# Patient Record
Sex: FEMALE | Race: WHITE | NOT HISPANIC OR LATINO | Employment: OTHER | URBAN - METROPOLITAN AREA
[De-identification: names, ages, dates, MRNs, and addresses within clinical notes are randomized per-mention and may not be internally consistent; named-entity substitution may affect disease eponyms.]

---

## 2017-12-13 ENCOUNTER — TRANSFERRED RECORDS (OUTPATIENT)
Dept: HEALTH INFORMATION MANAGEMENT | Facility: CLINIC | Age: 77
End: 2017-12-13

## 2018-03-06 ENCOUNTER — MEDICAL CORRESPONDENCE (OUTPATIENT)
Dept: HEALTH INFORMATION MANAGEMENT | Facility: CLINIC | Age: 78
End: 2018-03-06

## 2018-03-09 ENCOUNTER — MEDICAL CORRESPONDENCE (OUTPATIENT)
Dept: HEALTH INFORMATION MANAGEMENT | Facility: CLINIC | Age: 78
End: 2018-03-09

## 2018-03-20 ENCOUNTER — DOCUMENTATION ONLY (OUTPATIENT)
Dept: FAMILY MEDICINE | Facility: CLINIC | Age: 78
End: 2018-03-20

## 2018-03-20 DIAGNOSIS — R01.1 UNDIAGNOSED CARDIAC MURMURS: ICD-10-CM

## 2018-03-20 DIAGNOSIS — Z78.9 NURSING HOME RESIDENT: Primary | ICD-10-CM

## 2018-03-28 PROBLEM — F60.3 BORDERLINE PERSONALITY DISORDER (H): Status: ACTIVE | Noted: 2018-03-28

## 2018-03-28 PROBLEM — K21.9 GERD (GASTROESOPHAGEAL REFLUX DISEASE): Status: ACTIVE | Noted: 2018-03-28

## 2018-03-28 PROBLEM — F32.A DEPRESSIVE DISORDER: Status: ACTIVE | Noted: 2018-03-28

## 2018-03-29 NOTE — PROGRESS NOTES
Preceptor Attestation:   Patient seen, evaluated and discussed with the resident. I have verified the content of the note, which accurately reflects my assessment of the patient and the plan of care.   Supervising Physician:  Inder Lindsey MD

## 2018-03-29 NOTE — PROGRESS NOTES
"Subjective: Bhavana Marr is a 77 year old who is seen at [facility] nursing home for New patient visit today.    Additional information obtained from Nursing Staff RN. Patient is new to [facility]. Has a past medical history of MDD, Asthma, GERD, Borderline Personality disorder, Hyperlipidemia, osteoporosis  Acute concerns today include: No acute concern today    Chronic and Past Medical Problems include:  Patient Active Problem List   Diagnosis     Arthritis     Depressive disorder     Borderline personality disorder     GERD (gastroesophageal reflux disease)     Family History     Problem (# of Occurrences) Relation (Name,Age of Onset)    Depression (4) Maternal Aunt (60), Maternal Uncle (85), Paternal Aunt, Paternal Uncle        Social History:   Difficulty talking to patient. Would answer with \"yes\" or \"no\" and patient asked \"are we done\" when wanted to end interview  PMHX//MEDS/ALLERGIES/SHX/FHX reviewed and updated in Epic.   MEDICATION LIST:   Will need to obtain current med list to update medlist in chart    GENERAL ROS:   Patient had no general concerns. No fevers, chills, nausea, headache, chest pain trouble breathing, abd pain      Objective: LMP  (LMP Unknown)   /70 HR 78, RR 14, Temp 97.4  Gen: Well nourished and in NAD. Answers in one word answers or short sentence but overall cooperative and plesent  HEENT: Head is atraumatic,   CV: RR, 2+ systolic murmur heard best on Left mid axillary line  Pulm: CTAB, no wheezes/rales/rhonchi, good air entry   Extrem: no cyanosis, edema or clubbing   Psych: Euthymic, pleasant but answers with one word or brief answers  Neuro: Pt is able to ambulate independently       Assessment/ Plan:  1. Nursing home resident  New resident at [facility]. No complaints at this time, meeting with behavioural health at the home. Overall pleasant but not very conversive and conversation abrubtly ended with patient asking if \"we were finished\" Paper work was not available to review " vaccination, prior work up. Will need to review and scan into our system and ask at next visit. No acute concerns  - Continue current medications  - Will need to review pt's previous charts to see if patient needs vaccination, other diagnosis, or other w/u  - Need fax of medication or obtain med list at next visit    2. Undiagnosed cardiac murmurs  Patient noted to have a soft murmur on mitral area. No SOB, no chest pain, no swelling noted on exam. Will need to review previous charts once available to see if w/u done for murmur. Non- concerning at this time  - If no previous work up, can consider Echo       RECOMMENDED FOLLOW UP:  2 months.    Level of Service:  25594    Total of 20 minutes was spent in face to face contact with patient with > 50% in counseling and coordination of care.  Options for treatment and/or follow-up care were reviewed with the patient. Bhavana Marr was engaged and actively involved in the decision making process. She verbalized understanding of the options discussed and was satisfied with the final plan.    Phuc Davis  Family Medicine Resident PGY3

## 2018-04-10 DIAGNOSIS — F39 MOOD DISORDER (H): Primary | ICD-10-CM

## 2018-04-10 RX ORDER — PANTOPRAZOLE SODIUM 20 MG/1
20 TABLET, DELAYED RELEASE ORAL DAILY
Qty: 30 TABLET | Refills: 11 | Status: SHIPPED | OUTPATIENT
Start: 2018-04-10 | End: 2020-10-15

## 2018-04-10 RX ORDER — SERTRALINE HYDROCHLORIDE 100 MG/1
150 TABLET, FILM COATED ORAL DAILY
Qty: 30 TABLET | Refills: 11 | Status: ON HOLD | OUTPATIENT
Start: 2018-04-10 | End: 2023-05-31

## 2018-04-10 RX ORDER — TRAZODONE HYDROCHLORIDE 100 MG/1
100 TABLET ORAL AT BEDTIME
Qty: 30 TABLET | Refills: 11 | Status: SHIPPED | OUTPATIENT
Start: 2018-04-10 | End: 2018-09-24

## 2018-04-10 RX ORDER — MULTIVIT-MIN/FA/LYCOPEN/LUTEIN .4-300-25
TABLET ORAL
Qty: 30 TABLET | Refills: 11 | Status: SHIPPED | OUTPATIENT
Start: 2018-04-10 | End: 2019-09-18

## 2018-04-10 RX ORDER — MIRTAZAPINE 15 MG/1
15 TABLET, FILM COATED ORAL AT BEDTIME
Qty: 30 TABLET | Refills: 11 | Status: SHIPPED | OUTPATIENT
Start: 2018-04-10 | End: 2019-03-20

## 2018-04-10 RX ORDER — FERROUS SULFATE 325(65) MG
325 TABLET ORAL DAILY
Qty: 30 TABLET | Refills: 11 | Status: SHIPPED | OUTPATIENT
Start: 2018-04-10 | End: 2018-09-24

## 2018-04-10 RX ORDER — DOCUSATE SODIUM 100 MG/1
100 CAPSULE, LIQUID FILLED ORAL DAILY
Qty: 30 CAPSULE | Refills: 11 | Status: SHIPPED | OUTPATIENT
Start: 2018-04-10 | End: 2018-09-24

## 2018-04-10 RX ORDER — SIMVASTATIN 40 MG
40 TABLET ORAL AT BEDTIME
Qty: 30 TABLET | Refills: 11 | Status: SHIPPED | OUTPATIENT
Start: 2018-04-10

## 2018-04-10 RX ORDER — ACETAMINOPHEN 325 MG/1
650 TABLET ORAL 3 TIMES DAILY
Qty: 168 TABLET | Refills: 11 | Status: SHIPPED | OUTPATIENT
Start: 2018-04-10 | End: 2018-09-24

## 2018-04-10 RX ORDER — OLANZAPINE 5 MG/1
5 TABLET ORAL DAILY
Qty: 98 TABLET | Refills: 11 | Status: SHIPPED | OUTPATIENT
Start: 2018-04-10 | End: 2020-10-15

## 2018-05-02 DIAGNOSIS — J30.9 ALLERGIC RHINITIS, UNSPECIFIED CHRONICITY, UNSPECIFIED SEASONALITY, UNSPECIFIED TRIGGER: Primary | ICD-10-CM

## 2018-05-02 RX ORDER — FLUTICASONE PROPIONATE 50 MCG
2 SPRAY, SUSPENSION (ML) NASAL DAILY
Qty: 1 BOTTLE | Refills: 11 | Status: SHIPPED | OUTPATIENT
Start: 2018-05-02 | End: 2019-03-20

## 2018-05-16 NOTE — PROGRESS NOTES
"Per RAUL R[facility] \"wt 126.5, /79 P 106 R 14 T 98.4 60 day appt. Resident continues to do well, no medical issues at this time.\"  Avelina Glagissel, CMA    "

## 2018-05-22 ENCOUNTER — DOCUMENTATION ONLY (OUTPATIENT)
Dept: FAMILY MEDICINE | Facility: CLINIC | Age: 78
End: 2018-05-22

## 2018-05-22 VITALS
TEMPERATURE: 98.4 F | HEART RATE: 106 BPM | SYSTOLIC BLOOD PRESSURE: 123 MMHG | WEIGHT: 126.5 LBS | RESPIRATION RATE: 14 BRPM | DIASTOLIC BLOOD PRESSURE: 79 MMHG

## 2018-05-22 DIAGNOSIS — Z78.9 NURSING HOME RESIDENT: Primary | ICD-10-CM

## 2018-05-22 DIAGNOSIS — R01.1 HOLOSYSTOLIC MURMUR: ICD-10-CM

## 2018-05-22 NOTE — PROGRESS NOTES
Subjective: Bhavana Marr is a 77 year old who is seen at [facility] for follow up visit today.    Additional information obtained from Nursing Staff Josefina, who states that patient is doing well without concerns.   Acute concerns today include: None. Patient has no additional questions.   Chronic and Past Medical Problems include:  Patient Active Problem List   Diagnosis     Arthritis     Depressive disorder     Borderline personality disorder     GERD (gastroesophageal reflux disease)     Holosystolic murmur     Family History     Problem (# of Occurrences) Relation (Name,Age of Onset)    Depression (4) Maternal Aunt (60), Maternal Uncle (85), Paternal Aunt, Paternal Uncle        Social History:   Current activities:  Eating breakfast  PMHX/PSHX/MEDS/ALLERGIES/SHX/FHX reviewed and updated in Epic.   MEDICATION LIST:  Current Outpatient Prescriptions   Medication     acetaminophen (TYLENOL) 325 MG tablet     Calcium Oyster Shell 500 MG TABS     cholecalciferol (VITAMIN D3) 1000 UNIT tablet     Cyanocobalamin (B-12) 1000 MCG TBCR     docusate sodium (DOK) 100 MG capsule     ferrous sulfate (IRON) 325 (65 Fe) MG tablet     fluticasone (FLONASE) 50 MCG/ACT spray     mirtazapine (REMERON) 15 MG tablet     Multiple Vitamins-Minerals (CEROVITE SENIOR) TABS     OLANZapine (ZYPREXA) 5 MG tablet     pantoprazole (PROTONIX) 20 MG EC tablet     sertraline (ZOLOFT) 100 MG tablet     simvastatin (ZOCOR) 40 MG tablet     traZODone (DESYREL) 100 MG tablet     No current facility-administered medications for this visit.        GERIATRIC ROS:    Do you have difficulty getting around, watching TV or reading because of poor eyesight? No   Can you hear normal conversational voice? Yes  GENERAL ROS:   General: No unexplained weight gain or loss; adequate sleep pattern.  Resp: No worsening shortness of breath, cough or hemoptysis.   GI: No worsening constipation, no diarrhea, no nausea or vomiting  : Voiding  independently with no significant incontinence   Skin: No areas of new skin breakdown or worrisome rashes  Extremities:  No pain, extremity weakness or balance troubles    Objective: /79  Pulse 106  Temp 98.4  F (36.9  C)  Resp 14  Wt 126 lb 8 oz (57.4 kg)  LMP  (LMP Unknown)   Most recent weight:  126 lb  Gen: Well nourished and in NAD   HEENT: Head is atraumatic, moist mucous membranes  CV:  regular rate and rhythm, 2+ holosystolic murmur heard best at the left sternal border.  Pulm: CTAB, no wheezes/rales/rhonchi, good air entry   ABD: soft, nontender, BS intact  Extrem: no cyanosis, edema or clubbing   Psych: Euthymic.  Patient is alert and oriented ×3 converses normally.  Neuro: Pt is able to ambulate independently.    Preventative Screening:   Vaccinations reviewed: Patient did have Zostavax but would recommend Shingrix  Additional Recommended Screening: Echocardiogram for cardiac murmur     Assessment/ Plan:  Bhavana Munguia was seen today for other.    Diagnoses and all orders for this visit:    Nursing home resident.  Overall, patient doing well without concerns today.  She is able to ambulate independently and her mentation is normal.  Nursing staff has no further concerns.  Patient was recently seen for the first time by our clinic at which time vaccines and past records are unavailable.  Vaccine record was reviewed today, and the patient had the old Zostavax shingles vaccine, she would be recommended to get the new shingrix if able to afford.  Past medical records are unavailable, and nursing states they do not know that they will obtain these.  -Pharmacy to complete med reconciliation.    Holosystolic murmur.  Patient with a holosystolic murmur, likely aortic stenosis.  Recommend patient to get an echo for evaluation of this as she does not know if she has had one in the past and past records are not available for her.    RECOMMENDED FOLLOW UP:  2 months.    Level of Service:  07331    Whitney  MD Rodger  PGY-2, [facility]   Pager: [phone]

## 2018-06-08 DIAGNOSIS — J45.909 UNCOMPLICATED ASTHMA, UNSPECIFIED ASTHMA SEVERITY, UNSPECIFIED WHETHER PERSISTENT: Primary | ICD-10-CM

## 2018-06-08 RX ORDER — ALBUTEROL SULFATE 90 UG/1
1-2 AEROSOL, METERED RESPIRATORY (INHALATION) EVERY 4 HOURS PRN
Qty: 1 INHALER | Refills: 1 | Status: SHIPPED | OUTPATIENT
Start: 2018-06-08 | End: 2021-02-23

## 2018-07-13 ENCOUNTER — TRANSFERRED RECORDS (OUTPATIENT)
Dept: HEALTH INFORMATION MANAGEMENT | Facility: CLINIC | Age: 78
End: 2018-07-13

## 2018-07-26 NOTE — PROGRESS NOTES
Per Josefina, RN at [facility]: 60 day appt wt 125 /80 P 78 R 16 T 98.2 Bhavana struggles with keeping her food down at times. She is many years post op gastric bypass and is maintaining her weight only by taking ensure QID. I am wondering if she should have an EGD to find out if she has some strictures or something else going on that is hindering her eating? Also she reports no pain from her fx, could you discuss discontinuing her scheduled tylenol? Needs POLST completed.    Avelina Moreau, CMA

## 2018-07-27 ENCOUNTER — MEDICAL CORRESPONDENCE (OUTPATIENT)
Dept: HEALTH INFORMATION MANAGEMENT | Facility: CLINIC | Age: 78
End: 2018-07-27

## 2018-07-30 ENCOUNTER — DOCUMENTATION ONLY (OUTPATIENT)
Dept: FAMILY MEDICINE | Facility: CLINIC | Age: 78
End: 2018-07-30

## 2018-07-30 DIAGNOSIS — Z78.9 NURSING HOME RESIDENT: ICD-10-CM

## 2018-07-30 DIAGNOSIS — Z98.84 HISTORY OF GASTRIC BYPASS: ICD-10-CM

## 2018-07-30 DIAGNOSIS — R11.10 VOMITING, INTRACTABILITY OF VOMITING NOT SPECIFIED, PRESENCE OF NAUSEA NOT SPECIFIED, UNSPECIFIED VOMITING TYPE: Primary | ICD-10-CM

## 2018-07-30 RX ORDER — OLANZAPINE 5 MG/1
12.5 TABLET ORAL AT BEDTIME
COMMUNITY
Start: 2018-07-30 | End: 2020-10-15

## 2018-07-30 RX ORDER — GUAIFENESIN/DEXTROMETHORPHAN 100-10MG/5
5 SYRUP ORAL EVERY 4 HOURS PRN
Qty: 560 ML | COMMUNITY
Start: 2018-07-30 | End: 2018-10-17

## 2018-07-30 NOTE — PROGRESS NOTES
Subjective: Bhavana Marr is a 77 year old who is seen at [facility] for follow up visit today.  Additional information obtained from Nursing Staff Josefina  Acute concerns today include: Difficulty swallowing/vomiting   Patient states she has difficulty keeping food down. She does not seem to have any difficulty swallowing but shortly after eating or hours later she will start to vomit. Patient states is does not seem to happen at any particular time of day or with any type of food. She has a history of a gastric bypass surgery and feels it is associated with portion size. She has been watching her portion sizes recently and states it has not happened. Nursing staff confirmed history and noted it had not happened for the last two weeks.   Chronic and Past Medical Problems include:  Patient Active Problem List   Diagnosis     Arthritis     Depressive disorder     Borderline personality disorder     GERD (gastroesophageal reflux disease)     Holosystolic murmur     Asthma     Family History     Problem (# of Occurrences) Relation (Name,Age of Onset)    Depression (4) Maternal Aunt (60), Maternal Uncle (85), Paternal Aunt, Paternal Uncle        Social History:   Current activities:  Sitting in room  PMHX/PSHX/MEDS/ALLERGIES/SHX/FHX reviewed and updated in Epic.   MEDICATION LIST:  Current Outpatient Prescriptions   Medication     acetaminophen (TYLENOL) 325 MG tablet     albuterol (PROAIR HFA/PROVENTIL HFA/VENTOLIN HFA) 108 (90 Base) MCG/ACT Inhaler     Calcium Oyster Shell 500 MG TABS     cholecalciferol (VITAMIN D3) 1000 UNIT tablet     Cyanocobalamin (B-12) 1000 MCG TBCR     docusate sodium (DOK) 100 MG capsule     ferrous sulfate (IRON) 325 (65 Fe) MG tablet     fluticasone (FLONASE) 50 MCG/ACT spray     mirtazapine (REMERON) 15 MG tablet     Multiple Vitamins-Minerals (CEROVITE SENIOR) TABS     OLANZapine (ZYPREXA) 5 MG tablet     pantoprazole (PROTONIX) 20 MG EC tablet     sertraline (ZOLOFT) 100  MG tablet     simvastatin (ZOCOR) 40 MG tablet     traZODone (DESYREL) 100 MG tablet     No current facility-administered medications for this visit.      GERIATRIC ROS:    Do you have difficulty getting around, watching TV or reading because of poor eyesight? No   Can you hear normal conversational voice? No   Do you have trouble with control of your bladder?No   Do you have trouble with control of your bowels? No     GENERAL ROS:   General: No unexplained weight gain or loss; adequate sleep pattern.  Resp: No worsening shortness of breath, cough or hemoptysis.   GI: No worsening constipation, no diarrhea, no nausea or vomiting  : Voiding independently with no significant incontinence   Skin: No areas of new skin breakdown or worrisome rashes  Extremities:  No pain, extremity weakness or balance troubles    Objective:wt 125 /80 P 78 R 16 T 98.2   Gen: Well nourished and in NAD   HEENT: Head is atraumatic, decent dentition  CV: RRR, 2/6 holosystolic murmur heard best at the left sternal border  Pulm: CTAB, no wheezes/rales/rhonchi, good air entry   ABD: soft, nontender, BS intact  Extrem: no cyanosis, edema or clubbing   Psych: Euthymic. Patient is alert and oriented ×3 converses normally.  Neuro: Pt is able to ambulate independently    Preventative Screening:   Additional Recommended Screening: Swallow study with contrast     POLST has been completed today     Assessment/ Plan:  Nursing home resident  Overall, patient doing well.  She is able to ambulate independently and her mentation is normal.       Vomiting, intractability of vomiting not specified, presence of nausea not specified, unspecified vomiting type    History of gastric bypass    Due to patient history of gastric bypass will opt to get a swallow study to further evaluate. May just be due to large portion sizes in the setting of gastric bypass history but it is interesting that this is fairly new. Gastric Bypass was in 2000. Collect CMP, CBC,  and TSH to evaluate nutritional status in the setting of vomiting   -X-Ray Swallow Study w/contrast   -CMP, CBC, and TSH on next lab day.     RECOMMENDED FOLLOW UP:  2 months.    Level of Service:  04511    Total of 30 minutes was spent in face to face contact with patient with > 50% in counseling and coordination of care.  Options for treatment and/or follow-up care were reviewed with the patient. Bhavana Marr was engaged and actively involved in the decision making process. She verbalized understanding of the options discussed and was satisfied with the final plan.      Katarzyna Oneal

## 2018-07-31 NOTE — PROGRESS NOTES
Preceptor Attestation:   Patient seen, evaluated and discussed with the resident. I have verified the content of the note, which accurately reflects my assessment of the patient and the plan of care.   Supervising Physician:  David Lewis MD

## 2018-08-16 ENCOUNTER — TRANSFERRED RECORDS (OUTPATIENT)
Dept: HEALTH INFORMATION MANAGEMENT | Facility: CLINIC | Age: 78
End: 2018-08-16

## 2018-09-20 ENCOUNTER — MEDICAL CORRESPONDENCE (OUTPATIENT)
Dept: HEALTH INFORMATION MANAGEMENT | Facility: CLINIC | Age: 78
End: 2018-09-20

## 2018-09-20 DIAGNOSIS — R01.1 HOLOSYSTOLIC MURMUR: Primary | ICD-10-CM

## 2018-09-20 NOTE — PROGRESS NOTES
Echocardiogram Complete  September 20, 2018 at 2:54 pm Demographics, referral and medication list faxed to [facility] at [phone] who will contact patient for scheduling   [facility]  Phone: [phone]  Fax: [phone]

## 2018-09-21 ENCOUNTER — RECORDS - HEALTHEAST (OUTPATIENT)
Dept: ADMINISTRATIVE | Facility: OTHER | Age: 78
End: 2018-09-21

## 2018-09-21 NOTE — PROGRESS NOTES
September 21, 2018 at 2:58 pm faxed updated demographics and referral to [facility] at [phone]

## 2018-09-21 NOTE — PROGRESS NOTES
Per Josefina RN at [facility]:  60D Bp 97/69 P 106 R 16 T 98.6 Bhavana had a visit from a PA from [facility] yesterday and she was very concerned about what she stated was a grade 4/6 heart murmur which prompted an email to find out about an ECHO which was discussed in June. Bhavana does spend a lot of time in bed, she usually gets up for meals, eats very quickly then goes back up to bed. She never c/o chest pain or discomfort, just fatigue and sleeps a lot.  Avelina Moreau, CMA

## 2018-09-24 ENCOUNTER — DOCUMENTATION ONLY (OUTPATIENT)
Dept: FAMILY MEDICINE | Facility: CLINIC | Age: 78
End: 2018-09-24

## 2018-09-24 DIAGNOSIS — F39 MOOD DISORDER (H): ICD-10-CM

## 2018-09-24 DIAGNOSIS — K59.00 CONSTIPATION, UNSPECIFIED CONSTIPATION TYPE: Primary | ICD-10-CM

## 2018-09-24 RX ORDER — TRAZODONE HYDROCHLORIDE 100 MG/1
50 TABLET ORAL AT BEDTIME
Qty: 90 TABLET | Refills: 3 | Status: SHIPPED | OUTPATIENT
Start: 2018-09-24 | End: 2020-10-15

## 2018-09-24 RX ORDER — DOCUSATE SODIUM 100 MG/1
100 CAPSULE, LIQUID FILLED ORAL DAILY
Qty: 30 CAPSULE | Refills: 11 | Status: SHIPPED | OUTPATIENT
Start: 2018-09-24 | End: 2018-10-02 | Stop reason: ALTCHOICE

## 2018-09-24 RX ORDER — FERROUS SULFATE 325(65) MG
325 TABLET ORAL DAILY
Qty: 30 TABLET | Refills: 11 | Status: SHIPPED | OUTPATIENT
Start: 2018-09-24 | End: 2018-09-28

## 2018-09-24 RX ORDER — ASPIRIN 81 MG
100 TABLET, DELAYED RELEASE (ENTERIC COATED) ORAL DAILY PRN
Qty: 60 TABLET | Refills: 1 | COMMUNITY
Start: 2018-09-24 | End: 2018-10-02 | Stop reason: ALTCHOICE

## 2018-09-24 RX ORDER — ACETAMINOPHEN 325 MG/1
650 TABLET ORAL 3 TIMES DAILY PRN
Qty: 168 TABLET | Refills: 11 | COMMUNITY
Start: 2018-09-24 | End: 2020-10-15

## 2018-09-24 NOTE — PROGRESS NOTES
Subjective: Bhavana Marr is a 77 year old who is seen at [facility] nursing home for follow up visit today.    Additional information obtained from Nursing Staff, and includes:  Scheduling echo for her murmur.  Spends a lot of time in bed.  Eats her meals quickly.    Acute concerns today include: She is doing well and is wondering about the medications to help her sleep.  She believes something was decreased and that he is not sleeping as well anymore.  No headache, lightheadedness, SOB, fatigue, or dizziness.  Her nausea and vomiting have resolved and she tries to eat different foods and more slowly to not make her stomach get as upset.  She does alternate between hard and soft stools regularly.    Chronic and Past Medical Problems include:  Patient Active Problem List   Diagnosis     Arthritis     Depressive disorder     Borderline personality disorder     GERD (gastroesophageal reflux disease)     Holosystolic murmur     Asthma     History of gastric bypass     Family History     Problem (# of Occurrences) Relation (Name,Age of Onset)    Depression (4) Maternal Aunt (60), Maternal Uncle (85), Paternal Aunt, Paternal Uncle      Social History:   Current activities:  Sleeping in bed on arrival, stays in her room a fair amount during the day.    PMHX/PSHX/MEDS/ALLERGIES/SHX/FHX reviewed and updated in Epic.   MEDICATION LIST:  Current Outpatient Prescriptions   Medication     acetaminophen (TYLENOL) 325 MG tablet     docusate sodium (COLACE) 100 MG tablet     docusate sodium (DOK) 100 MG capsule     ferrous sulfate (IRON) 325 (65 Fe) MG tablet     traZODone (DESYREL) 100 MG tablet     albuterol (PROAIR HFA/PROVENTIL HFA/VENTOLIN HFA) 108 (90 Base) MCG/ACT Inhaler     Calcium Oyster Shell 500 MG TABS     cholecalciferol (VITAMIN D3) 1000 UNIT tablet     Cyanocobalamin (B-12) 1000 MCG TBCR     fluticasone (FLONASE) 50 MCG/ACT spray     guaiFENesin-dextromethorphan (ROBITUSSIN DM) 100-10 MG/5ML syrup     mirtazapine  (REMERON) 15 MG tablet     Multiple Vitamins-Minerals (CEROVITE SENIOR) TABS     OLANZapine (ZYPREXA) 5 MG tablet     OLANZapine (ZYPREXA) 5 MG tablet     pantoprazole (PROTONIX) 20 MG EC tablet     sertraline (ZOLOFT) 100 MG tablet     simvastatin (ZOCOR) 40 MG tablet     [DISCONTINUED] traZODone (DESYREL) 100 MG tablet     No current facility-administered medications for this visit.    GERIATRIC ROS:    Do you have difficulty getting around, watching TV or reading because of poor eyesight?  No   Can you hear normal conversational voice? Yes   Do you use hearing aides? No   Do you have problems with your memory?  No   Do you often feel sad or depressed? No   Have you unintentionally lost weight in the last 6 months? No   Do you have trouble with control of your bladder?  No   Do you have trouble with control of your bowels?  No   Have you had any falls in the past year? No    GENERAL ROS:   General: No unexplained weight gain or loss; adequate sleep pattern.  Resp: No worsening shortness of breath, cough or hemoptysis.   GI: No worsening constipation, +loose stools occasionally, no nausea or vomiting  : Voiding independently with no significant incontinence   Skin: No areas of new skin breakdown or worrisome rashes  Extremities:  No pain, extremity weakness or balance troubles    Objective: BP 97/69  RR 16 T 98.6  Gen: Well nourished and in NAD   HEENT: Head is atraumatic, decent dentition  CV: RRR, holosystolic murmur heard best at upper sternal border  Pulm: CTAB, no wheezes/rales/rhonchi, good air entry   ABD: soft, nontender, BS intact  Extrem: no cyanosis, edema or clubbing   Psych: Euthymic  Neuro: Pt is able to ambulate independently    Preventative Screening:   Vaccinations reviewed and up to date  Additional Recommended Screening: None    POLST has been completed     Assessment/ Plan:  1. Mood disorder (H)  Stable.  No acute concerns.  Tylenol was scheduled daily despite no voiced pain or  discomfort so this was changed to TID PRN pain.  - traZODone (DESYREL) 100 MG tablet; Take 0.5 tablets (50 mg) by mouth At Bedtime  Dispense: 90 tablet; Refill: 3  - ferrous sulfate (IRON) 325 (65 Fe) MG tablet; Take 1 tablet (325 mg) by mouth daily  Dispense: 30 tablet; Refill: 11  - acetaminophen (TYLENOL) 325 MG tablet; Take 2 tablets (650 mg) by mouth 3 times daily as needed for mild pain  Dispense: 168 tablet; Refill: 11  - docusate sodium (DOK) 100 MG capsule; Take 1 capsule (100 mg) by mouth daily  Dispense: 30 capsule; Refill: 11    Loose stools  Patient with more loose stools.  Has history of gastric bypass as well as dumping syndrome.  Has improved with dietary changes.  No further nausea or vomiting.  Will decreased Colace to daily PRN for now and re-evaluate at next visit.    Echocardiogram has been scheduled and will be discussed with patient at her next visit.  Orders were places on 9/21/18.     RECOMMENDED FOLLOW UP:  2 months.    Level of Service:  92568    Total of 30 minutes was spent in face to face contact with patient with > 50% in counseling and coordination of care.  Options for treatment and/or follow-up care were reviewed with the patient. Bhavana Marr was engaged and actively involved in the decision making process. She verbalized understanding of the options discussed and was satisfied with the final plan.    Becki Lynne    Discussed with Dr. Inder Lindsey.

## 2018-09-28 DIAGNOSIS — F39 MOOD DISORDER (H): ICD-10-CM

## 2018-09-28 RX ORDER — FERROUS SULFATE 325(65) MG
325 TABLET ORAL DAILY
Qty: 30 TABLET | Refills: 11 | Status: SHIPPED | OUTPATIENT
Start: 2018-09-28 | End: 2018-10-17

## 2018-10-01 ENCOUNTER — DOCUMENTATION ONLY (OUTPATIENT)
Dept: FAMILY MEDICINE | Facility: CLINIC | Age: 78
End: 2018-10-01

## 2018-10-01 DIAGNOSIS — L73.1 INGROWN HAIR: Primary | ICD-10-CM

## 2018-10-01 DIAGNOSIS — K59.09 OTHER CONSTIPATION: ICD-10-CM

## 2018-10-01 RX ORDER — ASPIRIN 81 MG
100 TABLET, DELAYED RELEASE (ENTERIC COATED) ORAL 2 TIMES DAILY PRN
Qty: 60 TABLET | Refills: 1 | COMMUNITY
Start: 2018-10-01 | End: 2018-10-17

## 2018-10-01 NOTE — PROGRESS NOTES
ASSESSMENT AND PLAN     1. Ingrown hair  Noted on L labia majora  - Warm compresses BID until resolved  - Avoid shaving in this area    2. Other constipation  - docusate sodium (COLACE) 100 MG tablet; Take 100 mg by mouth 2 times daily as needed for constipation  Dispense: 60 tablet; Refill: 1    Follow-up at next nursing home visit or sooner if needed    The patient was seen by, and discussed with, Dr. Lewis who agrees with the plan.    Patricia Skinner MD  PGY-3  Pager # [phone]         LIZET Eason is a 77 year old  female with a PMH significant for:  History of gastric bypass surgery, borderline personality disorder, lumbosacral intervertebral disc degeneration, major depressive disorder, hyperlipidemia, osteoporosis, vitamin D deficiency, insomnia, asthma, GERD who is being seen for a labial lesion.    Patient reports she noticed this lesion approximately 10 days ago.  It is located on her left labia.  She states it is not painful or itchy.  She denies any vaginal discharge.  She reports a history of genital herpes in the past but this feels a little bit different.  She has not tried anything to treat this.    She also reports difficulties with constipation.  Reports rare blood in her stools.  No significant abdominal pain.  She is currently receiving Colace once daily as needed.  Per nursing reports, patient actually reports diarrhea more than she reports constipation.    PMH, Medications and Allergies were reviewed and updated as needed.        REVIEW OF SYSTEMS     As per HPI        OBJECTIVE     VS not reviewed     Constitutional: Awake, alert, cooperative, no apparent distress, and appears stated age.  Lungs: No increased work of breathing, good air exchange, clear to auscultation bilaterally, no crackles or wheezing.  Cardiovascular: Regular rate and rhythm, normal S1 and S2, no S3 or S4, and moderate systolic murmur noted  Abdomen: Normal bowel sounds, soft, non-distended,  non-tender  Genitourinary: 0.5 cm erythematous papule surrounding a pubic hair noted on left labia majora.  No significant warmth or swelling.  No other lesions noted on the labia majora or minora  Neuropsychiatric: Normal affect, mood    No results found for this or any previous visit (from the past 24 hour(s)).

## 2018-10-02 ENCOUNTER — HOSPITAL ENCOUNTER (OUTPATIENT)
Dept: CARDIOLOGY | Facility: CLINIC | Age: 78
Discharge: HOME | End: 2018-10-02
Attending: FAMILY MEDICINE

## 2018-10-02 ENCOUNTER — TRANSFERRED RECORDS (OUTPATIENT)
Dept: HEALTH INFORMATION MANAGEMENT | Facility: CLINIC | Age: 78
End: 2018-10-02

## 2018-10-02 DIAGNOSIS — R01.1 HOLOSYSTOLIC MURMUR: ICD-10-CM

## 2018-10-02 DIAGNOSIS — E78.5 HYPERLIPIDEMIA LDL GOAL <130: ICD-10-CM

## 2018-10-02 DIAGNOSIS — G47.09 OTHER INSOMNIA: ICD-10-CM

## 2018-10-02 LAB
AORTIC ROOT: 3.2 CM
AORTIC VALVE MEAN VELOCITY: 141 CM/S
ASCENDING AORTA: 4.1 CM
AV DIMENSIONLESS INDEX VTI: 1.1
AV MEAN GRADIENT: 9 MMHG
AV PEAK GRADIENT: 18.3 MMHG
AV VALVE AREA: 1.7 CM2
AV VELOCITY RATIO: 0.9
BSA FOR ECHO PROCEDURE: 1.61 M2
CV BLOOD PRESSURE: NORMAL MMHG
CV ECHO HEIGHT: 65 IN
CV ECHO WEIGHT: 125 LBS
DOP CALC AO PEAK VEL: 214 CM/S
DOP CALC AO VTI: 41.2 CM
DOP CALC LVOT AREA: 1.54 CM2
DOP CALC LVOT DIAMETER: 1.4 CM
DOP CALC LVOT PEAK VEL: 190 CM/S
DOP CALC LVOT STROKE VOLUME: 68.3 CM3
DOP CALCLVOT PEAK VEL VTI: 44.4 CM
EJECTION FRACTION: 65 % (ref 55–75)
FRACTIONAL SHORTENING: 37.8 % (ref 28–44)
INTERVENTRICULAR SEPTUM IN END DIASTOLE: 1.3 CM (ref 0.6–0.9)
IVS/PW RATIO: 1.4
LA AREA 1: 26.9 CM2
LA AREA 2: 21.2 CM2
LEFT ATRIUM LENGTH: 5.95 CM
LEFT ATRIUM SIZE: 4.4 CM
LEFT ATRIUM VOLUME INDEX: 50.6 ML/M2
LEFT ATRIUM VOLUME: 81.5 ML
LEFT VENTRICLE CARDIAC INDEX: 2.8 L/MIN/M2
LEFT VENTRICLE CARDIAC OUTPUT: 4.6 L/MIN
LEFT VENTRICLE DIASTOLIC VOLUME INDEX: 44.7 CM3/M2 (ref 34–74)
LEFT VENTRICLE DIASTOLIC VOLUME: 72 CM3 (ref 46–106)
LEFT VENTRICLE HEART RATE: 67 BPM
LEFT VENTRICLE MASS INDEX: 80.3 G/M2
LEFT VENTRICLE SYSTOLIC VOLUME INDEX: 15.5 CM3/M2 (ref 11–31)
LEFT VENTRICLE SYSTOLIC VOLUME: 25 CM3 (ref 14–42)
LEFT VENTRICULAR INTERNAL DIMENSION IN DIASTOLE: 3.7 CM (ref 3.8–5.2)
LEFT VENTRICULAR INTERNAL DIMENSION IN SYSTOLE: 2.3 CM (ref 2.2–3.5)
LEFT VENTRICULAR MASS: 129.3 G
LEFT VENTRICULAR OUTFLOW TRACT MEAN GRADIENT: 10 MMHG
LEFT VENTRICULAR OUTFLOW TRACT MEAN VELOCITY: 147 CM/S
LEFT VENTRICULAR OUTFLOW TRACT PEAK GRADIENT: 14 MMHG
LEFT VENTRICULAR POSTERIOR WALL IN END DIASTOLE: 0.9 CM (ref 0.6–0.9)
LV STROKE VOLUME INDEX: 42.4 ML/M2
MITRAL VALVE E/A RATIO: 0.8
MV AVERAGE E/E' RATIO: 14.5 CM/S
MV DECELERATION TIME: 356 MS
MV E'TISSUE VEL-LAT: 6.82 CM/S
MV E'TISSUE VEL-MED: 5.07 CM/S
MV LATERAL E/E' RATIO: 12.7
MV MEDIAL E/E' RATIO: 17
MV PEAK A VELOCITY: 110 CM/S
MV PEAK E VELOCITY: 86.4 CM/S
NUC REST DIASTOLIC VOLUME INDEX: 2000 LBS
NUC REST SYSTOLIC VOLUME INDEX: 65 IN
TRICUSPID VALVE ANULAR PLANE SYSTOLIC EXCURSION: 1.7 CM

## 2018-10-02 ASSESSMENT — MIFFLIN-ST. JEOR: SCORE: 1042.88

## 2018-10-03 ENCOUNTER — DOCUMENTATION ONLY (OUTPATIENT)
Dept: FAMILY MEDICINE | Facility: CLINIC | Age: 78
End: 2018-10-03

## 2018-10-03 NOTE — PROGRESS NOTES
"Per Josefina RN at [facility]:  There has been a discrepancy with Bhavana Marr's ferrous sulfate. It was decreased to every other day in early August I believe it was a pharmacy request to \"does she really need it\" Her HGB was 12.7, Dr. Lewis has sent refill orders for both QOD and QD. Can you please clarify with him what it should be?     Avelina  "

## 2018-10-11 NOTE — PROGRESS NOTES
Per Josefina RN at [facility]:  f/u ECHO wt 120 /68 P 80 R 14 T 97.5. Bhavana had her ECHO last week so you should have the results to see her on Monday to discuss the findings.  Avelina Moreau, CMA

## 2018-10-15 ENCOUNTER — DOCUMENTATION ONLY (OUTPATIENT)
Dept: FAMILY MEDICINE | Facility: CLINIC | Age: 78
End: 2018-10-15

## 2018-10-15 DIAGNOSIS — Z71.2 ENCOUNTER TO DISCUSS TEST RESULTS: Primary | ICD-10-CM

## 2018-10-15 DIAGNOSIS — Z78.9 NURSING HOME RESIDENT: ICD-10-CM

## 2018-10-15 DIAGNOSIS — I35.0 MILD AORTIC STENOSIS: ICD-10-CM

## 2018-10-15 DIAGNOSIS — I34.0 MILD MITRAL REGURGITATION: ICD-10-CM

## 2018-10-17 ENCOUNTER — DOCUMENTATION ONLY (OUTPATIENT)
Dept: FAMILY MEDICINE | Facility: CLINIC | Age: 78
End: 2018-10-17

## 2018-10-17 VITALS
TEMPERATURE: 97.5 F | DIASTOLIC BLOOD PRESSURE: 68 MMHG | WEIGHT: 120 LBS | RESPIRATION RATE: 14 BRPM | HEART RATE: 80 BPM | SYSTOLIC BLOOD PRESSURE: 110 MMHG

## 2018-10-17 DIAGNOSIS — F39 MOOD DISORDER (H): ICD-10-CM

## 2018-10-17 DIAGNOSIS — K59.09 OTHER CONSTIPATION: ICD-10-CM

## 2018-10-17 PROBLEM — I35.0 MILD AORTIC STENOSIS: Status: ACTIVE | Noted: 2018-10-17

## 2018-10-17 PROBLEM — I34.0 MILD MITRAL REGURGITATION: Status: ACTIVE | Noted: 2018-10-17

## 2018-10-17 RX ORDER — GUAIFENESIN/DEXTROMETHORPHAN 100-10MG/5
10 SYRUP ORAL EVERY 4 HOURS PRN
Qty: 560 ML | Status: ON HOLD | COMMUNITY
Start: 2018-10-17 | End: 2023-05-31

## 2018-10-17 RX ORDER — FERROUS SULFATE 325(65) MG
325 TABLET ORAL
Qty: 30 TABLET | Refills: 11 | COMMUNITY
Start: 2018-10-17 | End: 2018-10-22

## 2018-10-17 RX ORDER — ASPIRIN 81 MG
100 TABLET, DELAYED RELEASE (ENTERIC COATED) ORAL DAILY
Qty: 60 TABLET | Refills: 1 | COMMUNITY
Start: 2018-10-17 | End: 2019-03-20

## 2018-10-17 RX ORDER — MAGNESIUM HYDROXIDE/ALUMINUM HYDROXICE/SIMETHICONE 120; 1200; 1200 MG/30ML; MG/30ML; MG/30ML
15 SUSPENSION ORAL
Refills: 0 | Status: ON HOLD | COMMUNITY
Start: 2018-10-17 | End: 2023-05-31

## 2018-10-17 NOTE — PROGRESS NOTES
"Given med list from [facility] ([place], [place]) to reconcile our list with theirs. Accurately verified and updated our medication list to match the [facility].      Per [facility] Order Summary: \"Pharmacy may fill orders for 1 year from date signed unless otherwise specified\"      Carloine Dyer, PharmD Resident  "

## 2018-10-18 NOTE — PROGRESS NOTES
Chief Complaint   Patient presents with     Other     [facility]          SUBJECTIVE       Bhavana Marr is a 77 year old  female with a PMH significant for:     Patient Active Problem List   Diagnosis     Arthritis     Depressive disorder     Borderline personality disorder (H)     GERD (gastroesophageal reflux disease)     Holosystolic murmur     Asthma     History of gastric bypass     Hyperlipidemia LDL goal <130     Other insomnia     Mild mitral regurgitation     Mild aortic stenosis     Patient is seen today at [facility] for follow-up of her echo results.  Patient was noted to have a murmur earlier this year, an echo was performed last week. She wants to know the results and has no other questions or concerns.        REVIEW OF SYSTEMS   Patient states she is feeling overall well.  Denies any palpitations, chest pain, shortness of breath, vision changes, nausea or vomiting.        OBJECTIVE       Physical Exam:  /68  Pulse 80  Temp 97.5  F (36.4  C)  Resp 14  Wt 120 lb (54.4 kg)  LMP  (LMP Unknown)  There is no height or weight on file to calculate BMI.      General: N acute distress  HEENT: Atraumatic, PERRL  Neck: Supple  CV: moderate systolic murmur, regular rate and rhythm  Pulm: CTAB, no wheezing, rhonchi or crackles  Abdm: Soft, NT, ND, +bowel sounds  Psych: Calm, normal affect        Echo:    No previous study for comparison.    Left ventricle ejection fraction is normal. The calculated left ventricular ejection fraction is 65%.    Normal left ventricular size and systolic function.    Left atrial volume is mildly increased.    Mild aortic stenosis.    Mild mitral regurgitation.    The ascending aorta is mildly dilated.      ASSESSMENT AND PLAN     Bhavana Munguia was seen today for other.    Diagnoses and all orders for this visit:    Encounter to discuss test results  Nursing home resident  Mild mitral regurgitation  Mild aortic stenosis.  Patient is overall feeling well this  morning.  Spent time discussing her echo results from last week.  Echo was done due to a newly noted murmur.  It showed mild aortic stenosis with mild mitral regurgitation and otherwise normal function.  Can discuss repeating the procedure depending on her clinical picture, and will plan to discuss this with her in 1 year.    Follow-up per routine.    LOS: [id number]    I precepted with Dr. Rosalia Soares MD  PGY-3, [facility]   Pager: [phone]

## 2018-10-22 ENCOUNTER — MEDICAL CORRESPONDENCE (OUTPATIENT)
Dept: HEALTH INFORMATION MANAGEMENT | Facility: CLINIC | Age: 78
End: 2018-10-22

## 2018-10-22 DIAGNOSIS — F39 MOOD DISORDER (H): ICD-10-CM

## 2018-10-22 DIAGNOSIS — K59.00 CONSTIPATION, UNSPECIFIED CONSTIPATION TYPE: Primary | ICD-10-CM

## 2018-10-22 DIAGNOSIS — Z98.84 HISTORY OF GASTRIC BYPASS: ICD-10-CM

## 2018-10-23 RX ORDER — DOCUSATE SODIUM 100 MG/1
CAPSULE, LIQUID FILLED ORAL
Qty: 30 CAPSULE | Refills: 12 | Status: SHIPPED | OUTPATIENT
Start: 2018-10-23 | End: 2019-12-19

## 2018-10-23 RX ORDER — FERROUS SULFATE 325(65) MG
TABLET ORAL
Qty: 30 TABLET | Refills: 12 | Status: ON HOLD | OUTPATIENT
Start: 2018-10-23 | End: 2023-05-31

## 2018-10-24 NOTE — PROGRESS NOTES
I have verified the content of the note, which accurately reflects my assessment of the patient and the plan of care.   Trinity Castro, PharmD

## 2018-11-09 ENCOUNTER — TRANSFERRED RECORDS (OUTPATIENT)
Dept: HEALTH INFORMATION MANAGEMENT | Facility: CLINIC | Age: 78
End: 2018-11-09

## 2018-11-20 NOTE — PROGRESS NOTES
Per Josefina RN at [facility]:  41 60 day apt wt 121 /71 P 66 R 12 T 98.5 Bhavana has had no complaints other than the occasional vomiting episode after eating.   Avelina Moreau, CMA

## 2018-11-26 ENCOUNTER — DOCUMENTATION ONLY (OUTPATIENT)
Dept: FAMILY MEDICINE | Facility: CLINIC | Age: 78
End: 2018-11-26

## 2018-11-26 DIAGNOSIS — H91.93 BILATERAL HEARING LOSS, UNSPECIFIED HEARING LOSS TYPE: Primary | ICD-10-CM

## 2018-11-28 NOTE — PROGRESS NOTES
Subjective: Bhavana Marr is a 77 year old who is seen at [facility] nursing home for follow up visit today.    Additional information obtained from Nursing Staff Josefina, and includes:  No concerns  Acute concerns today include: None.     Chronic and Past Medical Problems include:  Patient Active Problem List   Diagnosis     Arthritis     Depressive disorder     Borderline personality disorder (H)     GERD (gastroesophageal reflux disease)     Holosystolic murmur     Asthma     History of gastric bypass     Hyperlipidemia LDL goal <130     Other insomnia     Mild mitral regurgitation     Mild aortic stenosis     Family History     Problem (# of Occurrences) Relation (Name,Age of Onset)    Depression (4) Maternal Aunt (60), Maternal Uncle (85), Paternal Aunt, Paternal Uncle        Social History:   Current activities: per nursing home    PMHX/PSHX/MEDS/ALLERGIES/SHX/FHX reviewed and updated in Epic.   MEDICATION LIST:  Current Outpatient Prescriptions   Medication     acetaminophen (TYLENOL) 325 MG tablet     albuterol (PROAIR HFA/PROVENTIL HFA/VENTOLIN HFA) 108 (90 Base) MCG/ACT Inhaler     alum & mag hydroxide-simethicone (MYLANTA/MAALOX) 200-200-20 MG/5ML SUSP suspension     Calcium Oyster Shell 500 MG TABS     cholecalciferol (VITAMIN D3) 1000 UNIT tablet     Cyanocobalamin (B-12) 1000 MCG TBCR     docusate sodium (COLACE) 100 MG tablet      MG capsule     FEROSUL 325 (65 Fe) MG tablet     fluticasone (FLONASE) 50 MCG/ACT spray     guaiFENesin-dextromethorphan (ROBITUSSIN DM) 100-10 MG/5ML syrup     mirtazapine (REMERON) 15 MG tablet     Multiple Vitamins-Minerals (CEROVITE SENIOR) TABS     OLANZapine (ZYPREXA) 5 MG tablet     OLANZapine (ZYPREXA) 5 MG tablet     pantoprazole (PROTONIX) 20 MG EC tablet     sertraline (ZOLOFT) 100 MG tablet     simvastatin (ZOCOR) 40 MG tablet     traZODone (DESYREL) 100 MG tablet     No current facility-administered medications for this visit.        GERIATRIC ROS:    Do you  have difficulty getting around, watching TV or reading because of poor eyesight?  No   Can you hear normal conversational voice? NO  Do you use hearing aides? NO but she was fitted for them previously but never received them from audiologist  Do you have problems with your memory? No   Do you often feel sad or depressed? No   Do you have trouble with control of your bladder? No   Do you have trouble with control of your bowels?  No   Have you had any falls in the past year?  No       GENERAL ROS:   General: No unexplained weight gain or loss; adequate sleep pattern.  Resp: No worsening shortness of breath, cough or hemoptysis.   GI: No worsening constipation, no diarrhea, no nausea or vomiting  : Voiding independently with no significant incontinence   Skin: No areas of new skin breakdown or worrisome rashes  Extremities:  No pain, extremity weakness or balance troubles    Objective:wt 121 /71 P 66 R 12 T 98.5   Gen: Well nourished and in NAD   HEENT: Head is atraumatic, decent dentition  CV: RRR, 2/6 systolic murmur  Pulm: CTAB, no wheezes/rales/rhonchi, good air entry   ABD: soft, nontender, BS intact  Extrem: no cyanosis, edema or clubbing   Psych: Euthymic  Neuro: Pt is able to ambulate independently    Preventative Screening:   Vaccinations reviewed and up to date     Assessment/ Plan:  Hearing loss: Discussed with RN and RN will contact audiologist to have patient be assessed for hearing aids.    RECOMMENDED FOLLOW UP:  2 months.    Level of Service:  14141    Total of 20 minutes was spent in face to face contact with patient with > 50% in counseling and coordination of care.  Options for treatment and/or follow-up care were reviewed with the patient. Bhavana Marr was engaged and actively involved in the decision making process. She verbalized understanding of the options discussed and was satisfied with the final plan.    Patient precepted with Dr. Lewis.    Whitney Hidalgo, DO   PGY-3 [place]  Family Medicine  [facility]  Pager: [phone]

## 2019-01-24 NOTE — PROGRESS NOTES
Dewayne Rocha RN at [facility]:  60Day appt wt 128 /70 P 76 R 15 T 97.5. Has had occasional c/o back/hip pain, has been encouraged to be more active, spends much of the day laying in bed.   Avelina Moreau, CMA

## 2019-01-29 ENCOUNTER — DOCUMENTATION ONLY (OUTPATIENT)
Dept: FAMILY MEDICINE | Facility: CLINIC | Age: 79
End: 2019-01-29

## 2019-01-29 DIAGNOSIS — M25.50 ARTHRALGIA, UNSPECIFIED JOINT: Primary | ICD-10-CM
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
## 2019-01-29 DIAGNOSIS — H91.93 BILATERAL HEARING LOSS, UNSPECIFIED HEARING LOSS TYPE: ICD-10-CM

## 2019-01-29 RX ORDER — CAPSAICIN 0.025 %
1 CREAM (GRAM) TOPICAL 3 TIMES DAILY PRN
Qty: 270 G | Refills: 3 | Status: SHIPPED | OUTPATIENT
Start: 2019-01-29 | End: 2020-01-29

## 2019-01-29 NOTE — PROGRESS NOTES
Fall  Subjective: Bhavana Marr is a 78 year old who is seen at home for follow up visit today.  Seen at [address] [facility] [address] .    Also present at visit include nursing staff. Acute concerns today include: recent fall last week. Fell on knees. Unclear why she fell. Did not hit her head. Feeling well since then. Denies syncope, dizziness or seizure activity. Remembers event.      Chronic and Past Medical Problems include:  Patient Active Problem List   Diagnosis     Arthritis     Depressive disorder     Borderline personality disorder (H)     GERD (gastroesophageal reflux disease)     Holosystolic murmur     Asthma     History of gastric bypass     Hyperlipidemia LDL goal <130     Other insomnia     Mild mitral regurgitation     Mild aortic stenosis     Family History     Problem (# of Occurrences) Relation (Name,Age of Onset)    Depression (4) Maternal Aunt (60), Maternal Uncle (85), Paternal Aunt, Paternal Uncle        Social History:   Current activities:  Watch TV and activities  Support services:  Staff  PMHX/PSHX/MEDS/ALLERGIES/SHX/FHX reviewed and updated in Epic.   MEDICATION LIST:  Current Outpatient Medications   Medication     capsaicin (ZOSTRIX) 0.025 % external cream     acetaminophen (TYLENOL) 325 MG tablet     albuterol (PROAIR HFA/PROVENTIL HFA/VENTOLIN HFA) 108 (90 Base) MCG/ACT Inhaler     alum & mag hydroxide-simethicone (MYLANTA/MAALOX) 200-200-20 MG/5ML SUSP suspension     Calcium Oyster Shell 500 MG TABS     cholecalciferol (VITAMIN D3) 1000 UNIT tablet     Cyanocobalamin (B-12) 1000 MCG TBCR     docusate sodium (COLACE) 100 MG tablet      MG capsule     FEROSUL 325 (65 Fe) MG tablet     fluticasone (FLONASE) 50 MCG/ACT spray     guaiFENesin-dextromethorphan (ROBITUSSIN DM) 100-10 MG/5ML syrup     mirtazapine (REMERON) 15 MG tablet     Multiple Vitamins-Minerals (CEROVITE SENIOR) TABS     OLANZapine (ZYPREXA) 5 MG tablet     OLANZapine (ZYPREXA) 5 MG tablet      pantoprazole (PROTONIX) 20 MG EC tablet     sertraline (ZOLOFT) 100 MG tablet     simvastatin (ZOCOR) 40 MG tablet     traZODone (DESYREL) 100 MG tablet     No current facility-administered medications for this visit.      Medications are managed by:  NURSE  Patient has questions, concerns, or potential side effects/interactions from their medications: No  GERIATRIC ROS:    Do you have difficulty getting around, watching TV or reading because of poor eyesight? No   Can you hear normal conversational voice?  No   Do you use hearing aides? No however does need them. She is in the process of getting these.  Do you have problems with your memory? No   Do you often feel sad or depressed? No   Have you unintentionally lost weight in the last 6 months? No   Do you have trouble with control of your bladder? Occasionally however not a new concern  Do you have trouble with control of your bowels? No   Have you had any falls in the past year? Yes   How many? 3    GENERAL ROS:   General: No unexplained weight gain or loss; adequate sleep pattern.  Resp: No worsening shortness of breath or hemoptysis. Occasional cough  GI: No worsening constipation, no diarrhea, no nausea or vomiting  : Voiding independently with no significant incontinence   Skin: No areas of new skin breakdown or worrisome rashes  Extremities:  Occasional knee and back pain. No extremity weakness or balance troubles    Objective:  Gen: Well nourished and in NAD   HEENT: Head is atraumatic, decent dentition  CV: RRR - 2/6 systolic murmurs, no rubs, or gallups,   Pulm: CTAB, no wheezes/rales/rhonchi, good air entry   ABD: soft, nontender, BS intact  Extrem: no cyanosis, edema or clubbing   Psych: Euthymic  Neuro: Pt is able to ambulate independently    Preventative Screening:   Vaccinations reviewed and up to date    POA: self  Code status: DNR    Assessment/ Plan:  Bhavana Munguia was seen today for fall and hearing aid progress check.    Diagnoses and all orders  for this visit:    Arthralgia, unspecified joint  -     capsaicin (ZOSTRIX) 0.025 % external cream; Apply 1 g topically 3 times daily as needed (pain)  Patient with hand and knee pain occasionally. Due to history of gastric bypass patient unable to take NSAIDs which she was requesting today for PRN use. Discussed with her. Topical best options.     Bilateral hearing loss, unspecified hearing loss type  Patient still without hearing aids. Will send message again to staff regarding follow up for this.     Fall, subsequent encounter  Fall appears to be mechanical in nature with no sequela from event.  Patient does have history of mild aortic stenosis and mitral regurgitation however patient is asymptomatic and no concerns for worsening of aortic stenosis at this time.  If patient does have continued falls or concern for continued falls as well as new syncopal episodes would consider re-echoing the patient.        RECOMMENDED FOLLOW UP:  2 months.  Patient was seen and discussed with Dr. Lewis who agrees with assessment and plan.     Level of Service:  36211    Karina Walls  PGY3

## 2019-03-15 NOTE — PROGRESS NOTES
Dewayne Rocha RN at [facility]:  wt 125 /78 P 86 R 14 T 98.1 Bhavana has occasional stomach upset where she states she is up all night because of nausea and vomiting, unsure if it is food related. She spends most of her day in bed, not really interested in being active. No complaints.  Avelina Glamm, CMA

## 2019-03-19 ENCOUNTER — DOCUMENTATION ONLY (OUTPATIENT)
Dept: FAMILY MEDICINE | Facility: CLINIC | Age: 79
End: 2019-03-19

## 2019-03-19 DIAGNOSIS — K21.9 GASTROESOPHAGEAL REFLUX DISEASE WITHOUT ESOPHAGITIS: Primary | ICD-10-CM

## 2019-03-19 DIAGNOSIS — I35.0 MILD AORTIC STENOSIS: ICD-10-CM

## 2019-03-20 ENCOUNTER — DOCUMENTATION ONLY (OUTPATIENT)
Dept: PHARMACY | Facility: CLINIC | Age: 79
End: 2019-03-20

## 2019-03-20 DIAGNOSIS — J30.9 ALLERGIC RHINITIS: ICD-10-CM

## 2019-03-20 DIAGNOSIS — F39 MOOD DISORDER (H): ICD-10-CM

## 2019-03-20 DIAGNOSIS — K59.00 CONSTIPATION, UNSPECIFIED CONSTIPATION TYPE: ICD-10-CM

## 2019-03-20 NOTE — PROGRESS NOTES
Subjective: Bhavana Marr is a 78 year old who is seen at home for follow up visit today.  Seen at [address] [facility] [address] .    Also present at visit include nursing home staff.   Acute concerns today include: No acute concerns. She occasionally has heart burn about 1-2 times a month. She sleeps well and wakes up 1-2 times a night to urinate. No bowel issues. No breathing problems.     Chronic and Past Medical Problems include:  Patient Active Problem List   Diagnosis     Arthritis     Depressive disorder     Borderline personality disorder (H)     GERD (gastroesophageal reflux disease)     Holosystolic murmur     Asthma     History of gastric bypass     Hyperlipidemia LDL goal <130     Other insomnia     Mild mitral regurgitation     Mild aortic stenosis     Family History     Problem (# of Occurrences) Relation (Name,Age of Onset)    Depression (4) Maternal Aunt (60), Maternal Uncle (85), Paternal Aunt, Paternal Uncle        Social History:   Current activities:  Watch TV, sleep a lot  Support services:  Staff  PMHX/PSHX/MEDS/ALLERGIES/SHX/FHX reviewed and updated in Epic.   MEDICATION LIST:  Current Outpatient Medications   Medication     acetaminophen (TYLENOL) 325 MG tablet     albuterol (PROAIR HFA/PROVENTIL HFA/VENTOLIN HFA) 108 (90 Base) MCG/ACT Inhaler     alum & mag hydroxide-simethicone (MYLANTA/MAALOX) 200-200-20 MG/5ML SUSP suspension     Calcium Oyster Shell 500 MG TABS     capsaicin (ZOSTRIX) 0.025 % external cream     cholecalciferol (VITAMIN D3) 1000 UNIT tablet     Cyanocobalamin (B-12) 1000 MCG TBCR     docusate sodium (COLACE) 100 MG tablet      MG capsule     FEROSUL 325 (65 Fe) MG tablet     fluticasone (FLONASE) 50 MCG/ACT spray     guaiFENesin-dextromethorphan (ROBITUSSIN DM) 100-10 MG/5ML syrup     mirtazapine (REMERON) 15 MG tablet     Multiple Vitamins-Minerals (CEROVITE SENIOR) TABS     OLANZapine (ZYPREXA) 5 MG tablet     OLANZapine (ZYPREXA) 5 MG tablet      pantoprazole (PROTONIX) 20 MG EC tablet     sertraline (ZOLOFT) 100 MG tablet     simvastatin (ZOCOR) 40 MG tablet     traZODone (DESYREL) 100 MG tablet     No current facility-administered medications for this visit.      Medications are managed by:  SELF, FAMILY, HOME NURSE  Patient uses a pill box to manage their medications:  Yes / No  Patient has questions, concerns, or potential side effects/interactions from their medications:  Yes / No  GERIATRIC ROS:    Do you have difficulty getting around, watching TV or reading because of poor eyesight? No   Can you hear normal conversational voice? Yes   Do you use hearing aides? No   Do you have problems with your memory?  No   Do you often feel sad or depressed? No   Have you unintentionally lost weight in the last 6 months? No   Do you have trouble with control of your bladder? No   Do you have trouble with control of your bowels? No   Have you had any falls in the past year? Yes, 3 falls. No recent.      GENERAL ROS:   General: No unexplained weight gain or loss; adequate sleep pattern.  Resp: No worsening shortness of breath, cough or hemoptysis.   GI: No worsening constipation, no diarrhea, no nausea or vomiting  : Voiding independently with no significant incontinence   Skin: No areas of new skin breakdown or worrisome rashes  Extremities:  No pain, extremity weakness or balance troubles    Objective:  Gen: Well nourished and in NAD   HEENT: Head is atraumatic, decent dentition  CV: RRR - 2/6 systolic murmur, no rub or gallop   Pulm: CTAB, no wheezes/rales/rhonchi, good air entry   ABD: soft, nontender, BS intact  Extrem: no cyanosis, edema or clubbing   Psych: Euthymic  Neuro: Pt is able to ambulate independently    Preventative Screening:   Vaccinations reviewed and up to date    POA: self  Code status: DNR  Healthcare Directive, Living Will, POLST has been completed:  Unknown     Assessment/ Plan:  1. Gastroesophageal reflux disease without  esophagitis  Occasional, 1-2 times a month. She feels adequate control with PPI.     2. Mild aortic stenosis  Echo from 10/2018 with mild aortic stenosis and mild mitral regurgitation. No shortness of breath or chest pain. Continue to monitor.       RECOMMENDED FOLLOW UP:  2 months.    [facility] [id number]    I precepted today with Inder Lindsey MD.     Ramin Beltran, PGY2  [facility]

## 2019-03-20 NOTE — PROGRESS NOTES
Updated our med list to match administered meds at [facility].     Changes:  -Mirtazapine is 22.5 mg at bedtime, not 15 mg    Caroline Dyer, PharmD Resident

## 2019-03-21 RX ORDER — MIRTAZAPINE 15 MG/1
22.5 TABLET, FILM COATED ORAL AT BEDTIME
Qty: 30 TABLET | Refills: 11 | Status: ON HOLD | COMMUNITY
Start: 2019-03-20 | End: 2023-05-31

## 2019-03-21 RX ORDER — FLUTICASONE PROPIONATE 50 MCG
2 SPRAY, SUSPENSION (ML) NASAL DAILY PRN
COMMUNITY
Start: 2019-03-20 | End: 2023-08-10

## 2019-03-21 NOTE — PROGRESS NOTES
I have verified the content of the note, which accurately reflects my assessment of the patient and the plan of care.   Janelle Valdes, PharmD

## 2019-05-16 NOTE — PROGRESS NOTES
"Per RAUL Rocha at [facility]:  \"60 day appt wt 127 /82 P 73 R 14 T 98.8 Resident reported that she wants to cut down on her ensure because she feels like she is gaining weight and doesn't want to gain anymore weight. I really don't see too much of a weight difference out of a 10 lb range since her admission a year ago. She denies any medical concerns at this time. \"  Avelina Glamm, CMA    "

## 2019-05-21 ENCOUNTER — DOCUMENTATION ONLY (OUTPATIENT)
Dept: FAMILY MEDICINE | Facility: CLINIC | Age: 79
End: 2019-05-21

## 2019-05-21 DIAGNOSIS — R63.5 WEIGHT GAIN: ICD-10-CM

## 2019-05-21 DIAGNOSIS — Z00.00 PREVENTATIVE HEALTH CARE: ICD-10-CM

## 2019-05-21 DIAGNOSIS — D64.9 NORMOCYTIC ANEMIA: ICD-10-CM

## 2019-05-21 DIAGNOSIS — I35.0 MILD AORTIC STENOSIS: Primary | ICD-10-CM

## 2019-05-21 NOTE — PROGRESS NOTES
"Subjective: Bhavana Marr is a 78 year old who is seen at [facility] nursing home for follow up visit today.    Additional information obtained from Nursing Staff and includes:    Dewayne Rocha RN at [facility]:  \"60 day appt wt 127 /82 P 73 R 14 T 98.8 Resident reported that she wants to cut down on her ensure because she feels like she is gaining weight and doesn't want to gain anymore weight. I really don't see too much of a weight difference out of a 10 lb range since her admission a year ago. She denies any medical concerns at this time. \"  Acute concerns today include: As above, feels she is gaining some weight on ensure. Otherwise occasional heartburn that is well controlled. No new issues with bowel or bladder. Alternates between constipation and diarrhea but chronic and not bothersome to her. Staying active. No chest pain, SOB or increased fatigue. Arthritis pain well controlled    Chronic and Past Medical Problems include:  Patient Active Problem List   Diagnosis     Arthritis     Depressive disorder     Borderline personality disorder (H)     GERD (gastroesophageal reflux disease)     Holosystolic murmur     Asthma     History of gastric bypass     Hyperlipidemia LDL goal <130     Other insomnia     Mild mitral regurgitation     Mild aortic stenosis     Family History     Problem (# of Occurrences) Relation (Name,Age of Onset)    Depression (4) Maternal Aunt (60), Maternal Uncle (85), Paternal Aunt, Paternal Uncle        Social History:   Current activities:  Going for walks, playing cards  Support services:  Family  PMHX/PSHX/MEDS/ALLERGIES/SHX/FHX reviewed and updated in Epic.   MEDICATION LIST:  Current Outpatient Medications   Medication     acetaminophen (TYLENOL) 325 MG tablet     albuterol (PROAIR HFA/PROVENTIL HFA/VENTOLIN HFA) 108 (90 Base) MCG/ACT Inhaler     alum & mag hydroxide-simethicone (MYLANTA/MAALOX) 200-200-20 MG/5ML SUSP suspension     Calcium Oyster Shell 500 MG TABS     capsaicin (ZOSTRIX) " 0.025 % external cream     cholecalciferol (VITAMIN D3) 1000 UNIT tablet     Cyanocobalamin (B-12) 1000 MCG TBCR      MG capsule     FEROSUL 325 (65 Fe) MG tablet     fluticasone (FLONASE) 50 MCG/ACT nasal spray     guaiFENesin-dextromethorphan (ROBITUSSIN DM) 100-10 MG/5ML syrup     mirtazapine (REMERON) 15 MG tablet     Multiple Vitamins-Minerals (CEROVITE SENIOR) TABS     OLANZapine (ZYPREXA) 5 MG tablet     OLANZapine (ZYPREXA) 5 MG tablet     pantoprazole (PROTONIX) 20 MG EC tablet     sertraline (ZOLOFT) 100 MG tablet     simvastatin (ZOCOR) 40 MG tablet     traZODone (DESYREL) 100 MG tablet     No current facility-administered medications for this visit.        GERIATRIC ROS:    Do you have difficulty getting around, watching TV or reading because of poor eyesight? No   Can you hear normal conversational voice? Yes  Do you use hearing aides?No   Do you have problems with your memory? No   Do you often feel sad or depressed? No   Have you unintentionally lost weight in the last 6 months? No   Do you have trouble with control of your bladder? No   Do you have trouble with control of your bowels? No   Have you had any falls in the past year? No    GENERAL ROS:   General: No unexplained weight gain or loss; adequate sleep pattern.  Resp: No worsening shortness of breath, cough or hemoptysis.   GI: No worsening constipation, no diarrhea, no nausea or vomiting  : Voiding independently with no significant incontinence   Skin: No areas of new skin breakdown or worrisome rashes  Extremities:  No pain, extremity weakness or balance troubles    Objective: wt 127 /82 P 73 R 14 T 98.8  Gen: Well nourished and in NAD   HEENT: Head is atraumatic, decent dentition, moist MM  CV: RRR, moderate systolic murmur at RUSB  Pulm: CTAB, no wheezes/rales/rhonchi, good air entry   ABD: soft, nontender, BS intact  Extrem: no cyanosis, edema or clubbing   Psych: Euthymic  Neuro: Pt is able to ambulate  independently    Preventative Screening:   Vaccinations reviewed and due for PCV13, Zostavax and update of tetanus  Additional Recommended Screening: consider colonoscopy    POA: self   Code status: DNR     Assessment/ Plan:  1. Mild aortic stenosis  Echo Oct 2018, mild aortic stenosis, mild mitral regurg, mild aortic dilatation, normal EF.  Still very active, no increasing chest pain, palpitations, shortness of breath or fatigue.  Continue to monitor    2. Weight gain  Patient concerned about weight gain however weight has been quite stable.  Reassurance provided.  Continue Ensure    3. Preventative health care  Uptake vaccinations as above.    4. Normocytic anemia  Stable. On iron and B12 therapy. Add ferritin, iron studies, B12 to next lab work (gets CBC monthly). Consider colonoscopy if not done    [id number]    RECOMMENDED FOLLOW UP:  2 months.    The patient was seen by, and discussed with, Dr. Lindsey who agrees with the plan.    Patricia Skinner

## 2019-05-24 ENCOUNTER — DOCUMENTATION ONLY (OUTPATIENT)
Dept: PHARMACY | Facility: CLINIC | Age: 79
End: 2019-05-24

## 2019-05-24 NOTE — PROGRESS NOTES
I was asked to reconcile medications with medication list from [facility].  Medication list is up-to-date.     Keny Levi.D.

## 2019-07-24 ENCOUNTER — DOCUMENTATION ONLY (OUTPATIENT)
Dept: FAMILY MEDICINE | Facility: CLINIC | Age: 79
End: 2019-07-24

## 2019-07-24 DIAGNOSIS — R11.2 NON-INTRACTABLE VOMITING WITH NAUSEA, UNSPECIFIED VOMITING TYPE: ICD-10-CM

## 2019-07-24 DIAGNOSIS — G47.09 OTHER INSOMNIA: Primary | ICD-10-CM

## 2019-07-24 DIAGNOSIS — I35.0 MILD AORTIC STENOSIS: ICD-10-CM

## 2019-07-24 NOTE — PROGRESS NOTES
"Per RAUL Rocha at [facility]:  \"wt 131 /81 P 94 R 14 T 98.3 - Bhavana had an episode this week where she could not keep anything including water down for about 24 hours. No temp, VS were stable, this has since resolved. Resident has no other c/o\"  "

## 2019-08-01 NOTE — PROGRESS NOTES
"Subjective: Bhavana Marr is a 78 year old who is seen at home for follow up visit today.  Seen at [address] [facility] [address] .    Per Josefina RN at [facility]:  \"wt 131 /81 P 94 R 14 T 98.3 - Bhavana had an episode this week where she could not keep anything including water down for about 24 hours. No temp, VS were stable, this has since resolved. Resident has no other c/o\"  Acute concerns today include: Trouble falling asleep at night. Says she lays down around 10:00 and doesn't fall asleep for several hours. This is not a new issue for her. When asked what her activities during the day are, she says she naps all day. She has no other complaints.  Episode of n/v last week resolved spontaneously. She thinks it was something she ate. She has had no abdominal pain, nausea, vomiting this week. She also denies fevers/chills, chest pain, SOB, headache, dizziness, falls.     Chronic and Past Medical Problems include:  Patient Active Problem List   Diagnosis     Arthritis     Depressive disorder     Borderline personality disorder (H)     GERD (gastroesophageal reflux disease)     Holosystolic murmur     Asthma     History of gastric bypass     Hyperlipidemia LDL goal <130     Other insomnia     Mild mitral regurgitation     Mild aortic stenosis     Family History     Problem (# of Occurrences) Relation (Name,Age of Onset)    Depression (4) Maternal Aunt (60), Maternal Uncle (85), Paternal Aunt, Paternal Uncle        Social History:   Current activities:  Napping, occasional walking, outings  Support services:  Family  PMHX/PSHX/MEDS/ALLERGIES/SHX/FHX reviewed and updated in Epic.   MEDICATION LIST:  Current Outpatient Medications   Medication     acetaminophen (TYLENOL) 325 MG tablet     albuterol (PROAIR HFA/PROVENTIL HFA/VENTOLIN HFA) 108 (90 Base) MCG/ACT Inhaler     alum & mag hydroxide-simethicone (MYLANTA/MAALOX) 200-200-20 MG/5ML SUSP suspension     Calcium Oyster Shell 500 MG TABS     capsaicin " (ZOSTRIX) 0.025 % external cream     cholecalciferol (VITAMIN D3) 1000 UNIT tablet     Cyanocobalamin (B-12) 1000 MCG TBCR      MG capsule     FEROSUL 325 (65 Fe) MG tablet     fluticasone (FLONASE) 50 MCG/ACT nasal spray     guaiFENesin-dextromethorphan (ROBITUSSIN DM) 100-10 MG/5ML syrup     mirtazapine (REMERON) 15 MG tablet     Multiple Vitamins-Minerals (CEROVITE SENIOR) TABS     OLANZapine (ZYPREXA) 5 MG tablet     OLANZapine (ZYPREXA) 5 MG tablet     pantoprazole (PROTONIX) 20 MG EC tablet     sertraline (ZOLOFT) 100 MG tablet     simvastatin (ZOCOR) 40 MG tablet     traZODone (DESYREL) 100 MG tablet     No current facility-administered medications for this visit.      GERIATRIC ROS:    Do you have difficulty getting around, watching TV or reading because of poor eyesight? No   Can you hear normal conversational voice? Yes  Do you use hearing aides? No   Do you have problems with your memory?No   Do you often feel sad or depressed?  No   Have you unintentionally lost weight in the last 6 months? No   Do you have trouble with control of your bladder? No   Do you have trouble with control of your bowels? No   Have you had any falls in the past year? No    GENERAL ROS:   General: Trouble falling asleep.  No unexplained weight gain or loss.  Resp: No worsening shortness of breath, cough or hemoptysis.   GI: No worsening constipation, no diarrhea, no nausea or vomiting  : Voiding independently with no significant incontinence   Skin: No areas of new skin breakdown or worrisome rashes  Extremities:  No pain, extremity weakness or balance troubles    Objective:  Most recent weight:  131 lb  Gen: Well nourished and in NAD   HEENT: Head is atraumatic, decent dentition  CV: RRR , grade III systolic ejection murmur at RUSB  Pulm: CTAB, no wheezes/rales/rhonchi, good air entry   ABD: soft, nontender, BS intact  Extrem: no cyanosis, edema or clubbing   Psych: Euthymic  Neuro: Pt is able to ambulate  independently    Preventative Screening:   Vaccinations reviewed and due for PCV13, Zostavax   POA: Self   Code status: DNR    Assessment/ Plan:  There are no diagnoses linked to this encounter.   1. Other insomnia  Patient with longstanding issues with sleep. She already takes trazodone at bedtime. She has been doing very little activity and has been mostly napping during the day. We discussed sleep hygiene and encouraged patient to be up and out of bed during the day. She understands that sleeping all day makes it more difficult to sleep at night. She enjoys walking, so I encouraged her to start doing this again. I checked on Bhavana a second time to see if she had gotten out of bed, but she was napping again. She should work on sleep hygiene prior to any medication changes. Discussed this with nurse supervisor as well.     2. Mild aortic stenosis  Echo Oct 2018, mild aortic stenosis, mild mitral regurg, mild aortic dilatation, EF 65%.  Still very active, no increasing chest pain, palpitations, shortness of breath or fatigue.  Continue to monitor    3. Non-intractable vomiting with nausea, unspecified vomiting type  Self resolving episode of nausea and vomiting last week. No concurrent abdominal pain or fever. No recurrence of symptoms. Most likely viral gastroenteritis. She does have history of gastric bypass, but with symptoms resolving without therapy and lack of pain/fever unlikely to be related to obstructive process.       RECOMMENDED FOLLOW UP:  2 months.    Level of Service:  07816    Total of 30 minutes was spent in face to face contact with patient with > 50% in counseling and coordination of care.  Options for treatment and/or follow-up care were reviewed with the patient. Bhavana Marr was engaged and actively involved in the decision making process. She verbalized understanding of the options discussed and was satisfied with the final plan.    Patient discussed with attending physician Dr. Lewis who  agrees with the plan.     David Ochoa MD PGY2  [facility]

## 2019-08-29 ENCOUNTER — MEDICAL CORRESPONDENCE (OUTPATIENT)
Dept: HEALTH INFORMATION MANAGEMENT | Facility: CLINIC | Age: 79
End: 2019-08-29

## 2019-08-29 ENCOUNTER — DOCUMENTATION ONLY (OUTPATIENT)
Dept: FAMILY MEDICINE | Facility: CLINIC | Age: 79
End: 2019-08-29

## 2019-08-29 NOTE — PROGRESS NOTES
To be completed in Nursing note:    Please reference list for forms that require a visit for completion.  Please remind patients that providers are given 3-5 business days to complete and return forms.      Form type: Facsimile Transmittal from [facility]     Date form received: 19    Date form completed by Physician: 19    How was form returned to patient (mailed, faxed, or at  for patient to ): fax to [phone]    Date form mailed/faxed/left at  for patient and sent to HIM for [email or web address]    Once form is left for patient, faxed, or mailed PCS will then close the documentation only encounter.

## 2019-09-14 NOTE — PROGRESS NOTES
Information from Josefina at [facility]:    60 day appt    Wt 126 /66 P 106 R 18 T 98.7    Bhavana has been doing well with no complaints.     Olivia Dudley

## 2019-09-17 ENCOUNTER — DOCUMENTATION ONLY (OUTPATIENT)
Dept: PHARMACY | Facility: PHYSICIAN GROUP | Age: 79
End: 2019-09-17

## 2019-09-17 ENCOUNTER — DOCUMENTATION ONLY (OUTPATIENT)
Dept: FAMILY MEDICINE | Facility: CLINIC | Age: 79
End: 2019-09-17

## 2019-09-17 RX ORDER — SENNA AND DOCUSATE SODIUM 50; 8.6 MG/1; MG/1
1 TABLET, FILM COATED ORAL 2 TIMES DAILY
COMMUNITY
Start: 2019-09-17

## 2019-09-17 RX ORDER — SENNA AND DOCUSATE SODIUM 50; 8.6 MG/1; MG/1
1 TABLET, FILM COATED ORAL DAILY
Status: ON HOLD | COMMUNITY
Start: 2019-09-17 | End: 2023-05-31

## 2019-09-17 NOTE — PROGRESS NOTES
Subjective: Bhavana Marr is a 78 year old who is seen at home for follow up visit today.  Seen at [place] [id number] [place] [id number] .    Per Josefina CARTER: Patient is doing well and due for her 60-day visit.  Acute concerns today include: Patient denies any current problems or concerns.  Upon reviewing last visit patient states her trouble falling and sleep has improved.  Patient continues to have nausea at times but is not struggling with vomiting.  Patient does not believe that this is a significant problem for her anymore.  On review of systems patient is struggling with constipation.  Patient states she has a bowel movement 1-2 times a week.  Patient states she is often straining.  There is times where she will then develop diarrhea.  Patient is okay with changing her stool medications to try and achieve one soft stool daily.    Chronic and Past Medical Problems include:  Patient Active Problem List   Diagnosis     Arthritis     Depressive disorder     Borderline personality disorder (H)     GERD (gastroesophageal reflux disease)     Holosystolic murmur     Asthma     History of gastric bypass     Hyperlipidemia LDL goal <130     Other insomnia     Mild mitral regurgitation     Mild aortic stenosis     Family History     Problem (# of Occurrences) Relation (Name,Age of Onset)    Depression (4) Maternal Aunt (60), Maternal Uncle (85), Paternal Aunt, Paternal Uncle        Social History:   Current activities:  Outings, bingo  PMHX/PSHX/MEDS/ALLERGIES/SHX/FHX reviewed and updated in Epic.   MEDICATION LIST:  Current Outpatient Medications   Medication     SENNA-docusate sodium (SENNA S) 8.6-50 MG tablet     SENNA-docusate sodium (SENNA S) 8.6-50 MG tablet     acetaminophen (TYLENOL) 325 MG tablet     albuterol (PROAIR HFA/PROVENTIL HFA/VENTOLIN HFA) 108 (90 Base) MCG/ACT Inhaler     alum & mag hydroxide-simethicone (MYLANTA/MAALOX) 200-200-20 MG/5ML SUSP suspension     Calcium Oyster Shell 500 MG TABS      capsaicin (ZOSTRIX) 0.025 % external cream     cholecalciferol (VITAMIN D3) 1000 UNIT tablet     Cyanocobalamin (B-12) 1000 MCG TBCR      MG capsule     FEROSUL 325 (65 Fe) MG tablet     fluticasone (FLONASE) 50 MCG/ACT nasal spray     guaiFENesin-dextromethorphan (ROBITUSSIN DM) 100-10 MG/5ML syrup     mirtazapine (REMERON) 15 MG tablet     Multiple Vitamins-Minerals (CEROVITE SENIOR) TABS     OLANZapine (ZYPREXA) 5 MG tablet     OLANZapine (ZYPREXA) 5 MG tablet     pantoprazole (PROTONIX) 20 MG EC tablet     sertraline (ZOLOFT) 100 MG tablet     simvastatin (ZOCOR) 40 MG tablet     traZODone (DESYREL) 100 MG tablet     No current facility-administered medications for this visit.      Medications are managed by:  NURSE  Patient uses a pill box to manage their medications: dispensed per RN  Patient has questions, concerns, or potential side effects/interactions from their medications:  No  GERIATRIC ROS:    Do you have difficulty getting around, watching TV or reading because of poor eyesight? No   Can you hear normal conversational voice? Yes   Do you use hearing aides? No   Do you have problems with your memory? No   Do you often feel sad or depressed? No   Have you unintentionally lost weight in the last 6 months? No   Do you have trouble with control of your bladder? No   Do you have trouble with control of your bowels? No   Have you had any falls in the past year? No       GENERAL ROS:   General: No unexplained weight gain or loss; adequate sleep pattern.  Resp: No worsening shortness of breath, cough or hemoptysis.   GI: Constipation an nausea, no diarrhea, no  vomiting  : Voiding independently with no significant incontinence   Skin: No areas of new skin breakdown or worrisome rashes  Extremities:  No pain, extremity weakness or balance troubles    Objective: Wt 126 /66 P 106 R 18 T 98.7  Gen: Well nourished and in NAD   HEENT: Head is atraumatic, decent dentition  CV: RRR, 3/6 holo  systolic murmur   Pulm: CTAB, no wheezes/rales/rhonchi, good air entry   ABD: soft, nontender, BS intact  Extrem: no cyanosis, edema or clubbing   Psych: Euthymic  Neuro: Pt is able to ambulate independently    Assessment/ Plan:  Constipation. Patient currently states she is stooling 1-2 times weekly.  She notes some straining with this.  Patient also having monthly episodes of diarrhea.  This is likely due to overflow.  Patient is currently on Colace.  Will increase this to senna Colace  scheduled once daily and provide a as needed for additional constipation.    RECOMMENDED FOLLOW UP:  2 months.    Level of Service:  29514    Total of20 minutes was spent in face to face contact with patient with > 50% in counseling and coordination of care.  Options for treatment and/or follow-up care were reviewed with the patient. Bhavana Marr was engaged and actively involved in the decision making process. She verbalized understanding of the options discussed and was satisfied with the final plan.    I discussed the patient with  who is in agreement with the assessment and plan.     Katarzyna Oneal MD

## 2019-09-18 RX ORDER — LACTOSE-REDUCED FOOD
1 LIQUID (ML) ORAL 3 TIMES DAILY
COMMUNITY
Start: 2019-09-18

## 2019-09-18 NOTE — PROGRESS NOTES
Pharmacy Note    Received med list from [facility].  Reconciled meds with our med list and updated our med list.    Trinity Castro, Pharm.D.

## 2019-10-02 ENCOUNTER — MEDICAL CORRESPONDENCE (OUTPATIENT)
Dept: HEALTH INFORMATION MANAGEMENT | Facility: CLINIC | Age: 79
End: 2019-10-02

## 2019-10-03 ENCOUNTER — DOCUMENTATION ONLY (OUTPATIENT)
Dept: FAMILY MEDICINE | Facility: CLINIC | Age: 79
End: 2019-10-03

## 2019-10-03 NOTE — PROGRESS NOTES
To be completed in Nursing note:    Please reference list for forms that require a visit for completion.  Please remind patients that providers are given 3-5 business days to complete and return forms.      Form type:  [facility] / Progress Note    Date form received:  10/02/2019    Date form completed by Physician: 10/03/2019    How was form returned to patient (mailed, faxed, or at  for patient to ):  Faxed to [phone]       Date form mailed/faxed/left at  for patient and sent to HIM for scanning: 10/03/2019        Once form is left for patient, faxed, or mailed PCS will then close the documentation only encounter.

## 2019-11-08 ENCOUNTER — TRANSFERRED RECORDS (OUTPATIENT)
Dept: HEALTH INFORMATION MANAGEMENT | Facility: CLINIC | Age: 79
End: 2019-11-08

## 2019-11-14 NOTE — PROGRESS NOTES
Notes from Josefina @ [facility]:      60 day appt    wt 127     /72     P 73     R 16     T 98.8    Josefina will have a copy of her Optometry report and her annual labs.     Please add Cataracts as one of her visiting dx as well as psych dx of major depressive d/o, borderline personality disorder and bipolar disorder, insomnia, GERD and hx of gastric bypass.    She has no issues at this time.       Brianda CARTER

## 2019-11-18 ENCOUNTER — DOCUMENTATION ONLY (OUTPATIENT)
Dept: FAMILY MEDICINE | Facility: CLINIC | Age: 79
End: 2019-11-18

## 2019-11-19 NOTE — PROGRESS NOTES
Subjective: Bhavana Marr is a 78 year old who is seen at home for follow up visit today.  Seen at [address] [facility] [address] .    Acute concerns today include: No concerns.     She has no acute concerns herself. She is feeling well in general. Eating normally. Sleeping well, has had troubles with this in the past. No bowel or bladder concerns. No recent falls.     She is doing well per [facility] staff.     Chronic and Past Medical Problems include:  Patient Active Problem List   Diagnosis     Arthritis     Depressive disorder     Borderline personality disorder (H)     GERD (gastroesophageal reflux disease)     Holosystolic murmur     Asthma     History of gastric bypass     Hyperlipidemia LDL goal <130     Other insomnia     Mild mitral regurgitation     Mild aortic stenosis     Family History     Problem (# of Occurrences) Relation (Name,Age of Onset)    Depression (4) Maternal Aunt (60), Maternal Uncle (85), Paternal Aunt, Paternal Uncle        Social History:   Current activities:  Bingo    PMHX/PSHX/MEDS/ALLERGIES/SHX/FHX reviewed and updated in Epic.   MEDICATION LIST:  Current Outpatient Medications   Medication     acetaminophen (TYLENOL) 325 MG tablet     albuterol (PROAIR HFA/PROVENTIL HFA/VENTOLIN HFA) 108 (90 Base) MCG/ACT Inhaler     alum & mag hydroxide-simethicone (MYLANTA/MAALOX) 200-200-20 MG/5ML SUSP suspension     Calcium Oyster Shell 500 MG TABS     capsaicin (ZOSTRIX) 0.025 % external cream     cholecalciferol (VITAMIN D3) 1000 UNIT tablet     Cyanocobalamin (B-12) 1000 MCG TBCR      MG capsule     FEROSUL 325 (65 Fe) MG tablet     fluticasone (FLONASE) 50 MCG/ACT nasal spray     guaiFENesin-dextromethorphan (ROBITUSSIN DM) 100-10 MG/5ML syrup     mirtazapine (REMERON) 15 MG tablet     Nutritional Supplements (ENSURE ORIGINAL) LIQD     OLANZapine (ZYPREXA) 5 MG tablet     OLANZapine (ZYPREXA) 5 MG tablet     pantoprazole (PROTONIX) 20 MG EC tablet     SENNA-docusate sodium  (SENNA S) 8.6-50 MG tablet     SENNA-docusate sodium (SENNA S) 8.6-50 MG tablet     sertraline (ZOLOFT) 100 MG tablet     simvastatin (ZOCOR) 40 MG tablet     traZODone (DESYREL) 100 MG tablet     No current facility-administered medications for this visit.      Medications are managed by:  NURSE  Patient uses a pill box to manage their medications:  No  Patient has questions, concerns, or potential side effects/interactions from their medications:  No  GERIATRIC ROS:    Do you have difficulty getting around, watching TV or reading because of poor eyesight? No   Can you hear normal conversational voice? Yes  Do you use hearing aides? No   Do you have problems with your memory? No   Do you often feel sad or depressed? No   Have you unintentionally lost weight in the last 6 months?No   Do you have trouble with control of your bladder? No   Do you have trouble with control of your bowels? No   Have you had any falls in the past year? No      GENERAL ROS:   General: No unexplained weight gain or loss; adequate sleep pattern.  Resp: No worsening shortness of breath, cough or hemoptysis.   GI: No worsening constipation, no diarrhea, no nausea or vomiting  : Voiding independently with no significant incontinence   Skin: No areas of new skin breakdown or worrisome rashes  Extremities:  No pain, extremity weakness or balance troubles    Objective:    wt 127     /72     P 73     R 16     T 98.8    Gen: Well nourished and in NAD   HEENT: Head is atraumatic, decent dentition  CV: RRR - holosystolic murmur   Pulm: CTAB, no wheezes/rales/rhonchi, good air entry   ABD: soft, nontender, BS intact  Extrem: no cyanosis, edema or clubbing   Psych: Euthymic  Neuro: Pt is able to ambulate independently    Assessment/ Plan:  Bhavana was seen for her 60 day visit. No acute concerns today and doing well in general. Continue current plan of care.       RECOMMENDED FOLLOW UP:  2 months.        Patient seen and discussed with attending  physician David Lewis MD.     Ramin Beltran, PGY3  [facility]

## 2019-11-19 NOTE — PROGRESS NOTES
Preceptor Attestation:   Patient seen, evaluated and discussed with the resident. I have verified the content of the note, which accurately reflects my assessment of the patient and the plan of care.   Supervising Physician:  David Lewis MD.

## 2019-12-19 ENCOUNTER — DOCUMENTATION ONLY (OUTPATIENT)
Dept: PHARMACY | Facility: PHYSICIAN GROUP | Age: 79
End: 2019-12-19

## 2019-12-19 DIAGNOSIS — Z86.19 HX OF COLD SORES: Primary | ICD-10-CM

## 2019-12-19 DIAGNOSIS — E56.9 VITAMIN DEFICIENCY: ICD-10-CM

## 2019-12-19 RX ORDER — MULTIVIT-MIN/FA/LYCOPEN/LUTEIN .4-300-25
1 TABLET ORAL DAILY
Qty: 30 TABLET | Refills: 3 | COMMUNITY
Start: 2019-12-19

## 2019-12-19 RX ORDER — VALACYCLOVIR HYDROCHLORIDE 500 MG/1
TABLET, FILM COATED ORAL
Refills: 3 | COMMUNITY
Start: 2019-12-19

## 2019-12-19 NOTE — PROGRESS NOTES
Updated patients medication list from [facility].     Rebecca Ngo, PharmD Resident

## 2019-12-19 NOTE — PROGRESS NOTES
I have verified the content of the note, which accurately reflects my assessment of the patient and the plan of care.   Janelle Valdes, [facility], PharmD

## 2020-01-22 NOTE — PROGRESS NOTES
wt 127      /67     P 99     R 16     T 99.0     No health concerns at this time.     Notes from Josefina CARTER

## 2020-01-28 ENCOUNTER — DOCUMENTATION ONLY (OUTPATIENT)
Dept: FAMILY MEDICINE | Facility: CLINIC | Age: 80
End: 2020-01-28

## 2020-01-28 NOTE — PROGRESS NOTES
Subjective: Bhavana Marr is a 78 year old who is seen at home for follow up visit today.  Seen at [address] [facility] [address] .      Acute concerns today: None. Patient states she is overall doing well. RN staff also have no concerns. Endorses occasional difficulty falling asleep and often takes naps during the day. Eating well. No mood concerns. She enjoys social activities at the home. Denies problems with heart burn or personal history of GI bleed/ulcer.    Chronic and Past Medical Problems include:  Patient Active Problem List   Diagnosis     Arthritis     Depressive disorder     Borderline personality disorder (H)     GERD (gastroesophageal reflux disease)     Holosystolic murmur     Asthma     History of gastric bypass     Hyperlipidemia LDL goal <130     Other insomnia     Mild mitral regurgitation     Mild aortic stenosis     Family History     Problem (# of Occurrences) Relation (Name,Age of Onset)    Depression (4) Maternal Aunt (60), Maternal Uncle (85), Paternal Aunt, Paternal Uncle        Social History:   Current activities:  Bingo, eating out, watching shows, socializing     PMHX/PSHX/MEDS/ALLERGIES/SHX/FHX reviewed and updated in Epic.   MEDICATION LIST:  Current Outpatient Medications   Medication     acetaminophen (TYLENOL) 325 MG tablet     albuterol (PROAIR HFA/PROVENTIL HFA/VENTOLIN HFA) 108 (90 Base) MCG/ACT Inhaler     alum & mag hydroxide-simethicone (MYLANTA/MAALOX) 200-200-20 MG/5ML SUSP suspension     Calcium Oyster Shell 500 MG TABS     capsaicin (ZOSTRIX) 0.025 % external cream     cholecalciferol (VITAMIN D3) 1000 UNIT tablet     Cyanocobalamin (B-12) 1000 MCG TBCR     FEROSUL 325 (65 Fe) MG tablet     fluticasone (FLONASE) 50 MCG/ACT nasal spray     guaiFENesin-dextromethorphan (ROBITUSSIN DM) 100-10 MG/5ML syrup     mirtazapine (REMERON) 15 MG tablet     Multiple Vitamins-Minerals (CEROVITE SENIOR) TABS     Nutritional Supplements (ENSURE ORIGINAL) LIQD      OLANZapine (ZYPREXA) 5 MG tablet     OLANZapine (ZYPREXA) 5 MG tablet     pantoprazole (PROTONIX) 20 MG EC tablet     SENNA-docusate sodium (SENNA S) 8.6-50 MG tablet     SENNA-docusate sodium (SENNA S) 8.6-50 MG tablet     sertraline (ZOLOFT) 100 MG tablet     simvastatin (ZOCOR) 40 MG tablet     traZODone (DESYREL) 100 MG tablet     valACYclovir (VALTREX) 500 MG tablet     No current facility-administered medications for this visit.      Medications are managed by:  NURSE  Patient uses a pill box to manage their medications:  No  Patient has questions, concerns, or potential side effects/interactions from their medications:  No  GERIATRIC ROS:    Do you have difficulty getting around, watching TV or reading because of poor eyesight? No   Can you hear normal conversational voice? Yes  Do you use hearing aides? No   Do you have problems with your memory? No   Do you often feel sad or depressed? No   Have you unintentionally lost weight in the last 6 months?No   Do you have trouble with control of your bladder? No   Do you have trouble with control of your bowels? No   Have you had any falls in the past year? No      GENERAL ROS:   General: No unexplained weight gain or loss; adequate sleep pattern.  Resp: No worsening shortness of breath, cough or hemoptysis.   GI: Occasional diarrhea and constipation, no nausea or vomiting  : Voiding independently with no significant incontinence   Psych: No mood concerns  Extremities:  No pain, extremity weakness or balance troubles    Objective:    wt 127      /67     P 99     R 16     T 99.0   Gen: Well nourished, NAD   HEENT: Head is atraumatic, normocephalic, EOMI  CV: RRR - holosystolic murmur   Pulm: CTAB, no wheezes/rales/rhonchi, good air entry   Extrem: no cyanosis, edema or clubbing   Psych: Euthymic, normal affect  Neuro: No gross focal deficit, ambulating independently     Assessment/ Plan:  Patient Active Problem List   Diagnosis     Arthritis     Depressive  disorder     Borderline personality disorder (H)     GERD (gastroesophageal reflux disease)     Holosystolic murmur     Asthma     History of gastric bypass     Hyperlipidemia LDL goal <130     Other insomnia     Mild mitral regurgitation     Mild aortic stenosis     Bhavana is doing well today and has no acute concerns. No major changes today except plan to taper her PPI to every other day dosing for 2 weeks and then discontinue if tolerated. If rebound heartburn, resume PPI and consider trial of H2 blocker.     RECOMMENDED FOLLOW UP:  2 months.    Patient seen and discussed with attending physician Inder Lindsey MD.    Nicol Haynes MD, PGY3  [facility]

## 2020-01-28 NOTE — PROGRESS NOTES
Preceptor Attestation:   Patient seen, evaluated and discussed with the resident. I have verified the content of the note, which accurately reflects my assessment of the patient and the plan of care.   Supervising Physician:  Inder Lindsey MD.

## 2020-03-30 NOTE — PROGRESS NOTES
wt 130     BP 93/60,     P 76     R 16     T 98.0.     Bhavana has been stable, no issues or complaints     Notes from Josefina CARTER

## 2020-03-31 ENCOUNTER — DOCUMENTATION ONLY (OUTPATIENT)
Dept: FAMILY MEDICINE | Facility: CLINIC | Age: 80
End: 2020-03-31

## 2020-03-31 DIAGNOSIS — K21.9 GASTROESOPHAGEAL REFLUX DISEASE WITHOUT ESOPHAGITIS: ICD-10-CM

## 2020-03-31 DIAGNOSIS — G47.09 OTHER INSOMNIA: Primary | ICD-10-CM

## 2020-03-31 DIAGNOSIS — Z78.9 NURSING HOME RESIDENT: ICD-10-CM

## 2020-03-31 DIAGNOSIS — I35.0 MILD AORTIC STENOSIS: ICD-10-CM

## 2020-03-31 NOTE — PROGRESS NOTES
Subjective: Bhavana Marr is a 78 year old who is evaluated in a virtual visit today.  Seen at [address] [facility] [address] .       Acute concerns today: None. Patient states she is overall doing well. RN staff also have no concerns. Endorses occasional difficulty falling asleep and often takes naps during the day. Eating well. No mood concerns. Doing OK with COVID-19.  Denies problems with heart burn recently.     Chronic and Past Medical Problems include:      Patient Active Problem List   Diagnosis     Arthritis     Depressive disorder     Borderline personality disorder (H)     GERD (gastroesophageal reflux disease)     Holosystolic murmur     Asthma     History of gastric bypass     Hyperlipidemia LDL goal <130     Other insomnia     Mild mitral regurgitation     Mild aortic stenosis           Family History         Problem (# of Occurrences) Relation (Name,Age of Onset)     Depression (4) Maternal Aunt (60), Maternal Uncle (85), Paternal Aunt, Paternal Uncle            Social History:   Current activities:  Bingo, eating out, watching shows, socializing      PMHX/PSHX/MEDS/ALLERGIES/SHX/FHX reviewed and updated in Epic.   MEDICATION LIST:      Current Outpatient Medications   Medication     acetaminophen (TYLENOL) 325 MG tablet     albuterol (PROAIR HFA/PROVENTIL HFA/VENTOLIN HFA) 108 (90 Base) MCG/ACT Inhaler     alum & mag hydroxide-simethicone (MYLANTA/MAALOX) 200-200-20 MG/5ML SUSP suspension     Calcium Oyster Shell 500 MG TABS     capsaicin (ZOSTRIX) 0.025 % external cream     cholecalciferol (VITAMIN D3) 1000 UNIT tablet     Cyanocobalamin (B-12) 1000 MCG TBCR     FEROSUL 325 (65 Fe) MG tablet     fluticasone (FLONASE) 50 MCG/ACT nasal spray     guaiFENesin-dextromethorphan (ROBITUSSIN DM) 100-10 MG/5ML syrup     mirtazapine (REMERON) 15 MG tablet     Multiple Vitamins-Minerals (CEROVITE SENIOR) TABS     Nutritional Supplements (ENSURE ORIGINAL) LIQD     OLANZapine (ZYPREXA) 5 MG tablet  "    OLANZapine (ZYPREXA) 5 MG tablet     pantoprazole (PROTONIX) 20 MG EC tablet     SENNA-docusate sodium (SENNA S) 8.6-50 MG tablet     SENNA-docusate sodium (SENNA S) 8.6-50 MG tablet     sertraline (ZOLOFT) 100 MG tablet     simvastatin (ZOCOR) 40 MG tablet     traZODone (DESYREL) 100 MG tablet     valACYclovir (VALTREX) 500 MG tablet      No current facility-administered medications for this visit.      Medications are managed by:  NURSE  Patient uses a pill box to manage their medications:  No  Patient has questions, concerns, or potential side effects/interactions from their medications:  No  GERIATRIC ROS:    Do you have difficulty getting around, watching TV or reading because of poor eyesight? No   Can you hear normal conversational voice? Yes  Do you use hearing aides? No   Do you have problems with your memory? No   Do you often feel sad or depressed? No   Have you unintentionally lost weight in the last 6 months?No   Do you have trouble with control of your bladder? No   Do you have trouble with control of your bowels? No   Have you had any falls in the past year? No       GENERAL ROS:   General: No unexplained weight gain or loss; adequate sleep pattern.  Resp: No worsening shortness of breath, cough or hemoptysis.   GI: Occasional diarrhea and constipation, no nausea or vomiting  : Voiding independently with no significant incontinence   Psych: No mood concerns  Extremities:  No pain, extremity weakness or balance troubles     wt 130     BP 93/60,     P 76     R 16     T 98.0.     Bhavana has been stable, no issues or complaints       Bhavana has been stable, no issues or complaints    Gen: Alert. Answers questions appropriately. No cough or fever. C/o \"crusty\" eyes when awakening in morning. No edema or CHF sx.     Assessment/ Plan:      Patient Active Problem List   Diagnosis     Arthritis     Depressive disorder     Borderline personality disorder (H)     GERD (gastroesophageal reflux disease)     " "Holosystolic murmur     Asthma     History of gastric bypass     Hyperlipidemia LDL goal <130     Other insomnia     Mild mitral regurgitation     Mild aortic stenosis     Bhavana is doing well today and has no acute concerns. No major changes. Add artificial tears to help \"crusty\" morning eyelids on awakening.      RECOMMENDED FOLLOW UP:  2 months.   Start time 8:30am  End time 8:45am  15 minute Telephone visit  [id number]    Inder Lindsey MD                     "

## 2020-05-13 ENCOUNTER — MEDICAL CORRESPONDENCE (OUTPATIENT)
Dept: HEALTH INFORMATION MANAGEMENT | Facility: CLINIC | Age: 80
End: 2020-05-13

## 2020-05-15 ENCOUNTER — MEDICAL CORRESPONDENCE (OUTPATIENT)
Dept: HEALTH INFORMATION MANAGEMENT | Facility: CLINIC | Age: 80
End: 2020-05-15

## 2020-05-15 ENCOUNTER — VIRTUAL VISIT (OUTPATIENT)
Dept: FAMILY MEDICINE | Facility: CLINIC | Age: 80
End: 2020-05-15

## 2020-05-15 DIAGNOSIS — I34.0 MILD MITRAL REGURGITATION: ICD-10-CM

## 2020-05-15 DIAGNOSIS — I35.0 MILD AORTIC STENOSIS: Primary | ICD-10-CM

## 2020-05-15 DIAGNOSIS — Z78.9 NURSING HOME RESIDENT: ICD-10-CM

## 2020-05-15 NOTE — PROGRESS NOTES
"Family Medicine Video Visit Note  Bhavana Munguia is being evaluated via a billable video visit.               Video Visit Consent     Patient was verbally read the following and verbal consent was obtained.  \"Video visits are billed at different rates depending on your insurance coverage. During this emergency period, for some insurers they may be billed the same as an in-person visit.  Please reach out to your insurance provider with any questions.  If during the course of the call the physician/provider feels a telephone visit is not appropriate, you will not be charged for this service.\"     (Name person giving consent:  Patient   Date verbal consent given:  5/15/2020  Time verbal consent given:  9:00am    Patient would like the video invitation sent by: Other e-mail: [email or web address]           HPI       Video Start Time: 10:10am    Bhavana Munguia presents to clinic today for the following health issues:  60 day visit    History taken from patient and RN.    Patient states she is doing well overall.  Patient denies any acute questions or concerns.  Patient notes she does have some pain in her legs but associates this with immobility.  When patient is up walking around this pain dissipates.  Patient continues to eat well.  Her only complaint is the quality of the food.  Patient denies any problems urinating or stooling.  Patient continues to participate in activities at the facility.  RN notes no acute concerns.  Patient may have some tachycardia but RN associates this with exertion.  Patient also notes to have low blood pressures.  She is not on any antihypertensive.  Patient seems to be asymptomatic    Current Outpatient Medications   Medication Sig Dispense Refill     acetaminophen (TYLENOL) 325 MG tablet Take 2 tablets (650 mg) by mouth 3 times daily as needed for mild pain 168 tablet 11     albuterol (PROAIR HFA/PROVENTIL HFA/VENTOLIN HFA) 108 (90 Base) MCG/ACT Inhaler Inhale 1-2 puffs into the lungs every 4 hours as " "needed for shortness of breath / dyspnea 1 Inhaler 1     alum & mag hydroxide-simethicone (MYLANTA/MAALOX) 200-200-20 MG/5ML SUSP suspension Take 15 mLs by mouth every 3 hours as needed for indigestion  0     Calcium Oyster Shell 500 MG TABS Take 1 tablet by mouth daily 30 tablet 11     cholecalciferol (VITAMIN D3) 1000 UNIT tablet Take 1 tablet (1,000 Units) by mouth daily 30 tablet 11     Cyanocobalamin (B-12) 1000 MCG TBCR Take 1,000 mcg by mouth every 48 hours 14 tablet 11     FEROSUL 325 (65 Fe) MG tablet TAKE 1 TABLET BY MOUTH EVERY \"OTHER\" DAY 30 tablet 12     fluticasone (FLONASE) 50 MCG/ACT nasal spray Spray 2 sprays into both nostrils daily as needed for rhinitis or allergies       guaiFENesin-dextromethorphan (ROBITUSSIN DM) 100-10 MG/5ML syrup Take 10 mLs by mouth every 4 hours as needed for cough 560 mL      mirtazapine (REMERON) 15 MG tablet Take 1.5 tablets (22.5 mg) by mouth At Bedtime 30 tablet 11     Multiple Vitamins-Minerals (CEROVITE SENIOR) TABS Take 1 tablet by mouth daily 30 tablet 3     Nutritional Supplements (ENSURE ORIGINAL) LIQD Take 1 Container by mouth daily       OLANZapine (ZYPREXA) 5 MG tablet Take 2.5 tablets (12.5 mg) by mouth At Bedtime       OLANZapine (ZYPREXA) 5 MG tablet Take 1 tablet (5 mg) by mouth daily Also take 2.5 tablets by mouth at bedtime 98 tablet 11     pantoprazole (PROTONIX) 20 MG EC tablet Take 1 tablet (20 mg) by mouth daily 30 tablet 11     SENNA-docusate sodium (SENNA S) 8.6-50 MG tablet Take 1 tablet by mouth once as needed (constipation)       SENNA-docusate sodium (SENNA S) 8.6-50 MG tablet Take 1 tablet by mouth At Bedtime       sertraline (ZOLOFT) 100 MG tablet Take 1.5 tablets (150 mg) by mouth daily 30 tablet 11     simvastatin (ZOCOR) 40 MG tablet Take 1 tablet (40 mg) by mouth At Bedtime 30 tablet 11     traZODone (DESYREL) 100 MG tablet Take 0.5 tablets (50 mg) by mouth At Bedtime 90 tablet 3     valACYclovir (VALTREX) 500 MG tablet Give 500 mg by " mouth one time a day 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         Physical Exam:       Wt 127; 88/47, 111, 12, 97.9;    GENERAL: Healthy, alert and no distress  EYES: Eyes grossly normal to inspection.  No discharge or erythema, or obvious scleral/conjunctival abnormalities.  HENT: Normal cephalic/atraumatic.  External ears, nose and mouth without ulcers or lesions.  No nasal drainage visible.  RESP: No audible wheeze, cough, or visible cyanosis.  No visible retractions or increased work of breathing.    SKIN: Visible skin clear. No significant rash, abnormal pigmentation or lesions.  NEURO: Cranial nerves grossly intact.  Mentation and speech appropriate for age.  PSYCH: Mentation appears normal, affect flat, normal speech and appearance well-groomed.          Assessment and Plan   Diagnoses and all orders for this visit:    Mild aortic stenosis  Nursing home resident  Mild mitral regurgitation:   Patient doing well. No acute concerns today. No changes to medications or cares. Continue to monitor Blood Pressures and Heart Rates.     After Visit Information:  Will print and mail AVS     No follow-ups on file.    Video-Visit Details    Type of service:  Video Visit    Video End Time (time video stopped): 10:19am  9 minutes    Originating Location (pt. Location): Long term Care    Distant Location (provider location):  [facility]     Mode of Communication:  Video Conference via Byron Oneal DO  I precepted today with Inder Lindsey MD

## 2020-07-13 ENCOUNTER — TRANSFERRED RECORDS (OUTPATIENT)
Dept: HEALTH INFORMATION MANAGEMENT | Facility: CLINIC | Age: 80
End: 2020-07-13

## 2020-07-16 ENCOUNTER — DOCUMENTATION ONLY (OUTPATIENT)
Dept: FAMILY MEDICINE | Facility: CLINIC | Age: 80
End: 2020-07-16

## 2020-07-16 NOTE — PROGRESS NOTES
To be completed in Nursing note:    Please reference list for forms that require a visit for completion.  Please remind patients that providers are given 3-5 business days to complete and return forms.      Form type:  [facility] / Order Summary Report     Date form received:  2020    Date form completed by Physician:  2020    How was form returned to patient (mailed, faxed, or at  for patient to ):  Will fax to [phone]    Date form mailed/faxed/left at  for patient and sent to HIM for [email or web address]      Once form is left for patient, faxed, or mailed PCS will then close the documentation only encounter.

## 2020-08-07 NOTE — PROGRESS NOTES
Wt    123 lbs  B/p   142/86  p       96  resp  14  Temp 98.3  Patient has a scabbed area on rt hip surgical scar comes and goes  C/o increasing urinary incontience    Reported by ROD

## 2020-08-11 ENCOUNTER — VIRTUAL VISIT (OUTPATIENT)
Dept: FAMILY MEDICINE | Facility: CLINIC | Age: 80
End: 2020-08-11

## 2020-08-11 DIAGNOSIS — R63.4 WEIGHT LOSS: ICD-10-CM

## 2020-08-11 DIAGNOSIS — S81.801A WOUND OF RIGHT LOWER EXTREMITY, INITIAL ENCOUNTER: Primary | ICD-10-CM

## 2020-08-11 DIAGNOSIS — L85.3 DRY SKIN: ICD-10-CM

## 2020-08-11 NOTE — PROGRESS NOTES
Preceptor Attestation:   Patient seen and evaluated via video visit. I discussed the patient with the resident. I have verified the content of the note, which accurately reflects my assessment of the patient and the plan of care.   Supervising Physician:  David Lewis MD.

## 2020-08-11 NOTE — PROGRESS NOTES
Subjective: Bhavana Marr is a 79 year old who is seen at [facility] for follow up visit today.    Additional information obtained from Nursing Staff RN Josefina, and includes:  Weight loss and scab on right hip.   Acute concerns today include:   1. Weight loss: Normal weight range around 135lbs, has lost around 5-10lbs in the last 2 months. Patient has no concerns with appetite. She does not like the way the food is cooked in the kitchen, so sometimes will not eat the food. Drinks one Ensure can right now.     2. Right hip healing wound: Noticed about one week ago. Appears to be a thumb sized healing scab over her right hip scar from a hip replacement years ago. Patient does reports she stays in bed for a long period of time each day. Denies any pain to the area. No discharge noted from the wound.       Chronic and Past Medical Problems include:  Patient Active Problem List   Diagnosis     Arthritis     Depressive disorder     Borderline personality disorder (H)     GERD (gastroesophageal reflux disease)     Holosystolic murmur     Asthma     History of gastric bypass     Hyperlipidemia LDL goal <130     Other insomnia     Mild mitral regurgitation     Mild aortic stenosis     Family History     Problem (# of Occurrences) Relation (Name,Age of Onset)    Depression (4) Maternal Aunt (60), Maternal Uncle (85), Paternal Aunt, Paternal Uncle        Social History:   Current activities:  Has been bored with COVID-19.  has been out sick. She is usually very sociable.     PMHX/PSHX/MEDS/ALLERGIES/SHX/FHX reviewed and updated in Epic.   MEDICATION LIST:  Current Outpatient Medications   Medication     acetaminophen (TYLENOL) 325 MG tablet     albuterol (PROAIR HFA/PROVENTIL HFA/VENTOLIN HFA) 108 (90 Base) MCG/ACT Inhaler     alum & mag hydroxide-simethicone (MYLANTA/MAALOX) 200-200-20 MG/5ML SUSP suspension     Calcium Oyster Shell 500 MG TABS     cholecalciferol (VITAMIN D3)  1000 UNIT tablet     Cyanocobalamin (B-12) 1000 MCG TBCR     FEROSUL 325 (65 Fe) MG tablet     fluticasone (FLONASE) 50 MCG/ACT nasal spray     guaiFENesin-dextromethorphan (ROBITUSSIN DM) 100-10 MG/5ML syrup     mirtazapine (REMERON) 15 MG tablet     Multiple Vitamins-Minerals (CEROVITE SENIOR) TABS     Nutritional Supplements (ENSURE ORIGINAL) LIQD     OLANZapine (ZYPREXA) 5 MG tablet     OLANZapine (ZYPREXA) 5 MG tablet     pantoprazole (PROTONIX) 20 MG EC tablet     SENNA-docusate sodium (SENNA S) 8.6-50 MG tablet     SENNA-docusate sodium (SENNA S) 8.6-50 MG tablet     sertraline (ZOLOFT) 100 MG tablet     simvastatin (ZOCOR) 40 MG tablet     traZODone (DESYREL) 100 MG tablet     valACYclovir (VALTREX) 500 MG tablet     No current facility-administered medications for this visit.      GERIATRIC ROS:    Do you have difficulty getting around, watching TV or reading because of poor eyesight? No  Can you hear normal conversational voice? Yes  Do you use hearing aides? No  Do you have problems with your memory? No   Do you often feel sad or depressed? Sometimes, given that she cannot socialize as often given COVID-19.    Have you unintentionally lost weight in the last 6 months? Yes  Do you have trouble with control of your bladder? No  Do you have trouble with control of your bowels? No   Have you had any falls in the past year? No   GENERAL ROS:   General: No unexplained weight gain or loss; adequate sleep pattern.  Resp: No worsening shortness of breath, cough or hemoptysis.   GI: No worsening constipation, no diarrhea, no nausea or vomiting  : Voiding independently with no significant incontinence   Skin: No areas of new skin breakdown or worrisome rashes  Extremities:  No pain, extremity weakness or balance troubles  Objective: LMP  (LMP Unknown)    See nursing home chart for most recent set of vitals.   Gen: Well nourished and in NAD   Skin: Thumb sized healing wound with scar, no erythema, no drainage, no  streaking, no bruising.   Psych: Euthymic  Neuro: Pt is able to ambulate independently    Preventative Screening:   Vaccinations reviewed: Yes  Additional Recommended Screening: No    Assessment/ Plan:  1. Wound of right lower extremity, initial encounter  2. Dry skin  Right hip healing, scabbed, wound over previous surgical scar for hip arthroplasty. Has not bothered patient, noticed one week ago. Patient more concerned about her dry skin. No erythema or drainage noted from wound. Discussed wound cares with barrier protection with vaseline. Also, to prevent infection should apply neosporin to area twice a day for the next two weeks. Discussed with RAUL Rocha to call if not healing properly in the next two weeks. Also, counseled on position changes and pressure ulcer prevention.   - White Petrolatum ointment; Apply topically as needed for dry skin  Dispense: 60 g; Refill: 3  - bacitracin-neomycin-polymyxin (NEOSPORIN) 400-5-5000 external ointment; Apply twice daily for the next 14 days  Dispense: 28.35 g; Refill: 0      3. Weight loss  Sustained a 5-10lb unintentional weight loss in the last 2 months. She has had a good appetite. Just does not like how they cook the food in the kitchen. Discussed that we can increase the Ensure to 2 cans per day from previous 1 can per day. Will weight check before next appointment.       RECOMMENDED FOLLOW UP:  2 months.  Level of Service:  13540    Total of 30 minutes was spent in face to face contact with patient with > 50% in counseling and coordination of care.  Options for treatment and/or follow-up care were reviewed with the patient. Bhavana Marr was engaged and actively involved in the decision making process. She verbalized understanding of the options discussed and was satisfied with the final plan.    Staffed with Dr. David Lewis.     Natasha Esquivel MD, PGY3  [facility]

## 2020-10-06 ENCOUNTER — APPOINTMENT (OUTPATIENT)
Dept: FAMILY MEDICINE | Facility: CLINIC | Age: 80
End: 2020-10-06
Payer: COMMERCIAL

## 2020-10-06 ENCOUNTER — DOCUMENTATION ONLY (OUTPATIENT)
Dept: FAMILY MEDICINE | Facility: CLINIC | Age: 80
End: 2020-10-06

## 2020-10-06 DIAGNOSIS — Z00.00 PREVENTATIVE HEALTH CARE: ICD-10-CM

## 2020-10-06 DIAGNOSIS — G47.09 OTHER INSOMNIA: ICD-10-CM

## 2020-10-06 DIAGNOSIS — Z78.9 NURSING HOME RESIDENT: Primary | ICD-10-CM

## 2020-10-06 DIAGNOSIS — F39 MOOD DISORDER (H): ICD-10-CM

## 2020-10-06 PROCEDURE — 99213 OFFICE O/P EST LOW 20 MIN: CPT | Mod: 95 | Performed by: STUDENT IN AN ORGANIZED HEALTH CARE EDUCATION/TRAINING PROGRAM

## 2020-10-06 NOTE — PROGRESS NOTES
"Family Medicine Video Visit Note  Bhavana Munguia is being evaluated via a billable video visit.           Video Visit Consent     Patient was verbally read the following and verbal consent was obtained.  \"Video visits are billed at different rates depending on your insurance coverage. During this emergency period, for some insurers they may be billed the same as an in-person visit.  Please reach out to your insurance provider with any questions.  If during the course of the call the physician/provider feels a video visit is not appropriate, you will not be charged for this service.\"     (Name person giving consent:  caregiver   Date verbal consent given:  10/6/2020  Time verbal consent given:  09:00 AM)    Subjective: Bhavana Marr is a 79 year old who is seen at home for follow up visit today.  Seen at [address] [place] [address] .      Also present at visit include Josefina Hansen RN    Acute concerns today include: Bhavana was having intermittent confusion this past month with slightly increased temperatures. She had a temperature of 99.3 last night, which is a degree higher than usual for her. She had a UA that was negative this past week. Her mentation has improved in the last two days. She had 2 falls in the last week with some bruising. She had gone into the bathroom without her light on during the last fall. It is unclear what caused her earlier fall.    She was initially taking 300mg of trazodone to help her sleep. This was cut back to 150mg in the past week and her sleep has not been affected.     Bhavana denies fevers or chills. She also denies any headaches, chest pain, dyspnea, dizziness, confusion, coughing, muscle aches. She is sleeping well. She states her pooping is finally normal. She was incontinent of urine 3 months ago but that has gone back to normal. She has not been drinking as much water and has trouble with some foods. Because of her history of gastric bypass her meats have to be ground " up.     Chronic and Past Medical Problems include:  Patient Active Problem List   Diagnosis     Arthritis     Depressive disorder     Borderline personality disorder (H)     GERD (gastroesophageal reflux disease)     Holosystolic murmur     Asthma     History of gastric bypass     Hyperlipidemia LDL goal <130     Other insomnia     Mild mitral regurgitation     Mild aortic stenosis     Family History     Problem (# of Occurrences) Relation (Name,Age of Onset)    Depression (4) Maternal Aunt (60), Maternal Uncle (85), Paternal Aunt, Paternal Uncle        Social History:   Current activities:  Engages in nursing home activities  Support services:  Nursing home staff    PMHX/PSHX/MEDS/ALLERGIES/SHX/FHX reviewed and updated in Epic.   MEDICATION LIST:  Current Outpatient Medications   Medication     acetaminophen (TYLENOL) 325 MG tablet     albuterol (PROAIR HFA/PROVENTIL HFA/VENTOLIN HFA) 108 (90 Base) MCG/ACT Inhaler     alum & mag hydroxide-simethicone (MYLANTA/MAALOX) 200-200-20 MG/5ML SUSP suspension     bacitracin-neomycin-polymyxin (NEOSPORIN) 400-5-5000 external ointment     Calcium Oyster Shell 500 MG TABS     cholecalciferol (VITAMIN D3) 1000 UNIT tablet     Cyanocobalamin (B-12) 1000 MCG TBCR     FEROSUL 325 (65 Fe) MG tablet     fluticasone (FLONASE) 50 MCG/ACT nasal spray     guaiFENesin-dextromethorphan (ROBITUSSIN DM) 100-10 MG/5ML syrup     mirtazapine (REMERON) 15 MG tablet     Multiple Vitamins-Minerals (CEROVITE SENIOR) TABS     Nutritional Supplements (ENSURE ORIGINAL) LIQD     OLANZapine (ZYPREXA) 5 MG tablet     OLANZapine (ZYPREXA) 5 MG tablet     pantoprazole (PROTONIX) 20 MG EC tablet     SENNA-docusate sodium (SENNA S) 8.6-50 MG tablet     SENNA-docusate sodium (SENNA S) 8.6-50 MG tablet     sertraline (ZOLOFT) 100 MG tablet     simvastatin (ZOCOR) 40 MG tablet     traZODone (DESYREL) 100 MG tablet     valACYclovir (VALTREX) 500 MG tablet     White Petrolatum ointment     No current  facility-administered medications for this visit.      Medications are managed by:  NURSE  Patient has questions, concerns, or potential side effects/interactions from their medications:   No  GERIATRIC ROS:    Do you have difficulty getting around, watching TV or reading because of poor eyesight?  No   Can you hear normal conversational voice? Yes   Do you use hearing aides? No   Do you have problems with your memory?  No   Do you often feel sad or depressed?   No   Have you unintentionally lost weight in the last 6 months?  No   Do you have trouble with control of your bladder?   No   Do you have trouble with control of your bowels?  No   Have you had any falls in the past year? Yes  How many? Don't know, 2 in the last week    GENERAL ROS:   General: No unexplained weight gain or loss; adequate sleep pattern.  Resp: No worsening shortness of breath, cough or hemoptysis.   GI: No worsening constipation, no diarrhea, no nausea or vomiting  : Voiding independently with no significant incontinence   Skin: No areas of new skin breakdown or worrisome rashes  Extremities:  No pain, extremity weakness or balance troubles    Objective:  Gen: Well nourished and in NAD   HEENT: Head is atraumatic, decent dentition  Pulm: Normal work of breathing, able to speak in full sentences  Extrem: atraumatic, skin well perfused  Psych: Euthymic  Neuro: Pt is able to ambulate independently    Assessment/ Plan:  Insomnia  Dehydration  Bhavana's insomnia is currently well controlled with 150mg of Trazodone. The increased dose to 300mg is likely the cause to her increased falls and confusion, especially since both have resolved since decreasing the dose. However, will rule out electrolyte imbalance, anemia, or infection. She was likely also somewhat dehydrated.   - continue Trazodone at 150mg. If continuing to have confusion or falls, cut to 100mg, and continue taper until symptoms improve  -CBC and BMP at next lab draw  - encourage oral  hydration    Major Depressive Disorder  Bipolar Disorder  - stable, no new complaints, continue current medication regimen    Asthma  - stable, no new complaints, continue current medication regimen    GERD  - stable, no new complaints, continue current medication regimen  - Continue modified diet    Hyperlipidemia  -continue to monitor yearly, continue current regimen    RECOMMENDED FOLLOW UP:  2 months.    Total of 15 minutes was spent in face to face contact with patient with > 50% in counseling and coordination of care.  Options for treatment and/or follow-up care were reviewed with the patient. Bhavana Marr was engaged and actively involved in the decision making process. She verbalized understanding of the options discussed and was satisfied with the final plan.      Video-Visit Details    Type of service:  Video Visit    Video Start Time (time video started): 09:14 AM    Video End Time (time video stopped): 09:29 AM    Originating Location (pt. Location): Nursing Home    Distant Location (provider location):  [facility]     Platform used for Video Visit: Nancy Ngo MD  I precepted today with Dr. David Lewis

## 2020-10-09 ENCOUNTER — TRANSFERRED RECORDS (OUTPATIENT)
Dept: HEALTH INFORMATION MANAGEMENT | Facility: CLINIC | Age: 80
End: 2020-10-09

## 2020-10-15 ENCOUNTER — DOCUMENTATION ONLY (OUTPATIENT)
Dept: PHARMACY | Facility: CLINIC | Age: 80
End: 2020-10-15

## 2020-10-15 DIAGNOSIS — J45.909 UNCOMPLICATED ASTHMA, UNSPECIFIED ASTHMA SEVERITY, UNSPECIFIED WHETHER PERSISTENT: ICD-10-CM

## 2020-10-15 DIAGNOSIS — H04.123 DRY EYES: ICD-10-CM

## 2020-10-15 DIAGNOSIS — K59.00 CONSTIPATION, UNSPECIFIED CONSTIPATION TYPE: ICD-10-CM

## 2020-10-15 DIAGNOSIS — F39 MOOD DISORDER (H): ICD-10-CM

## 2020-10-15 DIAGNOSIS — R52 PAIN: Primary | ICD-10-CM

## 2020-10-15 DIAGNOSIS — Z86.19 HX OF COLD SORES: ICD-10-CM

## 2020-10-15 RX ORDER — TRAZODONE HYDROCHLORIDE 100 MG/1
100 TABLET ORAL AT BEDTIME
Qty: 90 TABLET | Refills: 3 | COMMUNITY
Start: 2020-10-15 | End: 2020-12-09

## 2020-10-15 RX ORDER — VALACYCLOVIR HYDROCHLORIDE 1 G/1
1000 TABLET, FILM COATED ORAL 2 TIMES DAILY
Qty: 21 TABLET | Refills: 0 | COMMUNITY
Start: 2020-10-15 | End: 2023-05-05

## 2020-10-15 RX ORDER — OLANZAPINE 5 MG/1
10 TABLET ORAL AT BEDTIME
Status: ON HOLD | COMMUNITY
Start: 2020-10-15 | End: 2023-06-07

## 2020-10-15 RX ORDER — CAPSAICIN 0.025 %
CREAM (GRAM) TOPICAL 3 TIMES DAILY PRN
Status: ON HOLD | COMMUNITY
Start: 2020-10-15 | End: 2023-05-31

## 2020-10-15 NOTE — PROGRESS NOTES
Updated medications from list sent by [facility]. Nurse on fax was Josefina Hansen.     Keny SantaD

## 2020-10-15 NOTE — PROGRESS NOTES
I have verified the content of the note, which accurately reflects my assessment of the patient and the plan of care.   Janelle Valdes, [facility], PharmD

## 2020-11-06 NOTE — PATIENT INSTRUCTIONS
Being seen d/t increase vomiting and difficulty keeping things down following meals/ensure    Wt 117 lb    B/P 100/63    P 106    R 17    T 98.4    O2 sat 94%    Pharmacist has expressed concern that the vomiting may be related to serotonin syndrome so we have just decreased her trazodone to 100mg at HS. Yesterday she states she did not keep her lunch down but she was fine after supper.     10lb weight loss in the last month  As reported by ROD  BTRN

## 2020-11-09 ENCOUNTER — DOCUMENTATION ONLY (OUTPATIENT)
Dept: FAMILY MEDICINE | Facility: CLINIC | Age: 80
End: 2020-11-09

## 2020-11-09 ENCOUNTER — APPOINTMENT (OUTPATIENT)
Dept: FAMILY MEDICINE | Facility: CLINIC | Age: 80
End: 2020-11-09
Payer: COMMERCIAL

## 2020-11-09 DIAGNOSIS — K21.9 GASTROESOPHAGEAL REFLUX DISEASE WITHOUT ESOPHAGITIS: Primary | ICD-10-CM

## 2020-11-09 NOTE — PROGRESS NOTES
"       SUBJECTIVE       Bhavana Munguia is a 79 year old  female with a PMH significant for:     Patient Active Problem List   Diagnosis     Arthritis     Depressive disorder     Borderline personality disorder (H)     GERD (gastroesophageal reflux disease)     Holosystolic murmur     Asthma     History of gastric bypass     Hyperlipidemia LDL goal <130     Other insomnia     Mild mitral regurgitation     Mild aortic stenosis     \"Increased vomiting, difficulty swallowing\"    Patient reports overall feeling well. Her biggest complaint today was a roommate who screams all night long. She does report getting enough sleep at night. Describes history of gastric bypass about 30 years ago. Denies any nausea, vomiting, diarrhea, abdominal pain, constipation. Patient reports being able to swallow liquids and solids without any sensation of things being stuck in her throat.     RN states that she has had multiple episodes of vomiting in the past weeks, requiring Maalox.     PMH, Medications and Allergies were reviewed and updated as needed.        REVIEW OF SYSTEMS     Constitutional, HEENT, cardiovascular, pulmonary, gi and gu systems are negative, except as otherwise noted.          OBJECTIVE     Wt 117 lbs  /63  P 106  T 98.4 F  O2 sat 94%    Constitutional: Awake, alert, cooperative, no acute distress, and appears stated age.  Eyes: sclera clear, conjunctiva normal.  ENT: NC/AT, MMM  Neck: Supple, symmetrical, trachea midline  Back: Symmetric, no curvature  Lungs: No increased WOB, CTABL, no crackles or wheezing appreciated.  Cardiovascular: RRR, normal S1 and S2, no S3 or S4, and no murmur appreciated.  Abdomen: Normal BS, soft, non-distended, non-tender  Musculoskeletal: No redness, warmth, or swelling of the joints. Tone is normal.  Neurologic: A&Ox3.  Cranial nerves II-XII are grossly intact.  Sensory is intact and gait is normal.  Neuropsychiatric: Normal affect, mood, orientation, memory and insight.  Skin: No " visible rashes, erythema, pallor, petechia or purpura.      ASSESSMENT AND PLAN     Diagnoses and all orders for this visit:    Gastroesophageal reflux disease without esophagitis  Symptoms suspicious of reflux, given history. Trial famotidine and follow up in 2-3 weeks. Consider adding PPI if symptoms persists.  -     famotidine (PEPCID) 40 MG tablet; Take 1 tablet (40 mg) by mouth 2 times daily as needed for heartburn      Dereje Frausto MD  11/9/2020    Precepted with Dr. Lewis.

## 2020-11-10 RX ORDER — FAMOTIDINE 40 MG/1
40 TABLET, FILM COATED ORAL 2 TIMES DAILY PRN
Qty: 90 TABLET | Refills: 0 | Status: SHIPPED | OUTPATIENT
Start: 2020-11-10 | End: 2022-07-11

## 2020-11-13 ENCOUNTER — TRANSFERRED RECORDS (OUTPATIENT)
Dept: HEALTH INFORMATION MANAGEMENT | Facility: CLINIC | Age: 80
End: 2020-11-13

## 2020-12-07 ENCOUNTER — OFFICE VISIT (OUTPATIENT)
Dept: FAMILY MEDICINE | Facility: CLINIC | Age: 80
End: 2020-12-07
Payer: COMMERCIAL

## 2020-12-07 DIAGNOSIS — F60.3 BORDERLINE PERSONALITY DISORDER (H): ICD-10-CM

## 2020-12-07 DIAGNOSIS — K21.9 GASTROESOPHAGEAL REFLUX DISEASE WITHOUT ESOPHAGITIS: ICD-10-CM

## 2020-12-07 DIAGNOSIS — M19.90 ARTHRITIS: Primary | ICD-10-CM

## 2020-12-07 DIAGNOSIS — D64.9 LOW HEMOGLOBIN: ICD-10-CM

## 2020-12-07 DIAGNOSIS — R63.4 WEIGHT LOSS: ICD-10-CM

## 2020-12-07 PROCEDURE — 99213 OFFICE O/P EST LOW 20 MIN: CPT | Mod: 95 | Performed by: STUDENT IN AN ORGANIZED HEALTH CARE EDUCATION/TRAINING PROGRAM

## 2020-12-07 NOTE — PROGRESS NOTES
"Family Medicine Video Visit Note  Bhavana Munguia is being evaluated via a billable video visit.           Video Visit Consent     Patient was verbally read the following and verbal consent was obtained.  \"Video visits are billed at different rates depending on your insurance coverage. During this emergency period, for some insurers they may be billed the same as an in-person visit.  Please reach out to your insurance provider with any questions.  If during the course of the call the physician/provider feels a video visit is not appropriate, you will not be charged for this service.\"     (Name person giving consent:  caregiver   Date verbal consent given:  12/7/2020  Time verbal consent given:  09:00 AM)    Subjective: Bahvana Marr is a 79 year old who is seen at home for follow up visit today.  Seen at [address] [place] [address] .      Also present at visit include Josefina Hansen RN  Acute concerns today include: The back of her knees has been hurting for about a month. Tylenol works to take the pain away completely. She is only taking the Tylenol as needed. She has D/c'd trazodone, now taking hydroxyzine 50 mg (starting 11/6) for sleep. Her sleep is fine. She is no longer having memory issues and no longer falling.    Her heartburn is much better. She is no longer having trouble eating/drinking. These episodes have been cyclic, last happened 3 weeks ago for about 2 days. Given pepcid as needed.     She is currently #116, two months ago was #126. She is drinking Ensure twice a day. At first she was unable to finish a whole portion but is finishing them now. Josefina Hansen believes she is drinking Ensure plus, though the original is listed in her medications.     Josefina is concerned about her annual labs. CBC on 10/9 showed a hbg of 10.2 and platelets over 600. Labs on 11/13 showed hemoglobin of 10.8 and normalized platelets. Bhavana is not feeling tired. She denies dyspnea, chest pain, illness, " nausea/vomiting.     Chronic and Past Medical Problems include:  Patient Active Problem List   Diagnosis     Arthritis     Depressive disorder     Borderline personality disorder (H)     GERD (gastroesophageal reflux disease)     Holosystolic murmur     Asthma     History of gastric bypass     Hyperlipidemia LDL goal <130     Other insomnia     Mild mitral regurgitation     Mild aortic stenosis     Family History     Problem (# of Occurrences) Relation (Name,Age of Onset)    Depression (4) Maternal Aunt (60), Maternal Uncle (85), Paternal Aunt, Paternal Uncle        Social History:   Current activities:  Bowling and bingo, although bowling has been on hold  Support services:  Nursing home staff    PMHX/PSHX/MEDS/ALLERGIES/SHX/FHX reviewed and updated in Epic.   MEDICATION LIST:  Current Outpatient Medications   Medication     Acetaminophen 325 MG CAPS     albuterol (PROAIR HFA/PROVENTIL HFA/VENTOLIN HFA) 108 (90 Base) MCG/ACT Inhaler     alum & mag hydroxide-simethicone (MYLANTA/MAALOX) 200-200-20 MG/5ML SUSP suspension     bacitracin-neomycin-polymyxin (NEOSPORIN) 400-5-5000 external ointment     Calcium Oyster Shell 500 MG TABS     capsaicin (ZOSTRIX) 0.025 % external cream     cholecalciferol (VITAMIN D3) 1000 UNIT tablet     Cyanocobalamin (B-12) 1000 MCG TBCR     famotidine (PEPCID) 40 MG tablet     FEROSUL 325 (65 Fe) MG tablet     fluticasone (FLONASE) 50 MCG/ACT nasal spray     guaiFENesin-dextromethorphan (ROBITUSSIN DM) 100-10 MG/5ML syrup     hydroxypropyl methylcellulose (GENTEAL) 0.2 % SOLN ophthalmic solution     magnesium hydroxide (MILK OF MAGNESIA) 400 MG/5ML suspension     mirtazapine (REMERON) 15 MG tablet     Multiple Vitamins-Minerals (CEROVITE SENIOR) TABS     Nutritional Supplements (ENSURE ORIGINAL) LIQD     OLANZapine (ZYPREXA) 5 MG tablet     SENNA-docusate sodium (SENNA S) 8.6-50 MG tablet     SENNA-docusate sodium (SENNA S) 8.6-50 MG tablet     sertraline (ZOLOFT) 100 MG tablet      simvastatin (ZOCOR) 40 MG tablet     traZODone (DESYREL) 100 MG tablet     valACYclovir (VALTREX) 1000 mg tablet     valACYclovir (VALTREX) 500 MG tablet     White Petrolatum ointment     No current facility-administered medications for this visit.      Medications are managed by: NURSE  Do you have difficulty getting around, watching TV or reading because of poor eyesight?  No   Can you hear normal conversational voice? Yes   Do you use hearing aides? No   Do you have problems with your memory?  No   Do you often feel sad or depressed?   No   Have you unintentionally lost weight in the last 6 months?  No   Do you have trouble with control of your bladder?   No   Do you have trouble with control of your bowels?  No   Have you had any falls in the past year? Yes, though not since changing medications in the last 2 months    GENERAL ROS:   General: 10# weight loss in 2 months; adequate sleep pattern.  Resp: No worsening shortness of breath, cough or hemoptysis.   GI: No worsening constipation, no diarrhea, no nausea or vomiting  : Voiding independently with no significant incontinence   Skin: No areas of new skin breakdown or worrisome rashes  Extremities:  Bilateral knee pain, extremity weakness or balance troubles    Objective:   Most recent weight:  116 lbs  Gen: Well nourished and in NAD   HEENT: Head is atraumatic, decent dentition  Pulm: Normal work of breathing, able to speak in full sentences  Extrem: atraumatic, skin well perfused  Psych: Euthymic  Neuro: Pt is able to ambulate independently    Assessment/plan  1. Arthritis  Bilateral knee pain likely due to arthritis.   - Schedule Tylenol 650mg three times a day.    2. Weight loss  3. Low hemoglobin  Patient has had many medication changes in the last 6 months. Her CBC is normalizing and may be an acute issue related to medications   - Increase Ensure to three times a day  - CBC in two months prior to next appointment    4. Borderline personality disorder  (H)  Symptoms well controlled on current regimen without side effects. No medication changes.    5. Gastroesophageal reflux disease without esophagitis  Continue famotidine twice a day as needed for heartburn.    RECOMMENDED FOLLOW UP:  2 months.    Total of 20 minutes was spent in face to face contact with patient with > 50% in counseling and coordination of care.  Options for treatment and/or follow-up care were reviewed with the patient. Bhavana Marr was engaged and actively involved in the decision making process. She verbalized understanding of the options discussed and was satisfied with the final plan.      Franca Ngo MD              Video-Visit Details    Type of service:  Video Visit    Video Start Time (time video started): 09:00 AM    Video End Time (time video stopped): 09:20 AM    Originating Location (pt. Location): Nursing Home    Distant Location (provider location):  [facility]     Platform used for Video Visit: Doximity      This patient was discussed with David Lewis MD, who agrees with the above assessment and plan.    Franca Ngo, PGY2  [facility]    .

## 2020-12-09 ENCOUNTER — DOCUMENTATION ONLY (OUTPATIENT)
Dept: PHARMACY | Facility: CLINIC | Age: 80
End: 2020-12-09

## 2020-12-09 DIAGNOSIS — R19.7 DIARRHEA: ICD-10-CM

## 2020-12-09 DIAGNOSIS — G47.00 INSOMNIA: Primary | ICD-10-CM

## 2020-12-09 RX ORDER — LOPERAMIDE HCL 1 MG/5ML
SOLUTION ORAL
Status: ON HOLD | COMMUNITY
Start: 2020-12-09 | End: 2023-05-31

## 2020-12-09 RX ORDER — HYDROXYZINE HYDROCHLORIDE 50 MG/1
50 TABLET, FILM COATED ORAL AT BEDTIME
COMMUNITY
Start: 2020-12-09 | End: 2021-12-21

## 2020-12-10 NOTE — PROGRESS NOTES
I have verified the content of the note, which accurately reflects my assessment of the patient and the plan of care.   Janelle Valdes, [facility], PharmD

## 2021-02-12 ENCOUNTER — TRANSFERRED RECORDS (OUTPATIENT)
Dept: HEALTH INFORMATION MANAGEMENT | Facility: CLINIC | Age: 81
End: 2021-02-12

## 2021-02-17 ENCOUNTER — DOCUMENTATION ONLY (OUTPATIENT)
Dept: FAMILY MEDICINE | Facility: CLINIC | Age: 81
End: 2021-02-17

## 2021-02-17 NOTE — PROGRESS NOTES
To be completed in Nursing note:    Please reference list for forms that require a visit for completion.  Please remind patients that providers are given 3-5 business days to complete and return forms.      Form type:  [facility] : Final Result for CBC with Differential     Date form received:  2021    Date form completed by Physician:  2021    How was form returned to patient (mailed, faxed, or at  for patient to ):  Fax to [phone]    Date form mailed/faxed/left at  for patient and sent to HIM for [email or web address]          Once form is left for patient, faxed, or mailed PCS will then close the documentation only encounter.

## 2021-02-18 NOTE — PROGRESS NOTES
Wt 124    B/P 136/81    P  92    R  16    T 98.6    O2 sats 97%    Dx to be addressed: Major Depressive d/o    Herpes    Asthma    GERD    Bipolar    Osteoporosis     Osteoarthritis    Bhavana had a recent episode where she was c/o SOB following a coughing episode relieved by using inhaler.    Reported by ROD  btrn

## 2021-02-19 ENCOUNTER — OFFICE VISIT (OUTPATIENT)
Dept: FAMILY MEDICINE | Facility: CLINIC | Age: 81
End: 2021-02-19
Payer: COMMERCIAL

## 2021-02-19 DIAGNOSIS — G89.29 CHRONIC BILATERAL LOW BACK PAIN, UNSPECIFIED WHETHER SCIATICA PRESENT: Primary | ICD-10-CM

## 2021-02-19 DIAGNOSIS — M54.50 CHRONIC BILATERAL LOW BACK PAIN, UNSPECIFIED WHETHER SCIATICA PRESENT: Primary | ICD-10-CM

## 2021-02-19 DIAGNOSIS — J45.909 UNCOMPLICATED ASTHMA, UNSPECIFIED ASTHMA SEVERITY, UNSPECIFIED WHETHER PERSISTENT: ICD-10-CM

## 2021-02-19 DIAGNOSIS — F32.A DEPRESSIVE DISORDER: ICD-10-CM

## 2021-02-19 PROCEDURE — 99213 OFFICE O/P EST LOW 20 MIN: CPT | Mod: 95 | Performed by: STUDENT IN AN ORGANIZED HEALTH CARE EDUCATION/TRAINING PROGRAM

## 2021-02-23 DIAGNOSIS — K59.00 CONSTIPATION, UNSPECIFIED CONSTIPATION TYPE: ICD-10-CM

## 2021-02-23 DIAGNOSIS — J45.909 UNCOMPLICATED ASTHMA, UNSPECIFIED ASTHMA SEVERITY, UNSPECIFIED WHETHER PERSISTENT: ICD-10-CM

## 2021-02-23 RX ORDER — ALBUTEROL SULFATE 90 UG/1
2 AEROSOL, METERED RESPIRATORY (INHALATION) EVERY 4 HOURS PRN
Qty: 1 INHALER | Refills: 1 | COMMUNITY
Start: 2021-02-23 | End: 2021-03-18

## 2021-02-23 RX ORDER — OLANZAPINE 2.5 MG/1
2.5 TABLET, FILM COATED ORAL AT BEDTIME
COMMUNITY
Start: 2021-02-23 | End: 2021-12-15

## 2021-03-02 NOTE — PROGRESS NOTES
"Family Medicine Video Visit Note  Bhavana Munguia is being evaluated via a billable video visit.            Video Visit Consent      Patient was verbally read the following and verbal consent was obtained.  \"Video visits are billed at different rates depending on your insurance coverage. During this emergency period, for some insurers they may be billed the same as an in-person visit.  Please reach out to your insurance provider with any questions.  If during the course of the call the physician/provider feels a video visit is not appropriate, you will not be charged for this service.\"      Name person giving consent:  caregiver   Date verbal consent given:  2/19/2020  Time verbal consent given:  09:00 AM     Subjective: Bhavana Marr is a 79 year old who is seen at home for follow up visit today.  Seen at [address] [place] [address] .       Also present at visit include Josefina Hansen RN  Acute concerns today include:   Had a coughing episode associated with shortness of breath last week. The episode was improved with albuterol inhaler. Has otherwise been stable with no other episodes. Usually gets short of breath after a coughing episode. Denies fever/chills, congestion, nausea/vomiting, chest pain.     She has been having difficulty with her roommate. She tries to avoid her as much as she can. Sometimes distresses her. Otherwise depression is stable. No thoughts to harm self.     Has chronic lower back pain. Has bene taking scheduled tylenol. Has been occurring on and off for the past 3 months. Denies saddle paresthesias, bladder/bowel incontinence, fever/chills. Does not radiate down her leg. Has not tried a heating pad.     Weight has improved from previous. Has been drinking ensure.      Chronic and Past Medical Problems include:      Patient Active Problem List   Diagnosis     Arthritis     Depressive disorder     Borderline personality disorder (H)     GERD (gastroesophageal reflux disease)     " Holosystolic murmur     Asthma     History of gastric bypass     Hyperlipidemia LDL goal <130     Other insomnia     Mild mitral regurgitation     Mild aortic stenosis            Family History         Problem (# of Occurrences) Relation (Name,Age of Onset)     Depression (4) Maternal Aunt (60), Maternal Uncle (85), Paternal Aunt, Paternal Uncle             Social History:   Current activities:  [employer]ingo  Support services:  Nursing home staff     PMHX/PSHX/MEDS/ALLERGIES/SHX/FHX reviewed and updated in Epic.   MEDICATION LIST:  Current Outpatient Medications   Medication     Acetaminophen 325 MG CAPS     albuterol (PROAIR HFA/PROVENTIL HFA/VENTOLIN HFA) 108 (90 Base) MCG/ACT inhaler     alum & mag hydroxide-simethicone (MYLANTA/MAALOX) 200-200-20 MG/5ML SUSP suspension     Calcium Oyster Shell 500 MG TABS     capsaicin (ZOSTRIX) 0.025 % external cream     cholecalciferol (VITAMIN D3) 1000 UNIT tablet     Cyanocobalamin (B-12) 1000 MCG TBCR     famotidine (PEPCID) 40 MG tablet     FEROSUL 325 (65 Fe) MG tablet     fluticasone (FLONASE) 50 MCG/ACT nasal spray     guaiFENesin-dextromethorphan (ROBITUSSIN DM) 100-10 MG/5ML syrup     hydroxypropyl methylcellulose (GENTEAL) 0.2 % SOLN ophthalmic solution     hydrOXYzine (ATARAX) 50 MG tablet     loperamide (IMODIUM) 1 MG/5ML solution     magnesium hydroxide (MILK OF MAGNESIA) 400 MG/5ML suspension     mirtazapine (REMERON) 15 MG tablet     Multiple Vitamins-Minerals (CEROVITE SENIOR) TABS     Nutritional Supplements (ENSURE ORIGINAL) LIQD     OLANZapine (ZYPREXA) 2.5 MG tablet     OLANZapine (ZYPREXA) 5 MG tablet     SENNA-docusate sodium (SENNA S) 8.6-50 MG tablet     SENNA-docusate sodium (SENNA S) 8.6-50 MG tablet     sertraline (ZOLOFT) 100 MG tablet     simvastatin (ZOCOR) 40 MG tablet     valACYclovir (VALTREX) 1000 mg tablet     valACYclovir (VALTREX) 500 MG tablet     White Petrolatum ointment     No current facility-administered medications for this visit.            Medications are managed by: NURSE  Do you have difficulty getting around, watching TV or reading because of poor eyesight?  No   Can you hear normal conversational voice? Yes   Do you use hearing aides? No   Do you have problems with your memory?  No   Do you often feel sad or depressed?   No   Have you unintentionally lost weight in the last 6 months?  No   Do you have trouble with control of your bladder?   No   Do you have trouble with control of your bowels?  No   Have you had any falls in the past year? Yes, not any since last visit.      GENERAL ROS:   General: adequate sleep pattern.  Resp: No worsening shortness of breath, cough or hemoptysis.   GI: No worsening constipation, no diarrhea, no nausea or vomiting  : Voiding independently with no significant incontinence   Skin: No areas of new skin breakdown or worrisome rashes  Back: Back pain  Extremities:  No extremity weakness or balance troubles     Objective:   Most recent weight:  124 lbs  Gen: Well nourished and in NAD   HEENT: Head is atraumatic, decent dentition  Pulm: Normal work of breathing, able to speak in full sentences  Extrem: atraumatic, skin well perfused  Psych: Euthymic  Neuro: Pt is able to ambulate independently     Assessment/plan  1. Chronic bilateral low back pain, unspecified whether sciatica present  Has been having lower back pain. Had scheduled tylenol from previous for knee pain. No red flag symptoms noted. No signs of sciatica. Discussed can continue scheduled tylenol and try a heating pad on for 15 minutes on and off.   -Scheduled tylenol, continue.  -Heating pad as needed    2. Depressive disorder  Stable. Has had trouble with her roommate but she feels stable at this time.   - No medication changes    3. Uncomplicated asthma, unspecified asthma severity, unspecified whether persistent  Episode of shortness of breath after coughing. Has only occurred a couple times. No chest pain. No wheezing. Albuterol  helps.  -Continue albuterol as needed.        RECOMMENDED FOLLOW UP:  2 months.     Total of 20 minutes was spent in face to face contact with patient with > 50% in counseling and coordination of care.  Options for treatment and/or follow-up care were reviewed with the patient. Bhavana Marr was engaged and actively involved in the decision making process. She verbalized understanding of the options discussed and was satisfied with the final plan.        Natasha Esquivel MD PGY3                    Video-Visit Details     Type of service:  Video Visit     Video Start Time (time video started): 09:20 AM     Video End Time (time video stopped): 09:40 AM    Originating Location (pt. Location): Nursing Home     Distant Location (provider location):  [facility] [place]      Platform used for Video Visit: Doximity        This patient was discussed with Inder Lindsey MD, who agrees with the above assessment and plan.     Natasha Esquivel MD PGY3  [facility]     .

## 2021-03-18 DIAGNOSIS — J45.909 UNCOMPLICATED ASTHMA, UNSPECIFIED ASTHMA SEVERITY, UNSPECIFIED WHETHER PERSISTENT: ICD-10-CM

## 2021-03-18 RX ORDER — ALBUTEROL SULFATE 90 UG/1
AEROSOL, METERED RESPIRATORY (INHALATION)
Qty: 8.5 G | Refills: 11 | Status: SHIPPED | OUTPATIENT
Start: 2021-03-18 | End: 2021-12-09 | Stop reason: ALTCHOICE

## 2021-04-08 NOTE — PROGRESS NOTES
Wt  130lbs  B/p  123/54  p     60  r      16  t      97.3  O2 sat  98% RA  Dx for appt    Bipolar    MDD    Asthma    Anemia    GERD    Osteoporosis    Osteoarthritis    Herpes    Reported by  AMANDA RN    BTRN

## 2021-04-12 ENCOUNTER — OFFICE VISIT (OUTPATIENT)
Dept: FAMILY MEDICINE | Facility: CLINIC | Age: 81
End: 2021-04-12
Payer: COMMERCIAL

## 2021-04-12 ENCOUNTER — DOCUMENTATION ONLY (OUTPATIENT)
Dept: FAMILY MEDICINE | Facility: CLINIC | Age: 81
End: 2021-04-12

## 2021-04-12 DIAGNOSIS — Z78.9 NURSING HOME RESIDENT: Primary | ICD-10-CM

## 2021-04-12 DIAGNOSIS — F32.A DEPRESSIVE DISORDER: ICD-10-CM

## 2021-04-12 DIAGNOSIS — Z00.00 ENCOUNTER FOR SCREENING AND PREVENTATIVE CARE: ICD-10-CM

## 2021-04-12 PROCEDURE — 99348 HOME/RES VST EST LOW MDM 30: CPT | Mod: GC | Performed by: STUDENT IN AN ORGANIZED HEALTH CARE EDUCATION/TRAINING PROGRAM

## 2021-04-12 NOTE — PROGRESS NOTES
Subjective: Bhavana Marr is a 80 year old who is seen at home for follow up visit today.  Seen at [address] [place] [address] .    Also present at visit include Josefina CARTER  Acute concerns today include:   1. No major concerns today. History of gastric bypass 1992, has diarrhea/constipation alternating but this has been ongoing for years.     Chronic and Past Medical Problems include:  Patient Active Problem List   Diagnosis     Arthritis     Depressive disorder     Borderline personality disorder (H)     GERD (gastroesophageal reflux disease)     Holosystolic murmur     Asthma     History of gastric bypass     Hyperlipidemia LDL goal <130     Other insomnia     Mild mitral regurgitation     Mild aortic stenosis     Weight loss     Family History     Problem (# of Occurrences) Relation (Name,Age of Onset)    Depression (4) Maternal Aunt (60), Maternal Uncle (85), Paternal Aunt, Paternal Uncle        Social History:   Current activities:  Yozio, other games  Support services:  Living in [place]  Currently living family/friends:  None  PMHX/PSHX/MEDS/ALLERGIES/SHX/FHX reviewed and updated in Epic.   MEDICATION LIST:  Current Outpatient Medications   Medication     Acetaminophen 325 MG CAPS     alum & mag hydroxide-simethicone (MYLANTA/MAALOX) 200-200-20 MG/5ML SUSP suspension     Calcium Oyster Shell 500 MG TABS     capsaicin (ZOSTRIX) 0.025 % external cream     cholecalciferol (VITAMIN D3) 1000 UNIT tablet     Cyanocobalamin (B-12) 1000 MCG TBCR     famotidine (PEPCID) 40 MG tablet     FEROSUL 325 (65 Fe) MG tablet     fluticasone (FLONASE) 50 MCG/ACT nasal spray     guaiFENesin-dextromethorphan (ROBITUSSIN DM) 100-10 MG/5ML syrup     hydroxypropyl methylcellulose (GENTEAL) 0.2 % SOLN ophthalmic solution     hydrOXYzine (ATARAX) 50 MG tablet     loperamide (IMODIUM) 1 MG/5ML solution     magnesium hydroxide (MILK OF MAGNESIA) 400 MG/5ML suspension     mirtazapine (REMERON) 15 MG tablet     Multiple  Vitamins-Minerals (CEROVITE SENIOR) TABS     Nutritional Supplements (ENSURE ORIGINAL) LIQD     OLANZapine (ZYPREXA) 2.5 MG tablet     OLANZapine (ZYPREXA) 5 MG tablet     PROAIR  (90 Base) MCG/ACT inhaler     SENNA-docusate sodium (SENNA S) 8.6-50 MG tablet     SENNA-docusate sodium (SENNA S) 8.6-50 MG tablet     sertraline (ZOLOFT) 100 MG tablet     simvastatin (ZOCOR) 40 MG tablet     valACYclovir (VALTREX) 1000 mg tablet     valACYclovir (VALTREX) 500 MG tablet     White Petrolatum ointment     No current facility-administered medications for this visit.      Medications are managed by:  SELF, FAMILY, HOME NURSE  Patient uses a pill box to manage their medications:  Yes / No  Patient has questions, concerns, or potential side effects/interactions from their medications:  Yes / No  GERIATRIC ROS:    Do you have difficulty getting around, watching TV or reading because of poor eyesight? No   Can you hear normal conversational voice? No, should have hearing aids but doesn't want them. She can converse easily if you speak up.  Do you use hearing aides? No   Do you have problems with your memory? No   Do you often feel sad or depressed? No   Have you unintentionally lost weight in the last 6 months? No   Do you have trouble with control of your bladder? No   Do you have trouble with control of your bowels? No   Have you had any falls in the past year? No   How many? Over a year ago was the last fall.     GENERAL ROS:   General: No unexplained weight gain or loss; adequate sleep pattern.  Resp: No worsening shortness of breath, cough or hemoptysis.   GI: No worsening constipation, no diarrhea, no nausea or vomiting  : Voiding independently with no significant incontinence   Skin: No areas of new skin breakdown or worrisome rashes  Extremities:  + Some dizziness with position changes but rare. No pain, extremity weakness or balance troubles.     Objective: LMP  (LMP Unknown)    Most recent weight:  See RN  notes for complete vitals.   Gen: Well nourished and in NAD   HEENT: Head is atraumatic, decent dentition  CV: RRR - holosystolic murmur, rubs, or gallups,   Pulm: CTAB, no wheezes/rales/rhonchi, good air entry   ABD: soft, nontender, BS intact  Extrem: no cyanosis, edema or clubbing   Psych: Euthymic  Neuro: Pt is able to ambulate independently    Preventative Screening:   Vaccinations reviewed and up to date YES  Additional Recommended Screening: Unclear if UTD on mammogram or DEXA scan. She is currently taking Vitamin D and calcium.     POA: None   Code status: Full Code today (previously stated DNR)  Healthcare Directive, Living Will, POLST has been completed: YES  Assessment/ Plan:  1. Nursing home resident    2. Depressive disorder  Stable. Doing well.    3. Encounter for screening and preventative care  - MA SCREENING DIGITAL BILAT; Future  - Dexa hip/pelvis/spine*; Future   No special concerns today. She is doing well. Updated code status. Discussed this with Josefina to update.    RECOMMENDED FOLLOW UP:  2 months.    Total of 20 minutes was spent in face to face contact with patient with > 50% in counseling and coordination of care.  Options for treatment and/or follow-up care were reviewed with the patient. Bhavana Marr was engaged and actively involved in the decision making process. She verbalized understanding of the options discussed and was satisfied with the final plan.    Patient seen and discussed with Dr. Lewis.    Yanet Medina MD PGY2  M

## 2021-04-13 NOTE — PROGRESS NOTES
Preceptor Attestation:    I discussed the patient with the resident and evaluated the patient in person. I have verified the content of the note, which accurately reflects my assessment of the patient and the plan of care.   Supervising Physician:  David Lewis MD.

## 2021-04-15 ENCOUNTER — TELEPHONE (OUTPATIENT)
Dept: FAMILY MEDICINE | Facility: CLINIC | Age: 81
End: 2021-04-15

## 2021-04-15 NOTE — TELEPHONE ENCOUNTER
Received call from radiology informing staff that the ICD code for the dexa scan was not approved by insurance for payment   Dx that may be appropriate are  Osteoporosis   Vit D deficiency  Another order needs to be entered she is scheduled for 4/19    Note routed to Dr.Forstrom Brianda CARTER    Order to be faxed to [phone]

## 2021-04-16 ENCOUNTER — DOCUMENTATION ONLY (OUTPATIENT)
Dept: PHARMACY | Facility: CLINIC | Age: 81
End: 2021-04-16

## 2021-04-16 DIAGNOSIS — M85.80 OSTEOPENIA, UNSPECIFIED LOCATION: Primary | ICD-10-CM

## 2021-04-16 DIAGNOSIS — Z13.820 SCREENING FOR OSTEOPOROSIS: Primary | ICD-10-CM

## 2021-04-16 DIAGNOSIS — H04.123 DRY EYES: ICD-10-CM

## 2021-04-16 NOTE — PROGRESS NOTES
Medication list has been updated to reflect MAR at [facility].     I have reviewed and verified the student s documentation and found it to be correct and complete.  Janelle Valdes [facility], Pharm.D.

## 2021-04-19 ENCOUNTER — HOSPITAL ENCOUNTER (OUTPATIENT)
Dept: MAMMOGRAPHY | Facility: CLINIC | Age: 81
End: 2021-04-19
Attending: FAMILY MEDICINE
Payer: COMMERCIAL

## 2021-04-19 ENCOUNTER — HOSPITAL ENCOUNTER (OUTPATIENT)
Dept: BONE DENSITY | Facility: CLINIC | Age: 81
End: 2021-04-19
Attending: FAMILY MEDICINE
Payer: COMMERCIAL

## 2021-04-19 ENCOUNTER — HOSPITAL ENCOUNTER (OUTPATIENT)
Dept: BONE DENSITY | Facility: CLINIC | Age: 81
End: 2021-04-19
Attending: NURSE PRACTITIONER
Payer: COMMERCIAL

## 2021-04-19 DIAGNOSIS — M85.80 OSTEOPENIA, UNSPECIFIED LOCATION: ICD-10-CM

## 2021-04-19 DIAGNOSIS — Z12.31 VISIT FOR SCREENING MAMMOGRAM: ICD-10-CM

## 2021-04-19 DIAGNOSIS — Z78.0 POST-MENOPAUSAL: ICD-10-CM

## 2021-04-19 PROCEDURE — 77080 DXA BONE DENSITY AXIAL: CPT

## 2021-04-19 PROCEDURE — 77081 DXA BONE DENSITY APPENDICULR: CPT

## 2021-04-19 PROCEDURE — 77063 BREAST TOMOSYNTHESIS BI: CPT

## 2021-05-05 NOTE — RESULT ENCOUNTER NOTE
Hello,    Please call the following patient at her nursing home with the results below. Thank you!    Vincent Munguia,    I hope you're well. I wanted to communicate with you the results of the tests that we did.     The mammogram was normal, no concerning findings - which is good news. Please let me know if you have any other questions or concerns.     Thank you!  Yanet Medina MD PGY2

## 2021-06-02 VITALS — WEIGHT: 125 LBS | HEIGHT: 65 IN | BODY MASS INDEX: 20.83 KG/M2

## 2021-06-04 NOTE — PROGRESS NOTES
WT     135 lb  B/P     112/75  P         73  RESP  16  TEMP   97.5  O2 Sat  96% on RA    VISIT Dx:    Bipolar d/o    Major depressive d/o    Insomnia    Asthma    Anemia    GERD    osteoporosis    HLD    VISIT NOTE: Bhavana had her dexa and mammogram last month and I believe that you were going to discuss her options for osteoporosis     Reported by ROD  BTRN

## 2021-06-05 ENCOUNTER — TELEPHONE (OUTPATIENT)
Dept: FAMILY MEDICINE | Facility: CLINIC | Age: 81
End: 2021-06-05

## 2021-06-05 NOTE — TELEPHONE ENCOUNTER
"[facility] Answering Service Pager Note    I received an answering service page at 8:19AM from RN regarding a medication error.    I called RN and we spoke on the phone.     Bhavana Marr took another resident's pills. These were:  Vitamin D  Clozapine 150mg  Vitamin B12  Folic acid  Imiprimine 12.5mg  Levothyroxine 125 mcg  Lopressor 12.5  Senna  Tamsulosin 12.4mg  Trileptal 300mg    This was an accidental ingestion, she grabbed another resident's cup by accident. She did vomit x1 approximately a half hour after ingestion. She also took her medications this morning, which looking at her list includes zoloft, zyprexa, mirtazepine (although the zyprexa and mirtazepine appear to be PM medications).    Her VS: Unable to obtain because she is \"moving her arms all around.\"    Based on the medications she is taking herself and also the medications she took from the other cup, inability to obtain vitals, would recommend she be seen in the ED. She needs to be monitored, EKG, labs checked. RN expressed understanding.     Yanet Medina MD PGY2  [facility]  Pager: [phone]   "

## 2021-06-07 ENCOUNTER — DOCUMENTATION ONLY (OUTPATIENT)
Dept: FAMILY MEDICINE | Facility: CLINIC | Age: 81
End: 2021-06-07
Payer: COMMERCIAL

## 2021-06-07 PROCEDURE — 99349 HOME/RES VST EST MOD MDM 40: CPT | Mod: GC | Performed by: STUDENT IN AN ORGANIZED HEALTH CARE EDUCATION/TRAINING PROGRAM

## 2021-06-07 NOTE — PROGRESS NOTES
Subjective: Bhavana Marr is a 80 year old who is seen at [facility] for follow up visit today. Address: [address] [place] [address].  Additional information obtained from Nursing Staff Josefina CARTER.  Acute concerns today include:  Patient was in the hospital overnight for monitoring on 6/5 due to accidentally taking another resident's pills, which included clozapine.  She has been feeling mentally and emotionally weak. Feels like this is caused by being at the nursing home and feeling like she doesn't have any privacy. Will talk with Josefina and other friends when she needs to. Is not interested in medication or counseling therapy.  She has been having pain in her L shoulder, L knee, and L lateral thigh for ~3 months. Pain will occur 2-3 times/week and last for several hours. Tylenol has been helping somewhat. She also has chronic back pain, which she feels is separate from her leg pain. No gait unsteadiness or fall. Is wondering if she could have additional medication for her pain.  She has a hx of gastric bypass surgery and has alternating diarrhea and constipation, with more constipation than diarrhea. Has been taking daily stool softeners.  She underwent a DEXA scan and mammogram in April, which we discussed the results of today. DEXA scan showed osteoporosis, and she is open to starting alendronate once weekly. Mammogram was normal.  Feels like breathing has been much better lately with albuterol inhaler.  Chronic and Past Medical Problems include:  Patient Active Problem List   Diagnosis     Arthritis     Depressive disorder     Borderline personality disorder (H)     GERD (gastroesophageal reflux disease)     Holosystolic murmur     Asthma     History of gastric bypass     Hyperlipidemia LDL goal <130     Other insomnia     Mild mitral regurgitation     Mild aortic stenosis     Weight loss     Family History     Problem (# of Occurrences) Relation (Name,Age of Onset)     Depression (4) Maternal Aunt (60), Maternal Uncle (85), Paternal Aunt, Paternal Uncle        Social History:   Support services:  Lives in nursing home.  PMHX/PSHX/MEDS/ALLERGIES/SHX/FHX reviewed and updated in Epic.   MEDICATION LIST:  Current Outpatient Medications   Medication     Acetaminophen 325 MG CAPS     alum & mag hydroxide-simethicone (MYLANTA/MAALOX) 200-200-20 MG/5ML SUSP suspension     Calcium Oyster Shell 500 MG TABS     capsaicin (ZOSTRIX) 0.025 % external cream     cholecalciferol (VITAMIN D3) 1000 UNIT tablet     Cyanocobalamin (B-12) 1000 MCG TBCR     famotidine (PEPCID) 40 MG tablet     FEROSUL 325 (65 Fe) MG tablet     fluticasone (FLONASE) 50 MCG/ACT nasal spray     guaiFENesin-dextromethorphan (ROBITUSSIN DM) 100-10 MG/5ML syrup     hydrOXYzine (ATARAX) 50 MG tablet     hypromellose (ARTIFICIAL TEARS) 0.5 % SOLN ophthalmic solution     loperamide (IMODIUM) 1 MG/5ML solution     magnesium hydroxide (MILK OF MAGNESIA) 400 MG/5ML suspension     mirtazapine (REMERON) 15 MG tablet     Multiple Vitamins-Minerals (CEROVITE SENIOR) TABS     Nutritional Supplements (ENSURE ORIGINAL) LIQD     OLANZapine (ZYPREXA) 2.5 MG tablet     OLANZapine (ZYPREXA) 5 MG tablet     PROAIR  (90 Base) MCG/ACT inhaler     SENNA-docusate sodium (SENNA S) 8.6-50 MG tablet     SENNA-docusate sodium (SENNA S) 8.6-50 MG tablet     sertraline (ZOLOFT) 100 MG tablet     simvastatin (ZOCOR) 40 MG tablet     valACYclovir (VALTREX) 1000 mg tablet     valACYclovir (VALTREX) 500 MG tablet     White Petrolatum ointment     No current facility-administered medications for this visit.      GERIATRIC ROS:    Do you have difficulty getting around, watching TV or reading because of poor eyesight? No  Can you hear normal conversational voice? Yes, though feels hearing is a little worse lately  Do you use hearing aides? No   Do you have problems with your memory? No  Do you often feel sad or depressed? Yes - more emotionally/mentally  weak   Have you unintentionally lost weight in the last 6 months? No  Do you have trouble with control of your bladder? No   Do you have trouble with control of your bowels? No   Have you had any falls in the past year? No    GENERAL ROS:   General: No unexplained weight gain or loss; sometimes does not sleep due to napping during day.  Resp: No worsening shortness of breath, cough or hemoptysis.   GI: No worsening constipation, no nausea or vomiting. Has constipation and diarrhea at baseline.  : Voiding independently with no significant incontinence  Extremities:  No balance troubles. Knee and thigh pain as above.    Objective: LMP  (LMP Unknown)    Most recent weight:  135. Most recent BP: 169/85, though all prior blood pressures WNL  Gen: Well nourished and in NAD   HEENT: Head is atraumatic  CV: RRR - no murmurs, rubs, or gallups,   Pulm: CTAB, no wheezes/rales/rhonchi, good air entry   ABD: soft, nontender, BS intact  Psych: Euthymic  Neuro: Pt is able to ambulate independently    Preventative Screening:   Additional Recommended Screening: Mammogram in 1 year    POA: None  Code status: Full code  Healthcare Directive, Living Will, POLST has been completed:  Yes     Assessment/ Plan:  1. Pain in knee, leg, L shoulder  Likely osteoarthritis. Patient taking Tylenol 650 mg TID and has capsaicin cream in prescriptions. Not able to take NSAIDs due to gastric bypass.   - Continue current medications; encouraged use of cream in painful areas  2. Depressive disorder  Mood has been worse lately. Patient declines medication or therapy (psychiatrist does see [facility] patients regularly), but has relational support she can talk to.  3. Osteoporosis  Diagnosed via DEXA scan in 4/2021. Patient taking calcium and vitamin D supplements. Will start weekly alendronate, 70 mg once weekly.  - Start alendronate 70 mg once weekly  - Continue calcium/vitamin D supplements  - Encouraged weight bearing activity and exercise    RECOMMENDED  FOLLOW UP:  2 months.    Total of 20 minutes was spent in face to face contact with patient with > 50% in counseling and coordination of care.  Options for treatment and/or follow-up care were reviewed with the patient. Bhavana Marr was engaged and actively involved in the decision making process. She verbalized understanding of the options discussed and was satisfied with the final plan.    Patient discussed with Dr. David Lewis who agrees with the above assessment and plan.    Yolanda Bradford, Medical Student Year 4  [facility]    Resident Attestation:  I was present with the medical student, Yolanda Bradford, MS4, who participated in the service and the documentation of the note.  I have verified the history and personally performed a physical exam and medical decision making, and have verified the content of the note.  I agree with the assessment and plan of care as documented in the note.    Sha Ornelas, PGY-3  [facility]  6/7/2021

## 2021-06-09 ENCOUNTER — TELEPHONE (OUTPATIENT)
Dept: FAMILY MEDICINE | Facility: CLINIC | Age: 81
End: 2021-06-09

## 2021-06-09 NOTE — TELEPHONE ENCOUNTER
[facility] phone call message- general phone call:    Reason for call: Please give a call back she is  Re the incident that happened on 6/5 please  e-mail any documents to her @ [email or web address].    Action desired: call back.    Return call needed: Yes    OK to leave a message on voice mail? Yes    Advised patient to response may take up to 2 business days: Yes    Primary language: English      needed? No    Call taken on June 9, 2021 at 12:09 PM by Adin Dsouza

## 2021-06-09 NOTE — TELEPHONE ENCOUNTER
Documentation being sent Noted.  Let me know if I need to speak to the [place] [facility] directly.    Inder Lindsey MD

## 2021-06-09 NOTE — TELEPHONE ENCOUNTER
Notes from 6/5/21 and 6/7/21 emailed to [place] [facility] as requested  Regarding medication issues from 6/5    Note routed to  and Dr.Brink Lamar RN

## 2021-06-24 ENCOUNTER — MEDICAL CORRESPONDENCE (OUTPATIENT)
Dept: HEALTH INFORMATION MANAGEMENT | Facility: CLINIC | Age: 81
End: 2021-06-24

## 2021-07-02 ENCOUNTER — DOCUMENTATION ONLY (OUTPATIENT)
Dept: FAMILY MEDICINE | Facility: CLINIC | Age: 81
End: 2021-07-02

## 2021-07-02 NOTE — PROGRESS NOTES
To be completed in Nursing note:    Please reference list for forms that require a visit for completion.  Please remind patients that providers are given 3-5 business days to complete and return forms.      Form type:  [facility]: Progress Notes     Date form received: 2021    Date form completed by Physician:  2021    How was form returned to patient (mailed, faxed, or at  for patient to ):  Fax to [phone]    Date form mailed/faxed/left at  for patient and sent to HIM for [email or web address]        Once form is left for patient, faxed, or mailed PCS will then close the documentation only encounter.

## 2021-08-03 ENCOUNTER — DOCUMENTATION ONLY (OUTPATIENT)
Dept: FAMILY MEDICINE | Facility: CLINIC | Age: 81
End: 2021-08-03

## 2021-08-03 DIAGNOSIS — M54.50 LOW BACK PAIN WITHOUT SCIATICA, UNSPECIFIED BACK PAIN LATERALITY, UNSPECIFIED CHRONICITY: Primary | ICD-10-CM

## 2021-08-03 PROBLEM — M75.00 ADHESIVE CAPSULITIS OF SHOULDER, UNSPECIFIED LATERALITY: Status: ACTIVE | Noted: 2021-08-03

## 2021-08-03 PROCEDURE — 99349 HOME/RES VST EST MOD MDM 40: CPT | Mod: GC | Performed by: STUDENT IN AN ORGANIZED HEALTH CARE EDUCATION/TRAINING PROGRAM

## 2021-08-03 RX ORDER — ALENDRONATE SODIUM 70 MG/1
70 TABLET ORAL
Status: ON HOLD | COMMUNITY
End: 2023-05-31

## 2021-08-03 NOTE — PROGRESS NOTES
I have verified the content of the note, which accurately reflects my assessment of the patient and the plan of care.   Trinity Castro, [facility], PharmD

## 2021-08-03 NOTE — PROGRESS NOTES
Medication list from [facility] updated in Epic. Added Fosamax.    Ian Copeland, PharmD, [facility]

## 2021-08-04 NOTE — PROGRESS NOTES
subjective: Bhavana Marr is a 80 year old who is seen at [facility] for follow up visit today.  Seen at [address] [place] [address] .    Additional information obtained from nursing staff.  Acute concerns today include:   Patient was complaining of back pain for 2 weeks.  The pain on the lower, started suddenly, no hx of trauma or carrying heavy object.  The pain is dull, intermittent, and localized.  Exacerbated with long sitting and relieved with laying.  It is relieved with Tylenol.  Denies bladder or bowel incontinence and saddle anesthesia.  Also she complains of right shoulder pain for some time, that usually exacerbated with movement.  Try Tylenol with help.  Once in a while patient dry cough, but no wheezing or chest pain.  Albuterol seem to be helping.  Last use was 3 weeks prior to visit.  Overall patient feels well no history of present fall, feeling dizzy or mentally down.    Chronic and Past Medical Problems include:  Patient Active Problem List   Diagnosis     Arthritis     Depressive disorder     Borderline personality disorder (H)     GERD (gastroesophageal reflux disease)     Holosystolic murmur     Asthma     History of gastric bypass     Hyperlipidemia LDL goal <130     Other insomnia     Mild mitral regurgitation     Mild aortic stenosis     Weight loss     Family History     Problem (# of Occurrences) Relation (Name,Age of Onset)    Depression (4) Maternal Aunt (60), Maternal Uncle (85), Paternal Aunt, Paternal Uncle        Social History:   Support services:  [facility]  PMHX/PSHX/MEDS/ALLERGIES/SHX/FHX reviewed and updated in Epic.   MEDICATION LIST:  Current Outpatient Medications   Medication     Acetaminophen 325 MG CAPS     alendronate (FOSAMAX) 70 MG tablet     alum & mag hydroxide-simethicone (MYLANTA/MAALOX) 200-200-20 MG/5ML SUSP suspension     Calcium Oyster Shell 500 MG TABS     capsaicin (ZOSTRIX) 0.025 % external cream     cholecalciferol (VITAMIN D3) 1000 UNIT tablet      Cyanocobalamin (B-12) 1000 MCG TBCR     famotidine (PEPCID) 40 MG tablet     FEROSUL 325 (65 Fe) MG tablet     fluticasone (FLONASE) 50 MCG/ACT nasal spray     guaiFENesin-dextromethorphan (ROBITUSSIN DM) 100-10 MG/5ML syrup     hydrOXYzine (ATARAX) 50 MG tablet     hypromellose (ARTIFICIAL TEARS) 0.5 % SOLN ophthalmic solution     loperamide (IMODIUM) 1 MG/5ML solution     magnesium hydroxide (MILK OF MAGNESIA) 400 MG/5ML suspension     mirtazapine (REMERON) 15 MG tablet     Multiple Vitamins-Minerals (CEROVITE SENIOR) TABS     Nutritional Supplements (ENSURE ORIGINAL) LIQD     OLANZapine (ZYPREXA) 2.5 MG tablet     OLANZapine (ZYPREXA) 5 MG tablet     PROAIR  (90 Base) MCG/ACT inhaler     SENNA-docusate sodium (SENNA S) 8.6-50 MG tablet     SENNA-docusate sodium (SENNA S) 8.6-50 MG tablet     sertraline (ZOLOFT) 100 MG tablet     simvastatin (ZOCOR) 40 MG tablet     valACYclovir (VALTREX) 1000 mg tablet     valACYclovir (VALTREX) 500 MG tablet     White Petrolatum ointment     No current facility-administered medications for this visit.     Medications are managed by:  SELF, FAMILY, HOME NURSE  Patient uses a pill box to manage their medications:  No  Patient has questions, concerns, or potential side effects/interactions from their medications:  No  GERIATRIC ROS:    Do you have difficulty getting around, watching TV or reading because of poor eyesight?  No   Can you hear normal conversational voice? Yes   Do you use hearing aides? No   Do you have problems with your memory? No   Do you often feel sad or depressed?  No   Have you unintentionally lost weight in the last 6 months? No   Do you have trouble with control of your bladder? No   Do you have trouble with control of your bowels?  No   Have you had any falls in the past year? No       GENERAL ROS:   General: No unexplained weight gain or loss; adequate sleep pattern.  Resp: No worsening shortness of breath, cough or hemoptysis.   GI: No worsening  constipation, no diarrhea, no nausea or vomiting  : Voiding independently with no significant incontinence   Skin: No areas of new skin breakdown or worrisome rashes  Extremities:  Right shoulder pain, no extremity weakness or balance troubles    Objective: LMP  (LMP Unknown)    Most recent weight:  133  Gen: Well nourished and in NAD   HEENT: Head is atraumatic, decent dentition  CV: RRR - no murmurs, rubs, or gallups,   Pulm: CTAB, no wheezes/rales/rhonchi, good air entry   ABD: soft, nontender, BS intact  Extrem: Restrictive active and passive range of motion of the right shoulder, no cyanosis, edema or clubbing   Psych: Euthymic  Neuro: Pt is able to ambulate independently    Preventative Screening:   Vaccinations reviewed and up to date   Additional Recommended Screening: Hep C    POA: None   Code status: Full code  Healthcare Directive, Living Will, POLST has been completed:  yes     Assessment/ Plan:  Back Pain  2-week history of back pain, no history of trauma or neurologic deficit.  -Back x-ray  -Referral to orthopedic  Right shoulder adhesive capsulitis  Restricted active and passive range of motion in all planes  -Recommended PT/OMT  Asthma  Well-controlled  -Albuterol as needed  Osteoporosis  Continue treatment with weight bearing activity and exercise  MDD  Continue same treatment  RECOMMENDED FOLLOW UP:  2 months.      Patient discussed with attending physician, Dr. Lewis who agreed for the plan    Jorge Agee, PGY2

## 2021-08-06 ENCOUNTER — TRANSFERRED RECORDS (OUTPATIENT)
Dept: HEALTH INFORMATION MANAGEMENT | Facility: CLINIC | Age: 81
End: 2021-08-06

## 2021-08-06 LAB
CHOLESTEROL (EXTERNAL): 129 MG/DL (ref 0–199)
HDLC SERPL-MCNC: 50 MG/DL
LDL CHOLESTEROL CALCULATED (EXTERNAL): 68 MG/DL
NON HDL CHOLESTEROL (EXTERNAL): 79 MG/DL
TRIGLYCERIDES (EXTERNAL): 53 MG/DL

## 2021-08-13 ENCOUNTER — TRANSFERRED RECORDS (OUTPATIENT)
Dept: HEALTH INFORMATION MANAGEMENT | Facility: CLINIC | Age: 81
End: 2021-08-13

## 2021-08-13 LAB — HEP C HIM: NORMAL

## 2021-10-01 NOTE — PROGRESS NOTES
Bhavana Doll [birth year] 60 day appt   wt 135   BP 94/67   P 67   R 16   T 97.3   O2 97% RA   - Bhavana has been doing well with no complaints.   DX for visit   - MDD, Asthma, GERD, Bipolar disorder and Osteoporosis    Reported by AMANDARROYAL  BTRN

## 2021-10-05 ENCOUNTER — DOCUMENTATION ONLY (OUTPATIENT)
Dept: PHARMACY | Facility: CLINIC | Age: 81
End: 2021-10-05

## 2021-10-05 ENCOUNTER — DOCUMENTATION ONLY (OUTPATIENT)
Dept: FAMILY MEDICINE | Facility: CLINIC | Age: 81
End: 2021-10-05
Payer: COMMERCIAL

## 2021-10-05 DIAGNOSIS — R01.1 HOLOSYSTOLIC MURMUR: ICD-10-CM

## 2021-10-05 DIAGNOSIS — F60.3 BORDERLINE PERSONALITY DISORDER (H): ICD-10-CM

## 2021-10-05 DIAGNOSIS — F32.A DEPRESSIVE DISORDER: ICD-10-CM

## 2021-10-05 DIAGNOSIS — K21.9 GASTROESOPHAGEAL REFLUX DISEASE WITHOUT ESOPHAGITIS: Primary | ICD-10-CM

## 2021-10-05 PROCEDURE — 99348 HOME/RES VST EST LOW MDM 30: CPT | Mod: GC | Performed by: STUDENT IN AN ORGANIZED HEALTH CARE EDUCATION/TRAINING PROGRAM

## 2021-10-06 NOTE — PROGRESS NOTES
Preceptor Attestation:    I discussed the patient with the resident and evaluated the patient in person. I have verified the content of the note, which accurately reflects my assessment of the patient and the plan of care.   Supervising Physician:  Inder Lindsey MD.

## 2021-10-06 NOTE — PROGRESS NOTES
Assessment & Plan     Gastroesophageal reflux disease without esophagitis  Stable    Holosystolic murmur  Stable2    Depressive disorder  Stable    Borderline personality disorder (H)  Stable, no changes         Follow up in 2 months    No follow-ups on file.    Franca Ngo MD  [facility]    Subjective   Bhavana Munguia is a 80 year old who presents for the following health issues     HPI     Bhavana Munguia is following up at her nursing home for Medicare visit.     She feels well today and has no concerns. She know the date and time and has no questions. She denies any leg swelling, recent falls, nausea, chest pain. She has not had any changes in her routine and can still easily navigate the house.    Review of Systems   Constitutional, HEENT, cardiovascular, pulmonary, gi and gu systems are negative, except as otherwise noted.      Objective    LMP  (LMP Unknown)   There is no height or weight on file to calculate BMI. See nursing note.  Physical Exam   GENERAL: healthy, alert and no distress  NECK: no adenopathy, no asymmetry, masses, or scars and thyroid normal to palpation  RESP: lungs clear to auscultation - no rales, rhonchi or wheezes  CV: regular rate and rhythm, normal S1 S2, no S3 or S4, holosystolic murmur loudest over left lower sternal border. No click or rub, no peripheral edema and peripheral pulses strong  ABDOMEN: soft, nontender, no hepatosplenomegaly, no masses and bowel sounds normal  MS: no gross musculoskeletal defects noted, no edema      ----- Service Performed and Documented by Resident or Fellow ------

## 2021-10-14 NOTE — PROGRESS NOTES
I have verified the content of the note, which accurately reflects my assessment of the patient and the plan of care.   Janelle Valdes, [facility], PharmD

## 2021-11-05 ENCOUNTER — TRANSFERRED RECORDS (OUTPATIENT)
Dept: HEALTH INFORMATION MANAGEMENT | Facility: CLINIC | Age: 81
End: 2021-11-05
Payer: COMMERCIAL

## 2021-12-08 NOTE — PROGRESS NOTES
wt 131   BP 90/63   P 89   R 18   T 97.9   O2 99% RA   - other than occasional low BP no concerns noted.   Resident received new hearing aides but she c/o they fall out of her ears when she puts her mask or her glasses on.    reported by ROD  btrn

## 2021-12-09 ENCOUNTER — DOCUMENTATION ONLY (OUTPATIENT)
Dept: FAMILY MEDICINE | Facility: CLINIC | Age: 81
End: 2021-12-09
Payer: COMMERCIAL

## 2021-12-09 DIAGNOSIS — K59.00 CONSTIPATION, UNSPECIFIED CONSTIPATION TYPE: ICD-10-CM

## 2021-12-09 DIAGNOSIS — J45.20 MILD INTERMITTENT ASTHMA, UNSPECIFIED WHETHER COMPLICATED: Primary | ICD-10-CM

## 2021-12-09 PROCEDURE — 99349 HOME/RES VST EST MOD MDM 40: CPT | Mod: GC | Performed by: STUDENT IN AN ORGANIZED HEALTH CARE EDUCATION/TRAINING PROGRAM

## 2021-12-09 RX ORDER — BUDESONIDE AND FORMOTEROL FUMARATE DIHYDRATE 80; 4.5 UG/1; UG/1
2 AEROSOL RESPIRATORY (INHALATION) 2 TIMES DAILY PRN
Qty: 10.2 G | Refills: 3 | Status: SHIPPED | OUTPATIENT
Start: 2021-12-09 | End: 2023-11-09

## 2021-12-09 RX ORDER — POLYETHYLENE GLYCOL 3350 17 G/17G
1 POWDER, FOR SOLUTION ORAL DAILY
Qty: 507 G | Refills: 1 | Status: SHIPPED | OUTPATIENT
Start: 2021-12-09 | End: 2022-10-21

## 2021-12-09 NOTE — PROGRESS NOTES
Assessment & Plan     Mild intermittent asthma, unspecified whether complicated  Discontinue albuterol and start Symbicort as rescue inhaler. Symptoms most consistent with mild intermittent asthma, ACT score 23 today.  - budesonide-formoterol (SYMBICORT) 80-4.5 MCG/ACT Inhaler; Inhale 2 puffs into the lungs 2 times daily as needed (Shortness of breath, wheezing)    Constipation, unspecified constipation type  Trial miralax for constipation and straining with bowel movements. Ok to continue milk of mag and Senna-docusate as needed for constipation.   - polyethylene glycol (MIRALAX) 17 GM/Dose powder; Take 17 g (1 capful) by mouth daily    Follow up 60 days for next home visit.    Zane Cade MD  [facility]    Subjective   Bhavana Munguia is a 80 year old who presents for the following health issues:    HPI     Follow up home visit today; Bhavana Munguia is in her usual state of health. With prompting the only things that she has been noticing is an increased use of her ProAir inhailer which she uses about one a week as needed for coughing. She denies any audible wheezing or subjective dyspnea, she denies any nocturnal symptoms waking with the feeling of shortness of breath or cough. She denies any dizziness, lightheadedness, feeling that she will pass out.    She does have difficulty with constipation usually she has a BM every other day sometimes longer. She denies any abdominal pain, nausea, vomiting or diarrhea.     Review of Systems   Constitutional, HEENT, cardiovascular, pulmonary, gi and gu systems are negative, except as otherwise noted.      Objective    LMP  (LMP Unknown)   There is no height or weight on file to calculate BMI.  Physical Exam   GENERAL: healthy, alert and no distress  EYES: Eyes grossly normal to inspection, PERRL and conjunctivae and sclerae normal  RESP: lungs clear to auscultation - no rales, rhonchi or wheezes  CV: regular rate and rhythm, holosystolic murmur, no  peripheral edema and peripheral pulses strong  ABDOMEN: soft, nontender, no hepatosplenomegaly, no masses and bowel sounds normal  MS: no gross musculoskeletal defects noted, no edema  SKIN: no suspicious lesions or rashes  NEURO: Normal strength and tone, mentation intact and speech normal  PSYCH: mentation appears normal, affect normal/bright

## 2021-12-15 ENCOUNTER — DOCUMENTATION ONLY (OUTPATIENT)
Dept: PHARMACY | Facility: CLINIC | Age: 81
End: 2021-12-15
Payer: COMMERCIAL

## 2021-12-15 DIAGNOSIS — F39 MOOD DISORDER (H): Primary | ICD-10-CM

## 2021-12-15 RX ORDER — OLANZAPINE 2.5 MG/1
2.5 TABLET, FILM COATED ORAL 2 TIMES DAILY
COMMUNITY
Start: 2021-12-15 | End: 2022-07-12

## 2021-12-15 NOTE — PROGRESS NOTES
"Updated medication list per [facility] \"Order Summary Report\".     Ian Copeland, PharmD, [facility]  PGY1 Ambulatory Care Pharmacy Resident    "

## 2021-12-16 NOTE — PROGRESS NOTES
I have verified the content of the note, which accurately reflects my assessment of the patient and the plan of care.   Janelle Valdes, [facility], PharmD

## 2021-12-21 ENCOUNTER — TELEPHONE (OUTPATIENT)
Dept: FAMILY MEDICINE | Facility: CLINIC | Age: 81
End: 2021-12-21
Payer: COMMERCIAL

## 2021-12-21 DIAGNOSIS — F39 MOOD DISORDER (H): Primary | ICD-10-CM

## 2021-12-21 DIAGNOSIS — G47.00 INSOMNIA, UNSPECIFIED TYPE: ICD-10-CM

## 2021-12-21 RX ORDER — HYDROXYZINE HYDROCHLORIDE 50 MG/1
50 TABLET, FILM COATED ORAL
Qty: 30 TABLET | Refills: 11 | Status: SHIPPED | OUTPATIENT
Start: 2021-12-21 | End: 2022-01-06

## 2021-12-21 NOTE — TELEPHONE ENCOUNTER
PRIOR AUTHORIZATION DENIED    Medication: hydroxyzine 50mg TAB- DENIED    Denial Date: 12/21/2021    Denial Rational: INDICATION NOT APPROVED PER PART D            Appeal Information:             Central Prior Authorization Team  Phone: [phone]

## 2021-12-21 NOTE — TELEPHONE ENCOUNTER
[facility] phone call message- general phone call:    Reason for call: Calling regarding prior auth for hydrOXYzine (ATARAX) 50 MG tablet-Sig - Route: Take 1 tablet (50 mg) by mouth At Bedtime For insomnia - Oral and cover my med key code Is [id number]    Return call needed: Yes    OK to leave a message on voice mail? Yes    Primary language: English      needed? No    Call taken on December 21, 2021 at 9:31 AM by Grisel Flores-Cardona

## 2021-12-21 NOTE — TELEPHONE ENCOUNTER
Can we get the current RX entered in Epic please including what pharmacy it is at?    Thank you,    Central Prior Authorization Team  Phone: [phone]

## 2021-12-21 NOTE — TELEPHONE ENCOUNTER
Prior Authorization Retail Medication Request    Medication/Dose: hydroxyzine 50mg at hs  ICD code (if different than what is on RX):    Previously Tried and Failed:    Rationale:  Renewal of PA     Insurance Name:    Insurance ID:        Pharmacy Information (if different than what is on RX)  Name     cover my med key code Is [id number]    BTRN

## 2021-12-21 NOTE — TELEPHONE ENCOUNTER
PA Initiation    Medication: hydroxyzine 50mg TAB- INITIATED  Insurance Company: [employer] - Phone [phone] Fax [phone]  Pharmacy Filling the Rx:    Filling Pharmacy Phone:    Filling Pharmacy Fax:    Start Date: 12/21/2021

## 2021-12-27 ENCOUNTER — DOCUMENTATION ONLY (OUTPATIENT)
Dept: FAMILY MEDICINE | Facility: CLINIC | Age: 81
End: 2021-12-27
Payer: COMMERCIAL

## 2021-12-27 NOTE — PROGRESS NOTES
To be completed in Nursing note:    Please reference list for forms that require a visit for completion.  Please remind patients that providers are given 3-5 business days to complete and return forms.      Form type:  [facility] Administration Department:  Request for Medicare Prescription Drug Coverage Determination     Date form received:  2021    Date form completed by Physician:  2021    How was form returned to patient (mailed, faxed, or at  for patient to ):  fax to [phone]      Date form mailed/faxed/left at  for patient and sent to HIM for [email or web address]            Once form is left for patient, faxed, or mailed PCS will then close the documentation only encounter.

## 2022-01-06 DIAGNOSIS — G47.00 INSOMNIA, UNSPECIFIED TYPE: ICD-10-CM

## 2022-01-06 RX ORDER — HYDROXYZINE HYDROCHLORIDE 50 MG/1
50 TABLET, FILM COATED ORAL AT BEDTIME
Qty: 30 TABLET | Refills: 11 | Status: SHIPPED | OUTPATIENT
Start: 2022-01-06 | End: 2023-05-05

## 2022-01-20 DIAGNOSIS — E78.5 HYPERLIPIDEMIA LDL GOAL <130: ICD-10-CM

## 2022-01-20 DIAGNOSIS — Z86.39 HISTORY OF VITAMIN D DEFICIENCY: ICD-10-CM

## 2022-01-20 DIAGNOSIS — R73.9 DRUG-INDUCED HYPERGLYCEMIA: ICD-10-CM

## 2022-01-20 DIAGNOSIS — T50.905A DRUG-INDUCED HYPERGLYCEMIA: ICD-10-CM

## 2022-01-20 DIAGNOSIS — F39 MOOD DISORDER (H): ICD-10-CM

## 2022-01-20 DIAGNOSIS — Z51.81 MEDICATION MONITORING ENCOUNTER: Primary | ICD-10-CM

## 2022-01-20 DIAGNOSIS — M85.80 OSTEOPENIA, UNSPECIFIED LOCATION: ICD-10-CM

## 2022-01-20 DIAGNOSIS — Z13.1 SCREENING FOR DIABETES MELLITUS: ICD-10-CM

## 2022-01-20 DIAGNOSIS — D50.9 IRON DEFICIENCY ANEMIA, UNSPECIFIED IRON DEFICIENCY ANEMIA TYPE: ICD-10-CM

## 2022-02-10 NOTE — PROGRESS NOTES
Information from Josefina:    60 day appt wt 132 /80 P 87 R 16 T 97.9 O2 95% RA.   Bhavana doesn't understand why her insurance is not paying for her OTC meds, I called the pharmacy and Medical Assistance will not be paying for them because that is what they pay [employer] to pay for. Her only other option is to pay out of pocket.     Dx for visit must be addressed in notes: MDD, Asthma, Anemia, GERD, Bipolar, Osteoporosis, Vit B 12 deficiency    Mary Amador, DNP, BSN, RN, PHN

## 2022-02-14 ENCOUNTER — DOCUMENTATION ONLY (OUTPATIENT)
Dept: FAMILY MEDICINE | Facility: CLINIC | Age: 82
End: 2022-02-14
Payer: COMMERCIAL

## 2022-02-14 DIAGNOSIS — Z98.84 HISTORY OF GASTRIC BYPASS: ICD-10-CM

## 2022-02-14 DIAGNOSIS — J45.20 MILD INTERMITTENT ASTHMA, UNSPECIFIED WHETHER COMPLICATED: ICD-10-CM

## 2022-02-14 DIAGNOSIS — K21.9 GASTROESOPHAGEAL REFLUX DISEASE WITHOUT ESOPHAGITIS: Primary | ICD-10-CM

## 2022-02-14 DIAGNOSIS — F32.A DEPRESSIVE DISORDER: ICD-10-CM

## 2022-02-14 DIAGNOSIS — J45.20 MILD INTERMITTENT ASTHMA, UNSPECIFIED WHETHER COMPLICATED: Primary | ICD-10-CM

## 2022-02-14 DIAGNOSIS — M54.50 ACUTE BILATERAL LOW BACK PAIN WITHOUT SCIATICA: ICD-10-CM

## 2022-02-14 DIAGNOSIS — F60.3 BORDERLINE PERSONALITY DISORDER (H): ICD-10-CM

## 2022-02-14 NOTE — PROGRESS NOTES
"Family Medicine Video Visit Note    Bhavana is being evaluated via a billable video visit.               Video Visit Consent        Patient was verbally read the following and verbal consent was obtained.    \"This video visit will be conducted via a call between you and your physician/provider. We have found that certain health care needs can be provided without the need for an in-person physical exam.  This service lets us provide the care you need with a video conversation.  If a prescription is necessary we can send it directly to your pharmacy.  If lab work is needed we can place an order for that and you can then stop by our lab to have the test done at a later time.        If during the course of the call the physician/provider feels a video visit is not appropriate, you will not be charged for this service.\"         Name person giving consent:  Patient      Date verbal consent given:  2/14/2022 Time verbal consent given:  7:45 AM        Conducted via approved eladia-based video.       Subjective: Bhavana is an 81 year old who is seen via video at home for follow up visit today.  Seen at [address] [place] [address] CTR   [place] [address].     Also present at visit include: Josefina Hansen RN, DON   Bhavana doesn't understand why her insurance is not paying for her OTC meds, I called the pharmacy and Medical Assistance will not be paying for them because that is what they pay [employer] to pay for. Her only other option is to pay out of pocket.   This continues to be a frustrating problem.         Chronic and Past Medical Problems include:   Patient Active Problem List   Diagnosis     Arthritis     Depressive disorder     Borderline personality disorder (H)     GERD (gastroesophageal reflux disease)     Holosystolic murmur     Asthma     History of gastric bypass     Hyperlipidemia LDL goal <130     Other insomnia     Mild mitral regurgitation     Mild aortic stenosis     Weight loss     Bilateral low back pain " without sciatica     Adhesive capsulitis of shoulder, right     Mild intermittent asthma, unspecified whether complicated       Social History:    Social History     Socioeconomic History     Marital status:      Spouse name: Not on file     Number of children: Not on file     Years of education: Not on file     Highest education level: Not on file   Occupational History     Not on file   Tobacco Use     Smoking status: Never Smoker     Smokeless tobacco: Never Used   Substance and Sexual Activity     Alcohol use: No     Drug use: No     Sexual activity: Not Currently     Birth control/protection: None   Other Topics Concern     Parent/sibling w/ CABG, MI or angioplasty before 65F 55M? No   Social History Narrative     Not on file     Social Determinants of Health     Financial Resource Strain: Not on file   Food Insecurity: Not on file   Transportation Needs: Not on file   Physical Activity: Not on file   Stress: Not on file   Social Connections: Not on file   Intimate Partner Violence: Not on file   Housing Stability: Not on file       Current activities: ?Nursing home activities   ?   PMHX/PSHX/MEDS/ALLERGIES/SHX/FHX reviewed and updated in Epic.    MEDICATION LIST:   Current Outpatient Medications   Medication     Acetaminophen 325 MG CAPS     alendronate (FOSAMAX) 70 MG tablet     alum & mag hydroxide-simethicone (MYLANTA/MAALOX) 200-200-20 MG/5ML SUSP suspension     budesonide-formoterol (SYMBICORT) 80-4.5 MCG/ACT Inhaler     Calcium Oyster Shell 500 MG TABS     capsaicin (ZOSTRIX) 0.025 % external cream     cholecalciferol (VITAMIN D3) 1000 UNIT tablet     Cyanocobalamin (B-12) 1000 MCG TBCR     famotidine (PEPCID) 40 MG tablet     FEROSUL 325 (65 Fe) MG tablet     fluticasone (FLONASE) 50 MCG/ACT nasal spray     guaiFENesin-dextromethorphan (ROBITUSSIN DM) 100-10 MG/5ML syrup     hydrOXYzine (ATARAX) 50 MG tablet     hypromellose (ARTIFICIAL TEARS) 0.5 % SOLN ophthalmic solution     loperamide  (IMODIUM) 1 MG/5ML solution     magnesium hydroxide (MILK OF MAGNESIA) 400 MG/5ML suspension     mirtazapine (REMERON) 15 MG tablet     Multiple Vitamins-Minerals (CEROVITE SENIOR) TABS     Nutritional Supplements (ENSURE ORIGINAL) LIQD     OLANZapine (ZYPREXA) 2.5 MG tablet     OLANZapine (ZYPREXA) 5 MG tablet     polyethylene glycol (MIRALAX) 17 GM/Dose powder     SENNA-docusate sodium (SENNA S) 8.6-50 MG tablet     SENNA-docusate sodium (SENNA S) 8.6-50 MG tablet     sertraline (ZOLOFT) 100 MG tablet     simvastatin (ZOCOR) 40 MG tablet     valACYclovir (VALTREX) 1000 mg tablet     valACYclovir (VALTREX) 500 MG tablet     White Petrolatum ointment     No current facility-administered medications for this visit.          Medications are managed by: ?NURSE at nursing home   Patient uses a pill box to manage their medications: ?No   Patient has questions, concerns, or potential side effects/interactions from their medications: ?No   GERIATRIC ROS: ?   Do you have difficulty getting around, watching TV or reading because of poor eyesight??No.   Can you hear normal conversational voice? Yes ?   Do you use hearing aides? No    Do you have problems with your memory? No   Do you often feel sad or depressed? No?   Have you unintentionally lost weight in the last 6 months? No    Do you have trouble with control of your bladder? Yes; wears Depends   Do you have trouble with control of your bowels? No    Have you had any falls in the past year? No.?   ?   GENERAL ROS:    General: No unexplained weight gain or loss; adequate sleep pattern.   Resp: No worsening shortness of breath, cough or hemoptysis.    GI: No worsening constipation, no diarrhea, no nausea or vomiting   : Voiding independently with no significant incontinence    Skin: No areas of new skin breakdown or worrisome rashes   Extremities: ?No pain, extremity weakness or balance troubles   ?   Video Exam:? wt 132 /80 P 87 R 16 T 97.9 O2 95% RA.   General:  Interactive.    Resp: Breathing without difficulty. Speaking in complete sentences.   Psych:?Remembers who I am.  Oriented to person and place.  Seems to be at baseline.   Neuro: Facial expressions are symmetric. Moving all extremities.  No focal findings.   Skin: intact   ?   Preventative Screening:?   Vaccinations were reviewed: Yes   Additional Recommended Screening: none   ?   Assessment/ Plan:   MDD, Asthma, Anemia, GERD, Bipolar, Osteoporosis, Vit B 12 deficiency     1. Gastroesophageal reflux disease without esophagitis  Increase famotidine to BID to control sxs.    2. Mild intermittent asthma, unspecified whether complicated  Well controlled at this time.    3. Acute bilateral low back pain without sciatica  Continue with acetaminophen and topical medications.    4. Depressive disorder  Stable.  She follows with Psychiatry.    5. History of gastric bypass  She needs chronic iron, B12, Vit D, calcium due to her gastric bypass.   Her insurance is refusing to pay for these as they are over-the-counter and they do not pay for vitamins.  We have submitted a PA for this and so far has been denied.  We are trying to get them to agree to pay for these needed nutrients.          Level of Service: ?02031      RECOMMENDED FOLLOW UP:  2 months.      Level of Service: 25 minutes with over 50% of time spent in spent in chart review, and care coordination with the DON.      Options for treatment and/or follow-up care were reviewed with the patient.          Video-Visit Details      Type of service:? Video Visit      Video Start Time: 8:10 AM      Video End Time: 8:35 AM    ?Originating Location (pt. Location):?Long term Care      Distant Location (provider location):? [facility]       Mode of Communication:? Video Conference          David Lewis MD

## 2022-03-31 NOTE — PROGRESS NOTES
Bhavana Marr [birth year] is being seen for 60 day appt   /65   P 78   R 16   T 97.0   O2 98%.   Resident has episodes of days where she vomits all intake, very frustrating for her as it affects her quality of life because she is unable to enjoy activities or outings when she is constantly vomiting.   Dx for vist: MDD< Asthma, Anemia, GERD, Bipolar, Osteoporosis, BPD, osteoarthritis    Reported by LJRN/  BTRN    Subjective: Bhavana Marr is a 81 year old who is seen at home for follow up visit today.  Seen at [id number] [place], [place] [id number] .    Acute concerns today include:  episodic vomiting that is started after patient having gastric bypass surgery.  Usually does have pain couple times per month.  Sometime continue over couple of days.  Patient stated the vomiting does not associated with nausea.  Also stated she does eat slow and small bites.  Chronic and Past Medical Problems include:  Patient Active Problem List   Diagnosis     Arthritis     Depressive disorder     Borderline personality disorder (H)     GERD (gastroesophageal reflux disease)     Holosystolic murmur     Asthma     History of gastric bypass     Hyperlipidemia LDL goal <130     Other insomnia     Mild mitral regurgitation     Mild aortic stenosis     Weight loss     Bilateral low back pain without sciatica     Adhesive capsulitis of shoulder, right     Mild intermittent asthma, unspecified whether complicated     Family History     Problem (# of Occurrences) Relation (Name,Age of Onset)    Depression (4) Maternal Aunt (60), Maternal Uncle (85), Paternal Aunt, Paternal Uncle        Social History:   Current activities: Within the nursing home  Support services: Nursing staff    PMHX/PSHX/MEDS/ALLERGIES/SHX/FHX reviewed and updated in Epic.   MEDICATION LIST:  Current Outpatient Medications   Medication     Acetaminophen 325 MG CAPS     alendronate (FOSAMAX) 70 MG tablet     alum & mag hydroxide-simethicone (MYLANTA/MAALOX) 200-200-20  MG/5ML SUSP suspension     budesonide-formoterol (SYMBICORT) 80-4.5 MCG/ACT Inhaler     Calcium Oyster Shell 500 MG TABS     capsaicin (ZOSTRIX) 0.025 % external cream     cholecalciferol (VITAMIN D3) 1000 UNIT tablet     Cyanocobalamin (B-12) 1000 MCG TBCR     famotidine (PEPCID) 40 MG tablet     FEROSUL 325 (65 Fe) MG tablet     fluticasone (FLONASE) 50 MCG/ACT nasal spray     guaiFENesin-dextromethorphan (ROBITUSSIN DM) 100-10 MG/5ML syrup     hydrOXYzine (ATARAX) 50 MG tablet     hypromellose (ARTIFICIAL TEARS) 0.5 % SOLN ophthalmic solution     loperamide (IMODIUM) 1 MG/5ML solution     magnesium hydroxide (MILK OF MAGNESIA) 400 MG/5ML suspension     mirtazapine (REMERON) 15 MG tablet     Multiple Vitamins-Minerals (CEROVITE SENIOR) TABS     Nutritional Supplements (ENSURE ORIGINAL) LIQD     OLANZapine (ZYPREXA) 2.5 MG tablet     OLANZapine (ZYPREXA) 5 MG tablet     polyethylene glycol (MIRALAX) 17 GM/Dose powder     SENNA-docusate sodium (SENNA S) 8.6-50 MG tablet     SENNA-docusate sodium (SENNA S) 8.6-50 MG tablet     sertraline (ZOLOFT) 100 MG tablet     simvastatin (ZOCOR) 40 MG tablet     valACYclovir (VALTREX) 1000 mg tablet     valACYclovir (VALTREX) 500 MG tablet     White Petrolatum ointment     No current facility-administered medications for this visit.     Medications are managed by:   HOME NURSE  Patient has questions, concerns, or potential side effects/interactions from their medications:  No  GERIATRIC ROS:    Do you have difficulty getting around, watching TV or reading because of poor eyesight?  No   Can you hear normal conversational voice? Yes   Do you use hearing aides? No   Do you have problems with your memory? Yes / No   Do you often feel sad or depressed? No   Have you unintentionally lost weight in the last 6 months? Yes, Bypass surgery     GENERAL ROS:   General: No unexplained weight gain or loss; adequate sleep pattern.  Resp: No worsening shortness of breath, cough or hemoptysis.    GI: No worsening constipation, no diarrhea, no nausea, + vomiting  : Voiding independently with no significant incontinence   Skin: No areas of new skin breakdown or worrisome rashes  Extremities:  No pain, extremity weakness or balance troubles    Objective: See RN note for details  Gen: Well nourished and in NAD   HEENT: Head is atraumatic, decent dentition  CV: RRR,regular rate and rhythm, normal S1 S2, no S3 or S4, holosystolic murmur loudest over left lower sternal border. No click or rub, no peripheral edema and peripheral pulses strong  Pulm: CTAB, no wheezes/rales/rhonchi, good air entry   ABD: soft, nontender, BS intact  Extrem: no cyanosis, edema or clubbing   Psych: Euthymic  Neuro: Pt is able to ambulate independently    Preventative Screening:   Vaccinations reviewed and up to date- Needs booster for COVID, DTAP  Additional Recommended Screening: ACT, annual physical      Code status: full  Healthcare Directive, Living Will, POLST has been completed:  Did not assesst     Assessment/ Plan:  Bhavana Munguia was seen today for [facility] visit.    Diagnoses and all orders for this visit:    Gastroesophageal reflux disease without esophagitis  Mild intermittent asthma, unspecified whether complicated  Depressive disorder  Constipation, unspecified constipation type  History of gastric bypass  Patient chronic conditions seem to be well controlled, will continue same regiment    Vomiting without nausea, intractability of vomiting not specified, unspecified vomiting type  Vomiting started gastric bypass surgery, eats episodic with with nausea.  Most likely due to dumping syndrome as a complication of the gastric bypass surgery.  Patient was consulted toavoid foods that are high in simple sugar content and replace them with a diet consisting of high-fiber, complex carbohydrate, and protein-rich foods. Behavioral modification, such as small, frequent meals and  solids from liquid intake  by 30 minutes.   -Behavioral modification      RECOMMENDED FOLLOW UP:  2 months.    Total of 30 minutes was spent in face to face contact with patient with > 50% in counseling and coordination of care.  Options for treatment and/or follow-up care were reviewed with the patient. Bhavana Nilda CHAS Marr was engaged and actively involved in the decision making process. She verbalized understanding of the options discussed and was satisfied with the final plan.    Patient was discussed with attending physician, Dr. Lindsey who agreed with the plan  Jorge Agee MD

## 2022-04-05 ENCOUNTER — DOCUMENTATION ONLY (OUTPATIENT)
Dept: FAMILY MEDICINE | Facility: CLINIC | Age: 82
End: 2022-04-05
Payer: COMMERCIAL

## 2022-04-05 DIAGNOSIS — Z98.84 HISTORY OF GASTRIC BYPASS: ICD-10-CM

## 2022-04-05 DIAGNOSIS — R11.11 VOMITING WITHOUT NAUSEA, INTRACTABILITY OF VOMITING NOT SPECIFIED, UNSPECIFIED VOMITING TYPE: ICD-10-CM

## 2022-04-05 DIAGNOSIS — K21.9 GASTROESOPHAGEAL REFLUX DISEASE WITHOUT ESOPHAGITIS: Primary | ICD-10-CM

## 2022-04-05 DIAGNOSIS — F32.A DEPRESSIVE DISORDER: ICD-10-CM

## 2022-04-05 DIAGNOSIS — K59.00 CONSTIPATION, UNSPECIFIED CONSTIPATION TYPE: ICD-10-CM

## 2022-04-05 DIAGNOSIS — J45.20 MILD INTERMITTENT ASTHMA, UNSPECIFIED WHETHER COMPLICATED: ICD-10-CM

## 2022-04-05 PROCEDURE — 99309 SBSQ NF CARE MODERATE MDM 30: CPT | Mod: GC | Performed by: STUDENT IN AN ORGANIZED HEALTH CARE EDUCATION/TRAINING PROGRAM

## 2022-04-18 NOTE — PROGRESS NOTES
"Updated medication list per [facility] \"Order Summary Report\". No changes to medication list since last encounter with pharmacy (August 2021).    Ian Copeland, PharmD, [facility]    " 1/4/2022 Last office visit    HYDROcodone-acetaminophen (NORCO) 7.5-325 MG per tablet 45 tablet 0 3/18/2022     Sig - Route: Take 1 to 1 and ONE-HALF tablets by mouth daily. - Oral        Wt Readings from Last 1 Encounters:   01/04/22 76.7 kg (169 lb)        BP Readings from Last 2 Encounters:   01/04/22 136/70   06/22/21 132/74   ]    Lab Results   Component Value Date    SODIUM 141 06/22/2021    POTASSIUM 4.0 06/22/2021    CHLORIDE 107 06/22/2021    CO2 26 06/22/2021    BUN 14 06/22/2021    CREATININE 0.98 06/22/2021    GLUCOSE 88 06/22/2021     No results found for: HGBA1C  TSH (mcUnits/mL)   Date Value   04/08/2015 1.707     Lab Results   Component Value Date    CHOLESTEROL 137 06/22/2021    HDL 64 06/22/2021    CALCLDL 57 06/22/2021    TRIGLYCERIDE 79 06/22/2021     Lab Results   Component Value Date    AST 34 06/22/2021    GPT 38 06/22/2021    ALKPT 32 (L) 06/22/2021    BILIRUBIN 0.6 06/22/2021

## 2022-05-06 ENCOUNTER — TELEPHONE (OUTPATIENT)
Dept: FAMILY MEDICINE | Facility: CLINIC | Age: 82
End: 2022-05-06

## 2022-05-06 NOTE — TELEPHONE ENCOUNTER
"Per [facility], \"A Prior Authorization has been started for hydroxyzine HCI 50mg tab.  To submit the PA, please follow the instructions below:  Login to [email or web address] and lick \"Enter a Key\"  Hill: [id number].\"  Dr. Lindsey, please let me know if you need me to do anything.  Thank you.  KWAME Niño    "

## 2022-06-13 NOTE — PROGRESS NOTES
Jenaro Marr 1940 60 day appt   wt 124   /77   P 94   R 18   T 97.5   O2 97%   Bhavana did have 1 day in which she was running a temp, vomiting and had diarrhea. Lasted less than 24 hours, COVID test was negative. No other concerns  Dx for visit: MDD, herpes virus, Asthma, Anemia, GERD, Bipolar, Osteoporosis, Osteoarthritis, BPD, Post gastric surgery syndromes    Reported by LJRN/  BTRN

## 2022-06-15 ENCOUNTER — DOCUMENTATION ONLY (OUTPATIENT)
Dept: FAMILY MEDICINE | Facility: CLINIC | Age: 82
End: 2022-06-15
Payer: COMMERCIAL

## 2022-06-15 DIAGNOSIS — M81.0 OSTEOPOROSIS, UNSPECIFIED OSTEOPOROSIS TYPE, UNSPECIFIED PATHOLOGICAL FRACTURE PRESENCE: ICD-10-CM

## 2022-06-15 DIAGNOSIS — K21.9 GASTROESOPHAGEAL REFLUX DISEASE WITHOUT ESOPHAGITIS: ICD-10-CM

## 2022-06-15 DIAGNOSIS — D50.9 IRON DEFICIENCY ANEMIA, UNSPECIFIED IRON DEFICIENCY ANEMIA TYPE: ICD-10-CM

## 2022-06-15 DIAGNOSIS — J45.20 MILD INTERMITTENT ASTHMA, UNSPECIFIED WHETHER COMPLICATED: ICD-10-CM

## 2022-06-15 DIAGNOSIS — F32.A DEPRESSIVE DISORDER: Primary | ICD-10-CM

## 2022-06-15 DIAGNOSIS — M19.90 OSTEOARTHRITIS, UNSPECIFIED OSTEOARTHRITIS TYPE, UNSPECIFIED SITE: ICD-10-CM

## 2022-06-15 DIAGNOSIS — F60.3 BORDERLINE PERSONALITY DISORDER (H): ICD-10-CM

## 2022-06-15 DIAGNOSIS — F31.81 BIPOLAR 2 DISORDER (H): ICD-10-CM

## 2022-06-15 DIAGNOSIS — Z98.84 HISTORY OF GASTRIC BYPASS: ICD-10-CM

## 2022-06-15 PROCEDURE — 99308 SBSQ NF CARE LOW MDM 20: CPT | Mod: GC | Performed by: STUDENT IN AN ORGANIZED HEALTH CARE EDUCATION/TRAINING PROGRAM

## 2022-06-16 NOTE — PROGRESS NOTES
Subjective: Bhavana Marr is a 81 year old who is seen at [facility] nursing home for follow up visit today.    Additional information obtained from Nursing Staff Josefina, and includes:  Bhavana did have 1 day in which she was running a temp, vomiting and had diarrhea. Lasted less than 24 hours, COVID test was negative. No other concerns  Acute concerns today include: None    Chronic and Past Medical Problems include:  Patient Active Problem List   Diagnosis     Arthritis     Depressive disorder     Borderline personality disorder (H)     GERD (gastroesophageal reflux disease)     Holosystolic murmur     Asthma     History of gastric bypass     Hyperlipidemia LDL goal <130     Other insomnia     Mild mitral regurgitation     Mild aortic stenosis     Weight loss     Bilateral low back pain without sciatica     Adhesive capsulitis of shoulder, right     Mild intermittent asthma, unspecified whether complicated     Family History     Problem (# of Occurrences) Relation (Name,Age of Onset)    Depression (4) Maternal Aunt (60), Maternal Uncle (85), Paternal Aunt, Paternal Uncle        Social History:   Current activities: Within the nursing home  Support services: Nursing staff  PMHX/PSHX/MEDS/ALLERGIES/SHX/FHX reviewed and updated in Epic.   MEDICATION LIST:  Current Outpatient Medications   Medication     Acetaminophen 325 MG CAPS     alendronate (FOSAMAX) 70 MG tablet     alum & mag hydroxide-simethicone (MYLANTA/MAALOX) 200-200-20 MG/5ML SUSP suspension     budesonide-formoterol (SYMBICORT) 80-4.5 MCG/ACT Inhaler     Calcium Oyster Shell 500 MG TABS     capsaicin (ZOSTRIX) 0.025 % external cream     cholecalciferol (VITAMIN D3) 1000 UNIT tablet     Cyanocobalamin (B-12) 1000 MCG TBCR     famotidine (PEPCID) 40 MG tablet     FEROSUL 325 (65 Fe) MG tablet     fluticasone (FLONASE) 50 MCG/ACT nasal spray     guaiFENesin-dextromethorphan (ROBITUSSIN DM) 100-10 MG/5ML syrup     hydrOXYzine (ATARAX) 50 MG tablet     hypromellose  (ARTIFICIAL TEARS) 0.5 % SOLN ophthalmic solution     loperamide (IMODIUM) 1 MG/5ML solution     magnesium hydroxide (MILK OF MAGNESIA) 400 MG/5ML suspension     mirtazapine (REMERON) 15 MG tablet     Multiple Vitamins-Minerals (CEROVITE SENIOR) TABS     Nutritional Supplements (ENSURE ORIGINAL) LIQD     OLANZapine (ZYPREXA) 2.5 MG tablet     OLANZapine (ZYPREXA) 5 MG tablet     polyethylene glycol (MIRALAX) 17 GM/Dose powder     SENNA-docusate sodium (SENNA S) 8.6-50 MG tablet     SENNA-docusate sodium (SENNA S) 8.6-50 MG tablet     sertraline (ZOLOFT) 100 MG tablet     simvastatin (ZOCOR) 40 MG tablet     valACYclovir (VALTREX) 1000 mg tablet     valACYclovir (VALTREX) 500 MG tablet     White Petrolatum ointment     No current facility-administered medications for this visit.       GERIATRIC ROS:    Do you have difficulty getting around, watching TV or reading because of poor eyesight? No   Can you hear normal conversational voice? Yes   Do you use hearing aides? No   Do you have problems with your memory? Yes   Do you often feel sad or depressed? No   Have you unintentionally lost weight in the last 6 months? Yes had bypass surgery    GENERAL ROS:   General: No unexplained weight gain or loss; adequate sleep pattern.  Resp: No worsening shortness of breath, cough or hemoptysis.   GI: No worsening constipation, no diarrhea, no nausea or vomiting  : Voiding independently with no significant incontinence   Skin: No areas of new skin breakdown or worrisome rashes  Extremities:  No pain, extremity weakness or balance troubles    Objective:    wt 124   /77   P 94   R 18   T 97.5   O2 97%  Gen: Well nourished and in NAD   HEENT: Head is atraumatic, decent dentition  CV: RRR - no murmurs, rubs, or gallups,   Pulm: CTAB, no wheezes/rales/rhonchi, good air entry   ABD: soft, nontender, BS intact  Extrem: no cyanosis, edema or clubbing   Psych: Euthymic  Neuro: Pt is able to ambulate independently    Preventative  Screening:   Vaccinations reviewed and up to date needs TDaP and covid booster  Additional Recommended Screening: fall risk assessment    Assessment/ Plan:  Bhavana Munguia was seen today for [facility] visit.    Diagnoses and all orders for this visit:    Depressive disorder  Bipolar 2 disorder (H)  Borderline personality disorder (H)  Continue current regimen      Mild intermittent asthma, unspecified whether complicated  Continue current regimen     Iron deficiency anemia, unspecified iron deficiency anemia type  Continue current regimen     Gastroesophageal reflux disease without esophagitis  History of gastric bypass  Continue current regimen     Osteoporosis, unspecified osteoporosis type, unspecified pathological fracture presence  Osteoarthritis, unspecified osteoarthritis type, unspecified site  Continue current regimen     Fever - Resolved           RECOMMENDED FOLLOW UP:  2 months.    Total of 20 minutes was spent in face to face contact with patient with > 50% in counseling and coordination of care.  Options for treatment and/or follow-up care were reviewed with the patient. Bhavana Marr was engaged and actively involved in the decision making process. She verbalized understanding of the options discussed and was satisfied with the final plan.      Meet Osorio MD  [facility] Medicine  PGY-3  [phone] (pager)

## 2022-07-06 NOTE — PROGRESS NOTES
Jenaro Marr [birth year]   wt 127   /75   P 100   R 16   T 97.2   O2 96%   Bhavana has a spot on her heal that looks like a Plantar's Wart. She states that it hurts when she walks unless she has her shoes on. Not sure when the Podiatrist is coming back.      Reported by AMANDARN/BTRN

## 2022-07-11 ENCOUNTER — DOCUMENTATION ONLY (OUTPATIENT)
Dept: FAMILY MEDICINE | Facility: CLINIC | Age: 82
End: 2022-07-11

## 2022-07-11 DIAGNOSIS — K21.9 GASTROESOPHAGEAL REFLUX DISEASE WITHOUT ESOPHAGITIS: ICD-10-CM

## 2022-07-11 DIAGNOSIS — D50.9 IRON DEFICIENCY ANEMIA, UNSPECIFIED IRON DEFICIENCY ANEMIA TYPE: ICD-10-CM

## 2022-07-11 DIAGNOSIS — B07.0 PLANTAR WART OF RIGHT FOOT: ICD-10-CM

## 2022-07-11 DIAGNOSIS — Z98.84 HISTORY OF GASTRIC BYPASS: ICD-10-CM

## 2022-07-11 DIAGNOSIS — M19.90 OSTEOARTHRITIS, UNSPECIFIED OSTEOARTHRITIS TYPE, UNSPECIFIED SITE: ICD-10-CM

## 2022-07-11 DIAGNOSIS — M81.0 OSTEOPOROSIS, UNSPECIFIED OSTEOPOROSIS TYPE, UNSPECIFIED PATHOLOGICAL FRACTURE PRESENCE: ICD-10-CM

## 2022-07-11 DIAGNOSIS — F60.3 BORDERLINE PERSONALITY DISORDER (H): ICD-10-CM

## 2022-07-11 DIAGNOSIS — J45.20 MILD INTERMITTENT ASTHMA, UNSPECIFIED WHETHER COMPLICATED: ICD-10-CM

## 2022-07-11 DIAGNOSIS — F31.81 BIPOLAR 2 DISORDER (H): ICD-10-CM

## 2022-07-11 DIAGNOSIS — F32.A DEPRESSIVE DISORDER: Primary | ICD-10-CM

## 2022-07-11 RX ORDER — FAMOTIDINE 40 MG/1
40 TABLET, FILM COATED ORAL 2 TIMES DAILY
Qty: 90 TABLET | Refills: 0 | Status: SHIPPED | OUTPATIENT
Start: 2022-07-11 | End: 2022-07-11

## 2022-07-11 RX ORDER — FAMOTIDINE 20 MG/1
20 TABLET, FILM COATED ORAL 2 TIMES DAILY
Qty: 90 TABLET | Refills: 11 | Status: SHIPPED | OUTPATIENT
Start: 2022-07-11 | End: 2022-07-12

## 2022-07-11 NOTE — PROGRESS NOTES
Subjective: Bhavana Marr is a 81 year old who is seen at [facility] nursing home for follow up visit today.    Additional information obtained from Nursing Staff Josefina, and includes:    1. Bhavana reports she has a growth on the bottom of there L heal concerning for a plantar wart. She states it is uncomfortable to walk on.   2. Bhavana reports she has n/v almost daily related to her GERD. She just got an antacid she started taking a couple days ago that has been helping her.     Chronic and Past Medical Problems include:  Patient Active Problem List   Diagnosis     Arthritis     Depressive disorder     Borderline personality disorder (H)     GERD (gastroesophageal reflux disease)     Holosystolic murmur     Asthma     History of gastric bypass     Hyperlipidemia LDL goal <130     Other insomnia     Mild mitral regurgitation     Mild aortic stenosis     Weight loss     Bilateral low back pain without sciatica     Adhesive capsulitis of shoulder, right     Mild intermittent asthma, unspecified whether complicated     Family History     Problem (# of Occurrences) Relation (Name,Age of Onset)    Depression (4) Maternal Aunt (60), Maternal Uncle (85), Paternal Aunt, Paternal Uncle        Social History:   Current activities: Within the nursing home  Support services: Nursing staff  PMHX/PSHX/MEDS/ALLERGIES/SHX/FHX reviewed and updated in Epic.   MEDICATION LIST:  Current Outpatient Medications   Medication     Acetaminophen 325 MG CAPS     alendronate (FOSAMAX) 70 MG tablet     alum & mag hydroxide-simethicone (MYLANTA/MAALOX) 200-200-20 MG/5ML SUSP suspension     budesonide-formoterol (SYMBICORT) 80-4.5 MCG/ACT Inhaler     Calcium Oyster Shell 500 MG TABS     capsaicin (ZOSTRIX) 0.025 % external cream     cholecalciferol (VITAMIN D3) 1000 UNIT tablet     Cyanocobalamin (B-12) 1000 MCG TBCR     famotidine (PEPCID) 40 MG tablet     FEROSUL 325 (65 Fe) MG tablet     fluticasone (FLONASE) 50 MCG/ACT nasal spray      guaiFENesin-dextromethorphan (ROBITUSSIN DM) 100-10 MG/5ML syrup     hydrOXYzine (ATARAX) 50 MG tablet     hypromellose (ARTIFICIAL TEARS) 0.5 % SOLN ophthalmic solution     loperamide (IMODIUM) 1 MG/5ML solution     magnesium hydroxide (MILK OF MAGNESIA) 400 MG/5ML suspension     mirtazapine (REMERON) 15 MG tablet     Multiple Vitamins-Minerals (CEROVITE SENIOR) TABS     Nutritional Supplements (ENSURE ORIGINAL) LIQD     OLANZapine (ZYPREXA) 2.5 MG tablet     OLANZapine (ZYPREXA) 5 MG tablet     polyethylene glycol (MIRALAX) 17 GM/Dose powder     SENNA-docusate sodium (SENNA S) 8.6-50 MG tablet     SENNA-docusate sodium (SENNA S) 8.6-50 MG tablet     sertraline (ZOLOFT) 100 MG tablet     simvastatin (ZOCOR) 40 MG tablet     valACYclovir (VALTREX) 1000 mg tablet     valACYclovir (VALTREX) 500 MG tablet     White Petrolatum ointment     No current facility-administered medications for this visit.       GERIATRIC ROS:    Do you have difficulty getting around, watching TV or reading because of poor eyesight? No   Can you hear normal conversational voice? Yes   Do you use hearing aides? No   Do you have problems with your memory? Yes   Do you often feel sad or depressed? No   Have you unintentionally lost weight in the last 6 months? Yes had bypass surgery    GENERAL ROS:   General: No unexplained weight gain or loss; adequate sleep pattern.  Resp: No worsening shortness of breath, cough or hemoptysis.   GI: No worsening constipation, no diarrhea, no nausea or vomiting  : Voiding independently with no significant incontinence   Skin: No areas of new skin breakdown or worrisome rashes  Extremities:  Bump on R heal that causes discomfort with walking. No pain, extremity weakness or balance troubles    Objective:    wt 127   /75   P 100   R 16   T 97.2   O2 96%   Gen: Well nourished and in NAD   HEENT: Head is atraumatic, decent dentition  CV: RRR - no murmurs, rubs, or gallups,   Pulm: CTAB, no  wheezes/rales/rhonchi, good air entry   ABD: soft, nontender, BS intact  Extrem: There is a plantar wart on R heal. no cyanosis, edema or clubbing   Psych: Euthymic  Neuro: Pt is able to ambulate independently    Preventative Screening:   Vaccinations reviewed and up to date needs TDaP and covid booster  Additional Recommended Screening: fall risk assessment    Assessment/ Plan:  Bhavana Munguia was seen today for [facility] visit.    Diagnoses and all orders for this visit:    Depressive disorder  Bipolar 2 disorder (H)  Borderline personality disorder (H)  Continue current regimen      Mild intermittent asthma, unspecified whether complicated  Continue current regimen     Iron deficiency anemia, unspecified iron deficiency anemia type  Continue current regimen     Gastroesophageal reflux disease without esophagitis  History of gastric bypass  Nausea/vomiting  Patient has been having frequent nausea and occasional vomiting due to her GERD. She started famotidine once daily prn which seems to be helping but waking up with nausea.   - famotidine 20 mg BID    Osteoporosis, unspecified osteoporosis type, unspecified pathological fracture presence  Osteoarthritis, unspecified osteoarthritis type, unspecified site  Continue current regimen     Plantar wart of right foot  - Recommended daily foot soaks for 10-15 minutes followed by use of pumice stone.   - salicylic acid 40% pads q48h for up to 12 weeks.   - We will try to bring an 11 blade scalpel in 2 weeks when we return for rounding to shave down to help with discomfort with walking.       RECOMMENDED FOLLOW UP:  2 months.    Total of 20 minutes was spent in face to face contact with patient with > 50% in counseling and coordination of care.  Options for treatment and/or follow-up care were reviewed with the patient. Bhavana Munguia CHAS Marr was engaged and actively involved in the decision making process. She verbalized understanding of the options discussed and was  satisfied with the final plan.      Benita Behm, MD PGY3  [facility] Medicine Residency  07/11/22    I precepted today with Dr. Lewis.

## 2022-07-12 ENCOUNTER — DOCUMENTATION ONLY (OUTPATIENT)
Dept: PHARMACY | Facility: CLINIC | Age: 82
End: 2022-07-12

## 2022-07-12 DIAGNOSIS — K21.9 GASTROESOPHAGEAL REFLUX DISEASE WITHOUT ESOPHAGITIS: ICD-10-CM

## 2022-07-12 DIAGNOSIS — F39 MOOD DISORDER (H): ICD-10-CM

## 2022-07-12 RX ORDER — OLANZAPINE 2.5 MG/1
2.5 TABLET, FILM COATED ORAL EVERY MORNING
Status: ON HOLD | COMMUNITY
Start: 2022-07-12 | End: 2023-05-31

## 2022-07-12 RX ORDER — FAMOTIDINE 40 MG/1
40 TABLET, FILM COATED ORAL 2 TIMES DAILY PRN
COMMUNITY
Start: 2022-07-12 | End: 2022-10-21

## 2022-07-12 RX ORDER — CALCIUM CARBONATE 500 MG/1
2 TABLET, CHEWABLE ORAL
COMMUNITY
Start: 2022-07-12

## 2022-07-24 ENCOUNTER — MEDICAL CORRESPONDENCE (OUTPATIENT)
Dept: HEALTH INFORMATION MANAGEMENT | Facility: CLINIC | Age: 82
End: 2022-07-24

## 2022-08-08 NOTE — PROGRESS NOTES
"Jenaro Marr [birth year] 60 day appt   wt 128   /73   P 72   R 16   T 97.9  O2 96% RA.    Resident currently receives senna daily for consitpation. She reports that she is not having a bowel movement at least every 3 days at times requiring MOM for relief. This week she was given 15 mLs MOM after reports no BM for \"4 or 5 days\", had a large BM then loose stool the next day. She would like something to help her have more regular bowel movements but not cause loose stools.   Dx for visit. BPD, Bipolar d/o, MDD, Asthma, GERD, Osteoporosis, Post gastric surgery syndrome.    Reported by LJRN/BTRN    Subjective: Bhavana Marr is a 81 year old who is seen at home for follow up visit today.  Seen at [address] [place], [place] [phone] .      Acute concerns today include:     Constipation.  Patient received senna daily for constipation.  She reports to staff that she is not having a bowel movement for at least 3 days.  Staff usually treat this with milk of mag, which usually causes loose stools.  Patient would like to have a bowel movement every day and wondering if there is additional management for that.    Continues to have phlegm after her gastric bypass surgery, but she is not bothered by it.    Chronic and Past Medical Problems include:  Patient Active Problem List   Diagnosis     Arthritis     Depressive disorder     Borderline personality disorder (H)     GERD (gastroesophageal reflux disease)     Holosystolic murmur     Asthma     History of gastric bypass     Hyperlipidemia LDL goal <130     Other insomnia     Mild mitral regurgitation     Mild aortic stenosis     Weight loss     Bilateral low back pain without sciatica     Adhesive capsulitis of shoulder, right     Mild intermittent asthma, unspecified whether complicated     Family History     Problem (# of Occurrences) Relation (Name,Age of Onset)    Depression (4) Maternal Aunt (60), Maternal Uncle (85), Paternal Aunt, Paternal Uncle        Social " History:   Current activities: Very excited to see the [employer] movie this afternoon    PMHX/PSHX/MEDS/ALLERGIES/SHX/FHX reviewed and updated in Epic.   MEDICATION LIST:  Current Outpatient Medications   Medication     Acetaminophen 325 MG CAPS     alendronate (FOSAMAX) 70 MG tablet     alum & mag hydroxide-simethicone (MYLANTA/MAALOX) 200-200-20 MG/5ML SUSP suspension     budesonide-formoterol (SYMBICORT) 80-4.5 MCG/ACT Inhaler     calcium carbonate (TUMS) 500 MG chewable tablet     Calcium Oyster Shell 500 MG TABS     capsaicin (ZOSTRIX) 0.025 % external cream     cholecalciferol (VITAMIN D3) 1000 UNIT tablet     Cyanocobalamin (B-12) 1000 MCG TBCR     famotidine (PEPCID) 40 MG tablet     FEROSUL 325 (65 Fe) MG tablet     fluticasone (FLONASE) 50 MCG/ACT nasal spray     guaiFENesin-dextromethorphan (ROBITUSSIN DM) 100-10 MG/5ML syrup     hydrOXYzine (ATARAX) 50 MG tablet     hypromellose (ARTIFICIAL TEARS) 0.5 % SOLN ophthalmic solution     loperamide (IMODIUM) 1 MG/5ML solution     magnesium hydroxide (MILK OF MAGNESIA) 400 MG/5ML suspension     mirtazapine (REMERON) 15 MG tablet     Multiple Vitamins-Minerals (CEROVITE SENIOR) TABS     Nutritional Supplements (ENSURE ORIGINAL) LIQD     OLANZapine (ZYPREXA) 2.5 MG tablet     OLANZapine (ZYPREXA) 5 MG tablet     polyethylene glycol (MIRALAX) 17 GM/Dose powder     salicylic acid (MEDIPLAST) 40 % miscellaneous     SENNA-docusate sodium (SENNA S) 8.6-50 MG tablet     SENNA-docusate sodium (SENNA S) 8.6-50 MG tablet     sertraline (ZOLOFT) 100 MG tablet     simvastatin (ZOCOR) 40 MG tablet     valACYclovir (VALTREX) 1000 mg tablet     valACYclovir (VALTREX) 500 MG tablet     White Petrolatum ointment     No current facility-administered medications for this visit.     Medications are managed by: Nursing home staff  Patient has questions, concerns, or potential side effects/interactions from their medications:   No  GERIATRIC ROS:    Do you have difficulty getting around,  watching TV or reading because of poor eyesight?  No   Can you hear normal conversational voice? Yes   Do you use hearing aides? No   Do you have problems with your memory? No   Do you often feel sad or depressed?  No   Have you unintentionally lost weight in the last 6 months?  Gastric bypass  Do you have trouble with control of your bladder? No   Do you have trouble with control of your bowels?  No   Have you had any falls in the past year? No       GENERAL ROS:   General: No unexplained weight gain or loss; adequate sleep pattern.  Resp: No worsening shortness of breath, cough or hemoptysis.   GI: No worsening constipation, no diarrhea, no nausea or vomiting  : Voiding independently with no significant incontinence   Skin: No areas of new skin breakdown or worrisome rashes  Extremities:  No pain, extremity weakness or balance troubles    Objective:    wt 128   /73   P 72   R 16   T 97.9  O2 96% RA.  Gen: Well nourished and in NAD   HEENT: Head is atraumatic, decent dentition  CV: RRR - no murmurs, rubs, or gallups,   Pulm: CTAB, no wheezes/rales/rhonchi, good air entry   ABD: soft, nontender, BS intact  Extrem: no cyanosis, edema or clubbing   Psych: Euthymic  Neuro: Pt is able to ambulate independently    Preventative Screening:   Vaccinations reviewed and due for Tdap and COVID booster    Assessment/ Plan:  Constipation, unspecified constipation type  Patient on a better regimen for constipation.  Recommended continuing daily senna.  Recommended increasing frequency of MiraLAX, 1/2-1 capful daily or every other day for  more consistent bowel movements.  If this is ineffective, could add in Colace at next visit.    Borderline personality disorder (H)  Depressive disorder  Bipolar affective disorder, remission status unspecified (H)  Continue current management per psychiatry.    Uncomplicated asthma, unspecified asthma severity, unspecified whether persistent  Continue current  management    Gastroesophageal reflux disease without esophagitis  Continue current management    Age-related osteoporosis without current pathological fracture  Continue current management    History of gastric bypass  Continue current management      RECOMMENDED FOLLOW UP:  2 months.    Level of Service:  94178    Total of 20 minutes was spent in face to face contact with patient with > 50% in counseling and coordination of care.  Options for treatment and/or follow-up care were reviewed with the patient. Bhavana Marr was engaged and actively involved in the decision making process. She verbalized understanding of the options discussed and was satisfied with the final plan.    Patient staffed with Dr. Lindsey.    Caryn Irvin MD, PGY 3

## 2022-08-09 ENCOUNTER — DOCUMENTATION ONLY (OUTPATIENT)
Dept: FAMILY MEDICINE | Facility: CLINIC | Age: 82
End: 2022-08-09
Payer: COMMERCIAL

## 2022-08-09 DIAGNOSIS — M81.0 AGE-RELATED OSTEOPOROSIS WITHOUT CURRENT PATHOLOGICAL FRACTURE: ICD-10-CM

## 2022-08-09 DIAGNOSIS — F32.A DEPRESSIVE DISORDER: ICD-10-CM

## 2022-08-09 DIAGNOSIS — K21.9 GASTROESOPHAGEAL REFLUX DISEASE WITHOUT ESOPHAGITIS: ICD-10-CM

## 2022-08-09 DIAGNOSIS — K59.00 CONSTIPATION, UNSPECIFIED CONSTIPATION TYPE: Primary | ICD-10-CM

## 2022-08-09 DIAGNOSIS — F31.9 BIPOLAR AFFECTIVE DISORDER, REMISSION STATUS UNSPECIFIED (H): ICD-10-CM

## 2022-08-09 DIAGNOSIS — Z98.84 HISTORY OF GASTRIC BYPASS: ICD-10-CM

## 2022-08-09 DIAGNOSIS — F60.3 BORDERLINE PERSONALITY DISORDER (H): ICD-10-CM

## 2022-08-09 DIAGNOSIS — J45.909 UNCOMPLICATED ASTHMA, UNSPECIFIED ASTHMA SEVERITY, UNSPECIFIED WHETHER PERSISTENT: ICD-10-CM

## 2022-08-09 PROCEDURE — 99309 SBSQ NF CARE MODERATE MDM 30: CPT | Mod: GC | Performed by: STUDENT IN AN ORGANIZED HEALTH CARE EDUCATION/TRAINING PROGRAM

## 2022-09-16 ENCOUNTER — HOSPITAL ENCOUNTER (OUTPATIENT)
Dept: MAMMOGRAPHY | Facility: CLINIC | Age: 82
Discharge: HOME | End: 2022-09-16
Attending: FAMILY MEDICINE | Admitting: FAMILY MEDICINE
Payer: COMMERCIAL

## 2022-09-16 DIAGNOSIS — Z12.31 VISIT FOR SCREENING MAMMOGRAM: ICD-10-CM

## 2022-09-16 PROCEDURE — 77067 SCR MAMMO BI INCL CAD: CPT

## 2022-09-28 NOTE — PROGRESS NOTES
Jenaro Marr [birth year] Is being seen for Plantar wart (?) on heel of right foot   wt 126   /64   P 72   R 16   T 97.8   O2 96% RA  Reported by LJRN/BTRN

## 2022-09-29 ENCOUNTER — DOCUMENTATION ONLY (OUTPATIENT)
Dept: FAMILY MEDICINE | Facility: CLINIC | Age: 82
End: 2022-09-29
Payer: COMMERCIAL

## 2022-09-29 DIAGNOSIS — B07.0 PLANTAR WARTS: ICD-10-CM

## 2022-09-29 PROCEDURE — 99308 SBSQ NF CARE LOW MDM 20: CPT | Mod: GC | Performed by: STUDENT IN AN ORGANIZED HEALTH CARE EDUCATION/TRAINING PROGRAM

## 2022-09-29 NOTE — PROGRESS NOTES
Subjective: Bhavana Marr is a 81 year old who is seen at home for follow up visit today.  Seen at [address] [facility] [address] .    Acute concerns today include: patient believes she has another plantar wart. She says it feels and appears the same as when she was diagnosed earlier in the year. She was started on OTC salicylic acid with good relief. No other changes in terms of noticing ulceration or bleeding. No other lesions.     Chronic and Past Medical Problems include:  Patient Active Problem List   Diagnosis     Arthritis     Depressive disorder     Borderline personality disorder (H)     GERD (gastroesophageal reflux disease)     Holosystolic murmur     Asthma     History of gastric bypass     Hyperlipidemia LDL goal <130     Other insomnia     Mild mitral regurgitation     Mild aortic stenosis     Weight loss     Bilateral low back pain without sciatica     Adhesive capsulitis of shoulder, right     Mild intermittent asthma, unspecified whether complicated     Bipolar affective disorder, remission status unspecified (H)     Constipation, unspecified constipation type     Age-related osteoporosis without current pathological fracture     Plantar warts     Family History     Problem (# of Occurrences) Relation (Name,Age of Onset)    Depression (4) Maternal Aunt (60), Maternal Uncle (85), Paternal Aunt, Paternal Uncle          PMHX/PSHX/MEDS/ALLERGIES/SHX/FHX reviewed and updated in Epic.   MEDICATION LIST:  Current Outpatient Medications   Medication     Acetaminophen 325 MG CAPS     alendronate (FOSAMAX) 70 MG tablet     alum & mag hydroxide-simethicone (MYLANTA/MAALOX) 200-200-20 MG/5ML SUSP suspension     budesonide-formoterol (SYMBICORT) 80-4.5 MCG/ACT Inhaler     calcium carbonate (TUMS) 500 MG chewable tablet     Calcium Oyster Shell 500 MG TABS     capsaicin (ZOSTRIX) 0.025 % external cream     cholecalciferol (VITAMIN D3) 1000 UNIT tablet     Cyanocobalamin (B-12) 1000 MCG TBCR      famotidine (PEPCID) 40 MG tablet     FEROSUL 325 (65 Fe) MG tablet     fluticasone (FLONASE) 50 MCG/ACT nasal spray     guaiFENesin-dextromethorphan (ROBITUSSIN DM) 100-10 MG/5ML syrup     hydrOXYzine (ATARAX) 50 MG tablet     hypromellose (ARTIFICIAL TEARS) 0.5 % SOLN ophthalmic solution     loperamide (IMODIUM) 1 MG/5ML solution     magnesium hydroxide (MILK OF MAGNESIA) 400 MG/5ML suspension     mirtazapine (REMERON) 15 MG tablet     Multiple Vitamins-Minerals (CEROVITE SENIOR) TABS     Nutritional Supplements (ENSURE ORIGINAL) LIQD     OLANZapine (ZYPREXA) 2.5 MG tablet     OLANZapine (ZYPREXA) 5 MG tablet     polyethylene glycol (MIRALAX) 17 GM/Dose powder     salicylic acid (MEDIPLAST) 40 % miscellaneous     SENNA-docusate sodium (SENNA S) 8.6-50 MG tablet     SENNA-docusate sodium (SENNA S) 8.6-50 MG tablet     sertraline (ZOLOFT) 100 MG tablet     simvastatin (ZOCOR) 40 MG tablet     valACYclovir (VALTREX) 1000 mg tablet     valACYclovir (VALTREX) 500 MG tablet     White Petrolatum ointment     No current facility-administered medications for this visit.     Medications are managed by:  [facility] NURSE    GENERAL ROS:   General: No unexplained weight gain or loss; adequate sleep pattern.  Resp: No worsening shortness of breath, cough or hemoptysis.   GI: No worsening constipation, no diarrhea, no nausea or vomiting  : Voiding independently with no significant incontinence   Skin: No areas of new skin breakdown or worrisome rashes  Extremities:  R heel with pain with ambulation    Objective:  wt 126   /64   P 72   R 16   T 97.8   O2 96% RA  Gen: Well nourished and in NAD   Extrem: no cyanosis, edema or clubbing. R heel with approx 5mm well circumscribed darker colored cauliflower-like appearance. No active bleeding, or drainage noted   Psych: Euthymic  Neuro: Pt is able to ambulate independently       Assessment/ Plan:  1. Plantar warts  History and physical exam consistent with diagnosis. Per chart  review, appears she has been on OTC salicylic acid plantar pads in the past for this.  - resume above regimen       RECOMMENDED FOLLOW UP:  2 months.    Total of 15 minutes was spent in face to face contact with patient with > 50% in counseling and coordination of care.  Options for treatment and/or follow-up care were reviewed with the patient. Bhavana Nilda CHAS Marr was engaged and actively involved in the decision making process. She verbalized understanding of the options discussed and was satisfied with the final plan.      Rema De Anda MD PGY3  Precepted with Dr. Lindsey

## 2022-10-04 NOTE — PROGRESS NOTES
Jenaro Marr [birth year] 60 day appt    Subjective: Bhavana Marr is a 81 year old who is seen at home for 60 day follow up visit today.  Seen at [id number] [phone] .      Acute concerns today include: Continues to be treated for Plantar wart on bottom of right foot    Dx for visit: Bipolar, MDD< Plantar Wart, Herpes, Asthma, Anemia, GERD, HLD, Osteoporosis, osteoarthritis, BPD, Post gastric syndromes    Chronic and Past Medical Problems include:  Patient Active Problem List   Diagnosis     Arthritis     Depressive disorder     Borderline personality disorder (H)     GERD (gastroesophageal reflux disease)     Holosystolic murmur     Asthma     History of gastric bypass     Hyperlipidemia LDL goal <130     Other insomnia     Mild mitral regurgitation     Mild aortic stenosis     Weight loss     Bilateral low back pain without sciatica     Adhesive capsulitis of shoulder, right     Mild intermittent asthma, unspecified whether complicated     Bipolar affective disorder, remission status unspecified (H)     Constipation, unspecified constipation type     Age-related osteoporosis without current pathological fracture     Plantar warts     Family History     Problem (# of Occurrences) Relation (Name,Age of Onset)    Depression (4) Maternal Aunt (60), Maternal Uncle (85), Paternal Aunt, Paternal Uncle        Social History:   Current activities:  Bingo and cards    PMHX/PSHX/MEDS/ALLERGIES/SHX/FHX reviewed and updated in Epic.   MEDICATION LIST:  Current Outpatient Medications   Medication     Acetaminophen 325 MG CAPS     alendronate (FOSAMAX) 70 MG tablet     alum & mag hydroxide-simethicone (MYLANTA/MAALOX) 200-200-20 MG/5ML SUSP suspension     budesonide-formoterol (SYMBICORT) 80-4.5 MCG/ACT Inhaler     calcium carbonate (TUMS) 500 MG chewable tablet     Calcium Oyster Shell 500 MG TABS     capsaicin (ZOSTRIX) 0.025 % external cream     cholecalciferol (VITAMIN D3) 1000 UNIT tablet      Cyanocobalamin (B-12) 1000 MCG TBCR     famotidine (PEPCID) 40 MG tablet     FEROSUL 325 (65 Fe) MG tablet     fluticasone (FLONASE) 50 MCG/ACT nasal spray     guaiFENesin-dextromethorphan (ROBITUSSIN DM) 100-10 MG/5ML syrup     hydrOXYzine (ATARAX) 50 MG tablet     hypromellose (ARTIFICIAL TEARS) 0.5 % SOLN ophthalmic solution     loperamide (IMODIUM) 1 MG/5ML solution     magnesium hydroxide (MILK OF MAGNESIA) 400 MG/5ML suspension     mirtazapine (REMERON) 15 MG tablet     Multiple Vitamins-Minerals (CEROVITE SENIOR) TABS     Nutritional Supplements (ENSURE ORIGINAL) LIQD     OLANZapine (ZYPREXA) 2.5 MG tablet     OLANZapine (ZYPREXA) 5 MG tablet     polyethylene glycol (MIRALAX) 17 GM/Dose powder     salicylic acid (MEDIPLAST) 40 % miscellaneous     SENNA-docusate sodium (SENNA S) 8.6-50 MG tablet     SENNA-docusate sodium (SENNA S) 8.6-50 MG tablet     sertraline (ZOLOFT) 100 MG tablet     simvastatin (ZOCOR) 40 MG tablet     valACYclovir (VALTREX) 1000 mg tablet     valACYclovir (VALTREX) 500 MG tablet     White Petrolatum ointment     No current facility-administered medications for this visit.     Medications are managed by:  NURSE  Patient has questions, concerns, or potential side effects/interactions from their medications:  No  GERIATRIC ROS:    Do you have difficulty getting around, watching TV or reading because of poor eyesight? No   Can you hear normal conversational voice?  No   Do you use hearing aides?  No   Do you have problems with your memory? Yes    Do you often feel sad or depressed? No   Have you unintentionally lost weight in the last 6 months?  No   Do you have trouble with control of your bladder? No   Do you have trouble with control of your bowels?  No   Have you had any falls in the past year?  No   How many? 0    GENERAL ROS:   General: No unexplained weight gain or loss; adequate sleep pattern.  Resp: No worsening shortness of breath, cough or hemoptysis.   GI: No worsening  constipation, no diarrhea, no nausea or vomiting  : Voiding independently with no significant incontinence   Skin: No areas of new skin breakdown or worrisome rashes aside from plantar wart.  Extremities:  No pain, extremity weakness or balance troubles    Objective: Most recent weight:  126 lbs  Vitals reviewed at the center with no concerns.  Gen: Well nourished and in NAD   HEENT: Head is atraumatic, decent dentition  CV: RRR - no murmurs, rubs, or gallups,   Pulm: CTAB, no wheezes/rales/rhonchi, good air entry   ABD: Non-distended  Extrem: no cyanosis, edema or clubbing   Psych: Euthymic  Neuro: Pt is able to ambulate independently    Preventative Screening:   Vaccinations reviewed and up to date   Get up and Go test:  Not performed. Observed patient ambulating with no concerns  Additional Recommended Screening: Annual labs discussed with nurse     Assessment/ Plan:  Constipation, unspecified constipation type  Continue daily senna and MiraLAX, 1/2-1 capful daily or every other day for consistent bowel movements.  Could add in Colace if still struggling with constipation.     Borderline personality disorder (H)  Depressive disorder  Bipolar affective disorder, remission status unspecified (H)  Continue current management per psychiatry.     Uncomplicated asthma, unspecified asthma severity, unspecified whether persistent  Continue current management     Gastroesophageal reflux disease without esophagitis  Continue current management     Age-related osteoporosis without current pathological fracture  Continue current management     History of gastric bypass  Continue current management    RECOMMENDED FOLLOW UP:  2 months.    Total of 15 minutes was spent in face to face contact with patient with > 50% in counseling and coordination of care.  Options for treatment and/or follow-up care were reviewed with the patient. Bhavana Marr was engaged and actively involved in the decision making process. She verbalized  understanding of the options discussed and was satisfied with the final plan.      Harshal Cuevas MD

## 2022-10-06 ENCOUNTER — DOCUMENTATION ONLY (OUTPATIENT)
Dept: FAMILY MEDICINE | Facility: CLINIC | Age: 82
End: 2022-10-06

## 2022-10-06 DIAGNOSIS — R52 PAIN: ICD-10-CM

## 2022-10-06 DIAGNOSIS — K59.00 CONSTIPATION, UNSPECIFIED CONSTIPATION TYPE: ICD-10-CM

## 2022-10-06 DIAGNOSIS — K21.9 GASTROESOPHAGEAL REFLUX DISEASE WITHOUT ESOPHAGITIS: ICD-10-CM

## 2022-10-21 RX ORDER — FAMOTIDINE 20 MG/1
20 TABLET, FILM COATED ORAL 2 TIMES DAILY
Status: ON HOLD | COMMUNITY
Start: 2022-10-21 | End: 2023-05-31

## 2022-10-21 RX ORDER — TROLAMINE SALICYLATE 10 G/100G
CREAM TOPICAL PRN
Status: ON HOLD | COMMUNITY
Start: 2022-10-21 | End: 2023-05-31

## 2022-10-21 RX ORDER — POLYETHYLENE GLYCOL 3350 17 G/17G
POWDER, FOR SOLUTION ORAL
Qty: 507 G | Refills: 1 | COMMUNITY
Start: 2022-10-21 | End: 2023-05-05

## 2022-12-05 NOTE — PROGRESS NOTES
Jenaro Marr [birth year] Is being seen for 60 day appt   wt 125,   /76   P 96   R 17   T 98.1   O2 98% RA.   Bhavana is currently in isolation because her roommate has tested positive for COVID  . Bhavana has so far tested negative and will remain on TBP for 7 days per CDC guidance as long as she continues to test negative.   Dx for visit: Bipolar, MDD, Herpes virus, Insomnia, Asthma, GERD, Anemia, HLD, Osteoporosis,     Reported by LJRN/BTRN

## 2022-12-06 ENCOUNTER — NURSING HOME VISIT (OUTPATIENT)
Dept: FAMILY MEDICINE | Facility: CLINIC | Age: 82
End: 2022-12-06
Payer: COMMERCIAL

## 2022-12-06 DIAGNOSIS — E78.5 HYPERLIPIDEMIA LDL GOAL <130: ICD-10-CM

## 2022-12-06 DIAGNOSIS — M19.90 OSTEOARTHRITIS, UNSPECIFIED OSTEOARTHRITIS TYPE, UNSPECIFIED SITE: ICD-10-CM

## 2022-12-06 DIAGNOSIS — F32.A DEPRESSIVE DISORDER: ICD-10-CM

## 2022-12-06 DIAGNOSIS — F31.9 BIPOLAR AFFECTIVE DISORDER, REMISSION STATUS UNSPECIFIED (H): ICD-10-CM

## 2022-12-06 DIAGNOSIS — J45.20 MILD INTERMITTENT ASTHMA, UNSPECIFIED WHETHER COMPLICATED: ICD-10-CM

## 2022-12-06 DIAGNOSIS — J45.909 UNCOMPLICATED ASTHMA, UNSPECIFIED ASTHMA SEVERITY, UNSPECIFIED WHETHER PERSISTENT: ICD-10-CM

## 2022-12-06 DIAGNOSIS — K21.9 GASTROESOPHAGEAL REFLUX DISEASE WITHOUT ESOPHAGITIS: Primary | ICD-10-CM

## 2022-12-06 DIAGNOSIS — M81.0 AGE-RELATED OSTEOPOROSIS WITHOUT CURRENT PATHOLOGICAL FRACTURE: ICD-10-CM

## 2022-12-06 DIAGNOSIS — Z98.84 HISTORY OF GASTRIC BYPASS: ICD-10-CM

## 2022-12-06 DIAGNOSIS — F60.3 BORDERLINE PERSONALITY DISORDER (H): ICD-10-CM

## 2022-12-06 PROCEDURE — 99308 SBSQ NF CARE LOW MDM 20: CPT | Mod: GC

## 2022-12-06 NOTE — PROGRESS NOTES
Jenaro Marr [birth year] 60 day appt    Subjective: Bhavana Marr is a 81 year old who is seen at home for 60 day follow up visit today.  Seen at [address] [phone] .      Acute concerns today include: No acute concerns, doing well    Dx for visit: Bipolar, MDD< , Herpes, Asthma, Anemia, GERD, HLD, Osteoporosis, osteoarthritis, BPD, Post gastric syndromes    Chronic and Past Medical Problems include:  Patient Active Problem List   Diagnosis     Arthritis     Depressive disorder     Borderline personality disorder (H)     GERD (gastroesophageal reflux disease)     Holosystolic murmur     Asthma     History of gastric bypass     Hyperlipidemia LDL goal <130     Other insomnia     Mild mitral regurgitation     Mild aortic stenosis     Weight loss     Bilateral low back pain without sciatica     Adhesive capsulitis of shoulder, right     Mild intermittent asthma, unspecified whether complicated     Bipolar affective disorder, remission status unspecified (H)     Constipation, unspecified constipation type     Age-related osteoporosis without current pathological fracture     Plantar warts     Family History     Problem (# of Occurrences) Relation (Name,Age of Onset)    Depression (4) Maternal Aunt (60), Maternal Uncle (85), Paternal Aunt, Paternal Uncle        Social History:   Current activities:  Restricted given recent COVID-19 positive residents    PMHX/PSHX/MEDS/ALLERGIES/SHX/FHX reviewed and updated in Epic.   MEDICATION LIST:  Current Outpatient Medications   Medication     Acetaminophen 325 MG CAPS     alendronate (FOSAMAX) 70 MG tablet     alum & mag hydroxide-simethicone (MYLANTA/MAALOX) 200-200-20 MG/5ML SUSP suspension     budesonide-formoterol (SYMBICORT) 80-4.5 MCG/ACT Inhaler     calcium carbonate (TUMS) 500 MG chewable tablet     Calcium Oyster Shell 500 MG TABS     capsaicin (ZOSTRIX) 0.025 % external cream     cholecalciferol (VITAMIN D3) 1000 UNIT tablet     Cyanocobalamin (B-12)  1000 MCG TBCR     famotidine (PEPCID) 20 MG tablet     FEROSUL 325 (65 Fe) MG tablet     fluticasone (FLONASE) 50 MCG/ACT nasal spray     guaiFENesin-dextromethorphan (ROBITUSSIN DM) 100-10 MG/5ML syrup     hydrOXYzine (ATARAX) 50 MG tablet     hypromellose (ARTIFICIAL TEARS) 0.5 % SOLN ophthalmic solution     loperamide (IMODIUM) 1 MG/5ML solution     magnesium hydroxide (MILK OF MAGNESIA) 400 MG/5ML suspension     mirtazapine (REMERON) 15 MG tablet     Multiple Vitamins-Minerals (CEROVITE SENIOR) TABS     Nutritional Supplements (ENSURE ORIGINAL) LIQD     OLANZapine (ZYPREXA) 2.5 MG tablet     OLANZapine (ZYPREXA) 5 MG tablet     polyethylene glycol (MIRALAX) 17 GM/Dose powder     salicylic acid (MEDIPLAST) 40 % miscellaneous     SENNA-docusate sodium (SENNA S) 8.6-50 MG tablet     SENNA-docusate sodium (SENNA S) 8.6-50 MG tablet     sertraline (ZOLOFT) 100 MG tablet     simvastatin (ZOCOR) 40 MG tablet     trolamine salicylate (ASPERCREME) 10 % external cream     valACYclovir (VALTREX) 1000 mg tablet     valACYclovir (VALTREX) 500 MG tablet     White Petrolatum ointment     No current facility-administered medications for this visit.     Medications are managed by:  NURSE  Patient has questions, concerns, or potential side effects/interactions from their medications:  No  GERIATRIC ROS:    Do you have difficulty getting around, watching TV or reading because of poor eyesight? No   Can you hear normal conversational voice?  No   Do you use hearing aides?  No   Do you have problems with your memory? Yes    Do you often feel sad or depressed? No   Have you unintentionally lost weight in the last 6 months?  No   Do you have trouble with control of your bladder? No   Do you have trouble with control of your bowels?  No   Have you had any falls in the past year?  No   How many? 0    GENERAL ROS:   General: No unexplained weight gain or loss; adequate sleep pattern.  Resp: No worsening shortness of breath, cough or  hemoptysis.   GI: No worsening constipation, no diarrhea, no nausea or vomiting  : Voiding independently with no significant incontinence   Skin: No areas of new skin breakdown or worrisome rashes aside from plantar wart.  Extremities:  No pain, extremity weakness or balance troubles    Objective: Most recent weight:  125 lbs  BP: 122/76  P 96  R 17  T 98.1  O2 98% on RA  Gen: Well nourished and in NAD   HEENT: Head is atraumatic, decent dentition  CV: RRR - no murmurs, rubs, or gallups,   Pulm: CTAB, no wheezes/rales/rhonchi, good air entry   ABD: Non-distended  Extrem: no cyanosis, edema or clubbing   Psych: Euthymic  Neuro: Pt is able to ambulate independently    Preventative Screening:   Vaccinations reviewed and up to date   Get up and Go test:  Not performed. Observed patient ambulating with no concerns  Additional Recommended Screening: Annual labs reviewed, no concerns     Assessment/ Plan:  Constipation, unspecified constipation type  Continue daily senna and MiraLAX, 1/2-1 capful daily or every other day for consistent bowel movements.  Could add in Colace if still struggling with constipation.     Borderline personality disorder (H)  Depressive disorder  Bipolar affective disorder, remission status unspecified (H)  Continue current management per psychiatry.     Uncomplicated asthma, unspecified asthma severity, unspecified whether persistent  Continue current management     Gastroesophageal reflux disease without esophagitis  Continue current management     Age-related osteoporosis without current pathological fracture  Continue current management     History of gastric bypass  Continue current management    RECOMMENDED FOLLOW UP:  2 months.    Total of 15 minutes was spent in face to face contact with patient with > 50% in counseling and coordination of care.  Options for treatment and/or follow-up care were reviewed with the patient. Bhavana Marr was engaged and actively involved in the decision  making process. She verbalized understanding of the options discussed and was satisfied with the final plan.      Rachel Hyman MD

## 2023-02-03 NOTE — PROGRESS NOTES
Jenaro Marr 1940 61 day eladia  . Wt 125   /90   R 16   T 97.7   O2 96% RA  Open area on scar tissue Right hip, we have been applying Bacitracin and bandaid daily.   Currently receives Acetaminiophen 650 mg TID - in January refused 2 pm dose 19 times.   Could we change her Acetaminiophen to BID?   And could we have parameters as to how much acetaminophen she can have daily?     Reported by LJRN/BTRN

## 2023-02-07 ENCOUNTER — NURSING HOME VISIT (OUTPATIENT)
Dept: FAMILY MEDICINE | Facility: CLINIC | Age: 83
End: 2023-02-07
Payer: COMMERCIAL

## 2023-02-07 ENCOUNTER — MEDICAL CORRESPONDENCE (OUTPATIENT)
Dept: HEALTH INFORMATION MANAGEMENT | Facility: CLINIC | Age: 83
End: 2023-02-07

## 2023-02-07 DIAGNOSIS — K21.9 GASTROESOPHAGEAL REFLUX DISEASE WITHOUT ESOPHAGITIS: ICD-10-CM

## 2023-02-07 DIAGNOSIS — F31.9 BIPOLAR AFFECTIVE DISORDER, REMISSION STATUS UNSPECIFIED (H): ICD-10-CM

## 2023-02-07 DIAGNOSIS — F60.3 BORDERLINE PERSONALITY DISORDER (H): ICD-10-CM

## 2023-02-07 DIAGNOSIS — L89.316 PRESSURE INJURY OF DEEP TISSUE OF RIGHT BUTTOCK: Primary | ICD-10-CM

## 2023-02-07 DIAGNOSIS — F32.A DEPRESSIVE DISORDER: ICD-10-CM

## 2023-02-07 DIAGNOSIS — Z00.00 ROUTINE CHECK-UP: ICD-10-CM

## 2023-02-07 DIAGNOSIS — J45.909 UNCOMPLICATED ASTHMA, UNSPECIFIED ASTHMA SEVERITY, UNSPECIFIED WHETHER PERSISTENT: ICD-10-CM

## 2023-02-07 DIAGNOSIS — M19.90 ARTHRITIS: ICD-10-CM

## 2023-02-07 PROCEDURE — 99308 SBSQ NF CARE LOW MDM 20: CPT | Mod: GC | Performed by: STUDENT IN AN ORGANIZED HEALTH CARE EDUCATION/TRAINING PROGRAM

## 2023-02-07 RX ORDER — ACETAMINOPHEN 325 MG/1
325-650 TABLET ORAL 2 TIMES DAILY
Qty: 90 TABLET | Refills: 4 | Status: ON HOLD | OUTPATIENT
Start: 2023-02-07 | End: 2023-05-31

## 2023-02-07 NOTE — PROGRESS NOTES
Subjective: Bhavana Marr is a 82 year old who is seen at home for follow up visit today.  Seen at [id number] [facility] [id number] .    Acute concerns today include: Open area on scar tissue on the right hip, daily applying bacitracin that the nursing staff.    Chronic and Past Medical Problems include:  Patient Active Problem List   Diagnosis     Arthritis     Depressive disorder     Borderline personality disorder (H)     GERD (gastroesophageal reflux disease)     Holosystolic murmur     Asthma     History of gastric bypass     Hyperlipidemia LDL goal <130     Other insomnia     Mild mitral regurgitation     Mild aortic stenosis     Weight loss     Bilateral low back pain without sciatica     Adhesive capsulitis of shoulder, right     Mild intermittent asthma, unspecified whether complicated     Bipolar affective disorder, remission status unspecified (H)     Constipation, unspecified constipation type     Age-related osteoporosis without current pathological fracture     Plantar warts     Family History     Problem (# of Occurrences) Relation (Name,Age of Onset)    Depression (4) Maternal Aunt (60), Maternal Uncle (85), Paternal Aunt, Paternal Uncle        Social History:     Support services: Nursing home  PMHX/PSHX/MEDS/ALLERGIES/SHX/FHX reviewed and updated in Epic.   MEDICATION LIST:  Current Outpatient Medications   Medication     Acetaminophen 325 MG CAPS     alendronate (FOSAMAX) 70 MG tablet     alum & mag hydroxide-simethicone (MYLANTA/MAALOX) 200-200-20 MG/5ML SUSP suspension     budesonide-formoterol (SYMBICORT) 80-4.5 MCG/ACT Inhaler     calcium carbonate (TUMS) 500 MG chewable tablet     Calcium Oyster Shell 500 MG TABS     capsaicin (ZOSTRIX) 0.025 % external cream     cholecalciferol (VITAMIN D3) 1000 UNIT tablet     Cyanocobalamin (B-12) 1000 MCG TBCR     famotidine (PEPCID) 20 MG tablet     FEROSUL 325 (65 Fe) MG tablet     fluticasone (FLONASE) 50 MCG/ACT nasal spray      guaiFENesin-dextromethorphan (ROBITUSSIN DM) 100-10 MG/5ML syrup     hydrOXYzine (ATARAX) 50 MG tablet     hypromellose (ARTIFICIAL TEARS) 0.5 % SOLN ophthalmic solution     loperamide (IMODIUM) 1 MG/5ML solution     magnesium hydroxide (MILK OF MAGNESIA) 400 MG/5ML suspension     mirtazapine (REMERON) 15 MG tablet     Multiple Vitamins-Minerals (CEROVITE SENIOR) TABS     Nutritional Supplements (ENSURE ORIGINAL) LIQD     OLANZapine (ZYPREXA) 2.5 MG tablet     OLANZapine (ZYPREXA) 5 MG tablet     polyethylene glycol (MIRALAX) 17 GM/Dose powder     salicylic acid (MEDIPLAST) 40 % miscellaneous     SENNA-docusate sodium (SENNA S) 8.6-50 MG tablet     SENNA-docusate sodium (SENNA S) 8.6-50 MG tablet     sertraline (ZOLOFT) 100 MG tablet     simvastatin (ZOCOR) 40 MG tablet     trolamine salicylate (ASPERCREME) 10 % external cream     valACYclovir (VALTREX) 1000 mg tablet     valACYclovir (VALTREX) 500 MG tablet     White Petrolatum ointment     No current facility-administered medications for this visit.     Medications are managed by:  NURSE  Patient has questions, concerns, or potential side effects/interactions from their medications:  No  GERIATRIC ROS:    Do you have difficulty getting around, watching TV or reading because of poor eyesight? No   Can you hear normal conversational voice?  No   Do you use hearing aides?  No   Do you have problems with your memory? Yes    Do you often feel sad or depressed? No   Have you unintentionally lost weight in the last 6 months?  No   Do you have trouble with control of your bladder? No   Do you have trouble with control of your bowels?  No   Have you had any falls in the past year?  No   How many? 0     GENERAL ROS:   General: No unexplained weight gain or loss; adequate sleep pattern.  Resp: No worsening shortness of breath, cough or hemoptysis.   GI: No worsening constipation, no diarrhea, no nausea or vomiting  : Voiding independently with no significant incontinence    Skin: No areas of new skin breakdown or worrisome rashes aside from plantar wart.  Extremities:  No pain, extremity weakness or balance troubles  Objective: . Wt 125   /90   R 16   T 97.7   O2 96% RA  Gen: Well nourished and in NAD   HEENT: Head is atraumatic, decent dentition  CV: RRR - no murmurs, rubs, or gallups,   Pulm: CTAB, no wheezes/rales/rhonchi, good air entry   ABD: soft, nontender, BS intact  Extrem: no cyanosis, edema or clubbing   Psych: Euthymic  Neuro: Pt is able to ambulate independently    Preventative Screening:   Vaccinations reviewed and up to date   Get up and Go test:  Not performed. Observed patient ambulating with no concerns  Additional Recommended Screening: Annual labs reviewed, no concerns    Assessment/ Plan:  Diagnoses and all orders for this visit:    Pressure injury of deep tissue of right buttock  Continue daily bacitracin and repositioning    Arthritis  -Tylenol 625 twice daily  -Tylenol 625 as needed    Borderline personality disorder (H)  Depressive disorder  Bipolar affective disorder, remission status unspecified (H)  Continue current management per psychiatric  Uncomplicated asthma, unspecified asthma severity, unspecified whether persistent  Continue current management      Gastroesophageal reflux disease without esophagitis  Continue current management  Routine check-up  Neck checkup in 2 months      RECOMMENDED FOLLOW UP:  2 months.    Total of 20 minutes was spent in face to face contact with patient with > 50% in counseling and coordination of care.  Options for treatment and/or follow-up care were reviewed with the patient. Bhavana Marr was engaged and actively involved in the decision making process. She verbalized understanding of the options discussed and was satisfied with the final plan.      Jorge Agee MD

## 2023-02-17 ENCOUNTER — TRANSFERRED RECORDS (OUTPATIENT)
Dept: HEALTH INFORMATION MANAGEMENT | Facility: CLINIC | Age: 83
End: 2023-02-17

## 2023-04-05 NOTE — PROGRESS NOTES
Jenaro Marr 1940 61 day appt   wt 125   /51   P 69   R 18   T 97.2   O2 90% RA  Has been struggling with depression d/t vomiting that sometimes lasts for days,   also complaining of constipation, could we increase Miralax from 2x weekly to 3x weekly?     Reported by Hoang/  TIA

## 2023-04-06 ENCOUNTER — NURSING HOME VISIT (OUTPATIENT)
Dept: FAMILY MEDICINE | Facility: CLINIC | Age: 83
End: 2023-04-06
Payer: COMMERCIAL

## 2023-04-06 DIAGNOSIS — R11.2 NAUSEA AND VOMITING, UNSPECIFIED VOMITING TYPE: Primary | ICD-10-CM

## 2023-04-06 PROCEDURE — 99309 SBSQ NF CARE MODERATE MDM 30: CPT | Mod: GC

## 2023-04-06 RX ORDER — ONDANSETRON 4 MG/1
4 FILM ORAL EVERY 4 HOURS PRN
Qty: 30 EACH | Refills: 1 | Status: ON HOLD | OUTPATIENT
Start: 2023-04-06 | End: 2023-05-31

## 2023-04-06 NOTE — PROGRESS NOTES
Subjective: Bhavana Marr is a 82 year old who is seen at home for follow up visit today.  Seen at [id number] [facility] [address] .    Acute concerns today include: Does not want to talk today.   Nursing report frequent nausea/vomtiing after eating. Request to increase miralaax to three times daily.   Chronic and Past Medical Problems include:  Patient Active Problem List   Diagnosis     Arthritis     Depressive disorder     Borderline personality disorder (H)     GERD (gastroesophageal reflux disease)     Holosystolic murmur     Asthma     History of gastric bypass     Hyperlipidemia LDL goal <130     Other insomnia     Mild mitral regurgitation     Mild aortic stenosis     Weight loss     Bilateral low back pain without sciatica     Adhesive capsulitis of shoulder, right     Mild intermittent asthma, unspecified whether complicated     Bipolar affective disorder, remission status unspecified (H)     Constipation, unspecified constipation type     Age-related osteoporosis without current pathological fracture     Plantar warts     Family History     Problem (# of Occurrences) Relation (Name,Age of Onset)    Depression (4) Maternal Aunt (60), Maternal Uncle (85), Paternal Aunt, Paternal Uncle        Social History:     Support services: Nursing home  PMHX/PSHX/MEDS/ALLERGIES/SHX/FHX reviewed and updated in Epic.   MEDICATION LIST:  Current Outpatient Medications   Medication     acetaminophen (TYLENOL) 325 MG tablet     alendronate (FOSAMAX) 70 MG tablet     alum & mag hydroxide-simethicone (MYLANTA/MAALOX) 200-200-20 MG/5ML SUSP suspension     budesonide-formoterol (SYMBICORT) 80-4.5 MCG/ACT Inhaler     calcium carbonate (TUMS) 500 MG chewable tablet     Calcium Oyster Shell 500 MG TABS     capsaicin (ZOSTRIX) 0.025 % external cream     cholecalciferol (VITAMIN D3) 1000 UNIT tablet     Cyanocobalamin (B-12) 1000 MCG TBCR     famotidine (PEPCID) 20 MG tablet     FEROSUL 325 (65 Fe) MG tablet      fluticasone (FLONASE) 50 MCG/ACT nasal spray     guaiFENesin-dextromethorphan (ROBITUSSIN DM) 100-10 MG/5ML syrup     hydrOXYzine (ATARAX) 50 MG tablet     hypromellose (ARTIFICIAL TEARS) 0.5 % SOLN ophthalmic solution     loperamide (IMODIUM) 1 MG/5ML solution     magnesium hydroxide (MILK OF MAGNESIA) 400 MG/5ML suspension     mirtazapine (REMERON) 15 MG tablet     Multiple Vitamins-Minerals (CEROVITE SENIOR) TABS     Nutritional Supplements (ENSURE ORIGINAL) LIQD     OLANZapine (ZYPREXA) 2.5 MG tablet     OLANZapine (ZYPREXA) 5 MG tablet     polyethylene glycol (MIRALAX) 17 GM/Dose powder     salicylic acid (MEDIPLAST) 40 % miscellaneous     SENNA-docusate sodium (SENNA S) 8.6-50 MG tablet     SENNA-docusate sodium (SENNA S) 8.6-50 MG tablet     sertraline (ZOLOFT) 100 MG tablet     simvastatin (ZOCOR) 40 MG tablet     trolamine salicylate (ASPERCREME) 10 % external cream     valACYclovir (VALTREX) 1000 mg tablet     valACYclovir (VALTREX) 500 MG tablet     White Petrolatum ointment     No current facility-administered medications for this visit.     Medications are managed by:  NURSE  Patient has questions, concerns, or potential side effects/interactions from their medications:  No  GERIATRIC ROS:    Do you have difficulty getting around, watching TV or reading because of poor eyesight? No   Can you hear normal conversational voice?  No   Do you use hearing aides?  No   Do you have problems with your memory? Yes    Do you often feel sad or depressed? No   Have you unintentionally lost weight in the last 6 months?  No   Do you have trouble with control of your bladder? No   Do you have trouble with control of your bowels?  No   Have you had any falls in the past year?  No   How many? 0     GENERAL ROS:   General: No unexplained weight gain or loss; adequate sleep pattern.  Resp: No worsening shortness of breath, cough or hemoptysis.   GI: No worsening constipation, no diarrhea, no nausea or vomiting  : Voiding  independently with no significant incontinence   Skin: No areas of new skin breakdown or worrisome rashes aside from plantar wart.  Extremities:  No pain, extremity weakness or balance troubles  Objective: . Wt 125   /90   R 16   T 97.7   O2 96% RA  Gen: Well nourished and in NAD   HEENT: Head is atraumatic, decent dentition  CV: RRR - no murmurs, rubs, or gallups,   Pulm: CTAB, no wheezes/rales/rhonchi, good air entry   ABD: soft, nontender, BS intact  Extrem: no cyanosis, edema or clubbing   Psych: Euthymic  Neuro: Pt is able to ambulate independently    Preventative Screening:   Vaccinations reviewed and up to date   Get up and Go test:  Not performed. Observed patient ambulating with no concerns  Additional Recommended Screening: Annual labs reviewed, no concerns    Assessment/ Plan:  Diagnoses and all orders for this visit:    Nausea and vomiting, unspecified vomiting type  -ondansetron (ZUPLENZ) 4 MG FILM, Take 4 mg by mouth every 4 hours as needed  -BMP     Constipation  -increase miralaax to 3 times daily     Pressure injury of deep tissue of right buttock  Continue daily bacitracin and repositioning    Arthritis  -Tylenol 625 twice daily  -Tylenol 625 as needed    Borderline personality disorder (H)  Depressive disorder  Bipolar affective disorder, remission status unspecified (H)  Continue current management per psychiatric  Uncomplicated asthma, unspecified asthma severity, unspecified whether persistent  Continue current management      Gastroesophageal reflux disease without esophagitis  Continue current management  Routine check-up  Neck checkup in 2 months      RECOMMENDED FOLLOW UP:  2 months.    Total of 20 minutes was spent in face to face contact with patient with > 50% in counseling and coordination of care.  Options for treatment and/or follow-up care were reviewed with the patient. Bhavana Marr was engaged and actively involved in the decision making process. She verbalized  understanding of the options discussed and was satisfied with the final plan.    Staffed with Dr. Lewis.     Ayden Chris DO

## 2023-04-14 ENCOUNTER — TRANSFERRED RECORDS (OUTPATIENT)
Dept: HEALTH INFORMATION MANAGEMENT | Facility: CLINIC | Age: 83
End: 2023-04-14
Payer: COMMERCIAL

## 2023-04-20 ENCOUNTER — HOSPITAL ENCOUNTER (OUTPATIENT)
Facility: CLINIC | Age: 83
End: 2023-04-20
Attending: INTERNAL MEDICINE | Admitting: INTERNAL MEDICINE
Payer: COMMERCIAL

## 2023-04-20 ENCOUNTER — HOSPITAL ENCOUNTER (EMERGENCY)
Facility: CLINIC | Age: 83
Discharge: HOME | End: 2023-04-20
Attending: PSYCHIATRY & NEUROLOGY | Admitting: PSYCHIATRY & NEUROLOGY
Payer: COMMERCIAL

## 2023-04-20 VITALS
TEMPERATURE: 98.7 F | RESPIRATION RATE: 18 BRPM | OXYGEN SATURATION: 97 % | HEART RATE: 73 BPM | SYSTOLIC BLOOD PRESSURE: 136 MMHG | DIASTOLIC BLOOD PRESSURE: 74 MMHG

## 2023-04-20 DIAGNOSIS — Z86.59 HISTORY OF BIPOLAR DISORDER: ICD-10-CM

## 2023-04-20 DIAGNOSIS — F43.23 ADJUSTMENT REACTION WITH ANXIETY AND DEPRESSION: ICD-10-CM

## 2023-04-20 PROCEDURE — 99285 EMERGENCY DEPT VISIT HI MDM: CPT | Mod: 25 | Performed by: PSYCHIATRY & NEUROLOGY

## 2023-04-20 PROCEDURE — 99284 EMERGENCY DEPT VISIT MOD MDM: CPT | Performed by: PSYCHIATRY & NEUROLOGY

## 2023-04-20 PROCEDURE — 90791 PSYCH DIAGNOSTIC EVALUATION: CPT

## 2023-04-20 PROCEDURE — 250N000013 HC RX MED GY IP 250 OP 250 PS 637: Performed by: PSYCHIATRY & NEUROLOGY

## 2023-04-20 RX ORDER — HYDROXYZINE HYDROCHLORIDE 25 MG/1
25 TABLET, FILM COATED ORAL ONCE
Status: COMPLETED | OUTPATIENT
Start: 2023-04-20 | End: 2023-04-20

## 2023-04-20 RX ADMIN — HYDROXYZINE HYDROCHLORIDE 25 MG: 25 TABLET, FILM COATED ORAL at 20:05

## 2023-04-20 ASSESSMENT — COLUMBIA-SUICIDE SEVERITY RATING SCALE - C-SSRS
REASONS FOR IDEATION LIFETIME: DOES NOT APPLY
1. IN THE PAST MONTH, HAVE YOU WISHED YOU WERE DEAD OR WISHED YOU COULD GO TO SLEEP AND NOT WAKE UP?: YES
TOTAL  NUMBER OF ABORTED OR SELF INTERRUPTED ATTEMPTS LIFETIME: NO
1. HAVE YOU WISHED YOU WERE DEAD OR WISHED YOU COULD GO TO SLEEP AND NOT WAKE UP?: YES
6. HAVE YOU EVER DONE ANYTHING, STARTED TO DO ANYTHING, OR PREPARED TO DO ANYTHING TO END YOUR LIFE?: NO
2. HAVE YOU ACTUALLY HAD ANY THOUGHTS OF KILLING YOURSELF?: NO
REASONS FOR IDEATION PAST MONTH: DOES NOT APPLY
ATTEMPT LIFETIME: NO
TOTAL  NUMBER OF INTERRUPTED ATTEMPTS LIFETIME: NO

## 2023-04-20 ASSESSMENT — ACTIVITIES OF DAILY LIVING (ADL)
ADLS_ACUITY_SCORE: 35

## 2023-04-20 NOTE — ED TRIAGE NOTES
BIBA from [facility], staff concerned that she has not been eating and has been making comments about not wanting to live anymore, seeming more depressed lately     Triage Assessment     Row Name 04/20/23 7308       Triage Assessment (Adult)    Airway WDL WDL       Respiratory WDL    Respiratory WDL WDL       Skin Circulation/Temperature WDL    Skin Circulation/Temperature WDL WDL       Cardiac WDL    Cardiac WDL WDL       Peripheral/Neurovascular WDL    Peripheral Neurovascular WDL WDL       Cognitive/Neuro/Behavioral WDL    Cognitive/Neuro/Behavioral WDL X;motor response    Level of Consciousness alert    Orientation oriented x 4    Speech slow    Mood/Behavior withdrawn       [place] Coma Scale    Best Eye Response 4-->(E4) spontaneous    Best Motor Response 6-->(M6) obeys commands    Best Verbal Response 5-->(V5) oriented    [place] Coma Scale Score 15

## 2023-04-20 NOTE — ED NOTES
Bed: ERI-CHAS  Expected date: 4/20/23  Expected time: 2:43 PM  Means of arrival:   Comments:  Bryant 413: 81 y/o, F, SI

## 2023-04-20 NOTE — ED PROVIDER NOTES
ED Provider Note  [facility]      History     Chief Complaint   Patient presents with     Suicidal     BIBA from [facility], staff concerned that she has not been eating, increased depression, and has been making comments about wishing to die and wanting to walk into traffic.      HPI  Bhavana Marr is a 82 year old female who is here from [facility] reporting that she was restless and pacing and staff got worried. Patient admits to feeling depressed but denies any thoughts of suicide. She currently sees her primary care provider for med management and denies having a therapist. She reports having trouble with her appetite primarily because of her gastric bypass history. She reports her restlessness is chronic. She inquires about how long the process will take as she would like to return to her facility.    Past Medical History  Past Medical History:   Diagnosis Date     Arthritis 1998     Asthma      Asthma 6/8/2018     Blood transfusion 2000     Borderline personality disorder (H)      Chronic sinus infection      Depressive disorder      GERD (gastroesophageal reflux disease)      History of blood transfusion      Hyperlipidemia      MDD (major depressive disorder)      Past Surgical History:   Procedure Laterality Date     ABDOMEN SURGERY      2 fatty tumors removed     APPENDECTOMY       BIOPSY       COLONOSCOPY       GASTRIC BYPASS       GI SURGERY  2000    gastric bypass     GYN SURGERY      complete hysterectomy     [facility] CORRECT BUNION,DOUBLE OSTEOTOMY      Description: Hallux Valgus (Bunion) Correction;  Recorded: 05/07/2012;     ORTHOPEDIC SURGERY      both hip,two foot surgies on each foot     ORTHOPEDIC SURGERY      right elbow     [facility] APPENDECTOMY      Description: Appendectomy;  Recorded: 05/07/2012;     [facility] TOTAL ABDOM HYSTERECTOMY      Description: Total Abdominal Hysterectomy;  Recorded: 05/07/2012;     acetaminophen (TYLENOL) 325 MG  tablet  alendronate (FOSAMAX) 70 MG tablet  alum & mag hydroxide-simethicone (MYLANTA/MAALOX) 200-200-20 MG/5ML SUSP suspension  budesonide-formoterol (SYMBICORT) 80-4.5 MCG/ACT Inhaler  calcium carbonate (TUMS) 500 MG chewable tablet  Calcium Oyster Shell 500 MG TABS  capsaicin (ZOSTRIX) 0.025 % external cream  cholecalciferol (VITAMIN D3) 1000 UNIT tablet  Cyanocobalamin (B-12) 1000 MCG TBCR  famotidine (PEPCID) 20 MG tablet  FEROSUL 325 (65 Fe) MG tablet  fluticasone (FLONASE) 50 MCG/ACT nasal spray  guaiFENesin-dextromethorphan (ROBITUSSIN DM) 100-10 MG/5ML syrup  hydrOXYzine (ATARAX) 50 MG tablet  hypromellose (ARTIFICIAL TEARS) 0.5 % SOLN ophthalmic solution  loperamide (IMODIUM) 1 MG/5ML solution  magnesium hydroxide (MILK OF MAGNESIA) 400 MG/5ML suspension  mirtazapine (REMERON) 15 MG tablet  Multiple Vitamins-Minerals (CEROVITE SENIOR) TABS  Nutritional Supplements (ENSURE ORIGINAL) LIQD  OLANZapine (ZYPREXA) 2.5 MG tablet  OLANZapine (ZYPREXA) 5 MG tablet  ondansetron (ZUPLENZ) 4 MG FILM  polyethylene glycol (MIRALAX) 17 GM/Dose powder  salicylic acid (MEDIPLAST) 40 % miscellaneous  SENNA-docusate sodium (SENNA S) 8.6-50 MG tablet  SENNA-docusate sodium (SENNA S) 8.6-50 MG tablet  sertraline (ZOLOFT) 100 MG tablet  simvastatin (ZOCOR) 40 MG tablet  trolamine salicylate (ASPERCREME) 10 % external cream  valACYclovir (VALTREX) 1000 mg tablet  valACYclovir (VALTREX) 500 MG tablet  White Petrolatum ointment      Allergies   Allergen Reactions     Nsaids      Gastric bypass surgery       Family History  Family History   Problem Relation Age of Onset     Depression Maternal Aunt      Depression Maternal Uncle      Depression Paternal Aunt      Depression Paternal Uncle      Social History   Social History     Tobacco Use     Smoking status: Never     Smokeless tobacco: Never   Substance Use Topics     Alcohol use: No     Drug use: No         A medically appropriate review of systems was performed with pertinent  positives and negatives noted in the HPI, and all other systems negative.    Physical Exam   BP: (!) 160/78  Pulse: 77  Temp: 98.7  F (37.1  C)  Resp: 18  SpO2: 93 %  Physical Exam  Vitals and nursing note reviewed.   HENT:      Head: Normocephalic.   Eyes:      Pupils: Pupils are equal, round, and reactive to light.   Pulmonary:      Effort: Pulmonary effort is normal.   Musculoskeletal:         General: Normal range of motion.      Cervical back: Normal range of motion.   Neurological:      General: No focal deficit present.      Mental Status: She is alert.   Psychiatric:         Attention and Perception: Attention normal. She does not perceive auditory or visual hallucinations.         Mood and Affect: Affect normal. Mood is anxious.         Speech: Speech normal.         Behavior: Behavior normal. Behavior is not agitated, aggressive, hyperactive or combative. Behavior is cooperative.         Thought Content: Thought content normal. Thought content is not paranoid or delusional. Thought content does not include homicidal or suicidal ideation.         Cognition and Memory: Cognition and memory normal.         Judgment: Judgment normal.           ED Course, Procedures, & Data      Procedures       ED Course Selections: A consult was attained from the  DEC service. The case was discussed with the  from that service. The consulting service's recommendations were provided at 7:00 PM. 10 minutes spent discussing case, care and disposition.    10 minutes spent reviewing prior records.       No results found for any visits on 04/20/23.  Medications   hydrOXYzine (ATARAX) tablet 25 mg (has no administration in time range)     Labs Ordered and Resulted from Time of ED Arrival to Time of ED Departure - No data to display  No orders to display          Critical care was not performed.     Medical Decision Making  The patient's presentation was of moderate complexity (a chronic illness mild to moderate  exacerbation, progression, or side effect of treatment).    The patient's evaluation involved:  an assessment requiring an independent historian (see separate area of note for details)  review of external note(s) from 2 sources (see separate area of note for details)    The patient's management necessitated moderate risk (limitations due to social determinants of health (see separate area of note for details)) and high risk (a decision regarding hospitalization).      Assessment & Plan    Patient is here for a mental health assessment. She resides in an Assisted Living Facility. She has history of depression. She has been feeling restless and was pacing. Staff felt she was depressed and that she has not been eating. Patient reports she has history of gastric bypass and her appetite has always been poor. She denies any thoughts of suicide.    Patient appears at baseline. She can be discharged back to her facility. I do not find her holdable. She is encouraged to follow-up established care and services.    I have reviewed the nursing notes. I have reviewed the findings, diagnosis, plan and need for follow up with the patient.    New Prescriptions    No medications on file       Final diagnoses:   Adjustment reaction with anxiety and depression   History of bipolar disorder       Martin Corbin MD  [facility] EMERGENCY DEPARTMENT  4/20/2023     Martin Corbin MD  04/20/23 1932

## 2023-04-20 NOTE — CONSULTS
Diagnostic Evaluation Consultation  Crisis Assessment    Patient was assessed: In Person  Patient location: [facility] ED  Was a release of information signed: Yes. Providers included on the release: Lindsey by kaylin Marr      Referral Data and Chief Complaint  Pt is a 82 year old female who uses she/her pronouns, and presents to the ED via EMS. Patient is referred to the ED by community provider(s). Patient is presenting to the ED for the following concerns: suicidal.      Informed Consent and Assessment Methods     Patient is her own guardian. Writer met with patient and explained the crisis assessment process, including applicable information disclosures and limits to confidentiality, assessed understanding of the process, and obtained consent to proceed with the assessment. Patient was observed to be able to participate in the assessment as evidenced by engaged. Assessment methods included conducting a formal interview with patient, review of medical records, collaboration with medical staff, and obtaining relevant collateral information from family and community providers when available..     Over the course of this crisis assessment provided reassurance, offered validation, engaged patient in problem solving and disposition planning, worked with patient on safety and aftercare planning and provided psychoeducation.      Summary of Patient Situation     Pt comes to ED by ambulance from nursing care facility due to increased symptoms of depression and expressed suicidal thoughts.  Pt has significant past history of bipolar disorder, as well as noted history of Borderline Personality D/O and MDD, currently also includes some cognitive impairment secondary to dementia.  Pt has resided in the current [facility] facility since 2018, all residents recently moved to a new facility March of 2023 which involved a physical plant change from crowded narrow space and roommates to a much larger and new space where residents had  "their own room and bathroom.  Pt had commented that she gutierrez snot like the new place as the old one felt like family.  Staff note pt has been more isolative in room, not engaging in activities as she used to, not out talking with peers and staff, giving them hugs, not shuns them or doesn't engage.  Pt has been eating less, this past week, hardly eating and when she does, she is nauseous.  She has appeared more depressed and has been making comments about suicide, stating she could walk down the driveway and into traffic.  Of note, the facility is kept locked, pt has a wander guard and her gait is slow and unsteady.  Pt tells this writer she is not suicidal, she admits she is \"skinny\" which is a result of gastric surgery 20+ years ago.  She has recently been seen by GI consultant and has further scheduled GI testing.  Pt admits she has bipolar, reports she takes her medications as prescribed which staff confirm.  She admits to being restless, says she often paces in her room, says she is not very active now in home activities, but mentions she likes a dice game called \"6542\" \"but it's not gambling!\"  She denies violent ideation or intent, no suspected drug or alcohol use, denies symptoms of psychosis.  Pt would like to return to nursing home, staff wonder if admission would be warranted which it is not at this time.  Pt signed release for  who can address pt's emerging adjustment issues to the new setting.    Brief Psychosocial History     Pt reports she was raised in [place], says that her real bio-mother was her aunt, but was placed her her aunt's sister at age 3 and \"raised like I was their own\", had a falling out with them in her middle years because \"they weren't my real family\".  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  She reports moving into " the care center about 5 years ago from her own apartment.  No legal issues, she is her own guardian. Family history of depression noted in the records.    Significant Clinical History     Pt reports long history of bipolar and depression, reports she has past [facility] aside from a series of three [facility] admission October 11, October 22 and November 16 all in 2017), [facility] staff indicate pt admitted to them in March 6, 2018 from an inpatient facility - there was also an [facility] medical admission after November for hip related surgery.     Collateral Information    [facility] staff Olesya by phone ([phone], [phone]), content embedded in the narrative     Risk Assessment    [place] Suicide Severity Rating Scale Full Clinical Version: 4/20/2023  Suicidal Ideation  1. Wish to be Dead (Lifetime): Yes  1. Wish to be Dead (Past 1 Month): Yes  2. Non-Specific Active Suicidal Thoughts (Lifetime): No  Intensity of Ideation  Most Severe Ideation Rating (Lifetime): 2  Most Severe Ideation Rating (Past 1 Month): 2  Frequency (Lifetime): Less than once a week  Frequency (Past 1 Month): Less than once a week  Duration (Lifetime): Fleeting, few seconds or minutes  Duration (Past 1 Month): Fleeting, few seconds or minutes  Controllability (Lifetime): Can control thoughts with little difficulty  Controllability (Past 1 Month): Can control thoughts with little difficulty  Deterrents (Lifetime): Deterrents probably stopped you  Deterrents (Past 1 Month): Deterrents probably stopped you  Reasons for Ideation (Lifetime): Does not apply  Reasons for Ideation (Past 1 Month): Does not apply  Suicidal Behavior  Actual Attempt (Lifetime): No  Has subject engaged in non-suicidal self-injurious behavior? (Lifetime): No  Interrupted Attempts (Lifetime): No  Aborted or Self-Interrupted Attempt (Lifetime): No  Preparatory Acts or Behavior (Lifetime): No  C-SSRS Risk  (Lifetime/Recent)  Calculated C-SSRS Risk Score (Lifetime/Recent): Low Risk      Validity of evaluation is not impacted by presenting factors during interview .   Comments regarding subjective versus objective responses to [place]at[place] tool: congruent  Environmental or Psychosocial Events: other life stressors, neither working nor attending school and social isolation  Chronic Risk Factors: history of psychiatric hospitalization, serious, persistent mental illness and personality disorders   Warning Signs: withdrawing from friends, family, and society and recent losses (physical, financial, personal)  Protective Factors: lives in a responsibly safe and stable environment and supportive ongoing medical and mental health care relationships  Interpretation of Risk Scoring, Risk Mitigation Interventions and Safety Plan:  low         Does the patient have thoughts of harming others? No     Is the patient engaging in sexually inappropriate behavior?  no        Current Substance Abuse     Is there recent substance abuse? no     Was a urine drug screen or blood alcohol level obtained: No       Mental Status Exam     Affect: Appropriate   Appearance: Appropriate    Attention Span/Concentration: Attentive  Eye Contact: Engaged   Fund of Knowledge: Appropriate    Language /Speech Content: Fluent   Language /Speech Volume: Normal    Language /Speech Rate/Productions: Articulate    Recent Memory: Variable   Remote Memory: Variable   Mood: Anxious    Orientation to Person: Yes    Orientation to Place: Yes   Orientation to Time of Day: Yes    Orientation to Date: Yes    Situation (Do they understand why they are here?): Yes    Psychomotor Behavior: Normal    Thought Content: Clear   Thought Form: Goal Directed      History of commitment: No       Medication    Psychotropic medications: Remeron, Zyprexa, Zoloft, Hydroxyzine  Medication changes made in the last two weeks: No       Current Care Team    Primary Care Provider: Inder [facility]  MD Richar [facility]                                        MD Richar M[facility]quez [facility]  Psychiatrist: No  Therapist: No  : [facility] [phone]     Other: [facility] [phone]      Diagnosis    Adjustment D/O mixed anxiety and depression F43.23  Dementia F02.8  Bipolar, unspecified F31.9 by history    Clinical Summary and Substantiation of Recommendations    Pt presents for assessment of suicidal behavior, no acute risk at this time, recommend completion of medication evaluation, discharge, take medications as prescribed, keep scheduled appointments, utilize community crisis services or ED if symptoms worsen.     Disposition    Recommended disposition: Residential Treatment: [facility]       Reviewed case and recommendations with attending provider. Attending Name: Dr. Corbin       Attending concurs with disposition: Yes       Patient and/or validated legal guardian concurs with disposition: Yes       Final disposition: Residential treatment: [facility].       Outpatient Details (if applicable):   Aftercare plan and appointments placed in the AVS and provided to patient: Yes. Given to patient by LMHP orally and by RN in writing      Assessment Details    Patient interview started at: 1900 and completed at: 2000.     Total duration spent on the patient case in minutes: 1.0 hrs      CPT code(s) utilized: 53645 - Psychotherapy for Crisis - 60 (30-74*) min       FERNANDO Price, [facility]  DEC - Triage & Transition Services  Callback: [phone]

## 2023-04-21 NOTE — DISCHARGE INSTRUCTIONS
Aftercare Plan  If I am feeling unsafe or I am in a crisis, I will:   Contact my established care providers   Call the National Suicide Prevention Lifeline: 988  Go to the nearest emergency room   Call 911     Warning signs that I or other people might notice when a crisis is developing for me: Active suicidal planning or intent    Things I am able to do on my own or with others to cope or help me feel better: Talk to staff, take medications as prescribed, keep scheduled appointments, utilize community crisis services or ED if symptoms worsen.     Your [facility] has a mental health crisis team you can call 24/7: [facility] Mobile Crisis  [phone]         Crisis Lines    Crisis Text Line  Text [phone]  You will be connected with a trained live crisis counselor to provide support.    Additional Information    Today you were seen by a licensed mental health professional through Triage and Transition services, [facility] ([facility])  for a crisis assessment in the Emergency Department at [facility].  It is recommended that you follow up with your established providers (psychiatrist, mental health therapist, and/or primary care doctor - as relevant) as soon as possible. Coordinators from [facility] will be calling you in the next 24-48 hours to ensure that you have the resources you need.  You can also contact [facility] coordinators directly at [phone]. You may have been scheduled for or offered an appointment with a mental health provider. [facility] maintains an extensive network of licensed behavioral health providers to connect patients with the services they need.  We do not charge providers a fee to participate in our referral network.  We match patients with providers based on a patient's specific needs, insurance coverage, and location.  Our first effort will be to refer you to a provider within your care system, and will utilize providers outside your care system as needed.

## 2023-05-03 NOTE — PROGRESS NOTES
Jenaro Marr 1940 [facility] Return   /63   P 75  R 16   T 97.0   O2 96% RA  She was sent to the hospital 4/21 for suicidal ideation and refusal to eat/drink/take meds  . She returned on 4/28 against our wishes with decreased psych meds and no resolution of her symptoms.   They have a dx of dementia with psychosis.   She is more paranoid now than when she left here, very delusional thoughts, keeps asking if she is going to correction, denies that she was in the hospital,   had wandered into another resident's room on Saturday and sustained a fall with head strike.   She is currently refusing to eat/drink/take meds.  The only thing that I received from the hospital is that she was hyponatremic and the ordered Sodium 1 gram daily   and that an xray showed a large amount of stool but no obstruction.     Reported by Hoang/  TIA

## 2023-05-04 ENCOUNTER — NURSING HOME VISIT (OUTPATIENT)
Dept: FAMILY MEDICINE | Facility: CLINIC | Age: 83
End: 2023-05-04
Payer: COMMERCIAL

## 2023-05-04 DIAGNOSIS — Z09 HOSPITAL DISCHARGE FOLLOW-UP: Primary | ICD-10-CM

## 2023-05-04 DIAGNOSIS — E87.1 HYPONATREMIA: ICD-10-CM

## 2023-05-04 DIAGNOSIS — Z78.9 NURSING HOME RESIDENT: ICD-10-CM

## 2023-05-04 PROCEDURE — 99309 SBSQ NF CARE MODERATE MDM 30: CPT | Mod: GC

## 2023-05-04 RX ORDER — SIMETHICONE 40MG/0.6ML
40 SUSPENSION, DROPS(FINAL DOSAGE FORM)(ML) ORAL 4 TIMES DAILY PRN
Status: CANCELLED | OUTPATIENT
Start: 2023-05-04

## 2023-05-05 ENCOUNTER — APPOINTMENT (OUTPATIENT)
Dept: ULTRASOUND IMAGING | Facility: CLINIC | Age: 83
DRG: 644 | End: 2023-05-05
Attending: STUDENT IN AN ORGANIZED HEALTH CARE EDUCATION/TRAINING PROGRAM
Payer: COMMERCIAL

## 2023-05-05 ENCOUNTER — MEDICAL CORRESPONDENCE (OUTPATIENT)
Dept: HEALTH INFORMATION MANAGEMENT | Facility: CLINIC | Age: 83
End: 2023-05-05

## 2023-05-05 ENCOUNTER — HOSPITAL ENCOUNTER (INPATIENT)
Facility: CLINIC | Age: 83
LOS: 31 days | Discharge: PSYCHIATRIC OR SUBSTANCE USE FACILITY | DRG: 644 | End: 2023-06-07
Attending: STUDENT IN AN ORGANIZED HEALTH CARE EDUCATION/TRAINING PROGRAM | Admitting: INTERNAL MEDICINE
Payer: COMMERCIAL

## 2023-05-05 ENCOUNTER — TELEPHONE (OUTPATIENT)
Dept: FAMILY MEDICINE | Facility: CLINIC | Age: 83
End: 2023-05-05
Payer: COMMERCIAL

## 2023-05-05 DIAGNOSIS — E16.2 HYPOGLYCEMIA: ICD-10-CM

## 2023-05-05 DIAGNOSIS — I95.9 HYPOTENSION, UNSPECIFIED HYPOTENSION TYPE: ICD-10-CM

## 2023-05-05 DIAGNOSIS — F32.A DEPRESSIVE DISORDER: Primary | ICD-10-CM

## 2023-05-05 DIAGNOSIS — R62.7 FAILURE TO THRIVE IN ADULT: ICD-10-CM

## 2023-05-05 DIAGNOSIS — F31.9 BIPOLAR AFFECTIVE DISORDER, REMISSION STATUS UNSPECIFIED (H): ICD-10-CM

## 2023-05-05 DIAGNOSIS — F60.3 BORDERLINE PERSONALITY DISORDER (H): ICD-10-CM

## 2023-05-05 LAB
ALBUMIN SERPL BCG-MCNC: 3 G/DL (ref 3.5–5.2)
ALBUMIN UR-MCNC: 10 MG/DL
ALP SERPL-CCNC: 50 U/L (ref 35–104)
ALT SERPL W P-5'-P-CCNC: 9 U/L (ref 10–35)
ANION GAP SERPL CALCULATED.3IONS-SCNC: 10 MMOL/L (ref 7–15)
APPEARANCE UR: CLEAR
AST SERPL W P-5'-P-CCNC: 20 U/L (ref 10–35)
BASOPHILS # BLD AUTO: 0 10E3/UL (ref 0–0.2)
BASOPHILS NFR BLD AUTO: 0 %
BILIRUB SERPL-MCNC: 0.2 MG/DL
BILIRUB UR QL STRIP: NEGATIVE
BUN SERPL-MCNC: 19.5 MG/DL (ref 8–23)
CALCIUM SERPL-MCNC: 8.8 MG/DL (ref 8.8–10.2)
CHLORIDE SERPL-SCNC: 100 MMOL/L (ref 98–107)
COLOR UR AUTO: YELLOW
CREAT SERPL-MCNC: 0.67 MG/DL (ref 0.51–0.95)
DEPRECATED HCO3 PLAS-SCNC: 23 MMOL/L (ref 22–29)
EOSINOPHIL # BLD AUTO: 0.1 10E3/UL (ref 0–0.7)
EOSINOPHIL NFR BLD AUTO: 1 %
ERYTHROCYTE [DISTWIDTH] IN BLOOD BY AUTOMATED COUNT: 12.7 % (ref 10–15)
GFR SERPL CREATININE-BSD FRML MDRD: 87 ML/MIN/1.73M2
GLUCOSE BLDC GLUCOMTR-MCNC: 104 MG/DL (ref 70–99)
GLUCOSE BLDC GLUCOMTR-MCNC: 88 MG/DL (ref 70–99)
GLUCOSE SERPL-MCNC: 109 MG/DL (ref 70–99)
GLUCOSE UR STRIP-MCNC: NEGATIVE MG/DL
HCT VFR BLD AUTO: 36.5 % (ref 35–47)
HGB BLD-MCNC: 11.9 G/DL (ref 11.7–15.7)
HGB UR QL STRIP: NEGATIVE
IMM GRANULOCYTES # BLD: 0 10E3/UL
IMM GRANULOCYTES NFR BLD: 0 %
KETONES UR STRIP-MCNC: 60 MG/DL
LEUKOCYTE ESTERASE UR QL STRIP: NEGATIVE
LIPASE SERPL-CCNC: 84 U/L (ref 13–60)
LYMPHOCYTES # BLD AUTO: 1.7 10E3/UL (ref 0.8–5.3)
LYMPHOCYTES NFR BLD AUTO: 18 %
MCH RBC QN AUTO: 30.4 PG (ref 26.5–33)
MCHC RBC AUTO-ENTMCNC: 32.6 G/DL (ref 31.5–36.5)
MCV RBC AUTO: 93 FL (ref 78–100)
MONOCYTES # BLD AUTO: 0.5 10E3/UL (ref 0–1.3)
MONOCYTES NFR BLD AUTO: 5 %
MUCOUS THREADS #/AREA URNS LPF: PRESENT /LPF
NEUTROPHILS # BLD AUTO: 7 10E3/UL (ref 1.6–8.3)
NEUTROPHILS NFR BLD AUTO: 76 %
NITRATE UR QL: NEGATIVE
NRBC # BLD AUTO: 0 10E3/UL
NRBC BLD AUTO-RTO: 0 /100
PH UR STRIP: 5.5 [PH] (ref 5–7)
PLATELET # BLD AUTO: 314 10E3/UL (ref 150–450)
POTASSIUM SERPL-SCNC: 3.9 MMOL/L (ref 3.4–5.3)
PROT SERPL-MCNC: 5.3 G/DL (ref 6.4–8.3)
RBC # BLD AUTO: 3.92 10E6/UL (ref 3.8–5.2)
RBC URINE: 0 /HPF
SODIUM SERPL-SCNC: 133 MMOL/L (ref 136–145)
SP GR UR STRIP: 1.03 (ref 1–1.03)
SQUAMOUS EPITHELIAL: <1 /HPF
UROBILINOGEN UR STRIP-MCNC: 2 MG/DL
WBC # BLD AUTO: 9.3 10E3/UL (ref 4–11)
WBC URINE: 2 /HPF

## 2023-05-05 PROCEDURE — 96361 HYDRATE IV INFUSION ADD-ON: CPT

## 2023-05-05 PROCEDURE — 82962 GLUCOSE BLOOD TEST: CPT

## 2023-05-05 PROCEDURE — 99285 EMERGENCY DEPT VISIT HI MDM: CPT | Mod: 25

## 2023-05-05 PROCEDURE — 250N000011 HC RX IP 250 OP 636: Performed by: EMERGENCY MEDICINE

## 2023-05-05 PROCEDURE — 36415 COLL VENOUS BLD VENIPUNCTURE: CPT | Performed by: EMERGENCY MEDICINE

## 2023-05-05 PROCEDURE — 85025 COMPLETE CBC W/AUTO DIFF WBC: CPT | Performed by: EMERGENCY MEDICINE

## 2023-05-05 PROCEDURE — 96374 THER/PROPH/DIAG INJ IV PUSH: CPT

## 2023-05-05 PROCEDURE — G0378 HOSPITAL OBSERVATION PER HR: HCPCS

## 2023-05-05 PROCEDURE — 80053 COMPREHEN METABOLIC PANEL: CPT | Performed by: STUDENT IN AN ORGANIZED HEALTH CARE EDUCATION/TRAINING PROGRAM

## 2023-05-05 PROCEDURE — 83690 ASSAY OF LIPASE: CPT | Performed by: INTERNAL MEDICINE

## 2023-05-05 PROCEDURE — 80053 COMPREHEN METABOLIC PANEL: CPT | Performed by: EMERGENCY MEDICINE

## 2023-05-05 PROCEDURE — 258N000003 HC RX IP 258 OP 636: Performed by: INTERNAL MEDICINE

## 2023-05-05 PROCEDURE — 93971 EXTREMITY STUDY: CPT | Mod: RT

## 2023-05-05 PROCEDURE — 81001 URINALYSIS AUTO W/SCOPE: CPT | Performed by: STUDENT IN AN ORGANIZED HEALTH CARE EDUCATION/TRAINING PROGRAM

## 2023-05-05 PROCEDURE — 258N000003 HC RX IP 258 OP 636: Performed by: STUDENT IN AN ORGANIZED HEALTH CARE EDUCATION/TRAINING PROGRAM

## 2023-05-05 PROCEDURE — 90791 PSYCH DIAGNOSTIC EVALUATION: CPT

## 2023-05-05 PROCEDURE — 96360 HYDRATION IV INFUSION INIT: CPT

## 2023-05-05 PROCEDURE — 85025 COMPLETE CBC W/AUTO DIFF WBC: CPT | Performed by: STUDENT IN AN ORGANIZED HEALTH CARE EDUCATION/TRAINING PROGRAM

## 2023-05-05 PROCEDURE — 99223 1ST HOSP IP/OBS HIGH 75: CPT | Performed by: INTERNAL MEDICINE

## 2023-05-05 RX ORDER — AMOXICILLIN 250 MG
1 CAPSULE ORAL 2 TIMES DAILY PRN
Status: DISCONTINUED | OUTPATIENT
Start: 2023-05-05 | End: 2023-06-07 | Stop reason: HOSPADM

## 2023-05-05 RX ORDER — POLYETHYLENE GLYCOL 3350 17 G/17G
17 POWDER, FOR SOLUTION ORAL DAILY PRN
Status: DISCONTINUED | OUTPATIENT
Start: 2023-05-05 | End: 2023-06-07 | Stop reason: HOSPADM

## 2023-05-05 RX ORDER — DEXTROSE MONOHYDRATE 25 G/50ML
25-50 INJECTION, SOLUTION INTRAVENOUS
Status: DISCONTINUED | OUTPATIENT
Start: 2023-05-05 | End: 2023-06-07 | Stop reason: HOSPADM

## 2023-05-05 RX ORDER — POLYETHYLENE GLYCOL 3350 17 G/17G
17 POWDER, FOR SOLUTION ORAL DAILY PRN
COMMUNITY
End: 2023-08-10

## 2023-05-05 RX ORDER — ONDANSETRON 2 MG/ML
4 INJECTION INTRAMUSCULAR; INTRAVENOUS ONCE
Status: COMPLETED | OUTPATIENT
Start: 2023-05-05 | End: 2023-05-05

## 2023-05-05 RX ORDER — ACETAMINOPHEN 325 MG/1
650 TABLET ORAL EVERY 6 HOURS PRN
Status: DISCONTINUED | OUTPATIENT
Start: 2023-05-05 | End: 2023-06-07 | Stop reason: HOSPADM

## 2023-05-05 RX ORDER — ACETAMINOPHEN 325 MG/1
650 TABLET ORAL EVERY 6 HOURS PRN
Status: ON HOLD | COMMUNITY
End: 2023-05-31

## 2023-05-05 RX ORDER — SODIUM CHLORIDE 9 MG/ML
INJECTION, SOLUTION INTRAVENOUS CONTINUOUS
Status: DISCONTINUED | OUTPATIENT
Start: 2023-05-05 | End: 2023-05-08

## 2023-05-05 RX ORDER — AMOXICILLIN 250 MG
2 CAPSULE ORAL 2 TIMES DAILY PRN
Status: DISCONTINUED | OUTPATIENT
Start: 2023-05-05 | End: 2023-06-07 | Stop reason: HOSPADM

## 2023-05-05 RX ORDER — ONDANSETRON 2 MG/ML
4 INJECTION INTRAMUSCULAR; INTRAVENOUS EVERY 6 HOURS PRN
Status: DISCONTINUED | OUTPATIENT
Start: 2023-05-05 | End: 2023-06-07 | Stop reason: HOSPADM

## 2023-05-05 RX ORDER — ONDANSETRON 4 MG/1
4 TABLET, ORALLY DISINTEGRATING ORAL EVERY 6 HOURS PRN
Status: DISCONTINUED | OUTPATIENT
Start: 2023-05-05 | End: 2023-06-07 | Stop reason: HOSPADM

## 2023-05-05 RX ORDER — ACETAMINOPHEN 650 MG/1
650 SUPPOSITORY RECTAL EVERY 6 HOURS PRN
Status: DISCONTINUED | OUTPATIENT
Start: 2023-05-05 | End: 2023-06-07 | Stop reason: HOSPADM

## 2023-05-05 RX ORDER — ALBUTEROL SULFATE 90 UG/1
2 AEROSOL, METERED RESPIRATORY (INHALATION) EVERY 4 HOURS PRN
Status: DISCONTINUED | OUTPATIENT
Start: 2023-05-05 | End: 2023-06-07 | Stop reason: HOSPADM

## 2023-05-05 RX ORDER — POLYETHYLENE GLYCOL 3350 17 G/17G
17 POWDER, FOR SOLUTION ORAL DAILY
COMMUNITY
End: 2023-08-10

## 2023-05-05 RX ORDER — NICOTINE POLACRILEX 4 MG
15-30 LOZENGE BUCCAL
Status: DISCONTINUED | OUTPATIENT
Start: 2023-05-05 | End: 2023-06-07 | Stop reason: HOSPADM

## 2023-05-05 RX ORDER — SODIUM CHLORIDE 9 MG/ML
INJECTION, SOLUTION INTRAVENOUS CONTINUOUS
Status: CANCELLED | OUTPATIENT
Start: 2023-05-05

## 2023-05-05 RX ORDER — PROCHLORPERAZINE 25 MG
12.5 SUPPOSITORY, RECTAL RECTAL EVERY 12 HOURS PRN
Status: DISCONTINUED | OUTPATIENT
Start: 2023-05-05 | End: 2023-06-07 | Stop reason: HOSPADM

## 2023-05-05 RX ORDER — PROCHLORPERAZINE MALEATE 5 MG
5 TABLET ORAL EVERY 6 HOURS PRN
Status: DISCONTINUED | OUTPATIENT
Start: 2023-05-05 | End: 2023-06-07 | Stop reason: HOSPADM

## 2023-05-05 RX ADMIN — ONDANSETRON 4 MG: 2 INJECTION INTRAMUSCULAR; INTRAVENOUS at 21:22

## 2023-05-05 RX ADMIN — DEXTROSE AND SODIUM CHLORIDE: 5; 900 INJECTION, SOLUTION INTRAVENOUS at 21:24

## 2023-05-05 RX ADMIN — SODIUM CHLORIDE 1000 ML: 9 INJECTION, SOLUTION INTRAVENOUS at 17:59

## 2023-05-05 ASSESSMENT — COLUMBIA-SUICIDE SEVERITY RATING SCALE - C-SSRS
2. HAVE YOU ACTUALLY HAD ANY THOUGHTS OF KILLING YOURSELF?: YES
1. SINCE LAST CONTACT, HAVE YOU WISHED YOU WERE DEAD OR WISHED YOU COULD GO TO SLEEP AND NOT WAKE UP?: YES
5. HAVE YOU STARTED TO WORK OUT OR WORKED OUT THE DETAILS OF HOW TO KILL YOURSELF? DO YOU INTEND TO CARRY OUT THIS PLAN?: YES

## 2023-05-05 ASSESSMENT — ACTIVITIES OF DAILY LIVING (ADL)
ADLS_ACUITY_SCORE: 35
ADLS_ACUITY_SCORE: 47

## 2023-05-05 NOTE — ED NOTES
Patient incontinent of stool.  She was cleaned up and placed in a fresh pad.  Straight cath urine obtained.

## 2023-05-05 NOTE — PROGRESS NOTES
Assessment & Plan     Hospital discharge follow-up  Nursing home resident  Hyponatremia  Patient was hospitalized from 4/21 - 4/28 for suicidal ideation and refusing to eat/drink/take meds.  Also found to be hyponatremic and their work-up.  Returned to the nursing home from the hospital in a similar if not worse state of paranoia.  Staff reported that she was found to have covered herself in her room and feces during a manic episode.  Today she is lying in bed with more depressed and flat affect.  Sodium levels in the hospital or down to 131 is not that severe but may be worsened by her sertraline.  We will repeat a BMP and reach out to her psychiatrist to suggest changing the dose of her sertraline in setting of hyponatremia.  - BMP         Follow up in 1 month for routine nursing home assessment.     No follow-ups on file.    Harshal Cuevas MD  [facility]    Jose Munguia is a 82 year old, presenting for the following health issues:  She was sent to the hospital 4/21 for suicidal ideation and refusal to eat/drink/take meds. She returned on 4/28 against our wishes with decreased psych meds and no resolution of her symptoms.   They have a dx of dementia with psychosis.   She is more paranoid now than when she left here, very delusional thoughts, keeps asking if she is going to long-term, denies that she was in the hospital, had wandered into another resident's room on Saturday and sustained a fall with head strike.   She is currently refusing to eat/drink/take meds.  The only thing that I received from the hospital is that she was hyponatremic and the ordered Sodium 1 gram daily   and that an xray showed a large amount of stool but no obstruction.        View : No data to display.              HPI     She was sent to the hospital 4/21 for suicidal ideation and refusal to eat/drink/take meds. She returned on 4/28 against our wishes with decreased psych meds and no resolution of her  symptoms.   She has a dx of dementia with psychosis.   Staff reports  more paranoid now than when she left the nursing home, very delusional thoughts, keeps asking if she is going to assisted, denies that she was in the hospital, had wandered into another resident's room on Saturday and sustained a fall with head strike.   She is currently refusing to eat/drink/take meds.  The only thing that staff received from the hospital is that she was hyponatremic and the ordered Sodium 1 gram daily and that an xray showed a large amount of stool but no obstruction.     For this writer the patient is resting in bed with no complaints.      Review of Systems   Constitutional, HEENT, cardiovascular, pulmonary, gi and gu systems are negative, except as otherwise noted.      Objective    LMP  (LMP Unknown)   There is no height or weight on file to calculate BMI.  Physical Exam   GENERAL: healthy, alert and no distress  RESP: lungs clear to auscultation - no rales, rhonchi or wheezes  CV: regular rate and rhythm, normal S1 S2, no S3 or S4, no murmur, click or rub, no peripheral edema and peripheral pulses strong  ABDOMEN: soft, nontender, no hepatosplenomegaly, no masses and bowel sounds normal  MS: no gross musculoskeletal defects noted, no edema        ----- Service Performed and Documented by Resident or Fellow ------

## 2023-05-05 NOTE — ED TRIAGE NOTES
Patient comes in via EMS from assisted living.  She has been having increased SI over the last several weeks, chronically SI for years.  Over the last few days she has not been eating or drinking due to her sadness.   She was initially hypotensive in the 80s for EMS and had a BG of 52.  EMS gave 400mL of NS and 1/2amp of D50 with improvement to BG of 139 and normotensive.  Patient agrees to stay safe in ED.  Denies any nausea, vomiting or abdominal pain.  States she hasnt been taking meds either.

## 2023-05-05 NOTE — TELEPHONE ENCOUNTER
Harshal Cuevas MD Trumbo, Bridgette, RAUL Rascon, I met with Bhavana today at the nursing home.  She was recently seen in the hospital for suicidal ideation but was also found to have hyponatremia.  Wondering if you could forward a message to her psychiatrist inquiring about her sertraline prescription and maybe adjusting her dosage in the setting of hyponatremia.

## 2023-05-05 NOTE — CONSULTS
"Diagnostic Evaluation Consultation  Crisis Assessment    Patient was assessed: In Person  Patient location: ED  Was a release of information signed: No. Reason: unable to engage with Writer.      Referral Data and Chief Complaint  Bhavana Munguia is a 82 year old, who uses she/her pronouns, and presents to the ED via EMS. Patient is referred to the ED by community provider(s). Patient is presenting to the ED for the following concerns: Failure to thrive.      Informed Consent and Assessment Methods     Patient is her own guardian. Writer met with patient and explained the crisis assessment process, including applicable information disclosures and limits to confidentiality. The patient was unwilling to participate in a formal crisis assessment because pt stated she did not want to talk. Due to this, assessment methods were limited to review of medical records, collaboration with medical staff, and obtaining relevant collateral information from family and community providers when available.     Over the course of this crisis assessment provided reassurance and offered validation. Patient's response to interventions was unengaged.     Summary of Patient Situation     When Writer approached Bhavana Munguia she was laying in bed, eyes open, but refused to speak. Writer noticed she had pulled her IV out and Writer located medical staff. Writer was unable to gather any information from pt directly. Writer attempted to engage pt in assessment, but pt only stated a few things to writer including \"leave me alone, I don't want to talk, I don't have a home and I don't like people.\" Throughout most of the assessment pt was unwilling to acknowledge Writer's verbal prompts and would close eyes, only briefly opening them and then closing them again. She also would not answer any regarding current mental health symptoms, even closed ended questions. When Writer prompted about SI multiple times, she did not respond.     Bhavana Munguia was recently " "admitted at [facility] due to SI threatening to walk into traffic or starve herself. She was discharged back to her facility on . Per collateral at the facility since she has returned she has been refusing to eat, drinking very little, smeared feces in her room, has been refusing meds and cares. Staff at the facility report that she hasn't made any SI comments since discharge from last hospitalization, but that she has appeared to be experiencing depression and failure to thrive.    Per MD Key note \"Patient presently is not very interactive with me but is calm and cooperative.  Does have some delay in her responses.  She denies wanting to harm herself.  Denies wanting to starve herself.  She admits that she has not been eating much as she is \"not hungry\".  Denies any abdominal pain or nausea/vomiting.  She does endorse feeling depressed.\"    Brief Psychosocial History  Per chart review from [facility] assessment on 2023 by Venkat Rankin \"Pt reports she was raised in [place], says that her real bio-mother was her aunt, but was placed her her aunt's sister at age 3 and \"raised like I was their own\", had a falling out with them in her middle years because \"they weren't my real family\".  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  She reports moving into the [facility] center about 5 years ago from her own apartment.  No legal issues, she is her own guardian. Family history of depression noted in the records.\"    Significant Clinical History  Per chart review from [facility] assessment on 2023 by Venkat Rankin \"Pt reports long history of bipolar and depression, reports she has past admissions to [facility] (Epic records note minimal inpatient treatment aside from a series of three [facility] inpatient admission ,  and  all in 2017), [facility] " "[facility] staff indicate pt admitted to them in March 6, 2018 from an inpatient facility - there was also an [facility] medical admission after November for hip related surgery.\"    Per chart review she has a history of bipolar disorder, Borderline Personality Disorder and MDD. Diagnoses currently also includes some cognitive impairment secondary to dementia. Pt was admitted to [facility] medically on 4/21/23 due to suicidal ideation and was discharged back to facility on 4/28/23.      Collateral Information  The following information was received from RAUL Rogers at [facility] whose relationship to the patient is RN at [facility]. Information was obtained via phone. Their phone number is # [phone] and they last had contact with patient on today 5/5/23.      How long have they been a resident: 2018    Why does patient live in the facility: \"It is a long term care for Mental health.\"    Significant changes to environment: \"Been really going downhill, refusing to eat or drink most of the time. Hasn't eaten anything at all the last few days, had a little bit of juice a bit of applesauce with meds when she does take them only. She won't take meds, refused vitals, she won't eat. In the past she was expressing that she was going to starve self and die this way, but since last hospitalization she hasn't been saying this outloud verbally but she hasn't been eating. Has some trouble with food staying down, but a lot of it is her mental illness, she is refusing cares.\"    Legal status (Commitment, probation, guardian, etc.): \"I don't know she has a .\"     Has the patient made any comments about wanting to kill themselves/others: \"No comments this time, last time she was definitely verbalizing this prior to last hospitalization. No verbal comments made this time, but she has been refusing care.\"     What happened today: \"BP was low, 80/45, that was part of the urgency in addition to refusing " "to eat, and dehydrated.\"    What is different about patient's functioning: \"Going downhill, refusing more and more cares, becoming more weak. In bed all the time, failure to thrive and care for self.\"    Concern about alcohol/drug use: No    If d/c is recommended, can patient return to current living situation: \"She can, needs more help at this time but we would accept her back. She can return tonight if that is what is recommended.\"    If no, what needs to happen in order for patient to return: NA    Additional information:            Risk Assessment  [place] Suicide Severity Rating Scale Full Clinical Version: 4/20/2023  Suicidal Ideation  1. Wish to be Dead (Lifetime): Yes  1. Wish to be Dead (Past 1 Month): Yes  2. Non-Specific Active Suicidal Thoughts (Lifetime): No  Intensity of Ideation  Most Severe Ideation Rating (Lifetime): 2  Most Severe Ideation Rating (Past 1 Month): 2  Frequency (Lifetime): Less than once a week  Frequency (Past 1 Month): Less than once a week  Duration (Lifetime): Fleeting, few seconds or minutes  Duration (Past 1 Month): Fleeting, few seconds or minutes  Controllability (Lifetime): Can control thoughts with little difficulty  Controllability (Past 1 Month): Can control thoughts with little difficulty  Deterrents (Lifetime): Deterrents probably stopped you  Deterrents (Past 1 Month): Deterrents probably stopped you  Reasons for Ideation (Lifetime): Does not apply  Reasons for Ideation (Past 1 Month): Does not apply  Suicidal Behavior  Actual Attempt (Lifetime): No  Has subject engaged in non-suicidal self-injurious behavior? (Lifetime): No  Interrupted Attempts (Lifetime): No  Aborted or Self-Interrupted Attempt (Lifetime): No  Preparatory Acts or Behavior (Lifetime): No  C-SSRS Risk (Lifetime/Recent)  Calculated C-SSRS Risk Score (Lifetime/Recent): Low Risk          [place] Suicide Severity Rating Scale Since Last Contact: 5/5/2023  Suicidal Ideation (Since Last Contact)  1. Wish " to be Dead (Since Last Contact): Yes  2. Non-Specific Active Suicidal Thoughts (Since Last Contact): Yes  3. Active Suicidal Ideation with any Methods (Not Plan) Without Intent to Act (Since Last Contact): Yes  4. Active Suicidal Ideation with Some Intent to Act, Without Specific Plan (Since Last Contact): Yes  5. Active Suicidal Ideation with Specific Plan and Intent (Since Last Contact): Yes        C-SSRS Risk (Since Last Contact)  Calculated C-SSRS Risk Score (Since Last Contact): High Risk    Validity of evaluation is impacted by presenting factors during interview: Pt unwilling to participate in interview, information gathered from last hospitalization at [facility].   Comments regarding subjective versus objective responses to [place] tool: NA  Environmental or Psychosocial Events: challenging interpersonal relationships, other life stressors and neither working nor attending school  Chronic Risk Factors: history of suicide attempts (Per chart review last attempt in 2018 by jumping out of a window.), history of psychiatric hospitalization and serious, persistent mental illness   Warning Signs: talking or writing about death, dying, or suicide, withdrawing from friends, family, and society, anxiety, agitation, unable to sleep, sleeping all the time, engaging in self-destructive behavior and recent discharges from emergency department or inpatient psychiatric care  Protective Factors: lives in a responsibly safe and stable environment, able to access care without barriers and supportive ongoing medical and mental health care relationships  Interpretation of Risk Scoring, Risk Mitigation Interventions and Safety Plan:  Writer was unable to engage pt in assessment and information CSSR-S was developed based on chart review. Since last contact pt was admitted medically due to SI with plan to walk into traffic or starve self to death. Per collateral she has not made any comments about wanting to die. Pt has informed  other medical staff per note review at this hospitalization that she doesn't want to die and her not eating is not due to her wanting to end her life. Writer is unable to assess risk at this time due to pt not willing to engage in assessment.        Does the patient have thoughts of harming others? Unable to assess.     Is the patient engaging in sexually inappropriate behavior?  no        Current Substance Abuse     Is there recent substance abuse? no     Was a urine drug screen or blood alcohol level obtained: No       Mental Status Exam     Affect: Flat   Appearance: Appropriate    Attention Span/Concentration: Inattentive  Eye Contact: Avoidant   Fund of Knowledge: Other: unable to assess.     Language /Speech Content: Fluent   Language /Speech Volume: Normal    Language /Speech Rate/Productions: Minimally Responsive    Recent Memory: Variable   Remote Memory: Variable   Mood: Depressed    Orientation to Person: Answer: unable to assess.     Orientation to Place: Answer: unable to assess.    Orientation to Time of Day: Answer: unable to assess.     Orientation to Date: Answer: unable to assess.     Situation (Do they understand why they are here?): Answer: unable to assess.     Psychomotor Behavior: Normal    Thought Content: Other: unable to assess.    Thought Form: Other: unable to assess.       History of commitment: No      Medication    Psychotropic medications: Yes. Pt is currently taking see below. . Medication compliant: No: refusing meds at care facility. . Recent medication changes: No  Medication changes made in the last two weeks: see chart for any med changes.     Current Facility-Administered Medications   Medication     sodium chloride 0.9% infusion     Current Outpatient Medications   Medication     acetaminophen (TYLENOL) 325 MG tablet     acetaminophen (TYLENOL) 325 MG tablet     alendronate (FOSAMAX) 70 MG tablet     alum & mag hydroxide-simethicone (MYLANTA/MAALOX) 200-200-20 MG/5ML SUSP  suspension     budesonide-formoterol (SYMBICORT) 80-4.5 MCG/ACT Inhaler     calcium carbonate (TUMS) 500 MG chewable tablet     Calcium Oyster Shell 500 MG TABS     capsaicin (ZOSTRIX) 0.025 % external cream     cholecalciferol (VITAMIN D3) 1000 UNIT tablet     Cyanocobalamin (B-12) 1000 MCG TBCR     famotidine (PEPCID) 20 MG tablet     FEROSUL 325 (65 Fe) MG tablet     fluticasone (FLONASE) 50 MCG/ACT nasal spray     guaiFENesin-dextromethorphan (ROBITUSSIN DM) 100-10 MG/5ML syrup     hypromellose (ARTIFICIAL TEARS) 0.5 % SOLN ophthalmic solution     loperamide (IMODIUM) 1 MG/5ML solution     magnesium hydroxide (MILK OF MAGNESIA) 400 MG/5ML suspension     mirtazapine (REMERON) 15 MG tablet     Multiple Vitamins-Minerals (CEROVITE SENIOR) TABS     OLANZapine (ZYPREXA) 2.5 MG tablet     OLANZapine (ZYPREXA) 5 MG tablet     ondansetron (ZUPLENZ) 4 MG FILM     polyethylene glycol (MIRALAX) 17 g packet     polyethylene glycol (MIRALAX) 17 g packet     SENNA-docusate sodium (SENNA S) 8.6-50 MG tablet     SENNA-docusate sodium (SENNA S) 8.6-50 MG tablet     sertraline (ZOLOFT) 100 MG tablet     simvastatin (ZOCOR) 40 MG tablet     trolamine salicylate (ASPERCREME) 10 % external cream     valACYclovir (VALTREX) 500 MG tablet     White Petrolatum ointment     Nutritional Supplements (ENSURE ORIGINAL) LIQD            Current Care Team    Primary Care Provider: Inder Lindsey MD - [facility]                                        David Lewis MD - [facility]    Psychiatrist: Dr. Clemente Clay  [facility]  Therapist: No     CTSS or ARMHS: No  ACT Team: No  Other: : [facility] [phone]  [facility] [phone]      Diagnosis    311 (F32.9) Unspecified Depressive Disorder  -primary - by history  Other Unspecified and Specified Bipolar and Related Disorder 296.80 (F31.9) Unspecified Bipolar and Related Disorder - by history   301.83 (F60.3) Borderline Personality Disorder - by  history     Clinical Summary and Substantiation of Recommendations    Writer unable to assess current mental health crisis due to pt being unwilling to participate in assessment. Per collateral, pt is refusing to eat, drink, refusing cares and appears to be experiencing failure to thrive. Pt would benefit from medical admission for further medical stabilization based on current presentation. Pt would likely not benefit from IP mental health placement based on dementia diagnosis.     Disposition    Recommended disposition: Medical Admission: for further stabalization.        Reviewed case and recommendations with attending provider. Attending Name: Dr. Jiménez     Attending concurs with disposition: Yes       Patient and/or validated legal guardian concurs with disposition: No: Pt was non responsive to Writer, writer unable to assess if pt concurs.        Final disposition: Medical admission: for further stabalization.       Outpatient Details (if applicable):   Aftercare plan and appointments placed in the AVS and provided to patient: No. Rationale: pt will be admitted medically.    Was lethal means counseling provided as a part of aftercare planning? NA; Pt admitted medically, also lives in a secure setting outpatient.       Assessment Details    Patient interview started at: 6:50pm and completed at: 7:10pm.     Total duration spent on the patient case in minutes: .25 hrs      CPT code(s) utilized: 49690 - Psychotherapy for Crisis - 60 (30-74*) min       Rachel Walker, Clinical Intern, Supervisor [place] Mckinneytherapist  [facility] - Triage & Transition Services  Callback: [phone]

## 2023-05-05 NOTE — ED PROVIDER NOTES
"  History     Chief Complaint:  Hypotension, Hyperglycemia, and Suicidal       HPI   Bhavana Marr is a 82 year old female with a history of depression, borderline personality disorder, bipolar affective disorder, GERD, asthma, hyperlipidemia, age-related osteoporosis who presents with hypoglycemia and hypotension along with suicidal ideation.  Patient lives at assisted living facility and has been reportedly not eating or drinking due to depression.  EMS found patient to have glucose of 52 with systolic blood pressure in the 80s.  Upon initial assessment, patient states \"I will not talk to you\", \"I cannot go home, I have no home\".  Patient refused to provide any further history.    Upon chart review, patient had nursing home visit for similar concerns yesterday.  She has been reportedly more paranoid since her recent discharge, wandering into patients rooms, asking if she is going to [facility], denying that she was in the hospital.  Staff reported that she was found covered in feces during a manic episode in her room.    During her recent admission, patient was reportedly threatening to walk in traffic and starve herself to death    Independent Historian:   None - Patient Only    Review of External Notes: Reviewed telephone encounter from today, progress note from yesterday 5/4/2023, discharge summary from 4/28/2023, DEC assessment from 4/20/2023    ROS:  Review of Systems    Allergies:  Nsaids     Medications:    alendronate (FOSAMAX) 70 MG tablet  Calcium Oyster Shell 500 MG TABS  cholecalciferol (VITAMIN D3) 1000 UNIT tablet  Multiple Vitamins-Minerals (CEROVITE SENIOR) TABS  acetaminophen (TYLENOL) 325 MG tablet  alum & mag hydroxide-simethicone (MYLANTA/MAALOX) 200-200-20 MG/5ML SUSP suspension  budesonide-formoterol (SYMBICORT) 80-4.5 MCG/ACT Inhaler  calcium carbonate (TUMS) 500 MG chewable tablet  capsaicin (ZOSTRIX) 0.025 % external cream  Cyanocobalamin (B-12) 1000 MCG TBCR  famotidine (PEPCID) 20 MG " tablet  FEROSUL 325 (65 Fe) MG tablet  fluticasone (FLONASE) 50 MCG/ACT nasal spray  guaiFENesin-dextromethorphan (ROBITUSSIN DM) 100-10 MG/5ML syrup  hydrOXYzine (ATARAX) 50 MG tablet  hypromellose (ARTIFICIAL TEARS) 0.5 % SOLN ophthalmic solution  loperamide (IMODIUM) 1 MG/5ML solution  magnesium hydroxide (MILK OF MAGNESIA) 400 MG/5ML suspension  mirtazapine (REMERON) 15 MG tablet  Nutritional Supplements (ENSURE ORIGINAL) LIQD  OLANZapine (ZYPREXA) 2.5 MG tablet  OLANZapine (ZYPREXA) 5 MG tablet  ondansetron (ZUPLENZ) 4 MG FILM  salicylic acid (MEDIPLAST) 40 % miscellaneous  SENNA-docusate sodium (SENNA S) 8.6-50 MG tablet  SENNA-docusate sodium (SENNA S) 8.6-50 MG tablet  sertraline (ZOLOFT) 100 MG tablet  simvastatin (ZOCOR) 40 MG tablet  trolamine salicylate (ASPERCREME) 10 % external cream  valACYclovir (VALTREX) 1000 mg tablet  valACYclovir (VALTREX) 500 MG tablet  White Petrolatum ointment        Past Medical History:    Past Medical History:   Diagnosis Date     Arthritis 1998     Asthma      Asthma 6/8/2018     Blood transfusion 2000     Borderline personality disorder (H)      Chronic sinus infection      Depressive disorder      GERD (gastroesophageal reflux disease)      History of blood transfusion      Hyperlipidemia      MDD (major depressive disorder)        Past Surgical History:    Past Surgical History:   Procedure Laterality Date     ABDOMEN SURGERY      2 fatty tumors removed     APPENDECTOMY       BIOPSY       COLONOSCOPY       GASTRIC BYPASS       GI SURGERY  2000    gastric bypass     GYN SURGERY      complete hysterectomy     [facility] CORRECT BUNION,DOUBLE OSTEOTOMY      Description: Hallux Valgus (Bunion) Correction;  Recorded: 05/07/2012;     ORTHOPEDIC SURGERY      both hip,two foot surgies on each foot     ORTHOPEDIC SURGERY      right elbow     [facility] APPENDECTOMY      Description: Appendectomy;  Recorded: 05/07/2012;     [facility] TOTAL ABDOM HYSTERECTOMY      Description: Total Abdominal  Hysterectomy;  Recorded: 05/07/2012;        Family History:    family history includes Depression in her maternal aunt, maternal uncle, paternal aunt, and paternal uncle.    Social History:   reports that she has never smoked. She has never used smokeless tobacco. She reports that she does not drink alcohol and does not use drugs.  PCP: Inder Lindsey     Physical Exam     Patient Vitals for the past 24 hrs:   BP Temp Temp src Pulse Resp SpO2   05/05/23 1900 113/47 -- -- 73 -- --   05/05/23 1830 (!) 117/101 -- -- 94 -- 100 %   05/05/23 1800 123/72 -- -- 71 -- 100 %   05/05/23 1645 100/66 98.6  F (37  C) Oral 86 20 100 %        Physical Exam  General: Uncooperative with exam, requesting not to be touched. One word answers, holding eyes shut  Head:  Scalp is NC/AT  Eyes:  No scleral icterus, PERRL  ENT:  The external nose and ears are normal.   Neck:  Normal range of motion without rigidity.  CV:  Regular rate and rhythm    No pathologic murmur   Resp:  Breath sounds are clear bilaterally    Non-labored, no retractions or accessory muscle use  GI:  Refuses exam  Skin:  Warm and dry, No rash or lesions noted.      Emergency Department Course     Laboratory:  Labs Ordered and Resulted from Time of ED Arrival to Time of ED Departure   COMPREHENSIVE METABOLIC PANEL - Abnormal       Result Value    Sodium 133 (*)     Potassium 3.9      Chloride 100      Carbon Dioxide (CO2) 23      Anion Gap 10      Urea Nitrogen 19.5      Creatinine 0.67      Calcium 8.8      Glucose 109 (*)     Alkaline Phosphatase 50      AST 20      ALT 9 (*)     Protein Total 5.3 (*)     Albumin 3.0 (*)     Bilirubin Total 0.2      GFR Estimate 87     GLUCOSE BY METER - Abnormal    GLUCOSE BY METER POCT 104 (*)    ROUTINE UA WITH MICROSCOPIC REFLEX TO CULTURE - Abnormal    Color Urine Yellow      Appearance Urine Clear      Glucose Urine Negative      Bilirubin Urine Negative      Ketones Urine 60 (*)     Specific Gravity Urine 1.032      Blood Urine  Negative      pH Urine 5.5      Protein Albumin Urine 10 (*)     Urobilinogen Urine 2.0      Nitrite Urine Negative      Leukocyte Esterase Urine Negative      Mucus Urine Present (*)     RBC Urine 0      WBC Urine 2      Squamous Epithelials Urine <1     CBC WITH PLATELETS AND DIFFERENTIAL    WBC Count 9.3      RBC Count 3.92      Hemoglobin 11.9      Hematocrit 36.5      MCV 93      MCH 30.4      MCHC 32.6      RDW 12.7      Platelet Count 314      % Neutrophils 76      % Lymphocytes 18      % Monocytes 5      % Eosinophils 1      % Basophils 0      % Immature Granulocytes 0      NRBCs per 100 WBC 0      Absolute Neutrophils 7.0      Absolute Lymphocytes 1.7      Absolute Monocytes 0.5      Absolute Eosinophils 0.1      Absolute Basophils 0.0      Absolute Immature Granulocytes 0.0      Absolute NRBCs 0.0     GLUCOSE MONITOR NURSING POCT   LIPASE        Procedures   None    Emergency Department Course & Assessments:    PSS-3    Date and Time Over the past 2 weeks have you felt down, depressed, or hopeless? Over the past 2 weeks have you had thoughts of killing yourself? Have you ever attempted to kill yourself? When did this last happen? User   05/05/23 1647 yes yes no -- VR      C-SSRS ([place])    Date and Time Q1 Wished to be Dead (Past Month) Q2 Suicidal Thoughts (Past Month) Q3 Suicidal Thought Method Q4 Suicidal Intent without Specific Plan Q5 Suicide Intent with Specific Plan Q6 Suicide Behavior (Lifetime) Within the Past 3 Months? RETIRED: Level of Risk per Screen Screening Not Complete User   05/05/23 1647 yes yes yes yes yes yes -- -- -- VR              Suicide assessment completed by mental health (D.E.C., LCSW, etc.)    Interventions:  Medications   0.9% sodium chloride BOLUS (0 mLs Intravenous Stopped 5/5/23 1905)     Followed by   sodium chloride 0.9% infusion (has no administration in time range)      Independent Interpretation (X-rays, CTs, rhythm strip):  None    Consultations/Discussion of  Management or Tests:  ED Course as of 05/05/23 2017   Fri May 05, 2023   1746 Attempted initial assessment   1848 Reattempted initial assessment, patient uncooperative   1909 Spoke with Sandeep with DEC after assessment   1925 I spoke to the hospitalist, Dr. Key, who has agreed to admit the patient for continued evaluation and treatment.         Social Determinants of Health affecting care:   Non adherent with medication at assisted living facility.    Disposition:  The patient was admitted to the hospital under the care of Dr. Key for observation.     Impression & Plan      Medical Decision Making:  Bhavana Marr is a 82 year old female who presents with failure to thrive.  Patient has not been eating or drinking at home likely because of her blood sugar being 52 with EMS along with systolic blood pressure in the 80s.  Amp of D50 along with normal saline given en route, additional 1 L of NS given here with blood pressure improvement.  Upon arrival, glucose rechecked at 109.  Consulted with DEC, patient was uncooperative with exam saying that she just wanted to sleep and did not answer questions appropriately, however she did deny suicidal ideation.  Due to her failure to thrive and inability provide cares for self, she is not appropriate for empath.  With patient's failure to thrive and significant hypoglycemia, patient would benefit from observation admission and encouragement of oral intake. I spoke to the hospitalist, , who has agreed to admit the patient for continued evaluation and treatment.  Patient resides at a living facility specializing in mental health, they are agreeable to accepting patient back after discharge.      Diagnosis:    ICD-10-CM    1. Hypotension, unspecified hypotension type  I95.9       2. Hypoglycemia  E16.2       3. Failure to thrive in adult  R62.7            Discharge Medications:  New Prescriptions    No medications on file          5/5/2023   Janay Jiménez MD       Felisha Cook PA-C  05/05/23 2017

## 2023-05-05 NOTE — ED NOTES
Bed: ED12  Expected date: 5/5/23  Expected time: 3:55 PM  Means of arrival: Ambulance  Comments:  417 82f hypoBP, hypoglycemic ETA [id number]

## 2023-05-05 NOTE — ED PROVIDER NOTES
"ED ATTENDING PHYSICIAN NOTE:   I evaluated this patient in conjunction with Felisha Cook PA-C  I have participated in the care of the patient and personally performed key elements of the history, exam, and medical decision making.      HPI:   Bhavana Marr is a 82 year old female with a history of depression who presents from her care facility for not eating for period of time as well as low blood sugar.  The patient is somewhat uncooperative and that she is unwilling to provide much information.  She states she \"just wants to sleep\".  The facility was concerned that she was not eating due to her worsening depression.  I asked the patient whether she is suicidal and she tells me no.  She appears to be profoundly depressed.  She denies wanting to hurt anybody else.  She will not tell me if there is been any situational changes.  She tells me she does not have any family.  She tells me she has not been sick with any fevers or vomiting.  EMS found her blood sugar to be 50 and gave her an amp of D50.  They also found her blood pressure to be low so IV saline was started.It is noted that she was brought to the emergency room at the end of April for concerns about increasing depression and making suicidal statements.  The patient denies to me that she is getting any current care for depression.    Independent Historian:   None - Patient Only    Review of External Notes:   Emergency department note April 20, 2023.This was at [facility].     EXAM:     Physical Exam   Constitutional:  Frail elderly female  HENT:   Mouth/Throat:   Oropharynx is clear and moist.   Eyes:    Conjunctivae normal and EOM are normal. Pupils are equal, round, and reactive to light.   Neck:    Normal range of motion.   Cardiovascular: Normal rate, regular rhythm and normal heart sounds.  Exam reveals no gallop and no friction rub.  No murmur heard.  Pulmonary/Chest:  Effort normal and breath sounds normal. Patient has no wheezes. " Patient has no rales.   Musculoskeletal:  Normal range of motion. Patient exhibits no edema.   Neurological:   Patient is alert and oriented to person.  GCS is 15  Skin:   Skin is warm and dry. No rash noted. No erythema.   Psychiatric:   Patient appears to be very depressed.  He is reluctant to speak with me about any details.  Frail elderly female..         Independent Interpretation (X-rays, CTs, rhythm strip):  None    Consultations/Discussion of Management or Tests:  Felisha herrera PA spoke with Dr. Key for observation admission    Social Determinants of Health affecting care:   None     MEDICAL DECISION MAKING/ASSESSMENT AND PLAN:   Bhavana Marr is an 82-year-old female brought in from her care facility for concerns for not eating she was found to have low blood pressure and low blood sugar.  This all seems to be likely stemming from her worsening depression.  She denies any acute illness.  Blood work and urine was obtained.  There is no acute metabolic derangement.  Vital signs are all normal.  I do not have any concern for any cardiovascular or respiratory issues.  Her blood sugar was rechecked.  This has remained stable she is not requiring any more dextrose boluses.  The patient did rip out her IV.  At this time due to her lack of cooperation we will not replace it.  We will bring her in for observation of her blood sugar.  At this point her blood pressure has responded to a liter of IV fluids.  A DEC assessment was attempted but the patient declines to elaborate with them as well.     DIAGNOSIS:     ICD-10-CM    1. Suicidal ideation  R45.851       2. Hypotension, unspecified hypotension type  I95.9       3. Hypoglycemia  E16.2                DISPOSITION:   Admission        Janay Jiménez MD  05/05/23 2030

## 2023-05-05 NOTE — TELEPHONE ENCOUNTER
Left message with Renetta at [facility] to have Dr. Clay call back.    Nola Cartwright RN on 5/5/2023 at 11:37 AM

## 2023-05-06 LAB
ANION GAP SERPL CALCULATED.3IONS-SCNC: 8 MMOL/L (ref 7–15)
BASOPHILS # BLD AUTO: 0 10E3/UL (ref 0–0.2)
BASOPHILS NFR BLD AUTO: 0 %
BUN SERPL-MCNC: 12.7 MG/DL (ref 8–23)
CALCIUM SERPL-MCNC: 8.9 MG/DL (ref 8.8–10.2)
CHLORIDE SERPL-SCNC: 103 MMOL/L (ref 98–107)
CREAT SERPL-MCNC: 0.63 MG/DL (ref 0.51–0.95)
DEPRECATED HCO3 PLAS-SCNC: 24 MMOL/L (ref 22–29)
EOSINOPHIL # BLD AUTO: 0.1 10E3/UL (ref 0–0.7)
EOSINOPHIL NFR BLD AUTO: 1 %
ERYTHROCYTE [DISTWIDTH] IN BLOOD BY AUTOMATED COUNT: 12.6 % (ref 10–15)
GFR SERPL CREATININE-BSD FRML MDRD: 89 ML/MIN/1.73M2
GLUCOSE SERPL-MCNC: 115 MG/DL (ref 70–99)
HCT VFR BLD AUTO: 35.5 % (ref 35–47)
HGB BLD-MCNC: 11.5 G/DL (ref 11.7–15.7)
IMM GRANULOCYTES # BLD: 0 10E3/UL
IMM GRANULOCYTES NFR BLD: 0 %
LYMPHOCYTES # BLD AUTO: 1.8 10E3/UL (ref 0.8–5.3)
LYMPHOCYTES NFR BLD AUTO: 18 %
MCH RBC QN AUTO: 30.1 PG (ref 26.5–33)
MCHC RBC AUTO-ENTMCNC: 32.4 G/DL (ref 31.5–36.5)
MCV RBC AUTO: 93 FL (ref 78–100)
MONOCYTES # BLD AUTO: 0.5 10E3/UL (ref 0–1.3)
MONOCYTES NFR BLD AUTO: 5 %
NEUTROPHILS # BLD AUTO: 7.6 10E3/UL (ref 1.6–8.3)
NEUTROPHILS NFR BLD AUTO: 76 %
NRBC # BLD AUTO: 0 10E3/UL
NRBC BLD AUTO-RTO: 0 /100
PLATELET # BLD AUTO: 309 10E3/UL (ref 150–450)
POTASSIUM SERPL-SCNC: 4.1 MMOL/L (ref 3.4–5.3)
RBC # BLD AUTO: 3.82 10E6/UL (ref 3.8–5.2)
SODIUM SERPL-SCNC: 135 MMOL/L (ref 136–145)
WBC # BLD AUTO: 10 10E3/UL (ref 4–11)

## 2023-05-06 PROCEDURE — 82310 ASSAY OF CALCIUM: CPT | Performed by: INTERNAL MEDICINE

## 2023-05-06 PROCEDURE — 99231 SBSQ HOSP IP/OBS SF/LOW 25: CPT | Performed by: HOSPITALIST

## 2023-05-06 PROCEDURE — 250N000013 HC RX MED GY IP 250 OP 250 PS 637: Performed by: HOSPITALIST

## 2023-05-06 PROCEDURE — 96361 HYDRATE IV INFUSION ADD-ON: CPT

## 2023-05-06 PROCEDURE — 258N000003 HC RX IP 258 OP 636: Performed by: INTERNAL MEDICINE

## 2023-05-06 PROCEDURE — 85025 COMPLETE CBC W/AUTO DIFF WBC: CPT | Performed by: INTERNAL MEDICINE

## 2023-05-06 PROCEDURE — 36415 COLL VENOUS BLD VENIPUNCTURE: CPT | Performed by: INTERNAL MEDICINE

## 2023-05-06 PROCEDURE — G0378 HOSPITAL OBSERVATION PER HR: HCPCS

## 2023-05-06 RX ORDER — OLANZAPINE 5 MG/1
10 TABLET ORAL AT BEDTIME
Status: DISCONTINUED | OUTPATIENT
Start: 2023-05-06 | End: 2023-06-07 | Stop reason: HOSPADM

## 2023-05-06 RX ORDER — ACETAMINOPHEN 325 MG/1
650 TABLET ORAL 2 TIMES DAILY
Status: DISCONTINUED | OUTPATIENT
Start: 2023-05-06 | End: 2023-06-07 | Stop reason: HOSPADM

## 2023-05-06 RX ORDER — AMOXICILLIN 250 MG
1 CAPSULE ORAL DAILY
Status: DISCONTINUED | OUTPATIENT
Start: 2023-05-06 | End: 2023-05-07

## 2023-05-06 RX ORDER — FAMOTIDINE 20 MG/1
20 TABLET, FILM COATED ORAL 2 TIMES DAILY
Status: DISCONTINUED | OUTPATIENT
Start: 2023-05-06 | End: 2023-05-13

## 2023-05-06 RX ORDER — OLANZAPINE 2.5 MG/1
2.5 TABLET, FILM COATED ORAL EVERY MORNING
Status: DISCONTINUED | OUTPATIENT
Start: 2023-05-06 | End: 2023-05-10

## 2023-05-06 RX ORDER — VALACYCLOVIR HYDROCHLORIDE 500 MG/1
500 TABLET, FILM COATED ORAL DAILY
Status: DISCONTINUED | OUTPATIENT
Start: 2023-05-06 | End: 2023-06-07 | Stop reason: HOSPADM

## 2023-05-06 RX ADMIN — OLANZAPINE 10 MG: 5 TABLET, FILM COATED ORAL at 21:45

## 2023-05-06 RX ADMIN — ACETAMINOPHEN 650 MG: 325 TABLET ORAL at 20:08

## 2023-05-06 RX ADMIN — FAMOTIDINE 20 MG: 20 TABLET ORAL at 20:09

## 2023-05-06 RX ADMIN — DEXTROSE AND SODIUM CHLORIDE: 5; 900 INJECTION, SOLUTION INTRAVENOUS at 22:38

## 2023-05-06 RX ADMIN — DEXTROSE AND SODIUM CHLORIDE: 5; 900 INJECTION, SOLUTION INTRAVENOUS at 09:29

## 2023-05-06 RX ADMIN — MIRTAZAPINE 22.5 MG: 15 TABLET, FILM COATED ORAL at 21:45

## 2023-05-06 ASSESSMENT — ACTIVITIES OF DAILY LIVING (ADL)
ADLS_ACUITY_SCORE: 43
ADLS_ACUITY_SCORE: 43
ADLS_ACUITY_SCORE: 47
ADLS_ACUITY_SCORE: 43
ADLS_ACUITY_SCORE: 49
ADLS_ACUITY_SCORE: 43
ADLS_ACUITY_SCORE: 43
ADLS_ACUITY_SCORE: 47
ADLS_ACUITY_SCORE: 47
ADLS_ACUITY_SCORE: 39
ADLS_ACUITY_SCORE: 43
ADLS_ACUITY_SCORE: 49

## 2023-05-06 NOTE — CONSULTS
"Care Management Initial Consult    General Information  Assessment completed with: PatientBhavana (Patient stated \"I do not want to talk.\"  remainder of consult information obtained from chart and outside sources)  Type of CM/SW Visit: Initial Assessment    Primary Care Provider verified and updated as needed: Yes (Inder Lindsey [phone])   Readmission within the last 30 days:           Advance Care Planning:    no ACP documents on file in EPIC; POLST provided by SNF and sent to [email or web address] for review and uploading to pt chart  General Information Comments: information obtained from paper records sent to hospital by SNF    Communication Assessment  Patient's communication style: spoken language (English or Bilingual)    Hearing Difficulty or Deaf: no        Cognitive  Cognitive/Neuro/Behavioral: .WDL except, level of consciousness, mood/behavior, orientation  Level of Consciousness: confused, alert  Arousal Level: opens eyes spontaneously  Orientation: disoriented to, place, time, situation (refused to answer for time)  Mood/Behavior: flat affect, withdrawn  Best Language: 0 - No aphasia  Speech: slow, other (see comments) (long-term through assessment, ignored RN and pretended not to hear anything)    Living Environment:   People in home: facility resident  n/a  Current living Arrangements: residential facility  Name of Facility: [facility] [phone]   Able to return to prior arrangements: yes  Living Arrangement Comments: \"[facility] is a Skilled Nursing Facility (SNF-1) providing 24 hour licensed nursing care for people with mental illness. \"    Family/Social Support:  Care provided by:  (SNF staff)  Provides care for: no one, unable/limited ability to care for self  Marital Status:   Facility resident(s)/Staff          Description of Support System: Supportive, Involved         Current Resources:   Patient receiving home care services: No   " "  Community Resources: Skilled Nursing Facility  Equipment currently used at home:    Supplies currently used at home: Incontinence Supplies    Employment/Financial:  Employment Status:        Financial Concerns: No concerns identified    Finance Comments: active [employer]ARE/[facility] [place] insurance  Does the patient's insurance plan have a 3 day qualifying hospital stay waiver?  No    Lifestyle & Psychosocial Needs:  Social Determinants of Health     Tobacco Use: Low Risk  (4/6/2023)    Patient History      Smoking Tobacco Use: Never      Smokeless Tobacco Use: Never      Passive Exposure: Not on file     Functional Status:  Prior to admission patient needed assistance:      Mental Health Status:  Mental Health Status: Current Concern       Chemical Dependency Status:  Chemical Dependency Status: No Current Concerns             Values/Beliefs:  Spiritual, Cultural Beliefs, Jain Practices, Values that affect care:            Values/Beliefs Comment: Jo [place]    Additional Information:  Consult for SW.  Writer received consult for discharge planning and read chart.  Called [facility] where patient resides to call nurse and receive preliminary information. Patient resides there and is able to return. Patient has been suicidal, not eating, rubbing feces on self and wall. Patient had a recent hospital stay and has only been home for about one week prior to this admission. Facility nurse explained that patient had \"a botched gastric bypass, a lot of diarrhea, gas and perhaps this contributes to her failure to thrive.\" Patient at baseline is \"friendly, cordial and gives staff hugs.\"  Patient is currently her own guardian.  SW attempted to complete assessment with patient but patient was not wanting to talk today.  Writer called [facility] and left a VM.     Upon review of paper chart the following was noted among paper records, appears to be an order from the primary doctor.         It " does look like patient is able to return to her Memory care facility per information noted above .    Josefina Martel, CARLIW

## 2023-05-06 NOTE — PHARMACY-ADMISSION MEDICATION HISTORY
Pharmacist Admission Medication History    Admission medication history is complete. The information provided in this note is only as accurate as the sources available at the time of the update.    Medication reconciliation/reorder completed by provider prior to medication history? No    Information Source(s): Facility ([facility] medication list/MAR via N/A    Pertinent Information: Patient from [facility] [place] [phone]    Changes made to PTA medication list:    Added: PRN APAP, PRN Miralax    Deleted: Valtrex 1 g, Mediplast, hydroxyzine    Changed: scheduled APAP, scheduled Miralax, scheduled senna S    Medication Affordability:  Not including over the counter (OTC) medications, was there a time in the past 12 months when you did not take your medications as prescribed because of cost?: Unable to Assess    Allergies reviewed with patient and updates made in EHR: yes    Medication History Completed By: Betsy Mishra RPH 5/5/2023 8:22 PM    Prior to Admission medications    Medication Sig Last Dose Taking? Auth Provider Long Term End Date   acetaminophen (TYLENOL) 325 MG tablet Take 650 mg by mouth every 6 hours as needed for mild pain Max acetaminophen dose 3250 mg  Yes Unknown, Entered By History     acetaminophen (TYLENOL) 325 MG tablet Take 1-2 tablets (325-650 mg) by mouth 2 times daily  Patient taking differently: Take 650 mg by mouth 2 times daily 5/5/2023 at 0800 Yes Inder Lindsey MD     alendronate (FOSAMAX) 70 MG tablet Take 70 mg by mouth every 7 days Every Wednesday 5/3/2023 Yes Reported, Patient Yes    alum & mag hydroxide-simethicone (MYLANTA/MAALOX) 200-200-20 MG/5ML SUSP suspension Take 15 mLs by mouth every 3 hours as needed for indigestion  Yes      budesonide-formoterol (SYMBICORT) 80-4.5 MCG/ACT Inhaler Inhale 2 puffs into the lungs 2 times daily as needed (Shortness of breath, wheezing)  Yes Zane Cade MD Yes    calcium carbonate (TUMS) 500 MG chewable tablet Chew 2 tabs  "every 3 hours as needed  Yes Inder Lindsey MD     Calcium Oyster Shell 500 MG TABS Take 1 tablet by mouth daily 5/5/2023 at 0800 Yes Inder Lindsey MD     capsaicin (ZOSTRIX) 0.025 % external cream Apply topically 3 times daily as needed  Yes Inder Lindsey MD     cholecalciferol (VITAMIN D3) 1000 UNIT tablet Take 1 tablet (1,000 Units) by mouth daily 5/5/2023 at 0800 Yes Inder Lindsey MD     Cyanocobalamin (B-12) 1000 MCG TBCR Take 1,000 mcg by mouth every 48 hours 5/4/2023 at 0748 Yes Inder Lindsey MD     famotidine (PEPCID) 20 MG tablet Take 20 mg by mouth 2 times daily 5/5/2023 at 0800 Yes Inder Lindsey MD     FEROSUL 325 (65 Fe) MG tablet TAKE 1 TABLET BY MOUTH EVERY \"OTHER\" DAY 5/3/2023 at 1259 Yes David Lewis MD     fluticasone (FLONASE) 50 MCG/ACT nasal spray Spray 2 sprays into both nostrils daily as needed for rhinitis or allergies  Yes Inder Lindsey MD     guaiFENesin-dextromethorphan (ROBITUSSIN DM) 100-10 MG/5ML syrup Take 10 mLs by mouth every 4 hours as needed for cough  Yes      hypromellose (ARTIFICIAL TEARS) 0.5 % SOLN ophthalmic solution Place 2 drops into both eyes 4 times daily as needed  Yes Reported, Patient     loperamide (IMODIUM) 1 MG/5ML solution Give as needed for diarrhea. 2-4 tsp after first loose stool and 2 tsp after each additional loose stool. Not to exceed 8 tsp in 72 hours  Yes Inder Lindsey MD     magnesium hydroxide (MILK OF MAGNESIA) 400 MG/5ML suspension Take 30 mLs by mouth daily as needed for constipation  Yes      mirtazapine (REMERON) 15 MG tablet Take 1.5 tablets (22.5 mg) by mouth At Bedtime 5/4/2023 at 2000 Yes Inder Lindsey MD Yes    Multiple Vitamins-Minerals (CEROVITE SENIOR) TABS Take 1 tablet by mouth daily 5/5/2023 at 0800 Yes Inder Lindsey MD     OLANZapine (ZYPREXA) 2.5 MG tablet Take 1 tablet (2.5 mg) by mouth every morning 5/5/2023 at 0800 Yes Inder Lindsey MD Yes    OLANZapine (ZYPREXA) 5 MG tablet Take 2 tablets (10 mg) by mouth At Bedtime 5/4/2023 at 2100 " Yes Inder Lindsey MD Yes    ondansetron (ZUPLENZ) 4 MG FILM Take 4 mg by mouth every 4 hours as needed (for nausea, vomiting)  Yes David Lewis MD     polyethylene glycol (MIRALAX) 17 g packet Take 17 g by mouth Every Mon, Wed, Fri Morning 5/5/2023 at 0800 Yes Unknown, Entered By History     polyethylene glycol (MIRALAX) 17 g packet Take 17 g by mouth daily as needed for constipation  Yes Unknown, Entered By History     SENNA-docusate sodium (SENNA S) 8.6-50 MG tablet Take 1 tablet by mouth once as needed (constipation)  Yes Katarzyna Oneal MD     SENNA-docusate sodium (SENNA S) 8.6-50 MG tablet Take 1 tablet by mouth daily 5/5/2023 at 0800 Yes Katarzyna Oneal MD     sertraline (ZOLOFT) 100 MG tablet Take 1.5 tablets (150 mg) by mouth daily 5/5/2023 at 0800 Yes Inder Lindsey MD Yes    simvastatin (ZOCOR) 40 MG tablet Take 1 tablet (40 mg) by mouth At Bedtime 5/4/2023 at 2000 Yes Inder Lindsey MD Yes    trolamine salicylate (ASPERCREME) 10 % external cream Apply topically as needed for moderate pain  Yes Inder Lindsey MD     valACYclovir (VALTREX) 500 MG tablet Give 500 mg by mouth one time a day related to other herpesviral infection for outbreak prevention 5/5/2023 at 0800 Yes Inder Lindsey MD Yes    White Petrolatum ointment Apply topically as needed for dry skin  Yes Natasha Ochoa MD     Nutritional Supplements (ENSURE ORIGINAL) LIQD Take 1 Container by mouth daily

## 2023-05-06 NOTE — PROGRESS NOTES
Observation goals  PRIOR TO DISCHARGE        Comments:   -Tolerating diet: not met, refused breakfast, ordered standard tray  -Seen by Psychiatry: not met  -BP and glucose stable:  met  Nurse to notify provider when observation goals have been met and patient is ready for discharge.

## 2023-05-06 NOTE — ED NOTES
[facility]  ED Nurse Handoff Report    ED Chief complaint: Hypotension, Hyperglycemia, and Suicidal      ED Diagnosis:   Final diagnoses:   Hypotension, unspecified hypotension type   Hypoglycemia   Failure to thrive in adult       Code Status: MD need to assess     Allergies:   Allergies   Allergen Reactions     Nsaids      Gastric bypass surgery         Patient Story: Patient has increased S.I. over last several weeks.   Focused Assessment:  Patient safety. BG and BP monitoring.     Treatments and/or interventions provided: EMS gave 400mL of NS and 1/2amp of D50, NS bolus, CBC,UA,CMP    Patient's response to treatments and/or interventions: Patients  BP and BG improved    To be done/followed up on inpatient unit:  DEC assessment     Does this patient have any cognitive concerns?: SI, refused to talk at times    Activity level - Baseline/Home:  Stand with Assist of 1  Activity Level - Current:   Unknown    Patient's Preferred language: English   Needed?: No    Isolation: None  Infection: Not Applicable  Patient tested for COVID 19 prior to admission: NO  Bariatric?: No  Labs Ordered and Resulted from Time of ED Arrival to Time of ED Departure   COMPREHENSIVE METABOLIC PANEL - Abnormal       Result Value    Sodium 133 (*)     Potassium 3.9      Chloride 100      Carbon Dioxide (CO2) 23      Anion Gap 10      Urea Nitrogen 19.5      Creatinine 0.67      Calcium 8.8      Glucose 109 (*)     Alkaline Phosphatase 50      AST 20      ALT 9 (*)     Protein Total 5.3 (*)     Albumin 3.0 (*)     Bilirubin Total 0.2      GFR Estimate 87     GLUCOSE BY METER - Abnormal    GLUCOSE BY METER POCT 104 (*)    ROUTINE UA WITH MICROSCOPIC REFLEX TO CULTURE - Abnormal    Color Urine Yellow      Appearance Urine Clear      Glucose Urine Negative      Bilirubin Urine Negative      Ketones Urine 60 (*)     Specific Gravity Urine 1.032      Blood Urine Negative      pH Urine 5.5      Protein Albumin Urine 10  (*)     Urobilinogen Urine 2.0      Nitrite Urine Negative      Leukocyte Esterase Urine Negative      Mucus Urine Present (*)     RBC Urine 0      WBC Urine 2      Squamous Epithelials Urine <1     CBC WITH PLATELETS AND DIFFERENTIAL    WBC Count 9.3      RBC Count 3.92      Hemoglobin 11.9      Hematocrit 36.5      MCV 93      MCH 30.4      MCHC 32.6      RDW 12.7      Platelet Count 314      % Neutrophils 76      % Lymphocytes 18      % Monocytes 5      % Eosinophils 1      % Basophils 0      % Immature Granulocytes 0      NRBCs per 100 WBC 0      Absolute Neutrophils 7.0      Absolute Lymphocytes 1.7      Absolute Monocytes 0.5      Absolute Eosinophils 0.1      Absolute Basophils 0.0      Absolute Immature Granulocytes 0.0      Absolute NRBCs 0.0     GLUCOSE MONITOR NURSING POCT       Vital Signs:   Vitals:    05/05/23 1645 05/05/23 1800 05/05/23 1830 05/05/23 1900   BP: 100/66 123/72 (!) 117/101 113/47   Pulse: 86 71 94 73   Resp: 20      Temp: 98.6  F (37  C)      TempSrc: Oral      SpO2: 100% 100% 100%        Cardiac Rhythm:     Was the PSS-3 completed:   Yes  What interventions are required if any?               Family Comments:   OBS brochure/video discussed/provided to patient/family: Yes              Name of person given brochure if not patient:               Relationship to patient:     For the majority of the shift this patient's behavior was Green.   Behavioral interventions performed were None.    ED NURSE PHONE NUMBER: *[id number]

## 2023-05-06 NOTE — ED NOTES
Pt is quiet and withdrawn, but calm and compliant with cares.  Perineal care performed at this time following a large almost blackish stool.  Pt denies any abdominal pain but had a small emesis on her pillow case. She denied being nauseated.  MD updated.    Chio Shepard RN,.......................................... 5/5/2023   8:30 PM

## 2023-05-06 NOTE — PROGRESS NOTES
[facility]    Medicine Progress Note - Hospitalist Service    Date of Admission:  5/5/2023    Assessment & Plan   Bhavana Marr is an 82-year-old female patient with history including dementia; asthma; dyslipidemia; depression/anxiety; bipolar disorder; borderline personality disorder; osteoporosis; prior gastric bypass; and recent hospitalization (4/21/2023 to 4/28/2023) with issues including suicidal ideation and dementia with behavioral disturbance. She presents from her memory care facility with decreased oral intake, low glucose level, low blood pressure, depression and possible suicidal ideations.     On initial evaluation, patient was afebrile, normotensive, not tachycardic.  Labs notable for BMP with sodium 133 otherwise normal; CBC was normal; LFTs showed low albumin of 3.0 and low protein 5.3 and low AST of 9, otherwise normal; glucose 109; urinalysis showed 60 ketones and not suggestive of infection.        Decreased oral intake with hypoglycemia, low blood pressures.  Hyponatremia, suspect due to decreased PO intake.  Decreased oral intake, possibly due to depression.  * Initial presentation as above. Pt found with low sodium as well as ketones in the urine. Given fluids in the ED.    * No abdominal pain or lab findings to suggest a primary GI etiology.  - Continue IVF's - D5 NS at 75 ml/hr.  - Continue diet as tolerated.  - Lipase minimally elevated at 84.  - Mental health management as noted.     Psychiatric issues.  Dementia with h/o behavioral disturbance.  Depression/anxiety, bipolar d/o, borderline personality d/o with possible suicidal ideations.  * Of note is that the patient was recently hospitalized through [facility] with issues including suicidal ideation and dementia.  She was seen by psychiatry during that hospitalization; they did not recommend any type of psychiatric admission.    * Initial presentation as above. Noted manic episode and some confusion at memory  "care. On admit, pt denied suicidal ideations on admit but did endorse feeling depressed.  She was seen by the DEC  in the ED and not felt to be actively suicidal.  - Continue PTA mirtazapine; olanzapine; sertraline.  - Psychiatry consulted, appreciate their assistance.  - Re-orient as needed.  - Maintain normal day/night, sleep/wake cycles.  - Minimize sedating medications as able.     Asthma.  - Continue budesonide-formoterol.     Dyslipidemia.  - Continue simvastatin.     GERD.  - Continue famotidine.     Osteoporosis.  - Resume alendronate after discharge.     Prior gastric bypass.  B12 deficiency.  Iron deficienty anemia.  - Continue B12 and iron.     Dispo.  - Pending psychiatry evaluation.       Diet: Regular Diet Adult    DVT Prophylaxis: Ambulate every shift  Cortez Catheter: Not present  Lines: None     Cardiac Monitoring: None  Code Status: Full Code      Clinically Significant Risk Factors Present on Admission              # Hypoalbuminemia: Lowest albumin = 3 g/dL at 5/5/2023  4:53 PM, will monitor as appropriate                  Disposition Plan      Expected Discharge Date: 05/06/2023    Discharge Delays: Other (Add Comment)    Discharge Comments: SW consult, CM          Nuno Courtney MD  Hospitalist Service  [facility]  Securely message with [employer] (more info)  Text page via [employer] Paging/Directory   ______________________________________________________________________    Interval History   Bhavana Marr was seen this morning. I introduced myself and tried to talk to her, however she told me to \"go find something else to do.\"    Physical Exam   Vital Signs: Temp: 98.4  F (36.9  C) Temp src: Oral BP: (!) 147/65 Pulse: 58   Resp: 19 SpO2: 98 % O2 Device: None (Room air)    Weight: 119 lbs .77 oz    Patient refused.    Medical Decision Making       25 MINUTES SPENT BY ME on the date of service doing chart review, history, exam, documentation & further activities " per the note.      Data     I have personally reviewed the following data over the past 24 hrs:    10.0  \   11.5 (L)   / 309     135 (L) 103 12.7 /  115 (H)   4.1 24 0.63 \       ALT: N/A AST: N/A AP: N/A TBILI: N/A   ALB: N/A TOT PROTEIN: N/A LIPASE: N/A       Imaging results reviewed over the past 24 hrs:   Recent Results (from the past 24 hour(s))   US Lower Extremity Venous Duplex Right    Narrative    EXAM: US LOWER EXTREMITY VENOUS DUPLEX RIGHT  LOCATION: [facility]  DATE/TIME: 5/5/2023 9:00 PM CDT    INDICATION: Leg pain,   COMPARISON: None.  TECHNIQUE: Venous Duplex ultrasound of the right lower extremity with and without compression, augmentation and duplex. Color flow and spectral Doppler with waveform analysis performed.    FINDINGS: Exam includes the common femoral, femoral, popliteal, and contralateral common femoral veins as well as segmentally visualized deep calf veins and greater saphenous vein.     RIGHT: No deep vein thrombosis. No superficial thrombophlebitis. No popliteal cyst.      Impression    IMPRESSION:  1.  No deep venous thrombosis in the right lower extremity.

## 2023-05-06 NOTE — PROGRESS NOTES
Orientation/Cognitive: Disoriented X3   Observation Goals (Met/ Not Met): Not met  Mobility Level/Assist Equipment: A1 GB/W  Fall Risk (Y/N): Y   Behavior Concerns: confused trying to get OOB frequently, pulled IV access twice.  Pain Management: Denies pain  Tele/VS/O2: no tele, VSS, O2 RA  ABNL Lab/BG: sodium 135  Diet: reg  Bowel/Bladder: Incontinent @ times  Skin Concerns: blanchable redness on buttocks, R hip   Drains/Devices: Lost IV access   Tests/Procedures for next shift: psych consult  Anticipated DC date & active delays: TBD

## 2023-05-06 NOTE — PLAN OF CARE
Bhavana Munguia CHAS Justa  May 5, 2023  Plan of Care Hand-off Note     Patient Care Path: Inpatient Medical    Plan for Care:   Writer unable to assess current mental health crisis due to pt being unwilling to participate in assessment. Per collateral, pt is refusing to eat, drink, refusing cares and appears to be experiencing failure to thrive. Pt would benefit from medical admission for further medical stabilization based on current presentation. Pt would likely not benefit from IP mental health placement based on dementia diagnosis, but would benefit from a psychiatry consult to review current medications.     Critical Safety Issues: Pt refusing cares, refusing to eat and drink.     Overview:  This patient is a child/adolescent: No    This patient has additional special visitor precautions: No    Legal Status: Voluntary    Contacts:   : [facility] [phone]  [facility] [phone]    Psychiatry Consult:  Adult Psychiatry Consult requested related to medication review. Psychiatry IP Consult Order Placed: Yes    Updated RN and Attending Provider regarding plan of care.    Rachel Walker

## 2023-05-06 NOTE — PROGRESS NOTES
RECEIVING UNIT ED HANDOFF REVIEW    ED Nurse Handoff Report was reviewed by: Trupti Galarza RN on May 5, 2023 at 9:05 PM

## 2023-05-06 NOTE — PROGRESS NOTES
Observation goals  PRIOR TO DISCHARGE        Comments:   -Tolerating diet: not met, refused breakfast, refused lunch  -Seen by Psychiatry: not met  -BP and glucose stable:  met  Nurse to notify provider when observation goals have been met and patient is ready for discharge.        Orientation/Cognitive: A/Ox1, irritable, refuses meds  Observation Goals (Met/ Not Met): not met  Mobility Level/Assist Equipment: A1 gb w, forgets she can ambulate  Fall Risk (Y/N): yes  Behavior Concerns: irritable, confused, refuses meds and assessments  Pain Management: denies  Tele/VS/O2: VSS RA  ABNL Lab/BG: , hgb 11.5, abnl UA  Diet: reg  Bowel/Bladder: inc, no BM this shift  Skin Concerns: R hip, chino red  Drains/Devices: no IV access, vasc access paged, needed for cont IVF D5 in NS  Tests/Procedures for next shift: Psych consult  Anticipated DC date & active delays: TBD  Patient Stated Goal for Today: CHELSEA, refused to answer  Short-Term Goals: improve PO intake  Additional Info: refused 1230 meds, moved due time to 1600

## 2023-05-06 NOTE — PROGRESS NOTES
Observation goals  PRIOR TO DISCHARGE        Comments:   -Tolerating diet.   Not met  -Seen by Psychiatry.   Not met  -BP and glucose stable.   Met  Nurse to notify provider when observation goals have been met and patient is ready for discharge.

## 2023-05-06 NOTE — PROVIDER NOTIFICATION
Clarified with hospitalist regarding needing a 1:1 sitter since notes indicated pt having possible suicidal ideations.  Per hospitalist, no need for a sitter.

## 2023-05-06 NOTE — PLAN OF CARE
Goal Outcome Evaluation:      Plan of Care Reviewed With: patient    Overall Patient Progress: improvingOverall Patient Progress: improving       Summary:  Shift [id number]. Failure to thrive, hasn't been eating or drinking at [facility].    Care Plan Summary Note:  Orientation: A&Ox3, confused at times  Observation Goals (met & not met): Not met  Activity Level: Ax1 Gb/W, ambulated to bathroom   Fall Risk: Yes  Behavior & Aggression Tool Color: Green  Pain Management: Denies pain, has prn Tyl  Tele/VS/O2: VSS on RA  ABNL Lab/BG: None  Diet: Reg  Bowel/Bladder: Continent   Skin concerns: red blanchable to buttocks  Drains/Devices: IV infusing D5 & 0.9% NS at 75 mL/hr. Has wrap cover over IV site.  Tests/Procedures for next shift: Psych consult, SW/CM consult  Anticipated DC date: 1-2 days  Other Important Info: Had past suicidal ideation, MD confirmed no sitter needed at this time  Goal of day: to get rest       Observation goals  PRIOR TO DISCHARGE        Comments:   -Tolerating diet: not met, hasn't had appetite during our shift  -Seen by Psychiatry: not met  -BP and glucose stable: met  Nurse to notify provider when observation goals have been met and patient is ready for discharge.

## 2023-05-06 NOTE — PROGRESS NOTES
Observation goals  PRIOR TO DISCHARGE        Comments:   -Tolerating diet: not met, hasn't had appetite during our shift  -Seen by Psychiatry: not met  -BP and glucose stable: met  Nurse to notify provider when observation goals have been met and patient is ready for discharge.

## 2023-05-06 NOTE — PROGRESS NOTES
Orientation/Cognitive: Disoriented X4   Observation Goals (Met/ Not Met): Not met  Mobility Level/Assist Equipment: Lift X2  Fall Risk (Y/N): Y  Behavior Concerns: suicidal ideation   Pain Management: Denies pain  Tele/VS/O2: no tele, VSS, O2 RA  ABNL Lab/BG: sodium 133, albumin 3.0, protein total 5.9, Lipase 84  Diet: reg  Bowel/Bladder: Incontinent   Skin Concerns: blanchable redness on buttocks  Drains/Devices: PIV infusing dextrose 5% 0.9 % NaCl 75 mL/hr  Tests/Procedures for next shift: psych consult, GREGG,    Anticipated DC date & active delays: TBD

## 2023-05-06 NOTE — H&P
INTERNAL MEDICINE HISTORY AND PHYSICAL  [facility]      Bhavana Marr [MR#: [id number]  : 1940  82 year old female]  Date of Admission:  2023  Primary Care Provider:  Inder Lindsey      Chief Complaint:   Low glucose level, low blood pressure, decreased oral intake, depression, possible suicidal ideations.      History of Present Illness:   Ms. Bhavana Marr is an 82-year-old female patient with history including dementia; asthma; dyslipidemia; depression/anxiety; bipolar disorder; borderline personality disorder; osteoporosis; prior gastric bypass; and recent hospitalization (2023 to 2023) with issues including suicidal ideation and dementia with behavioral disturbance. She presents from her memory care facility with decreased oral intake, low glucose level, low blood pressure, depression and possible suicidal ideations.    Of note is that the patient was recently hospitalized through [facility] with issues including suicidal ideation and dementia.  She was seen by psychiatry during that hospitalization; they did not recommend any type of psychiatric admission.  Apparently had some issues with nausea which improved/resolved at the time discharge.  It appears that she was discharged on salt tablets.    In the above context, patient now presents to [facility] from her memory care unit.  Please note that the patient is not the best historian and not very conversant with me. However, in speaking with the ER provider, Felisha Cook PA-C and from reviewing the electronic medical record, it appears that patient has not been eating well recently at her memory care unit.  She has been depressed.  There were apparently reports of suicidal ideations.  Apparently she has been a bit confused and wandering to other residents rooms.  It was noted at 1 point that she was found covered in feces during a manic episode in her room.  Overall, they were monitoring her blood  "pressures and glucose levels and today she was noted to have low blood pressures and low glucose levels.  As such, paramedics were called.  Patient initially did not really want to speak with the paramedics.  She was given dextrose and IV fluids en route with improvement in blood pressure and glucose level.  She was subsequently brought to [facility] for further evaluation.      On initial evaluation, patient was afebrile, normotensive, not tachycardic.  Labs notable for BMP with sodium 133 otherwise normal; CBC was normal; LFTs showed low albumin of 3.0 and low protein 5.3 and low AST of 9, otherwise normal; glucose 109; urinalysis showed 60 ketones and not suggestive of infection.  Patient has some further soft blood pressures and was given some more IV fluids in the ER.  She was seen by the DEC  not felt to be actively suicidal and she did not need psychiatry hospitalization; however given the patient's medical issues, request for observation admission was made.    Patient presently is not very interactive with me but is calm and cooperative.  Does have some delay in her responses.  She denies wanting to harm herself.  Denies wanting to starve herself.  She admits that she has not been eating much as she is \"not hungry\".  Denies any abdominal pain or nausea/vomiting.  She does endorse feeling depressed.  No chest pain or dyspnea.      Past Medical History:   1. Dementia. Lives in memory care.  2. Asthma.  3. DLD.  4. Psychiatric: Depression/anxiety, bipolar, borderline personality d/o.  5. Osteoporosis.  6. H/o obesity s/p g-bypass.  7. H/o B12 deficiency.  8. H/o iron deficiency anemia.      Past Medical History:   Diagnosis Date     Arthritis 1998     Asthma      Asthma 6/8/2018     Blood transfusion 2000     Borderline personality disorder (H)      Chronic sinus infection      Depressive disorder      GERD (gastroesophageal reflux disease)      History of blood transfusion      Hyperlipidemia      MDD " "(major depressive disorder)      Above past medical history reviewed and edited and/or added to as necessary.    Past Surgical History:     Past Surgical History:   Procedure Laterality Date     ABDOMEN SURGERY      2 fatty tumors removed     APPENDECTOMY       BIOPSY       COLONOSCOPY       GASTRIC BYPASS       GI SURGERY  2000    gastric bypass     GYN SURGERY      complete hysterectomy     HC CORRECT BUNION,DOUBLE OSTEOTOMY      Description: Hallux Valgus (Bunion) Correction;  Recorded: 05/07/2012;     ORTHOPEDIC SURGERY      both hip,two foot surgies on each foot     ORTHOPEDIC SURGERY      right elbow     ZC APPENDECTOMY      Description: Appendectomy;  Recorded: 05/07/2012;     ZC TOTAL ABDOM HYSTERECTOMY      Description: Total Abdominal Hysterectomy;  Recorded: 05/07/2012;        Allergies:     Allergies   Allergen Reactions     Nsaids      Gastric bypass surgery          Medications:     Prior to Admission Medications   Prescriptions Last Dose Informant Patient Reported? Taking?   Calcium Oyster Shell 500 MG TABS   No No   Sig: Take 1 tablet by mouth daily   Cyanocobalamin (B-12) 1000 MCG TBCR   No No   Sig: Take 1,000 mcg by mouth every 48 hours   FEROSUL 325 (65 Fe) MG tablet   No No   Sig: TAKE 1 TABLET BY MOUTH EVERY \"OTHER\" DAY   Multiple Vitamins-Minerals (CEROVITE SENIOR) TABS   Yes No   Sig: Take 1 tablet by mouth daily   Nutritional Supplements (ENSURE ORIGINAL) LIQD   Yes No   Sig: Take 1 Container by mouth daily   OLANZapine (ZYPREXA) 2.5 MG tablet   Yes No   Sig: Take 1 tablet (2.5 mg) by mouth every morning   OLANZapine (ZYPREXA) 5 MG tablet   Yes No   Sig: Take 2 tablets (10 mg) by mouth At Bedtime   SENNA-docusate sodium (SENNA S) 8.6-50 MG tablet   Yes No   Sig: Take 1 tablet by mouth once as needed (constipation)   SENNA-docusate sodium (SENNA S) 8.6-50 MG tablet   Yes No   Sig: Take 1 tablet by mouth At Bedtime   White Petrolatum ointment   No No   Sig: Apply topically as needed for dry " skin   acetaminophen (TYLENOL) 325 MG tablet   No No   Sig: Take 1-2 tablets (325-650 mg) by mouth 2 times daily   alendronate (FOSAMAX) 70 MG tablet   Yes No   Sig: Take 70 mg by mouth every 7 days Every Wednesday   alum & mag hydroxide-simethicone (MYLANTA/MAALOX) 200-200-20 MG/5ML SUSP suspension   Yes No   Sig: Take 15 mLs by mouth every 3 hours as needed for indigestion   budesonide-formoterol (SYMBICORT) 80-4.5 MCG/ACT Inhaler   No No   Sig: Inhale 2 puffs into the lungs 2 times daily as needed (Shortness of breath, wheezing)   calcium carbonate (TUMS) 500 MG chewable tablet   Yes No   Sig: Chew 2 tabs every 3 hours as needed   capsaicin (ZOSTRIX) 0.025 % external cream   Yes No   Sig: Apply topically 3 times daily as needed   cholecalciferol (VITAMIN D3) 1000 UNIT tablet   No No   Sig: Take 1 tablet (1,000 Units) by mouth daily   famotidine (PEPCID) 20 MG tablet   Yes No   Sig: Take 20 mg by mouth 2 times daily   fluticasone (FLONASE) 50 MCG/ACT nasal spray   Yes No   Sig: Spray 2 sprays into both nostrils daily as needed for rhinitis or allergies   guaiFENesin-dextromethorphan (ROBITUSSIN DM) 100-10 MG/5ML syrup   Yes No   Sig: Take 10 mLs by mouth every 4 hours as needed for cough   hydrOXYzine (ATARAX) 50 MG tablet   No No   Sig: Take 1 tablet (50 mg) by mouth At Bedtime For insomnia   hypromellose (ARTIFICIAL TEARS) 0.5 % SOLN ophthalmic solution   Yes No   Sig: Place 2 drops into both eyes 4 times daily as needed   loperamide (IMODIUM) 1 MG/5ML solution   Yes No   Sig: Give 4 tsp by mouth as needed for diarrhea. 2-4 tsp after first loose stool and 2 tsp after each additional loose stool. Not to exceed 8 tsp in 72 hours   magnesium hydroxide (MILK OF MAGNESIA) 400 MG/5ML suspension   Yes No   Sig: Take 30 mLs by mouth daily as needed for constipation   mirtazapine (REMERON) 15 MG tablet   Yes No   Sig: Take 1.5 tablets (22.5 mg) by mouth At Bedtime   ondansetron (ZUPLENZ) 4 MG FILM   No No   Sig: Take 4  mg by mouth every 4 hours as needed (for nausea, vomiting)   polyethylene glycol (MIRALAX) 17 GM/Dose powder   Yes No   Sig: Take 17 g (1 capful) by mouth twice a week. May also take 17 g (1 capful) daily as needed for constipation.   salicylic acid (MEDIPLAST) 40 % miscellaneous   No No   Sig: Apply to plantar wart every 48 hours for up to 12 weeks   sertraline (ZOLOFT) 100 MG tablet   No No   Sig: Take 1.5 tablets (150 mg) by mouth daily   simvastatin (ZOCOR) 40 MG tablet   No No   Sig: Take 1 tablet (40 mg) by mouth At Bedtime   trolamine salicylate (ASPERCREME) 10 % external cream   Yes No   Sig: Apply topically as needed for moderate pain   valACYclovir (VALTREX) 1000 mg tablet   Yes No   Sig: Take 1 tablet (1,000 mg) by mouth 2 times daily For outbreaks   valACYclovir (VALTREX) 500 MG tablet   Yes No   Sig: Give 500 mg by mouth one time a day related to other herpesviral infection for outbreak prevention      Facility-Administered Medications: None       Social History:   . No children. Does not smoke/drink.  Is in a memory care facility.  Social History     Socioeconomic History     Marital status:      Spouse name: Not on file     Number of children: Not on file     Years of education: Not on file     Highest education level: Not on file   Occupational History     Not on file   Tobacco Use     Smoking status: Never     Smokeless tobacco: Never   Vaping Use     Vaping status: Not on file   Substance and Sexual Activity     Alcohol use: No     Drug use: No     Sexual activity: Not Currently     Birth control/protection: None   Other Topics Concern     Parent/sibling w/ CABG, MI or angioplasty before 65F 55M? No   Social History Narrative     Not on file     Social Determinants of Health     Financial Resource Strain: Not on file   Food Insecurity: Not on file   Transportation Needs: Not on file   Physical Activity: Not on file   Stress: Not on file   Social Connections: Not on file   Intimate  Partner Violence: Not on file   Housing Stability: Not on file       Family History:   Reviewed.  Family History   Problem Relation Age of Onset     Depression Maternal Aunt      Depression Maternal Uncle      Depression Paternal Aunt      Depression Paternal Uncle        Review of Systems:   As noted in the HPI; otherwise 10 point review of systems was negative.     Physical Exam:   VITALS:  Blood pressure 113/47, pulse 73, temperature 98.6  F (37  C), temperature source Oral, resp. rate 20, SpO2 100 %, not currently breastfeeding.  General: Awake, alert, calm, cooperative.  Some delayed responses.  Not very talkative.  HEENT: Pupils likely round and reactive, no scleral icterus or conjunctival injection.  Oropharynx without erythema or exudate.  Neck: No bruits, JVD or adenopathy.  Heart/Chest: Regular rate and rhythm without murmurs, rubs or gallops.  Lungs: Diminished in bases without crackles or wheezes.  Abdomen: Soft, nontender, nondistended, possible sounds.  Extremities/Musculoskeletal: No bilateral extremity pitting edema.  Skin:    Neurologic:       Labs, Imaging & Other Data:     Results for orders placed or performed during the hospital encounter of 05/05/23   Comprehensive metabolic panel     Status: Abnormal   Result Value Ref Range    Sodium 133 (L) 136 - 145 mmol/L    Potassium 3.9 3.4 - 5.3 mmol/L    Chloride 100 98 - 107 mmol/L    Carbon Dioxide (CO2) 23 22 - 29 mmol/L    Anion Gap 10 7 - 15 mmol/L    Urea Nitrogen 19.5 8.0 - 23.0 mg/dL    Creatinine 0.67 0.51 - 0.95 mg/dL    Calcium 8.8 8.8 - 10.2 mg/dL    Glucose 109 (H) 70 - 99 mg/dL    Alkaline Phosphatase 50 35 - 104 U/L    AST 20 10 - 35 U/L    ALT 9 (L) 10 - 35 U/L    Protein Total 5.3 (L) 6.4 - 8.3 g/dL    Albumin 3.0 (L) 3.5 - 5.2 g/dL    Bilirubin Total 0.2 <=1.2 mg/dL    GFR Estimate 87 >60 mL/min/1.73m2   CBC with platelets and differential     Status: None   Result Value Ref Range    WBC Count 9.3 4.0 - 11.0 10e3/uL    RBC Count 3.92  3.80 - 5.20 10e6/uL    Hemoglobin 11.9 11.7 - 15.7 g/dL    Hematocrit 36.5 35.0 - 47.0 %    MCV 93 78 - 100 fL    MCH 30.4 26.5 - 33.0 pg    MCHC 32.6 31.5 - 36.5 g/dL    RDW 12.7 10.0 - 15.0 %    Platelet Count 314 150 - 450 10e3/uL    % Neutrophils 76 %    % Lymphocytes 18 %    % Monocytes 5 %    % Eosinophils 1 %    % Basophils 0 %    % Immature Granulocytes 0 %    NRBCs per 100 WBC 0 <1 /100    Absolute Neutrophils 7.0 1.6 - 8.3 10e3/uL    Absolute Lymphocytes 1.7 0.8 - 5.3 10e3/uL    Absolute Monocytes 0.5 0.0 - 1.3 10e3/uL    Absolute Eosinophils 0.1 0.0 - 0.7 10e3/uL    Absolute Basophils 0.0 0.0 - 0.2 10e3/uL    Absolute Immature Granulocytes 0.0 <=0.4 10e3/uL    Absolute NRBCs 0.0 10e3/uL   Glucose by meter     Status: Abnormal   Result Value Ref Range    GLUCOSE BY METER POCT 104 (H) 70 - 99 mg/dL   UA with Microscopic reflex to Culture     Status: Abnormal    Specimen: Urine, Catheter   Result Value Ref Range    Color Urine Yellow Colorless, Straw, Light Yellow, Yellow    Appearance Urine Clear Clear    Glucose Urine Negative Negative mg/dL    Bilirubin Urine Negative Negative    Ketones Urine 60 (A) Negative mg/dL    Specific Gravity Urine 1.032 1.003 - 1.035    Blood Urine Negative Negative    pH Urine 5.5 5.0 - 7.0    Protein Albumin Urine 10 (A) Negative mg/dL    Urobilinogen Urine 2.0 Normal, 2.0 mg/dL    Nitrite Urine Negative Negative    Leukocyte Esterase Urine Negative Negative    Mucus Urine Present (A) None Seen /LPF    RBC Urine 0 <=2 /HPF    WBC Urine 2 <=5 /HPF    Squamous Epithelials Urine <1 <=1 /HPF    Narrative    Urine Culture not indicated   CBC with platelets + differential     Status: None    Narrative    The following orders were created for panel order CBC with platelets + differential.  Procedure                               Abnormality         Status                     ---------                               -----------         ------                     CBC with platelets and  d...[[id number]]                      Final result                 Please view results for these tests on the individual orders.         ASSESSMENT & PLAN:   Ms. Bhavana Marr is an 82-year-old female patient with history including dementia; asthma; dyslipidemia; depression/anxiety; bipolar disorder; borderline personality disorder; osteoporosis; prior gastric bypass; and recent hospitalization (4/21/2023 to 4/28/2023) with issues including suicidal ideation and dementia with behavioral disturbance. She presents from her memory care facility with decreased oral intake, low glucose level, low blood pressure, depression and possible suicidal ideations.    On initial evaluation, patient was afebrile, normotensive, not tachycardic.  Labs notable for BMP with sodium 133 otherwise normal; CBC was normal; LFTs showed low albumin of 3.0 and low protein 5.3 and low AST of 9, otherwise normal; glucose 109; urinalysis showed 60 ketones and not suggestive of infection.        Decreased oral intake with hypoglycemia, low blood pressures.  Hyponatremia, suspect due to decreased PO intake.  Decreased oral intake, possibly due to depression.  * Initial presentation as above. Pt found with low sodium as well as ketones in the urine. Given fluids in the ED.    * No abdominal pain or lab findings to suggest a primary GI etiology.  - Continue IVF's - D5 NS at 75 ml/hr.  - Continue diet as tolerated.  - Check lipase.  - Mental health management as noted.    Psychiatric issues.  Dementia with h/o behavioral disturbance.  Depression/anxiety, bipolar d/o, borderline personality d/o with possible suicidal ideations.  * Of note is that the patient was recently hospitalized through [facility] with issues including suicidal ideation and dementia.  She was seen by psychiatry during that hospitalization; they did not recommend any type of psychiatric admission.    * Initial presentation as above. Noted manic episode and some confusion at memory  care. On admit, pt denied suicidal ideations on admit but did endorse feeling depressed.  She was seen by the DEC  in the ED and not felt to be actively suicidal.  - Continue PTA mirtazapine; olanzapine; sertraline.  - Ask psychiatry to see, appreciate help.  - Re-orient as needed.  - Maintain normal day/night, sleep/wake cycles.  - Minimize sedating medications as able.    Asthma.  - Continue budesonide-formoterol and we will order PRN albuterol.    Dyslipidemia.  - Continue simvastatin.    GERD.  - Continue famotidine.    Osteoporosis.  - Resume alendronate after discharge.    Prior gastric bypass.  B12 deficiency.  Iron deficienty anemia.  - Continue B12 and iron.    Dispo.  - Possibly discharge back to memory care tomorrow if BP's and glucose stable; seen by Psychiatry.  - Ask SW to see for discharge planning to memory care, appreciate help.      Mayank Key Jr., MD  [phone] (p)  Text Page  Daniel

## 2023-05-07 LAB
ANION GAP SERPL CALCULATED.3IONS-SCNC: 11 MMOL/L (ref 7–15)
BUN SERPL-MCNC: 7.4 MG/DL (ref 8–23)
CALCIUM SERPL-MCNC: 9.3 MG/DL (ref 8.8–10.2)
CHLORIDE SERPL-SCNC: 103 MMOL/L (ref 98–107)
CREAT SERPL-MCNC: 0.63 MG/DL (ref 0.51–0.95)
DEPRECATED HCO3 PLAS-SCNC: 23 MMOL/L (ref 22–29)
GFR SERPL CREATININE-BSD FRML MDRD: 89 ML/MIN/1.73M2
GLUCOSE SERPL-MCNC: 117 MG/DL (ref 70–99)
MAGNESIUM SERPL-MCNC: 2 MG/DL (ref 1.7–2.3)
PHOSPHATE SERPL-MCNC: 3 MG/DL (ref 2.5–4.5)
POTASSIUM SERPL-SCNC: 3.7 MMOL/L (ref 3.4–5.3)
SODIUM SERPL-SCNC: 137 MMOL/L (ref 136–145)

## 2023-05-07 PROCEDURE — 250N000011 HC RX IP 250 OP 636: Performed by: INTERNAL MEDICINE

## 2023-05-07 PROCEDURE — 120N000001 HC R&B MED SURG/OB

## 2023-05-07 PROCEDURE — 250N000013 HC RX MED GY IP 250 OP 250 PS 637: Performed by: INTERNAL MEDICINE

## 2023-05-07 PROCEDURE — 83735 ASSAY OF MAGNESIUM: CPT | Performed by: HOSPITALIST

## 2023-05-07 PROCEDURE — 80048 BASIC METABOLIC PNL TOTAL CA: CPT | Performed by: HOSPITALIST

## 2023-05-07 PROCEDURE — 36415 COLL VENOUS BLD VENIPUNCTURE: CPT | Performed by: HOSPITALIST

## 2023-05-07 PROCEDURE — 96376 TX/PRO/DX INJ SAME DRUG ADON: CPT

## 2023-05-07 PROCEDURE — 99232 SBSQ HOSP IP/OBS MODERATE 35: CPT | Performed by: HOSPITALIST

## 2023-05-07 PROCEDURE — 84100 ASSAY OF PHOSPHORUS: CPT | Performed by: HOSPITALIST

## 2023-05-07 PROCEDURE — 258N000003 HC RX IP 258 OP 636: Performed by: INTERNAL MEDICINE

## 2023-05-07 PROCEDURE — G0378 HOSPITAL OBSERVATION PER HR: HCPCS

## 2023-05-07 PROCEDURE — 250N000013 HC RX MED GY IP 250 OP 250 PS 637: Performed by: HOSPITALIST

## 2023-05-07 RX ORDER — AMOXICILLIN 250 MG
2 CAPSULE ORAL 2 TIMES DAILY
Status: DISCONTINUED | OUTPATIENT
Start: 2023-05-07 | End: 2023-05-26

## 2023-05-07 RX ADMIN — ONDANSETRON 4 MG: 2 INJECTION INTRAMUSCULAR; INTRAVENOUS at 07:56

## 2023-05-07 RX ADMIN — DEXTROSE AND SODIUM CHLORIDE: 5; 900 INJECTION, SOLUTION INTRAVENOUS at 20:12

## 2023-05-07 RX ADMIN — FAMOTIDINE 20 MG: 20 TABLET ORAL at 09:01

## 2023-05-07 RX ADMIN — MIRTAZAPINE 22.5 MG: 15 TABLET, FILM COATED ORAL at 23:09

## 2023-05-07 RX ADMIN — OLANZAPINE 2.5 MG: 2.5 TABLET, FILM COATED ORAL at 09:01

## 2023-05-07 RX ADMIN — ACETAMINOPHEN 650 MG: 325 TABLET ORAL at 20:04

## 2023-05-07 RX ADMIN — FAMOTIDINE 20 MG: 20 TABLET ORAL at 20:03

## 2023-05-07 RX ADMIN — SENNOSIDES AND DOCUSATE SODIUM 2 TABLET: 50; 8.6 TABLET ORAL at 20:11

## 2023-05-07 RX ADMIN — VALACYCLOVIR HYDROCHLORIDE 500 MG: 500 TABLET, FILM COATED ORAL at 09:01

## 2023-05-07 RX ADMIN — DEXTROSE AND SODIUM CHLORIDE: 5; 900 INJECTION, SOLUTION INTRAVENOUS at 08:02

## 2023-05-07 RX ADMIN — OLANZAPINE 10 MG: 5 TABLET, FILM COATED ORAL at 23:09

## 2023-05-07 RX ADMIN — SENNOSIDES AND DOCUSATE SODIUM 1 TABLET: 50; 8.6 TABLET ORAL at 09:06

## 2023-05-07 RX ADMIN — Medication 1 MG: at 00:35

## 2023-05-07 RX ADMIN — SERTRALINE HYDROCHLORIDE 150 MG: 100 TABLET ORAL at 09:02

## 2023-05-07 RX ADMIN — ACETAMINOPHEN 650 MG: 325 TABLET ORAL at 09:07

## 2023-05-07 ASSESSMENT — ACTIVITIES OF DAILY LIVING (ADL)
ADLS_ACUITY_SCORE: 37
ADLS_ACUITY_SCORE: 37
ADLS_ACUITY_SCORE: 39
ADLS_ACUITY_SCORE: 37
ADLS_ACUITY_SCORE: 39

## 2023-05-07 NOTE — PROGRESS NOTES
[facility]    Medicine Progress Note - Hospitalist Service    Date of Admission:  5/5/2023    Assessment & Plan   Bhavana Marr is an 82-year-old female patient with history including dementia; asthma; dyslipidemia; depression/anxiety; bipolar disorder; borderline personality disorder; osteoporosis; prior gastric bypass; and recent hospitalization (4/21/2023 to 4/28/2023) with issues including suicidal ideation and dementia with behavioral disturbance. She presents from her [facility] care facility with decreased oral intake, low glucose level, low blood pressure, depression and possible suicidal ideations.     On initial evaluation, patient was afebrile, normotensive, not tachycardic.  Labs notable for BMP with sodium 133 otherwise normal; CBC was normal; LFTs showed low albumin of 3.0 and low protein 5.3 and low AST of 9, otherwise normal; glucose 109; urinalysis showed 60 ketones and not suggestive of infection.        Decreased oral intake with hypoglycemia, low blood pressures  Hyponatremia, suspect due to decreased PO intake  Decreased oral intake, possibly due to depression  * Initial presentation as above. Pt found with low sodium as well as ketones in the urine. Given fluids in the ED.    * No abdominal pain or lab findings to suggest a primary GI etiology.  - Continue IVF's - D5 NS at 75 ml/hr.  - Continue diet as tolerated.  - Lipase minimally elevated at 84.  - Mental health management as noted.     Psychiatric issues  Dementia with h/o behavioral disturbance  Depression/anxiety, bipolar d/o, borderline personality d/o with possible suicidal ideations  * Of note is that the patient was recently hospitalized through [facility] with issues including suicidal ideation and dementia.  She was seen by psychiatry during that hospitalization; they did not recommend any type of psychiatric admission.    * Initial presentation as above. Noted manic episode and some confusion at [facility].  On admit, pt denied suicidal ideations on admit but did endorse feeling depressed.  She was seen by the DEC  in the ED and not felt to be actively suicidal.  - Continue PTA mirtazapine; olanzapine; sertraline.  - Psychiatry consulted, appreciate their assistance.  - Re-orient as needed.  - Maintain normal day/night, sleep/wake cycles.  - Minimize sedating medications as able.     Asthma  - Continue budesonide-formoterol.     Dyslipidemia  - Continue simvastatin.     GERD  - Continue famotidine.     Osteoporosis  - Resume alendronate after discharge.     Prior gastric bypass  B12 deficiency  Iron deficienty anemia  - Continue B12 and iron.    Constipation  - Increase senokot-s.  - PRN miralax available.     Dispo  - Pending psychiatry evaluation.       Diet: Regular Diet Adult    DVT Prophylaxis: Ambulate every shift  Cortez Catheter: Not present  Lines: None     Cardiac Monitoring: None  Code Status: Full Code      Clinically Significant Risk Factors Present on Admission              # Hypoalbuminemia: Lowest albumin = 3 g/dL at 5/5/2023  4:53 PM, will monitor as appropriate                  Disposition Plan      Expected Discharge Date: 05/08/2023    Discharge Delays: Specialist Consult (enter specialist & decision needed in comments)    Discharge Comments: Psych consult          Nuno Courtney MD  Hospitalist Service  [facility]  Securely message with [employer] (more info)  Text page via [employer] Paging/Directory   ______________________________________________________________________    Interval History   Bhavana Marr was seen this morning. She had some nausea last night, improved now. States she occasionally will get some nausea/vomiting ever since her gastric bypass surgery. Feels constipated, last BM was 3 days ago. Denies fevers, chest pain, shortness of breath.    Physical Exam   Vital Signs: Temp: 98.6  F (37  C) Temp src: Oral BP: 116/77 Pulse: 93   Resp: 18 SpO2: 96 % O2  Device: None (Room air)    Weight: 119 lbs .77 oz    Constitutional: awake, alert, cooperative, no apparent distress, laying in the hospital bed  Respiratory: no increased work of breathing, clear to auscultation bilaterally, no crackles or wheezing  Cardiovascular: regular rate and rhythm, normal S1 and S2, no murmur noted  GI: normal bowel sounds, soft, non-distended, non-tender  Skin: warm, dry  Musculoskeletal: no lower extremity pitting edema present  Neurologic: awake, alert, answers questions appropriately, moves all extremities, flat affect    Medical Decision Making       35 MINUTES SPENT BY ME on the date of service doing chart review, history, exam, documentation & further activities per the note.      Data     I have personally reviewed the following data over the past 24 hrs:    N/A  \   N/A   / N/A     137 103 7.4 (L) /  117 (H)   3.7 23 0.63 \

## 2023-05-07 NOTE — PROGRESS NOTES
Observation goals  PRIOR TO DISCHARGE        Comments:   -Tolerating diet: not met, refusing food  -Seen by Psychiatry: not met  -BP and glucose stable:  met  Nurse to notify provider when observation goals have been met and patient is ready for discharge.

## 2023-05-07 NOTE — UTILIZATION REVIEW
Admission Status; Secondary Review Determination       Under the authority of the Utilization Management Committee, the utilization review process indicated a secondary review on the above patient. The review outcome is based on review of the medical records, discussions with staff, and applying clinical experience noted on the date of the review.     (x) Inpatient Status Appropriate - This patient's medical care is consistent with medical management for inpatient care and reasonable inpatient medical practice.     RATIONALE FOR DETERMINATION     Patient requires inpatient admission versus short stay observation or outpatient treatment for the following reasons:  82 year old female with pmh of dementia, and borderline personalitty disorder who presents with decreased oral intake, hypoglycemia, and possible suicidal ideations from her [facility] care facility where she has not been eating or drinking. She is refusing oral intake here, and she remains on IV fluids to maintain hydration status. Psychiatry is consulted and not available on the weekends. Due to inability to maintain oral hydration without continued IV fluids and need for mental health evaluation and stabilization, would advance to inpatient status.    The expected length of stay at the time of admission was more than 2 nights because of the severity of illness, intensity of service provided, and risk for adverse outcome. Inpatient admission is appropriate.         This document was produced using voice recognition software       The information on this document is developed by the utilization review team in order for the business office to ensure compliance. This only denotes the appropriateness of proper admission status and does not reflect the quality of care rendered.   The definitions of Inpatient Status and Observation Status used in making the determination above are those provided in the CMS Coverage Manual, Chapter 1 and Chapter 6, section 70.4.    Sincerely,   Lowell Parsons DO  Physician Advisor  [facility].

## 2023-05-07 NOTE — PROVIDER NOTIFICATION
MD Notification    Notified Person: MD    Notified Person Name: RALPH HERNÁNDEZ     Notification Date/Time: 5/6/23 1906    Notification Interaction: Text    Purpose of Notification: SS Room 5503 LD. Pt lost IV access twice and has an order for Dextrose 5% & 0.9 NaCl. Pt is still refusing to eat. I ordered IV access but they are busy. Please advice. Thank you.    Orders Received: Pending    Comments:

## 2023-05-07 NOTE — PLAN OF CARE
Orientation/Cognitive: A&Ox4, forgetful  Observation Goals (Met/ Not Met): Inpatient  Mobility Level/Assist Equipment: Assist of 1 gb/w  Fall Risk (Y/N): Yes  Behavior Concerns: Consistently trying to pull IV out  Pain Management: Denies pain  Tele/VS/O2: VSS on RA  ABNL Lab/BG:   Diet: Regular, poor appetite   Bowel/Bladder: Continent  Skin Concerns: Redness to buttocks and R hip  Drains/Devices: IVF infusing; 75 D5NS  Tests/Procedures for next shift: Psych needs to see pt  Anticipated DC date & active delays: TBD  Patient Stated Goal for Today:

## 2023-05-07 NOTE — PROGRESS NOTES
Observation goals  PRIOR TO DISCHARGE          -Tolerating diet: not met, refusing food  -Seen by Psychiatry: not met  -BP and glucose stable:  met    Nurse to notify provider when observation goals have been met and patient is ready for discharge.

## 2023-05-07 NOTE — PROVIDER NOTIFICATION
MD Notification    Notified Person: MD    Notified Person Name: RALPH HERNÁNDEZ      Notification Date/Time: 5/6/23    Notification Interaction: Text    Purpose of Notification: FYI unable to get vascular access until 2300. Pt pulls the IV access out charge nurse requested a sitter. Thank you.     Orders Received: Pending    Comments:

## 2023-05-07 NOTE — PLAN OF CARE
Goal Outcome Evaluation:      Plan of Care Reviewed With: patient        Summary:  Shift 9892-4932. Failure to thrive, hasn't been eating or drinking at [facility].    Orientation/Cognitive: A&O x3, fluctuating confusion  Observation Goals (Met/ Not Met): Not met  Mobility Level/Assist Equipment: Ax1 Gb/W  Fall Risk (Y/N): Yes  Behavior Concerns: None  Pain Management: Denies of pain  Tele/VS/O2: VSS on RA  ABNL Lab/BG: none  Diet: Reg, but no appetite   Bowel/Bladder: Urinary frequency  Skin Concerns: blanchable redness to buttocks and R hip  Drains/Devices: IV D5 NaCl 75 mL/hr  Tests/Procedures for next shift: Psych consult, SW following  Anticipated DC date & active delays: TBD, psych consult pending  Patient Stated Goal for Today: To rest    Observation goals  PRIOR TO DISCHARGE        Comments:   -Tolerating diet: not met, refusing food  -Seen by Psychiatry: not met  -BP and glucose stable:  met  Nurse to notify provider when observation goals have been met and patient is ready for discharge.            V-Y Flap Text: The defect edges were debeveled with a #15 scalpel blade.  Given the location of the defect, shape of the defect and the proximity to free margins a V-Y flap was deemed most appropriate.  Using a sterile surgical marker, an appropriate advancement flap was drawn incorporating the defect and placing the expected incisions within the relaxed skin tension lines where possible.    The area thus outlined was incised deep to adipose tissue with a #15 scalpel blade.  The skin margins were undermined to an appropriate distance in all directions utilizing iris scissors.

## 2023-05-08 ENCOUNTER — DOCUMENTATION ONLY (OUTPATIENT)
Dept: OTHER | Facility: CLINIC | Age: 83
End: 2023-05-08
Payer: COMMERCIAL

## 2023-05-08 PROCEDURE — 99231 SBSQ HOSP IP/OBS SF/LOW 25: CPT | Performed by: HOSPITALIST

## 2023-05-08 PROCEDURE — 99222 1ST HOSP IP/OBS MODERATE 55: CPT

## 2023-05-08 PROCEDURE — 120N000001 HC R&B MED SURG/OB

## 2023-05-08 PROCEDURE — 258N000003 HC RX IP 258 OP 636: Performed by: INTERNAL MEDICINE

## 2023-05-08 PROCEDURE — 250N000013 HC RX MED GY IP 250 OP 250 PS 637: Performed by: HOSPITALIST

## 2023-05-08 RX ADMIN — OLANZAPINE 10 MG: 5 TABLET, FILM COATED ORAL at 21:37

## 2023-05-08 RX ADMIN — ACETAMINOPHEN 650 MG: 325 TABLET ORAL at 21:36

## 2023-05-08 RX ADMIN — ACETAMINOPHEN 650 MG: 325 TABLET ORAL at 09:06

## 2023-05-08 RX ADMIN — FAMOTIDINE 20 MG: 20 TABLET ORAL at 09:07

## 2023-05-08 RX ADMIN — DEXTROSE AND SODIUM CHLORIDE: 5; 900 INJECTION, SOLUTION INTRAVENOUS at 09:03

## 2023-05-08 RX ADMIN — FAMOTIDINE 20 MG: 20 TABLET ORAL at 21:37

## 2023-05-08 RX ADMIN — SERTRALINE HYDROCHLORIDE 150 MG: 100 TABLET ORAL at 09:07

## 2023-05-08 RX ADMIN — SENNOSIDES AND DOCUSATE SODIUM 2 TABLET: 50; 8.6 TABLET ORAL at 21:36

## 2023-05-08 RX ADMIN — MIRTAZAPINE 22.5 MG: 15 TABLET, FILM COATED ORAL at 21:37

## 2023-05-08 RX ADMIN — VALACYCLOVIR HYDROCHLORIDE 500 MG: 500 TABLET, FILM COATED ORAL at 09:06

## 2023-05-08 RX ADMIN — SENNOSIDES AND DOCUSATE SODIUM 2 TABLET: 50; 8.6 TABLET ORAL at 09:06

## 2023-05-08 RX ADMIN — OLANZAPINE 2.5 MG: 2.5 TABLET, FILM COATED ORAL at 09:07

## 2023-05-08 ASSESSMENT — ACTIVITIES OF DAILY LIVING (ADL)
ADLS_ACUITY_SCORE: 43
ADLS_ACUITY_SCORE: 39
ADLS_ACUITY_SCORE: 37
ADLS_ACUITY_SCORE: 39
ADLS_ACUITY_SCORE: 43
ADLS_ACUITY_SCORE: 39

## 2023-05-08 NOTE — PROGRESS NOTES
[facility]    Medicine Progress Note - Hospitalist Service    Date of Admission:  5/5/2023    Assessment & Plan   Bhavana Marr is an 82-year-old female patient with history including dementia; asthma; dyslipidemia; depression/anxiety; bipolar disorder; borderline personality disorder; osteoporosis; prior gastric bypass; and recent hospitalization (4/21/2023 to 4/28/2023) with issues including suicidal ideation and dementia with behavioral disturbance. She presents from her [facility] care facility with decreased oral intake, low glucose level, low blood pressure, depression and possible suicidal ideations.     On initial evaluation, patient was afebrile, normotensive, not tachycardic.  Labs notable for BMP with sodium 133 otherwise normal; CBC was normal; LFTs showed low albumin of 3.0 and low protein 5.3 and low AST of 9, otherwise normal; glucose 109; urinalysis showed 60 ketones and not suggestive of infection.        Decreased oral intake with hypoglycemia, low blood pressures  Hyponatremia, suspect due to decreased PO intake  Decreased oral intake, possibly due to depression  * Initial presentation as above. Pt found with low sodium as well as ketones in the urine. Given fluids in the ED.    * No abdominal pain or lab findings to suggest a primary GI etiology.  - Continue IVF's - D5 NS at 75 ml/hr.  - Continue diet as tolerated.  - Lipase minimally elevated at 84.  - Mental health management as noted.     Psychiatric issues  Dementia with h/o behavioral disturbance  Depression/anxiety, bipolar d/o, borderline personality d/o with possible suicidal ideations  * Of note is that the patient was recently hospitalized through [facility] with issues including suicidal ideation and dementia.  She was seen by psychiatry during that hospitalization; they did not recommend any type of psychiatric admission.    * Initial presentation as above. Noted manic episode and some confusion at [facility].  On admit, pt denied suicidal ideations on admit but did endorse feeling depressed.  She was seen by the DEC  in the ED and not felt to be actively suicidal.  - Continue PTA mirtazapine; olanzapine; sertraline.  - Psychiatry consulted, appreciate their assistance.  - Re-orient as needed.  - Maintain normal day/night, sleep/wake cycles.  - Minimize sedating medications as able.     Asthma  - Continue PTA budesonide-formoterol.     Dyslipidemia  - Continue PTA simvastatin.     GERD  - Continue PTA famotidine.     Osteoporosis  - Resume alendronate after discharge.     Prior gastric bypass  B12 deficiency  Iron deficienty anemia  - Continue B12 and iron.    Constipation  - Increased senokot-s.  - PRN miralax available.     Dispo  - Pending psychiatry evaluation.       Diet: Regular Diet Adult    DVT Prophylaxis: Ambulate every shift  Cortez Catheter: Not present  Lines: None     Cardiac Monitoring: None  Code Status: Full Code      Clinically Significant Risk Factors Present on Admission              # Hypoalbuminemia: Lowest albumin = 3 g/dL at 5/5/2023  4:53 PM, will monitor as appropriate                  Disposition Plan      Expected Discharge Date: 05/09/2023    Discharge Delays: Specialist Consult (enter specialist & decision needed in comments)    Discharge Comments: Psych consult          Nuno Courtney MD  Hospitalist Service  [facility]  Securely message with [employer] (more info)  Text page via [employer] Paging/Directory   ______________________________________________________________________    Interval History   Bhavana Marr was seen this morning. Feels depressed. No other complaints/concerns this morning. Seems open to working with psychiatry today.    Physical Exam   Vital Signs: Temp: 97.7  F (36.5  C) Temp src: Oral BP: 123/60 Pulse: 64   Resp: 18 SpO2: 95 % O2 Device: None (Room air)    Weight: 119 lbs .77 oz    Constitutional: awake, alert, cooperative, no apparent  distress, laying in the hospital bed  Respiratory: no increased work of breathing, clear to auscultation bilaterally, no crackles or wheezing  Cardiovascular: regular rate and rhythm, normal S1 and S2, no murmur noted  GI: normal bowel sounds, soft, non-distended, non-tender  Skin: warm, dry  Musculoskeletal: no lower extremity pitting edema present  Neurologic: awake, alert, answers questions appropriately, moves all extremities, flat affect    Medical Decision Making       30 MINUTES SPENT BY ME on the date of service doing chart review, history, exam, documentation & further activities per the note.      Data   NOTE: Data reviewed over the past 24 hrs contributes toward MDM complexity

## 2023-05-08 NOTE — CONSULTS
"      Initial Psychiatric Consult   Consult date: May 8, 2023         Reason for Consult, requesting source:    Depression/anxiety   Requesting source: Mayank Key    Labs and imaging reviewed. Patient seen and evaluated by DANIEL Perry CNP          HPI:   Ms. Bhavana Marr is an 82-year-old female patient with history including dementia; asthma; dyslipidemia; depression/anxiety; bipolar disorder; borderline personality disorder; osteoporosis; prior gastric bypass; and recent hospitalization (4/21/2023 to 4/28/2023) with issues including suicidal ideation and dementia with behavioral disturbance. She presents from her memory care facility with decreased oral intake, low glucose level, low blood pressure, depression and possible suicidal ideations.    Of note is that the patient was recently [facility] with issues including suicidal ideation and dementia.  She was seen by psychiatry during that hospitalization; they did not recommend any type of psychiatric admission.  Apparently had some issues with nausea which improved/resolved at the time discharge.  It appears that she was discharged on salt tablets.     On 5/4, she was seen by Primary Care after her discharged from [facility] and staff reported that she was found to have covered herself in her room and feces during a manic episode. The primary care team reached out to her psychiatrist questioning dose adjustment of sertraline due to recent hyponatremia. Sertraline seems to have been kept at 150mg daily.     On assessment today, Bhavana tells me that she is feeling \"better\" and agrees she's ready to return to her living facility. She doesn't remember why she wasn't taking her medications at home, but has been complaint while in the hospital and now reports feeling better. Denies SI, states she has been sleeping/eating better but it is to be ntoed she is a poor historian.         Past Psychiatric History:   Per chart review she " has a history of bipolar disorder, Borderline Personality Disorder and MDD. Diagnoses currently also includes dementia. Pt was admitted to [facility] medically on 4/21/23 due to suicidal ideation and was discharged back to facility on 4/28/23.         Substance Use and History:   None per chart review        Past Medical History:   PAST MEDICAL HISTORY:   Past Medical History:   Diagnosis Date     Arthritis 1998     Asthma      Asthma 6/8/2018     Blood transfusion 2000     Borderline personality disorder (H)      Chronic sinus infection      Depressive disorder      GERD (gastroesophageal reflux disease)      History of blood transfusion      Hyperlipidemia      MDD (major depressive disorder)        PAST SURGICAL HISTORY:   Past Surgical History:   Procedure Laterality Date     ABDOMEN SURGERY      2 fatty tumors removed     APPENDECTOMY       BIOPSY       COLONOSCOPY       GASTRIC BYPASS       GI SURGERY  2000    gastric bypass     GYN SURGERY      complete hysterectomy     HC CORRECT BUNION,DOUBLE OSTEOTOMY      Description: Hallux Valgus (Bunion) Correction;  Recorded: 05/07/2012;     ORTHOPEDIC SURGERY      both hip,two foot surgies on each foot     ORTHOPEDIC SURGERY      right elbow     Z APPENDECTOMY      Description: Appendectomy;  Recorded: 05/07/2012;     ZZC TOTAL ABDOM HYSTERECTOMY      Description: Total Abdominal Hysterectomy;  Recorded: 05/07/2012;             Family History:   FAMILY HISTORY:   Family History   Problem Relation Age of Onset     Depression Maternal Aunt      Depression Maternal Uncle      Depression Paternal Aunt      Depression Paternal Uncle        Family Psychiatric History: see above         Social History:   SOCIAL HISTORY:   Social History     Tobacco Use     Smoking status: Never     Smokeless tobacco: Never   Vaping Use     Vaping status: Not on file   Substance Use Topics     Alcohol use: No       Lives in care facility          Physical ROS:   The 10 point Review of  "Systems is negative other than noted in the HPI or here.           Medications:       acetaminophen  650 mg Oral BID     famotidine  20 mg Oral BID     mirtazapine  22.5 mg Oral At Bedtime     OLANZapine  10 mg Oral At Bedtime     OLANZapine  2.5 mg Oral QAM     senna-docusate  2 tablet Oral BID     sertraline  150 mg Oral Daily     sodium chloride (PF)  3 mL Intracatheter Q8H     valACYclovir  500 mg Oral Daily              Allergies:     Allergies   Allergen Reactions     Nsaids      Gastric bypass surgery            Labs:     Recent Results (from the past 48 hour(s))   Basic metabolic panel    Collection Time: 05/07/23  5:52 AM   Result Value Ref Range    Sodium 137 136 - 145 mmol/L    Potassium 3.7 3.4 - 5.3 mmol/L    Chloride 103 98 - 107 mmol/L    Carbon Dioxide (CO2) 23 22 - 29 mmol/L    Anion Gap 11 7 - 15 mmol/L    Urea Nitrogen 7.4 (L) 8.0 - 23.0 mg/dL    Creatinine 0.63 0.51 - 0.95 mg/dL    Calcium 9.3 8.8 - 10.2 mg/dL    Glucose 117 (H) 70 - 99 mg/dL    GFR Estimate 89 >60 mL/min/1.73m2   Magnesium    Collection Time: 05/07/23  5:52 AM   Result Value Ref Range    Magnesium 2.0 1.7 - 2.3 mg/dL   Phosphorus    Collection Time: 05/07/23  5:52 AM   Result Value Ref Range    Phosphorus 3.0 2.5 - 4.5 mg/dL          Physical and Psychiatric Examination:     /60 (BP Location: Right arm)   Pulse 64   Temp 97.7  F (36.5  C) (Oral)   Resp 18   Ht 1.6 m (5' 3\")   Wt 54 kg (119 lb 0.8 oz)   LMP  (LMP Unknown)   SpO2 95%   BMI 21.09 kg/m    Weight is 119 lbs .77 oz  Body mass index is 21.09 kg/m .    Physical Exam:  I have reviewed the physical exam as documented by by the medical team and agree with findings and assessment and have no additional findings to add at this time.    Mental Status Exam:    Appearance: awake, alert, adequately groomed and dressed in hospital scrubs  Attitude:  somewhat cooperative  Eye Contact:  fair  Mood:  better  Affect:  appropriate and in normal range and mood " congruent  Speech:  clear, coherent  Language: Fluent in english   Psychomotor Behavior:  no evidence of tardive dyskinesia, dystonia, or tics  Thought Process:  linear  Associations:  no loose associations  Thought Content:  no evidence of suicidal ideation or homicidal ideation and no evidence of psychotic thought  Insight:  poor  Judgement:  limited  Oriented to:  person and place  Attention Span and Concentration:  limited  Recent and Remote Memory:  poor  Fund of Knowledge: Appropriate   Gait and Station: baseline                DSM-5 Diagnosis:   History of bipolar disorder  History of Cluster B traits   Neurocognitive disorder             Assessment:   Bhavana is an 82 year old female with a history of the above diagnoses who presents to the hospital for failure to thrive. She has been taking all medications during this hospitalization, her sodium has normalized and upon interview today appears to be at baseline. I do not find her holdable. She is encouraged to follow-up with established care and services.           Summary of Recommendations:     1. Patient may return to her prior living arrangements with outpatient psychiatry follow-up     2. Continue current psychiatric medications       Betsy Copeland, VINITA-BC  Consult/Liaison Psychiatry   [facility]

## 2023-05-08 NOTE — PROGRESS NOTES
Orientation/Cognitive: A&OX4 but forgetful at times  Observation Goals (Met/ Not Met): Inpatient  Mobility Level/Assist Equipment: SBA GBW  Fall Risk (Y/N): Yes  Behavior Concerns: None  Pain Management: Denies  Tele/VS/O2: VSS on RA  ABNL Lab/BG:   Diet: Regular  Bowel/Bladder: Urinary frequency  Skin Concerns: Blanchable redness on L hip, mepilex applied  Drains/Devices: No PIV, pt pulled out  Tests/Procedures for next shift: None  Anticipated DC date & active delays: TBD

## 2023-05-08 NOTE — PLAN OF CARE
Goal Outcome Evaluation:    Orientation/Cognitive: A&Ox3, disoriented to place, and partial time  Observation Goals (Met/ Not Met): inpatient  Mobility Level/Assist Equipment: SBA GB/W  Fall Risk (Y/N): Y  Behavior Concerns: impulsive  Pain Management: denies  Tele/VS/O2: VSS on RA  ABNL Lab/BG: BUN 7.4  Diet: Regular  Bowel/Bladder: frequency, no BM this shift  Skin Concerns: Blanchable redness to L hip  Drains/Devices: PIV infusing D5NS at 75mL/hr  Tests/Procedures for next shift: psych to consult   Anticipated DC date & active delays: TBD pending psych consult  Patient Stated Goal for Today: sleep

## 2023-05-08 NOTE — PROGRESS NOTES
Orientation/Cognitive: A &O X4, forgetful  Observation Goals (Met/ Not Met): Inpatient  Mobility Level/Assist Equipment: A1 GB/W  Fall Risk (Y/N): Y  Behavior Concerns: gets OOB frequently  Pain Management: Denies pain  Tele/VS/O2: no tele, VSS, O2 RA  ABNL Lab/BG: Urea nitrogen 7.4  Diet: reg  Bowel/Bladder: Incontinent @ times  Skin Concerns: blanchable redness on buttocks  Drains/Devices: PIV infusing dextrose 5% 0.9 % NaCl 75 mL/hr  Tests/Procedures for next shift: psych consult  Anticipated DC date & active delays: TBD

## 2023-05-09 PROCEDURE — 250N000013 HC RX MED GY IP 250 OP 250 PS 637: Performed by: HOSPITALIST

## 2023-05-09 PROCEDURE — 250N000011 HC RX IP 250 OP 636: Performed by: INTERNAL MEDICINE

## 2023-05-09 PROCEDURE — 99233 SBSQ HOSP IP/OBS HIGH 50: CPT | Performed by: HOSPITALIST

## 2023-05-09 PROCEDURE — 120N000001 HC R&B MED SURG/OB

## 2023-05-09 RX ADMIN — MIRTAZAPINE 22.5 MG: 15 TABLET, FILM COATED ORAL at 21:33

## 2023-05-09 RX ADMIN — FAMOTIDINE 20 MG: 20 TABLET ORAL at 19:32

## 2023-05-09 RX ADMIN — ACETAMINOPHEN 650 MG: 325 TABLET ORAL at 19:32

## 2023-05-09 RX ADMIN — ONDANSETRON 4 MG: 4 TABLET, ORALLY DISINTEGRATING ORAL at 14:04

## 2023-05-09 RX ADMIN — OLANZAPINE 10 MG: 5 TABLET, FILM COATED ORAL at 21:33

## 2023-05-09 RX ADMIN — SENNOSIDES AND DOCUSATE SODIUM 2 TABLET: 50; 8.6 TABLET ORAL at 19:32

## 2023-05-09 ASSESSMENT — ACTIVITIES OF DAILY LIVING (ADL)
ADLS_ACUITY_SCORE: 47
ADLS_ACUITY_SCORE: 43
ADLS_ACUITY_SCORE: 39
ADLS_ACUITY_SCORE: 43
ADLS_ACUITY_SCORE: 39
ADLS_ACUITY_SCORE: 39
ADLS_ACUITY_SCORE: 47
ADLS_ACUITY_SCORE: 47
ADLS_ACUITY_SCORE: 43
ADLS_ACUITY_SCORE: 43
ADLS_ACUITY_SCORE: 39
ADLS_ACUITY_SCORE: 43

## 2023-05-09 NOTE — PLAN OF CARE
Goal Outcome Evaluation:  Orientation/Cognitive: A&OX3 , disoriented to place   Observation Goals (Met/ Not Met): Inpatient  Mobility Level/Assist Equipment: SBA GB/W  Fall Risk (Y/N): Yes  Behavior Concerns: None, slept all night  Pain Management: Denies  Tele/VS/O2: VSS on RA  ABNL Lab/BG: No new lab results  Diet: Regular  Bowel/Bladder: Continent  Skin Concerns: Blanchable redness on L hip, mepilex in place  Drains/Devices: No IV access   Tests/Procedures for next shift: None  Anticipated DC date & active delays: TBD

## 2023-05-09 NOTE — PROGRESS NOTES
Writer spoke with Dr. Courtney to inform him that pt removed IV yesterday evening. Dr. Courtney was ok with IV not being replaced.

## 2023-05-09 NOTE — PROGRESS NOTES
Care Management Follow Up    Length of Stay (days): 2    Expected Discharge Date: 05/09/2023     Concerns to be Addressed:       Patient plan of care discussed at interdisciplinary rounds: Yes    Anticipated Discharge Disposition:       Anticipated Discharge Services:    Anticipated Discharge DME:      Patient/family educated on Medicare website which has current facility and service quality ratings:    Education Provided on the Discharge Plan:    Patient/Family in Agreement with the Plan:      Referrals Placed by CM/SW:    Private pay costs discussed: Not applicable    Additional Information:  Doctor, Writer and Director of Nursing, Josefina,  from [facility] discussed patient return.  ([phone])     DON was not comfortable with patient being discharged back to their facility prior to her Psychiatric  needs being met. Psych is being consulted again with new information that Patient may not have a dementia diagnosis and is need of med adjustment      FRANKIE Eastman

## 2023-05-09 NOTE — CONSULTS
"Triage and Transition - Consult and Liaison     Bhavana DOHERTY Justa  May 9, 2023    The following information was received from Josefina whose relationship to the patient is Director of Nursing at [facility] (long term care for mentally ill) Information was obtained via phone. Their phone number is # [phone] and they last had contact with patient today, when she came to visit       How long have they been a resident: 5 years    Why does patient live in the facility: the facility is for patient's with mental illness- it is not memory care or nursing home. She lives there for longstanding history of mental health concerns- including bipolar.    Significant changes to environment: they moved to a new location about 6 weeks ago    What is different about patient's functioning: Normally Bhavana is alert and oriented and bright eyed. She often comes to the nursing station to talk to staff and is friendly. Nurse states they moved to a new location a little over 6 weeks ago, moved all of the residents to brand new place, they now have their own rooms, etc. She states shortly after this they noticed decompensation in her bipolar. She started pacing the hallways, not engaging with them , would walk circles around building. She then into depressive episode where she refused to get out of bed, wasn't eating, and repeatedly asking about letting her die. Starting talking about wanting  to go out in traffic outside their facility. During this time had been more confused- as well. Feel it was the move that set this off and she has decompensated. She understands [facility]-psych beds are hard to come by, but feels like nothing is being done. She states \"we want the old Bhavana back- shes not herself\"    Nurse states in June of 2021, dementia got put in chart. One of the nurses gave patient the wrong psychotropic medications- she ended up in the hospital and she believes this is when the diagnosis was med.  Their facility is often mistook for " memory care or nursing home but it is not. Primary doctor and psychiatrist has never diagnosed her with dementia.     When patient came to them , she was on sertraline 150mg , Remeron 15mg, 100mg trazodone and higher doses of Zyprexa both in evenings and in morning.  Zyprexa was decreased 3 years ago- to current dose. Had  Serotonin syndrome with trazodone so she was taken off. They have upped Remeron to 22.5mg. No other previous medication trial information was gathered.     Concern about alcohol/drug use: No    If d/c is recommended, can patient return to current living situation: no.    If no, what needs to happen in order for patient to return: they would like to see medications adjusted, bipolar more well managed.     Linsey Fuchs, [facility]   Triage and Transition - Consult and Liaison   [phone]

## 2023-05-09 NOTE — CONSULTS
"Psych consulted noted. I attempted to see her, she was laying in bed. Appeared to have vomit on the floor. She was irritable, refused to speak with me. Repeated over and over \"I don't want to talk\" and \"leave me alone\". I attempted to engage her in conversation anyway and she did not respond at all. She did agree to meet with me tomorrow- I will try to see her in the morning.      DANIEL Koch [facility]   Consult/Liaison Psychiatry   [facility]    Contact information available via [facility] Paging/Directory  If I am not available, then [facility] CL line ([phone]) should know who is covering our consult service.        "

## 2023-05-09 NOTE — PLAN OF CARE
Orientation/Cognitive: A&Ox3, disoriented to place  Observation Goals (Met/ Not Met): Inpatient  Mobility Level/Assist Equipment: Assist of 1 gb/w  Fall Risk (Y/N): Yes  Behavior Concerns:   Pain Management: Denies pain; PRN zofran given for nausea  Tele/VS/O2: VSS on RA  ABNL Lab/BG:   Diet: Regular, poor appetite   Bowel/Bladder: Continent  Skin Concerns: Redness to buttocks and R hip  Drains/Devices: No IV access, MD aware  Tests/Procedures for next shift: DON of facility wants Psych to reevaluate patient  Anticipated DC date & active delays: TBD  Patient Stated Goal for Today:

## 2023-05-09 NOTE — TELEPHONE ENCOUNTER
CHRIS Hansen RN DON informed of request regarding sertraline and hyponatremia. RN states patient is currently inpt and she will discuss this issue with the inpt psychiatrist    Brianda Danielle RN on 5/9/2023 at 9:25 AM  .

## 2023-05-09 NOTE — PROGRESS NOTES
[facility]    Medicine Progress Note - Hospitalist Service    Date of Admission:  5/5/2023    Assessment & Plan   Bhavana Marr is an 82-year-old female patient with history including dementia; asthma; dyslipidemia; depression/anxiety; bipolar disorder; borderline personality disorder; osteoporosis; prior gastric bypass; and recent hospitalization (4/21/2023 to 4/28/2023) with issues including suicidal ideation and dementia with behavioral disturbance. She presents from her memory care facility with decreased oral intake, low glucose level, low blood pressure, depression and possible suicidal ideations.     On initial evaluation, patient was afebrile, normotensive, not tachycardic.  Labs notable for BMP with sodium 133 otherwise normal; CBC was normal; LFTs showed low albumin of 3.0 and low protein 5.3 and low AST of 9, otherwise normal; glucose 109; urinalysis showed 60 ketones and not suggestive of infection.        Decreased oral intake with hypoglycemia, low blood pressures  Hyponatremia, suspect due to decreased PO intake  Decreased oral intake, possibly due to depression  * Initial presentation as above. Pt found with low sodium as well as ketones in the urine. Given fluids in the ED.    * No abdominal pain or lab findings to suggest a primary GI etiology.  - Discontinued IV fluids on 5/8 due to improved oral intake.  - Continue diet as tolerated.  - Lipase minimally elevated at 84.  - Mental health management as noted.     Psychiatric issues  Dementia with h/o behavioral disturbance  Depression/anxiety, bipolar d/o, borderline personality d/o with possible suicidal ideations  * Of note is that the patient was recently hospitalized through [facility] with issues including suicidal ideation and dementia.  She was seen by psychiatry during that hospitalization; they did not recommend any type of psychiatric admission.    * Initial presentation as above. Noted manic episode and some  confusion at memory care. On admit, pt denied suicidal ideations on admit but did endorse feeling depressed.  She was seen by the DEC  in the ED and not felt to be actively suicidal.  - Re-orient as needed.  - Maintain normal day/night, sleep/wake cycles.  - Minimize sedating medications as able.  - Continue PTA mirtazapine; olanzapine; sertraline.  - Psychiatry consulted, appreciate their assistance.    - Received more information/concern from director of nursing at patient's facility today. Will ask psychiatry to re-evaluate patient and contact the director of nursing at her facility (Josefina, [phone]).     Asthma  - Continue PTA budesonide-formoterol.     Dyslipidemia  - Continue PTA simvastatin.     GERD  - Continue PTA famotidine.     Osteoporosis  - Resume alendronate after discharge.     Prior gastric bypass  B12 deficiency  Iron deficienty anemia  - Continue B12 and iron.    Constipation  - Increased senokot-s.  - PRN miralax available.     Dispo  - Pending psychiatry re-evaluation.       Diet: Regular Diet Adult    DVT Prophylaxis: Ambulate every shift  Cortez Catheter: Not present  Lines: None     Cardiac Monitoring: None  Code Status: Full Code      Clinically Significant Risk Factors              # Hypoalbuminemia: Lowest albumin = 3 g/dL at 5/5/2023  4:53 PM, will monitor as appropriate                   Disposition Plan      Expected Discharge Date: 05/09/2023,  6:00 PM  Discharge Delays: Specialist Consult (enter specialist & decision needed in comments)    Discharge Comments: Care conference today @1015.  Psych reconsulted today.          Nuno Courtney MD  Hospitalist Service  [facility]  Securely message with [employer] (more info)  Text page via [employer] Paging/Directory   ______________________________________________________________________    Interval History   Bhavana Marr was seen this morning. She feels depressed, denies any suicidal ideation. Wants to go  home soon. No other complaints/concerns for me.    Met with social work and Josefina, director of nursing at the patient's facility, this morning. She is concerned that Bhavana is depressed and hopeless and that her psychiatric needs aren't being met. She believes that dementia got into the patient's chart erroneously and that the patient does not actually have a diagnosis of dementia. She has noticed a significant change in the patient's behavior over the past 6 weeks, appearing depressed, hopeless, decreased appetite, less interactive. The facility moved into a new building about two months ago, symptoms started about two weeks after that.    Physical Exam   Vital Signs: Temp: 97.5  F (36.4  C) Temp src: Axillary BP: 129/67 Pulse: 57   Resp: 16 SpO2: 99 % O2 Device: None (Room air)    Weight: 119 lbs .77 oz    Constitutional: awake, alert, cooperative, no apparent distress, laying in the hospital bed  Respiratory: no increased work of breathing, clear to auscultation bilaterally, no crackles or wheezing  Cardiovascular: regular rate and rhythm, normal S1 and S2, no murmur noted  GI: normal bowel sounds, soft, non-distended, non-tender  Skin: warm, dry  Musculoskeletal: no lower extremity pitting edema present  Neurologic: awake, alert, answers questions appropriately, moves all extremities, flat affect    Medical Decision Making       55 MINUTES SPENT BY ME on the date of service doing chart review, history, exam, documentation & further activities per the note.      Data   NOTE: Data reviewed over the past 24 hrs contributes toward MDM complexity

## 2023-05-10 ENCOUNTER — MEDICAL CORRESPONDENCE (OUTPATIENT)
Dept: HEALTH INFORMATION MANAGEMENT | Facility: CLINIC | Age: 83
End: 2023-05-10

## 2023-05-10 PROCEDURE — 250N000013 HC RX MED GY IP 250 OP 250 PS 637: Performed by: HOSPITALIST

## 2023-05-10 PROCEDURE — 99232 SBSQ HOSP IP/OBS MODERATE 35: CPT | Performed by: HOSPITALIST

## 2023-05-10 PROCEDURE — 99221 1ST HOSP IP/OBS SF/LOW 40: CPT

## 2023-05-10 PROCEDURE — 250N000013 HC RX MED GY IP 250 OP 250 PS 637: Performed by: INTERNAL MEDICINE

## 2023-05-10 PROCEDURE — 120N000001 HC R&B MED SURG/OB

## 2023-05-10 RX ORDER — OLANZAPINE 5 MG/1
5 TABLET ORAL EVERY MORNING
Status: DISCONTINUED | OUTPATIENT
Start: 2023-05-11 | End: 2023-06-06

## 2023-05-10 RX ADMIN — FAMOTIDINE 20 MG: 20 TABLET ORAL at 08:58

## 2023-05-10 RX ADMIN — ACETAMINOPHEN 650 MG: 325 TABLET ORAL at 21:23

## 2023-05-10 RX ADMIN — SERTRALINE HYDROCHLORIDE 150 MG: 100 TABLET ORAL at 08:58

## 2023-05-10 RX ADMIN — ACETAMINOPHEN 650 MG: 325 TABLET ORAL at 08:58

## 2023-05-10 RX ADMIN — FAMOTIDINE 20 MG: 20 TABLET ORAL at 21:24

## 2023-05-10 RX ADMIN — Medication 1 MG: at 21:31

## 2023-05-10 RX ADMIN — VALACYCLOVIR HYDROCHLORIDE 500 MG: 500 TABLET, FILM COATED ORAL at 08:58

## 2023-05-10 RX ADMIN — OLANZAPINE 10 MG: 5 TABLET, FILM COATED ORAL at 21:24

## 2023-05-10 RX ADMIN — OLANZAPINE 2.5 MG: 2.5 TABLET, FILM COATED ORAL at 08:58

## 2023-05-10 RX ADMIN — MIRTAZAPINE 22.5 MG: 15 TABLET, FILM COATED ORAL at 21:23

## 2023-05-10 ASSESSMENT — ACTIVITIES OF DAILY LIVING (ADL)
ADLS_ACUITY_SCORE: 43

## 2023-05-10 NOTE — PLAN OF CARE
Orientation/Cognitive: A&Ox3, disoriented to time  Observation Goals (Met/ Not Met): Inpatient  Mobility Level/Assist Equipment: Assist of 1 gb/w  Fall Risk (Y/N): Yes  Behavior Concerns:   Pain Management: Denies pain  Tele/VS/O2: VSS on RA  ABNL Lab/BG:   Diet: Regular, poor appetite   Bowel/Bladder: Continent  Skin Concerns: Redness to buttocks and R hip  Drains/Devices: No IV access, MD aware  Tests/Procedures for next shift: Patient on 72-hour hold and Psych filing for commitment  Anticipated DC date & active delays: TBD  Patient Stated Goal for Today:

## 2023-05-10 NOTE — CONSULTS
"      Initial Psychiatric Consult   Consult date: May 10, 2023         Reason for Consult, requesting source:    Depression  Requesting source: Mayank Key    Labs and imaging reviewed. Discussed with nursing.        HPI:   Bhavana Marr is a 82 year old female with a history of bipolar I disorder, gastric bypass surgery (unknown procedure type), osteoporosis, asthma, and dyslipidemia who presented from her LTC facility [facility] on 5/5/23 for poor PO intake with subsequent hypoglycemia and hypotension, depression, and suicidal ideation.     Bhavana lives in LTC facility [facility]; the director of nursing at this facility has expressed significant concerns for Bhavana's mental health- has been depressed, not getting out of bed, not eating, not talking to them, asking for them to let her die. She had expressed wanting to walk into traffic. They have sent her to the ED 3 times recently (4/20/23, 4/21/2023, and this admission 5/5/23) for similar concerns. She has been discharged the past two times with no changes to psychiatric medications and no improvement in symptoms. They do not feel she is safe to return at this point. I have attempted to speak with Bhavana on two separate occasions, both times she refused to talk to me and remained mute for most of both assessment attempts. She did not respond to questions regarding mood, suicidal ideation, medications, psychiatric history, or my recommendation for her to be admitted to inpatient psychiatry. She did however speak to the aide who was in the room and told me \"I have nothing to say to you\".           Past Psychiatric History:   History of bipolar I disorder. Current psychotropic medications include olanzapine, sertraline, and mirtazapine.        Substance Use and History:   Unable to assess, she refuses to answer.        Past Medical History:   PAST MEDICAL HISTORY:   Past Medical History:   Diagnosis Date     Arthritis 1998     Asthma      Asthma 6/8/2018 "     Blood transfusion 2000     Borderline personality disorder (H)      Chronic sinus infection      Depressive disorder      GERD (gastroesophageal reflux disease)      History of blood transfusion      Hyperlipidemia      MDD (major depressive disorder)        PAST SURGICAL HISTORY:   Past Surgical History:   Procedure Laterality Date     ABDOMEN SURGERY      2 fatty tumors removed     APPENDECTOMY       BIOPSY       COLONOSCOPY       GASTRIC BYPASS       GI SURGERY  2000    gastric bypass     GYN SURGERY      complete hysterectomy     HC CORRECT BUNION,DOUBLE OSTEOTOMY      Description: Hallux Valgus (Bunion) Correction;  Recorded: 05/07/2012;     ORTHOPEDIC SURGERY      both hip,two foot surgies on each foot     ORTHOPEDIC SURGERY      right elbow     ZZC APPENDECTOMY      Description: Appendectomy;  Recorded: 05/07/2012;     ZZC TOTAL ABDOM HYSTERECTOMY      Description: Total Abdominal Hysterectomy;  Recorded: 05/07/2012;             Family History:   FAMILY HISTORY:   Family History   Problem Relation Age of Onset     Depression Maternal Aunt      Depression Maternal Uncle      Depression Paternal Aunt      Depression Paternal Uncle            Social History:   SOCIAL HISTORY:   Social History     Tobacco Use     Smoking status: Never     Smokeless tobacco: Never   Vaping Use     Vaping status: Not on file   Substance Use Topics     Alcohol use: No       Unable to assess, she refuses to answer.         Physical ROS:   The 10 point Review of Systems is negative other than noted in the HPI or here.    Review Of Systems  Unable to assess, she refuses to answer.         Medications:       acetaminophen  650 mg Oral BID     famotidine  20 mg Oral BID     mirtazapine  22.5 mg Oral At Bedtime     OLANZapine  10 mg Oral At Bedtime     OLANZapine  2.5 mg Oral QAM     senna-docusate  2 tablet Oral BID     sertraline  150 mg Oral Daily     sodium chloride (PF)  3 mL Intracatheter Q8H     valACYclovir  500 mg Oral Daily  "             Allergies:     Allergies   Allergen Reactions     Nsaids      Gastric bypass surgery            Labs:   No results found for this or any previous visit (from the past 48 hour(s)).       Physical and Psychiatric Examination:     /54 (BP Location: Right arm, Patient Position: Left side, Cuff Size: Adult Regular)   Pulse 69   Temp 97.8  F (36.6  C) (Oral)   Resp 18   Ht 1.6 m (5' 3\")   Wt 54 kg (119 lb 0.8 oz)   LMP  (LMP Unknown)   SpO2 94%   BMI 21.09 kg/m    Weight is 119 lbs .77 oz  Body mass index is 21.09 kg/m .    Physical Exam:  I have reviewed the physical exam as documented by by the medical team and agree with findings and assessment and have no additional findings to add at this time.    Mental Status Exam:    Appearance: awake, alert and laying in bed, eyes closed  Attitude:  uncooperative  Eye Contact:  poor   Mood:  Unable to assess, she refuses to answer.  Affect:  Flat  Speech:  Selectively mute  Psychomotor Behavior:  no evidence of tardive dyskinesia, dystonia, or tics  Thought Process:  Unable to assess, she refuses to speak to me.  Associations:  Unable to assess, she refuses to speak to me.  Thought Content:  Unable to assess, she refuses to speak to me.  Insight:  Unable to assess, she refuses to speak to me.  Judgement:  Unable to assess, she refuses to speak to me.  Oriented to:  Unable to assess, she refuses to speak to me.  Attention Span and Concentration:  Unable to assess, she refuses to speak to me.  Recent and Remote Memory:  Unable to assess, she refuses to speak to me.  Gait and Station:                DSM-5 Diagnosis:   Bipolar I disorder, current episode depressed, severe, without psychosis          Assessment:   Bhavana Marr is a 82 year old female seen for concerns for depression. She again refuses to speak with me. Her care facility has reported significant concerns for depression, suicidal ideation, and poor self care. She had poor PO intake which " "lead to hypoglycemia and hypotension. She has lived at this facility for several years and they know her well- this is far from her baseline. She did not answer questions regarding suicidal ideation so I am unable to say if she is currently suicidal or not. Due to risk of danger to self as an apparent result of depression, I recommend IP psychiatric admission. She did not respond to this either, so without her consent we will have to place her on a hold and file for commitment. To this she responded, \"do what you have to do.\"    Per her LTC facility [facility] director of nursing, she does NOT have a history of dementia. This was entered into her chart erroneously during a prior admission in 2021. She has lived at [facility] for 5 years where she sees both a primary care provider and psychiatrist regularly, neither of whom have diagnosed her with dementia. Upon review of chart, in the H&P from 6/2021 admission it states she has a \"history of apparent dementia\" but this is not listed in the most recent outpatient PCP nursing home visit not just two months prior in 4/2021.           Summary of Recommendations:   1.  Increase olanzapine to 5mg in the morning, 10mg at night for bipolar depression.    2.  IP psych is recommended. 72 hour hold placed, will file for commitment. Examiner's statement provided to unit SW.    3.  Consider mood stabilizer such as valproate for depression if olanzapine not effective- would need to try to clarify type of gastric bypass procedure she had or use IR formulation regardless.          DANIEL Koch CNP    Consult/Liaison Psychiatry   [facility]    Contact information available via [facility] Paging/Directory  If I am not available, then [facility] CL line ([phone]) should know who is covering our consult service.            "

## 2023-05-10 NOTE — PROGRESS NOTES
Care Management Follow Up    Length of Stay (days): 3    Expected Discharge Date: 05/12/2023     Concerns to be Addressed:       Patient plan of care discussed at interdisciplinary rounds: Yes    Anticipated Discharge Disposition:       Anticipated Discharge Services:    Anticipated Discharge DME:      Patient/family educated on Medicare website which has current facility and service quality ratings:    Education Provided on the Discharge Plan:    Patient/Family in Agreement with the Plan:      Referrals Placed by CM/SW:    Private pay costs discussed: Not applicable    Additional Information:  SW received information that patient will now be put under a 72 hour hold and needs an Exhibit A completed. SW  Escalated to supervisor Gabriela.     SW received a call from [facility] care coordinator Ladonna Staton  who would like to be updated about patients status.       FRANKIE Eastman

## 2023-05-10 NOTE — PLAN OF CARE
Goal Outcome Evaluation:      Plan of Care Reviewed With: patient    Overall Patient Progress: no change  Orientation/Cognitive: A&Ox3, disoriented to place  Observation Goals (Met/ Not Met): Inpatient  Mobility Level/Assist Equipment: Assist of 1 gb/w  Fall Risk (Y/N): Yes  Behavior Concerns: suicidal statements occasionally, does not have plan.  Pain Management: Denies pain  Tele/VS/O2: VSS on RA  ABNL Lab/BG:   Diet: Regular, poor appetite, 2 emesis episodes following eating  Bowel/Bladder: Continent, occasional episodes  Skin Concerns: Redness to buttocks and R hip  Drains/Devices: No IV access, MD aware  Tests/Procedures for next shift: DON of facility wants Psych to reevaluate patient  Anticipated DC date & active delays: TBD  Patient Stated Goal for Today:

## 2023-05-10 NOTE — PLAN OF CARE
Goal Outcome Evaluation:  Orientation/Cognitive: A&Ox3 disoriented to place  Observation Goals (Met/ Not Met): INP  Mobility Level/Assist Equipment: Ax1,GB,Walker  Fall Risk (Y/N): Y  Behavior Concerns: Flat affect, will ignore staff when asking question   Pain Management: Denies pain   Tele/VS/O2: VSS on RA  ABNL Lab/BG: Hgb:11.5  Diet: Regular  Bowel/Bladder: Can be incontinent at times  Skin Concerns: Scattered bruising   Drains/Devices: No IV access MD aware  Tests/Procedures for next shift: Psych to reconsult  Anticipated DC date & active delays: TBD  Patient Stated Goal for Today: To be left alone  Other: Pt refused AM lab draws, please call lab later when pt is in a better mood

## 2023-05-10 NOTE — PROGRESS NOTES
[facility]    Medicine Progress Note - Hospitalist Service    Date of Admission:  5/5/2023    Assessment & Plan   Bhavana Marr is an 82-year-old female patient with history including dementia; asthma; dyslipidemia; depression/anxiety; bipolar disorder; borderline personality disorder; osteoporosis; prior gastric bypass; and recent hospitalization (4/21/2023 to 4/28/2023) with issues including suicidal ideation and dementia with behavioral disturbance. She presents from her memory care facility with decreased oral intake, low glucose level, low blood pressure, depression and possible suicidal ideations.     On initial evaluation, patient was afebrile, normotensive, not tachycardic.  Labs notable for BMP with sodium 133 otherwise normal; CBC was normal; LFTs showed low albumin of 3.0 and low protein 5.3 and low AST of 9, otherwise normal; glucose 109; urinalysis showed 60 ketones and not suggestive of infection.        Decreased oral intake with hypoglycemia, low blood pressures  Hyponatremia, suspect due to decreased PO intake  Decreased oral intake, possibly due to depression  * Initial presentation as above. Pt found with low sodium as well as ketones in the urine. Given fluids in the ED.    * No abdominal pain or lab findings to suggest a primary GI etiology.  - Discontinued IV fluids on 5/8 due to improved oral intake.  - Continue diet as tolerated. She is eating better, although not always consistently per report.  - Lipase minimally elevated at 84.  - Mental health management as noted.     Psychiatric issues  Dementia with h/o behavioral disturbance  Depression/anxiety, bipolar d/o, borderline personality d/o with possible suicidal ideations  * Of note is that the patient was recently hospitalized through [facility] with issues including suicidal ideation and dementia.  She was seen by psychiatry during that hospitalization; they did not recommend any type of psychiatric admission.     * Initial presentation as above. Noted manic episode and some confusion at memory care. On admit, pt denied suicidal ideations on admit but did endorse feeling depressed.  She was seen by the DEC  in the ED and not felt to be actively suicidal.  - Re-orient as needed.  - Maintain normal day/night, sleep/wake cycles.  - Minimize sedating medications as able.  - Continue PTA mirtazapine; olanzapine; sertraline.  - Psychiatry consulted, appreciate their assistance. Patient refused to speak with psychiatry again today. Meds adjusted by psychiatry on 5/10/23. Patient placed on a hold on 5/10/23 by psychiatry, they are planning to file for commitment.     Asthma  - Continue PTA budesonide-formoterol.     Dyslipidemia  - Continue PTA simvastatin.     GERD  - Continue PTA famotidine.     Osteoporosis  - Resume alendronate after discharge.     Prior gastric bypass  B12 deficiency  Iron deficienty anemia  - Continue B12 and iron.    Constipation  - Continue senokot-s.  - PRN miralax available.     Dispo  - Psychiatry placed patient on a hold on 5/10/23 and is planning to file for commitment.       Diet: Regular Diet Adult    DVT Prophylaxis: Ambulate every shift  Cortez Catheter: Not present  Lines: None     Cardiac Monitoring: None  Code Status: Full Code      Clinically Significant Risk Factors              # Hypoalbuminemia: Lowest albumin = 3 g/dL at 5/5/2023  4:53 PM, will monitor as appropriate                   Disposition Plan      Expected Discharge Date: 05/12/2023,  6:00 PM  Discharge Delays: Specialist Consult (enter specialist & decision needed in comments)    Discharge Comments: .  Psych consult today.    Inpateint psych recommended.          Nuno Courtney MD  Hospitalist Service  [facility]  Securely message with [employer] (more info)  Text page via [facility] Paging/Directory   ______________________________________________________________________    Interval History   Bhavana Munguia  CHAS Marr was seen earlier today. She feels depressed and tired. She wanted to talk about her life a little bit, talked about not having any living relatives. Denies chest pain, shortness of breath, nausea, abdominal pain. Discussed with psychiatry.    Physical Exam   Vital Signs: Temp: 97.8  F (36.6  C) Temp src: Oral BP: 100/54 Pulse: 69   Resp: 18 SpO2: 94 % O2 Device: None (Room air)    Weight: 119 lbs .77 oz    Constitutional: awake, alert, cooperative, no apparent distress, laying in the hospital bed  Respiratory: no increased work of breathing, clear to auscultation bilaterally, no crackles or wheezing  Cardiovascular: regular rate and rhythm, normal S1 and S2, no murmur noted  GI: normal bowel sounds, soft, non-distended, non-tender  Skin: warm, dry  Musculoskeletal: no lower extremity pitting edema present  Neurologic: awake, alert, answered some questions but then didn't want to talk much, moves all extremities, flat affect, appears depressed    Medical Decision Making       40 MINUTES SPENT BY ME on the date of service doing chart review, history, exam, documentation & further activities per the note.      Data   NOTE: Data reviewed over the past 24 hrs contributes toward MDM complexity

## 2023-05-11 PROCEDURE — 250N000013 HC RX MED GY IP 250 OP 250 PS 637

## 2023-05-11 PROCEDURE — 120N000001 HC R&B MED SURG/OB

## 2023-05-11 PROCEDURE — 99231 SBSQ HOSP IP/OBS SF/LOW 25: CPT | Performed by: INTERNAL MEDICINE

## 2023-05-11 PROCEDURE — 250N000013 HC RX MED GY IP 250 OP 250 PS 637: Performed by: HOSPITALIST

## 2023-05-11 RX ADMIN — SENNOSIDES AND DOCUSATE SODIUM 2 TABLET: 50; 8.6 TABLET ORAL at 08:47

## 2023-05-11 RX ADMIN — VALACYCLOVIR HYDROCHLORIDE 500 MG: 500 TABLET, FILM COATED ORAL at 08:48

## 2023-05-11 RX ADMIN — ACETAMINOPHEN 650 MG: 325 TABLET ORAL at 08:48

## 2023-05-11 RX ADMIN — FAMOTIDINE 20 MG: 20 TABLET ORAL at 20:12

## 2023-05-11 RX ADMIN — SERTRALINE HYDROCHLORIDE 150 MG: 100 TABLET ORAL at 08:47

## 2023-05-11 RX ADMIN — OLANZAPINE 5 MG: 2.5 TABLET, FILM COATED ORAL at 08:48

## 2023-05-11 RX ADMIN — MIRTAZAPINE 22.5 MG: 15 TABLET, FILM COATED ORAL at 22:00

## 2023-05-11 RX ADMIN — ACETAMINOPHEN 650 MG: 325 TABLET ORAL at 20:12

## 2023-05-11 RX ADMIN — OLANZAPINE 10 MG: 5 TABLET, FILM COATED ORAL at 22:00

## 2023-05-11 RX ADMIN — SENNOSIDES AND DOCUSATE SODIUM 2 TABLET: 50; 8.6 TABLET ORAL at 20:12

## 2023-05-11 RX ADMIN — FAMOTIDINE 20 MG: 20 TABLET ORAL at 08:48

## 2023-05-11 ASSESSMENT — ACTIVITIES OF DAILY LIVING (ADL)
ADLS_ACUITY_SCORE: 40
ADLS_ACUITY_SCORE: 44
ADLS_ACUITY_SCORE: 42
ADLS_ACUITY_SCORE: 42
ADLS_ACUITY_SCORE: 40
ADLS_ACUITY_SCORE: 43
ADLS_ACUITY_SCORE: 40
ADLS_ACUITY_SCORE: 42
ADLS_ACUITY_SCORE: 43
ADLS_ACUITY_SCORE: 42
ADLS_ACUITY_SCORE: 44
ADLS_ACUITY_SCORE: 44

## 2023-05-11 NOTE — PROGRESS NOTES
Care Management Follow Up    Length of Stay (days): 4    Expected Discharge Date: 05/15/2023     Concerns to be Addressed:       Patient plan of care discussed at interdisciplinary rounds: Yes    Anticipated Discharge Disposition:       Anticipated Discharge Services:    Anticipated Discharge DME:      Patient/family educated on Medicare website which has current facility and service quality ratings:    Education Provided on the Discharge Plan:    Patient/Family in Agreement with the Plan:      Referrals Placed by CM/SW:    Private pay costs discussed: Not applicable    Additional Information:  Call received from Esperanza in administration confirming that the petition for commitment has been signed. Writer called Pre-petition screening ([phone]) and spoke with Buster Leung. Buster updated on petition for commitment and that paperwork has been signed. Writer explained patient is on a 72 hour hold that was placed on 5/10/23 at 1214. Writer faxed over petition, Examiners statement and Exhibit A to Pre-pettition screening at  [phone]. Writer will wait to hear from pre-petition screening on if the commitment is supported. Signed Petition for commitment placed on patient's chart.       Addendum 1525: Call received from Caryn Chen ([phone]) with [facility] Pre-petition screening. She confirmed she has been assigned patient's case and will be out on 5/12 between [id number] to meet with patient.     FERNANDO Fields, SW   Social Work   [facility]

## 2023-05-11 NOTE — PLAN OF CARE
"Orientation/Cognitive: A&Ox3, disoriented to time  Observation Goals (Met/ Not Met): Inpatient  Mobility Level/Assist Equipment: Assist of 1 gb/w  Fall Risk (Y/N): Yes  Behavior Concerns: Very flat today; Writer offered to take her for a walk in hallway and pt stated \"leave me alone\". Pt slept and rested in bed all day. Refused chair at meal times.  Pain Management: Denies pain  Tele/VS/O2: VSS on RA  ABNL Lab/BG:   Diet: Regular, very poor appetite (ensure ordered; pt refused)   Bowel/Bladder: Continent  Skin Concerns: Redness to buttocks and R hip (pt refused repos or mepilex)  Drains/Devices: No IV access, MD aware  Tests/Procedures for next shift: Patient on 72-hour hold and Psych filing for commitment  Anticipated DC date & active delays: TBD  Patient Stated Goal for Today:   "

## 2023-05-11 NOTE — PROGRESS NOTES
[facility]    HOSPITALIST PROGRESS NOTE :   --------------------------------------------------    Date of Admission:  5/5/2023    Cumulative Summary: Bhavana Marr is an 82-year-old female patient with history including dementia; asthma; dyslipidemia; depression/anxiety; bipolar disorder; borderline personality disorder; osteoporosis; prior gastric bypass; and recent hospitalization (4/21/2023 to 4/28/2023) with issues including suicidal ideation and dementia with behavioral disturbance. She presents from her memory care facility with decreased oral intake, low glucose level, low blood pressure, depression and possible suicidal ideations.     On initial evaluation, patient was afebrile, normotensive, not tachycardic.  Labs notable for BMP with sodium 133 otherwise normal; CBC was normal; LFTs showed low albumin of 3.0 and low protein 5.3 and low AST of 9, otherwise normal; glucose 109; urinalysis showed 60 ketones and not suggestive of infection.     Assessment & Plan     Decreased oral intake with hypoglycemia, low blood pressures  Hyponatremia, suspect due to decreased PO intake  Decreased oral intake, possibly due to Psychiatric Illness; see below     * Initial presentation as above. Pt found with low sodium as well as ketones in the urine. Given fluids in the ED.    * No abdominal pain or lab findings to suggest a primary GI etiology.    - Patient has been admitted to the hospital for further evaluation and management  - Discontinued IV fluids on 5/8 due to improved oral intake.  - Continue diet as tolerated. She is eating better, although not always consistently per report.  - Lipase minimally elevated at 84.  - Mental health management as below     Psychiatric issues  Depression/anxiety, bipolar d/o, borderline personality d/o with possible suicidal ideations  * Of note is that the patient was recently hospitalized through [facility] with issues including suicidal  ideation and dementia.  She was seen by psychiatry during that hospitalization; they did not recommend any type of psychiatric admission.    * Initial presentation as above. Noted manic episode and some confusion at memory care. On admit, pt denied suicidal ideations on admit but did endorse feeling depressed.  She was seen by the DEC  in the ED and not felt to be actively suicidal.    - Re-orient as needed.  - Maintain normal day/night, sleep/wake cycles.  - Minimize sedating medications as able.  - Continue PTA mirtazapine; olanzapine; sertraline.  - Psychiatry consulted, appreciate their assistance. Patient refused to speak with psychiatry again . Meds adjusted by psychiatry on 5/10/23. Patient placed on a hold on 5/10/23 by psychiatry, they are planning to file for commitment.  -Highly appreciate  help, seems like [facility]'s screening has been completed, patient is planned to be evaluated on 05/12 between 09:30-10 AM     Asthma  - Continue PTA budesonide-formoterol.     Dyslipidemia  - Continue PTA simvastatin.     GERD  - Continue PTA famotidine.     Osteoporosis  - Resume alendronate after discharge.     Prior gastric bypass  B12 deficiency  Iron deficienty anemia  - Continue B12 and iron.     Constipation  - Continue senokot-s.  - PRN miralax available.      Dispo  - Psychiatry placed patient on a hold on 5/10/23 and has filed for commitment.    Clinically Significant Risk Factors              # Hypoalbuminemia: Lowest albumin = 3 g/dL at 5/5/2023  4:53 PM, will monitor as appropriate                 Diet: Regular Diet Adult    Cortez Catheter: Not present  DVT Prophylaxis: Ambulate every shift  Code Status: Full Code    The patient's care was discussed with the Patient.    Medical Decision Making     30 min were spent in direct patient care today including the floor time , more than 50% of the time was spent in patient care coordination and counseling.      Disposition  Plan     Expected Discharge Date: 05/12/2023,  6:00 PM  Discharge Delays: Specialist Consult (enter specialist & decision needed in comments)    Discharge Comments: Inpateint psych recommended.     Entered: Radha Yan MD 05/11/2023, 8:15 AM     Radha Yan MD, MD, [facility]  Text Page (7am - 6pm)    ----------------------------------------------------------------------------------------------------------------------    Interval History   Patient care was assumed this morning, patient was somnolent examined.  Patient looks tired and depressed, was sleeping, did not answer to many questions but denying any chest pain, shortness of breath or pain.    -Data reviewed today: I reviewed all new labs and imaging results over the last 24 hours.    I personally reviewed no images or EKG's today.    Physical Exam   Temp: 97.7  F (36.5  C) Temp src: Oral BP: 103/57 Pulse: 63   Resp: 18 SpO2: 97 % O2 Device: None (Room air)    Vitals:    05/05/23 2140   Weight: 54 kg (119 lb 0.8 oz)     Vital Signs with Ranges  Temp:  [97.3  F (36.3  C)-97.8  F (36.6  C)] 97.7  F (36.5  C)  Pulse:  [] 63  Resp:  [16-18] 18  BP: (100-112)/(54-72) 103/57  SpO2:  [94 %-97 %] 97 %  I/O last 3 completed shifts:  In: 560 [P.O.:560]  Out: 100 [Emesis/NG output:100]    GENERAL: Alert , awake and oriented. NAD. Conversational, appropriate.  Does not want to talk too much, answered few questions  HEENT: Normocephalic. EOMI. No icterus or injection. Nares normal.   LUNGS: Clear to auscultation. No dyspnea at rest.   HEART: Regular rate. Extremities perfused.   ABDOMEN: Soft, nontender, and nondistended. Positive bowel sounds.   EXTREMITIES: No LE edema noted.   NEUROLOGIC: Moves extremities x4 on command. No acute focal neurologic abnormalities noted.  Does not want to talk too much    Medications       acetaminophen  650 mg Oral BID     famotidine  20 mg Oral BID     mirtazapine  22.5 mg Oral At Bedtime     OLANZapine  10 mg Oral At Bedtime      OLANZapine  5 mg Oral QAM     senna-docusate  2 tablet Oral BID     sertraline  150 mg Oral Daily     sodium chloride (PF)  3 mL Intracatheter Q8H     valACYclovir  500 mg Oral Daily       Data   Recent Labs   Lab 05/07/23  0552 05/06/23  0636 05/05/23  2108 05/05/23  1653   WBC  --  10.0  --  9.3   HGB  --  11.5*  --  11.9   MCV  --  93  --  93   PLT  --  309  --  314    135*  --  133*   POTASSIUM 3.7 4.1  --  3.9   CHLORIDE 103 103  --  100   CO2 23 24  --  23   BUN 7.4* 12.7  --  19.5   CR 0.63 0.63  --  0.67   ANIONGAP 11 8  --  10   LAWANDA 9.3 8.9  --  8.8   * 115* 88 109*   ALBUMIN  --   --   --  3.0*   PROTTOTAL  --   --   --  5.3*   BILITOTAL  --   --   --  0.2   ALKPHOS  --   --   --  50   ALT  --   --   --  9*   AST  --   --   --  20   LIPASE  --   --   --  84*       Imaging:   No results found for this or any previous visit (from the past 24 hour(s)).

## 2023-05-11 NOTE — PROGRESS NOTES
Alert, oriented x 2-3. Pt aware she was in the hospital at start of  shift and verbalized reason for being in hospital was d/t depression and mental issues. Calm and cooperative with cares. Denied any suicidal ideation. VSS.  Ambulated to BR with  assist of one with GB/walker.  Plan for psych re-eval. Pt on 72 hour hold, and psych plan to file for commitment.

## 2023-05-11 NOTE — PROGRESS NOTES
Orientation/Cognitive: A&O X3, disoriented to time.  Observation Goals (Met/ Not Met): Inpateint  Mobility Level/Assist Equipment: A1 GB/W  Fall Risk (Y/N): Y  Behavior Concerns:n/a  Pain Management: Denies pain  Tele/VS/O2: no tele,VSS, O2 RA  ABNL Lab/BG: None  Diet: Reg   Bowel/Bladder: Continent  Skin Concerns: Blanchable redness on buttocks and R hip.   Drains/Devices: No IV acess  Tests/Procedures for next shift: Pt on 72 hour hold & [facility] filing for commitment  Anticipated DC date & active delays: TBD

## 2023-05-11 NOTE — PROGRESS NOTES
Care Management Follow Up    Length of Stay (days): 4    Expected Discharge Date: 05/12/2023     Concerns to be Addressed:    Commitment  Patient plan of care discussed at interdisciplinary rounds: Yes    Anticipated Discharge Disposition:  TBD     Anticipated Discharge Services:  TBD  Anticipated Discharge DME:  N/A    Patient/family educated on Medicare website which has current facility and service quality ratings:  N/A  Education Provided on the Discharge Plan:  N/A  Patient/Family in Agreement with the Plan:  TBD    Referrals Placed by CM/SW:  N/A  Private pay costs discussed: Not applicable    Additional Information:  Completed the commitment paperwork and have submitted it to administration for it to be signed.  Once this is completed will contact [facility] Pres-Petition Screening for processing.    Will continue to follow.    FERNANDO Xiao, [facility]    [phone]  [facility]

## 2023-05-11 NOTE — PROGRESS NOTES
Resumed cares from [id number]  Pt flat affect, denies pain. A&O x3 - disoriented to situation and pt stated she did not want to talk about it. VSS on RA. Scattered bruising. Incontinent of bladder and bowels. No IV access - MD aware. Blanchable redness on buttocks, pt turning herself in bed. Patient on a 72-hour hold and pysch filing for commitment. Todays goal: pt stated she wants to sleep

## 2023-05-12 PROCEDURE — 120N000001 HC R&B MED SURG/OB

## 2023-05-12 PROCEDURE — 250N000013 HC RX MED GY IP 250 OP 250 PS 637

## 2023-05-12 PROCEDURE — 250N000013 HC RX MED GY IP 250 OP 250 PS 637: Performed by: HOSPITALIST

## 2023-05-12 PROCEDURE — 99231 SBSQ HOSP IP/OBS SF/LOW 25: CPT | Performed by: INTERNAL MEDICINE

## 2023-05-12 PROCEDURE — 250N000013 HC RX MED GY IP 250 OP 250 PS 637: Performed by: INTERNAL MEDICINE

## 2023-05-12 RX ADMIN — Medication 1 MG: at 20:49

## 2023-05-12 RX ADMIN — MIRTAZAPINE 22.5 MG: 15 TABLET, FILM COATED ORAL at 22:26

## 2023-05-12 RX ADMIN — SENNOSIDES AND DOCUSATE SODIUM 2 TABLET: 50; 8.6 TABLET ORAL at 08:41

## 2023-05-12 RX ADMIN — VALACYCLOVIR HYDROCHLORIDE 500 MG: 500 TABLET, FILM COATED ORAL at 08:41

## 2023-05-12 RX ADMIN — OLANZAPINE 10 MG: 5 TABLET, FILM COATED ORAL at 22:22

## 2023-05-12 RX ADMIN — FAMOTIDINE 20 MG: 20 TABLET ORAL at 08:42

## 2023-05-12 RX ADMIN — SENNOSIDES AND DOCUSATE SODIUM 1 TABLET: 50; 8.6 TABLET ORAL at 20:05

## 2023-05-12 RX ADMIN — ACETAMINOPHEN 650 MG: 325 TABLET ORAL at 08:41

## 2023-05-12 RX ADMIN — SERTRALINE HYDROCHLORIDE 150 MG: 100 TABLET ORAL at 08:42

## 2023-05-12 RX ADMIN — FAMOTIDINE 20 MG: 20 TABLET ORAL at 20:05

## 2023-05-12 RX ADMIN — OLANZAPINE 5 MG: 2.5 TABLET, FILM COATED ORAL at 08:42

## 2023-05-12 RX ADMIN — ACETAMINOPHEN 650 MG: 325 TABLET ORAL at 20:05

## 2023-05-12 ASSESSMENT — ACTIVITIES OF DAILY LIVING (ADL)
ADLS_ACUITY_SCORE: 40
DRESS: 0-->INDEPENDENT
ADLS_ACUITY_SCORE: 26
ADLS_ACUITY_SCORE: 40
DOING_ERRANDS_INDEPENDENTLY_DIFFICULTY: NO
CONCENTRATING,_REMEMBERING_OR_MAKING_DECISIONS_DIFFICULTY: NO
ADLS_ACUITY_SCORE: 40
FALL_HISTORY_WITHIN_LAST_SIX_MONTHS: NO
ADLS_ACUITY_SCORE: 40
DIFFICULTY_EATING/SWALLOWING: NO
WALKING_OR_CLIMBING_STAIRS_DIFFICULTY: NO
DIFFICULTY_COMMUNICATING: NO
ADLS_ACUITY_SCORE: 40
CHANGE_IN_FUNCTIONAL_STATUS_SINCE_ONSET_OF_CURRENT_ILLNESS/INJURY: NO
ADLS_ACUITY_SCORE: 40
WEAR_GLASSES_OR_BLIND: YES
ADLS_ACUITY_SCORE: 40
TOILETING_ISSUES: NO
BATHING: 0-->INDEPENDENT
EQUIPMENT_CURRENTLY_USED_AT_HOME: CANE, STRAIGHT;WALKER, STANDARD
DRESSING/BATHING_DIFFICULTY: NO
ADLS_ACUITY_SCORE: 40
ADLS_ACUITY_SCORE: 40
ADLS_ACUITY_SCORE: 41
DRESS: 0-->INDEPENDENT
ADLS_ACUITY_SCORE: 40

## 2023-05-12 NOTE — PROGRESS NOTES
[facility]    HOSPITALIST PROGRESS NOTE :   --------------------------------------------------    Date of Admission:  5/5/2023    Cumulative Summary: Bhavana Marr is an 82-year-old female patient with history including dementia; asthma; dyslipidemia; depression/anxiety; bipolar disorder; borderline personality disorder; osteoporosis; prior gastric bypass; and recent hospitalization (4/21/2023 to 4/28/2023) with issues including suicidal ideation and dementia with behavioral disturbance. She presents from her memory care facility with decreased oral intake, low glucose level, low blood pressure, depression and possible suicidal ideations.     On initial evaluation, patient was afebrile, normotensive, not tachycardic.  Labs notable for BMP with sodium 133 otherwise normal; CBC was normal; LFTs showed low albumin of 3.0 and low protein 5.3 and low AST of 9, otherwise normal; glucose 109; urinalysis showed 60 ketones and not suggestive of infection.     Assessment & Plan     Decreased oral intake with hypoglycemia, low blood pressures  Hyponatremia, suspect due to decreased PO intake  Decreased oral intake, possibly due to Psychiatric Illness; see below     * Initial presentation as above. Pt found with low sodium as well as ketones in the urine. Given fluids in the ED.    * No abdominal pain or lab findings to suggest a primary GI etiology.    - Patient has been admitted to the hospital for further evaluation and management  - Discontinued IV fluids on 5/8 due to improved oral intake.  - Continue diet as tolerated. She is eating better, although not always consistently per report.  - Lipase minimally elevated at 84.  - Mental health management as below     Psychiatric issues  Depression/anxiety, bipolar d/o, borderline personality d/o with possible suicidal ideations  * Of note is that the patient was recently hospitalized through [facility] with issues including suicidal  ideation and dementia.  She was seen by psychiatry during that hospitalization; they did not recommend any type of psychiatric admission.    * Initial presentation as above. Noted manic episode and some confusion at memory care. On admit, pt denied suicidal ideations on admit but did endorse feeling depressed.  She was seen by the DEC  in the ED and not felt to be actively suicidal.    - Re-orient as needed.  - Maintain normal day/night, sleep/wake cycles.  - Minimize sedating medications as able.  - Continue PTA mirtazapine; olanzapine; sertraline.  - Psychiatry consulted, appreciate their assistance. Patient refused to speak with psychiatry again . Meds adjusted by psychiatry on 5/10/23. Patient placed on a hold on 5/10/23 by psychiatry, they are planning to file for commitment.  - Highly appreciate  help, seems like [facility]'s screening has been completed, patient is planned to be evaluated on 05/12      Asthma  - Continue PTA budesonide-formoterol.     Dyslipidemia  - Continue PTA simvastatin.     GERD  - Continue PTA famotidine.     Osteoporosis  - Resume alendronate after discharge.     Prior gastric bypass  B12 deficiency  Iron deficienty anemia  - Continue B12 and iron.     Constipation  - Continue senokot-s.  - PRN miralax available.      Dispo  - Psychiatry placed patient on a hold on 5/10/23 and has filed for commitment.    Clinically Significant Risk Factors              # Hypoalbuminemia: Lowest albumin = 3 g/dL at 5/5/2023  4:53 PM, will monitor as appropriate                 Diet: Regular Diet Adult    Cortez Catheter: Not present  DVT Prophylaxis: Ambulate every shift  Code Status: Full Code    The patient's care was discussed with the Patient.    Medical Decision Making     30 min were spent in direct patient care today including the floor time , more than 50% of the time was spent in patient care coordination and counseling.      Disposition Plan     Expected  Discharge Date: 05/15/2023,  6:00 PM  Discharge Delays: Specialist Consult (enter specialist & decision needed in comments)    Discharge Comments: Inpateint psych recommended.     Entered: Radha Yan MD 05/12/2023, 7:40 AM     Radha Yan MD, MD, [facility]  Text Page (7am - 6pm)    ----------------------------------------------------------------------------------------------------------------------    Interval History      Patient was seen and examined, was lying in bed, told me she does not want to talk to me today, denying any complaints, she closed her eyes then and did not answer any questions she was okay for a quick examination looks comfortable    -Data reviewed today: I reviewed all new labs and imaging results over the last 24 hours.    I personally reviewed no images or EKG's today.    Physical Exam   Temp: 97.7  F (36.5  C) Temp src: Oral BP: 110/65 Pulse: 65   Resp: 16 SpO2: 97 % O2 Device: None (Room air)    Vitals:    05/05/23 2140   Weight: 54 kg (119 lb 0.8 oz)     Vital Signs with Ranges  Temp:  [97.7  F (36.5  C)-98.4  F (36.9  C)] 97.7  F (36.5  C)  Pulse:  [63-78] 65  Resp:  [16-18] 16  BP: (100-118)/(65-71) 110/65  SpO2:  [96 %-97 %] 97 %  I/O last 3 completed shifts:  In: 830 [P.O.:830]  Out: -     GENERAL: Alert , awake and oriented. NAD. Conversational, appropriate.  Does not want to talk too much, answered few questions  HEENT: Normocephalic. EOMI. No icterus or injection. Nares normal.   LUNGS: Clear to auscultation. No dyspnea at rest.   HEART: Regular rate. Extremities perfused.   ABDOMEN: Soft, nontender, and nondistended. Positive bowel sounds.   EXTREMITIES: No LE edema noted.   NEUROLOGIC: Moves extremities x4 on command. No acute focal neurologic abnormalities noted.  Does not want to talk too much    Medications       acetaminophen  650 mg Oral BID     famotidine  20 mg Oral BID     mirtazapine  22.5 mg Oral At Bedtime     OLANZapine  10 mg Oral At Bedtime     OLANZapine  5 mg Oral  VINAYAK     senna-docusate  2 tablet Oral BID     sertraline  150 mg Oral Daily     sodium chloride (PF)  3 mL Intracatheter Q8H     valACYclovir  500 mg Oral Daily       Data   Recent Labs   Lab 05/07/23  0552 05/06/23  0636 05/05/23  2108 05/05/23  1653   WBC  --  10.0  --  9.3   HGB  --  11.5*  --  11.9   MCV  --  93  --  93   PLT  --  309  --  314    135*  --  133*   POTASSIUM 3.7 4.1  --  3.9   CHLORIDE 103 103  --  100   CO2 23 24  --  23   BUN 7.4* 12.7  --  19.5   CR 0.63 0.63  --  0.67   ANIONGAP 11 8  --  10   LAWANDA 9.3 8.9  --  8.8   * 115* 88 109*   ALBUMIN  --   --   --  3.0*   PROTTOTAL  --   --   --  5.3*   BILITOTAL  --   --   --  0.2   ALKPHOS  --   --   --  50   ALT  --   --   --  9*   AST  --   --   --  20   LIPASE  --   --   --  84*       Imaging:   No results found for this or any previous visit (from the past 24 hour(s)).

## 2023-05-12 NOTE — PLAN OF CARE
Orientation/Cognitive: A&O X3, disoriented to time.  Observation Goals (Met/ Not Met): Inpateint  Mobility Level/Assist Equipment: A1 GB/W  Fall Risk (Y/N): Y  Behavior Concerns:n/a  Pain Management: Denies pain  Tele/VS/O2: no tele,VSS, O2 RA  ABNL Lab/BG: See Chart  Diet: Reg          Bowel/Bladder: Continent  Skin Concerns: Blanchable redness on buttocks and R hip.   Drains/Devices: No IV, Md aware  Tests/Procedures for next shift: Pt on 72 hour hold   Anticipated DC date & active delays: TBD

## 2023-05-12 NOTE — PROGRESS NOTES
Care Management Follow Up    Length of Stay (days): 5    Expected Discharge Date: 05/15/2023     Concerns to be Addressed:       Patient plan of care discussed at interdisciplinary rounds: Yes    Anticipated Discharge Disposition:       Anticipated Discharge Services:    Anticipated Discharge DME:      Patient/family educated on Medicare website which has current facility and service quality ratings:    Education Provided on the Discharge Plan:    Patient/Family in Agreement with the Plan:      Referrals Placed by CM/SW:    Private pay costs discussed: Not applicable    Additional Information:  Writer called Whitney mahmood ([phone]) with [facility] Pre-petition screening. Whitney stated she came out to meet with patient, but patient declined to meet with Whitney. Whitney stated she is looking into assigning it but will give writer a call with confirmation.     Addendum 155: VM from Whitney stating she never got the commitment paperwork and asking for writer to fax again. Writer faxed documents over to Whitney. Writer called Whitney and updated her. Whitney and writer verified Fax and whitney is asking that it be faxed to [phone]. Writer faxed documents again to this number.     Nataliia James, FERNANDO, LGSW   Social Work   [facility]

## 2023-05-12 NOTE — PLAN OF CARE
"Orientation/Cognitive: A&Ox3, disoriented to time  Observation Goals (Met/ Not Met): Inpatient  Mobility Level/Assist Equipment: Ax1, gb, walker  Fall Risk (Y/N): Y  Behavior Concerns: flat affect, cooperative  Pain Management: denies pain  Tele/VS/O2: VSS  ABNL Lab/BG: WDL  Diet: tolerating a regular diet, remains unmotivated to eat much  Bowel/Bladder: WDL  Skin Concerns: redness on buttocks and R hip, declined mepilex  Drains/Devices: No IV access  Tests/Procedures for next shift: none  Anticipated DC date & active delays: Pt on 72-hr hold, psych filing for commitment  Patient Stated Goal for Today: refused to answer    /41 (BP Location: Left arm)   Pulse 58   Temp 97.4  F (36.3  C) (Oral)   Resp 16   Ht 1.6 m (5' 3\")   Wt 54 kg (119 lb 0.8 oz)   LMP  (LMP Unknown)   SpO2 96%   BMI 21.09 kg/m          "

## 2023-05-13 LAB
CREAT SERPL-MCNC: 0.75 MG/DL (ref 0.51–0.95)
GFR SERPL CREATININE-BSD FRML MDRD: 80 ML/MIN/1.73M2

## 2023-05-13 PROCEDURE — 82565 ASSAY OF CREATININE: CPT | Performed by: INTERNAL MEDICINE

## 2023-05-13 PROCEDURE — 99231 SBSQ HOSP IP/OBS SF/LOW 25: CPT | Performed by: INTERNAL MEDICINE

## 2023-05-13 PROCEDURE — 120N000001 HC R&B MED SURG/OB

## 2023-05-13 PROCEDURE — 36415 COLL VENOUS BLD VENIPUNCTURE: CPT | Performed by: INTERNAL MEDICINE

## 2023-05-13 PROCEDURE — 250N000013 HC RX MED GY IP 250 OP 250 PS 637: Performed by: HOSPITALIST

## 2023-05-13 PROCEDURE — 250N000013 HC RX MED GY IP 250 OP 250 PS 637

## 2023-05-13 RX ORDER — FAMOTIDINE 20 MG/1
20 TABLET, FILM COATED ORAL DAILY
Status: DISCONTINUED | OUTPATIENT
Start: 2023-05-14 | End: 2023-06-07 | Stop reason: HOSPADM

## 2023-05-13 RX ADMIN — OLANZAPINE 5 MG: 2.5 TABLET, FILM COATED ORAL at 09:33

## 2023-05-13 RX ADMIN — OLANZAPINE 10 MG: 5 TABLET, FILM COATED ORAL at 22:05

## 2023-05-13 RX ADMIN — FAMOTIDINE 20 MG: 20 TABLET ORAL at 09:32

## 2023-05-13 RX ADMIN — ACETAMINOPHEN 650 MG: 325 TABLET ORAL at 09:32

## 2023-05-13 RX ADMIN — VALACYCLOVIR HYDROCHLORIDE 500 MG: 500 TABLET, FILM COATED ORAL at 09:33

## 2023-05-13 RX ADMIN — MIRTAZAPINE 22.5 MG: 15 TABLET, FILM COATED ORAL at 22:05

## 2023-05-13 RX ADMIN — SENNOSIDES AND DOCUSATE SODIUM 2 TABLET: 50; 8.6 TABLET ORAL at 09:33

## 2023-05-13 RX ADMIN — ACETAMINOPHEN 650 MG: 325 TABLET ORAL at 20:20

## 2023-05-13 RX ADMIN — SERTRALINE HYDROCHLORIDE 150 MG: 100 TABLET ORAL at 09:33

## 2023-05-13 ASSESSMENT — ACTIVITIES OF DAILY LIVING (ADL)
ADLS_ACUITY_SCORE: 26
ADLS_ACUITY_SCORE: 27
ADLS_ACUITY_SCORE: 27
ADLS_ACUITY_SCORE: 26
ADLS_ACUITY_SCORE: 27
ADLS_ACUITY_SCORE: 27
ADLS_ACUITY_SCORE: 26
ADLS_ACUITY_SCORE: 26
ADLS_ACUITY_SCORE: 27
ADLS_ACUITY_SCORE: 27

## 2023-05-13 NOTE — CONSULTS
"CLINICAL NUTRITION SERVICES  -  ASSESSMENT NOTE      Recommendations Ordered by Registered Dietitian (RD):     Will make pt Room Service with Assist - she will be seen for meal orders, offer supplements       Malnutrition:   % Weight Loss:  Weight loss does not meet criteria for malnutrition  - records reflect wt has been stable, but suspect pt has lost some wt with decreased po intake  % Intake:  </= 50% for >/= 5 days (severe malnutrition)  Subcutaneous Fat Loss:  None observed - pt covered in blankets  Muscle Loss:  Temporal region - moderate depletion  Fluid Retention:  None noted    Malnutrition Diagnosis: suspect at least Moderate malnutrition  In Context of:  Acute illness or injury          REASON FOR ASSESSMENT  Bhavana Marr is a 82 year old female seen by Registered Dietitian for Admission Nutrition Risk Screen: 5/12  Have you recently lost weight without trying? \"14-23#\"  Have you been eating poorly because of a decreased appetite? \"yes\"        NUTRITION HISTORY  Chart reviewed  Pt admitted from facility  She has been refusing to eat, drinking very little     Pt currently on 72hr hold      CURRENT NUTRITION ORDERS  Diet Order:     Regular    Pt continues to have poor po intake during admit  Declining offers of food    Stopped by to see pt this afternoon - not very engaged, covers pulled up to chin  Tells me that she ordered mashed potatoes for lunch (no order placed)  Is agreeable to some orange sarita - said \"NO\" to offer of additional food items      NUTRITION FOCUSED PHYSICAL ASSESSMENT FOR DIAGNOSING MALNUTRITION)  Yes (brief visual, as pt covered in blankets and not engaged in visit)             Observed:    Muscle wasting (refer to documentation in Malnutrition section)    Obtained from Chart/Interdisciplinary Team:  FTT    ANTHROPOMETRICS  Height: 5' 3\"  Weight:(5/5) 54 kg / 119 lbs .77 oz  Body mass index is 21.09 kg/m .  Weight Status:  Normal BMI  IBW: 52.3 kg  % IBW: 103%  Weight History:   Wt " Readings from Last 10 Encounters:   05/05/23 54 kg (119 lb 0.8 oz)   10/17/18 54.4 kg (120 lb)   05/22/18 57.4 kg (126 lb 8 oz)     4/24/23 54.2 kg    LABS  Labs reviewed    MEDICATIONS  Medications reviewed      ASSESSED NUTRITION NEEDS PER APPROVED PRACTICE GUIDELINES:    Dosing Weight 54 kg  Estimated Energy Needs: 8180-5268 kcals (25-30 Kcal/Kg)  Justification: maintenance  Estimated Protein Needs: 65-80 grams protein (1.2-1.5 g pro/Kg)  Justification: Repletion  Estimated Fluid Needs: 7205-9147  mL (1 mL/Kcal)  Justification: maintenance    MALNUTRITION:  % Weight Loss:  Weight loss does not meet criteria for malnutrition  - records reflect wt has been stable, but suspect pt has lost some wt with decreased po intake  % Intake:  </= 50% for >/= 5 days (severe malnutrition)  Subcutaneous Fat Loss:  None observed - pt covered in blankets  Muscle Loss:  Temporal region - moderate depletion  Fluid Retention:  None noted    Malnutrition Diagnosis: suspect at least Moderate malnutrition  In Context of:  Acute illness or injury    NUTRITION DIAGNOSIS:  Inadequate oral intake related to poor appetite, mental health as evidenced by pt declining to eat meals      NUTRITION INTERVENTIONS  Recommendations / Nutrition Prescription  Regular diet  .      Implementation  Nutrition education: Not appropriate at this time due to patient condition  Will make pt Room Service with Assist - she will be seen for meal orders, offer supplements  .      Nutrition Goals  Pt to consume 1-2 items TID at meals  .      MONITORING AND EVALUATION:  Progress towards goals will be monitored and evaluated per protocol and Practice Guidelines

## 2023-05-13 NOTE — PROGRESS NOTES
[facility]    HOSPITALIST PROGRESS NOTE :   --------------------------------------------------    Date of Admission:  5/5/2023    Cumulative Summary: Bhavana Marr is an 82-year-old female patient with history including dementia; asthma; dyslipidemia; depression/anxiety; bipolar disorder; borderline personality disorder; osteoporosis; prior gastric bypass; and recent hospitalization (4/21/2023 to 4/28/2023) with issues including suicidal ideation and dementia with behavioral disturbance. She presents from her memory care facility with decreased oral intake, low glucose level, low blood pressure, depression and possible suicidal ideations.     On initial evaluation, patient was afebrile, normotensive, not tachycardic.  Labs notable for BMP with sodium 133 otherwise normal; CBC was normal; LFTs showed low albumin of 3.0 and low protein 5.3 and low AST of 9, otherwise normal; glucose 109; urinalysis showed 60 ketones and not suggestive of infection.     Assessment & Plan     Decreased oral intake with hypoglycemia, low blood pressures  Hyponatremia, suspect due to decreased PO intake  Decreased oral intake, possibly due to Psychiatric Illness; see below     * Initial presentation as above. Pt found with low sodium as well as ketones in the urine. Given fluids in the ED.    * No abdominal pain or lab findings to suggest a primary GI etiology.    -- Patient has been admitted to the hospital for further evaluation and management.  -- Discontinued IV fluids on 5/8 due to improved oral intake.  --Discussed with bedside nursing to assist in ordering food for patient, discussed with patient to increase her oral intake  --Her oral intake is not consistently improved but has been improved as compared to before pain  -- Lipase minimally elevated at 84.  -- Mental health management as below     Psychiatric issues  Depression/anxiety, bipolar d/o, borderline personality d/o with possible suicidal  ideations  * Of note is that the patient was recently hospitalized through [facility] with issues including suicidal ideation and dementia.  She was seen by psychiatry during that hospitalization; they did not recommend any type of psychiatric admission.    * Initial presentation as above. Noted manic episode and some confusion at memory care. On admit, pt denied suicidal ideations on admit but did endorse feeling depressed.  She was seen by the DEC  in the ED and not felt to be actively suicidal.    -- Re-orient as needed.  -- Maintain normal day/night, sleep/wake cycles.  -- Minimize sedating medications as able.  -- Continue PTA mirtazapine; olanzapine; sertraline.  -- Psychiatry consulted, appreciate their assistance. Patient refused to speak with psychiatry again . Meds adjusted by psychiatry on 5/10/23. Patient placed on a hold on 5/10/23 by psychiatry, they are planning to file for commitment.  -- Highly appreciate  help, seems like [facility]'s screening has been completed, per  note, Caryn from [facility] screening did come to meet with patient but patient declined to meet Caryn which seems to be concerning behavior and commitment has been filed in the first place due to withdrawn affects.  --According to Caryn, she did not get the commitment paperwork so  has refaxed the documents again.  --Hoping he can have further information available on Monday morning.     Asthma  - Continue PTA budesonide-formoterol.     Dyslipidemia  - Continue PTA simvastatin.     GERD  - Continue PTA famotidine.     Osteoporosis  - Resume alendronate after discharge.     Prior gastric bypass  B12 deficiency  Iron deficienty anemia  - Continue B12 and iron.     Constipation  - Continue senokot-s.  - PRN miralax available.      Dispo  - Psychiatry placed patient on a hold on 5/10/23 and has filed for commitment.    Clinically Significant Risk Factors               # Hypoalbuminemia: Lowest albumin = 3 g/dL at 5/5/2023  4:53 PM, will monitor as appropriate                 Diet: Regular Diet Adult    Cortez Catheter: Not present  DVT Prophylaxis: Ambulate every shift  Code Status: Full Code    The patient's care was discussed with the Patient.    Medical Decision Making     30 min were spent in direct patient care today including the floor time , more than 50% of the time was spent in patient care coordination and counseling.      Disposition Plan     Expected Discharge Date: 05/15/2023,  6:00 PM  Discharge Delays: Specialist Consult (enter specialist & decision needed in comments)    Discharge Comments: Inpateint psych recommended.     Entered: Radha Yan MD 05/13/2023, 7:29 AM     Radha Yan MD, MD, [facility]  Text Page (7am - 6pm)    ----------------------------------------------------------------------------------------------------------------------    Interval History      Patient was seen and examined, lying in bed, answered more questions today as compared to yesterday.  Thinks that she got good night sleep, denying any new complaints, has not had any breakfast, encouraged her to order the breakfast she is open to the idea of bedside nursing help ordering her food.  Denying any new complaints.    -Data reviewed today: I reviewed all new labs and imaging results over the last 24 hours.    I personally reviewed no images or EKG's today.    Physical Exam   Temp: 97.5  F (36.4  C) Temp src: Oral BP: 136/71 Pulse: 65   Resp: 16 SpO2: 99 % O2 Device: None (Room air)    Vitals:    05/05/23 2140   Weight: 54 kg (119 lb 0.8 oz)     Vital Signs with Ranges  Temp:  [97.4  F (36.3  C)-98.1  F (36.7  C)] 97.5  F (36.4  C)  Pulse:  [53-66] 65  Resp:  [16] 16  BP: (108-136)/(41-71) 136/71  SpO2:  [96 %-100 %] 99 %  I/O last 3 completed shifts:  In: 480 [P.O.:480]  Out: -     GENERAL: Alert , awake and oriented. NAD. Conversational, appropriate.  Does not want to talk too  much, answered few questions  HEENT: Normocephalic. EOMI. No icterus or injection. Nares normal.   LUNGS: Clear to auscultation. No dyspnea at rest.   HEART: Regular rate. Extremities perfused.   ABDOMEN: Soft, nontender, and nondistended. Positive bowel sounds.   EXTREMITIES: No LE edema noted.   NEUROLOGIC: Moves extremities x4 on command. No acute focal neurologic abnormalities noted.  Does not want to talk too much    Medications       acetaminophen  650 mg Oral BID     famotidine  20 mg Oral BID     mirtazapine  22.5 mg Oral At Bedtime     OLANZapine  10 mg Oral At Bedtime     OLANZapine  5 mg Oral QAM     senna-docusate  2 tablet Oral BID     sertraline  150 mg Oral Daily     sodium chloride (PF)  3 mL Intracatheter Q8H     valACYclovir  500 mg Oral Daily       Data   Recent Labs   Lab 05/07/23  0552      POTASSIUM 3.7   CHLORIDE 103   CO2 23   BUN 7.4*   CR 0.63   ANIONGAP 11   LAWANDA 9.3   *       Imaging:   No results found for this or any previous visit (from the past 24 hour(s)).

## 2023-05-13 NOTE — PLAN OF CARE
"Failure to thrive, 72-hour hold    DATE & TIME: 05/13/23 4911-6683      Cognitive Concerns/ Orientation : A&Ox1; confused, uncooperative, impulsive and irritable at times  BEHAVIOR & AGGRESSION TOOL COLOR: Green  CIWA SCORE: NA   ABNL VS/O2: VSS on RA  MOBILITY: Assist of 1/gait belt to bathroom; slept almost all day - activity encouraged all shift but patient adamantly refuses - \"Leave me alone!\"  PAIN MANAGMENT: Denies  DIET: Regular - poor appetite - po encouraged  BOWEL/BLADDER: Continent; last BM 5/10  ABNL LAB/BG: None drawn  DRAIN/DEVICES: None  TELEMETRY RHYTHM: NA  SKIN: Pale, dry, blanchable redness to sacrum/coccyx (refused full skin assessment)  TESTS/PROCEDURES: NA  D/C DATE: On 72-hr hold - Psych has filed for commitment                              "

## 2023-05-13 NOTE — PROGRESS NOTES
Failure to thrive, 72-hour hold    Date & Time: 5/12/23 - 5/13/23 9369-5393  Orientation/Cognitive: A&O X 2-3, disoriented to time/place/ sometimes situation. Pt. States does not remember why she was admitted. Does not remember to use call light.  Mobility: A1 GB/W  Fall Risk (Y/N): Y  Behavior Concerns: flat affect  Pain Management: Denies pain  VS/O2: VSS, O2 on RA  ABNL Lab/BG: Hgb 11.5  Diet: Reg          Bowel/Bladder: Continent  Skin Concerns: WDL   Drains/Devices: No IV access, MD aware.  Tests/Procedures: Pt on 72 hour hold & psych filing for commitment  Anticipated DC date & active delays: TBD

## 2023-05-13 NOTE — PLAN OF CARE
Goal Outcome Evaluation:       Failure to thrive, 72-hour hold    Date & Time: 5/12/23 - 5/13/23 9676-5482  Orientation/Cognitive: A&O X 2-3, disoriented to time/place/ sometimes situation. Pt. States does not remember why she was admitted. Does not remember to use call light.  Mobility: A1 GB/W  Fall Risk (Y/N): Y  Behavior Concerns: flat affect  Pain Management: Denies pain  VS/O2: VSS, O2 on RA  ABNL Lab/BG: Hgb 11.5  Diet: Reg          Bowel/Bladder: Continent  Skin Concerns: WDL   Drains/Devices: No IV access, MD aware.  Tests/Procedures: Pt on 72 hour hold & psych filing for commitment  Anticipated DC date & active delays: TBD

## 2023-05-14 ENCOUNTER — MEDICAL CORRESPONDENCE (OUTPATIENT)
Dept: HEALTH INFORMATION MANAGEMENT | Facility: CLINIC | Age: 83
End: 2023-05-14
Payer: COMMERCIAL

## 2023-05-14 PROCEDURE — 250N000013 HC RX MED GY IP 250 OP 250 PS 637: Performed by: HOSPITALIST

## 2023-05-14 PROCEDURE — 250N000013 HC RX MED GY IP 250 OP 250 PS 637: Performed by: INTERNAL MEDICINE

## 2023-05-14 PROCEDURE — 120N000001 HC R&B MED SURG/OB

## 2023-05-14 PROCEDURE — 250N000013 HC RX MED GY IP 250 OP 250 PS 637

## 2023-05-14 PROCEDURE — 99231 SBSQ HOSP IP/OBS SF/LOW 25: CPT | Performed by: INTERNAL MEDICINE

## 2023-05-14 RX ADMIN — ACETAMINOPHEN 650 MG: 325 TABLET ORAL at 08:36

## 2023-05-14 RX ADMIN — SENNOSIDES AND DOCUSATE SODIUM 2 TABLET: 50; 8.6 TABLET ORAL at 08:36

## 2023-05-14 RX ADMIN — SERTRALINE HYDROCHLORIDE 150 MG: 100 TABLET ORAL at 08:35

## 2023-05-14 RX ADMIN — FAMOTIDINE 20 MG: 20 TABLET ORAL at 08:36

## 2023-05-14 RX ADMIN — OLANZAPINE 10 MG: 5 TABLET, FILM COATED ORAL at 21:35

## 2023-05-14 RX ADMIN — OLANZAPINE 5 MG: 2.5 TABLET, FILM COATED ORAL at 08:36

## 2023-05-14 RX ADMIN — Medication 1 MG: at 21:35

## 2023-05-14 RX ADMIN — ACETAMINOPHEN 650 MG: 325 TABLET ORAL at 20:13

## 2023-05-14 RX ADMIN — VALACYCLOVIR HYDROCHLORIDE 500 MG: 500 TABLET, FILM COATED ORAL at 08:35

## 2023-05-14 RX ADMIN — SENNOSIDES AND DOCUSATE SODIUM 2 TABLET: 50; 8.6 TABLET ORAL at 20:12

## 2023-05-14 RX ADMIN — MIRTAZAPINE 22.5 MG: 15 TABLET, FILM COATED ORAL at 21:35

## 2023-05-14 ASSESSMENT — ACTIVITIES OF DAILY LIVING (ADL)
ADLS_ACUITY_SCORE: 24
ADLS_ACUITY_SCORE: 27
ADLS_ACUITY_SCORE: 27
ADLS_ACUITY_SCORE: 26
ADLS_ACUITY_SCORE: 26
ADLS_ACUITY_SCORE: 27
ADLS_ACUITY_SCORE: 26
ADLS_ACUITY_SCORE: 27
ADLS_ACUITY_SCORE: 27
ADLS_ACUITY_SCORE: 26

## 2023-05-14 NOTE — PROGRESS NOTES
[facility]    HOSPITALIST PROGRESS NOTE :   --------------------------------------------------    Date of Admission:  5/5/2023    Cumulative Summary: Bhavana Marr is an 82-year-old female patient with history including dementia; asthma; dyslipidemia; depression/anxiety; bipolar disorder; borderline personality disorder; osteoporosis; prior gastric bypass; and recent hospitalization (4/21/2023 to 4/28/2023) with issues including suicidal ideation and dementia with behavioral disturbance. She presents from her memory care facility with decreased oral intake, low glucose level, low blood pressure, depression and possible suicidal ideations.     On initial evaluation, patient was afebrile, normotensive, not tachycardic.  Labs notable for BMP with sodium 133 otherwise normal; CBC was normal; LFTs showed low albumin of 3.0 and low protein 5.3 and low AST of 9, otherwise normal; glucose 109; urinalysis showed 60 ketones and not suggestive of infection.     Assessment & Plan     Decreased oral intake with hypoglycemia, low blood pressures  Hyponatremia, suspect due to decreased PO intake  Decreased oral intake, possibly due to Psychiatric Illness; see below     * Initial presentation as above. Pt found with low sodium as well as ketones in the urine. Given fluids in the ED.    * No abdominal pain or lab findings to suggest a primary GI etiology.    -- Patient has been admitted to the hospital for further evaluation and management.  -- Discontinued IV fluids on 5/8 due to improved oral intake.  -- Discussed with bedside nursing to assist in ordering food for patient, discussed with patient to increase her oral intake  -- Her oral intake is not consistently improved but has been improved as compared to before pain  -- Lipase minimally elevated at 84.  -- Mental health management as below     Psychiatric issues  Depression/anxiety, bipolar d/o, borderline personality d/o with possible suicidal  ideations  * Of note is that the patient was recently hospitalized through [facility] with issues including suicidal ideation and dementia.  She was seen by psychiatry during that hospitalization; they did not recommend any type of psychiatric admission.    * Initial presentation as above. Noted manic episode and some confusion at memory care. On admit, pt denied suicidal ideations on admit but did endorse feeling depressed.  She was seen by the DEC  in the ED and not felt to be actively suicidal.    -- Re-orient as needed.  -- Maintain normal day/night, sleep/wake cycles.  -- Minimize sedating medications as able.  -- Continue PTA mirtazapine; olanzapine; sertraline.  -- Psychiatry consulted, appreciate their assistance. Patient refused to speak with psychiatry again . Meds adjusted by psychiatry on 5/10/23. Patient placed on a hold on 5/10/23 by psychiatry, they are planning to file for commitment.  -- Highly appreciate  help, seems like [facility]'s screening has been completed, per  note, Caryn from [facility] screening did come to meet with patient but patient declined to meet Caryn which seems to be concerning behavior and commitment has been filed in the first place due to withdrawn affects.  --According to Caryn, she did not get the commitment paperwork so  has refaxed the documents again.  --Hoping he can have further information available on Monday morning.    Suspect Moderate malnutrition In Context of:  Acute illness or injury:  -- Will make pt Room Service with Assist - she will be seen for meal orders, offer supplements     Asthma  -- Continue PTA budesonide-formoterol.     Dyslipidemia  -- Continue PTA simvastatin.     GERD  -- Continue PTA famotidine.     Osteoporosis  -- Resume alendronate after discharge.     Prior gastric bypass  B12 deficiency  Iron deficienty anemia  -- Continue B12 and iron.     Constipation  --  Continue senokot-s.  -- PRN miralax available.   -- Last bowel movement was on 05/10, encouraged patient to continue to take her stool softeners      Dispo  -- Psychiatry placed patient on a hold on 5/10/23 and has filed for commitment.    Clinically Significant Risk Factors              # Hypoalbuminemia: Lowest albumin = 3 g/dL at 5/5/2023  4:53 PM, will monitor as appropriate                 Diet: Regular Diet Adult  Room Service    Cortez Catheter: Not present  DVT Prophylaxis: Ambulate every shift  Code Status: Full Code    The patient's care was discussed with the Patient.    Medical Decision Making     30 min were spent in total patient care today including the floor time , more than 50% of the time was spent in patient care coordination and counseling.      Disposition Plan     Expected Discharge Date: 05/15/2023,  6:00 PM  Discharge Delays: Specialist Consult (enter specialist & decision needed in comments)    Discharge Comments: Inpateint psych recommended.     Entered: Radha Yan MD 05/14/2023, 7:47 AM     Radha Yan MD, MD, FACP  Text Page (7am - 6pm)    ----------------------------------------------------------------------------------------------------------------------    Interval History      Patient was seen and examined, lying in bed, was less talkative today as compared to yesterday, answered only 1 question, then closed her eyes, looks comfortable, denying any new problems.    -Data reviewed today: I reviewed all new labs and imaging results over the last 24 hours.    I personally reviewed no images or EKG's today.    Physical Exam   Temp: 97.4  F (36.3  C) Temp src: Oral BP: 103/57 Pulse: 70   Resp: 18 SpO2: 98 % O2 Device: None (Room air)    Vitals:    05/05/23 2140   Weight: 54 kg (119 lb 0.8 oz)     Vital Signs with Ranges  Temp:  [97.4  F (36.3  C)-97.9  F (36.6  C)] 97.4  F (36.3  C)  Pulse:  [70-97] 70  Resp:  [17-18] 18  BP: ()/(52-74) 103/57  SpO2:  [98 %] 98 %  No intake/output  data recorded.    GENERAL: Alert , awake and oriented. NAD. Conversational, appropriate.  Does not want to talk too much, answered few questions  HEENT: Normocephalic. EOMI. No icterus or injection. Nares normal.   LUNGS: Clear to auscultation. No dyspnea at rest.   HEART: Regular rate. Extremities perfused.   ABDOMEN: Soft, nontender, and nondistended. Positive bowel sounds.   EXTREMITIES: No LE edema noted.   NEUROLOGIC: Moves extremities x4 on command. No acute focal neurologic abnormalities noted.  Does not want to talk too much    Medications       acetaminophen  650 mg Oral BID     famotidine  20 mg Oral Daily     mirtazapine  22.5 mg Oral At Bedtime     OLANZapine  10 mg Oral At Bedtime     OLANZapine  5 mg Oral QAM     senna-docusate  2 tablet Oral BID     sertraline  150 mg Oral Daily     sodium chloride (PF)  3 mL Intracatheter Q8H     valACYclovir  500 mg Oral Daily       Data   Recent Labs   Lab 05/13/23  0851   CR 0.75       Imaging:   No results found for this or any previous visit (from the past 24 hour(s)).

## 2023-05-14 NOTE — PLAN OF CARE
Goal Outcome Evaluation:       Failure to thrive, 72-hour hold    DATE & TIME: 05/13/23-5/14/23 3510-1798    Cognitive Concerns/ Orientation : A&Ox1; confused, calm/cooperative. Can be irritable/frustrated at times.   BEHAVIOR & AGGRESSION TOOL COLOR: Green  ABNL VS/O2: VSS on RA  MOBILITY: Assist of 1/gait belt to bathroom; slept all of shift between cares/using the bathroom.   PAIN MANAGMENT: Denies  DIET: Regular - poor appetite - po encouraged. Water intake adequate.  BOWEL/BLADDER: Continent; last BM 5/10 - pt. Refused senna in evening of 5/13.  ABNL LAB/BG: None drawn  DRAIN/DEVICES: None  TELEMETRY RHYTHM: NA  SKIN: Pale, dry, blanchable redness to sacrum/coccyx.   TESTS/PROCEDURES: NA  D/C DATE: On 72-hr hold - Psych has filed for commitment - awaiting further details.

## 2023-05-15 ENCOUNTER — MEDICAL CORRESPONDENCE (OUTPATIENT)
Dept: HEALTH INFORMATION MANAGEMENT | Facility: CLINIC | Age: 83
End: 2023-05-15
Payer: COMMERCIAL

## 2023-05-15 PROCEDURE — 250N000013 HC RX MED GY IP 250 OP 250 PS 637: Performed by: HOSPITALIST

## 2023-05-15 PROCEDURE — 250N000013 HC RX MED GY IP 250 OP 250 PS 637

## 2023-05-15 PROCEDURE — 250N000013 HC RX MED GY IP 250 OP 250 PS 637: Performed by: INTERNAL MEDICINE

## 2023-05-15 PROCEDURE — 120N000001 HC R&B MED SURG/OB

## 2023-05-15 PROCEDURE — 99231 SBSQ HOSP IP/OBS SF/LOW 25: CPT | Performed by: HOSPITALIST

## 2023-05-15 RX ADMIN — ACETAMINOPHEN 650 MG: 325 TABLET ORAL at 20:09

## 2023-05-15 RX ADMIN — ACETAMINOPHEN 650 MG: 325 TABLET ORAL at 08:35

## 2023-05-15 RX ADMIN — SENNOSIDES AND DOCUSATE SODIUM 2 TABLET: 50; 8.6 TABLET ORAL at 08:36

## 2023-05-15 RX ADMIN — SENNOSIDES AND DOCUSATE SODIUM 2 TABLET: 50; 8.6 TABLET ORAL at 20:09

## 2023-05-15 RX ADMIN — OLANZAPINE 10 MG: 5 TABLET, FILM COATED ORAL at 21:58

## 2023-05-15 RX ADMIN — FAMOTIDINE 20 MG: 20 TABLET ORAL at 08:35

## 2023-05-15 RX ADMIN — MIRTAZAPINE 22.5 MG: 15 TABLET, FILM COATED ORAL at 21:58

## 2023-05-15 RX ADMIN — SERTRALINE HYDROCHLORIDE 150 MG: 100 TABLET ORAL at 08:36

## 2023-05-15 RX ADMIN — VALACYCLOVIR HYDROCHLORIDE 500 MG: 500 TABLET, FILM COATED ORAL at 08:35

## 2023-05-15 RX ADMIN — OLANZAPINE 5 MG: 2.5 TABLET, FILM COATED ORAL at 08:35

## 2023-05-15 ASSESSMENT — ACTIVITIES OF DAILY LIVING (ADL)
ADLS_ACUITY_SCORE: 24
ADLS_ACUITY_SCORE: 26
ADLS_ACUITY_SCORE: 24

## 2023-05-15 NOTE — PLAN OF CARE
Goal Outcome Evaluation:       Failure to thrive, 72-hour hold    DATE & TIME: 05/14/23-5/15/23 3523-0338     Cognitive Concerns/ Orientation : A&Ox1-2; confused, oriented to self Cooperative but irritable at times. Pleasant this shift.  BEHAVIOR & AGGRESSION TOOL COLOR: Green  ABNL VS/O2: VSS on RA  MOBILITY: Assist of 1/gait belt to bathroom; slept all of shift except when going to the bathroom.   PAIN MANAGMENT: Denies  DIET: Regular - appetite was better on 5/14 - Water intake adequate.  BOWEL/BLADDER: Continent; last BM 5/14. Had 2 small BM overnight.  ABNL LAB/BG: None drawn  DRAIN/DEVICES: None  TELEMETRY RHYTHM: NA  SKIN: Pale, dry.  TESTS/PROCEDURES: NA  D/C DATE: On 72-hr hold - Psych has filed for commitment - awaiting further details.

## 2023-05-15 NOTE — PLAN OF CARE
Failure to thrive, 72-hour hold  DATE & TIME: 05/14/23 6521-9112     Cognitive Concerns/ Orientation : A&Ox1; confused; cooperative but irritable and refuses cares at times.  BEHAVIOR & AGGRESSION TOOL COLOR: Green  ABNL VS/O2: VSS on RA  MOBILITY: Assist of 1/gait belt to bathroom; slept all of shift except when going to the bathroom.   PAIN MANAGMENT: Denies  DIET: Regular - appetite was better today - Water intake adequate.  BOWEL/BLADDER: Continent; last BM today  ABNL LAB/BG: None drawn  DRAIN/DEVICES: None  TELEMETRY RHYTHM: NA  SKIN: Pale, dry, blanchable redness to sacrum/coccyx.   TESTS/PROCEDURES: NA  D/C DATE: On 72-hr hold - Psych has filed for commitment - awaiting further details.

## 2023-05-15 NOTE — PROGRESS NOTES
[facility]    Medicine Progress Note - Hospitalist Service    Date of Admission:  5/5/2023    Assessment & Plan   Bhavana Marr is an 82-year-old female patient with history including dementia; asthma; dyslipidemia; depression/anxiety; bipolar disorder; borderline personality disorder; osteoporosis; prior gastric bypass; and recent hospitalization (4/21/2023 to 4/28/2023) with issues including suicidal ideation and dementia with behavioral disturbance. She presents from her memory care facility with decreased oral intake, low glucose level, low blood pressure, depression and possible suicidal ideations.     On initial evaluation, patient was afebrile, normotensive, not tachycardic.  Labs notable for BMP with sodium 133 otherwise normal; CBC was normal; LFTs showed low albumin of 3.0 and low protein 5.3 and low AST of 9, otherwise normal; glucose 109; urinalysis showed 60 ketones and not suggestive of infection.    Decreased oral intake with hypoglycemia, low blood pressures  Hyponatremia, suspect due to decreased PO intake  Decreased oral intake, possibly due to Psychiatric Illness; see below   * Initial presentation as above. Pt found with low sodium as well as ketones in the urine. Given fluids in the ED.    * No abdominal pain or lab findings to suggest a primary GI etiology.  Patient has been admitted to the hospital for further evaluation and management.    Discontinued IV fluids on 5/8 due to improved oral intake.    Discussed with bedside nursing to assist in ordering food for patient, discussed with patient to increase her oral intake    Her oral intake is not consistently improved but has been improved as compared to before pain    Lipase minimally elevated at 84.    Mental health management as below.    Psychiatric issues  Depression/anxiety, bipolar d/o, borderline personality d/o with possible suicidal ideations  * Of note is that the patient was recently hospitalized through  [facility] with issues including suicidal ideation and dementia.  She was seen by psychiatry during that hospitalization; they did not recommend any type of psychiatric admission.    * Initial presentation as above. Noted manic episode and some confusion at memory care. On admit, pt denied suicidal ideations on admit but did endorse feeling depressed.  She was seen by the DEC  in the ED and not felt to be actively suicidal.    Re-orient as needed.    Maintain normal day/night, sleep/wake cycles.    Minimize sedating medications as able.    Continue PTA mirtazapine; olanzapine; sertraline.    Psychiatry consulted, appreciate their assistance. Patient refused to speak with psychiatry again . Meds adjusted by psychiatry on 5/10/23. Patient placed on a hold on 5/10/23 by psychiatry, they are planning to file for commitment.    Highly appreciate  help, seems like [facility]'s screening has been completed, per  note, Caryn from [facility] screening did come to meet with patient but patient declined to meet Caryn which seems to be concerning behavior and commitment has been filed in the first place due to withdrawn affects.    Suspect Moderate malnutrition In Context of:  Acute illness or injury:    Nutrition consulted.    She will be seen for meal orders, offer supplements.     Asthma    Continue PTA budesonide-formoterol.     Dyslipidemia  Continue PTA simvastatin.     GERD    Continue PTA famotidine.     Osteoporosis    Resume alendronate after discharge.     Prior gastric bypass  B12 deficiency  Iron deficienty anemia    Continue B12 and iron.     Constipation    Continue senokot-s.    PRN miralax available.     Dispo    Psychiatry placed patient on a hold on 5/10/23 and has filed for commitment.         Diet: Regular Diet Adult  Room Service    DVT Prophylaxis: Ambulate every shift  Cortez Catheter: Not present  Lines: None     Cardiac Monitoring:  None  Code Status: Full Code      Clinically Significant Risk Factors              # Hypoalbuminemia: Lowest albumin = 3 g/dL at 5/5/2023  4:53 PM, will monitor as appropriate                     Disposition Plan     Expected Discharge Date: 05/16/2023,  6:00 PM  Discharge Delays: Specialist Consult (enter specialist & decision needed in comments)    Discharge Comments: .72 hour hold--up on Monday 1214.   [place] to meet on Friday for commitment.       Inpateint psych recommended.          uNno Courtney MD  Hospitalist Service  [facility]  Securely message with [employer] (more info)  Text page via [employer] Paging/Directory   ______________________________________________________________________    Interval History   Bhavana Nilda CHAS Marr was seen this morning. She stated: 'I'm better.' and 'I don't cry as much.' No specific complaints/concerns for me this morning.    Physical Exam   Vital Signs: Temp: 97.8  F (36.6  C) Temp src: Oral BP: 99/53 Pulse: 84   Resp: 16 SpO2: 96 % O2 Device: None (Room air)    Weight: 119 lbs .77 oz    Constitutional: awake, alert, cooperative, no apparent distress, laying in the hospital bed, flat affect  Respiratory: no increased work of breathing, clear to auscultation bilaterally, no crackles or wheezing  Cardiovascular: regular rate and rhythm, normal S1 and S2, no murmur noted  GI: normal bowel sounds, soft, non-distended, non-tender    Medical Decision Making       30 MINUTES SPENT BY ME on the date of service doing chart review, history, exam, documentation & further activities per the note.      Data   NOTE: Data reviewed over the past 24 hrs contributes toward MDM complexity

## 2023-05-15 NOTE — PLAN OF CARE
Goal Outcome Evaluation:    Failure to thrive, comes from memory care unit, decreased oral intake, depression, Placed on court hold today 5/15 at 1206    DATE & TIME: 5/15/23 9508-4642     Cognitive Concerns/ Orientation : A&Ox3; poor insight into situation, Pleasant this shift.  BEHAVIOR & AGGRESSION TOOL COLOR: Green  ABNL VS/O2: VSS on RA  MOBILITY: Assist of 1/gait belt to bathroom; ambulated in hallway to shower room.  PAIN MANAGMENT: Denies  DIET: Regular - Fair appetite, encouraged protein and fluid intake.  BOWEL/BLADDER: Continent; last BM 5/14.   ABNL LAB/BG: None drawn  DRAIN/DEVICES: None  TELEMETRY RHYTHM: NA  SKIN: Pale, dry. Blanchable redness on coccyx.  TESTS/PROCEDURES: NA  D/C DATE: Patient is on a commitment.

## 2023-05-16 PROCEDURE — 250N000013 HC RX MED GY IP 250 OP 250 PS 637: Performed by: INTERNAL MEDICINE

## 2023-05-16 PROCEDURE — 99231 SBSQ HOSP IP/OBS SF/LOW 25: CPT | Performed by: HOSPITALIST

## 2023-05-16 PROCEDURE — 250N000013 HC RX MED GY IP 250 OP 250 PS 637: Performed by: HOSPITALIST

## 2023-05-16 PROCEDURE — 120N000001 HC R&B MED SURG/OB

## 2023-05-16 PROCEDURE — 250N000013 HC RX MED GY IP 250 OP 250 PS 637

## 2023-05-16 RX ORDER — SIMVASTATIN 20 MG
40 TABLET ORAL AT BEDTIME
Status: DISCONTINUED | OUTPATIENT
Start: 2023-05-16 | End: 2023-06-07 | Stop reason: HOSPADM

## 2023-05-16 RX ADMIN — FAMOTIDINE 20 MG: 20 TABLET ORAL at 08:35

## 2023-05-16 RX ADMIN — VALACYCLOVIR HYDROCHLORIDE 500 MG: 500 TABLET, FILM COATED ORAL at 08:35

## 2023-05-16 RX ADMIN — SERTRALINE HYDROCHLORIDE 150 MG: 100 TABLET ORAL at 08:35

## 2023-05-16 RX ADMIN — OLANZAPINE 5 MG: 2.5 TABLET, FILM COATED ORAL at 08:35

## 2023-05-16 RX ADMIN — SIMVASTATIN 40 MG: 20 TABLET, FILM COATED ORAL at 22:39

## 2023-05-16 RX ADMIN — ACETAMINOPHEN 650 MG: 325 TABLET ORAL at 22:37

## 2023-05-16 RX ADMIN — OLANZAPINE 10 MG: 5 TABLET, FILM COATED ORAL at 22:39

## 2023-05-16 RX ADMIN — ACETAMINOPHEN 650 MG: 325 TABLET ORAL at 22:39

## 2023-05-16 RX ADMIN — MIRTAZAPINE 22.5 MG: 15 TABLET, FILM COATED ORAL at 22:39

## 2023-05-16 RX ADMIN — SENNOSIDES AND DOCUSATE SODIUM 2 TABLET: 50; 8.6 TABLET ORAL at 08:35

## 2023-05-16 RX ADMIN — ACETAMINOPHEN 650 MG: 325 TABLET ORAL at 08:34

## 2023-05-16 ASSESSMENT — ACTIVITIES OF DAILY LIVING (ADL)
ADLS_ACUITY_SCORE: 24
ADLS_ACUITY_SCORE: 26
ADLS_ACUITY_SCORE: 24
ADLS_ACUITY_SCORE: 32
ADLS_ACUITY_SCORE: 26
ADLS_ACUITY_SCORE: 24
ADLS_ACUITY_SCORE: 24
ADLS_ACUITY_SCORE: 32
ADLS_ACUITY_SCORE: 24
ADLS_ACUITY_SCORE: 24

## 2023-05-16 NOTE — PLAN OF CARE
Goal Outcome Evaluation:      Plan of Care Reviewed With: patient      Failure to thrive, comes from memory care unit, decreased oral intake, depression, Placed on court hold today 5/15 at 1206    DATE & TIME: 5/16/23  [id number]  Cognitive Concerns/ Orientation : A&Ox 2-3; poor insight into situation, Pleasant this shift.  BEHAVIOR & AGGRESSION TOOL COLOR: Green  ABNL VS/O2: VSS on RA  MOBILITY: Assist of 1/gait belt to bathroom.  PAIN MANAGMENT: Denies  DIET: Regular - appetite improving, food order was done for lunch and dinner. encouraged protein and fluid intake.  BOWEL/BLADDER: Incontinent of stool this shift; BM x3   ABNL LAB/BG: None drawn  DRAIN/DEVICES: None  TELEMETRY RHYTHM: NA  SKIN: Pale, dry. Blanchable redness on coccyx & Left hips, open to air.   TESTS/PROCEDURES: NA  D/C DATE: Psych consult in place. Patient is on a court hold and commitment process, SW following.

## 2023-05-16 NOTE — PLAN OF CARE
Goal Outcome Evaluation:       Failure to thrive, comes from memory care unit, decreased oral intake, depression, Placed on court hold today 5/15 at 1206    DATE & TIME: 5/15/23 - 5/16/23 8786-2421     Cognitive Concerns/ Orientation : A&Ox 2-3; poor insight into situation, Pleasant this shift.  BEHAVIOR & AGGRESSION TOOL COLOR: Green  ABNL VS/O2: VSS on RA  MOBILITY: Assist of 1/gait belt to bathroom; ambulated in hallway to shower room.  PAIN MANAGMENT: Denies  DIET: Regular - appetite improving, encouraged protein and fluid intake.  BOWEL/BLADDER: Continent this shift; last BM 5/14.   ABNL LAB/BG: None drawn  DRAIN/DEVICES: None  TELEMETRY RHYTHM: NA  SKIN: Pale, dry. Blanchable redness on coccyx & hips.  TESTS/PROCEDURES: NA  D/C DATE: Patient is on a commitment.

## 2023-05-16 NOTE — PLAN OF CARE
Goal Outcome Evaluation: progressing  5/15/23 3 p to 7 p    Orientation: DO to siutation, time, calm, needs to be reoriented    Vitals/Tele: Madhav, on RA, no signs of pain    IV Access/drains: None    Diet: Regular, appetite is poor    Mobility: A1 GB    GI/: Continent, this shift    Wound/Skin: pale, dry    Consults:     Discharge Plan: TBD, on commitment process      See Flow sheets for assessment

## 2023-05-16 NOTE — PROGRESS NOTES
[facility]    Medicine Progress Note - Hospitalist Service    Date of Admission:  5/5/2023    Assessment & Plan   Bhavana Marr is an 82-year-old female patient with history including dementia; asthma; dyslipidemia; depression/anxiety; bipolar disorder; borderline personality disorder; osteoporosis; prior gastric bypass; and recent hospitalization (4/21/2023 to 4/28/2023) with issues including suicidal ideation and dementia with behavioral disturbance. She presents from her memory care facility with decreased oral intake, low glucose level, low blood pressure, depression and possible suicidal ideations.     On initial evaluation, patient was afebrile, normotensive, not tachycardic.  Labs notable for BMP with sodium 133 otherwise normal; CBC was normal; LFTs showed low albumin of 3.0 and low protein 5.3 and low AST of 9, otherwise normal; glucose 109; urinalysis showed 60 ketones and not suggestive of infection.    Decreased oral intake with hypoglycemia, low blood pressures  Hyponatremia, suspect due to decreased PO intake  Decreased oral intake, possibly due to Psychiatric Illness; see below   * Initial presentation as above. Pt found with low sodium as well as ketones in the urine. Given fluids in the ED.    * No abdominal pain or lab findings to suggest a primary GI etiology.  Patient has been admitted to the hospital for further evaluation and management.    Discontinued IV fluids on 5/8 due to improved oral intake.    Discussed with bedside nursing to assist in ordering food for patient, discussed with patient to increase her oral intake    Her oral intake is not consistently improved but has been improved as compared to before pain    Lipase minimally elevated at 84.    Mental health management as below.    Psychiatric issues  Depression/anxiety, bipolar d/o, borderline personality d/o with possible suicidal ideations  * Of note is that the patient was recently hospitalized through  [facility] with issues including suicidal ideation and dementia.  She was seen by psychiatry during that hospitalization; they did not recommend any type of psychiatric admission.    * Initial presentation as above. Noted manic episode and some confusion at memory care. On admit, pt denied suicidal ideations on admit but did endorse feeling depressed.  She was seen by the DEC  in the ED and not felt to be actively suicidal.    Re-orient as needed.    Maintain normal day/night, sleep/wake cycles.    Minimize sedating medications as able.    Continue PTA mirtazapine; olanzapine; sertraline.    Psychiatry consulted, appreciate their assistance. Patient refused to speak with psychiatry again . Meds adjusted by psychiatry on 5/10/23. Patient placed on a hold on 5/10/23 by psychiatry, they are planning to file for commitment.    Highly appreciate  help, seems like [facility]'s screening has been completed, per  note, Caryn from [facility] screening did come to meet with patient but patient declined to meet Caryn which seems to be concerning behavior and commitment has been filed in the first place due to withdrawn affects.    Suspect Moderate malnutrition In Context of:  Acute illness or injury    Nutrition consulted.    She will be seen for meal orders, offer supplements.     Asthma    Can resume PTA PRN budesonide-formoterol if needed.     Dyslipidemia    Continue PTA simvastatin.     GERD    Continue PTA famotidine.     Osteoporosis    Resume alendronate after discharge.     Prior gastric bypass  B12 deficiency  Iron deficienty anemia    Continue B12 and iron.     Constipation    Continue senokot-s.    PRN miralax available.     Dispo    Psychiatry placed patient on a hold on 5/10/23 and has filed for commitment.       Diet: Regular Diet Adult  Room Service    DVT Prophylaxis: Ambulate every shift  Cortez Catheter: Not present  Lines: None     Cardiac  Monitoring: None  Code Status: Full Code      Clinically Significant Risk Factors              # Hypoalbuminemia: Lowest albumin = 3 g/dL at 5/5/2023  4:53 PM, will monitor as appropriate                     Disposition Plan     Expected Discharge Date: 05/18/2023,  6:00 PM  Discharge Delays: Specialist Consult (enter specialist & decision needed in comments)    Discharge Comments: .72 hour hold--up on Monday 1214.   County to meet on Friday for commitment.       Inpateint psych recommended.          Nuno Courtney MD  Hospitalist Service  [facility]  Securely message with [employer] (more info)  Text page via [employer] Paging/Directory   ______________________________________________________________________    Interval History   Bhavana Marr was seen this morning. Seems to feel OK. No new complaints/concerns. States she is going to go on vacation next week or the week after.    Physical Exam   Vital Signs: Temp: 97.8  F (36.6  C) Temp src: Oral BP: 99/54 Pulse: 60   Resp: 17 SpO2: 95 % O2 Device: None (Room air)    Weight: 119 lbs .77 oz    Constitutional: awake, alert, cooperative, no apparent distress, laying in the hospital bed, flat affect  Respiratory: no increased work of breathing, clear to auscultation bilaterally, no crackles or wheezing  Cardiovascular: regular rate and rhythm, normal S1 and S2, no murmur noted  GI: normal bowel sounds, soft, non-distended, non-tender    Medical Decision Making       30 MINUTES SPENT BY ME on the date of service doing chart review, history, exam, documentation & further activities per the note.      Data   NOTE: Data reviewed over the past 24 hrs contributes toward MDM complexity

## 2023-05-16 NOTE — PROGRESS NOTES
Care Management Follow Up    Length of Stay (days): 9    Expected Discharge Date: 05/22/2023     Concerns to be Addressed:       Patient plan of care discussed at interdisciplinary rounds: Yes    Anticipated Discharge Disposition:  In patient psychiatry per psychiatry consult on 5/10.     Anticipated Discharge Services:    Anticipated Discharge DME:      Patient/family educated on Medicare website which has current facility and service quality ratings:    Education Provided on the Discharge Plan:    Patient/Family in Agreement with the Plan:      Referrals Placed by CM/SW:    Private pay costs discussed: Not applicable    Additional Information:  [facility] has scheduled patient's Virtual   Court Examination for May 18 at 10:30 and her Preliminary Hearing to follow.   will bring a lap top to patients room on the morning of May 18th.   Patient today has been given her hearing schedule and copy of the PrePetition Report.  The hospital  copy of the schedule and PPS report is in patient's paper chart under legal index.   Writer will try to meet with patient to review papers, however her court appointed  will also reach out to her before the hearing.        LEENA CollazoSW

## 2023-05-16 NOTE — PROGRESS NOTES
SPIRITUAL HEALTH SERVICES Progress Note       66     Attempted visit today per extended length of stay check-in and Bhavana Munguia denied  support visit at this time. We remain available to support pt while admitted. Pt will reach out as needed and no follow-up is necessary at this time.     ------  Gio Maddox M.Div.  Resident   Pager: [phone]

## 2023-05-17 PROCEDURE — 120N000001 HC R&B MED SURG/OB

## 2023-05-17 PROCEDURE — 250N000013 HC RX MED GY IP 250 OP 250 PS 637: Performed by: HOSPITALIST

## 2023-05-17 PROCEDURE — 99232 SBSQ HOSP IP/OBS MODERATE 35: CPT

## 2023-05-17 PROCEDURE — 250N000013 HC RX MED GY IP 250 OP 250 PS 637: Performed by: INTERNAL MEDICINE

## 2023-05-17 PROCEDURE — 250N000013 HC RX MED GY IP 250 OP 250 PS 637

## 2023-05-17 PROCEDURE — 99231 SBSQ HOSP IP/OBS SF/LOW 25: CPT | Performed by: HOSPITALIST

## 2023-05-17 RX ADMIN — OLANZAPINE 10 MG: 5 TABLET, FILM COATED ORAL at 20:05

## 2023-05-17 RX ADMIN — SENNOSIDES AND DOCUSATE SODIUM 2 TABLET: 50; 8.6 TABLET ORAL at 09:05

## 2023-05-17 RX ADMIN — SENNOSIDES AND DOCUSATE SODIUM 2 TABLET: 50; 8.6 TABLET ORAL at 20:04

## 2023-05-17 RX ADMIN — VALACYCLOVIR HYDROCHLORIDE 500 MG: 500 TABLET, FILM COATED ORAL at 09:04

## 2023-05-17 RX ADMIN — SERTRALINE HYDROCHLORIDE 150 MG: 100 TABLET ORAL at 09:04

## 2023-05-17 RX ADMIN — OLANZAPINE 5 MG: 2.5 TABLET, FILM COATED ORAL at 09:03

## 2023-05-17 RX ADMIN — MIRTAZAPINE 22.5 MG: 15 TABLET, FILM COATED ORAL at 20:07

## 2023-05-17 RX ADMIN — ACETAMINOPHEN 650 MG: 325 TABLET ORAL at 09:04

## 2023-05-17 RX ADMIN — SIMVASTATIN 40 MG: 20 TABLET, FILM COATED ORAL at 20:06

## 2023-05-17 RX ADMIN — ACETAMINOPHEN 650 MG: 325 TABLET ORAL at 20:05

## 2023-05-17 RX ADMIN — FAMOTIDINE 20 MG: 20 TABLET ORAL at 09:05

## 2023-05-17 ASSESSMENT — ACTIVITIES OF DAILY LIVING (ADL)
ADLS_ACUITY_SCORE: 32
ADLS_ACUITY_SCORE: 26
ADLS_ACUITY_SCORE: 32
ADLS_ACUITY_SCORE: 26
ADLS_ACUITY_SCORE: 32
ADLS_ACUITY_SCORE: 30
ADLS_ACUITY_SCORE: 30
ADLS_ACUITY_SCORE: 26

## 2023-05-17 NOTE — PROGRESS NOTES
[facility]    Medicine Progress Note - Hospitalist Service    Date of Admission:  5/5/2023    Assessment & Plan   Bhavana Marr is an 82-year-old female patient with history including dementia; asthma; dyslipidemia; depression/anxiety; bipolar disorder; borderline personality disorder; osteoporosis; prior gastric bypass; and recent hospitalization (4/21/2023 to 4/28/2023) with issues including suicidal ideation and dementia with behavioral disturbance. She presents from her memory care facility with decreased oral intake, low glucose level, low blood pressure, depression and possible suicidal ideations.     On initial evaluation, patient was afebrile, normotensive, not tachycardic.  Labs notable for BMP with sodium 133 otherwise normal; CBC was normal; LFTs showed low albumin of 3.0 and low protein 5.3 and low AST of 9, otherwise normal; glucose 109; urinalysis showed 60 ketones and not suggestive of infection.    Decreased oral intake with hypoglycemia, low blood pressures  Hyponatremia, suspect due to decreased PO intake  Decreased oral intake, possibly due to Psychiatric Illness; see below   * Initial presentation as above. Pt found with low sodium as well as ketones in the urine. Given fluids in the ED.    * No abdominal pain or lab findings to suggest a primary GI etiology.  Patient has been admitted to the hospital for further evaluation and management.    Discontinued IV fluids on 5/8 due to improved oral intake.    Discussed with bedside nursing to assist in ordering food for patient, discussed with patient to increase her oral intake    Her oral intake is not consistently improved but has been improved as compared to before pain    Lipase minimally elevated at 84.    Mental health management as below.    Psychiatric issues  Depression/anxiety, bipolar d/o, borderline personality d/o with possible suicidal ideations  * Of note is that the patient was recently hospitalized through  [facility] with issues including suicidal ideation and dementia.  She was seen by psychiatry during that hospitalization; they did not recommend any type of psychiatric admission.    * Initial presentation as above. Noted manic episode and some confusion at memory care. On admit, pt denied suicidal ideations on admit but did endorse feeling depressed.  She was seen by the DEC  in the ED and not felt to be actively suicidal.    Re-orient as needed.    Maintain normal day/night, sleep/wake cycles.    Minimize sedating medications as able.    Continue PTA mirtazapine; olanzapine; sertraline.    Psychiatry consulted, appreciate their assistance. Patient refused to speak with psychiatry again . Meds adjusted by psychiatry on 5/10/23. Patient placed on a hold on 5/10/23 by psychiatry, they are planning to file for commitment.    Highly appreciate  help, seems like [facility]'s screening has been completed, per  note, Caryn from [facility] screening did come to meet with patient but patient declined to meet Caryn which seems to be concerning behavior and commitment has been filed in the first place due to withdrawn affects.    Psychiatry re-evaluated patient today, appreciate their assistance.    Suspect Moderate malnutrition In Context of:  Acute illness or injury    Nutrition consulted.    She will be seen for meal orders, offer supplements.     Asthma    Can resume PTA PRN budesonide-formoterol if needed.     Dyslipidemia    Continue PTA simvastatin.     GERD    Continue PTA famotidine.     Osteoporosis    Resume alendronate after discharge.     Prior gastric bypass  B12 deficiency  Iron deficienty anemia    Continue B12 and iron.     Constipation    Continue senokot-s.    PRN miralax available.     Dispo    Psychiatry placed patient on a hold on 5/10/23 and has filed for commitment. Patient has court examination and preliminary hearing tomorrow,  5/18/23.       Diet: Regular Diet Adult  Room Service    DVT Prophylaxis: Ambulate every shift  Cortez Catheter: Not present  Lines: None     Cardiac Monitoring: None  Code Status: Full Code      Clinically Significant Risk Factors              # Hypoalbuminemia: Lowest albumin = 3 g/dL at 5/5/2023  4:53 PM, will monitor as appropriate                     Disposition Plan      Expected Discharge Date: 05/22/2023,  6:00 PM  Discharge Delays: Specialist Consult (enter specialist & decision needed in comments)    Discharge Comments: .72 hour hold--up on Monday 1214.   County to meet on Friday for commitment.       Inpateint psych recommended.          Nuno Courtney MD  Hospitalist Service  [facility]  Securely message with [employer] (more info)  Text page via [employer] Paging/Directory   ______________________________________________________________________    Interval History   Bhavana Marr was seen this morning. She feels better today. States she had some pain in her leg, but it is resolved now. No other complaints/concerns for me.    Physical Exam   Vital Signs: Temp: 98  F (36.7  C) Temp src: Oral BP: 131/65 Pulse: 103   Resp: 16 SpO2: 96 % O2 Device: None (Room air)    Weight: 119 lbs .77 oz    Constitutional: awake, alert, cooperative, no apparent distress, laying in the hospital bed  Neuropsychiatric: flat affect    Medical Decision Making       25 MINUTES SPENT BY ME on the date of service doing chart review, history, exam, documentation & further activities per the note.      Data   NOTE: Data reviewed over the past 24 hrs contributes toward MDM complexity

## 2023-05-17 NOTE — PLAN OF CARE
Goal Outcome Evaluation:  Failure to thrive, comes from memory care unit, decreased oral intake, depression, Placed on court hold today 5/15 at 1206    DATE & TIME: 5/17/23 5961-6627  Cognitive Concerns/ Orientation : A&Ox 2-3; poor insight into situation. Slept most of the shift and was irritable. Pt did not want to communicate.  BEHAVIOR & AGGRESSION TOOL COLOR: Green  ABNL VS/O2: Pt was redirectable and compliant to have VS performed.  MOBILITY: Assist of 1/gait belt.  PAIN MANAGMENT: Denies  DIET: Regular   BOWEL/BLADDER: Incontinent of bowel and bladder at times, no BM this shift.   ABNL LAB/BG: None drawn  DRAIN/DEVICES: None, no IV access.   TELEMETRY RHYTHM: NA  SKIN: Pale, dry. Blanchable redness on coccyx & Left hips, open to air.   TESTS/PROCEDURES: NA  D/C DATE: Psych saw the pt, continuing current medications. Patient is on a court hold and commitment process, SW following.

## 2023-05-17 NOTE — CONSULTS
"      Psychiatry Consultation; Follow up              Reason for Consult, requesting source:    Follow up   Requesting source: Nuno Courtney    Labs and imaging reviewed, discussed with nursing.               Interim history:    From initial note: \"Bhavana Marr is a 82 year old female with a history of bipolar I disorder, gastric bypass surgery (unknown procedure type), osteoporosis, asthma, and dyslipidemia who presented from her LTC facility [facility] on 5/5/23 for poor PO intake with subsequent hypoglycemia and hypotension, depression, and suicidal ideation.      Bhavana lives in LTC facility [facility]; the director of nursing at this facility has expressed significant concerns for Bhavana's mental health- has been depressed, not getting out of bed, not eating, not talking to them, asking for them to let her die. She had expressed wanting to walk into traffic. They have sent her to the ED 3 times recently (4/20/23, 4/21/2023, and this admission 5/5/23) for similar concerns. She has been discharged the past two times with no changes to psychiatric medications and no improvement in symptoms. They do not feel she is safe to return at this point. I have attempted to speak with Bhavana on two separate occasions, both times she refused to talk to me and remained mute for most of both assessment attempts. She did not respond to questions regarding mood, suicidal ideation, medications, psychiatric history, or my recommendation for her to be admitted to inpatient psychiatry. She did however speak to the aide who was in the room and told me \"I have nothing to say to you\". \"    5/17 follow up: She is somewhat more willing to engage in conversation today but overall quite guarded still. She reports her mood is \"better\" and does endorse recent depression. Denies SI currently or recently but does report a past history of one suicide attempt \"with a rope\", when asked what stopped her at that time she responded \"there was no " "where to hang it\". Does not recall when this happened- many years ago. Repeatedly states she's going to sleep now when further questions asked, does not respond to questions.         Current Medications:       acetaminophen  650 mg Oral BID     famotidine  20 mg Oral Daily     mirtazapine  22.5 mg Oral At Bedtime     OLANZapine  10 mg Oral At Bedtime     OLANZapine  5 mg Oral QAM     senna-docusate  2 tablet Oral BID     sertraline  150 mg Oral Daily     simvastatin  40 mg Oral At Bedtime     valACYclovir  500 mg Oral Daily              MSE:   Appearance: awake, alert  Attitude:  guarded and somewhat cooperative  Eye Contact:  poor   Mood:  \"better\"  Affect:  flat  Speech:  clear, coherent  Psychomotor Behavior:  no evidence of tardive dyskinesia, dystonia, or tics  Thought Process:  linear and goal oriented  Associations:  no loose associations  Thought Content:  no evidence of suicidal ideation or homicidal ideation and no evidence of psychotic thought  Insight:  limited  Judgement:  limited  Oriented to:  time, person, and place  Attention Span and Concentration:  fair  Recent and Remote Memory:  fair      Vital signs:  Temp: 98  F (36.7  C) Temp src: Oral BP: 131/65 Pulse: 103   Resp: 16 SpO2: 96 % O2 Device: None (Room air)   Height: 160 cm (5' 3\") Weight: 54 kg (119 lb 0.8 oz)  Estimated body mass index is 21.09 kg/m  as calculated from the following:    Height as of this encounter: 1.6 m (5' 3\").    Weight as of this encounter: 54 kg (119 lb 0.8 oz).              DSM-5 Diagnosis:   Bipolar I disorder, current episode depressed, severe, without psychosis    She does NOT have a history of dementia          Assessment:   Bhavana does appear somewhat improved, slightly more willing to engage in assessment but still quite limited. At least she does now admit to depression, feels medication change has been helpful (olanzapine was increased on 5/10) and states her mood is \"better\". On 5/10 we filed petition for " "commitment, she is now on a court hold with hearing scheduled for 5/18. Continue to recommend IP psych admission for bipolar depression.    From my initial note 5/10: \"Per her LTC facility [facility] director of nursing, she does NOT have a history of dementia. This was entered into her chart erroneously during a prior admission in 2021. She has lived at [facility] for 5 years where she sees both a primary care provider and psychiatrist regularly, neither of whom have diagnosed her with dementia. Upon review of chart, in the H&P from 6/2021 admission it states she has a \"history of apparent dementia\" but this is not listed in the most recent outpatient PCP nursing home visit not just two months prior in 4/2021.\" She does not live in a memory care facility but rather a long term care facility for persons with mental illness.           Summary of Recommendations:   1.  Olanzapine 5mg daily and 10mg nightly for bipolar depression, does seem to be responding to this    2.  Sertraline 150mg daily and mirtazapine 22.5mg nightly- will need to monitor for possible activation/switch into cherise    3.  Court hearing re: commitment on 5/18    4.  Continue to recommend IP psych admission       DANIEL Koch [facility]  Consult/Liaison Psychiatry   [facility]    Contact information available via [facility] Paging/Directory  If I am not available, then [facility] MARIBEL line ([phone]) should know who is covering our consult service.   \"Much or all of the text in this note was generated through the use of Dragon Dictate voice to text software. Errors in spelling or words which appear to be out of contact are unintentional, may be present due having escaped editing\"           "

## 2023-05-17 NOTE — PLAN OF CARE
Failure to thrive, comes from memory care unit, decreased oral intake, depression, Placed on court hold today 5/15 at 1206    DATE & TIME: 5/16/23  NOC  Cognitive Concerns/ Orientation : A&Ox 2-3; poor insight into situation, Pleasant this shift, but @0400 got out bed and tried to leave but resolved in the end.  BEHAVIOR & AGGRESSION TOOL COLOR: Green  ABNL VS/O2: Refused VSS this shift  MOBILITY: Assist of 1/gait belt.  PAIN MANAGMENT: Denies  DIET: Regular   BOWEL/BLADDER: Incontinent of stool this shift; no BM this  ABNL LAB/BG: None drawn  DRAIN/DEVICES: None  TELEMETRY RHYTHM: NA  SKIN: Pale, dry. Blanchable redness on coccyx & Left hips, open to air.   TESTS/PROCEDURES: NA  D/C DATE: Psych consult in place. Patient is on a court hold and commitment process, SW following. Possibly discharge 5/22

## 2023-05-18 ENCOUNTER — MEDICAL CORRESPONDENCE (OUTPATIENT)
Dept: HEALTH INFORMATION MANAGEMENT | Facility: CLINIC | Age: 83
End: 2023-05-18
Payer: COMMERCIAL

## 2023-05-18 PROCEDURE — 250N000013 HC RX MED GY IP 250 OP 250 PS 637: Performed by: INTERNAL MEDICINE

## 2023-05-18 PROCEDURE — 250N000013 HC RX MED GY IP 250 OP 250 PS 637: Performed by: HOSPITALIST

## 2023-05-18 PROCEDURE — 120N000001 HC R&B MED SURG/OB

## 2023-05-18 PROCEDURE — 99231 SBSQ HOSP IP/OBS SF/LOW 25: CPT | Performed by: HOSPITALIST

## 2023-05-18 PROCEDURE — 250N000013 HC RX MED GY IP 250 OP 250 PS 637

## 2023-05-18 RX ADMIN — SENNOSIDES AND DOCUSATE SODIUM 2 TABLET: 50; 8.6 TABLET ORAL at 20:42

## 2023-05-18 RX ADMIN — OLANZAPINE 5 MG: 2.5 TABLET, FILM COATED ORAL at 08:23

## 2023-05-18 RX ADMIN — SENNOSIDES AND DOCUSATE SODIUM 2 TABLET: 50; 8.6 TABLET ORAL at 08:23

## 2023-05-18 RX ADMIN — ACETAMINOPHEN 650 MG: 325 TABLET ORAL at 08:22

## 2023-05-18 RX ADMIN — OLANZAPINE 10 MG: 5 TABLET, FILM COATED ORAL at 20:43

## 2023-05-18 RX ADMIN — SIMVASTATIN 40 MG: 20 TABLET, FILM COATED ORAL at 20:44

## 2023-05-18 RX ADMIN — MIRTAZAPINE 22.5 MG: 15 TABLET, FILM COATED ORAL at 20:42

## 2023-05-18 RX ADMIN — ACETAMINOPHEN 650 MG: 325 TABLET ORAL at 20:42

## 2023-05-18 RX ADMIN — VALACYCLOVIR HYDROCHLORIDE 500 MG: 500 TABLET, FILM COATED ORAL at 08:23

## 2023-05-18 RX ADMIN — SERTRALINE HYDROCHLORIDE 150 MG: 100 TABLET ORAL at 08:22

## 2023-05-18 RX ADMIN — FAMOTIDINE 20 MG: 20 TABLET ORAL at 08:23

## 2023-05-18 ASSESSMENT — ACTIVITIES OF DAILY LIVING (ADL)
ADLS_ACUITY_SCORE: 29
ADLS_ACUITY_SCORE: 30
ADLS_ACUITY_SCORE: 30
ADLS_ACUITY_SCORE: 29
ADLS_ACUITY_SCORE: 30
ADLS_ACUITY_SCORE: 29
ADLS_ACUITY_SCORE: 29
ADLS_ACUITY_SCORE: 30
ADLS_ACUITY_SCORE: 29

## 2023-05-18 NOTE — PLAN OF CARE
Goal Outcome Evaluation:      Cognitive Concerns/ Orientation : A&Ox 3; disoriented to situation, calm/cooperative this shift   BEHAVIOR & AGGRESSION TOOL COLOR: Green  ABNL VS/O2: VSS on RA  MOBILITY: SBA/GB to BR  PAIN MANAGMENT: denies  DIET: Regular, poor po   BOWEL/BLADDER: continent of urine, no BM  ABNL LAB/BG: None today  DRAIN/DEVICES: None   TELEMETRY RHYTHM: NA  SKIN: Pale, dry. Blanchable redness on coccyx/L hip-open to air  TESTS/PROCEDURES: NA  D/C DATE: Pt slept well, up to BR with SBA, no c/o pain/discomfort, patient is on a court hold and commitment process, Psych/SW following.

## 2023-05-18 NOTE — PROGRESS NOTES
[facility]    Medicine Progress Note - Hospitalist Service    Date of Admission:  5/5/2023    Assessment & Plan   Bhavana Marr is an 82-year-old female patient with history including dementia; asthma; dyslipidemia; depression/anxiety; bipolar disorder; borderline personality disorder; osteoporosis; prior gastric bypass; and recent hospitalization (4/21/2023 to 4/28/2023) with issues including suicidal ideation and dementia with behavioral disturbance. She presents from her memory care facility with decreased oral intake, low glucose level, low blood pressure, depression and possible suicidal ideations.     On initial evaluation, patient was afebrile, normotensive, not tachycardic.  Labs notable for BMP with sodium 133 otherwise normal; CBC was normal; LFTs showed low albumin of 3.0 and low protein 5.3 and low AST of 9, otherwise normal; glucose 109; urinalysis showed 60 ketones and not suggestive of infection.    Decreased oral intake with hypoglycemia, low blood pressures  Hyponatremia, suspect due to decreased PO intake  Decreased oral intake, possibly due to Psychiatric Illness; see below   * Initial presentation as above. Pt found with low sodium as well as ketones in the urine. Given fluids in the ED.    * No abdominal pain or lab findings to suggest a primary GI etiology.  Patient has been admitted to the hospital for further evaluation and management.    Discontinued IV fluids on 5/8 due to improved oral intake.    Discussed with bedside nursing to assist in ordering food for patient, discussed with patient to increase her oral intake    Her oral intake is not consistently improved but has been improved as compared to before pain    Lipase minimally elevated at 84.    Mental health management as below.    Psychiatric issues  Depression/anxiety, bipolar d/o, borderline personality d/o with possible suicidal ideations  * Of note is that the patient was recently hospitalized through  [facility] with issues including suicidal ideation and dementia.  She was seen by psychiatry during that hospitalization; they did not recommend any type of psychiatric admission.    * Initial presentation as above. Noted manic episode and some confusion at memory care. On admit, pt denied suicidal ideations on admit but did endorse feeling depressed.  She was seen by the DEC  in the ED and not felt to be actively suicidal.    Re-orient as needed.    Maintain normal day/night, sleep/wake cycles.    Minimize sedating medications as able.    Continue PTA mirtazapine; olanzapine; sertraline.    Psychiatry consulted, appreciate their assistance. Patient refused to speak with psychiatry again . Meds adjusted by psychiatry on 5/10/23. Patient placed on a hold on 5/10/23 by psychiatry, they filed for commitment. Re-evaluated by psychiatry on 5/17/23, continued current medications.    Patient had court examination and preliminary hearing today, 5/18/23.    Still awaiting inpatient psychiatry.    Suspect Moderate malnutrition In Context of:  Acute illness or injury    Nutrition consulted.    She will be seen for meal orders, offer supplements.     Asthma    Can resume PTA PRN budesonide-formoterol if needed.     Dyslipidemia    Continue PTA simvastatin.     GERD    Continue PTA famotidine.     Osteoporosis    Resume alendronate after discharge.     Prior gastric bypass  B12 deficiency  Iron deficienty anemia    Continue B12 and iron.     Constipation    Continue senokot-s.    PRN miralax available.     Dispo    Psychiatry placed patient on a hold on 5/10/23 and has filed for commitment. Patient had court examination and preliminary hearing today, 5/18/23.         Diet: Regular Diet Adult  Room Service    DVT Prophylaxis: Ambulate every shift  Cortez Catheter: Not present  Lines: None     Cardiac Monitoring: None  Code Status: Full Code      Clinically Significant Risk Factors              # Hypoalbuminemia: Lowest  albumin = 3 g/dL at 5/5/2023  4:53 PM, will monitor as appropriate                     Disposition Plan      Expected Discharge Date: 05/22/2023,  6:00 PM  Discharge Delays: Specialist Consult (enter specialist & decision needed in comments)    Discharge Comments: .72 hour hold--up on Monday 1214.   [place] to meet [place] Friday for commitment.       Inpateint psych recommended.          Nuno Courtney MD  Hospitalist Service  [facility]  Securely message with [employer] (more info)  Text page via [facility] Paging/Directory   ______________________________________________________________________    Interval History   Bhavana Marr was seen this morning. She feels OK. Mood is 'OK.' No new complaints/concerns.    Physical Exam   Vital Signs: Temp: 98  F (36.7  C) Temp src: Oral BP: 91/63 Pulse: 71   Resp: 16 SpO2: 94 % O2 Device: None (Room air)    Weight: 119 lbs .77 oz    Constitutional: awake, alert, cooperative, no apparent distress, laying in the hospital bed  Respiratory: no increased work of breathing, clear to auscultation bilaterally, no crackles or wheezing  Cardiovascular: regular rate and rhythm, normal S1 and S2, no murmur noted  GI: normal bowel sounds, soft, non-distended, non-tender  Skin: warm, dry  Musculoskeletal: no lower extremity pitting edema present  Neurologic: awake, alert, appropriate in conversation but doesn't talk much, flat affect, moves all extremities    Medical Decision Making       25 MINUTES SPENT BY ME on the date of service doing chart review, history, exam, documentation & further activities per the note.      Data   NOTE: Data reviewed over the past 24 hrs contributes toward MDM complexity

## 2023-05-18 NOTE — PLAN OF CARE
Goal Outcome Evaluation:  Failure to thrive, comes from memory care unit, decreased oral intake, depression, Placed on court hold today 5/15 at 1206    DATE & TIME: 5/18/23  2487-1433  Cognitive Concerns/ Orientation : A&Ox 3; disoriented to situation, calm/cooperative this shift   BEHAVIOR & AGGRESSION TOOL COLOR: Green  ABNL VS/O2: VSS on RA  MOBILITY: SBA/GB to BR x1 assist using a transfer belt  PAIN MANAGMENT: denies  DIET: Regular, fair appetite, needs encouragement.  BOWEL/BLADDER: continent of urine, no BM this shift  ABNL LAB/BG: None today  DRAIN/DEVICES: None   TELEMETRY RHYTHM: NA  SKIN: Pale, dry. Blanchable redness on coccyx/L hip-open to air  TESTS/PROCEDURES: NA  D/C DATE: Pt was sleeping most of the shift, up to BR with SBa, no c/o pain/discomfort, patient is on a court hold, had a virtual hearing however declined to communicate.  is involved, and mentioned possibility of Pt getting admitted to Geriatric Psych.

## 2023-05-18 NOTE — PLAN OF CARE
Goal Outcome Evaluation:      Plan of Care Reviewed With: patient    Overall Patient Progress: no change    Outcome Evaluation: Pt still not engaging during visits, but eating well per flow sheets/[facility] records. Does not appear at risk for malnutrition.

## 2023-05-18 NOTE — PROGRESS NOTES
CLINICAL NUTRITION SERVICES - REASSESSMENT NOTE     Nutrition Prescription    RECOMMENDATIONS FOR MDs/PROVIDERS TO ORDER:  None    Malnutrition Status:    Unable to determine due to no NFPE and pt not engaging with writer. Pt eating well per flow sheets/[facility] records, and does not appear at risk for malnutrition.    Recommendations already ordered by Registered Dietitian (RD):  New weights    Future/Additional Recommendations:  Monitor po intake adequacy, labs, BMs, wt trends     EVALUATION OF THE PROGRESS TOWARD GOALS   Diet: Regular  Snacks and supplements: None  Intake: Pt ordering 3 meals/day and consuming 50-75% of them. Pt appears to be meeting >75% estimated needs per 4-day average via [facility] and flow sheets records.     NEW FINDINGS   Visited pt in room this morning. Pt appeared to be sleeping and did not respond to several attempts to wake and engage in conversation. NFPE not appropriate at this time.    Labs:  Reviewed.    Meds:  Senna docusate    GI:  Last BM on 5/16 x4    Weights:  No new weights since admit    MALNUTRITION  % Intake: Decreased intake does not meet criteria  % Weight Loss: Unable to assess 2/2 no new weights  Subcutaneous Fat Loss: Unable to assess  Muscle Loss: Unable to assess  Fluid Accumulation/Edema: None noted  Malnutrition Diagnosis: Unable to determine due to no NFPE and pt not engaging with writer     Previous Goals   Pt to consume 1-2 items TID at meals  Evaluation: Met    Previous Nutrition Diagnosis  Inadequate oral intake related to poor appetite, mental health as evidenced by pt declining to eat meals  Evaluation: Improving    CURRENT NUTRITION DIAGNOSIS  None at this time    INTERVENTIONS  Implementation  Encourage po intake    Goals  Patient to consume % of nutritionally adequate meal trays TID, or the equivalent with supplements/snacks.    Monitoring/Evaluation  Progress toward goals will be monitored and evaluated per protocol.    Lenard Durbin - Dietetic  Intern

## 2023-05-19 PROCEDURE — 120N000001 HC R&B MED SURG/OB

## 2023-05-19 PROCEDURE — 250N000013 HC RX MED GY IP 250 OP 250 PS 637: Performed by: INTERNAL MEDICINE

## 2023-05-19 PROCEDURE — 250N000013 HC RX MED GY IP 250 OP 250 PS 637

## 2023-05-19 PROCEDURE — 99231 SBSQ HOSP IP/OBS SF/LOW 25: CPT | Performed by: HOSPITALIST

## 2023-05-19 PROCEDURE — 250N000013 HC RX MED GY IP 250 OP 250 PS 637: Performed by: HOSPITALIST

## 2023-05-19 RX ADMIN — ACETAMINOPHEN 650 MG: 325 TABLET ORAL at 20:57

## 2023-05-19 RX ADMIN — OLANZAPINE 10 MG: 5 TABLET, FILM COATED ORAL at 20:57

## 2023-05-19 RX ADMIN — ACETAMINOPHEN 650 MG: 325 TABLET ORAL at 10:25

## 2023-05-19 RX ADMIN — SENNOSIDES AND DOCUSATE SODIUM 2 TABLET: 50; 8.6 TABLET ORAL at 10:25

## 2023-05-19 RX ADMIN — SIMVASTATIN 40 MG: 20 TABLET, FILM COATED ORAL at 20:57

## 2023-05-19 RX ADMIN — OLANZAPINE 5 MG: 2.5 TABLET, FILM COATED ORAL at 10:25

## 2023-05-19 RX ADMIN — SERTRALINE HYDROCHLORIDE 150 MG: 100 TABLET ORAL at 10:25

## 2023-05-19 RX ADMIN — VALACYCLOVIR HYDROCHLORIDE 500 MG: 500 TABLET, FILM COATED ORAL at 10:25

## 2023-05-19 RX ADMIN — FAMOTIDINE 20 MG: 20 TABLET ORAL at 10:25

## 2023-05-19 RX ADMIN — MIRTAZAPINE 22.5 MG: 15 TABLET, FILM COATED ORAL at 20:57

## 2023-05-19 ASSESSMENT — ACTIVITIES OF DAILY LIVING (ADL)
ADLS_ACUITY_SCORE: 30
ADLS_ACUITY_SCORE: 30
ADLS_ACUITY_SCORE: 32
ADLS_ACUITY_SCORE: 32
ADLS_ACUITY_SCORE: 30
ADLS_ACUITY_SCORE: 32
ADLS_ACUITY_SCORE: 30
ADLS_ACUITY_SCORE: 32
ADLS_ACUITY_SCORE: 30
ADLS_ACUITY_SCORE: 30

## 2023-05-19 NOTE — PROGRESS NOTES
Care Management Follow Up    Length of Stay (days): 11    Expected Discharge Date: 05/22/2023     Concerns to be Addressed:       Patient plan of care discussed at interdisciplinary rounds: Yes    Anticipated Discharge Disposition:  In Patient Psychiatry     Anticipated Discharge Services:    Anticipated Discharge DME:      Patient/family educated on Medicare website which has current facility and service quality ratings:    Education Provided on the Discharge Plan:    Patient/Family in Agreement with the Plan:      Referrals Placed by CM/SW:    Private pay costs discussed: Not applicable    Additional Information:  Patient had a scheduled Examination and Preliminary Hearing today however she chose to not participate. Writer was with patient when she spoke to the Court staff over the phone and decline wanting to talk. Writer explained the purpose of the phone call and she said continued to decline and pushed the phone away.   Her court appointed  waived the Examination and the preliminary hearing due to patient refusal to participate.   The  representing the hospital is asking if psychiatry is planning to file for a Grove. The original commitment paperwork did not request a Grove and in the psychiatry note from 5/17 there is not mention of the need for a Grove.   [facility] faxed Continuance of Court Hold until patient's Hearing on 5/23 which will be virtual.  Writer placed the court hold paper in the index section of patient's paper chart.         LEENA CollazoSW

## 2023-05-19 NOTE — PLAN OF CARE
Summary: [id number] 5/19/23 failure to thrive   Orientation: disoriented to situation   Activity Level: stand by GB  Fall Risk: yes  Behavior & Aggression Tool Color: green  Pain Management: denies  ABNL VS/O2: VSS on RA  ABNL Lab/BG: n/a  Diet: reg- room service   Bowel/Bladder: 5 BM today, incontinent of bladder at times  Drains/Devices: n/a  Tests/Procedures for next shift: n/a  Anticipated DC date: pt on court hold  Other Important Info:

## 2023-05-19 NOTE — PROGRESS NOTES
[facility]    Medicine Progress Note - Hospitalist Service    Date of Admission:  5/5/2023    Assessment & Plan   Bhavana Marr is an 82-year-old female patient with history including dementia; asthma; dyslipidemia; depression/anxiety; bipolar disorder; borderline personality disorder; osteoporosis; prior gastric bypass; and recent hospitalization (4/21/2023 to 4/28/2023) with issues including suicidal ideation and dementia with behavioral disturbance. She presents from her memory care facility with decreased oral intake, low glucose level, low blood pressure, depression and possible suicidal ideations.     On initial evaluation, patient was afebrile, normotensive, not tachycardic.  Labs notable for BMP with sodium 133 otherwise normal; CBC was normal; LFTs showed low albumin of 3.0 and low protein 5.3 and low AST of 9, otherwise normal; glucose 109; urinalysis showed 60 ketones and not suggestive of infection.    Decreased oral intake with hypoglycemia, low blood pressures  Hyponatremia, suspect due to decreased PO intake  Decreased oral intake, possibly due to Psychiatric Illness; see below   * Initial presentation as above. Pt found with low sodium as well as ketones in the urine. Given fluids in the ED.    * No abdominal pain or lab findings to suggest a primary GI etiology.  Patient has been admitted to the hospital for further evaluation and management.    Discontinued IV fluids on 5/8 due to improved oral intake.    Discussed with bedside nursing to assist in ordering food for patient, discussed with patient to increase her oral intake.    Patient eating well per review of flowsheets.    Lipase minimally elevated at 84.    Mental health management as below.    Psychiatric issues  Depression/anxiety, bipolar d/o, borderline personality d/o with possible suicidal ideations  * Of note is that the patient was recently hospitalized through [facility] with issues including suicidal  ideation and dementia.  She was seen by psychiatry during that hospitalization; they did not recommend any type of psychiatric admission.    * Initial presentation as above. Noted manic episode and some confusion at memory care. On admit, pt denied suicidal ideations on admit but did endorse feeling depressed.  She was seen by the DEC  in the ED and not felt to be actively suicidal.    Re-orient as needed.    Maintain normal day/night, sleep/wake cycles.    Minimize sedating medications as able.    Continue PTA mirtazapine; olanzapine; sertraline.    Psychiatry consulted, appreciate their assistance. Patient refused to speak with psychiatry again. Meds adjusted by psychiatry on 5/10/23. Patient placed on a hold on 5/10/23 by psychiatry, they filed for commitment. Re-evaluated by psychiatry on 5/17/23, continued current medications. Likely re-consult psychiatry on 5/22/23.    Patient had court examination and preliminary hearing 5/18/23, didn't participate, next hearing scheduled for 5/23/23.    Still awaiting inpatient psychiatry.    Suspect Moderate malnutrition In Context of:  Acute illness or injury    Nutrition consulted.    She will be seen for meal orders, offer supplements.     Asthma    Can resume PTA PRN budesonide-formoterol if needed.     Dyslipidemia    Continue PTA simvastatin.     GERD    Continue PTA famotidine.     Osteoporosis    Resume alendronate after discharge.     Prior gastric bypass  B12 deficiency  Iron deficienty anemia    Continue B12 and iron.     Constipation    Continue senokot-s.    PRN miralax available.     Dispo    Psychiatry placed patient on a hold on 5/10/23 and has filed for commitment. Patient had court examination and preliminary hearing on 5/18/23, didn't participate. Next hearing is on 5/23/23.       Diet: Regular Diet Adult  Room Service    DVT Prophylaxis: Ambulate every shift  Cortez Catheter: Not present  Lines: None     Cardiac Monitoring: None  Code Status: Full  Code      Clinically Significant Risk Factors              # Hypoalbuminemia: Lowest albumin = 3 g/dL at 5/5/2023  4:53 PM, will monitor as appropriate                     Disposition Plan     Expected Discharge Date: 05/22/2023,  6:00 PM  Discharge Delays: Specialist Consult (enter specialist & decision needed in comments)    Discharge Comments: .72 hour hold--up on Monday 1214.   County to meet on Friday for commitment.       Inpateint psych recommended.          Nuno Courtney MD  Hospitalist Service  [facility]  Securely message with [employer] (more info)  Text page via [facility] Paging/Directory   ______________________________________________________________________    Interval History   Bhavana Marr was seen this morning. She feels depressed. No pain, shortness of breath, nausea. Had court hearing yesterday, but didn't participate.    Physical Exam   Vital Signs: Temp: 97.9  F (36.6  C) Temp src: Oral BP: 106/63 Pulse: 65   Resp: 17 SpO2: 95 % O2 Device: None (Room air)    Weight: 119 lbs .77 oz    Constitutional: awake, alert, cooperative, no apparent distress, laying in the hospital bed  Respiratory: no increased work of breathing, clear to auscultation bilaterally, no crackles or wheezing  Cardiovascular: regular rate and rhythm, normal S1 and S2, no murmur noted  Neuropsychiatric: flat affect, appears depressed    Medical Decision Making       25 MINUTES SPENT BY ME on the date of service doing chart review, history, exam, documentation & further activities per the note.      Data   NOTE: Data reviewed over the past 24 hrs contributes toward MDM complexity

## 2023-05-19 NOTE — PLAN OF CARE
Goal Outcome Evaluation:       Cognitive Concerns/ Orientation : A&Ox 3; disoriented to situation, calm/cooperative    BEHAVIOR & AGGRESSION TOOL COLOR: Green  ABNL VS/O2: VSS on RA  MOBILITY: SBA/GB to BR  PAIN MANAGMENT: denies  DIET: Regular, fair appetite, needs encouragement  BOWEL/BLADDER: continent of urine, no BM this shift  ABNL LAB/BG: None today  DRAIN/DEVICES: None   TELEMETRY RHYTHM: NA  SKIN: Pale, dry. Blanchable redness on coccyx/L hip-open to air  TESTS/PROCEDURES: NA  D/C DATE: Pt slept most of the shift, up to BR with SBA, no c/o pain/discomfort, patient is on a court hold, had a preliminary virtual hearing 5/18 however declined to participate, next hearing 5/23 SW following.

## 2023-05-19 NOTE — PLAN OF CARE
Goal Outcome Evaluation:      Plan of Care Reviewed With: patient    Failure to thrive, comes from memory care unit, decreased oral intake, depression, Placed on court hold 5/15 at 1206  DATE & TIME: 5/19/23, [id number]  Cognitive Concerns/ Orientation : A&Ox 3; disoriented to situation, calm/cooperative. Sleeping frequently throughout the day    BEHAVIOR & AGGRESSION TOOL COLOR: Green  ABNL VS/O2: VSS on RA  MOBILITY: SBA/GB to BR, encouraged patient to walk in the hallways, but refused  PAIN MANAGMENT: denies  DIET: Regular, fair appetite, needs encouragement  BOWEL/BLADDER: continent of urine, large loose formed BMs x 4   ABNL LAB/BG: None today  DRAIN/DEVICES: None   TELEMETRY RHYTHM: NA  SKIN: Pale, dry. Blanchable redness on coccyx. L/R. Hip red spots, covered with mepilex, encouraged weight shifting  TESTS/PROCEDURES: NA   D/C DATE: patient is on a court hold, had a virtual hearing however declined to communicate per previous notes. SW following.

## 2023-05-20 PROCEDURE — 250N000013 HC RX MED GY IP 250 OP 250 PS 637: Performed by: INTERNAL MEDICINE

## 2023-05-20 PROCEDURE — 250N000013 HC RX MED GY IP 250 OP 250 PS 637: Performed by: HOSPITALIST

## 2023-05-20 PROCEDURE — 250N000013 HC RX MED GY IP 250 OP 250 PS 637

## 2023-05-20 PROCEDURE — 250N000011 HC RX IP 250 OP 636: Performed by: INTERNAL MEDICINE

## 2023-05-20 PROCEDURE — 99231 SBSQ HOSP IP/OBS SF/LOW 25: CPT | Performed by: HOSPITALIST

## 2023-05-20 PROCEDURE — 120N000001 HC R&B MED SURG/OB

## 2023-05-20 RX ADMIN — ACETAMINOPHEN 650 MG: 325 TABLET ORAL at 20:51

## 2023-05-20 RX ADMIN — SENNOSIDES AND DOCUSATE SODIUM 2 TABLET: 50; 8.6 TABLET ORAL at 09:43

## 2023-05-20 RX ADMIN — FAMOTIDINE 20 MG: 20 TABLET ORAL at 09:43

## 2023-05-20 RX ADMIN — ONDANSETRON 4 MG: 4 TABLET, ORALLY DISINTEGRATING ORAL at 18:48

## 2023-05-20 RX ADMIN — PROCHLORPERAZINE MALEATE 5 MG: 5 TABLET ORAL at 20:56

## 2023-05-20 RX ADMIN — MIRTAZAPINE 22.5 MG: 15 TABLET, FILM COATED ORAL at 20:51

## 2023-05-20 RX ADMIN — SERTRALINE HYDROCHLORIDE 150 MG: 100 TABLET ORAL at 09:43

## 2023-05-20 RX ADMIN — SIMVASTATIN 40 MG: 20 TABLET, FILM COATED ORAL at 20:51

## 2023-05-20 RX ADMIN — OLANZAPINE 10 MG: 5 TABLET, FILM COATED ORAL at 20:51

## 2023-05-20 RX ADMIN — VALACYCLOVIR HYDROCHLORIDE 500 MG: 500 TABLET, FILM COATED ORAL at 09:43

## 2023-05-20 RX ADMIN — OLANZAPINE 5 MG: 2.5 TABLET, FILM COATED ORAL at 09:43

## 2023-05-20 RX ADMIN — ACETAMINOPHEN 650 MG: 325 TABLET ORAL at 09:43

## 2023-05-20 ASSESSMENT — ACTIVITIES OF DAILY LIVING (ADL)
ADLS_ACUITY_SCORE: 32
ADLS_ACUITY_SCORE: 34
ADLS_ACUITY_SCORE: 32
ADLS_ACUITY_SCORE: 32
ADLS_ACUITY_SCORE: 28
ADLS_ACUITY_SCORE: 28
ADLS_ACUITY_SCORE: 34
ADLS_ACUITY_SCORE: 32
ADLS_ACUITY_SCORE: 28
ADLS_ACUITY_SCORE: 32

## 2023-05-20 NOTE — PLAN OF CARE
Goal Outcome Evaluation:  Orientation/Cognitive: A&Ox 3; disoriented to situation.  Mobility Level/Assist Equipment: SBA with GB  Fall Risk (Y/N): Yes  Behavior Concerns: None   Pain Management: Scheduled tylenol   Tele/VS/O2: VSS on RA except soft BP   ABNL Lab/BG: None this shift   Diet: Regular   Bowel/Bladder: Incontinent at times   Skin Concerns: Blanchable redness on coccyx  Drains/Devices: No PIV   Tests/Procedures for next shift: Court examination scheduled for 5/23/23. SW following   Anticipated DC date & active delays: TBD. Patient had court examination and preliminary hearing 5/18/23, didn't participate, next hearing scheduled for 5/23/23.

## 2023-05-20 NOTE — PROGRESS NOTES
"[facility]  Hospitalist Progress Note        Hussain Gar MD   05/20/2023        Interval History:        - No acute issues overnight,  following for disposition; has court hearing on 5/23/23  - patient denied any active complaints, refused to be examined stating \"I don't want to talk\"  - last evaluated by psychiatry on 5/17, continued on olanzapine 5 mg daily and 10 mg nightly along with sertraline 150 mg daily and Remeron 22.5 mg at night; will put in order for psychiatric re-evaluation  -no labs for past week, will repeat routine labs for 5/21         Assessment and Plan:        Bhavana Marr is an 82-year-old female patient with PMH of dementia; asthma; dyslipidemia; depression/anxiety; bipolar disorder; borderline personality disorder; osteoporosis; prior gastric bypass; and recent hospitalization (4/21/2023 to 4/28/2023) with issues including suicidal ideation and dementia with behavioral disturbance. She presented from her memory care facility with decreased oral intake, low glucose level, low blood pressure, depression and possible suicidal ideations.     On initial evaluation, patient was afebrile, normotensive, not tachycardic.  Labs notable for BMP with sodium 133 otherwise normal; CBC was normal; LFTs showed low albumin of 3.0 and low protein 5.3 and low AST of 9, otherwise normal; glucose 109; urinalysis showed 60 ketones and not suggestive of infection.     Decreased oral intake with hypoglycemia, low blood pressures  Hyponatremia, suspect due to decreased PO intake  Failure to thrive, possibly due to Psychiatric Illness; see below   * Initial presentation as above. Pt found with low sodium as well as ketones in the urine. Given fluids in the ED.    * No abdominal pain or lab findings to suggest a primary GI etiology.  Patient has been admitted to the hospital for further evaluation and management.  - d/roberto IVF on 5/8 due to improved oral intake  - Mental health " management as below.     Psychiatric issues  Depression/anxiety, bipolar d/o, borderline personality d/o with possible suicidal ideations  * Of note is that the patient was recently hospitalized through [facility] with issues including suicidal ideation and dementia.  She was seen by psychiatry during that hospitalization; they did not recommend any type of psychiatric admission.    * Initial presentation as above. Noted manic episode and some confusion at [facility] care. On admit, pt denied suicidal ideations on admit but did endorse feeling depressed.  She was seen by the DEC  in the ED and not felt to be actively suicidal.  -Psychiatry following , last evaluated on 5/17, continued on olanzapine 5 mg daily and 10 mg nightly along with sertraline 150 mg daily and Remeron 22.5 mg at night; will put in order for psychiatric re-evaluation  -  following for disposition; has court hearing on 5/23/23; patient declined preliminary hearing on 5/18/23     Suspect Moderate malnutrition In Context of:  Acute illness or injury  - Nutrition following; supplements for nutrition     Asthma  -  PRN albuterol inhaler     Dyslipidemia    Continue PTA simvastatin.     GERD    Continue PTA famotidine.     Osteoporosis    Resume alendronate after discharge.     Prior gastric bypass  B12 deficiency  Iron deficienty anemia  - can resume PTA B12 and iron on discharge .     Constipation  -bowel regimen in place      DVT Prophylaxis: Ambulate every shift    Code Status: Full Code      Diet: Regular Diet Adult  Room Service      Dispo  -pending court hearing ; psychiatry reevaluation ;  following for disposition  - had court examination and preliminary hearing on 5/18/23, didn't participate. Next hearing is on 5/23/23.    Clinically Significant Risk Factors              # Hypoalbuminemia: Lowest albumin = 3 g/dL at 5/5/2023  4:53 PM, will monitor as appropriate                      Page Me (7 am to 6  "pm)    Care plan discussed with               Physical Exam:      Blood pressure 123/75, pulse 98, temperature 97.6  F (36.4  C), temperature source Oral, resp. rate 16, height 1.6 m (5' 3\"), weight 54 kg (119 lb 0.8 oz), SpO2 96 %, not currently breastfeeding.  Vitals:    05/05/23 2140   Weight: 54 kg (119 lb 0.8 oz)     Vital Signs with Ranges  Temp:  [97.6  F (36.4  C)-98.1  F (36.7  C)] 97.6  F (36.4  C)  Pulse:  [60-98] 98  Resp:  [16-18] 16  BP: ()/(57-75) 123/75  SpO2:  [96 %-98 %] 96 %  I/O's Last 24 hours  I/O last 3 completed shifts:  In: 236 [P.O.:236]  Out: -      Patient resting comfortably, sleeping, not in apparent distress  declined examination stating \"I don't want to talk\"                  Medications:          acetaminophen  650 mg Oral BID     famotidine  20 mg Oral Daily     mirtazapine  22.5 mg Oral At Bedtime     OLANZapine  10 mg Oral At Bedtime     OLANZapine  5 mg Oral QAM     senna-docusate  2 tablet Oral BID     sertraline  150 mg Oral Daily     simvastatin  40 mg Oral At Bedtime     valACYclovir  500 mg Oral Daily     PRN Meds: acetaminophen **OR** acetaminophen, albuterol, glucose **OR** dextrose **OR** glucagon, melatonin, ondansetron **OR** ondansetron, polyethylene glycol, prochlorperazine **OR** prochlorperazine **OR** prochlorperazine, senna-docusate **OR** senna-docusate         Data:      All new lab and imaging data was reviewed.   Recent Labs   Lab Test 05/06/23  0636 05/05/23  1653   WBC 10.0 9.3   HGB 11.5* 11.9   MCV 93 93    314      Recent Labs   Lab Test 05/13/23  0851 05/07/23  0552 05/06/23  0636 05/05/23  2108 05/05/23  1653   NA  --  137 135*  --  133*   POTASSIUM  --  3.7 4.1  --  3.9   CHLORIDE  --  103 103  --  100   CO2  --  23 24  --  23   BUN  --  7.4* 12.7  --  19.5   CR 0.75 0.63 0.63  --  0.67   ANIONGAP  --  11 8  --  10   LWAANDA  --  9.3 8.9  --  8.8   GLC  --  117* 115* 88 109*     No lab results found.    Invalid input(s): TROP, " TROPONINIES

## 2023-05-20 NOTE — PLAN OF CARE
Goal Outcome Evaluation:    5/19/2023 4796-3452    Orientation/Cognitive: A&Ox 3; disoriented to place.  Mobility Level/Assist Equipment: SBA with GB  Fall Risk (Y/N): Yes  Behavior Concerns: None   Pain Management: Scheduled tylenol   Tele/VS/O2: VSS on RA   ABNL Lab/BG: None this shift   Diet: Regular   Bowel/Bladder: BR-interm incont  Skin Concerns: none  Drains/Devices: No PIV   Tests/Procedures for next shift: Court examination scheduled for 5/23/23. SW following   Anticipated DC date & active delays: TBD Patient had court examination and preliminary hearing 5/18/23, didn't participate, next hearing scheduled for 5/23/23.

## 2023-05-21 LAB
ANION GAP SERPL CALCULATED.3IONS-SCNC: 9 MMOL/L (ref 7–15)
BASOPHILS # BLD AUTO: 0 10E3/UL (ref 0–0.2)
BASOPHILS NFR BLD AUTO: 0 %
BUN SERPL-MCNC: 8.6 MG/DL (ref 8–23)
CALCIUM SERPL-MCNC: 9.3 MG/DL (ref 8.8–10.2)
CHLORIDE SERPL-SCNC: 104 MMOL/L (ref 98–107)
CREAT SERPL-MCNC: 0.71 MG/DL (ref 0.51–0.95)
DEPRECATED HCO3 PLAS-SCNC: 24 MMOL/L (ref 22–29)
EOSINOPHIL # BLD AUTO: 0.1 10E3/UL (ref 0–0.7)
EOSINOPHIL NFR BLD AUTO: 1 %
ERYTHROCYTE [DISTWIDTH] IN BLOOD BY AUTOMATED COUNT: 13.2 % (ref 10–15)
GFR SERPL CREATININE-BSD FRML MDRD: 85 ML/MIN/1.73M2
GLUCOSE SERPL-MCNC: 78 MG/DL (ref 70–99)
HCT VFR BLD AUTO: 38.9 % (ref 35–47)
HGB BLD-MCNC: 12.6 G/DL (ref 11.7–15.7)
IMM GRANULOCYTES # BLD: 0 10E3/UL
IMM GRANULOCYTES NFR BLD: 0 %
LYMPHOCYTES # BLD AUTO: 1.7 10E3/UL (ref 0.8–5.3)
LYMPHOCYTES NFR BLD AUTO: 25 %
MCH RBC QN AUTO: 30.3 PG (ref 26.5–33)
MCHC RBC AUTO-ENTMCNC: 32.4 G/DL (ref 31.5–36.5)
MCV RBC AUTO: 94 FL (ref 78–100)
MONOCYTES # BLD AUTO: 0.4 10E3/UL (ref 0–1.3)
MONOCYTES NFR BLD AUTO: 5 %
NEUTROPHILS # BLD AUTO: 4.6 10E3/UL (ref 1.6–8.3)
NEUTROPHILS NFR BLD AUTO: 69 %
NRBC # BLD AUTO: 0 10E3/UL
NRBC BLD AUTO-RTO: 0 /100
PLATELET # BLD AUTO: 255 10E3/UL (ref 150–450)
POTASSIUM SERPL-SCNC: 4.2 MMOL/L (ref 3.4–5.3)
RBC # BLD AUTO: 4.16 10E6/UL (ref 3.8–5.2)
SODIUM SERPL-SCNC: 137 MMOL/L (ref 136–145)
WBC # BLD AUTO: 6.9 10E3/UL (ref 4–11)

## 2023-05-21 PROCEDURE — 85025 COMPLETE CBC W/AUTO DIFF WBC: CPT | Performed by: HOSPITALIST

## 2023-05-21 PROCEDURE — 120N000001 HC R&B MED SURG/OB

## 2023-05-21 PROCEDURE — 250N000013 HC RX MED GY IP 250 OP 250 PS 637

## 2023-05-21 PROCEDURE — 250N000011 HC RX IP 250 OP 636: Performed by: INTERNAL MEDICINE

## 2023-05-21 PROCEDURE — 250N000013 HC RX MED GY IP 250 OP 250 PS 637: Performed by: INTERNAL MEDICINE

## 2023-05-21 PROCEDURE — 36415 COLL VENOUS BLD VENIPUNCTURE: CPT | Performed by: HOSPITALIST

## 2023-05-21 PROCEDURE — 80048 BASIC METABOLIC PNL TOTAL CA: CPT | Performed by: HOSPITALIST

## 2023-05-21 PROCEDURE — 99232 SBSQ HOSP IP/OBS MODERATE 35: CPT | Performed by: HOSPITALIST

## 2023-05-21 PROCEDURE — 250N000013 HC RX MED GY IP 250 OP 250 PS 637: Performed by: HOSPITALIST

## 2023-05-21 RX ADMIN — SIMVASTATIN 40 MG: 20 TABLET, FILM COATED ORAL at 20:19

## 2023-05-21 RX ADMIN — OLANZAPINE 10 MG: 5 TABLET, FILM COATED ORAL at 20:19

## 2023-05-21 RX ADMIN — MIRTAZAPINE 22.5 MG: 15 TABLET, FILM COATED ORAL at 20:19

## 2023-05-21 RX ADMIN — FAMOTIDINE 20 MG: 20 TABLET ORAL at 08:44

## 2023-05-21 RX ADMIN — ACETAMINOPHEN 650 MG: 325 TABLET ORAL at 20:19

## 2023-05-21 RX ADMIN — OLANZAPINE 5 MG: 2.5 TABLET, FILM COATED ORAL at 08:44

## 2023-05-21 RX ADMIN — SERTRALINE HYDROCHLORIDE 150 MG: 100 TABLET ORAL at 08:43

## 2023-05-21 RX ADMIN — SENNOSIDES AND DOCUSATE SODIUM 2 TABLET: 50; 8.6 TABLET ORAL at 20:19

## 2023-05-21 RX ADMIN — PROCHLORPERAZINE EDISYLATE 5 MG: 5 INJECTION INTRAMUSCULAR; INTRAVENOUS at 20:27

## 2023-05-21 RX ADMIN — VALACYCLOVIR HYDROCHLORIDE 500 MG: 500 TABLET, FILM COATED ORAL at 08:43

## 2023-05-21 RX ADMIN — ONDANSETRON 4 MG: 4 TABLET, ORALLY DISINTEGRATING ORAL at 18:12

## 2023-05-21 RX ADMIN — ACETAMINOPHEN 650 MG: 325 TABLET ORAL at 08:43

## 2023-05-21 ASSESSMENT — ACTIVITIES OF DAILY LIVING (ADL)
ADLS_ACUITY_SCORE: 32
ADLS_ACUITY_SCORE: 26
ADLS_ACUITY_SCORE: 26
ADLS_ACUITY_SCORE: 32
ADLS_ACUITY_SCORE: 26
ADLS_ACUITY_SCORE: 32
ADLS_ACUITY_SCORE: 26

## 2023-05-21 NOTE — PLAN OF CARE
Goal Outcome Evaluation:  Cognitive Concerns/ Orientation : A&Ox4, withdrawn, very quiet  BEHAVIOR & AGGRESSION TOOL COLOR: Green   ABNL VS/O2: VSS on room air  MOBILITY: SBA with gait blet  PAIN MANAGMENT: Denied. Scheduled Tylenol was given  DIET: Regular, no appetite  BOWEL/BLADDER: Incontinent of bowels and bladder at times  ABNL LAB/BG: NA  DRAIN/DEVICES: None  TELEMETRY RHYTHM: NA  SKIN: Blanchable redness to right hip. pale   TESTS/PROCEDURES: NA  D/C DATE: TBD  Discharge Barriers: Patient had court examination and preliminary hearing 5/18/23, didn't participate, next hearing scheduled for 5/23/23.  OTHER IMPORTANT INFO: Court examination scheduled for 5/23/23. SW following. Voiced wanting to die this am, no active plan, place on suicide watch, sitter at the bedside. Showered this am. Emotional support offered.

## 2023-05-21 NOTE — PLAN OF CARE
DATE & TIME: 5/20/23, 1900 - 0830    Cognitive Concerns/ Orientation : A&O x 2, disoriented to time and situation   BEHAVIOR & AGGRESSION TOOL COLOR: Green   ABNL VS/O2: VSS on room air  MOBILITY: SBA with GB  PAIN MANAGMENT: Denied. Scheduled Tylenol was given  DIET: Regular  BOWEL/BLADDER: Incontinent of bowels and bladder at times  ABNL LAB/BG: NA  DRAIN/DEVICES: None  TELEMETRY RHYTHM: NA  SKIN: Blanchable redness to right hip. Protective mepilex in place  TESTS/PROCEDURES: NA  D/C DATE: TBD  Discharge Barriers: Patient had court examination and preliminary hearing 5/18/23, didn't participate, next hearing scheduled for 5/23/23.  OTHER IMPORTANT INFO: Court examination scheduled for 5/23/23. SW following       Goal Outcome Evaluation:      Plan of Care Reviewed With: patient    Overall Patient Progress: no changeOverall Patient Progress: no change

## 2023-05-21 NOTE — PROGRESS NOTES
"[facility]  Hospitalist Progress Note        Hussain Gar MD   05/21/2023        Interval History:        - Patient reports feeling depressed and states \"I just want to die\" ; reports having no plan to hurt herself  - will have suicide precautions in place; sitter  - psychiatry to reevaluate (consult placed for re-eval 5/20)  - routine labs 5/21 noted unremarkable         Assessment and Plan:        Bhavana Marr is an 82-year-old female patient with PMH of dementia; asthma; dyslipidemia; depression/anxiety; bipolar disorder; borderline personality disorder; osteoporosis; prior gastric bypass; and recent hospitalization (4/21/2023 to 4/28/2023) with issues including suicidal ideation and dementia with behavioral disturbance. She presented from her memory care facility with decreased oral intake, low glucose level, low blood pressure, depression and possible suicidal ideations.     On initial evaluation, patient was afebrile, normotensive, not tachycardic.  Labs notable for BMP with sodium 133 otherwise normal; CBC was normal; LFTs showed low albumin of 3.0 and low protein 5.3 and low AST of 9, otherwise normal; glucose 109; urinalysis showed 60 ketones and not suggestive of infection.     Decreased oral intake with hypoglycemia, low blood pressures  Hyponatremia, suspect due to decreased PO intake  Failure to thrive, possibly due to Psychiatric Illness; see below   * Initial presentation as above. Pt found with low sodium as well as ketones in the urine. Given fluids in the ED.    * No abdominal pain or lab findings to suggest a primary GI etiology.  Patient has been admitted to the hospital for further evaluation and management.  - d/roberto IVF on 5/8 due to improved oral intake  - Mental health management as below.     Psychiatric issues  Depression/anxiety, bipolar d/o, borderline personality d/o with possible suicidal ideations  * Of note is that the patient was recently hospitalized through " "[facility] with issues including suicidal ideation and dementia.  She was seen by psychiatry during that hospitalization; they did not recommend any type of psychiatric admission.    * Initial presentation as above. Noted manic episode and some confusion at memory care. On admit, pt denied suicidal ideations on admit but did endorse feeling depressed.  She was seen by the DEC  in the ED and not felt to be actively suicidal.  - Psychiatry following intermittently, last evaluated on 5/17, continued on olanzapine 5 mg daily and 10 mg nightly along with sertraline 150 mg daily and Remeron 22.5 mg at night  - still reports feeling depressed and states \"I just want to die\" (5/21) ; reports having no plan to hurt herself  - will have suicide precautions in place; sitter  - psychiatry to reevaluate (consult placed for re-eval 5/20)  -  following for disposition; has court hearing on 5/23/23; patient declined preliminary hearing on 5/18/23     Suspect Moderate malnutrition In Context of:  Acute illness or injury  - Nutrition following; supplements for nutrition     Asthma  -  PRN albuterol inhaler     Dyslipidemia    Continue PTA simvastatin.     GERD    Continue PTA famotidine.     Osteoporosis    Resume alendronate after discharge.     Prior gastric bypass  B12 deficiency  Iron deficienty anemia  - can resume PTA B12 and iron on discharge .     Constipation  -bowel regimen in place      DVT Prophylaxis: Ambulate every shift    Code Status: Full Code      Diet: Regular Diet Adult  Room Service      Dispo  - pending court hearing ; psychiatry reevaluation ;  following for disposition  - had court examination and preliminary hearing on 5/18/23, didn't participate. Next hearing is on 5/23/23.    Clinically Significant Risk Factors              # Hypoalbuminemia: Lowest albumin = 3 g/dL at 5/5/2023  4:53 PM, will monitor as appropriate                      Page Me (7 am to 6 pm)    Care " "plan discussed with patient and nursing               Physical Exam:      Blood pressure (!) 148/85, pulse 98, temperature 97.8  F (36.6  C), temperature source Oral, resp. rate 18, height 1.6 m (5' 3\"), weight 54 kg (119 lb 0.8 oz), SpO2 95 %, not currently breastfeeding.  Vitals:    05/05/23 2140   Weight: 54 kg (119 lb 0.8 oz)     Vital Signs with Ranges  Temp:  [97.6  F (36.4  C)-98  F (36.7  C)] 97.8  F (36.6  C)  Pulse:  [61-98] 98  Resp:  [16-18] 18  BP: (102-148)/(52-85) 148/85  SpO2:  [95 %-97 %] 95 %  I/O's Last 24 hours  I/O last 3 completed shifts:  In: 240 [P.O.:240]  Out: 75 [Emesis/NG output:75]     Alert awake and oriented  looks depressed  lungs bilaterally clear  abdomen soft, nontender, nondistended  no edema  nonfocal neurological exam                  Medications:          acetaminophen  650 mg Oral BID     famotidine  20 mg Oral Daily     mirtazapine  22.5 mg Oral At Bedtime     OLANZapine  10 mg Oral At Bedtime     OLANZapine  5 mg Oral QAM     senna-docusate  2 tablet Oral BID     sertraline  150 mg Oral Daily     simvastatin  40 mg Oral At Bedtime     valACYclovir  500 mg Oral Daily     PRN Meds: acetaminophen **OR** acetaminophen, albuterol, glucose **OR** dextrose **OR** glucagon, melatonin, ondansetron **OR** ondansetron, polyethylene glycol, prochlorperazine **OR** prochlorperazine **OR** prochlorperazine, senna-docusate **OR** senna-docusate         Data:      All new lab and imaging data was reviewed.   Recent Labs   Lab Test 05/06/23  0636 05/05/23  1653   WBC 10.0 9.3   HGB 11.5* 11.9   MCV 93 93    314      Recent Labs   Lab Test 05/13/23  0851 05/07/23  0552 05/06/23  0636 05/05/23  2108 05/05/23  1653   NA  --  137 135*  --  133*   POTASSIUM  --  3.7 4.1  --  3.9   CHLORIDE  --  103 103  --  100   CO2  --  23 24  --  23   BUN  --  7.4* 12.7  --  19.5   CR 0.75 0.63 0.63  --  0.67   ANIONGAP  --  11 8  --  10   LAWANDA  --  9.3 8.9  --  8.8   GLC  --  117* 115* 88 109*     No " lab results found.    Invalid input(s): TROP, TROPONINIES

## 2023-05-21 NOTE — PLAN OF CARE
Shift: 5/20/23 1645-1203  Orientation/Cognitive: A&Ox2. Disoriented to place and situation   Mobility Level/Assist Equipment: SBA with GB  Fall Risk (Y/N): Yes  Behavior Concerns: Flat affect. Intermittently refuses to answer assessment questions   Pain Management: Scheduled tylenol   Tele/VS/O2: VSS on RA   ABNL Lab/BG: None this shift   Diet: Regular, However, poor appetite today. And had x2 small emesis. Gave PRN zofran x1.   Bowel/Bladder: Intermittently incontinent   Skin Concerns: none  Drains/Devices: No PIV   Tests/Procedures for next shift: Court examination scheduled for 5/23/23. SW following   Anticipated DC date & active delays: TBD Patient had court examination and preliminary hearing 5/18/23, didn't participate, next hearing scheduled for 5/23/23.

## 2023-05-22 PROCEDURE — 250N000011 HC RX IP 250 OP 636: Performed by: HOSPITALIST

## 2023-05-22 PROCEDURE — 99233 SBSQ HOSP IP/OBS HIGH 50: CPT

## 2023-05-22 PROCEDURE — 250N000013 HC RX MED GY IP 250 OP 250 PS 637

## 2023-05-22 PROCEDURE — 250N000013 HC RX MED GY IP 250 OP 250 PS 637: Performed by: HOSPITALIST

## 2023-05-22 PROCEDURE — 250N000013 HC RX MED GY IP 250 OP 250 PS 637: Performed by: INTERNAL MEDICINE

## 2023-05-22 PROCEDURE — 99232 SBSQ HOSP IP/OBS MODERATE 35: CPT | Performed by: HOSPITALIST

## 2023-05-22 PROCEDURE — 120N000001 HC R&B MED SURG/OB

## 2023-05-22 RX ORDER — DIVALPROEX SODIUM 125 MG/1
250 CAPSULE, COATED PELLETS ORAL AT BEDTIME
Status: DISCONTINUED | OUTPATIENT
Start: 2023-05-22 | End: 2023-05-31

## 2023-05-22 RX ORDER — DIVALPROEX SODIUM 125 MG/1
125 CAPSULE, COATED PELLETS ORAL 2 TIMES DAILY
Status: DISCONTINUED | OUTPATIENT
Start: 2023-05-22 | End: 2023-05-31

## 2023-05-22 RX ORDER — ENOXAPARIN SODIUM 100 MG/ML
40 INJECTION SUBCUTANEOUS EVERY 24 HOURS
Status: DISCONTINUED | OUTPATIENT
Start: 2023-05-22 | End: 2023-06-07 | Stop reason: HOSPADM

## 2023-05-22 RX ADMIN — SERTRALINE HYDROCHLORIDE 150 MG: 100 TABLET ORAL at 08:39

## 2023-05-22 RX ADMIN — OLANZAPINE 10 MG: 5 TABLET, FILM COATED ORAL at 21:01

## 2023-05-22 RX ADMIN — FAMOTIDINE 20 MG: 20 TABLET ORAL at 08:38

## 2023-05-22 RX ADMIN — OLANZAPINE 5 MG: 2.5 TABLET, FILM COATED ORAL at 08:38

## 2023-05-22 RX ADMIN — SIMVASTATIN 40 MG: 20 TABLET, FILM COATED ORAL at 21:00

## 2023-05-22 RX ADMIN — MIRTAZAPINE 22.5 MG: 15 TABLET, FILM COATED ORAL at 21:01

## 2023-05-22 RX ADMIN — VALACYCLOVIR HYDROCHLORIDE 500 MG: 500 TABLET, FILM COATED ORAL at 08:40

## 2023-05-22 RX ADMIN — DIVALPROEX SODIUM 250 MG: 125 CAPSULE, COATED PELLETS ORAL at 21:00

## 2023-05-22 RX ADMIN — SENNOSIDES AND DOCUSATE SODIUM 1 TABLET: 50; 8.6 TABLET ORAL at 08:40

## 2023-05-22 RX ADMIN — DIVALPROEX SODIUM 125 MG: 125 CAPSULE, COATED PELLETS ORAL at 14:37

## 2023-05-22 RX ADMIN — ENOXAPARIN SODIUM 40 MG: 40 INJECTION SUBCUTANEOUS at 12:02

## 2023-05-22 RX ADMIN — ACETAMINOPHEN 650 MG: 325 TABLET ORAL at 21:00

## 2023-05-22 RX ADMIN — ACETAMINOPHEN 650 MG: 325 TABLET ORAL at 08:38

## 2023-05-22 ASSESSMENT — ACTIVITIES OF DAILY LIVING (ADL)
ADLS_ACUITY_SCORE: 26
ADLS_ACUITY_SCORE: 32
ADLS_ACUITY_SCORE: 26
ADLS_ACUITY_SCORE: 26
ADLS_ACUITY_SCORE: 32
ADLS_ACUITY_SCORE: 26
ADLS_ACUITY_SCORE: 32
ADLS_ACUITY_SCORE: 26
ADLS_ACUITY_SCORE: 32

## 2023-05-22 NOTE — PROGRESS NOTES
"[facility]  Hospitalist Progress Note        Hussain Gar MD   05/22/2023        Interval History:        - No acute issues overnight; feels depressed and making comments like \"I just want to die\" but denies any plan; does not seem to be actively suicidal; can probably discontinue suicide precautions/sitter; await psychiatry reevaluation  - was planned for outpatient UGI endoscopy (5/22) for nause/vomiting with h/o gastric bypass (was seen in clinic by Yessi 4/18/23); currently tolerating PO without significant G.I. issues, can reschedule EGD with the PCP as outpatient on follow up         Assessment and Plan:        Bhavana Marr is an 82-year-old female patient with PMH of dementia; asthma; dyslipidemia; depression/anxiety; bipolar disorder; borderline personality disorder; osteoporosis; prior gastric bypass; and recent hospitalization (4/21/2023 to 4/28/2023) with issues including suicidal ideation and dementia with behavioral disturbance. She presented from her memory care facility with decreased oral intake, low glucose level, low blood pressure, depression and possible suicidal ideations.     On initial evaluation, patient was afebrile, normotensive, not tachycardic.  Labs notable for BMP with sodium 133 otherwise normal; CBC was normal; LFTs showed low albumin of 3.0 and low protein 5.3 and low AST of 9, otherwise normal; glucose 109; urinalysis showed 60 ketones and not suggestive of infection.     Decreased oral intake with hypoglycemia, low blood pressures  Hyponatremia, suspect due to decreased PO intake  Failure to thrive, possibly due to Psychiatric Illness; see below   * Initial presentation as above. Pt found with low sodium as well as ketones in the urine. Given fluids in the ED.    * No abdominal pain or lab findings to suggest a primary GI etiology.  Patient has been admitted to the hospital for further evaluation and management.  - d/roberto IVF on 5/8 due to improved oral " "intake  - Mental health management as below.     Psychiatric issues  Depression/anxiety, bipolar d/o, borderline personality d/o with possible suicidal ideations  * Of note is that the patient was recently hospitalized through [facility] with issues including suicidal ideation and dementia.  She was seen by psychiatry during that hospitalization; they did not recommend any type of psychiatric admission.    * Initial presentation as above. Noted manic episode and some confusion at [facility] care. On admit, pt denied suicidal ideations on admit but did endorse feeling depressed.  She was seen by the DEC  in the ED and not felt to be actively suicidal.  - Psychiatry following intermittently, last evaluated on 5/17, continued on olanzapine 5 mg daily and 10 mg nightly along with sertraline 150 mg daily and Remeron 22.5 mg at night  - reports feeling depressed and making comments like \"I just want to die\" but denies any plan; does not seem to be actively suicidal; can probably discontinue suicide precautions/sitter; will await psychiatry reevaluation (consult placed for re-eval 5/20)  -  following for disposition; has court hearing on 5/23/23; patient declined preliminary hearing on 5/18/23     Suspect Moderate malnutrition In Context of:  Acute illness or injury  - Nutrition following; supplements for nutrition    Prior gastric bypass  B12 deficiency  Iron deficienty anemia  GERD  - can resume PTA B12 and iron on discharge  - continue PT famotidine  - was planned for outpatient UGI endoscopy (5/22) for nausea/vomiting with h/o gastric bypass (was seen in clinic by Yessi 4/18/23); currently tolerating PO without significant G.I. issues, can reschedule EGD with the PCP as outpatient on follow up     Asthma  -  PRN albuterol inhaler     Dyslipidemia    Continue PTA simvastatin.      Osteoporosis    Resume alendronate after discharge.     Constipation  -bowel regimen in place      DVT Prophylaxis:  " "given prolonged hospitalization ; will start Lovenox 5/22/23; (patient may refuse )    Code Status: Full Code      Diet: Room Service  Combination Diet Regular Diet; Safe Tray - with utensils      Dispo  - pending court hearing ; psychiatry reevaluation ;  following for disposition  - had court examination and preliminary hearing on 5/18/23, didn't participate. Next hearing is on 5/23/23.    Clinically Significant Risk Factors              # Hypoalbuminemia: Lowest albumin = 3 g/dL at 5/5/2023  4:53 PM, will monitor as appropriate                      Page Me (7 am to 6 pm)    Care plan discussed with patient and nursing               Physical Exam:      Blood pressure 104/54, pulse 55, temperature 97.7  F (36.5  C), temperature source Oral, resp. rate 18, height 1.6 m (5' 3\"), weight 54 kg (119 lb 0.8 oz), SpO2 96 %, not currently breastfeeding.  Vitals:    05/05/23 2140   Weight: 54 kg (119 lb 0.8 oz)     Vital Signs with Ranges  Temp:  [97.5  F (36.4  C)-97.9  F (36.6  C)] 97.7  F (36.5  C)  Pulse:  [55-65] 55  Resp:  [16-18] 18  BP: ()/(54-70) 104/54  SpO2:  [96 %-98 %] 96 %  I/O's Last 24 hours  I/O last 3 completed shifts:  In: 240 [P.O.:240]  Out: -      Alert awake and oriented  Has a flat affect  lungs bilaterally clear  abdomen soft, nontender, nondistended  no edema  nonfocal neurological exam                  Medications:          acetaminophen  650 mg Oral BID     famotidine  20 mg Oral Daily     mirtazapine  22.5 mg Oral At Bedtime     OLANZapine  10 mg Oral At Bedtime     OLANZapine  5 mg Oral QAM     senna-docusate  2 tablet Oral BID     sertraline  150 mg Oral Daily     simvastatin  40 mg Oral At Bedtime     valACYclovir  500 mg Oral Daily     PRN Meds: acetaminophen **OR** acetaminophen, albuterol, glucose **OR** dextrose **OR** glucagon, melatonin, ondansetron **OR** ondansetron, polyethylene glycol, prochlorperazine **OR** prochlorperazine **OR** prochlorperazine, " senna-docusate **OR** senna-docusate         Data:      All new lab and imaging data was reviewed.   Recent Labs   Lab Test 05/21/23  0742 05/06/23  0636 05/05/23  1653   WBC 6.9 10.0 9.3   HGB 12.6 11.5* 11.9   MCV 94 93 93    309 314      Recent Labs   Lab Test 05/21/23  0742 05/13/23  0851 05/07/23  0552 05/06/23  0636     --  137 135*   POTASSIUM 4.2  --  3.7 4.1   CHLORIDE 104  --  103 103   CO2 24  --  23 24   BUN 8.6  --  7.4* 12.7   CR 0.71 0.75 0.63 0.63   ANIONGAP 9  --  11 8   LAWANDA 9.3  --  9.3 8.9   GLC 78  --  117* 115*     No lab results found.    Invalid input(s): TROP, TROPONINIES

## 2023-05-22 NOTE — PLAN OF CARE
Goal Outcome Evaluation:    DATE & TIME: 5/21 5688-5755   Cognitive Concerns/ Orientation : A&Ox4, withdrawn but very pleasant when engaging in conversation   BEHAVIOR & AGGRESSION TOOL COLOR: Green   ABNL VS/O2: VSS on room air - slightly soft BP 96/62   MOBILITY: SBA with gait blet  PAIN MANAGMENT: Denied. Receiving scheduled tylenol.   DIET: Regular, good appetite foloowing brief episodes of emesis requiring PO zolfran and IV compazine   BOWEL/BLADDER: Incontinent of bowel and bladder at times - continent this shift  ABNL LAB/BG: NA  DRAIN/DEVICES: IV placed to left hand   TELEMETRY RHYTHM: NA  SKIN: Blanchable redness to right hip.  TESTS/PROCEDURES: NA  D/C DATE: TBD  Discharge Barriers: Patient had court examination and preliminary hearing 5/18/23, didn't participate, next hearing scheduled for 5/23/23.  OTHER IMPORTANT INFO: Court examination scheduled for 5/23/23. SW following. Pt declined suicidal ideation, plan/intent this shift - psych reeval pending

## 2023-05-22 NOTE — CONSULTS
"      Psychiatry Consultation; Follow up              Reason for Consult, requesting source:    Follow up   Requesting source: Nuno Courtney    Labs and imaging reviewed, discussed with nursing.               Interim history:    From initial note: \"Bhavana Marr is a 82 year old female with a history of bipolar I disorder, gastric bypass surgery (unknown procedure type), osteoporosis, asthma, and dyslipidemia who presented from her LTC facility [facility] on 5/5/23 for poor PO intake with subsequent hypoglycemia and hypotension, depression, and suicidal ideation.      Bhavana lives in LTC facility [facility]; the director of nursing at this facility has expressed significant concerns for Bhavana's mental health- has been depressed, not getting out of bed, not eating, not talking to them, asking for them to let her die. She had expressed wanting to walk into traffic. They have sent her to the ED 3 times recently (4/20/23, 4/21/2023, and this admission 5/5/23) for similar concerns. She has been discharged the past two times with no changes to psychiatric medications and no improvement in symptoms. They do not feel she is safe to return at this point. I have attempted to speak with Bhavana on two separate occasions, both times she refused to talk to me and remained mute for most of both assessment attempts. She did not respond to questions regarding mood, suicidal ideation, medications, psychiatric history, or my recommendation for her to be admitted to inpatient psychiatry. She did however speak to the aide who was in the room and told me \"I have nothing to say to you\". \"    5/17 follow up: She is somewhat more willing to engage in conversation today but overall quite guarded still. She reports her mood is \"better\" and does endorse recent depression. Denies SI currently or recently but does report a past history of one suicide attempt \"with a rope\", when asked what stopped her at that time she responded \"there was no " "where to hang it\". Does not recall when this happened- many years ago. Repeatedly states she's going to sleep now when further questions asked, does not respond to questions.     5/22 Follow up:  Seen laying in bed, awake. Minimally willing to engage in conversation, repeatedly states \"I don't want to talk\" but does answer some questions. She continues to endorse depressed mood, today states she thinks she needs IP psych admission which she was previously not even willing to discuss. She is aware of commitment hearing tomorrow, asked what to expect with that but when I began explaining, she stated again that she is done talking. She does not ever recall taking Depakote and is agreeable to trying this for bipolar depression.        Current Medications:       acetaminophen  650 mg Oral BID     enoxaparin ANTICOAGULANT  40 mg Subcutaneous Q24H     famotidine  20 mg Oral Daily     mirtazapine  22.5 mg Oral At Bedtime     OLANZapine  10 mg Oral At Bedtime     OLANZapine  5 mg Oral QAM     senna-docusate  2 tablet Oral BID     sertraline  150 mg Oral Daily     simvastatin  40 mg Oral At Bedtime     valACYclovir  500 mg Oral Daily              MSE:   Appearance: awake, alert and laying in bed  Attitude:  guarded and somewhat cooperative  Eye Contact:  fair  Mood:  depressed  Affect:  flat  Speech:  clear, coherent  Psychomotor Behavior:  no evidence of tardive dyskinesia, dystonia, or tics  Thought Process:  linear and goal oriented  Associations:  no loose associations  Thought Content:  no evidence of suicidal ideation or homicidal ideation and no evidence of psychotic thought  Insight:  fair  Judgement:  fair  Oriented to:  time, person, and place  Attention Span and Concentration:  fair  Recent and Remote Memory:  fair      Vital signs:  Temp: 97.7  F (36.5  C) Temp src: Oral BP: 104/54 Pulse: 55   Resp: 18 SpO2: 96 % O2 Device: Nasal cannula   Height: 160 cm (5' 3\") Weight: 54 kg (119 lb 0.8 oz)  Estimated body mass " "index is 21.09 kg/m  as calculated from the following:    Height as of this encounter: 1.6 m (5' 3\").    Weight as of this encounter: 54 kg (119 lb 0.8 oz).              DSM-5 Diagnosis:   Bipolar I disorder, current episode depressed, severe, without psychosis    She does NOT have a history of dementia          Assessment:   Mood remains depressed, has seen slight improvement with increase in olanzapine. She was more willing to discuss medications changes today. Does not ever recall taking Depakote. Agreeable to trying this for bipolar depression. She has a history of gastric bypass but does not know what type of procedure she had done. Will try Depakote sprinkles and monitor valproic acid level; may need to switch to liquid IR formulation if her VPA acid levels are unexpectedly low.    On 5/10 we filed petition for commitment, she is now on a court hold. She refused to participate in preliminary hearing on 5/18. Commitment hearing tomorrow 5/23. Continue to recommend IP psych admission. She does not need geripsych as she does not have dementia.     From my initial note 5/10: \"Per her LTC facility [facility] director of nursing, she does NOT have a history of dementia. This was entered into her chart erroneously during a prior admission in 2021. She has lived at [facility] for 5 years where she sees both a primary care provider and psychiatrist regularly, neither of whom have diagnosed her with dementia. Upon review of chart, in the H&P from 6/2021 admission it states she has a \"history of apparent dementia\" but this is not listed in the most recent outpatient PCP nursing home visit not just two months prior in 4/2021.\" She does not live in a memory care facility but rather a long term care facility for persons with mental illness.              Summary of Recommendations:   1.  Olanzapine 5mg daily and 10mg nightly for bipolar depression, does seem to be responding to this    2.  Sertraline 150mg daily and " "mirtazapine 22.5mg nightly- will need to monitor for possible activation/switch into cherise    3.  Start Depakote sprinkles 125mg BID and 250mg at bedtime    4.  Will plan to check valproic acid level 5/26    5.  Continue to recommend IP psych admission, discussed with unit SW. Does NOT have history of dementia.     6.  Consult left open for follow up      DANIEL Koch CNP  Consult/Liaison Psychiatry   [facility]    Contact information available via [facility] Paging/Directory  If I am not available, then [facility] CL line ([phone]) should know who is covering our consult service.   \"Much or all of the text in this note was generated through the use of Dragon Dictate voice to text software. Errors in spelling or words which appear to be out of contact are unintentional, may be present due having escaped editing\"           "

## 2023-05-22 NOTE — PROVIDER NOTIFICATION
Endoscopy called, pt has upper GI endoscopy scheduled today as an outpatient. MD said pt doesn't need one inpatient, okayed to reschedule through her PCP after discharge.

## 2023-05-22 NOTE — PLAN OF CARE
Goal Outcome Evaluation:  DATE & TIME: 5/21/23-5/22/23 [id number]   Cognitive Concerns/ Orientation : A&Ox4, withdrawn but very pleasant when engaging in conversation   BEHAVIOR & AGGRESSION TOOL COLOR: Green   ABNL VS/O2: VSS on RA  MOBILITY: SBA with gait blet  PAIN MANAGMENT: Denied.   DIET: Regular  BOWEL/BLADDER: Incontinent of bowel and bladder at times - continent this shift  ABNL LAB/BG: NA  DRAIN/DEVICES: Pt removed PIV that was placed yesterday  TELEMETRY RHYTHM: NA  SKIN: Blanchable redness to right hip.  TESTS/PROCEDURES: NA  D/C DATE: TBD  Discharge Barriers: Patient had court examination and preliminary hearing 5/18/23, didn't participate, next hearing scheduled for 5/23/23.  OTHER IMPORTANT INFO: Court examination scheduled for 5/23/23. SW following. Pt declined suicidal ideation, plan/intent this shift - psych reeval pending

## 2023-05-22 NOTE — PROGRESS NOTES
Care Management Follow Up    Length of Stay (days): 15    Expected Discharge Date: 05/24/2023     Concerns to be Addressed:       Patient plan of care discussed at interdisciplinary rounds: Yes    Anticipated Discharge Disposition:  In Patient psychiatry     Anticipated Discharge Services:    Anticipated Discharge DME:      Patient/family educated on Medicare website which has current facility and service quality ratings:    Education Provided on the Discharge Plan:    Patient/Family in Agreement with the Plan:      Referrals Placed by CM/SW:    Private pay costs discussed: Not applicable    Additional Information:  Patient has her virtual  MI Commitment Hearing tomorrow morning scheduled at 0900. Writer will bring a lap top into patient's room and link patient to the zoom hearing.  Psychiatry continues to recommend a Full Commitment for patient and [facility].   Writer received a call today from Carleen Lucero [facility] Assistant  ([phone]) representing the hospital petition. She had two questions which writer which writer was able to answer after talking to PATRICK Hester.  Question was 1. Is hospital pursing a [place] and the answer is NO.  #2 Will we consider a Stay of  Commitment and the answer is NO because patient is not consistent in willingness to transfer to In Patient pscychiatry   Ms Nic also stated the  representing patient did call her today and offer her another chance to complete the Examination which she declined last week.  Patient declined this offer and also told the  she would not participate in the Virtual Hearing tomorrow at 900.  Ms Lucero has requested writer be in the room with patient tomorrow during the hearing.     April Verde is checking to see if patient is on the wait list for [facility].  Patient does need to be independent with mobility for transfer and able to toliet  herself.     Larissa Voss, [facility] CenterSW

## 2023-05-22 NOTE — PROGRESS NOTES
Current condition (current mood & behavior): green, calm and   Sitter present: yes  Every 15 minute documentation by NA/RN completed for Shift: yes  Room safety Suicide Checklist completed in Epic: yes  Patient's color of severity (suicide scale): YELLOW  Order for psych consult placed (if appropriate): yes  Suicide care plan added: yes      Renny Colón RN

## 2023-05-22 NOTE — PROGRESS NOTES
Current condition (current mood & behavior): Calm and cooperative  Sitter present: yes  Every 15 minute documentation by NA/RN completed for Shift: yes  Room safety Suicide Checklist completed in Epic: yes  Patient's color of severity (suicide scale): YELLOW  Order for psych consult placed (if appropriate): yes  Suicide care plan added: yes      Eamon Sommer RN

## 2023-05-23 ENCOUNTER — TELEPHONE (OUTPATIENT)
Dept: BEHAVIORAL HEALTH | Facility: CLINIC | Age: 83
End: 2023-05-23
Payer: COMMERCIAL

## 2023-05-23 ENCOUNTER — MEDICAL CORRESPONDENCE (OUTPATIENT)
Dept: HEALTH INFORMATION MANAGEMENT | Facility: CLINIC | Age: 83
End: 2023-05-23
Payer: COMMERCIAL

## 2023-05-23 LAB — SARS-COV-2 RNA RESP QL NAA+PROBE: NEGATIVE

## 2023-05-23 PROCEDURE — 250N000013 HC RX MED GY IP 250 OP 250 PS 637: Performed by: HOSPITALIST

## 2023-05-23 PROCEDURE — 250N000013 HC RX MED GY IP 250 OP 250 PS 637

## 2023-05-23 PROCEDURE — 99231 SBSQ HOSP IP/OBS SF/LOW 25: CPT | Performed by: HOSPITALIST

## 2023-05-23 PROCEDURE — 87635 SARS-COV-2 COVID-19 AMP PRB: CPT | Performed by: HOSPITALIST

## 2023-05-23 PROCEDURE — 250N000011 HC RX IP 250 OP 636: Performed by: INTERNAL MEDICINE

## 2023-05-23 PROCEDURE — 99233 SBSQ HOSP IP/OBS HIGH 50: CPT

## 2023-05-23 PROCEDURE — 250N000011 HC RX IP 250 OP 636: Performed by: HOSPITALIST

## 2023-05-23 PROCEDURE — 120N000001 HC R&B MED SURG/OB

## 2023-05-23 PROCEDURE — 250N000013 HC RX MED GY IP 250 OP 250 PS 637: Performed by: INTERNAL MEDICINE

## 2023-05-23 RX ADMIN — OLANZAPINE 5 MG: 2.5 TABLET, FILM COATED ORAL at 09:06

## 2023-05-23 RX ADMIN — ENOXAPARIN SODIUM 40 MG: 40 INJECTION SUBCUTANEOUS at 11:14

## 2023-05-23 RX ADMIN — SENNOSIDES AND DOCUSATE SODIUM 2 TABLET: 50; 8.6 TABLET ORAL at 09:13

## 2023-05-23 RX ADMIN — SENNOSIDES AND DOCUSATE SODIUM 2 TABLET: 50; 8.6 TABLET ORAL at 20:53

## 2023-05-23 RX ADMIN — FAMOTIDINE 20 MG: 20 TABLET ORAL at 09:06

## 2023-05-23 RX ADMIN — DIVALPROEX SODIUM 125 MG: 125 CAPSULE, COATED PELLETS ORAL at 13:22

## 2023-05-23 RX ADMIN — DIVALPROEX SODIUM 125 MG: 125 CAPSULE, COATED PELLETS ORAL at 09:07

## 2023-05-23 RX ADMIN — VALACYCLOVIR HYDROCHLORIDE 500 MG: 500 TABLET, FILM COATED ORAL at 09:07

## 2023-05-23 RX ADMIN — ACETAMINOPHEN 650 MG: 325 TABLET ORAL at 09:06

## 2023-05-23 RX ADMIN — SIMVASTATIN 40 MG: 20 TABLET, FILM COATED ORAL at 20:53

## 2023-05-23 RX ADMIN — ACETAMINOPHEN 650 MG: 325 TABLET ORAL at 20:54

## 2023-05-23 RX ADMIN — MIRTAZAPINE 22.5 MG: 15 TABLET, FILM COATED ORAL at 20:53

## 2023-05-23 RX ADMIN — DIVALPROEX SODIUM 250 MG: 125 CAPSULE, COATED PELLETS ORAL at 20:53

## 2023-05-23 RX ADMIN — ONDANSETRON 4 MG: 4 TABLET, ORALLY DISINTEGRATING ORAL at 17:47

## 2023-05-23 RX ADMIN — OLANZAPINE 10 MG: 5 TABLET, FILM COATED ORAL at 20:53

## 2023-05-23 RX ADMIN — SERTRALINE HYDROCHLORIDE 150 MG: 100 TABLET ORAL at 09:06

## 2023-05-23 ASSESSMENT — ACTIVITIES OF DAILY LIVING (ADL)
ADLS_ACUITY_SCORE: 26

## 2023-05-23 NOTE — TELEPHONE ENCOUNTER
11:26 AM: Intake received a call from Provider stating that when bed is available, Pt is to be presented for DIANE/Richard.    11:30 AM: Intake received a RARS score of 8.

## 2023-05-23 NOTE — PROGRESS NOTES
[facility]  Hospitalist Progress Note        Hussain Gar MD   05/23/2023        Interval History:        - re-evaluated by psychiatry 5/22, started Depakote sprinkles 125 mg BID and 250 mg at bedtime; plan to check valproic acid level 5/26; continue to recommend inpatient psychiatry admission  - has court hearing for commitment on 5/23 but patient declining to participate in the hearing         Assessment and Plan:        Bhavana Marr is an 82-year-old female patient with PMH of asthma; dyslipidemia; depression/anxiety; bipolar disorder; borderline personality disorder; osteoporosis; prior gastric bypass; and recent hospitalization (4/21/2023 to 4/28/2023) with issues including suicidal ideation and dementia with behavioral disturbance. She presented from her care facility with decreased oral intake, low glucose level, low blood pressure, depression and possible suicidal ideations.     On initial evaluation, patient was afebrile, normotensive, not tachycardic.  Labs notable for BMP with sodium 133 otherwise normal; CBC was normal; LFTs showed low albumin of 3.0 and low protein 5.3 and low AST of 9, otherwise normal; glucose 109; urinalysis showed 60 ketones and not suggestive of infection.     Decreased oral intake with hypoglycemia, low blood pressures  Hyponatremia, suspect due to decreased PO intake  Failure to thrive, possibly due to Psychiatric Illness; see below   * Initial presentation as above. Pt found with low sodium as well as ketones in the urine. Given fluids in the ED.    * No abdominal pain or lab findings to suggest a primary GI etiology.  Patient has been admitted to the hospital for further evaluation and management.  - d/roberto IVF on 5/8 due to improved oral intake  - Mental health management as below.     Psychiatric issues  Depression/anxiety, bipolar d/o, borderline personality d/o with possible suicidal ideations  * Of note is that the patient was recently hospitalized  "through [facility] with issues including suicidal ideation and dementia.  She was seen by psychiatry during that hospitalization; they did not recommend any type of psychiatric admission.    * Initial presentation as above. Noted manic episode and some confusion at memory care. On admit, pt denied suicidal ideations on admit but did endorse feeling depressed.  She was seen by the DEC  in the ED and not felt to be actively suicidal.  - Psychiatry following intermittently, last evaluated on 5/22, continued on olanzapine 5 mg daily and 10 mg nightly along with sertraline 150 mg daily and Remeron 22.5 mg at night; on 5/22, started Depakote sprinkles 125 mg BID and 250 mg at bedtime; plan to check valproic acid level 5/26; continue to recommend inpatient psychiatry admission  - has court hearing for commitment on 5/23 but patient declining to participate in the hearing  - reports feeling depressed and making comments like \"I just want to die\" but denies any plan; does not seem to be actively suicidal; will discontinue sitter (5/23); continue with suicide precautions  -  following for disposition     Suspect Moderate malnutrition In Context of:  Acute illness or injury  - Nutrition following; supplements for nutrition    Prior gastric bypass  B12 deficiency  Iron deficienty anemia  GERD  - can resume PTA B12 and iron on discharge  - continue PT famotidine  - was planned for outpatient UGI endoscopy (5/22) for nausea/vomiting with h/o gastric bypass (was seen in clinic by Yessi 4/18/23); currently tolerating PO without significant G.I. issues, can reschedule EGD with the PCP as outpatient on follow up     Asthma  -  PRN albuterol inhaler     Dyslipidemia    Continue PTA simvastatin.      Osteoporosis    Resume alendronate after discharge.     Constipation  -bowel regimen in place      DVT Prophylaxis:  given prolonged hospitalization started Lovenox 5/22/23; (patient may refuse )    Code Status: " "Full Code      Diet: Room Service  Combination Diet Regular Diet; Safe Tray - with utensils      Dispo  - pending court hearing ; needs inpatient psychiatry;  following for disposition  - had court examination and preliminary hearing on 5/18/23, didn't participate. Next hearing is on 5/23/23.    Clinically Significant Risk Factors              # Hypoalbuminemia: Lowest albumin = 3 g/dL at 5/5/2023  4:53 PM, will monitor as appropriate                      Page Me (7 am to 6 pm)    Care plan discussed with patient and               Physical Exam:      Blood pressure 117/67, pulse 87, temperature 97.6  F (36.4  C), temperature source Oral, resp. rate 18, height 1.6 m (5' 3\"), weight 54 kg (119 lb 0.8 oz), SpO2 95 %, not currently breastfeeding.  Vitals:    05/05/23 2140   Weight: 54 kg (119 lb 0.8 oz)     Vital Signs with Ranges  Temp:  [97.6  F (36.4  C)-98.4  F (36.9  C)] 97.6  F (36.4  C)  Pulse:  [63-87] 87  Resp:  [18] 18  BP: (117-118)/(67-78) 117/67  SpO2:  [95 %-96 %] 95 %  I/O's Last 24 hours  I/O last 3 completed shifts:  In: 490 [P.O.:490]  Out: -      Alert awake and oriented  Has a flat affect  lungs bilaterally clear  abdomen soft, nontender, nondistended  no edema  nonfocal neurological exam                  Medications:          acetaminophen  650 mg Oral BID     divalproex sodium delayed-release  125 mg Oral BID 09 12     divalproex sodium delayed-release  250 mg Oral At Bedtime     enoxaparin ANTICOAGULANT  40 mg Subcutaneous Q24H     famotidine  20 mg Oral Daily     mirtazapine  22.5 mg Oral At Bedtime     OLANZapine  10 mg Oral At Bedtime     OLANZapine  5 mg Oral QAM     senna-docusate  2 tablet Oral BID     sertraline  150 mg Oral Daily     simvastatin  40 mg Oral At Bedtime     valACYclovir  500 mg Oral Daily     PRN Meds: acetaminophen **OR** acetaminophen, albuterol, glucose **OR** dextrose **OR** glucagon, melatonin, ondansetron **OR** ondansetron, polyethylene " glycol, prochlorperazine **OR** prochlorperazine **OR** prochlorperazine, senna-docusate **OR** senna-docusate         Data:      All new lab and imaging data was reviewed.   Recent Labs   Lab Test 05/21/23  0742 05/06/23  0636 05/05/23  1653   WBC 6.9 10.0 9.3   HGB 12.6 11.5* 11.9   MCV 94 93 93    309 314      Recent Labs   Lab Test 05/21/23  0742 05/13/23  0851 05/07/23  0552 05/06/23  0636     --  137 135*   POTASSIUM 4.2  --  3.7 4.1   CHLORIDE 104  --  103 103   CO2 24  --  23 24   BUN 8.6  --  7.4* 12.7   CR 0.71 0.75 0.63 0.63   ANIONGAP 9  --  11 8   LAWANDA 9.3  --  9.3 8.9   GLC 78  --  117* 115*     No lab results found.    Invalid input(s): TROP, TROPONINIES

## 2023-05-23 NOTE — PLAN OF CARE
Goal Outcome Evaluation:    Orientation: A+Ox2, disoriented to time, situation     Vitals/Tele: VSS, on RA    IV Access/drains: No IV access     Diet: Regular, safe tray     Mobility; SBA with gait belt. Pt ambulated in the hallway x2     GI/: Continent    Wound/Skin; Redness around hip     Consults: Psych     Discharge Plan: Pending inpatient pysch     Others: 1:1 sitter    See Flow sheets for assessment

## 2023-05-23 NOTE — CONSULTS
Triage and Transition - Consult and Liaison     Bhavana Marr  May 23, 2023    Psychiatry consult acknowledged.     Escalated case to Psychiatry Management.     Updated Rachel Sauceda.      Pt has been added to  Patient Placement Services for the wait list for [facility].      RARs called in to  PPS.     RYALND KOTHARI MSW, LICSW  Triage and Transition - Consult and Liaison   [phone]

## 2023-05-23 NOTE — CONSULTS
"      Psychiatry Consultation; Follow up              Reason for Consult, requesting source:    Follow up  Requesting source: Nuno Courtney    Labs and imaging reviewed, discussed with nursing.               Interim history:    From initial note: \"Bhavana Marr is a 82 year old female with a history of bipolar I disorder, gastric bypass surgery (unknown procedure type), osteoporosis, asthma, and dyslipidemia who presented from her LTC facility [facility] on 5/5/23 for poor PO intake with subsequent hypoglycemia and hypotension, depression, and suicidal ideation.      Bhavana lives in LTC facility [facility]; the director of nursing at this facility has expressed significant concerns for Bhavana's mental health- has been depressed, not getting out of bed, not eating, not talking to them, asking for them to let her die. She had expressed wanting to walk into traffic. They have sent her to the ED 3 times recently (4/20/23, 4/21/2023, and this admission 5/5/23) for similar concerns. She has been discharged the past two times with no changes to psychiatric medications and no improvement in symptoms. They do not feel she is safe to return at this point. I have attempted to speak with Bhavana on two separate occasions, both times she refused to talk to me and remained mute for most of both assessment attempts. She did not respond to questions regarding mood, suicidal ideation, medications, psychiatric history, or my recommendation for her to be admitted to inpatient psychiatry. She did however speak to the aide who was in the room and told me \"I have nothing to say to you\". \"     5/17 follow up: She is somewhat more willing to engage in conversation today but overall quite guarded still. She reports her mood is \"better\" and does endorse recent depression. Denies SI currently or recently but does report a past history of one suicide attempt \"with a rope\", when asked what stopped her at that time she responded \"there was no " "where to hang it\". Does not recall when this happened- many years ago. Repeatedly states she's going to sleep now when further questions asked, does not respond to questions.      5/22 Follow up:  Seen laying in bed, awake. Minimally willing to engage in conversation, repeatedly states \"I don't want to talk\" but does answer some questions. She continues to endorse depressed mood, today states she thinks she needs IP psych admission which she was previously not even willing to discuss. She is aware of commitment hearing tomorrow, asked what to expect with that but when I began explaining, she stated again that she is done talking. She does not ever recall taking Depakote and is agreeable to trying this for bipolar depression.    5/23 Follow up: Laying in bed, awake. Again minimally willing to engage in conversation, immediately states \"I don't want to talk\". Does answer some questions but repeatedly states she is done talking. States she did participate in court hearing this morning, \"they told me it went well\" but she is unable/unwilling to provide any further details. She continues to endorse depressed mood. Denies SI/HI/AVH. Denies any side effects from Depakote.        Current Medications:       acetaminophen  650 mg Oral BID     divalproex sodium delayed-release  125 mg Oral BID 09 12     divalproex sodium delayed-release  250 mg Oral At Bedtime     enoxaparin ANTICOAGULANT  40 mg Subcutaneous Q24H     famotidine  20 mg Oral Daily     mirtazapine  22.5 mg Oral At Bedtime     OLANZapine  10 mg Oral At Bedtime     OLANZapine  5 mg Oral QAM     senna-docusate  2 tablet Oral BID     sertraline  150 mg Oral Daily     simvastatin  40 mg Oral At Bedtime     valACYclovir  500 mg Oral Daily              MSE:   Appearance: awake, alert and laying on her side in bed  Attitude:  guarded and somewhat cooperative  Eye Contact:  poor   Mood:  depressed  Affect:  flat affect  Speech:  paucity of speech  Psychomotor Behavior:  " "no evidence of tardive dyskinesia, dystonia, or tics  Thought Process:  linear and goal oriented  Associations:  no loose associations  Thought Content:  no evidence of suicidal ideation or homicidal ideation and no evidence of psychotic thought  Insight:  fair  Judgement:  fair  Oriented to:  time, person, and place  Attention Span and Concentration:  fair  Recent and Remote Memory:  intact      Vital signs:  Temp: 97.6  F (36.4  C) Temp src: Oral BP: 117/67 Pulse: 87   Resp: 18 SpO2: 95 % O2 Device: None (Room air)   Height: 160 cm (5' 3\") Weight: 54 kg (119 lb 0.8 oz)  Estimated body mass index is 21.09 kg/m  as calculated from the following:    Height as of this encounter: 1.6 m (5' 3\").    Weight as of this encounter: 54 kg (119 lb 0.8 oz).              DSM-5 Diagnosis:   Bipolar I disorder, current episode depressed, severe, without psychosis     She does NOT have a history of dementia          Assessment:   Mood remains depressed. She is quite apathetic, not doing much other than laying in bed. Minimally engages in assessment. Does get up with encouragement, nursing reports they have been getting her up and walking the halls. She is physically capable of getting up, walking but depression is causing significant lack of motivation/interest. I continue to recommend IP psych admission for severe bipolar depression. No side effects reported from addition of Depakote sprinkles yesterday. Will order VPA level to be drawn 5/26, continue current dose for now.    Court hearing regarding commitment was this morning, as of yet no update from [facility].    From my initial note 5/10: \"Per her LTC facility [facility] director of nursing, she does NOT have a history of dementia. This was entered into her chart erroneously during a prior admission in 2021. She has lived at [facility] for 5 years where she sees both a primary care provider and psychiatrist regularly, neither of whom have diagnosed her with dementia. Upon " "review of chart, in the H&P from 6/2021 admission it states she has a \"history of apparent dementia\" but this is not listed in the most recent outpatient PCP nursing home visit not just two months prior in 4/2021.\" She does not live in a memory care facility but rather a long term care facility for persons with mental illness.            Summary of Recommendations:   1.  Continue current medications    2.  Valproic acid level to be drawn 5/26 as trough prior to bedtime dose of Depakote    3.  Continue to encourage ambulation    4.  IP psych admission recommended    5.  Consult left open for ongoing follow up    DANIEL Koch CNP  Consult/Liaison Psychiatry   [facility]    Contact information available via [facility] Paging/Directory  If I am not available, then [facility] CL line ([phone]) should know who is covering our consult service.   \"Much or all of the text in this note was generated through the use of Dragon Dictate voice to text software. Errors in spelling or words which appear to be out of contact are unintentional, may be present due having escaped editing\"           "

## 2023-05-23 NOTE — PROGRESS NOTES
Current condition (current mood & behavior): Madi Diego present: Yes  Every 15 minute documentation by NA/RN completed for Shift: Yes  Room safety Suicide Checklist completed in Epic: yes  Patient's color of severity (suicide scale): yellow  Order for psych consult placed (if appropriate): yes  Suicide care plan added: yes      Saida Winters RN

## 2023-05-23 NOTE — PROGRESS NOTES
Charge RN called central intake @ [phone] to put Bhavana on the list for to transfer to inpatient mental health. I talked to Moshe who said patient needs to be independent with ambulating and all cares. I talked to sitter at bedside a to walk her in the buchanan twice before 1200. I will continue to monitor and when patient is ambulatory.

## 2023-05-23 NOTE — PROGRESS NOTES
"Care Management Follow Up    Length of Stay (days): 16    Expected Discharge Date: 05/24/2023     Concerns to be Addressed:       Patient plan of care discussed at interdisciplinary rounds: Yes    Anticipated Discharge Disposition:       Anticipated Discharge Services:    Anticipated Discharge DME:      Patient/family educated on Medicare website which has current facility and service quality ratings:    Education Provided on the Discharge Plan:    Patient/Family in Agreement with the Plan:      Referrals Placed by CM/SW:    Private pay costs discussed: Not applicable    Additional Information:  Patient did participate in her virtual hearing today. She told the , \"I want t go home, I am taking my medications and eating\".   We now will  await the Judges decision which writer anticipates a decision by Wednesday.   PATRICK Hester, reports patient is now on the wait list for [facility] [address] which is the Older Adult In Patient Psychiatry unit.    Writer contacted the DON at [facility], Josefina, [phone],  where patient resides to provide her an update.  This facility is licensed as a SNF and their population has chronic mental health diagnosis.  This facility use to be called [facility].  When they move to a new location with a new building they rename the facility.The facility [place] [address] in [place].     Josefina reports patient has lived there many years and their goal is that return, \"we are her family, she has no other family\".   Patient at baseline is independent with personal cares except staff assist with a shower and assist with occasional  fecal incontinence.  Prior to coming in, patient was showing weakness due to poor fluid and food intake. She was grabbing on to the handrails.   She was making suicidal comments and was wearing a wander guard so staff would be  aware if she tried to walk out onto to [address].      Writer explained a petition for MI commitment " has been filed and completed and now we are waiting for the 's decision.  Explained this commitment was initiated because patient did not think she needed In patient mental health care for her depression.  Explained patient is on the wait list to [facility] to [facility]. Reviewed that patient is now taking her medications and does walk in the hallway with encouragement.  The DON and  goal is for patient to return. The  plans to visit patient in the coming days.        Larissa Voss, LEENASW

## 2023-05-23 NOTE — TELEPHONE ENCOUNTER
S: [place] [address] Med Unit, Charge Nurse April calling at 12:22 PM about a 82 year old/Female presenting with increased depression, SI with no plan      B: Pt arrived via Group Home Staff . Presenting problem, stressors: Pt has been experiencing an increase in depression and anxiety. Pt was recently hospitalized, but continued to be depressed and had not been eating while at group home. Pt was not complying with prescribed meds. Pt had second hearing today as Court hold is in process.    Pt affect in ED: Depressed and quiet  Pt Dx: Bipolar Disorder  Previous IPMH hx? Yes: 4/21 - 4/28  Pt endorses SI, no plan   Hx of suicide attempt? No  Pt denies SIB  Pt denies HI   Pt denies hallucinations .   Pt RARS Score: 8    Hx of aggression/violence, sexual offenses, legal concerns, Epic care plan? describe: No  Current concerns for aggression this visit? No  Does pt have a history of Civil Commitment? 72HH on 5/10/23  Is Pt their own guardian? Yes    Pt is prescribed medication. Is patient medication compliant? Yes  Pt endorses OP services: Group home  CD concerns: None  Acute or chronic medical concerns: Dementia ha been ruled out, per [facility].  Does Pt present with specific needs, assistive devices, or exclusionary criteria? None      Pt is ambulatory  Pt is able to perform ADLs independently      A: Pt to be reviewed for IP admission. Pt is on a court hold in [facility]   Preferred placement: Statewide    COVID:Not yet ordered, Intake to request lab   Utox: Not ordered, intake to request lab    CMP: Not ordered, intake requested lab  CBC: Not ordered, intake requested lab  HCG: N/A    R: Patient cleared and ready for behavioral bed placement: Yes  Pt placed on IP worklist? Yes    1:45 PM: Intake informed updated Assessment/Psych Consult has been completed.

## 2023-05-23 NOTE — PROGRESS NOTES
IP MH Referral Acuity Rating Score (RARS)    LMHP complete at referral to IP MH, with DEC; and, daily while awaiting IP MH placement. Call score to PPS.  CRITERIA SCORING   New 72 HH and Involuntary for IP MH (not adolescent) 1/1   Boarding over 24 hours 1/1   Vulnerable adult at least 55+ with multiple co morbidities; or, Patient age 11 or under 1/1   Suicide ideation without relief of precipitating factors 1/1   Current plan for suicide 1/1   Current plan for homicide 0/1   Imminent risk or actual attempt to seriously harm another without relief of factors precipitating the attempt 1/1   Severe dysfunction in daily living (ex: complete neglect for self care, extreme disruption in vegetative function, extreme deterioration in social interactions) 1/1   Recent (last 2 weeks) or current physical aggression in the ED 0/1   Restraints or seclusion episode in ED 0/1   Verbal aggression, agitation, yelling, etc., while in the ED 0/1   Active psychosis with psychomotor agitation or catatonia 0/1   Need for constant or near constant redirection (from leaving, from others, etc).  1/1   Intrusive or disruptive behaviors 0/1   TOTAL Acuity Total Score: 8

## 2023-05-24 ENCOUNTER — TELEPHONE (OUTPATIENT)
Dept: BEHAVIORAL HEALTH | Facility: CLINIC | Age: 83
End: 2023-05-24
Payer: COMMERCIAL

## 2023-05-24 LAB
AMPHETAMINES UR QL SCN: NORMAL
BARBITURATES UR QL SCN: NORMAL
BENZODIAZ UR QL SCN: NORMAL
BZE UR QL SCN: NORMAL
CANNABINOIDS UR QL SCN: NORMAL
OPIATES UR QL SCN: NORMAL
PCP QUAL URINE (ROCHE): NORMAL

## 2023-05-24 PROCEDURE — 250N000013 HC RX MED GY IP 250 OP 250 PS 637: Performed by: INTERNAL MEDICINE

## 2023-05-24 PROCEDURE — 99231 SBSQ HOSP IP/OBS SF/LOW 25: CPT | Performed by: INTERNAL MEDICINE

## 2023-05-24 PROCEDURE — 80307 DRUG TEST PRSMV CHEM ANLYZR: CPT | Performed by: INTERNAL MEDICINE

## 2023-05-24 PROCEDURE — 250N000013 HC RX MED GY IP 250 OP 250 PS 637: Performed by: HOSPITALIST

## 2023-05-24 PROCEDURE — 250N000013 HC RX MED GY IP 250 OP 250 PS 637

## 2023-05-24 PROCEDURE — 250N000011 HC RX IP 250 OP 636: Performed by: HOSPITALIST

## 2023-05-24 PROCEDURE — 120N000001 HC R&B MED SURG/OB

## 2023-05-24 RX ADMIN — SIMVASTATIN 40 MG: 20 TABLET, FILM COATED ORAL at 20:59

## 2023-05-24 RX ADMIN — DIVALPROEX SODIUM 250 MG: 125 CAPSULE, COATED PELLETS ORAL at 20:58

## 2023-05-24 RX ADMIN — SENNOSIDES AND DOCUSATE SODIUM 2 TABLET: 50; 8.6 TABLET ORAL at 08:56

## 2023-05-24 RX ADMIN — FAMOTIDINE 20 MG: 20 TABLET ORAL at 08:57

## 2023-05-24 RX ADMIN — OLANZAPINE 10 MG: 5 TABLET, FILM COATED ORAL at 20:58

## 2023-05-24 RX ADMIN — ACETAMINOPHEN 650 MG: 325 TABLET ORAL at 20:58

## 2023-05-24 RX ADMIN — ACETAMINOPHEN 650 MG: 325 TABLET ORAL at 08:56

## 2023-05-24 RX ADMIN — OLANZAPINE 5 MG: 2.5 TABLET, FILM COATED ORAL at 08:56

## 2023-05-24 RX ADMIN — DIVALPROEX SODIUM 125 MG: 125 CAPSULE, COATED PELLETS ORAL at 14:15

## 2023-05-24 RX ADMIN — SENNOSIDES AND DOCUSATE SODIUM 2 TABLET: 50; 8.6 TABLET ORAL at 20:58

## 2023-05-24 RX ADMIN — VALACYCLOVIR HYDROCHLORIDE 500 MG: 500 TABLET, FILM COATED ORAL at 08:56

## 2023-05-24 RX ADMIN — ENOXAPARIN SODIUM 40 MG: 40 INJECTION SUBCUTANEOUS at 12:08

## 2023-05-24 RX ADMIN — MIRTAZAPINE 22.5 MG: 15 TABLET, FILM COATED ORAL at 20:59

## 2023-05-24 RX ADMIN — SERTRALINE HYDROCHLORIDE 150 MG: 100 TABLET ORAL at 08:57

## 2023-05-24 RX ADMIN — DIVALPROEX SODIUM 125 MG: 125 CAPSULE, COATED PELLETS ORAL at 08:57

## 2023-05-24 ASSESSMENT — ACTIVITIES OF DAILY LIVING (ADL)
ADLS_ACUITY_SCORE: 26
ADLS_ACUITY_SCORE: 27
ADLS_ACUITY_SCORE: 30
ADLS_ACUITY_SCORE: 26
ADLS_ACUITY_SCORE: 30
ADLS_ACUITY_SCORE: 26

## 2023-05-24 NOTE — PLAN OF CARE
Up SBA with gait belt. A&O x 4. Calm and cooperative with cares. Denies pain. Good appetite, tolerating current diet. Ambulated in hallway x 4 throughout shift. Occasionally incontinent of bladder, incontinent of bowel x 1. Denies suicidal ideation. Blanchable redness to right hip, mepilex replaced. Commitment order placed by .  drug screen resulted negative.

## 2023-05-24 NOTE — PROGRESS NOTES
Care Management Follow Up    Length of Stay (days): 17    Expected Discharge Date: 05/25/2023     Concerns to be Addressed:       Patient plan of care discussed at interdisciplinary rounds: Yes    Anticipated Discharge Disposition:  In Patient Psychiatry at [facility]     Anticipated Discharge Services:    Anticipated Discharge DME:      Patient/family educated on Medicare website which has current facility and service quality ratings:    Education Provided on the Discharge Plan:    Patient/Family in Agreement with the Plan:      Referrals Placed by CM/SW:    Private pay costs discussed:     Additional Information:  Writer has been informed from [facility] Commitment  that the  did sign a commitment order however the commitment was listed to [facility] not [facility] where patient is.  The  has sent a message to the [facility] staff to have the order corrected.  A copy of the current order is on the chart as we wait for a corrected version.    Mental Health Intake is requesting a copy of the order and a drug screen to be given per their protocol.         Larissa Voss, LEENASW

## 2023-05-24 NOTE — PLAN OF CARE
Goal Outcome Evaluation:       DATE & TIME: 5/23/23 9688-0759  Cognitive Concerns/ Orientation : A&Ox4, withdrawn but very pleasant when engaging in conversation   BEHAVIOR & AGGRESSION TOOL COLOR: Green  ABNL VS/O2: VSS on RA  MOBILITY: SBA with gait belt  PAIN MANAGMENT: Denied.   DIET: Regular- safe tray  BOWEL/BLADDER: Continent this shift, no BM  ABNL LAB/BG: NA  DRAIN/DEVICES: No IV access  TELEMETRY RHYTHM: NA  SKIN: Blanchable redness to right hip- mepilex applied. Scattered bruising.  TESTS/PROCEDURES: NA  D/C DATE: Pending  decision from court hearing 5/23 AM.  Discharge Barriers: Spoke with pt this shift about feelings of court hearing. Pt reported it feeling more positive and that it went well.  OTHER IMPORTANT INFO:  Pt denies suicidal ideation, plan/intent this shift. Started on depakote 5/22 evening per psychiatry. Suicide precautions and 1:1 discontinued this shift. Bed alarm on.

## 2023-05-24 NOTE — TELEPHONE ENCOUNTER
9:18 AM   Intake spoke with nurse overseeing pt, requested drug test, court hold to be faxed over for review and Dr. to Dr. Bernard (PAGER [phone])  Dr. House ([id number]).    9:29 AM    Intake received callback from  Ladonna stating  should be making decision today and will inform intake once received.

## 2023-05-24 NOTE — TELEPHONE ENCOUNTER
0437 Bed Search Update:    [facility]: at capacity    [facility]: at capacity per website     [facility]: at capacity per website     [facility]: at capacity per unit staff    [facility]: at capacity per website     [facility]: at capacity per website     [facility]: at capacity per website     [facility]: at capacity per website     [facility]: at capacity per website     [facility]: at capacity per website (Mixed unit/Low acuity only).     [facility]: at capacity per website     [facility]: at capacity per website     [facility]: at capacity per website     [facility]: Posting 2 beds.  No Intake after 10PM.       [facility]: at capacity per website    [facility]: Posting 3 beds.  Very low acuity only.     [facility]: Posting 3 beds.  Low acuity    [facility]: Posting 2 beds.  Voluntary patients only.    [facility]: Posting 6 beds. (Low acuity only. Must have the cognitive ability to do programming. No aggressive or violent behavior or recent HX in the last 2 yrs. MH must be primary).     [facility]: at capacity per website     [facility]: at capacity per website     [facility]: Posting 2 beds (Covid neg. Voluntary only. Mixed unit. No aggressive or violent behavior. No registered sex offenders).     [facility]: Posting 6 beds. Facility is in [place].     [facility]: Posting 3 beds.  Will serve ages 13-18 (mixed unit with adults). Negative COVID test required. No lines, drains or tubes (oxygen, CPAP, IV, etc.)  Per wait list, pt is not to be placed here.    Remains on wait list.

## 2023-05-24 NOTE — PROGRESS NOTES
[facility]    Medicine Progress Note - Hospitalist Service    Date of Admission:  5/5/2023    Assessment & Plan   Bhavana Marr is an 82-year-old female patient with PMH of asthma; dyslipidemia; depression/anxiety; bipolar disorder; borderline personality disorder; osteoporosis; prior gastric bypass; and recent hospitalization (4/21/2023 to 4/28/2023) with issues including suicidal ideation and dementia with behavioral disturbance. She presented from her care facility with decreased oral intake, low glucose level, low blood pressure, depression and possible suicidal ideations.     On initial evaluation, patient was afebrile, normotensive, not tachycardic.  Labs notable for BMP with sodium 133 otherwise normal; CBC was normal; LFTs showed low albumin of 3.0 and low protein 5.3 and low AST of 9, otherwise normal; glucose 109; urinalysis showed 60 ketones and not suggestive of infection.     Decreased oral intake with hypoglycemia, low blood pressures  Hyponatremia, suspect due to decreased PO intake  Failure to thrive, possibly due to Psychiatric Illness; see below   * Initial presentation as above. Pt found with low sodium as well as ketones in the urine. Given fluids in the ED.    * No abdominal pain or lab findings to suggest a primary GI etiology.  - d/roberto IVF on 5/8 due to improved oral intake  - Mental health management as below.     Psychiatric issues  Depression/anxiety, bipolar d/o, borderline personality d/o with possible suicidal ideations  * Of note is that the patient was recently hospitalized through [facility] with issues including suicidal ideation.  She was seen by psychiatry during that hospitalization; they did not recommend any type of psychiatric admission.    * Lives in LTC facility.  Per review of psych note, that the facility director of nursing reported that patient does not have hx of Dementia and was entered in her chart erroneously during prior admission in 2021.      * Initial presentation as above. Noted manic episode and some confusion. On admit, pt denied suicidal ideations on admit but did endorse feeling depressed.  She was seen by the DEC  in the ED and not felt to be actively suicidal.  - Psychiatry following intermittently, last evaluated on 5/22, continued on olanzapine 5 mg daily and 10 mg nightly along with sertraline 150 mg daily and Remeron 22.5 mg at night; on 5/22, started Depakote sprinkles 125 mg BID and 250 mg at bedtime; plan to check valproic acid level 5/26; continue to recommend inpatient psychiatry admission  - has court hearing for commitment on 5/23 and had participated, awaiting decision at this time  - sitter was discontinued on 5/23  -  following for disposition     Suspect Moderate malnutrition In Context of:  Acute illness or injury  - Nutrition following; supplements for nutrition     Prior gastric bypass  B12 deficiency  Iron deficienty anemia  GERD  - can resume PTA B12 and iron on discharge  - continue PTA famotidine  - was planned for outpatient UGI endoscopy (5/22) for nausea/vomiting with h/o gastric bypass (was seen in clinic by Yessi 4/18/23); currently tolerating PO without significant G.I. issues, can reschedule EGD with the PCP as outpatient on follow up     Asthma  -  PRN albuterol inhaler     Dyslipidemia    Continue PTA simvastatin.      Osteoporosis    Resume alendronate after discharge.     Constipation  -bowel regimen in place        Diet: Room Service  Combination Diet Regular Diet; Safe Tray - with utensils    DVT Prophylaxis: Enoxaparin (Lovenox) SQ  Cortez Catheter: Not present  Lines: None     Cardiac Monitoring: None  Code Status: Full Code      Clinically Significant Risk Factors              # Hypoalbuminemia: Lowest albumin = 3 g/dL at 5/5/2023  4:53 PM, will monitor as appropriate                     Disposition Plan     Expected Discharge Date: 05/24/2023,  6:00 PM  Discharge Delays: Specialist  Consult (enter specialist & decision needed in comments)    Discharge Comments: .72 hour hold--up on Monday 1214.   [place] to meet on Friday for commitment.       Inpateint psych recommended.          Merle House MD  Hospitalist Service  [facility]  Securely message with [employer] (more info)  Text page via [employer] Paging/Directory   ______________________________________________________________________    Interval History   Patient reports she is eating better. Denies nausea or vomiting. Denies suicidal ideation.   Had a court hearing yesterday and awaiting results.     Physical Exam   Vital Signs: Temp: 97.3  F (36.3  C) Temp src: Oral BP: 104/66 Pulse: 81   Resp: 16 SpO2: 96 % O2 Device: None (Room air)    Weight: 119 lbs .77 oz    General Appearance: Alert, awake and no apparent distress  Respiratory: CTA bilaterally, no wheezing  Cardiovascular: regular rate and rhythm  GI: soft and non-tender  Skin: warm and dry      Medical Decision Making       25 MINUTES SPENT BY ME on the date of service doing chart review, history, exam, documentation & further activities per the note.  MANAGEMENT DISCUSSED with the following over the past 24 hours: patient, RN, care transition team   NOTE(S)/MEDICAL RECORDS REVIEWED over the past 24 hours: psychiatry note, social work note      Data

## 2023-05-24 NOTE — PROGRESS NOTES
Suicide precautions ordered to be discontinued at 1830. Sitter removed from room and bed alarm turned on at 2000.

## 2023-05-24 NOTE — TELEPHONE ENCOUNTER
R: Bed Search completed @ 8:00am and again @ 2pm.    Bed available in [facility] -   [facility] reviewed clinical info/pt chart and declined r/t Dementia dx w/Behavioral Disturbances.     Worklist updated.    Intake also escalated to intake Leadership r/t Dementia Dx as exclusionary for [facility].     Addtl Bed search completed Statewide.  No beds identified for review    Pt remains on the Adult worklist

## 2023-05-25 ENCOUNTER — TELEPHONE (OUTPATIENT)
Dept: BEHAVIORAL HEALTH | Facility: CLINIC | Age: 83
End: 2023-05-25
Payer: COMMERCIAL

## 2023-05-25 ENCOUNTER — MEDICAL CORRESPONDENCE (OUTPATIENT)
Dept: HEALTH INFORMATION MANAGEMENT | Facility: CLINIC | Age: 83
End: 2023-05-25

## 2023-05-25 LAB
CREAT SERPL-MCNC: 0.67 MG/DL (ref 0.51–0.95)
GFR SERPL CREATININE-BSD FRML MDRD: 87 ML/MIN/1.73M2
PLATELET # BLD AUTO: 215 10E3/UL (ref 150–450)

## 2023-05-25 PROCEDURE — 250N000011 HC RX IP 250 OP 636: Performed by: HOSPITALIST

## 2023-05-25 PROCEDURE — 250N000013 HC RX MED GY IP 250 OP 250 PS 637: Performed by: HOSPITALIST

## 2023-05-25 PROCEDURE — 120N000001 HC R&B MED SURG/OB

## 2023-05-25 PROCEDURE — 250N000013 HC RX MED GY IP 250 OP 250 PS 637

## 2023-05-25 PROCEDURE — 85049 AUTOMATED PLATELET COUNT: CPT | Performed by: HOSPITALIST

## 2023-05-25 PROCEDURE — 99233 SBSQ HOSP IP/OBS HIGH 50: CPT

## 2023-05-25 PROCEDURE — 250N000013 HC RX MED GY IP 250 OP 250 PS 637: Performed by: INTERNAL MEDICINE

## 2023-05-25 PROCEDURE — 82565 ASSAY OF CREATININE: CPT | Performed by: HOSPITALIST

## 2023-05-25 PROCEDURE — 99231 SBSQ HOSP IP/OBS SF/LOW 25: CPT | Performed by: INTERNAL MEDICINE

## 2023-05-25 PROCEDURE — 36415 COLL VENOUS BLD VENIPUNCTURE: CPT | Performed by: HOSPITALIST

## 2023-05-25 RX ADMIN — ACETAMINOPHEN 650 MG: 325 TABLET ORAL at 20:28

## 2023-05-25 RX ADMIN — DIVALPROEX SODIUM 250 MG: 125 CAPSULE, COATED PELLETS ORAL at 20:28

## 2023-05-25 RX ADMIN — SIMVASTATIN 40 MG: 20 TABLET, FILM COATED ORAL at 20:28

## 2023-05-25 RX ADMIN — MIRTAZAPINE 22.5 MG: 15 TABLET, FILM COATED ORAL at 20:27

## 2023-05-25 RX ADMIN — OLANZAPINE 10 MG: 5 TABLET, FILM COATED ORAL at 20:26

## 2023-05-25 RX ADMIN — FAMOTIDINE 20 MG: 20 TABLET ORAL at 08:30

## 2023-05-25 RX ADMIN — OLANZAPINE 5 MG: 2.5 TABLET, FILM COATED ORAL at 08:30

## 2023-05-25 RX ADMIN — VALACYCLOVIR HYDROCHLORIDE 500 MG: 500 TABLET, FILM COATED ORAL at 08:31

## 2023-05-25 RX ADMIN — ENOXAPARIN SODIUM 40 MG: 40 INJECTION SUBCUTANEOUS at 11:05

## 2023-05-25 RX ADMIN — ACETAMINOPHEN 650 MG: 325 TABLET ORAL at 08:30

## 2023-05-25 RX ADMIN — DIVALPROEX SODIUM 125 MG: 125 CAPSULE, COATED PELLETS ORAL at 08:30

## 2023-05-25 RX ADMIN — SENNOSIDES AND DOCUSATE SODIUM 2 TABLET: 50; 8.6 TABLET ORAL at 20:28

## 2023-05-25 RX ADMIN — SERTRALINE HYDROCHLORIDE 150 MG: 100 TABLET ORAL at 08:31

## 2023-05-25 RX ADMIN — DIVALPROEX SODIUM 125 MG: 125 CAPSULE, COATED PELLETS ORAL at 13:49

## 2023-05-25 ASSESSMENT — ACTIVITIES OF DAILY LIVING (ADL)
ADLS_ACUITY_SCORE: 27
ADLS_ACUITY_SCORE: 30
ADLS_ACUITY_SCORE: 27
ADLS_ACUITY_SCORE: 30
ADLS_ACUITY_SCORE: 27
ADLS_ACUITY_SCORE: 30
ADLS_ACUITY_SCORE: 27
ADLS_ACUITY_SCORE: 27
ADLS_ACUITY_SCORE: 30

## 2023-05-25 NOTE — PROGRESS NOTES
"CLINICAL NUTRITION SERVICES - REASSESSMENT NOTE    Malnutrition:   % Weight Loss:  Weight loss does not meet criteria for malnutrition  - records reflect wt has been stable, but suspect pt has lost some wt with decreased po intake  % Intake:  Resolved  Subcutaneous Fat Loss:  None observed - pt covered in blankets (  Muscle Loss:  Temporal region - moderate depletion  Fluid Retention:  None noted     Malnutrition Diagnosis: Unable to fully assess at this time. Maintaining fair-good intakes here.      EVALUATION OF PROGRESS TOWARD GOALS   Diet: Regular Diet  Safe Tray - w/ utensils  Room service w/ Assist     Intake/Tolerance:   - Pt is eating % of most meals. Pt seen in room. Mostly withdrawn - states she has been eating fine, has hx of gastric bypass so fills up quickly, food has tasted fine, no concerns.     5/24 - ordered 1600 kcal, 53 g protein (no intakes recorded, but RN note states \"good appetite, tolerating current diet\")  5/23 - ordered 1430 kcal, 45 g protein (% intake)  5/22 - ordered 1675 kcal, 85 g protein (% intake)    - Labs - reviewed  - Stooling   5/24 - x1   5/23 - x0  5/22 - x1  - Weight trends - No new wt on file since admission     ASSESSED NUTRITION NEEDS:  Dosing Weight 54 kg  Estimated Energy Needs: 2653-4799 kcals (25-30 Kcal/Kg)  Justification: maintenance  Estimated Protein Needs: 65-80 grams protein (1.2-1.5 g pro/Kg)  Justification: Repletion    NEW FINDINGS:   - Disposition: [facility]    Previous Goals:   Patient to consume % of nutritionally adequate meal trays TID, or the equivalent with supplements/snacks.  Evaluation: Met    Previous Nutrition Diagnosis:   None at this time  Evaluation: No change    CURRENT NUTRITION DIAGNOSIS  No nutrition diagnosis identified at this time    INTERVENTIONS  Recommendations / Nutrition Prescription  Continue current diet with room service assistance     Implementation  No changes.     Goals  Intake of at least 75% " adequate meals BID-TID.     MONITORING AND EVALUATION:  Progress towards goals will be monitored and evaluated per protocol and Practice Guidelines    Shandra Siddiqi RD, LD  [facility] [place], [facility], Ortho, [facility]  Pager: [phone]  Weekend Pager: [phone]

## 2023-05-25 NOTE — PLAN OF CARE
Up SBA with gait belt. A&O x 4. Calm and cooperative with cares. Denies pain. Good appetite. Intermittently incontinent of bladder. Blanchable redness to right hip, mepilex CDI. Ambulated in hallway multiple times this shift. Commitment order in place, awaiting bed availability.

## 2023-05-25 NOTE — TELEPHONE ENCOUNTER
[facility] Access Inpatient Bed Call Log 5/25/2023 1:48 AM      Intake has called facilities that have not updated their bed status within the last 12 hours.     Adults:         [facility] is posting 0 beds.      [facility] is posting 0 beds. [phone]      [facility] is posting 0 beds. [phone]     [facility] is posting 0 beds. [phone] - 1:49am Per Mookie, they are capped.    [facility] is posting 0 beds. [phone]     [facility] is posting 0 bed. [phone]      [facility] is posting 0 beds. [phone]     [facility] is posting 0 beds. [phone]     [facility] is posting 2 beds. [phone]     [facility] is posting 0 beds. [phone]       [facility] is posting 4 beds. Mixed unit 12+. Low acuity only.  [phone]     [facility] is posting 0 beds. No aggression.  [phone]     [facility] is posting 0 beds. [phone]      [facility] is posting 1 beds. [phone]      [facility] is posting 2 bed. [phone]      [facility] is posting 1 beds. Low acuity. [phone]     [facility] is posting 2 beds. 72 hr hold preferred. [phone] - 1:51am Per Alyssa, call back after 7:30am.     [facility] is posting 3 bed.  [phone]        [facility] is posting 6 bed. Voluntary only- no holds/commitments, No Hx of aggression, violence, or assault. No sexual offenders. No 72 hr holds. [phone]     [facility] is posting 5 beds. (Must have the cognitive ability to do programming. No aggressive or violent behavior or recent HX in the last 2 yrs.  must be primary.) [phone]    [facility] is posting 2 beds. Low acuity only. Violence and aggression capped. [phone]      [facility] is posting 2 bed. Low acuity, Neg Covid. [phone]     [facility] is posting 2 beds. Covid neg. Vol only. Combined adolescent and adult  unit. No aggressive or violent behavior. No registered sex offenders. [phone]     [facility] [place] posting 0 beds. Negative covid.      [facility] is posting 14 beds. Call for details. [phone]      [facility] is posting 4 beds. [phone]  - 1:53am Per Jaida, they are open to review.      Pt remains on work list pending appropriate bed availability.

## 2023-05-25 NOTE — TELEPHONE ENCOUNTER
[facility] Access Inpatient Bed Call Log 5/25/2023 2PM      Intake has called facilities that have not updated their bed status within the last 12 hours.     Adults:         [facility] is posting 0 beds.      [facility] is posting 0 beds. [phone]      [facility] is posting 0 beds. [phone]     [facility] is posting 0 beds. [phone]     [facility] is posting 0 beds. [phone]     [facility] is posting 0 bed. [phone]      [facility] is posting 0 beds. [phone]     [facility] is posting 0 beds. [phone]     [facility] is posting 2 beds. [phone]     [facility] is posting 0 beds. [phone]       [facility] is posting 4 beds. Mixed unit 12+. Low acuity only.  [phone]     [facility] is posting 0 beds. No aggression.  [phone]     [facility] is posting 0 beds. [phone]      [facility] is posting 1 beds. [phone]      [facility] is posting 2 bed. [phone]      [facility] is posting 1 beds. Low acuity. [phone]     [facility] is posting 2 beds. 72 hr hold preferred. [phone]     [facility] is posting 3 bed.  [phone]        [facility] is posting 6 bed. Voluntary only- no holds/commitments, No Hx of aggression, violence, or assault. No sexual offenders. No 72 hr holds. [phone]     [facility] is posting 5 beds. (Must have the cognitive ability to do programming. No aggressive or violent behavior or recent HX in the last 2 yrs. MH must be primary.) [phone]    [facility] is posting 2 beds. Low acuity only. Violence and aggression capped. [phone]      [facility] is posting 2 bed. Low acuity, Neg Covid. [phone]     [facility] is posting 2 beds. Covid neg. Vol only. Combined adolescent and adult unit. No aggressive or violent behavior. No registered sex offenders. [phone]      [facility] [place] posting 0 beds. Negative covid.      [facility] is posting 14 beds. Call for details. [phone]    [facility] is posting 4 beds. [phone]     Pt Dx with dementia and at this time the above facilities report that dementia is exclusionary criteria. Dr Ness reports that pt is to go to 3B  Pt remains on work list pending appropriate bed availability.

## 2023-05-25 NOTE — TELEPHONE ENCOUNTER
Intake notes Order for Commitment as a Person Who Poses a Risk of Harm Due to a Mental Illness was received in [employer] on 05/25/2023 10:34:45 AM. Intake has placed paperwork in [employer] Folder labeled Court/Commitment/[place]/Hold folder.

## 2023-05-25 NOTE — CONSULTS
"      Psychiatry Consultation; Follow up              Reason for Consult, requesting source:    Follow up  Requesting source: Nuno Courtney    Labs and imaging reviewed, discussed with nursing.               Interim history:    From initial note: \"Bhavana Marr is a 82 year old female with a history of bipolar I disorder, gastric bypass surgery (unknown procedure type), osteoporosis, asthma, and dyslipidemia who presented from her LTC facility [facility] on 5/5/23 for poor PO intake with subsequent hypoglycemia and hypotension, depression, and suicidal ideation.      Bhavana lives in LTC facility [facility]; the director of nursing at this facility has expressed significant concerns for Bhavana's mental health- has been depressed, not getting out of bed, not eating, not talking to them, asking for them to let her die. She had expressed wanting to walk into traffic. They have sent her to the ED 3 times recently (4/20/23, 4/21/2023, and this admission 5/5/23) for similar concerns. She has been discharged the past two times with no changes to psychiatric medications and no improvement in symptoms. They do not feel she is safe to return at this point. I have attempted to speak with Bhavana on two separate occasions, both times she refused to talk to me and remained mute for most of both assessment attempts. She did not respond to questions regarding mood, suicidal ideation, medications, psychiatric history, or my recommendation for her to be admitted to inpatient psychiatry. She did however speak to the aide who was in the room and told me \"I have nothing to say to you\". \"     5/17 follow up: She is somewhat more willing to engage in conversation today but overall quite guarded still. She reports her mood is \"better\" and does endorse recent depression. Denies SI currently or recently but does report a past history of one suicide attempt \"with a rope\", when asked what stopped her at that time she responded \"there was no " "where to hang it\". Does not recall when this happened- many years ago. Repeatedly states she's going to sleep now when further questions asked, does not respond to questions.      5/22 Follow up:  Seen laying in bed, awake. Minimally willing to engage in conversation, repeatedly states \"I don't want to talk\" but does answer some questions. She continues to endorse depressed mood, today states she thinks she needs IP psych admission which she was previously not even willing to discuss. She is aware of commitment hearing tomorrow, asked what to expect with that but when I began explaining, she stated again that she is done talking. She does not ever recall taking Depakote and is agreeable to trying this for bipolar depression.    5/23 Follow up: Laying in bed, awake. Again minimally willing to engage in conversation, immediately states \"I don't want to talk\". Does answer some questions but repeatedly states she is done talking. States she did participate in court hearing this morning, \"they told me it went well\" but she is unable/unwilling to provide any further details. She continues to endorse depressed mood. Denies SI/HI/AVH. Denies any side effects from Depakote.    5/25: Again laying in bed, shades drawn. Continues to repeatedly state \"I don't want to talk\" but does answer a few questions before insisting that she is done talking. Initially states she slept well, later states her sleep has been poor. She is eating. Low energy/motivation. Denies SI/HI/AVH.         Current Medications:       acetaminophen  650 mg Oral BID     divalproex sodium delayed-release  125 mg Oral BID 09 12     divalproex sodium delayed-release  250 mg Oral At Bedtime     enoxaparin ANTICOAGULANT  40 mg Subcutaneous Q24H     famotidine  20 mg Oral Daily     mirtazapine  22.5 mg Oral At Bedtime     OLANZapine  10 mg Oral At Bedtime     OLANZapine  5 mg Oral QAM     senna-docusate  2 tablet Oral BID     sertraline  150 mg Oral Daily     " "simvastatin  40 mg Oral At Bedtime     valACYclovir  500 mg Oral Daily              MSE:   Appearance: awake, alert and laying in bed  Attitude:  guarded and somewhat cooperative  Eye Contact:  poor   Mood:  depressed  Affect:  flat affect  Speech:  paucity of speech  Psychomotor Behavior:  no evidence of tardive dyskinesia, dystonia, or tics  Thought Process:  linear and goal oriented  Associations:  no loose associations  Thought Content:  no evidence of suicidal ideation or homicidal ideation and no evidence of psychotic thought  Insight:  fair  Judgement:  fair  Oriented to:  time, person, and place  Attention Span and Concentration:  fair  Recent and Remote Memory:  intact      Vital signs:  Temp: 97.6  F (36.4  C) Temp src: Oral BP: 113/76 Pulse: 103   Resp: 18 SpO2: 96 % O2 Device: None (Room air)   Height: 160 cm (5' 3\") Weight: 54 kg (119 lb 0.8 oz)  Estimated body mass index is 21.09 kg/m  as calculated from the following:    Height as of this encounter: 1.6 m (5' 3\").    Weight as of this encounter: 54 kg (119 lb 0.8 oz).              DSM-5 Diagnosis:   Bipolar I disorder, current episode depressed, severe, without psychosis     She does NOT have a history of dementia          Assessment:   Bhavana is overall unchanged, ongoing depression, apathy, minimally engaged in conversation. Repeatedly states she doesn't want to talk to me. Laying in bed awake but with shades drawn. I continue to recommend IP psych admission for bipolar depression. She was recently started on Depakote sprinkles- plan to check VPA level tomorrow 5/26. She has a history of gastric bypass- unknown procedure type which may impact absorption of Depakote sprinkles- will need to consider switching to liquid IR formulation if this is the case.    Commitment supported by [facility].     From my initial note 5/10: \"Per her LTC facility [facility] director of nursing, she does NOT have a history of dementia. This was entered into her chart " "erroneously during a prior admission in 2021. She has lived at [facility] for 5 years where she sees both a primary care provider and psychiatrist regularly, neither of whom have diagnosed her with dementia. Upon review of chart, in the H&P from 6/2021 admission it states she has a \"history of apparent dementia\" but this is not listed in the most recent outpatient PCP nursing home visit not just two months prior in 4/2021.\" She does not live in a memory care facility but rather a long term care facility for persons with mental illness.            Summary of Recommendations:   1.  Continue current medications    2.  Valproic acid level to be drawn 5/26 as trough prior to bedtime dose of Depakote    3.  Continue to encourage ambulation, PO intake    4.  IP psych admission recommended, commitment supported by [facility].    5.  Will plan to follow up next week, please reconsult sooner if needed.    DANIEL Koch [facility]  Consult/Liaison Psychiatry   [facility]    Contact information available via [facility] Paging/Directory  If I am not available, then [facility] line ([phone]) should know who is covering our consult service.   \"Much or all of the text in this note was generated through the use of Dragon Dictate voice to text software. Errors in spelling or words which appear to be out of contact are unintentional, may be present due having escaped editing\"           "

## 2023-05-25 NOTE — PROGRESS NOTES
[facility]    Medicine Progress Note - Hospitalist Service    Date of Admission:  5/5/2023    Assessment & Plan   Bhavana Marr is an 82-year-old female patient with PMH of asthma; dyslipidemia; depression/anxiety; bipolar disorder; borderline personality disorder; osteoporosis; prior gastric bypass; and recent hospitalization (4/21/2023 to 4/28/2023) with issues including suicidal ideation and dementia with behavioral disturbance. She presented from her care facility with decreased oral intake, low glucose level, low blood pressure, depression and possible suicidal ideations.     On initial evaluation, patient was afebrile, normotensive, not tachycardic.  Labs notable for BMP with sodium 133 otherwise normal; CBC was normal; LFTs showed low albumin of 3.0 and low protein 5.3 and low AST of 9, otherwise normal; glucose 109; urinalysis showed 60 ketones and not suggestive of infection.     Decreased oral intake with hypoglycemia, low blood pressures  Hyponatremia, suspect due to decreased PO intake  Failure to thrive, possibly due to Psychiatric Illness; see below   * Initial presentation as above. Pt found with low sodium as well as ketones in the urine. Given fluids in the ED.    * No abdominal pain or lab findings to suggest a primary GI etiology.  * d/roberto IVF on 5/8 due to improved oral intake  - Mental health management as below.     Psychiatric issues  Depression/anxiety, bipolar d/o, borderline personality d/o with possible suicidal ideations  * Of note is that the patient was recently hospitalized through [facility] with issues including suicidal ideation.  She was seen by psychiatry during that hospitalization; they did not recommend any type of psychiatric admission.    * Lives in LTC facility.  Per review of psych note, that the facility director of nursing reported that patient does not have hx of Dementia and was entered in her chart erroneously during prior admission in 2021.      * Initial presentation as above. Noted manic episode and some confusion. On admit, pt denied suicidal ideations on admit but did endorse feeling depressed.  She was seen by the DEC  in the ED and not felt to be actively suicidal.  * UDS has been completed on 5/24 pre request of inpat psych, results are negative  - Psychiatry following intermittently, last evaluated on 5/22, continued on olanzapine 5 mg daily and 10 mg nightly along with sertraline 150 mg daily and Remeron 22.5 mg at night; on 5/22, started Depakote sprinkles 125 mg BID and 250 mg at bedtime; plan to check valproic acid level 5/26; continue to recommend inpatient psychiatry admission  - had court hearing for commitment on 5/23, commitment order signed by , however facility listed as [facility]. Awaiting correction of the facility at this time  - sitter was discontinued on 5/23  -  following for disposition     Suspect Moderate malnutrition In Context of:  Acute illness or injury  - Nutrition following; supplements for nutrition     Prior gastric bypass  B12 deficiency  Iron deficienty anemia  GERD  - can resume PTA B12 and iron on discharge  - continue PTA famotidine  - was planned for outpatient UGI endoscopy (5/22) for nausea/vomiting with h/o gastric bypass (was seen in clinic by Yessi 4/18/23); currently tolerating PO without significant G.I. issues, can reschedule EGD with the PCP as outpatient on follow up     Asthma  -  PRN albuterol inhaler     Dyslipidemia    Continue PTA simvastatin.      Osteoporosis    Resume alendronate after discharge.     Constipation  -bowel regimen in place        Diet: Room Service  Combination Diet Regular Diet; Safe Tray - with utensils    DVT Prophylaxis: Enoxaparin (Lovenox) SQ  Cortez Catheter: Not present  Lines: None     Cardiac Monitoring: None  Code Status: Full Code      Clinically Significant Risk Factors              # Hypoalbuminemia: Lowest albumin = 3  g/dL at 5/5/2023  4:53 PM, will monitor as appropriate                     Disposition Plan  awaiting commitment order and inpatient psych    Expected Discharge Date: 05/25/2023,  6:00 PM  Discharge Delays: Specialist Consult (enter specialist & decision needed in comments)    Discharge Comments: .72 hour hold--up on Monday 1214.   [place] to meet [place] Friday for commitment.       Inpateint psych recommended.          Merle House MD  Hospitalist Service  [facility]  Securely message with [employer] logo (more info)  Text page via [employer] Paging/Directory   ______________________________________________________________________    Interval History   No acute issues overnight. Patient denies chest pain, shortness of breath, n/v or abdominal pain. Denies suicidal ideations.     Physical Exam   Vital Signs: Temp: 97.6  F (36.4  C) Temp src: Oral BP: 113/76 Pulse: 103   Resp: 18 SpO2: 96 % O2 Device: None (Room air)    Weight: 119 lbs .77 oz    General Appearance: Alert, awake and no apparent distress  Respiratory: CTA bilaterally, no wheezing  Cardiovascular: regular rate and rhythm  GI: soft and non-tender  Skin: warm and dry      Medical Decision Making       25 MINUTES SPENT BY ME on the date of service doing chart review, history, exam, documentation & further activities per the note.  MANAGEMENT DISCUSSED with the following over the past 24 hours: patient, RN, care transition team   NOTE(S)/MEDICAL RECORDS REVIEWED over the past 24 hours:  social work note      Data

## 2023-05-25 NOTE — PLAN OF CARE
Goal Outcome Evaluation:       DATE & TIME: 5/24/23 [id number]  Cognitive Concerns/ Orientation : A&Ox4, withdrawn but very pleasant when engaging in conversation   BEHAVIOR & AGGRESSION TOOL COLOR: Green  ABNL VS/O2: VSS on RA  MOBILITY: SBA with gait belt  PAIN MANAGMENT: Denied.   DIET: Regular- safe tray  BOWEL/BLADDER: Continent this shift, no BM. Incontinent at times per shift report.  ABNL LAB/BG: NA  DRAIN/DEVICES: No IV access  TELEMETRY RHYTHM: NA  SKIN: Blanchable redness to right hip- mepilex in place. Scattered bruising.  TESTS/PROCEDURES: NA  D/C DATE: Committment filed. Pending approval and placement. Pt on waitlist for [facility].  Discharge Barriers: Spoke with pt this shift about feelings of court hearing. Pt reported it feeling more positive and that it went well.  OTHER IMPORTANT INFO:  Pt denies suicidal ideation, plan/intent this shift.

## 2023-05-26 ENCOUNTER — TELEPHONE (OUTPATIENT)
Dept: BEHAVIORAL HEALTH | Facility: CLINIC | Age: 83
End: 2023-05-26
Payer: COMMERCIAL

## 2023-05-26 LAB — VALPROATE SERPL-MCNC: 29.1 UG/ML

## 2023-05-26 PROCEDURE — 250N000013 HC RX MED GY IP 250 OP 250 PS 637: Performed by: INTERNAL MEDICINE

## 2023-05-26 PROCEDURE — 250N000013 HC RX MED GY IP 250 OP 250 PS 637: Performed by: HOSPITALIST

## 2023-05-26 PROCEDURE — 250N000013 HC RX MED GY IP 250 OP 250 PS 637

## 2023-05-26 PROCEDURE — 99231 SBSQ HOSP IP/OBS SF/LOW 25: CPT | Performed by: INTERNAL MEDICINE

## 2023-05-26 PROCEDURE — 36415 COLL VENOUS BLD VENIPUNCTURE: CPT

## 2023-05-26 PROCEDURE — 120N000001 HC R&B MED SURG/OB

## 2023-05-26 PROCEDURE — 250N000011 HC RX IP 250 OP 636: Performed by: HOSPITALIST

## 2023-05-26 PROCEDURE — 80164 ASSAY DIPROPYLACETIC ACD TOT: CPT

## 2023-05-26 RX ADMIN — ACETAMINOPHEN 650 MG: 325 TABLET ORAL at 20:39

## 2023-05-26 RX ADMIN — SERTRALINE HYDROCHLORIDE 150 MG: 100 TABLET ORAL at 08:13

## 2023-05-26 RX ADMIN — SENNOSIDES AND DOCUSATE SODIUM 2 TABLET: 50; 8.6 TABLET ORAL at 08:14

## 2023-05-26 RX ADMIN — MIRTAZAPINE 22.5 MG: 15 TABLET, FILM COATED ORAL at 20:36

## 2023-05-26 RX ADMIN — OLANZAPINE 10 MG: 5 TABLET, FILM COATED ORAL at 20:35

## 2023-05-26 RX ADMIN — VALACYCLOVIR HYDROCHLORIDE 500 MG: 500 TABLET, FILM COATED ORAL at 08:15

## 2023-05-26 RX ADMIN — OLANZAPINE 5 MG: 2.5 TABLET, FILM COATED ORAL at 08:13

## 2023-05-26 RX ADMIN — ACETAMINOPHEN 650 MG: 325 TABLET ORAL at 08:13

## 2023-05-26 RX ADMIN — FAMOTIDINE 20 MG: 20 TABLET ORAL at 08:14

## 2023-05-26 RX ADMIN — Medication 1 MG: at 22:51

## 2023-05-26 RX ADMIN — ACETAMINOPHEN 650 MG: 325 TABLET ORAL at 13:33

## 2023-05-26 RX ADMIN — DIVALPROEX SODIUM 125 MG: 125 CAPSULE, COATED PELLETS ORAL at 13:33

## 2023-05-26 RX ADMIN — DIVALPROEX SODIUM 125 MG: 125 CAPSULE, COATED PELLETS ORAL at 08:13

## 2023-05-26 RX ADMIN — DIVALPROEX SODIUM 250 MG: 125 CAPSULE, COATED PELLETS ORAL at 20:35

## 2023-05-26 RX ADMIN — ENOXAPARIN SODIUM 40 MG: 40 INJECTION SUBCUTANEOUS at 11:08

## 2023-05-26 RX ADMIN — SIMVASTATIN 40 MG: 20 TABLET, FILM COATED ORAL at 20:35

## 2023-05-26 ASSESSMENT — ACTIVITIES OF DAILY LIVING (ADL)
ADLS_ACUITY_SCORE: 32
ADLS_ACUITY_SCORE: 31
ADLS_ACUITY_SCORE: 30
ADLS_ACUITY_SCORE: 32
ADLS_ACUITY_SCORE: 30
ADLS_ACUITY_SCORE: 30
ADLS_ACUITY_SCORE: 32
ADLS_ACUITY_SCORE: 30
ADLS_ACUITY_SCORE: 32
ADLS_ACUITY_SCORE: 30
ADLS_ACUITY_SCORE: 32
ADLS_ACUITY_SCORE: 32

## 2023-05-26 NOTE — TELEPHONE ENCOUNTER
[facility] Access Inpatient Bed Call Log 5/26/2023 1:19 AM      Intake has called facilities that have not updated their bed status within the last 12 hours.     Adults:         [facility] is posting 0 beds.      [facility] is posting 0 beds. [phone]      [facility] is posting 0 beds. [phone]     [facility] is posting 0 beds. [phone]     [facility] is posting 0 beds. [phone]     [facility] is posting 0 bed. [phone]      [facility] is posting 0 beds. [phone]     [facility] is posting 0 beds. [phone]     [facility] is posting 2 beds. [phone]     [facility] is posting 0 beds. [phone]       [facility] is posting 2 beds. Mixed unit 12+. Low acuity only.  [phone]     [facility] is posting 0 beds. No aggression.  [phone]     [facility] is posting 0 beds. [phone]      [facility] is posting 1 beds. [phone]      [facility] is posting 3 bed. [phone]      [facility] is posting 0 beds. Low acuity. [phone]     [facility] is posting 2 beds. 72 hr hold preferred. [phone]     [facility] is posting 3 bed.  [phone]        [facility] is posting 4 bed. Voluntary only- no holds/commitments, No Hx of aggression, violence, or assault. No sexual offenders. No 72 hr holds. [phone]     [facility] is posting 6 beds. (Must have the cognitive ability to do programming. No aggressive or violent behavior or recent HX in the last 2 yrs. MH must be primary.) [phone]    [facility] is posting 0 beds. Low acuity only. Violence and aggression capped. [phone]      [facility] is posting 0 bed. Low acuity, Neg Covid. [phone]     [facility] is posting 2 beds. Covid neg. Vol only. Combined adolescent and adult unit. No aggressive or violent behavior. No registered sex offenders. [phone]     [facility] posting 3  beds. Adina covid.      [facility] is posting 14 beds. Call for details. [phone]      [facility] is posting 4 beds. [phone]       Pt remains on work list pending appropriate bed availability.

## 2023-05-26 NOTE — PLAN OF CARE
Alert and oriented  x4. Forgetful at times. VSS on RA. Denies suicidal ideations or thoughts. No chest pain and SOB. Calm and cooperative. Lower back pain managed with prn Tylenol. Tolerating regular diet. Voiding. Incontinent of stool x3. Senna S discontinued by MD. Up SBA with GB. Ambulated in halls. Up to chair for meals. Applied mepilex to bilateral hips for blanchable redness. Discharge pending placement.

## 2023-05-26 NOTE — PROGRESS NOTES
[facility]    Medicine Progress Note - Hospitalist Service    Date of Admission:  5/5/2023    Assessment & Plan   Bhavana Marr is an 82-year-old female patient with PMH of asthma; dyslipidemia; depression/anxiety; bipolar disorder; borderline personality disorder; osteoporosis; prior gastric bypass; and recent hospitalization (4/21/2023 to 4/28/2023) with issues including suicidal ideation and dementia with behavioral disturbance. She presented from her care facility with decreased oral intake, low glucose level, low blood pressure, depression and possible suicidal ideations.     On initial evaluation, patient was afebrile, normotensive, not tachycardic.  Labs notable for BMP with sodium 133 otherwise normal; CBC was normal; LFTs showed low albumin of 3.0 and low protein 5.3 and low AST of 9, otherwise normal; glucose 109; urinalysis showed 60 ketones and not suggestive of infection.     Decreased oral intake with hypoglycemia, low blood pressures  Hyponatremia, suspect due to decreased PO intake  Failure to thrive, possibly due to Psychiatric Illness; see below   * Initial presentation as above. Pt found with low sodium as well as ketones in the urine. Given fluids in the ED.    * No abdominal pain or lab findings to suggest a primary GI etiology.  * d/roberto IVF on 5/8 due to improved oral intake  - Mental health management as below.     Psychiatric issues  Depression/anxiety, bipolar d/o, borderline personality d/o with possible suicidal ideations  * Of note is that the patient was recently hospitalized through [facility] with issues including suicidal ideation.  She was seen by psychiatry during that hospitalization; they did not recommend any type of psychiatric admission.    * Lives in LTC facility.  Per review of psych note, that the facility director of nursing reported that patient does not have hx of Dementia and was entered in her chart erroneously during prior admission in 2021.      * Initial presentation as above. Noted manic episode and some confusion. On admit, pt denied suicidal ideations on admit but did endorse feeling depressed.  She was seen by the DEC  in the ED and not felt to be actively suicidal.  * UDS has been completed on 5/24 pre request of inpat psych, results are negative  - Psychiatry following intermittently, last evaluated on 5/25, continued on current olanzapine 5 mg daily and 10 mg nightly along with sertraline 150 mg daily and Remeron 22.5 mg at night; on 5/22, started Depakote sprinkles 125 mg BID and 250 mg at bedtime; plan to check valproic acid level today; continue to recommend inpatient psychiatry admission  - had court hearing for commitment on 5/23, commitment supported by [facility]  - sitter was discontinued on 5/23  -  following for disposition     Suspect Moderate malnutrition In Context of:  Acute illness or injury  - Nutrition following; supplements for nutrition     Prior gastric bypass  B12 deficiency  Iron deficienty anemia  GERD  - can resume PTA B12 and iron on discharge  - continue PTA famotidine  - was planned for outpatient UGI endoscopy (5/22) for nausea/vomiting with h/o gastric bypass (was seen in clinic by Yessi 4/18/23); currently tolerating PO without significant G.I. issues, can reschedule EGD with the PCP as outpatient on follow up     Asthma  -  PRN albuterol inhaler     Dyslipidemia    Continue PTA simvastatin.      Osteoporosis    Resume alendronate after discharge.     Constipation  -bowel regimen in place          Diet: Room Service  Combination Diet Regular Diet; Safe Tray - with utensils    DVT Prophylaxis: Pneumatic Compression Devices and Anti-embolisim stockings (TEDs)  Cortez Catheter: Not present  Lines: None     Cardiac Monitoring: None  Code Status: Full Code      Clinically Significant Risk Factors              # Hypoalbuminemia: Lowest albumin = 3 g/dL at 5/5/2023  4:53 PM, will monitor as appropriate                      Disposition Plan  awaiting inpt psych admission. medically stable     Expected Discharge Date: 05/29/2023,  6:00 PM  Discharge Delays: Specialist Consult (enter specialist & decision needed in comments)    Discharge Comments: .72 hour hold--up on Monday 1214.   [place] to meet on Friday for commitment.       Inpateint psych recommended.          Merle House MD  Hospitalist Service  [facility]  Securely message with [employer] (more info)  Text page via [employer] Paging/Directory   ______________________________________________________________________    Interval History   Discussed with nursing staff. No acute issues. Vitals are stable. Patient reports she is doing fine and eating fine. Denies abd/nausea or vomiting. Ambulating in halls with nursing staff.     Physical Exam   Vital Signs: Temp: 97.6  F (36.4  C) Temp src: Oral BP: 124/82 Pulse: 85   Resp: 18 SpO2: 94 % O2 Device: None (Room air)    Weight: 119 lbs .77 oz    General Appearance: Alert,awake and no apparent distress  Respiratory: CTAB, no wheezing  Cardiovascular: regular rate and rhythm  GI: soft and non-tender  Skin: warm and dry      Medical Decision Making       25 MINUTES SPENT BY ME on the date of service doing chart review, history, exam, documentation & further activities per the note.  MANAGEMENT DISCUSSED with the following over the past 24 hours: patient, RN, charge RN/care transition team   NOTE(S)/MEDICAL RECORDS REVIEWED over the past 24 hours: psychiatry note      Data         Imaging results reviewed over the past 24 hrs:   No results found for this or any previous visit (from the past 24 hour(s)).

## 2023-05-26 NOTE — PLAN OF CARE
Goal Outcome Evaluation:    DATE & TIME: 5/25-5/26 0721-5135   Cognitive Concerns/ Orientation : A&Ox4, withdrawn but very pleasant when engaging in conversation   BEHAVIOR & AGGRESSION TOOL COLOR: Green   ABNL VS/O2: VSS on RA  MOBILITY: SBA with gait belt  PAIN MANAGMENT: Denied.   DIET: Regular- safe tray  BOWEL/BLADDER: Continent this shift, no BM. Incontinent at times per shift report.  ABNL LAB/BG: NA  DRAIN/DEVICES: No IV access  TELEMETRY RHYTHM: NA  SKIN: Blanchable redness to right hip- mepilex in place. Scattered bruising.  TESTS/PROCEDURES: NA  D/C DATE: Committment filed. Pending approval and placement. Pt on [facility].  Discharge Barriers:   OTHER IMPORTANT INFO:  Pt denies suicidal ideation, plan/intent this shift. Started on depakote 5/22 evening per psychiatry. Suicide precautions and 1:1 discontinued. Bed alarm on.

## 2023-05-26 NOTE — TELEPHONE ENCOUNTER
8:15 AM    R. Per work list info pt needs further review by EC/BHP awaits further directions.    [facility] Access Inpatient Bed Call Log 5/26/2023 7:25AM     Intake has called facilities that have not updated their bed status within the last 12 hours.    [facility] is posting 0 beds.      [facility] is posting 0 [phone]      [facility] is posting 0 beds. [phone]     [facility] is posting 0 beds. [phone] per call @ 7:26am to Thao call back at 8am due to nurses not being available Per call at 1:12pm they have 5 low acuity beds.    [facility] is posting 0 beds. [phone]     [facility] is posting 0 beds. [phone]     [facility] is posting 0 beds. [phone]     [facility] is posting 0 beds. [phone]     [facility] is posting 2 beds [phone]  Per call @ 1:15pm to [place] at capacity until 5/27 around 7pm.    [facility] is posting 0 beds. [phone]     [facility] is posting 1 beds. Mixed unit 12+. Low acuity only.  [phone] not appropriate bed for pt.    [facility] is positing 0 beds. No aggression.  [phone]     [facility] is posting 0 beds. [phone]     [facility] is posting 1 beds. Low acuity only. [phone] not appropriate bed for pt.    [facility] is posting 3 beds. [phone]   Per call @ 1:15pm to [place] at capacity until 5/27 around 7pm.    [facility] is posting 0 beds. Low acuity. [phone]      [facility] is posting 2 beds. 72 hr hold preferred. [phone] per call @ 7:29am to [facility] low acuity confirmed 2 beds.   not appropriate bed for pt.    [facility] is posting 3 beds. [phone] low acuity  not appropriate bed for pt.    [facility] is posting 4 beds. Vol only, No Hx of aggression, violence, or assault. No sexual offenders. No 72 hr holds. [phone]    [facility] is posting 6 beds. (Must have the  cognitive ability to do programming. No aggressive or violent behavior or recent HX in the last 2 yrs. MH must be primary.) [phone]  Pt meets exclusionary criteria.    [facility] is posting 0 beds. Low acuity only. Violence and aggression capped. [phone]      [facility] is posting 0 bed. Low acuity, Neg Covid. [phone] per call @ 7:32am they are at capacity.     [facility] is posting 2 beds. Covid neg. Vol only. Combined adolescent and adult unit. No aggressive or violent behavior. No registered sex offenders. [phone] per call @ 7:34am to Sue confirmed 2 beds.  Pt meets exclusionary criteria.    [facility] posting 3 beds. Negative covid.  Pt declined 5/24    [facility] is posting 1 bed. [phone] per call at 7:37am to Kristine pending d/c s today will update. Per call at 1:25pm to Sri, they can review pt as long as they know if she has a definite ride home. She had MULTIPLE questions re: pt's status and many answers were provided to her. Called med unit at 1:52 pm to ask if pt has a ride home.  Pt said she has no children and no ride home. Call placed at 1:53 pm to unit and informed staff pt does not have a ride home so stated that referral is cancelled.     [facility] is posting 14 beds. Call for details. [phone] per call @ 7:38am phone rang multiple times before disconnect. Due to court hold pt cannot be presented to facility in [place] [facility] is posting 4 beds. [phone]  Per call at 2:00pm resource nurse will give intake a call back within an hour or two.To obtain further information regarding they can review pt or not.                            Pt remains on work list pending appropriate bed availability.

## 2023-05-27 ENCOUNTER — TELEPHONE (OUTPATIENT)
Dept: BEHAVIORAL HEALTH | Facility: CLINIC | Age: 83
End: 2023-05-27
Payer: COMMERCIAL

## 2023-05-27 PROCEDURE — 250N000013 HC RX MED GY IP 250 OP 250 PS 637

## 2023-05-27 PROCEDURE — 250N000013 HC RX MED GY IP 250 OP 250 PS 637: Performed by: INTERNAL MEDICINE

## 2023-05-27 PROCEDURE — 99232 SBSQ HOSP IP/OBS MODERATE 35: CPT | Performed by: INTERNAL MEDICINE

## 2023-05-27 PROCEDURE — 250N000011 HC RX IP 250 OP 636: Performed by: HOSPITALIST

## 2023-05-27 PROCEDURE — 120N000001 HC R&B MED SURG/OB

## 2023-05-27 PROCEDURE — 250N000013 HC RX MED GY IP 250 OP 250 PS 637: Performed by: HOSPITALIST

## 2023-05-27 RX ADMIN — MIRTAZAPINE 22.5 MG: 15 TABLET, FILM COATED ORAL at 20:15

## 2023-05-27 RX ADMIN — ENOXAPARIN SODIUM 40 MG: 40 INJECTION SUBCUTANEOUS at 12:35

## 2023-05-27 RX ADMIN — OLANZAPINE 10 MG: 5 TABLET, FILM COATED ORAL at 20:14

## 2023-05-27 RX ADMIN — DIVALPROEX SODIUM 125 MG: 125 CAPSULE, COATED PELLETS ORAL at 14:45

## 2023-05-27 RX ADMIN — VALACYCLOVIR HYDROCHLORIDE 500 MG: 500 TABLET, FILM COATED ORAL at 08:23

## 2023-05-27 RX ADMIN — ACETAMINOPHEN 650 MG: 325 TABLET ORAL at 20:14

## 2023-05-27 RX ADMIN — OLANZAPINE 5 MG: 2.5 TABLET, FILM COATED ORAL at 08:23

## 2023-05-27 RX ADMIN — SERTRALINE HYDROCHLORIDE 150 MG: 100 TABLET ORAL at 08:23

## 2023-05-27 RX ADMIN — DIVALPROEX SODIUM 125 MG: 125 CAPSULE, COATED PELLETS ORAL at 08:23

## 2023-05-27 RX ADMIN — SIMVASTATIN 40 MG: 20 TABLET, FILM COATED ORAL at 20:15

## 2023-05-27 RX ADMIN — DIVALPROEX SODIUM 250 MG: 125 CAPSULE, COATED PELLETS ORAL at 20:15

## 2023-05-27 RX ADMIN — Medication 1 MG: at 20:18

## 2023-05-27 RX ADMIN — ACETAMINOPHEN 650 MG: 325 TABLET ORAL at 08:23

## 2023-05-27 RX ADMIN — FAMOTIDINE 20 MG: 20 TABLET ORAL at 08:23

## 2023-05-27 ASSESSMENT — ACTIVITIES OF DAILY LIVING (ADL)
ADLS_ACUITY_SCORE: 30
ADLS_ACUITY_SCORE: 23
ADLS_ACUITY_SCORE: 31
ADLS_ACUITY_SCORE: 30
ADLS_ACUITY_SCORE: 31
ADLS_ACUITY_SCORE: 29
ADLS_ACUITY_SCORE: 31
ADLS_ACUITY_SCORE: 30
ADLS_ACUITY_SCORE: 27
ADLS_ACUITY_SCORE: 30
ADLS_ACUITY_SCORE: 30
ADLS_ACUITY_SCORE: 31

## 2023-05-27 NOTE — TELEPHONE ENCOUNTER
0428 Bed Search Update:    [facility]: at capacity    [facility]: at capacity per website     [facility]: at capacity per website     [facility]: at capacity per unit staff    [facility]: at capacity per website     [facility]: at capacity per website     [facility]: at capacity per website     [facility]: at capacity per website     [facility]: at capacity per website     [facility]: at capacity per website (Mixed unit/Low acuity only).     [facility]: at capacity per website     [facility]: at capacity per website     [facility]: at capacity per website     [facility]: Posting 2 beds.  No Intake after 10PM.       [facility]: at capacity per website.    [facility]: Posting 6 beds, 3 adult acute, 3 mood disorder.  0402 Per César, all of the patients on the list that may be placed outside of the [facility] meet exclusionary criteria.  He stated a 72 HH would be exclusionary for their high acuity unit (2E)     [facility]: Posting 4 beds.  0402 Per César, low acuity only    [facility]: Posting 4 beds.  Voluntary patients only.    [facility]: Posting 6 beds. (Low acuity only. Must have the cognitive ability to do programming. No aggressive or violent behavior or recent HX in the last 2 yrs. MH must be primary).     [facility]: at capacity per website     [facility] access hospital: at capacity per website     [facility]: Posting 2 beds (Covid neg. Voluntary only. Mixed unit. No aggressive or violent behavior. No registered sex offenders).     [facility]: Declined on 5/26.  Negative COVID test required. No lines, drains or tubes (oxygen, CPAP, IV, etc.) No history of aggression, violence, or assault. We do not provide Detox or CD treatment.    [facility]: Posting 14 beds. Facility is in [place].     [facility]: Posting 4 beds.  Will serve ages 13-18 (mixed unit with adults). Negative COVID test required.  No lines, drains or tubes (oxygen, CPAP, IV, etc.)      Remains on wait list.

## 2023-05-27 NOTE — TELEPHONE ENCOUNTER
[facility] Access Inpatient Bed Call Log 5/27/2023 8:30 AM       Intake has called facilities that have not updated their bed status within the last 12 hours.        Adults:         [facility] is posting 0 beds.      [facility] is posting 0 [phone]      [facility] is posting 0 beds. [phone]     [facility] is posting 0 beds. [phone]        is posting 0 beds. [phone]     [facility] is posting 0 beds. [phone]     [facility] is posting 0 beds. [phone]     [facility] is posting 0 beds. [phone]     [facility] is posting 2 beds [phone] Per website @7:49am     [facility] is posting 0 beds. [phone]         [facility] is posting 1 beds. Mixed unit 12+. Low acuity only.  [phone] Per website @8:15am     [facility] is positing 1 bed. No aggression.  [phone] Per website @7:29am      [facility] is posting 0 beds. [phone] @cap per voicemail     [facility] is posting 0 beds. Low acuity only. [phone] Per website      [facility] is posting 3 beds. [phone] Per website      [facility] is posting 2 beds. Low acuity. [phone] Per website @6:14am     [facility] is posting 2 beds. 72 hr hold preferred. [phone] Per call @8:21am beds available, did not specify how many.     [facility] is posting 4 beds. [phone] @12:12am     [facility] is posting 0 beds. Vol only, No Hx of aggression, violence, or assault. No sexual offenders. No 72 hr holds. [phone] Per call @8:18am, Pts in ED waiting for placement. Call after noon.         [facility] is posting 6 beds. (Must have the cognitive ability to do programming. No aggressive or violent behavior or recent HX in the last 2 yrs. MH must be primary.) [phone]      [facility] is posting 0 beds. Low acuity only. Violence and aggression capped.  [phone] @Cap per call at 7:54am     [facility] is posting 0 bed. Low acuity, Neg Covid. [phone] per call @8:15am, not accepting pt on weekends     [facility] is posting 2 beds. Covid neg. Vol only. Combined adolescent and adult unit. No aggressive or violent behavior. No registered sex offenders. [phone]      [facility] posting 0 beds. Negative covid.      [facility] is posting 1 beds. [phone] @cap per update at 8:13am     [facility] is posting 14 beds. [phone] Per call @7:46am     [facility] is posting 9 beds. [phone] Per website Update @8:06am      Pt remains on work list pending appropriate bed availability.

## 2023-05-27 NOTE — PLAN OF CARE
Goal Outcome Evaluation:       DATE & TIME: 5/26/23-5/27/23 [phone]  Cognitive Concerns/ Orientation : A&Ox4, forgetful  BEHAVIOR & AGGRESSION TOOL COLOR: Green. Denies suicidal ideations or thoughts  ABNL VS/O2: VSS on RA  MOBILITY: SBA with gait belt  PAIN MANAGMENT: Denied.   DIET: Regular- safe tray  BOWEL/BLADDER: Continent this shift,  Incontinent at times per shift report. No BM  ABNL LAB/BG: NA  DRAIN/DEVICES: No IV access  TELEMETRY RHYTHM: NA  SKIN: Blanchable redness to hips- mepilex in place. Scattered bruising.  TESTS/PROCEDURES: NA  D/C DATE: Pending approval and placement. Pt on waitlist for [facility].  Discharge Barriers:   OTHER IMPORTANT INFO: Patient on a court hold

## 2023-05-27 NOTE — PROGRESS NOTES
[facility]    Medicine Progress Note - Hospitalist Service    Date of Admission:  5/5/2023    Assessment & Plan   Bhavana Marr is an 82-year-old female patient with PMH of asthma; dyslipidemia; depression/anxiety; bipolar disorder; borderline personality disorder; osteoporosis; prior gastric bypass; and recent hospitalization (4/21/2023 to 4/28/2023) with issues including suicidal ideation and dementia with behavioral disturbance. She presented from her care facility with decreased oral intake, low glucose level, low blood pressure, depression and possible suicidal ideations.     On initial evaluation, patient was afebrile, normotensive, not tachycardic.  Labs notable for BMP with sodium 133 otherwise normal; CBC was normal; LFTs showed low albumin of 3.0 and low protein 5.3 and low AST of 9, otherwise normal; glucose 109; urinalysis showed 60 ketones and not suggestive of infection.     Decreased oral intake with hypoglycemia, low blood pressures  Hyponatremia, suspect due to decreased PO intake  Failure to thrive, possibly due to Psychiatric Illness; see below   * Initial presentation as above. Pt found with low sodium as well as ketones in the urine. Given fluids in the ED.    * No abdominal pain or lab findings to suggest a primary GI etiology.  * d/roberto IVF on 5/8 due to improved oral intake  - Mental health management as below.     Psychiatric issues  Depression/anxiety, bipolar d/o, borderline personality d/o with possible suicidal ideations  * Of note is that the patient was recently hospitalized through [facility] with issues including suicidal ideation.  She was seen by psychiatry during that hospitalization; they did not recommend any type of psychiatric admission.    * Lives in LTC facility.  Per review of psych note, that the facility director of nursing reported that patient does not have hx of Dementia and was entered in her chart erroneously during prior admission in 2021.      * Initial presentation as above. Noted manic episode and some confusion. On admit, pt denied suicidal ideations on admit but did endorse feeling depressed.  She was seen by the DEC  in the ED and not felt to be actively suicidal.  * UDS has been completed on 5/24 pre request of inpat psych, results are negative  - Psychiatry following intermittently, last evaluated on 5/25, continued on current olanzapine 5 mg daily and 10 mg nightly along with sertraline 150 mg daily and Remeron 22.5 mg at night; on 5/22, started Depakote sprinkles 125 mg BID and 250 mg at bedtime;  continue to recommend inpatient psychiatry admission  - valproic acid level 29.1 on 5/26. Defer to psych when they re-eval if need to adjust Depakote dose  - had court hearing for commitment on 5/23, commitment supported by [facility]  - sitter was discontinued on 5/23  - 5/27---notified by RN that inpt psych intake called and said she is not appropriate a this time due to increased nursing needs due to frequent BM (see below under constipation).  Patient needs to be independent  - monitor BM as stool softener effect wears off and if needs decrease, otherwise will need to reconsult psych     Suspect Moderate malnutrition In Context of:  Acute illness or injury  - Nutrition following; supplements for nutrition     Prior gastric bypass  B12 deficiency  Iron deficienty anemia  GERD  - can resume PTA B12 and iron on discharge  - continue PTA famotidine  - was planned for outpatient UGI endoscopy (5/22) for nausea/vomiting with h/o gastric bypass (was seen in clinic by Yessi 4/18/23); currently tolerating PO without significant G.I. issues, can reschedule EGD with the PCP as outpatient on follow up     Asthma  -  PRN albuterol inhaler     Dyslipidemia    Continue PTA simvastatin.      Osteoporosis    Resume alendronate after discharge.     Constipation-resolved  Patient with frequent loose stools on 5/26 needing frequent nursing assistance. Scheduled  Senna-colace discontinued. Loose/soft stools are likely related to bowel regimen. She does not have abdominal pain, fever, nausea or vomiting.   - discontinue scheduled bowel regimen  - monitor stools, utilize prn stool         Diet: Room Service  Combination Diet Regular Diet; Safe Tray - with utensils    DVT Prophylaxis: Pneumatic Compression Devices and Anti-embolisim stockings (TEDs)  Cortez Catheter: Not present  Lines: None     Cardiac Monitoring: None  Code Status: Full Code      Clinically Significant Risk Factors              # Hypoalbuminemia: Lowest albumin = 3 g/dL at 5/5/2023  4:53 PM, will monitor as appropriate                     Disposition Plan  awaiting inpt psych admission. medically stable    Expected Discharge Date: 05/29/2023,  6:00 PM  Discharge Delays: Specialist Consult (enter specialist & decision needed in comments)  *Medically Ready for Discharge  Placement - Mental Health/Addiction/Geripsych    Discharge Comments: .72 hour hold--up on Monday 1214.   County to meet on Friday for commitment.       Inpateint psych recommended.          Merle House MD  Hospitalist Service  [facility]  Securely message with [employer] (more info)  Text page via [employer] Paging/Directory   ______________________________________________________________________    Interval History   Patient with frequent loose stools yesterday needing frequent assistance from nursing staff. This am, she has had 2-3 soft BM. Denies abdominal pain, nausea or vomiting. Tolerating diet. She has been getting scheduled senna-colace which was discontinued yesterday afternoon due to loose/soft.       Physical Exam   Vital Signs: Temp: 97.5  F (36.4  C) Temp src: Oral BP: 120/79 Pulse: 73   Resp: 18 SpO2: 90 % O2 Device: None (Room air)    Weight: 119 lbs .77 oz    General Appearance: Alert,awake and no apparent distress  Respiratory: CTAB, no wheezing  Cardiovascular: regular rate and rhythm  GI: soft and  non-tender  Skin: warm and dry      Medical Decision Making       35 MINUTES SPENT BY ME on the date of service doing chart review, history, exam, documentation & further activities per the note.  MANAGEMENT DISCUSSED with the following over the past 24 hours: patient, RN, charge RN/care transition team   NOTE(S)/MEDICAL RECORDS REVIEWED over the past 24 hours: nursing notes  Tests ORDERED & REVIEWED in the past 24 hours:  - valproic acid level      Data         Imaging results reviewed over the past 24 hrs:   No results found for this or any previous visit (from the past 24 hour(s)).

## 2023-05-27 NOTE — PLAN OF CARE
DATE & TIME: 5/27/23 0779-0596     Cognitive Concerns/ Orientation : Alert and oriented x4. Forgetful at times   BEHAVIOR & AGGRESSION TOOL COLOR: Green  CIWA SCORE: NA   ABNL VS/O2: Stable on room air  MOBILITY: SBA with GB. Ambulated in  halls. Up to chair for meals  PAIN MANAGMENT: Denies this shift  DIET: Regular  BOWEL/BLADDER: Incontinent of soft BM at times. Urinary frequency. PVR 25ml. Urine clear.   ABNL LAB/BG: Valproic level 29.1-MD aware  DRAIN/DEVICES: None  TELEMETRY RHYTHM: None  SKIN: Slight blanchable redness to bilateral hips-covered with mepilex.   TESTS/PROCEDURES: None  D/C DATE: TBD-pending placement.  OTHER IMPORTANT INFO: Mental Health intake stated patient is not appropriate at this time as Patient is not completely Independent with ADLs. Patient currently requires minimal assist or supervision with ADLs including incontinence. MD and SW aware. Calm and cooperative. Denies suicidal thoughts.

## 2023-05-28 ENCOUNTER — TELEPHONE (OUTPATIENT)
Dept: BEHAVIORAL HEALTH | Facility: CLINIC | Age: 83
End: 2023-05-28
Payer: COMMERCIAL

## 2023-05-28 PROCEDURE — 250N000013 HC RX MED GY IP 250 OP 250 PS 637: Performed by: HOSPITALIST

## 2023-05-28 PROCEDURE — 250N000013 HC RX MED GY IP 250 OP 250 PS 637: Performed by: INTERNAL MEDICINE

## 2023-05-28 PROCEDURE — 99231 SBSQ HOSP IP/OBS SF/LOW 25: CPT | Performed by: INTERNAL MEDICINE

## 2023-05-28 PROCEDURE — 250N000011 HC RX IP 250 OP 636: Performed by: HOSPITALIST

## 2023-05-28 PROCEDURE — 250N000013 HC RX MED GY IP 250 OP 250 PS 637

## 2023-05-28 PROCEDURE — 120N000001 HC R&B MED SURG/OB

## 2023-05-28 RX ADMIN — SERTRALINE HYDROCHLORIDE 150 MG: 100 TABLET ORAL at 08:06

## 2023-05-28 RX ADMIN — DIVALPROEX SODIUM 125 MG: 125 CAPSULE, COATED PELLETS ORAL at 14:02

## 2023-05-28 RX ADMIN — ACETAMINOPHEN 650 MG: 325 TABLET ORAL at 08:05

## 2023-05-28 RX ADMIN — OLANZAPINE 10 MG: 5 TABLET, FILM COATED ORAL at 20:25

## 2023-05-28 RX ADMIN — MIRTAZAPINE 22.5 MG: 15 TABLET, FILM COATED ORAL at 20:26

## 2023-05-28 RX ADMIN — DIVALPROEX SODIUM 125 MG: 125 CAPSULE, COATED PELLETS ORAL at 08:06

## 2023-05-28 RX ADMIN — FAMOTIDINE 20 MG: 20 TABLET ORAL at 08:06

## 2023-05-28 RX ADMIN — VALACYCLOVIR HYDROCHLORIDE 500 MG: 500 TABLET, FILM COATED ORAL at 08:06

## 2023-05-28 RX ADMIN — DIVALPROEX SODIUM 250 MG: 125 CAPSULE, COATED PELLETS ORAL at 20:26

## 2023-05-28 RX ADMIN — SIMVASTATIN 40 MG: 20 TABLET, FILM COATED ORAL at 20:26

## 2023-05-28 RX ADMIN — Medication 1 MG: at 20:25

## 2023-05-28 RX ADMIN — ACETAMINOPHEN 650 MG: 325 TABLET ORAL at 20:25

## 2023-05-28 RX ADMIN — OLANZAPINE 5 MG: 2.5 TABLET, FILM COATED ORAL at 08:06

## 2023-05-28 RX ADMIN — ENOXAPARIN SODIUM 40 MG: 40 INJECTION SUBCUTANEOUS at 10:40

## 2023-05-28 ASSESSMENT — ACTIVITIES OF DAILY LIVING (ADL)
ADLS_ACUITY_SCORE: 23
ADLS_ACUITY_SCORE: 20
ADLS_ACUITY_SCORE: 23

## 2023-05-28 NOTE — PROGRESS NOTES
[facility]    Medicine Progress Note - Hospitalist Service    Date of Admission:  5/5/2023    Assessment & Plan   Bhavana Marr is an 82-year-old female patient with PMH of asthma; dyslipidemia; depression/anxiety; bipolar disorder; borderline personality disorder; osteoporosis; prior gastric bypass; and recent hospitalization (4/21/2023 to 4/28/2023) with issues including suicidal ideation and dementia with behavioral disturbance. She presented from her care facility with decreased oral intake, low glucose level, low blood pressure, depression and possible suicidal ideations.     On initial evaluation, patient was afebrile, normotensive, not tachycardic.  Labs notable for BMP with sodium 133 otherwise normal; CBC was normal; LFTs showed low albumin of 3.0 and low protein 5.3 and low AST of 9, otherwise normal; glucose 109; urinalysis showed 60 ketones and not suggestive of infection.     Decreased oral intake with hypoglycemia, low blood pressures  Hyponatremia, suspect due to decreased PO intake  Failure to thrive, possibly due to Psychiatric Illness; see below   * Initial presentation as above. Pt found with low sodium as well as ketones in the urine. Given fluids in the ED.    * No abdominal pain or lab findings to suggest a primary GI etiology.  * d/roberto IVF on 5/8 due to improved oral intake  - Mental health management as below.     Psychiatric issues  Depression/anxiety, bipolar d/o, borderline personality d/o with possible suicidal ideations  * Of note is that the patient was recently hospitalized through [facility] with issues including suicidal ideation.  She was seen by psychiatry during that hospitalization; they did not recommend any type of psychiatric admission.    * Lives in LTC facility.  Per review of psych note, that the facility director of nursing reported that patient does not have hx of Dementia and was entered in her chart erroneously during prior admission in 2021.      * Initial presentation as above. Noted manic episode and some confusion. On admit, pt denied suicidal ideations on admit but did endorse feeling depressed.  She was seen by the DEC  in the ED and not felt to be actively suicidal.  * UDS has been completed on 5/24 pre request of inpat psych, results are negative  - Psychiatry following intermittently, last evaluated on 5/25, continued on current olanzapine 5 mg daily and 10 mg nightly along with sertraline 150 mg daily and Remeron 22.5 mg at night; on 5/22, started Depakote sprinkles 125 mg BID and 250 mg at bedtime;  continue to recommend inpatient psychiatry admission  - valproic acid level 29.1 on 5/26. Defer to psych when they re-eval if need to adjust Depakote dose  - had court hearing for commitment on 5/23, commitment supported by [facility]  - sitter was discontinued on 5/23  - 5/27---notified by RN that inpt psych intake called and said she is not appropriate a this time due to increased nursing needs due to frequent BM (see below under constipation).  Patient needs to be independent  - 5/28--discussed with social work, will clarify with [facility] tomorrow if they are able to accommodate pt since she is stand by assist and intermittently incontinent. If still unable to accommodate patient, will need to re consult psych     Suspect Moderate malnutrition In Context of:  Acute illness or injury  - Nutrition following; supplements for nutrition     Prior gastric bypass  B12 deficiency  Iron deficienty anemia  GERD  - can resume PTA B12 and iron on discharge  - continue PTA famotidine  - was planned for outpatient UGI endoscopy (5/22) for nausea/vomiting with h/o gastric bypass (was seen in clinic by Yessi 4/18/23); currently tolerating PO without significant G.I. issues, can reschedule EGD with the PCP as outpatient on follow up     Asthma  -  PRN albuterol inhaler     Dyslipidemia    Continue PTA simvastatin.      Osteoporosis    Resume alendronate after  discharge.     Constipation-resolved  Patient with frequent loose stools on 5/26 needing frequent nursing assistance. Scheduled Senna-colace discontinued. Loose/soft stools are likely related to bowel regimen. She does not have abdominal pain, fever, nausea or vomiting.   - discontinue scheduled bowel regimen on 5/26  - Last BM now recorded on 5/27. Will need to f/u and may need to started lower dose of scheduled senna-colace if no BM tomrorow  - monitor stools, utilize prn stool         Diet: Room Service  Combination Diet Regular Diet; Safe Tray - with utensils    DVT Prophylaxis: Pneumatic Compression Devices and Anti-embolisim stockings (TEDs)  Cortez Catheter: Not present  Lines: None     Cardiac Monitoring: None  Code Status: Full Code      Clinically Significant Risk Factors              # Hypoalbuminemia: Lowest albumin = 3 g/dL at 5/5/2023  4:53 PM, will monitor as appropriate                     Disposition Plan  awaiting inpt psych admission. medically stable     Expected Discharge Date: 05/29/2023,  6:00 PM  Discharge Delays: Specialist Consult (enter specialist & decision needed in comments)  *Medically Ready for Discharge  Placement - Mental Health/Addiction/Geripsych    Discharge Comments: .72 hour hold--up on Monday 1214.   County to meet on Friday for commitment.       Inpateint psych recommended.          Merle House MD  Hospitalist Service  [facility]  Securely message with [employer] (more info)  Text page via [employer] Paging/Directory   ______________________________________________________________________    Interval History   No acute issues.   Last BM recorded on 5/27.  Patient denies n/v or abdominal pain. Denies SI.     Discussed with social work, will follow up tomorrow with inpt psych/charge RN to verify if able to take pt/ or not. As above, psych intake called on 5/27 and stated that patient not independent and [facility] can't take her given her  needs. Patient  is currently stand by assist and intermittently incontinent per nursing.       Physical Exam   Vital Signs: Temp: 97.5  F (36.4  C) Temp src: Oral BP: 139/80 Pulse: 69   Resp: 18 SpO2: 97 % O2 Device: None (Room air)    Weight: 119 lbs .77 oz    General Appearance: Alert,awake and no apparent distress  Respiratory: CTAB, no wheezing  Cardiovascular: regular rate and rhythm  GI: soft and non-tender  Skin: warm and dry      Medical Decision Making       30 MINUTES SPENT BY ME on the date of service doing chart review, history, exam, documentation & further activities per the note.  MANAGEMENT DISCUSSED with the following over the past 24 hours: patient, RN, charge RN/care transition team   NOTE(S)/MEDICAL RECORDS REVIEWED over the past 24 hours: social work note      Data         Imaging results reviewed over the past 24 hrs:   No results found for this or any previous visit (from the past 24 hour(s)).

## 2023-05-28 NOTE — PLAN OF CARE
Goal Outcome Evaluation:       DATE & TIME: 5/27/23-5/28/23 5546-6377  Cognitive Concerns/ Orientation : A&Ox4, very forgetful  BEHAVIOR & AGGRESSION TOOL COLOR: Green.Pleasant and engaging in conversation this shift. Denies suicidal ideations or thoughts  ABNL VS/O2: VSS on RA  MOBILITY: SBA with gait belt  PAIN MANAGMENT: Denied.   DIET: Regular- safe tray  BOWEL/BLADDER: Continent this shift,  No BM  ABNL LAB/BG: NA  DRAIN/DEVICES: No IV access  TELEMETRY RHYTHM: NA  SKIN: Blanchable redness to hips- mepilex in place. Scattered bruising.  TESTS/PROCEDURES: NA  D/C DATE: Pending placement.   Discharge Barriers:     OTHER IMPORTANT INFO: Patient on a court hold

## 2023-05-28 NOTE — PROGRESS NOTES
Care Management Follow Up    Length of Stay (days): 21    Expected Discharge Date: 05/29/2023     Concerns to be Addressed:       Patient plan of care discussed at interdisciplinary rounds: Yes    Anticipated Discharge Disposition:       Anticipated Discharge Services:    Anticipated Discharge DME:      Patient/family educated on Medicare website which has current facility and service quality ratings:    Education Provided on the Discharge Plan:    Patient/Family in Agreement with the Plan:      Referrals Placed by CM/SW:    Private pay costs discussed: Not applicable    Additional Information:    Chris received phone call that [facility] will not consider pt if she's incontinent or isn't fully independent.  Chris connected with bedside RN that confirmed pt is intermittently incontinent and does require SBA, can be unsteady on feet.  RN stated that she's not familiar enough with pt to be able to assess on whether pt could progress to independent status.  Sw to continue to follow.    Update:  Chris reached out to SW supervisor to further inquire into [facility] indicating pt needs to be fully independent.   supervisor states that [facility], in the past, has been able to accommodate needs like this pt currently has.  Sw to f/u with [facility].      Larissa Rosado, [facility] HospitalSW

## 2023-05-28 NOTE — TELEPHONE ENCOUNTER
[facility] Access Inpatient Bed Call Log 5/27/2023 8:00 PM      Intake has called facilities that have not updated their bed status within the last 12 hours.        Adults:         [facility] is posting 0 beds.      [facility] is posting 0 [phone]      [facility] is posting 0 beds. [phone]     [facility] is posting 0 beds. [phone]       [facility] is posting 0 beds. [phone]     [facility] is posting 0 beds. [phone]     [facility] is posting 0 beds. [phone]     [facility] is posting 0 beds. [phone]     [facility], is posting 2 beds [phone]     [facility] is posting 0 beds. [phone]         [facility] is posting 6 beds. Mixed unit 12+. Low acuity only.  [phone]     [facility] is positing 1 bed. No aggression.  [phone] Per website @7:29am      [facility] is posting 1 beds. [phone]     [facility] is posting 0 beds. Low acuity only. [phone] Per website      [facility] is posting 0 beds. [phone] Per website      [facility] is posting 4 beds. 2 higher acuity, 2 Low acuity. [phone]     [facility] is posting 2 beds. 72 hr hold preferred. [phone] Per call @8:21am beds available, did not specify how many.     [facility] is posting 4 beds. [phone]     [facility] is posting 0 beds. Vol only, No Hx of aggression, violence, or assault. No sexual offenders. No 72 hr holds. [phone]  Pts in ED waiting for placement. Call after noon.         [facility] is posting 9 beds. (Must have the cognitive ability to do programming. No aggressive or violent behavior or recent HX in the last 2 yrs. MH must be primary.) [phone]      [facility] is posting 0 beds. Low acuity only. Violence and aggression capped. [phone] @Cap per call at 7:54am     [facility] is posting 0 bed. Low acuity, Neg  Covid. [phone] per call, not accepting pt on weekends     [facility] is posting 2 beds. Covid neg. Vol only. Combined adolescent and adult unit. No aggressive or violent behavior. No registered sex offenders. [phone]      [facility] posting 0 beds. Negative covid.      [facility] is posting 1 beds. [phone]     [facility] is posting 14 beds. [phone]     [facility] is posting 9 beds. [phone] Per website      Pt remains on work list pending appropriate bed availability.

## 2023-05-28 NOTE — PLAN OF CARE
Goal Outcome Evaluation:       Cognitive Concerns/ Orientation : A&Ox4, very forgetful  BEHAVIOR & AGGRESSION TOOL COLOR: Green.Pleasant and engaging in conversation this shift. Denies suicidal ideations or thoughts  ABNL VS/O2: VSS on RA  MOBILITY: SBA with gait belt  PAIN MANAGMENT: Denied.   DIET: Regular- safe tray  BOWEL/BLADDER: Continent this shift,  No BM  ABNL LAB/BG: NA  DRAIN/DEVICES: No IV access  TELEMETRY RHYTHM: NA  SKIN: Blanchable redness to hips- mepilex in place. Scattered bruising.  TESTS/PROCEDURES: NA  D/C DATE: Pending placement.   Discharge Barriers:     OTHER IMPORTANT INFO: Patient on a court hold

## 2023-05-28 NOTE — TELEPHONE ENCOUNTER
0311 Bed Search Update:    [facility]: at capacity    [facility]: at capacity per website     [facility]: at capacity per website     [facility]: at capacity per unit staff    [facility]: at capacity per website     [facility]: at capacity per website     [facility] at capacity per website     [facility]: at capacity per website     [facility]: at capacity per website     [facility]: Posting 4 beds (Mixed unit/Low acuity only).     [facility]: at capacity per website     [facility]: at capacity per website     [facility]: at capacity per website     [facility]: Posting 2 beds.  No Intake after 10PM.       [facility]: at capacity per website.    [facility]: at capacity per website    [facility]: Posting 4 beds.  Low acuity    [facility]: Posting 4 beds.  Voluntary patients only.    [facility]: Posting 8 beds. (Low acuity only. Must have the cognitive ability to do programming. No aggressive or violent behavior or recent HX in the last 2 yrs. MH must be primary).     [facility]: at capacity per website     [facility]: at capacity per website     [facility]: Posting 2 beds (Covid neg. Voluntary only. Mixed unit. No aggressive or violent behavior. No registered sex offenders).     [facility]: Declined 5/26    : Posting 14 beds. Facility is in [place].     [facility]: Pt does not have transportation per wait list.    Remains on wait list.

## 2023-05-28 NOTE — TELEPHONE ENCOUNTER
[facility] Access Inpatient Bed Call Log 5/28/2023 8:30 AM       Intake has called facilities that have not updated their bed status within the last 12 hours.        Adults:         [facility] is posting 0 beds.      [facility] is posting 0 [phone]      [place] is posting 0 beds. [phone]     [facility] is posting 0 beds. [phone]       [facility] is posting 0 beds. [phone]     [facility] is posting 0 beds. [phone]     [facility] is posting 0 beds. [phone]     [facility] is posting 0 beds. [phone]     [facility], is posting 2 beds [phone] Per website @7:55am        [facility] is posting 0 beds. [phone]     [facility] is posting 6 beds. Mixed unit 12+. Low acuity only.  [phone] Per website @6:38am     [facility] is positing 1 bed. No aggression.  [phone] Per website @7:56am     [facility] is posting 0 beds. [phone] @cap per voicemail     [facility] is posting 0 beds. Low acuity only. [phone]      [facility] is posting 2 beds. [phone] Per website @7:56am     [facility] is posting 0 beds. Low acuity. [phone]      [facility] is posting 2 beds. 72 hr hold preferred. [phone] Per website @8:20am     [facility] is posting 1 bed. [phone] Per website @6:21am     [facility] is posting 0 beds. Vol only, No Hx of aggression, violence, or assault. No sexual offenders. No 72 hr holds. [phone] @8:39am Reviewing pt in ED, Intake to call after 12pm     [facility] is posting 8 beds. (Must have the cognitive ability to do programming. No aggressive or violent behavior or recent HX in the last 2 yrs. MH must be primary.) [phone]      [facility] is posting 0 beds. Low acuity only. Violence and aggression capped. [phone] @capacity 8:26am     [facility]. Low acuity,  Neg Covid. [phone] per call @Cap 8:38am     [facility] is posting 4 beds. Covid neg. Vol only. Combined adolescent and adult unit. No aggressive or violent behavior. No registered sex offenders. [phone]      [facility] posting 0 beds. Negative covid.      [facility] is posting 1 beds. [phone]      [facility] is posting 14 beds. [phone] @8:23am, beds available     [facility] is posting 9 beds. [phone]                  Pt remains on work list pending appropriate bed availability

## 2023-05-29 ENCOUNTER — TELEPHONE (OUTPATIENT)
Dept: BEHAVIORAL HEALTH | Facility: CLINIC | Age: 83
End: 2023-05-29
Payer: COMMERCIAL

## 2023-05-29 LAB
CREAT SERPL-MCNC: 0.59 MG/DL (ref 0.51–0.95)
GFR SERPL CREATININE-BSD FRML MDRD: 90 ML/MIN/1.73M2
PLATELET # BLD AUTO: 259 10E3/UL (ref 150–450)

## 2023-05-29 PROCEDURE — 120N000001 HC R&B MED SURG/OB

## 2023-05-29 PROCEDURE — 250N000013 HC RX MED GY IP 250 OP 250 PS 637

## 2023-05-29 PROCEDURE — 85049 AUTOMATED PLATELET COUNT: CPT | Performed by: INTERNAL MEDICINE

## 2023-05-29 PROCEDURE — 250N000011 HC RX IP 250 OP 636: Performed by: HOSPITALIST

## 2023-05-29 PROCEDURE — 36415 COLL VENOUS BLD VENIPUNCTURE: CPT | Performed by: INTERNAL MEDICINE

## 2023-05-29 PROCEDURE — 250N000013 HC RX MED GY IP 250 OP 250 PS 637: Performed by: INTERNAL MEDICINE

## 2023-05-29 PROCEDURE — 99231 SBSQ HOSP IP/OBS SF/LOW 25: CPT | Performed by: INTERNAL MEDICINE

## 2023-05-29 PROCEDURE — 82565 ASSAY OF CREATININE: CPT | Performed by: INTERNAL MEDICINE

## 2023-05-29 PROCEDURE — 250N000013 HC RX MED GY IP 250 OP 250 PS 637: Performed by: HOSPITALIST

## 2023-05-29 RX ADMIN — ENOXAPARIN SODIUM 40 MG: 40 INJECTION SUBCUTANEOUS at 11:36

## 2023-05-29 RX ADMIN — DIVALPROEX SODIUM 125 MG: 125 CAPSULE, COATED PELLETS ORAL at 14:56

## 2023-05-29 RX ADMIN — DIVALPROEX SODIUM 125 MG: 125 CAPSULE, COATED PELLETS ORAL at 09:58

## 2023-05-29 RX ADMIN — DIVALPROEX SODIUM 250 MG: 125 CAPSULE, COATED PELLETS ORAL at 20:53

## 2023-05-29 RX ADMIN — ACETAMINOPHEN 650 MG: 325 TABLET ORAL at 09:58

## 2023-05-29 RX ADMIN — SERTRALINE HYDROCHLORIDE 150 MG: 100 TABLET ORAL at 09:58

## 2023-05-29 RX ADMIN — OLANZAPINE 10 MG: 5 TABLET, FILM COATED ORAL at 20:53

## 2023-05-29 RX ADMIN — ACETAMINOPHEN 650 MG: 325 TABLET ORAL at 20:54

## 2023-05-29 RX ADMIN — VALACYCLOVIR HYDROCHLORIDE 500 MG: 500 TABLET, FILM COATED ORAL at 09:58

## 2023-05-29 RX ADMIN — MIRTAZAPINE 22.5 MG: 15 TABLET, FILM COATED ORAL at 20:54

## 2023-05-29 RX ADMIN — SIMVASTATIN 40 MG: 20 TABLET, FILM COATED ORAL at 20:54

## 2023-05-29 RX ADMIN — FAMOTIDINE 20 MG: 20 TABLET ORAL at 09:58

## 2023-05-29 RX ADMIN — OLANZAPINE 5 MG: 2.5 TABLET, FILM COATED ORAL at 09:58

## 2023-05-29 ASSESSMENT — ACTIVITIES OF DAILY LIVING (ADL)
ADLS_ACUITY_SCORE: 27
ADLS_ACUITY_SCORE: 23
ADLS_ACUITY_SCORE: 20
ADLS_ACUITY_SCORE: 20
ADLS_ACUITY_SCORE: 27
ADLS_ACUITY_SCORE: 20
ADLS_ACUITY_SCORE: 27
ADLS_ACUITY_SCORE: 23
ADLS_ACUITY_SCORE: 20

## 2023-05-29 NOTE — TELEPHONE ENCOUNTER
0532 Bed Search Update:    [facility]: at capacity    [facility]: at capacity per website     [facility]: at capacity per website     [facility]: at capacity per unit staff    [facility]: at capacity per website     [facility]: at capacity per website     [facility]: at capacity per website     [facility]: at capacity per website     [facility] Posting 2 beds    [facility]: at capacity per website     [facility]: Posting 6 beds (Mixed unit/Low acuity only).     [facility]: at capacity per website     [facility]: at capacity per website     [facility]: at capacity per website     [facility] [place]: at capacity per website.  No Intake after 10PM.       [facility]: at capacity per website.    [facility]: at capacity per website    [facility]: Posting 1 beds.  Low acuity    [facility]: at capacity per website.  Voluntary patients only.    [facility]: Posting 7 beds. (Low acuity only. Must have the cognitive ability to do programming. No aggressive or violent behavior or recent HX in the last 2 yrs. MH must be primary).     [facility]: at capacity per website     [facility] hospital: at capacity per website     [facility]: Posting 4 beds (Covid neg. Voluntary only. Mixed unit. No aggressive or violent behavior. No registered sex offenders).     [facility]: Posting 1 bed.  Negative COVID test required. No lines, drains or tubes (oxygen, CPAP, IV, etc.) No history of aggression, violence, or assault. We do not provide Detox or CD treatment.    [facility]: Posting 8 beds. Facility is in [place].     [facility]: Posting 4 beds.  Will serve ages 13-18 (mixed unit with adults). Negative COVID test required. No lines, drains or tubes (oxygen, CPAP, IV, etc.)      Remains on wait list.

## 2023-05-29 NOTE — PROGRESS NOTES
[facility]    Medicine Progress Note - Hospitalist Service    Date of Admission:  5/5/2023    Assessment & Plan   Bhavana Marr is an 82-year-old female patient with PMH of asthma; dyslipidemia; depression/anxiety; bipolar disorder; borderline personality disorder; osteoporosis; prior gastric bypass; and recent hospitalization (4/21/2023 to 4/28/2023) with issues including suicidal ideation and dementia with behavioral disturbance. She presented from her care facility with decreased oral intake, low glucose level, low blood pressure, depression and possible suicidal ideations.     On initial evaluation, patient was afebrile, normotensive, not tachycardic.  Labs notable for BMP with sodium 133 otherwise normal; CBC was normal; LFTs showed low albumin of 3.0 and low protein 5.3 and low AST of 9, otherwise normal; glucose 109; urinalysis showed 60 ketones and not suggestive of infection.       Decreased oral intake with hypoglycemia, low blood pressures: improving  Hyponatremia, suspect due to decreased PO intake: resolved   Failure to thrive, possibly due to Psychiatric Illness; see below   * Initial presentation as above. Pt found with low sodium as well as ketones in the urine. Given fluids in the ED.    * No abdominal pain or lab findings to suggest a primary GI etiology.  * d/roberto IVF on 5/8 due to improved oral intake  -- Mental health management as below.     Psychiatric issues  Depression/anxiety, bipolar d/o, borderline personality d/o with possible suicidal ideations  * Of note is that the patient was recently hospitalized through [facility] with issues including suicidal ideation.  She was seen by psychiatry during that hospitalization; they did not recommend any type of psychiatric admission.    * Lives in LTC facility.  Per review of psych note, that the facility director of nursing reported that patient does not have hx of Dementia and was entered in her chart erroneously during  prior admission in 2021.     * Initial presentation as above. Noted manic episode and some confusion. On admit, pt denied suicidal ideations on admit but did endorse feeling depressed.  She was seen by the DEC  in the ED and not felt to be actively suicidal.  * UDS has been completed on 5/24 pre request of inpat psych, results are negative    -- Psychiatry following intermittently, last evaluated on 5/25, continued on current olanzapine 5 mg daily and 10 mg nightly along with sertraline 150 mg daily and Remeron 22.5 mg at night; on 5/22, started Depakote sprinkles 125 mg BID and 250 mg at bedtime  --  continue to recommend inpatient psychiatry admission  -- valproic acid level 29.1 on 5/26. Defer to psych when they re-eval if need to adjust Depakote dose  --had court hearing for commitment on 5/23, commitment supported by [facility]  -- sitter was discontinued on 5/23  -- 5/27---notified by RN that inpt psych intake called and said she is not appropriate a this time due to increased nursing needs due to frequent BM (see below under constipation).  Patient needs to be independent  -- 5/28 discussed with social work, will clarify with [facility] tomorrow if they are able to accommodate pt since she is stand by assist and intermittently incontinent. If still unable to accommodate patient, will need to re consult psych, BP are also resolved which were sec to the result of Bowel regimen for constipation      Suspect Moderate malnutrition In Context of:  Acute illness or injury  -- Nutrition following; supplements for nutrition  -- will start checking weight weekly   -- oral intake is slowly improving      Prior gastric bypass  B12 deficiency  Iron deficienty anemia  GERD  -- Can resume PTA B12 and iron on discharge  -- continue PTA famotidine  -- was planned for outpatient UGI endoscopy (5/22) for nausea/vomiting with h/o gastric bypass (was seen in clinic by Yessi 4/18/23); currently tolerating PO without significant  G.I. issues, can reschedule EGD with the PCP as outpatient on follow up     Asthma  --  PRN albuterol inhaler     Dyslipidemia  -- Continue PTA simvastatin.      Osteoporosis  -- Resume alendronate after discharge.     Constipation-resolved  Patient with frequent loose stools on 5/26 needing frequent nursing assistance. Scheduled Senna-colace discontinued. Loose/soft stools are likely related to bowel regimen. She does not have abdominal pain, fever, nausea or vomiting.     -- Discontinue scheduled bowel regimen on 5/26  -- Last BM now recorded on 5/27. Will need to f/u and may need to started lower dose of scheduled senna-colace if no BM tomrorow  -- monitor stools, utilize prn stool     Diet: Room Service  Combination Diet Regular Diet; Safe Tray - with utensils    DVT Prophylaxis: Pneumatic Compression Devices and Anti-embolisim stockings (TEDs)  Cortez Catheter: Not present  Lines: None     Cardiac Monitoring: None  Code Status: Full Code      Clinically Significant Risk Factors              # Hypoalbuminemia: Lowest albumin = 3 g/dL at 5/5/2023  4:53 PM, will monitor as appropriate                     Disposition Plan  awaiting inpt psych admission. medically stable    Expected Discharge Date: 05/29/2023,  6:00 PM  Discharge Delays: Specialist Consult (enter specialist & decision needed in comments)  *Medically Ready for Discharge  Placement - Mental Health/Addiction/Geripsych    Discharge Comments: .72 hour hold--up on Monday 1214.   County to meet on Friday for commitment.       Inpateint psych recommended.          Radha Yan MD  Hospitalist Service  [facility]  Securely message with [employer] (more info)  Text page via [employer] Paging/Directory   ______________________________________________________________________    Interval History :    No acute issues, last bowel movement was recorded on 05/27, patient is denying any abdominal pain, denying any diarrhea.  Will discuss with social  services if [facility] would be able to take patient.    Physical Exam   Vital Signs: Temp: 97.8  F (36.6  C) Temp src: Oral BP: 128/73 Pulse: 69   Resp: 16 SpO2: 94 % O2 Device: None (Room air)    Weight: 119 lbs .77 oz    General Appearance: Alert,awake and no apparent distress  Respiratory: CTAB, no wheezing  Cardiovascular: regular rate and rhythm  GI: soft and non-tender  Skin: warm and dry      Medical Decision Making     30 MINUTES SPENT BY ME on the date of service doing chart review, history, exam, documentation & further activities per the note.  MANAGEMENT DISCUSSED with the following over the past 24 hours: patient, RN, charge RN/care transition team   NOTE(S)/MEDICAL RECORDS REVIEWED over the past 24 hours: social work note      Data         Imaging results reviewed over the past 24 hrs:   No results found for this or any previous visit (from the past 24 hour(s)).

## 2023-05-29 NOTE — PLAN OF CARE
DATE & TIME: 05/29/23 7-3 pm   Cognitive Concerns/ Orientation : A & O x3, disorientated to situation. Forgetful.   BEHAVIOR & AGGRESSION TOOL COLOR: Green.   ABNL VS/O2: VSS on RA  MOBILITY: SBA with gait belt  PAIN MANAGMENT: Denies pain. On scheduled Tylenol.   DIET: Regular- safe tray. Good appetite.   BOWEL/BLADDER: Continent of bladder. No BM.   ABNL LAB/BG: WDL  DRAIN/DEVICES: No IV access  SKIN: Pale. Blanchable redness to R hip and elbow- mepilex in place.  D/C DATE: Pending placement.   Discharge Barriers: Pt needs to be fully independent to be accepted, SW to F/U with [facility], if able to take patient on SBA.  OTHER IMPORTANT INFO: Patient on a court hold. No issues. Stable. Showered.

## 2023-05-29 NOTE — TELEPHONE ENCOUNTER
[facility] Access Inpatient Bed Call Log 5/28/2023 8:30 PM       Intake has called facilities that have not updated their bed status within the last 12 hours.        Adults:         [facility] is posting 0 beds.      [facility] is posting 0 [phone]      [facility] is posting 0 beds. [phone]     [facility] is posting 0 beds. [phone]       [facility] is posting 0 beds. [phone]     [facility] is posting 0 beds. [phone]     [facility] is posting 0 beds. [phone]     [facility] is posting 0 beds. [phone]     [facility] is posting 2 beds [phone] Per website        [facility] is posting 0 beds. [phone]     [facility] is posting 6 beds. Mixed unit 12+. Low acuity only.  [phone] Per website     [facility] is positing 1 bed. No aggression.  [phone] Per website     [facility] is posting 0 beds. [phone] @cap per voicemail     [facility] is posting 0 beds. Low acuity only. [phone]      [facility] is posting 2 beds. [phone] Per website     [facility] is posting 0 beds. Low acuity. [phone]      [facility] is posting 2 beds. 72 hr hold preferred. [phone] Per website     [facility] is posting 2 bed. [phone] Per website     [facility] is posting 0 beds. Vol only, No Hx of aggression, violence, or assault. No sexual offenders. No 72 hr holds. [phone] @8:39am Reviewing pt in ED, Intake to call after 12pm     [facility] is posting 8 beds. (Must have the cognitive ability to do programming. No aggressive or violent behavior or recent HX in the last 2 yrs. MH must be primary.) [phone]      [facility] is posting 0 beds. Low acuity only. Violence and aggression capped. [phone] @capacity 8:26am     [facility]. Low acuity, Neg Covid. [phone] per call     [facility] is posting 4 beds. Covid neg. Vol only. Combined adolescent and adult unit. No aggressive or violent behavior. No registered sex offenders. [phone]      [facility] posting 0 beds. Negative covid.      [facility] is posting 4 beds. [phone]      [facility] is posting 8. [phone]     [facility] is posting 9 beds. [phone]                  Pt remains on work list pending appropriate bed availability.

## 2023-05-29 NOTE — PLAN OF CARE
Pt is AOx3, not to situation, denies pain, forgetful, flat affect, frequent urination, no BM for shift, good appetite, however, pt eats too fast and vomited, noted this is a daily occurrence for her. Instructed pt on slow and complete chewing to prevent emesis, R hip and elbow blanchable redness, repositioning encouraged, SBA to BR, pending placement.

## 2023-05-29 NOTE — PLAN OF CARE
Goal Outcome Evaluation:       DATE & TIME: 5/28/23-5/29/23 2251-4225  Cognitive Concerns/ Orientation : A&Ox4, very forgetful  BEHAVIOR & AGGRESSION TOOL COLOR: Green. Denies suicidal ideations or thoughts  ABNL VS/O2: VSS on RA  MOBILITY: SBA with gait belt  PAIN MANAGMENT: Denies pain  DIET: Regular- safe tray  BOWEL/BLADDER: Continent of bladder, frequent urination, denies burning or pain with urination or being full.   ABNL LAB/BG: NA  DRAIN/DEVICES: No IV access  TELEMETRY RHYTHM: NA  SKIN: Blanchable redness to hips- mepilex in place.  TESTS/PROCEDURES: NA  D/C DATE: Pending placement.   Discharge Barriers:   pt needs to be fully independent to be accepted, SW to F/U with [facility], if able to take patient on SBA.  OTHER IMPORTANT INFO: Patient on a court hold

## 2023-05-30 ENCOUNTER — TELEPHONE (OUTPATIENT)
Dept: BEHAVIORAL HEALTH | Facility: CLINIC | Age: 83
End: 2023-05-30
Payer: COMMERCIAL

## 2023-05-30 ENCOUNTER — APPOINTMENT (OUTPATIENT)
Dept: PHYSICAL THERAPY | Facility: CLINIC | Age: 83
DRG: 644 | End: 2023-05-30
Attending: INTERNAL MEDICINE
Payer: COMMERCIAL

## 2023-05-30 PROCEDURE — 97530 THERAPEUTIC ACTIVITIES: CPT | Mod: GP

## 2023-05-30 PROCEDURE — 250N000013 HC RX MED GY IP 250 OP 250 PS 637: Performed by: INTERNAL MEDICINE

## 2023-05-30 PROCEDURE — 120N000001 HC R&B MED SURG/OB

## 2023-05-30 PROCEDURE — 97161 PT EVAL LOW COMPLEX 20 MIN: CPT | Mod: GP

## 2023-05-30 PROCEDURE — 97116 GAIT TRAINING THERAPY: CPT | Mod: GP

## 2023-05-30 PROCEDURE — 250N000011 HC RX IP 250 OP 636: Performed by: HOSPITALIST

## 2023-05-30 PROCEDURE — 99231 SBSQ HOSP IP/OBS SF/LOW 25: CPT | Performed by: INTERNAL MEDICINE

## 2023-05-30 PROCEDURE — 250N000011 HC RX IP 250 OP 636: Performed by: INTERNAL MEDICINE

## 2023-05-30 PROCEDURE — 250N000013 HC RX MED GY IP 250 OP 250 PS 637

## 2023-05-30 PROCEDURE — 250N000013 HC RX MED GY IP 250 OP 250 PS 637: Performed by: HOSPITALIST

## 2023-05-30 RX ORDER — HYDROXYZINE HYDROCHLORIDE 25 MG/1
25 TABLET, FILM COATED ORAL EVERY 6 HOURS PRN
Status: DISCONTINUED | OUTPATIENT
Start: 2023-05-30 | End: 2023-06-07 | Stop reason: HOSPADM

## 2023-05-30 RX ADMIN — DIVALPROEX SODIUM 125 MG: 125 CAPSULE, COATED PELLETS ORAL at 13:59

## 2023-05-30 RX ADMIN — SERTRALINE HYDROCHLORIDE 150 MG: 100 TABLET ORAL at 08:22

## 2023-05-30 RX ADMIN — DIVALPROEX SODIUM 125 MG: 125 CAPSULE, COATED PELLETS ORAL at 08:22

## 2023-05-30 RX ADMIN — ACETAMINOPHEN 650 MG: 325 TABLET ORAL at 23:41

## 2023-05-30 RX ADMIN — ACETAMINOPHEN 650 MG: 325 TABLET ORAL at 08:22

## 2023-05-30 RX ADMIN — DIVALPROEX SODIUM 250 MG: 125 CAPSULE, COATED PELLETS ORAL at 21:29

## 2023-05-30 RX ADMIN — POLYETHYLENE GLYCOL 3350 17 G: 17 POWDER, FOR SOLUTION ORAL at 08:20

## 2023-05-30 RX ADMIN — FAMOTIDINE 20 MG: 20 TABLET ORAL at 08:22

## 2023-05-30 RX ADMIN — HYDROXYZINE HYDROCHLORIDE 25 MG: 25 TABLET, FILM COATED ORAL at 15:54

## 2023-05-30 RX ADMIN — PROCHLORPERAZINE MALEATE 5 MG: 5 TABLET ORAL at 17:30

## 2023-05-30 RX ADMIN — SENNOSIDES AND DOCUSATE SODIUM 2 TABLET: 50; 8.6 TABLET ORAL at 17:19

## 2023-05-30 RX ADMIN — ONDANSETRON 4 MG: 4 TABLET, ORALLY DISINTEGRATING ORAL at 13:25

## 2023-05-30 RX ADMIN — OLANZAPINE 5 MG: 2.5 TABLET, FILM COATED ORAL at 08:22

## 2023-05-30 RX ADMIN — MIRTAZAPINE 22.5 MG: 15 TABLET, FILM COATED ORAL at 21:29

## 2023-05-30 RX ADMIN — VALACYCLOVIR HYDROCHLORIDE 500 MG: 500 TABLET, FILM COATED ORAL at 08:22

## 2023-05-30 RX ADMIN — ENOXAPARIN SODIUM 40 MG: 40 INJECTION SUBCUTANEOUS at 12:24

## 2023-05-30 RX ADMIN — SIMVASTATIN 40 MG: 20 TABLET, FILM COATED ORAL at 21:29

## 2023-05-30 RX ADMIN — ACETAMINOPHEN 650 MG: 325 TABLET ORAL at 17:18

## 2023-05-30 RX ADMIN — OLANZAPINE 10 MG: 5 TABLET, FILM COATED ORAL at 21:29

## 2023-05-30 ASSESSMENT — ACTIVITIES OF DAILY LIVING (ADL)
ADLS_ACUITY_SCORE: 27
ADLS_ACUITY_SCORE: 23
ADLS_ACUITY_SCORE: 28
ADLS_ACUITY_SCORE: 27
ADLS_ACUITY_SCORE: 23
ADLS_ACUITY_SCORE: 27
ADLS_ACUITY_SCORE: 27
ADLS_ACUITY_SCORE: 23
ADLS_ACUITY_SCORE: 27
ADLS_ACUITY_SCORE: 23
ADLS_ACUITY_SCORE: 28
ADLS_ACUITY_SCORE: 28

## 2023-05-30 NOTE — PLAN OF CARE
Summary: Low BP, blood sugar, depression, decreased oral intake and possible suicidal ideations.  DATE & TIME: 5/30/23 [phone]  Cognitive Concerns/ Orientation : A & O x3, disoriented to situation. [place].  PRN Hydroxyzine ordered for increased anxiety.  BEHAVIOR & AGGRESSION TOOL COLOR: Yellow, has passive SI thoughts.  ABNL VS/O2: VSS on RA  MOBILITY: SBA with GB and walker in hallway.   PAIN MANAGMENT: Denies. On scheduled Tylenol.   DIET: Regular- Safe tray  BOWEL/BLADDER: Continent this shift. Up to bathroom No BM, administered PRN senna this shift- got miralax this morning  ABNL LAB/BG: N/A  DRAIN/DEVICES: No PIV   TELEMETRY RHYTHM: N/A  SKIN: Pale. Redness on R hip, mepilex placed. Redness to R elbow.   TESTS/PROCEDURES: None  D/C DATE: Pending placement  Discharge Barriers: Patient needs to be fully independent for acceptance into inpatient psych. SW following.  OTHER IMPORTANT INFO: Patient on a court hold. PRN atarax given for anxiety. Very small emesis this shift.  PRN compazine given for nausea.

## 2023-05-30 NOTE — PROGRESS NOTES
"   05/30/23 1000   Appointment Info   Signing Clinician's Name / Credentials (PT) Pamela Nye, ZEINAB   Living Environment   Living Environment Comments Unable to confirm home environment, per discussion with RN, pt resides in an [facility].   Self-Care   Activity/Exercise/Self-Care Comment Poor historian. Pt reports independence with mobility prior to admit.   General Information   Onset of Illness/Injury or Date of Surgery 05/05/23   Referring Physician Dr. Yan   Patient/Family Therapy Goals Statement (PT) \"To get out of here\"   Pertinent History of Current Problem (include personal factors and/or comorbidities that impact the POC) Patient is an 82-year-old female patient with PMH of asthma; dyslipidemia; depression/anxiety; bipolar disorder; borderline personality disorder; osteoporosis; prior gastric bypass; and recent hospitalization (4/21/2023 to 4/28/2023) with issues including suicidal ideation and dementia with behavioral disturbance. She presented from her care facility with decreased oral intake, low glucose level, low blood pressure, depression and possible suicidal ideations.   Existing Precautions/Restrictions fall   Cognition   Affect/Mental Status (Cognition) anxious;confused   Orientation Status (Cognition) oriented to;person   Pain Assessment   Patient Currently in Pain No   Range of Motion (ROM)   Range of Motion ROM is WFL   Strength (Manual Muscle Testing)   Strength Comments Gross LE strength 4/5 bilaterally   Bed Mobility   Comment, (Bed Mobility) SBA   Transfers   Comment, (Transfers) CGA   Gait/Stairs (Locomotion)   Comment, (Gait/Stairs) 10' CGA decreased step length   Balance   Balance Comments Good in sitting, fair in standing   Clinical Impression   Criteria for Skilled Therapeutic Intervention Yes, treatment indicated   PT Diagnosis (PT) Impaired ambulation   Influenced by the following impairments Decreased strength, decreased endurance, decreased balance   Functional limitations due to " impairments Difficulty with bed mobility, transfers, ambulation   Clinical Presentation (PT Evaluation Complexity) Stable/Uncomplicated   Clinical Presentation Rationale VSS, pain controlled   Clinical Decision Making (Complexity) low complexity   Planned Therapy Interventions (PT) balance training;bed mobility training;gait training;patient/family education;transfer training;strengthening   Risk & Benefits of therapy have been explained evaluation/treatment results reviewed;care plan/treatment goals reviewed;risks/benefits reviewed;current/potential barriers reviewed;participants voiced agreement with care plan;participants included;patient   PT Total Evaluation Time   PT Karinaal, Low Complexity Minutes (52284) 10   PT Discharge Planning   PT Plan Progress independence, increase ambulation distance with and without FWW   PT Discharge Recommendation (DC Rec) home with assist   PT Rationale for DC Rec Pt currently requires SBA to CGA for mobility, anticipate this is near baseline mobility. Pt reports that she uses furntiture at home to stabilize herself during ambulation, pt does better today with ambulation with FWW. Continues to require SBA to CGA 2/2 poor safety awareness and unfamiliar environment.   PT Brief overview of current status SBA to CGA   Total Session Time   Total Session Time (sum of timed and untimed services) 10

## 2023-05-30 NOTE — PLAN OF CARE
Summary: Low BP, blood sugar, depression, decreased oral intake and possible suicidal ideations.  DATE & TIME: 5/30/23 7-3 pm   Cognitive Concerns/ Orientation : A & O x3, disoriented to situation. [place]. States she is feeling nervousness, appears anxious. Rambling speech. Worse compared to yesterday. PRN Hydroxyzine ordered.   BEHAVIOR & AGGRESSION TOOL COLOR: Yellow, has passive SI thoughts.   ABNL VS/O2: VSS on RA  MOBILITY: SBA with GB and walker in hallway. Worked with PT today.   PAIN MANAGMENT: Denies. On scheduled Tylenol.   DIET: Regular- Safe tray  BOWEL/BLADDER: Continent this shift. No BM, administered PRN Miralax.   ABNL LAB/BG: N/A  DRAIN/DEVICES: No PIV   TELEMETRY RHYTHM: N/A  SKIN: Pale. Redness on R hip, mepilex placed. Redness to R elbow.   TESTS/PROCEDURES: None  D/C DATE: Pending placement  Discharge Barriers: Patient needs to be fully independent for acceptance into inpatient psych. SW following.  OTHER IMPORTANT INFO: Patient on a court hold. Vomited x1 due to panic/anxiety, Zofran x1. Appears mood/mental state worse today. Still cooperative.

## 2023-05-30 NOTE — PLAN OF CARE
Goal Outcome Evaluation:       DATE & TIME: 5/29/23-5/30/23 2943-3365    Cognitive Concerns/ Orientation : A/O, disoriented to this shift    BEHAVIOR & AGGRESSION TOOL COLOR: Green. Denies SI/ST    CIWA SCORE: NA     ABNL VS/O2: VSS on RA    MOBILITY: SBA with GB    PAIN MANAGMENT: denies    DIET: Regular- Safe tray    BOWEL/BLADDER: Continent this shift    ABNL LAB/BG: Non new    DRAIN/DEVICES: No device present    TELEMETRY RHYTHM: N/A    SKIN: Redness on R hip    TESTS/PROCEDURES: none    D/C DATE: pending placement    Discharge Barriers: patient needs to be fully independent for acceptance into inpatient psych. SW following.    OTHER IMPORTANT INFO: Patient on a court hold

## 2023-05-30 NOTE — PROGRESS NOTES
Care Management Follow Up    Length of Stay (days): 23    Expected Discharge Date: 05/31/2023     Concerns to be Addressed:       Patient plan of care discussed at interdisciplinary rounds: Yes    Anticipated Discharge Disposition:  To In patient psychiatry or to group home depending upon psychiatry reevaluation      Anticipated Discharge Services:    Anticipated Discharge DME:      Patient/family educated on Medicare website which has current facility and service quality ratings:    Education Provided on the Discharge Plan:    Patient/Family in Agreement with the Plan:      Referrals Placed by CM/SW:    Private pay costs discussed: Not applicable    Additional Information:  [facility] In patient psychiatry unit criteria includes that patient be independent with transfers and mobility or with use of an right ear.  PT has been consulted to evaluate patient's mobility status.  Writer did speak with the DON at patient's SNF. She reports patient does have a gait disturbance at baseline. When out in their common areas she does use the hallway railing.  She does not use an assistive device.   Any further questions regarding patient's mobility status can be directed to Josefina SERRANO at [phone].    Larissa Voss, LEENASW

## 2023-05-30 NOTE — CONSULTS
"Triage and Transition - Consult and Liaison   [phone]  May 30, 2023      Bhavana Marr  1940    Plan:     Continue care coordination with care team    Maintain current transition plan. Next steps include: Consult re-order for tomorrow.     Patient will continue to be followed by this service with next review on 05/31/2023.    Please enter another psychiatry if further visits are needed. Patients are not followed by Psychiatry C&L Service unless otherwise indicated.         Presenting problem, including what brought patient to hospital: Bhavana Marr  is followed related to  82 year old female with a history of bipolar I disorder, gastric bypass surgery (unknown procedure type), osteoporosis, asthma, and dyslipidemia who presented from her LTC facility [facility] on 5/5/23 for poor PO intake with subsequent hypoglycemia and hypotension, depression, and suicidal ideation.     Reason for consult: Requested by Dr. Yan  to determine if pt meets criteria for inpatient.    Reason for inability to complete assessment with patient: Pt states today was Wednesday, but was oriented to the year and hospital. PT states that she still needs assistance to use the restroom and is unsteady on her feet. Pt then stated \"i'm done answering questions now\"        Medications:   Current Facility-Administered Medications   Medication     acetaminophen (TYLENOL) tablet 650 mg    Or     acetaminophen (TYLENOL) Suppository 650 mg     acetaminophen (TYLENOL) tablet 650 mg     albuterol (PROVENTIL HFA/VENTOLIN HFA) inhaler     glucose gel 15-30 g    Or     dextrose 50 % injection 25-50 mL    Or     glucagon injection 1 mg     divalproex sodium delayed-release (DEPAKOTE SPRINKLE) DR capsule 125 mg     divalproex sodium delayed-release (DEPAKOTE SPRINKLE) DR capsule 250 mg     enoxaparin ANTICOAGULANT (LOVENOX) injection 40 mg     famotidine (PEPCID) tablet 20 mg     melatonin tablet 1 mg     mirtazapine (REMERON) tablet 22.5 mg "     OLANZapine (zyPREXA) tablet 10 mg     OLANZapine (zyPREXA) tablet 5 mg     ondansetron (ZOFRAN ODT) ODT tab 4 mg    Or     ondansetron (ZOFRAN) injection 4 mg     polyethylene glycol (MIRALAX) Packet 17 g     prochlorperazine (COMPAZINE) injection 5 mg    Or     prochlorperazine (COMPAZINE) tablet 5 mg    Or     prochlorperazine (COMPAZINE) suppository 12.5 mg     senna-docusate (SENOKOT-S/PERICOLACE) 8.6-50 MG per tablet 1 tablet    Or     senna-docusate (SENOKOT-S/PERICOLACE) 8.6-50 MG per tablet 2 tablet     sertraline (ZOLOFT) tablet 150 mg     simvastatin (ZOCOR) tablet 40 mg     valACYclovir (VALTREX) tablet 500 mg     Medications Prior to Admission   Medication Sig Dispense Refill Last Dose     acetaminophen (TYLENOL) 325 MG tablet Take 650 mg by mouth every 6 hours as needed for mild pain Max acetaminophen dose 3250 mg        acetaminophen (TYLENOL) 325 MG tablet Take 1-2 tablets (325-650 mg) by mouth 2 times daily (Patient taking differently: Take 650 mg by mouth 2 times daily) 90 tablet 4 5/5/2023 at 0800     alendronate (FOSAMAX) 70 MG tablet Take 70 mg by mouth every 7 days Every Wednesday   5/3/2023     alum & mag hydroxide-simethicone (MYLANTA/MAALOX) 200-200-20 MG/5ML SUSP suspension Take 15 mLs by mouth every 3 hours as needed for indigestion  0      budesonide-formoterol (SYMBICORT) 80-4.5 MCG/ACT Inhaler Inhale 2 puffs into the lungs 2 times daily as needed (Shortness of breath, wheezing) 10.2 g 3      calcium carbonate (TUMS) 500 MG chewable tablet Chew 2 tabs every 3 hours as needed        Calcium Oyster Shell 500 MG TABS Take 1 tablet by mouth daily 30 tablet 11 5/5/2023 at 0800     capsaicin (ZOSTRIX) 0.025 % external cream Apply topically 3 times daily as needed        cholecalciferol (VITAMIN D3) 1000 UNIT tablet Take 1 tablet (1,000 Units) by mouth daily 30 tablet 11 5/5/2023 at 0800     Cyanocobalamin (B-12) 1000 MCG TBCR Take 1,000 mcg by mouth every 48 hours 14 tablet 11 5/4/2023 at  "0748     famotidine (PEPCID) 20 MG tablet Take 20 mg by mouth 2 times daily   5/5/2023 at 0800     FEROSUL 325 (65 Fe) MG tablet TAKE 1 TABLET BY MOUTH EVERY \"OTHER\" DAY 30 tablet 12 5/3/2023 at 1259     fluticasone (FLONASE) 50 MCG/ACT nasal spray Spray 2 sprays into both nostrils daily as needed for rhinitis or allergies        guaiFENesin-dextromethorphan (ROBITUSSIN DM) 100-10 MG/5ML syrup Take 10 mLs by mouth every 4 hours as needed for cough 560 mL       hypromellose (ARTIFICIAL TEARS) 0.5 % SOLN ophthalmic solution Place 2 drops into both eyes 4 times daily as needed        loperamide (IMODIUM) 1 MG/5ML solution Give as needed for diarrhea. 2-4 tsp after first loose stool and 2 tsp after each additional loose stool. Not to exceed 8 tsp in 72 hours        magnesium hydroxide (MILK OF MAGNESIA) 400 MG/5ML suspension Take 30 mLs by mouth daily as needed for constipation        mirtazapine (REMERON) 15 MG tablet Take 1.5 tablets (22.5 mg) by mouth At Bedtime 30 tablet 11 5/4/2023 at 2000     Multiple Vitamins-Minerals (CEROVITE SENIOR) TABS Take 1 tablet by mouth daily 30 tablet 3 5/5/2023 at 0800     OLANZapine (ZYPREXA) 2.5 MG tablet Take 1 tablet (2.5 mg) by mouth every morning   5/5/2023 at 0800     OLANZapine (ZYPREXA) 5 MG tablet Take 2 tablets (10 mg) by mouth At Bedtime   5/4/2023 at 2100     ondansetron (ZUPLENZ) 4 MG FILM Take 4 mg by mouth every 4 hours as needed (for nausea, vomiting) 30 each 1      polyethylene glycol (MIRALAX) 17 g packet Take 17 g by mouth Every Mon, Wed, Fri Morning   5/5/2023 at 0800     polyethylene glycol (MIRALAX) 17 g packet Take 17 g by mouth daily as needed for constipation        SENNA-docusate sodium (SENNA S) 8.6-50 MG tablet Take 1 tablet by mouth once as needed (constipation)        SENNA-docusate sodium (SENNA S) 8.6-50 MG tablet Take 1 tablet by mouth daily   5/5/2023 at 0800     sertraline (ZOLOFT) 100 MG tablet Take 1.5 tablets (150 mg) by mouth daily 30 tablet 11 " 5/5/2023 at 0800     simvastatin (ZOCOR) 40 MG tablet Take 1 tablet (40 mg) by mouth At Bedtime 30 tablet 11 5/4/2023 at 2000     trolamine salicylate (ASPERCREME) 10 % external cream Apply topically as needed for moderate pain        valACYclovir (VALTREX) 500 MG tablet Give 500 mg by mouth one time a day 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

## 2023-05-30 NOTE — PROGRESS NOTES
[facility]    Medicine Progress Note - Hospitalist Service    Date of Admission:  5/5/2023    Assessment & Plan   Bhavana Marr is an 82-year-old female patient with PMH of asthma; dyslipidemia; depression/anxiety; bipolar disorder; borderline personality disorder; osteoporosis; prior gastric bypass; and recent hospitalization (4/21/2023 to 4/28/2023) with issues including suicidal ideation and dementia with behavioral disturbance. She presented from her care facility with decreased oral intake, low glucose level, low blood pressure, depression and possible suicidal ideations.     On initial evaluation, patient was afebrile, normotensive, not tachycardic.  Labs notable for BMP with sodium 133 otherwise normal; CBC was normal; LFTs showed low albumin of 3.0 and low protein 5.3 and low AST of 9, otherwise normal; glucose 109; urinalysis showed 60 ketones and not suggestive of infection.       Decreased oral intake with hypoglycemia, low blood pressures: improving  Hyponatremia, suspect due to decreased PO intake: resolved   Failure to thrive, possibly due to Psychiatric Illness; see below   * Initial presentation as above. Pt found with low sodium as well as ketones in the urine. Given fluids in the ED.    * No abdominal pain or lab findings to suggest a primary GI etiology.  * d/roberto IVF on 5/8 due to improved oral intake  -- Mental health management as below.     Psychiatric issues  Depression/anxiety, bipolar d/o, borderline personality d/o with possible suicidal ideations  * Of note is that the patient was recently hospitalized through [facility] with issues including suicidal ideation.  She was seen by psychiatry during that hospitalization; they did not recommend any type of psychiatric admission.    * Lives in LTC facility.  Per review of psych note, that the facility director of nursing reported that patient does not have hx of Dementia and was entered in her chart erroneously during  prior admission in 2021.     * Initial presentation as above. Noted manic episode and some confusion. On admit, pt denied suicidal ideations on admit but did endorse feeling depressed.  She was seen by the DEC  in the ED and not felt to be actively suicidal.  * UDS has been completed on 5/24 pre request of inpat psych, results are negative    -- Psychiatry following intermittently, last evaluated on 5/25, continued on current olanzapine 5 mg daily and 10 mg nightly along with sertraline 150 mg daily and Remeron 22.5 mg at night; on 5/22, started Depakote sprinkles 125 mg BID and 250 mg at bedtime  --  continue to recommend inpatient psychiatry admission  -- valproic acid level 29.1 on 5/26. Defer to psych when they re-eval if need to adjust Depakote dose  --had court hearing for commitment on 5/23, commitment supported by [facility]  -- sitter was discontinued on 5/23  -- 5/27---notified by RN that inpt psych intake called and said she is not appropriate a this time due to increased nursing needs due to frequent BM (see below under constipation).  Patient needs to be independent  -- 5/28 discussed with social work, will clarify with [facility] if they are able to accommodate pt since she is stand by assist and intermittently  -- We will consult physical therapy to evaluate patient, will also reconsult psych to evaluate if any further medications adjustments are needed.     Suspect Moderate malnutrition In Context of:  Acute illness or injury  -- Nutrition following; supplements for nutrition  -- will start checking weight weekly   -- oral intake is slowly improving      Prior gastric bypass  B12 deficiency  Iron deficienty anemia  GERD  -- Can resume PTA B12 and iron on discharge  -- continue PTA famotidine  -- was planned for outpatient UGI endoscopy (5/22) for nausea/vomiting with h/o gastric bypass (was seen in clinic by Yessi 4/18/23); currently tolerating PO without significant G.I. issues, can reschedule EGD  with the PCP as outpatient on follow up     Asthma  --  PRN albuterol inhaler     Dyslipidemia  -- Continue PTA simvastatin.      Osteoporosis  -- Resume alendronate after discharge.     Constipation-resolved  Patient with frequent loose stools on 5/26 needing frequent nursing assistance. Scheduled Senna-colace discontinued. Loose/soft stools are likely related to bowel regimen. She does not have abdominal pain, fever, nausea or vomiting.     -- Discontinue scheduled bowel regimen on 5/26  -- Last BM now recorded on 5/27. Will need to f/u and may need to started lower dose of scheduled senna-colace if no BM tomrorow  -- monitor stools, utilize prn stool     Diet: Room Service  Combination Diet Regular Diet; Safe Tray - with utensils    DVT Prophylaxis: Pneumatic Compression Devices and Anti-embolisim stockings (TEDs)  Cortez Catheter: Not present  Lines: None     Cardiac Monitoring: None  Code Status: Full Code      Clinically Significant Risk Factors              # Hypoalbuminemia: Lowest albumin = 3 g/dL at 5/5/2023  4:53 PM, will monitor as appropriate                     Disposition Plan  awaiting inpt psych admission. medically stable          Radha Yan MD  Hospitalist Service  [facility]  Securely message with [employer] (more info)  Text page via [employer] Paging/Directory   ______________________________________________________________________    Interval History :    No acute issues, patient was seen and examined, patient told me that she thinks that she might be more confused this morning and told the charge something wrong.  Discussed with patient that patient did not have any discussion with  this morning , encourage patient to continue to increase her oral intake.    Physical Exam   Vital Signs: Temp: 97.6  F (36.4  C) Temp src: Oral BP: 118/68 Pulse: 68   Resp: 16 SpO2: 98 % O2 Device: None (Room air)    Weight: 119 lbs .77 oz    General Appearance: Alert,awake and no apparent  distress  Respiratory: CTAB, no wheezing  Cardiovascular: regular rate and rhythm  GI: soft and non-tender  Skin: warm and dry      Medical Decision Making     30 MINUTES SPENT BY ME on the date of service doing chart review, history, exam, documentation & further activities per the note.  MANAGEMENT DISCUSSED with the following over the past 24 hours: patient, RN, charge RN/care transition team   NOTE(S)/MEDICAL RECORDS REVIEWED over the past 24 hours: social work note      Data     I have personally reviewed the following data over the past 24 hrs:    N/A  \   N/A   / 259     N/A N/A N/A /  N/A   N/A N/A 0.59 \       Imaging results reviewed over the past 24 hrs:   No results found for this or any previous visit (from the past 24 hour(s)).

## 2023-05-30 NOTE — TELEPHONE ENCOUNTER
R: [facility] Access Inpatient Bed Call Log 5/30/2023 8:12 AM          Intake has called facilities that have not updated their bed status within the last 12 hours.           Adults:                    [facility] is posting 0 beds.                [facility] is posting 0 [phone]                [facility] is posting 0 beds. [phone]               [facility] is posting 0 beds. [phone] 8:16a Per Ariana, no beds available currently, may have some later today.                [facility] is posting 0 beds. [phone]               [facility] is posting 0 beds. [phone] 8:15a Per Elmer, no beds available.                [facility] is posting 0 beds. [phone]               [facility] is posting 0 beds. [phone]               [facility] is posting 2 beds. [phone]               [facility] is posting 0 beds. [phone]                    [facility] is posting 3 beds. Mixed unit 12+. Low acuity only.  [phone]               [facility] is positing 0 beds. No aggression.  [phone]               [facility] is posting 0 beds. [phone] 8:19a Number unavailable.                [facility] is posting 0 beds. Low acuity only. [phone]               [facility] is posting 2 beds. [phone]               [facility] is posting 0 beds. Low acuity. [phone]               [facility] is posting 0 beds. 72 hr hold preferred. [phone] 8:18a Per Colten no beds available.                [facility] is posting 1 beds. [phone]               [facility] is posting 0 beds. Vol only, No Hx of aggression, violence, or assault. No sexual offenders. No 72 hr holds. [phone]                    [facility] is posting 7 beds. (Must have the cognitive ability to do programming. No aggressive or violent behavior  or recent HX in the last 2 yrs. MH must be primary.) [phone]                is posting 0 beds. Low acuity only. Violence and aggression capped. [phone]     [facility] is posting 1 beds. Low acuity, Neg Covid. [phone] 8:20a No beds available.     [facility] is posting 4 beds. Covid neg. Vol only. Combined adolescent and adult unit. No aggressive or violent behavior. No registered sex offenders. [phone]   [facility] posting 0 beds. Negative covid.   [facility] is posting 1 beds.  [phone] 8:21a Per Chris, no beds available at this time.   [facility] is posting 2 beds. Call for details. [phone] 8:13a Per Natasha, beds available.    [facility] is posting 4 beds. [phone]   Pt remains on work list pending appropriate bed availability.

## 2023-05-31 ENCOUNTER — APPOINTMENT (OUTPATIENT)
Dept: PHYSICAL THERAPY | Facility: CLINIC | Age: 83
DRG: 644 | End: 2023-05-31
Payer: COMMERCIAL

## 2023-05-31 ENCOUNTER — TELEPHONE (OUTPATIENT)
Dept: BEHAVIORAL HEALTH | Facility: CLINIC | Age: 83
End: 2023-05-31
Payer: COMMERCIAL

## 2023-05-31 PROCEDURE — 250N000013 HC RX MED GY IP 250 OP 250 PS 637: Performed by: HOSPITALIST

## 2023-05-31 PROCEDURE — 97116 GAIT TRAINING THERAPY: CPT | Mod: GP | Performed by: PHYSICAL THERAPIST

## 2023-05-31 PROCEDURE — 250N000011 HC RX IP 250 OP 636: Performed by: HOSPITALIST

## 2023-05-31 PROCEDURE — 250N000013 HC RX MED GY IP 250 OP 250 PS 637: Performed by: INTERNAL MEDICINE

## 2023-05-31 PROCEDURE — 250N000013 HC RX MED GY IP 250 OP 250 PS 637

## 2023-05-31 PROCEDURE — 99232 SBSQ HOSP IP/OBS MODERATE 35: CPT

## 2023-05-31 PROCEDURE — 120N000001 HC R&B MED SURG/OB

## 2023-05-31 PROCEDURE — 250N000011 HC RX IP 250 OP 636: Performed by: INTERNAL MEDICINE

## 2023-05-31 PROCEDURE — 99231 SBSQ HOSP IP/OBS SF/LOW 25: CPT | Performed by: INTERNAL MEDICINE

## 2023-05-31 RX ORDER — FAMOTIDINE 20 MG/1
20 TABLET, FILM COATED ORAL DAILY
Status: ON HOLD | DISCHARGE
Start: 2023-06-01 | End: 2023-07-23

## 2023-05-31 RX ORDER — DIVALPROEX SODIUM 125 MG/1
375 CAPSULE, COATED PELLETS ORAL AT BEDTIME
Status: ON HOLD | DISCHARGE
Start: 2023-05-31 | End: 2023-07-23

## 2023-05-31 RX ORDER — SERTRALINE HYDROCHLORIDE 100 MG/1
100 TABLET, FILM COATED ORAL DAILY
Status: DISCONTINUED | OUTPATIENT
Start: 2023-06-01 | End: 2023-06-06

## 2023-05-31 RX ORDER — DIVALPROEX SODIUM 125 MG/1
250 CAPSULE, COATED PELLETS ORAL 2 TIMES DAILY
Status: DISCONTINUED | OUTPATIENT
Start: 2023-05-31 | End: 2023-06-07 | Stop reason: HOSPADM

## 2023-05-31 RX ORDER — MIRTAZAPINE 15 MG/1
15 TABLET, FILM COATED ORAL AT BEDTIME
DISCHARGE
Start: 2023-05-31

## 2023-05-31 RX ORDER — SERTRALINE HYDROCHLORIDE 100 MG/1
100 TABLET, FILM COATED ORAL DAILY
Status: ON HOLD | DISCHARGE
Start: 2023-06-01 | End: 2023-07-23

## 2023-05-31 RX ORDER — ACETAMINOPHEN 325 MG/1
650 TABLET ORAL EVERY 6 HOURS PRN
DISCHARGE
Start: 2023-05-31

## 2023-05-31 RX ORDER — OLANZAPINE 5 MG/1
5 TABLET ORAL EVERY MORNING
DISCHARGE
Start: 2023-06-01 | End: 2023-06-07

## 2023-05-31 RX ORDER — DIVALPROEX SODIUM 125 MG/1
250 CAPSULE, COATED PELLETS ORAL 2 TIMES DAILY
Status: ON HOLD | DISCHARGE
Start: 2023-05-31 | End: 2023-07-23

## 2023-05-31 RX ORDER — HYDROXYZINE HYDROCHLORIDE 25 MG/1
25 TABLET, FILM COATED ORAL EVERY 6 HOURS PRN
Status: ON HOLD | DISCHARGE
Start: 2023-05-31 | End: 2023-07-23

## 2023-05-31 RX ORDER — MIRTAZAPINE 15 MG/1
15 TABLET, FILM COATED ORAL AT BEDTIME
Status: DISCONTINUED | OUTPATIENT
Start: 2023-05-31 | End: 2023-06-07 | Stop reason: HOSPADM

## 2023-05-31 RX ORDER — DIVALPROEX SODIUM 125 MG/1
375 CAPSULE, COATED PELLETS ORAL AT BEDTIME
Status: DISCONTINUED | OUTPATIENT
Start: 2023-05-31 | End: 2023-06-07 | Stop reason: HOSPADM

## 2023-05-31 RX ADMIN — SIMVASTATIN 40 MG: 20 TABLET, FILM COATED ORAL at 21:04

## 2023-05-31 RX ADMIN — ONDANSETRON 4 MG: 4 TABLET, ORALLY DISINTEGRATING ORAL at 13:11

## 2023-05-31 RX ADMIN — SERTRALINE HYDROCHLORIDE 150 MG: 100 TABLET ORAL at 08:47

## 2023-05-31 RX ADMIN — VALACYCLOVIR HYDROCHLORIDE 500 MG: 500 TABLET, FILM COATED ORAL at 08:47

## 2023-05-31 RX ADMIN — ONDANSETRON 4 MG: 4 TABLET, ORALLY DISINTEGRATING ORAL at 19:02

## 2023-05-31 RX ADMIN — MIRTAZAPINE 15 MG: 15 TABLET, FILM COATED ORAL at 21:04

## 2023-05-31 RX ADMIN — DIVALPROEX SODIUM 375 MG: 125 CAPSULE, COATED PELLETS ORAL at 21:04

## 2023-05-31 RX ADMIN — PROCHLORPERAZINE MALEATE 5 MG: 5 TABLET ORAL at 18:19

## 2023-05-31 RX ADMIN — DIVALPROEX SODIUM 250 MG: 125 CAPSULE, COATED PELLETS ORAL at 13:14

## 2023-05-31 RX ADMIN — ACETAMINOPHEN 650 MG: 325 TABLET ORAL at 08:47

## 2023-05-31 RX ADMIN — OLANZAPINE 10 MG: 5 TABLET, FILM COATED ORAL at 21:04

## 2023-05-31 RX ADMIN — SENNOSIDES AND DOCUSATE SODIUM 2 TABLET: 50; 8.6 TABLET ORAL at 08:49

## 2023-05-31 RX ADMIN — HYDROXYZINE HYDROCHLORIDE 25 MG: 25 TABLET, FILM COATED ORAL at 13:14

## 2023-05-31 RX ADMIN — FAMOTIDINE 20 MG: 20 TABLET ORAL at 08:47

## 2023-05-31 RX ADMIN — OLANZAPINE 5 MG: 2.5 TABLET, FILM COATED ORAL at 08:47

## 2023-05-31 RX ADMIN — ENOXAPARIN SODIUM 40 MG: 40 INJECTION SUBCUTANEOUS at 11:13

## 2023-05-31 RX ADMIN — DIVALPROEX SODIUM 125 MG: 125 CAPSULE, COATED PELLETS ORAL at 08:47

## 2023-05-31 ASSESSMENT — ACTIVITIES OF DAILY LIVING (ADL)
ADLS_ACUITY_SCORE: 27
ADLS_ACUITY_SCORE: 23
ADLS_ACUITY_SCORE: 27
ADLS_ACUITY_SCORE: 23
ADLS_ACUITY_SCORE: 27
ADLS_ACUITY_SCORE: 23
ADLS_ACUITY_SCORE: 23

## 2023-05-31 NOTE — PLAN OF CARE
"Summary: Low BP, depression, decreased oral intake and possible suicidal ideations.  Cognitive Concerns/ Orientation : A & O x 3, disoriented to situation. Mild Ute.   BEHAVIOR & AGGRESSION TOOL COLOR: Yellow, flat affect, still having passive SI thoughts.  ABNL VS/O2: VSS on RA  MOBILITY: SBA with GB and walker. Ambulated hallway multiple times. Up in chair for meals.   PAIN MANAGMENT: Denies. On scheduled Tylenol.   DIET: Regular- Safe tray  BOWEL/BLADDER: Continent this shift. Up to bathroom, no BM since 05/27. 2 more senokot given. BS + and abdomen flat.   ABNL LAB/BG: N/A  DRAIN/DEVICES: No PIV   SKIN: Pale. Blanchable redness on R elbow and hip, mepilex placed.   TESTS/PROCEDURES: None  D/C DATE: Pending placement  Discharge Barriers: Patient needs to be fully independent for acceptance into inpatient psych. SW following.  OTHER IMPORTANT INFO: Patient on a court hold. Showered. Doing well in the morning, this afternoon around the same time as yesterday pt C/O of nausea. When asked pt states she is \"nervous\". PRN Zofran and Hydroxyzine given. Psych saw and changed some medications today.   "

## 2023-05-31 NOTE — CONSULTS
"      Psychiatry Consultation; Follow up              Reason for Consult, requesting source:    Follow up  Requesting source: Nuno Courtney    Labs and imaging reviewed, discussed with nursing.               Interim history:    From initial note: \"Bhavana Marr is a 82 year old female with a history of bipolar I disorder, gastric bypass surgery (unknown procedure type), osteoporosis, asthma, and dyslipidemia who presented from her LTC facility [facility] on 5/5/23 for poor PO intake with subsequent hypoglycemia and hypotension, depression, and suicidal ideation.      Bhavana lives in LTC facility [facility]; the director of nursing at this facility has expressed significant concerns for Bhavana's mental health- has been depressed, not getting out of bed, not eating, not talking to them, asking for them to let her die. She had expressed wanting to walk into traffic. They have sent her to the ED 3 times recently (4/20/23, 4/21/2023, and this admission 5/5/23) for similar concerns. She has been discharged the past two times with no changes to psychiatric medications and no improvement in symptoms. They do not feel she is safe to return at this point. I have attempted to speak with Bhavana on two separate occasions, both times she refused to talk to me and remained mute for most of both assessment attempts. She did not respond to questions regarding mood, suicidal ideation, medications, psychiatric history, or my recommendation for her to be admitted to inpatient psychiatry. She did however speak to the aide who was in the room and told me \"I have nothing to say to you\". \"     5/17 follow up: She is somewhat more willing to engage in conversation today but overall quite guarded still. She reports her mood is \"better\" and does endorse recent depression. Denies SI currently or recently but does report a past history of one suicide attempt \"with a rope\", when asked what stopped her at that time she responded \"there was no " "where to hang it\". Does not recall when this happened- many years ago. Repeatedly states she's going to sleep now when further questions asked, does not respond to questions.      5/22 Follow up:  Seen laying in bed, awake. Minimally willing to engage in conversation, repeatedly states \"I don't want to talk\" but does answer some questions. She continues to endorse depressed mood, today states she thinks she needs IP psych admission which she was previously not even willing to discuss. She is aware of commitment hearing tomorrow, asked what to expect with that but when I began explaining, she stated again that she is done talking. She does not ever recall taking Depakote and is agreeable to trying this for bipolar depression.    5/23 Follow up: Laying in bed, awake. Again minimally willing to engage in conversation, immediately states \"I don't want to talk\". Does answer some questions but repeatedly states she is done talking. States she did participate in court hearing this morning, \"they told me it went well\" but she is unable/unwilling to provide any further details. She continues to endorse depressed mood. Denies SI/HI/AVH. Denies any side effects from Depakote.    5/25: Again laying in bed, shades drawn. Continues to repeatedly state \"I don't want to talk\" but does answer a few questions before insisting that she is done talking. Initially states she slept well, later states her sleep has been poor. She is eating. Low energy/motivation. Denies SI/HI/AVH.     5/31 follow up: Seen sitting up in chair, eating lunch. Blinds are open in her room today. Immediately states \"I don't want to talk\" but does engage somewhat in assessment. Repeatedly states she's done talking but will answer some questions. States her mood is \"depressed\" and that \"I want to die\". She denies any intent to kill herself, but also states \"there's nothing here to use\" and laughs. She denies any anxiety, though nursing reports she has been having " "frequent panic attacks usually in the afternoon. She denies feeling restless. States she is sleeping poorly and appetite is poor, though she had just finished eating her entire lunch tray when I entered the room.        Current Medications:       acetaminophen  650 mg Oral BID     divalproex sodium delayed-release  125 mg Oral BID 09 12     divalproex sodium delayed-release  250 mg Oral At Bedtime     enoxaparin ANTICOAGULANT  40 mg Subcutaneous Q24H     famotidine  20 mg Oral Daily     mirtazapine  22.5 mg Oral At Bedtime     OLANZapine  10 mg Oral At Bedtime     OLANZapine  5 mg Oral QAM     sertraline  150 mg Oral Daily     simvastatin  40 mg Oral At Bedtime     valACYclovir  500 mg Oral Daily              MSE:   Appearance: awake, alert and sitting in chair, eating lunch.  Attitude:  guarded and somewhat cooperative  Eye Contact:  poor   Mood:  depressed  Affect:  flat affect  Speech:  slightly pressured in comparison to previous paucity of speech  Psychomotor Behavior:  no evidence of tardive dyskinesia, dystonia, or tics  Thought Process:  linear and goal oriented  Associations:  no loose associations  Thought Content:  no evidence of psychotic thought and passive suicidal ideation present  Insight:  fair  Judgement:  fair  Oriented to:  time, person, and place  Attention Span and Concentration:  fair  Recent and Remote Memory:  intact      Vital signs:  Temp: 98  F (36.7  C) Temp src: Oral BP: 137/87 Pulse: 95   Resp: 18 SpO2: 96 % O2 Device: None (Room air)   Height: 160 cm (5' 3\") Weight: 50.4 kg (111 lb 3.2 oz)  Estimated body mass index is 19.7 kg/m  as calculated from the following:    Height as of this encounter: 1.6 m (5' 3\").    Weight as of this encounter: 50.4 kg (111 lb 3.2 oz).              DSM-5 Diagnosis:   Bipolar I disorder, current episode depressed, severe, without psychosis     She does NOT have a history of dementia          Assessment:   Remains depressed, now reporting passive suicidal " "ideation. No intent or plan to harm herself. Difficult to engage in conversation. Agreeable to medication changes.    Some concern for possible activation- nursing is reporting anxiety and her speech is slightly pressured. Will decrease antidepressant doses.    VPA level came back at 29.1. Increase Depakote to 250mg BID and 375mg nightly and recheck VPA on 6/5/23.     Commitment supported by [facility].     From my initial note 5/10: \"Per her LTC facility [facility] director of nursing, she does NOT have a history of dementia. This was entered into her chart erroneously during a prior admission in 2021. She has lived at [facility] for 5 years where she sees both a primary care provider and psychiatrist regularly, neither of whom have diagnosed her with dementia. Upon review of chart, in the H&P from 6/2021 admission it states she has a \"history of apparent dementia\" but this is not listed in the most recent outpatient PCP nursing home visit not just two months prior in 4/2021.\" She does not live in a memory care facility but rather a long term care facility for persons with mental illness.            Summary of Recommendations:   1.  Increase Depakote to 250mg BID and 375mg nightly    2.  Decrease sertraline to 100mg daily    3.  Decrease mirtazapine to 15mg daily    4.  Recheck valproic acid level 6/5/23.    5.  Continue to recommend IP psych admission.  Will continue to follow until transferred.    DANIEL Koch [facility]  Consult/Liaison Psychiatry   [facility]    Contact information available via [facility] Paging/Directory  If I am not available, then YUKO LÓPEZ line ([phone]) should know who is covering our consult service.   \"Much or all of the text in this note was generated through the use of Dragon Dictate voice to text software. Errors in spelling or words which appear to be out of contact are unintentional, may be present due having escaped editing\"           "

## 2023-05-31 NOTE — TELEPHONE ENCOUNTER
2:49 PM: Intake contacted Bhavya CHAUDHRY, to see if there has been any aggression during Pt's stay in the med unit. It was reported that there are no concerns of aggression and pt just appears to be down.      [facility] Access Inpatient Bed Call Log 5/31/2023 8:30 AM       Intake has called facilities that have not updated their bed status within the last 12 hours.        Adults:         [facility] is posting 0 beds.          Pt awaiting placement for 3B. Pt remains on work list pending appropriate bed availability.

## 2023-05-31 NOTE — PROGRESS NOTES
IP MH Referral Acuity Rating Score (RARS)    LMHP complete at referral to IP MH, with DEC; and, daily while awaiting IP MH placement. Call score to PPS.  CRITERIA SCORING   New 73 HH and Involuntary for IP MH (not adolescent) 0/1   Boarding over 24 hours 1/1   Vulnerable adult at least 55+ with multiple co morbidities; or, Patient age 11 or under 1/1   Suicide ideation without relief of precipitating factors 1/1   Current plan for suicide 1/1   Current plan for homicide 0/1   Imminent risk or actual attempt to seriously harm another without relief of factors precipitating the attempt 1/1   Severe dysfunction in daily living (ex: complete neglect for self care, extreme disruption in vegetative function, extreme deterioration in social interactions) 1/1   Recent (last 2 weeks) or current physical aggression in the ED 0/1   Restraints or seclusion episode in ED 0/1   Verbal aggression, agitation, yelling, etc., while in the ED 0/1   Active psychosis with psychomotor agitation or catatonia 0/1   Need for constant or near constant redirection (from leaving, from others, etc).  1/1   Intrusive or disruptive behaviors 0/1   TOTAL Acuity Total Score: 7

## 2023-05-31 NOTE — TELEPHONE ENCOUNTER
No appropriate beds are currently available within the  system. Geriatric bed search update @ 3:10AM:       [facility]: @ cap per website  [facility]: @ cap per website  [facility]: @ cap per website  [facility]: @ cap per website  [facility]: @ cap per website  [facility]: Posting 1 bed; Low acuity referrals only; must be independent w/ ADLS. Per Priya @ 03:11 they do not have beds available  [facility]: Posting 5 beds. Mixed unit/Low acuity only; must be independent w/ ADLS and able to participate in programming.  [facility]: @ cap per website  [facility]: @ cap per website  [facility]: Posting 1 bed. Per Chad @ 03:14 their fax machine is down and suggests calling back after 8AM when referrals can be emailed to their   [facility]: Posting 1 bed. Per Janelle @ 03:16 their referral RN is not available until after 7AM  [facility]: Posting 4 beds. Mixed unit; Non-aggressive patients; must need minimal assistance w/ ADLS; Voluntary only. Per chart, pt has limited transportation home  [facility]: Posting 4 beds. Mixed unit. Per chart, pt has limited transportation home    Pt remains on work list until appropriate placement is available

## 2023-05-31 NOTE — PLAN OF CARE
Summary: Low BP, depression, decreased oral intake and possible suicidal ideations.  Cognitive Concerns/ Orientation : A & O x2-3, disoriented to place/situation. [place].   BEHAVIOR & AGGRESSION TOOL COLOR: Yellow, flat affect, has passive SI thoughts.  ABNL VS/O2: VSS on RA  MOBILITY: SBA with GB and walker in hallway.   PAIN MANAGMENT: Denies. On scheduled Tylenol.   DIET: Regular- Safe tray  BOWEL/BLADDER: Continent this shift. Up to bathroom, no BM  ABNL LAB/BG: N/A  DRAIN/DEVICES: No PIV   TELEMETRY RHYTHM: N/A  SKIN: Pale. Redness on R hip, mepilex placed.   TESTS/PROCEDURES: None  D/C DATE: Pending placement  Discharge Barriers: Patient needs to be fully independent for acceptance into inpatient psych. SW following.  OTHER IMPORTANT INFO: Patient on a court hold.

## 2023-05-31 NOTE — DISCHARGE SUMMARY
[facility]  Hospitalist Discharge Summary      Date of Admission:  5/5/2023  Date of Discharge:  5/31/2023  Discharging Provider: Radha Yan MD, FACP  Discharge Service: Hospitalist Service    Discharge Diagnoses   Failure to thrive, secondary to psychiatric illness, improving.  Hyponatremia, secondary to decreased oral intake, resolved.  Depression and anxiety  Bipolar history.  Borderline personality with intermittent suicidal ideations.  Moderate malnutrition in context of acute illness.  Prior gastric bypass surgery.  B12 deficiency.  Iron deficiency anemia.  GERD.  Asthma.  Dyslipidemia.  Osteoporosis.  History of constipation, resolved    Clinically Significant Risk Factors          Follow-ups Needed After Discharge   Follow-up Appointments     Follow Up and recommended labs and tests      Follow-up with psychiatry after the discharge and inpatient psych             Unresulted Labs Ordered in the Past 30 Days of this Admission     No orders found from 4/5/2023 to 5/6/2023.          Discharge Disposition   Transferred to inpatient psych  Condition at discharge: Stable    Hospital Course     Bhavana Marr is an 82-year-old female patient with PMH of asthma; dyslipidemia; depression/anxiety; bipolar disorder; borderline personality disorder; osteoporosis; prior gastric bypass; and recent hospitalization (4/21/2023 to 4/28/2023) with issues including suicidal ideation and dementia with behavioral disturbance. She presented from her care facility with decreased oral intake, low glucose level, low blood pressure, depression and possible suicidal ideations.     On initial evaluation, patient was afebrile, normotensive, not tachycardic.  Labs notable for BMP with sodium 133 otherwise normal; CBC was normal; LFTs showed low albumin of 3.0 and low protein 5.3 and low AST of 9, otherwise normal; glucose 109; urinalysis showed 60 ketones and not suggestive of infection.    Here are further details  regarding her current hospitalization      Decreased oral intake with hypoglycemia, low blood pressures: improving  Hyponatremia, suspect due to decreased PO intake: resolved   Failure to thrive, possibly due to Psychiatric Illness; see below   * Initial presentation as above. Pt found with low sodium as well as ketones in the urine. Given fluids in the ED.    * No abdominal pain or lab findings to suggest a primary GI etiology.  * d/roberto IVF on 5/8 due to improved oral intake     -- Mental health management as below.  -- To decrease pill burden, some of her PTA medications including alendronate 70 mg, Maalox, vitamin B12, calcium supplementation, Second external cream, ferrous sulfate, Robitussin, loperamide, mag oxide and Zofran has been is stopped  --In the coming weeks, if needed then patient can be started back on vitamin B12 and ferrous sulfate supplementation.     Psychiatric issues  Depression/anxiety, bipolar d/o, borderline personality d/o with possible suicidal ideations  * Of note is that the patient was recently hospitalized through [facility] with issues including suicidal ideation.  She was seen by psychiatry during that hospitalization; they did not recommend any type of psychiatric admission.    * Lives in LTC facility.  Per review of psych note, that the facility director of nursing reported that patient does not have hx of Dementia and was entered in her chart erroneously during prior admission in 2021.     * Initial presentation as above. Noted manic episode and some confusion. On admit, pt denied suicidal ideations on admit but did endorse feeling depressed.  She was seen by the DEC  in the ED and not felt to be actively suicidal.  * UDS has been completed on 5/24 pre request of inpat psych, results are negative     -- Psychiatry following intermittently, on 5/25, patient was continued on olanzapine 5 mg daily and 10 mg nightly along with sertraline 150 mg daily and Remeron 22.5 mg at  night; on 5/22, started Depakote sprinkles 125 mg BID and 250 mg at bedtime  -- Appreciate psychiatry help, evaluated patient this morning on 05/31/2023 we will increase Depakote to 250 mg twice daily and 375 mg nightly  -- Decreasing sertraline from 150 mg to 100 mg.  -- Decreasing mirtazapine from 22.5 mg at night to 15 mg daily  -- Recheck valproic acid level on June 5, 2023.  -- Continuing to recommend inpatient psychiatry admission  -- Had court hearing for commitment on 5/23, commitment supported by [facility]  -- sitter was discontinued on 5/23  -- Patient has been getting evaluated by inpatient psych, at this time patient is medically stable does not have any diarrhea is more prone to constipation and does require intermittent administration of his stool softener, patient has also been evaluated by physical therapy yesterday and seems to be at her baseline for movement.  --Patient is continued on adjusted medication dosages on discharge as per this morning.      Suspect Moderate malnutrition In Context of:  Acute illness or injury  -- Nutrition following; supplements for nutrition  -- will start checking weight weekly   -- oral intake is slowly improving   -- BMP can be checked intermittently to monitor electrolytes.     Prior gastric bypass  B12 deficiency  Iron deficienty anemia  GERD  -- Can resume PTA B12 and iron next week  -- Continue PTA famotidine  -- was planned for outpatient UGI endoscopy (5/22) for nausea/vomiting with h/o gastric bypass (was seen in clinic by Yessi 4/18/23); currently tolerating PO without significant G.I. issues, can reschedule EGD with the PCP as outpatient on follow up     Asthma  --  PRN albuterol inhaler     Dyslipidemia  -- Continue PTA simvastatin.      Osteoporosis  -- Resume alendronate after discharge from psych     Constipation-resolved  Patient with frequent loose stools on 5/26 needing frequent nursing assistance. Scheduled Senna-colace discontinued. Loose/soft stools  are likely related to bowel regimen. She does not have abdominal pain, fever, nausea or vomiting.      -- Discontinue scheduled bowel regimen on 5/26  -- Last BM now recorded on 5/27.  And is given a stool softener this morning.  -- monitor stools, utilize prn stool     Patient was seen and examined on the day of discharge ,she is feeling well, does not have any complaints , I did review the discharge medications and instructions with the patient and plan for her to follow up with the PCP after the hospitalization .patient was in agreement , she is discharged in stable condition to inpatient psych    Consultations This Hospital Stay   DIAGNOSTIC EVALUATION CENTER (DEC) ASSESSMENT ORDER  PSYCHIATRY IP CONSULT  CARE MANAGEMENT / SOCIAL WORK IP CONSULT  CARE MANAGEMENT / SOCIAL WORK IP CONSULT  VASCULAR ACCESS ADULT IP CONSULT  VASCULAR ACCESS ADULT IP CONSULT  PSYCHIATRY IP CONSULT  PSYCHIATRY IP CONSULT  PSYCHIATRY IP CONSULT  PSYCHIATRY IP CONSULT  PHYSICAL THERAPY ADULT IP CONSULT  PSYCHIATRY IP CONSULT  PSYCHIATRY IP CONSULT  VASCULAR ACCESS ADULT IP CONSULT    Code Status   Full Code    Time Spent on this Encounter   IRadha MD, personally saw the patient today and spent greater than 30 minutes discharging this patient.     Radha Yan MD, [facility]  [place] [address] [address] [address] [address]  [address] [place] [address]  Phone: [phone]  ______________________________________________________________________    Physical Exam   Vital Signs: Temp: 98.1  F (36.7  C) Temp src: Oral BP: 118/69 Pulse: 75   Resp: 18 SpO2: 97 % O2 Device: None (Room air)    Weight: 111 lbs 3.2 oz    Physical Exam  Vitals and nursing note reviewed.   Constitutional:       Appearance: She is well-developed.   HENT:      Head: Normocephalic and atraumatic.   Eyes:      Conjunctiva/sclera: Conjunctivae normal.      Pupils: Pupils are equal, round, and reactive to light.   Neck:      Thyroid: No thyromegaly.    Cardiovascular:      Rate and Rhythm: Normal rate and regular rhythm.      Heart sounds: Normal heart sounds. No murmur heard.  Pulmonary:      Effort: Pulmonary effort is normal. No respiratory distress.      Breath sounds: Normal breath sounds. No wheezing.   Abdominal:      General: Bowel sounds are normal.      Palpations: Abdomen is soft.      Tenderness: There is no abdominal tenderness. There is no guarding or rebound.   Musculoskeletal:         General: No deformity. Normal range of motion.      Cervical back: Normal range of motion and neck supple.   Skin:     General: Skin is warm and dry.   Neurological:      General: No focal deficit present.      Mental Status: She is alert. Mental status is at baseline.   Psychiatric:         Behavior: Behavior normal.              Primary Care Physician   Inder Lindsey    Discharge Orders      General info for SNF    Length of Stay Estimate: Short Term Care: Estimated # of Days <30  Condition at Discharge: Improving  Level of care:skilled   Rehabilitation Potential: Good  Admission H&P remains valid and up-to-date: Yes  Recent Chemotherapy: N/A  Use Nursing Home Standing Orders: Yes     Mantoux instructions    Give two-step Mantoux (PPD) Per Facility Policy Yes     Follow Up and recommended labs and tests    Follow-up with psychiatry after the discharge and inpatient psych     Reason for your hospital stay    You were admitted to the hospital secondary to related to thrive secondary to suicidal ideation, you have been evaluated by psychiatry and has been recommended for inpatient psychiatric admission.     Additional Discharge Instructions    During the hospitalization many of your multivitamin and supplementations have been stopped, you are also started on Depakote, your sertraline and Remeron dose has been decreased, you are also receiving hydroxyzine for as needed anxiety, your Zyprexa dose has also been adjusted.  Further medications adjustment will be made by  psychiatrist after you are discharged to inpatient psychiatric facility.     Activity - Up ad devan     Full Code     Fall precautions     Diet    Follow this diet upon discharge: Orders Placed This Encounter      Room Service      Combination Diet Regular Diet; Safe Tray - with utensils       Significant Results and Procedures   Results for orders placed or performed during the hospital encounter of 05/05/23   US Lower Extremity Venous Duplex Right    Narrative    EXAM: US LOWER EXTREMITY VENOUS DUPLEX RIGHT  LOCATION: [facility]  DATE/TIME: 5/5/2023 9:00 PM CDT    INDICATION: Leg pain,   COMPARISON: None.  TECHNIQUE: Venous Duplex ultrasound of the right lower extremity with and without compression, augmentation and duplex. Color flow and spectral Doppler with waveform analysis performed.    FINDINGS: Exam includes the common femoral, femoral, popliteal, and contralateral common femoral veins as well as segmentally visualized deep calf veins and greater saphenous vein.     RIGHT: No deep vein thrombosis. No superficial thrombophlebitis. No popliteal cyst.      Impression    IMPRESSION:  1.  No deep venous thrombosis in the right lower extremity.       Discharge Medications   Current Discharge Medication List      START taking these medications    Details   !! divalproex sodium delayed-release (DEPAKOTE SPRINKLE) 125 MG DR capsule Take 250 mg by mouth 2 times daily    Associated Diagnoses: Failure to thrive in adult; Depressive disorder; Borderline personality disorder (H); Bipolar affective disorder, remission status unspecified (H)      !! divalproex sodium delayed-release (DEPAKOTE SPRINKLE) 125 MG DR capsule Take 375 mg by mouth At Bedtime    Associated Diagnoses: Failure to thrive in adult; Depressive disorder; Borderline personality disorder (H); Bipolar affective disorder, remission status unspecified (H)      hydrOXYzine (ATARAX) 25 MG tablet Take 1 tablet (25 mg) by mouth every 6  hours as needed for anxiety    Associated Diagnoses: Failure to thrive in adult; Depressive disorder; Borderline personality disorder (H); Bipolar affective disorder, remission status unspecified (H)       !! - Potential duplicate medications found. Please discuss with provider.      CONTINUE these medications which have CHANGED    Details   acetaminophen (TYLENOL) 325 MG tablet Take 2 tablets (650 mg) by mouth every 6 hours as needed for mild pain or other (and adjunct with moderate or severe pain or per patient request)    Associated Diagnoses: Failure to thrive in adult; Depressive disorder; Borderline personality disorder (H); Bipolar affective disorder, remission status unspecified (H)      famotidine (PEPCID) 20 MG tablet Take 1 tablet (20 mg) by mouth daily    Associated Diagnoses: Failure to thrive in adult; Depressive disorder; Borderline personality disorder (H); Bipolar affective disorder, remission status unspecified (H)      mirtazapine (REMERON) 15 MG tablet Take 1 tablet (15 mg) by mouth At Bedtime    Associated Diagnoses: Failure to thrive in adult; Depressive disorder; Borderline personality disorder (H); Bipolar affective disorder, remission status unspecified (H)      !! OLANZapine (ZYPREXA) 5 MG tablet Take 1 tablet (5 mg) by mouth every morning    Associated Diagnoses: Failure to thrive in adult; Depressive disorder; Borderline personality disorder (H); Bipolar affective disorder, remission status unspecified (H)      sertraline (ZOLOFT) 100 MG tablet Take 1 tablet (100 mg) by mouth daily    Associated Diagnoses: Failure to thrive in adult; Depressive disorder; Borderline personality disorder (H); Bipolar affective disorder, remission status unspecified (H)       !! - Potential duplicate medications found. Please discuss with provider.      CONTINUE these medications which have NOT CHANGED    Details   budesonide-formoterol (SYMBICORT) 80-4.5 MCG/ACT Inhaler Inhale 2 puffs into the lungs 2 times  daily as needed (Shortness of breath, wheezing)  Qty: 10.2 g, Refills: 3    Associated Diagnoses: Mild intermittent asthma, unspecified whether complicated      calcium carbonate (TUMS) 500 MG chewable tablet Chew 2 tabs every 3 hours as needed      cholecalciferol (VITAMIN D3) 1000 UNIT tablet Take 1 tablet (1,000 Units) by mouth daily  Qty: 30 tablet, Refills: 11    Associated Diagnoses: Mood disorder (H)      fluticasone (FLONASE) 50 MCG/ACT nasal spray Spray 2 sprays into both nostrils daily as needed for rhinitis or allergies    Associated Diagnoses: Allergic rhinitis      hypromellose (ARTIFICIAL TEARS) 0.5 % SOLN ophthalmic solution Place 2 drops into both eyes 4 times daily as needed      Multiple Vitamins-Minerals (CEROVITE SENIOR) TABS Take 1 tablet by mouth daily  Qty: 30 tablet, Refills: 3    Associated Diagnoses: Vitamin deficiency      !! OLANZapine (ZYPREXA) 5 MG tablet Take 2 tablets (10 mg) by mouth At Bedtime    Associated Diagnoses: Mood disorder (H)      !! polyethylene glycol (MIRALAX) 17 g packet Take 17 g by mouth Every Mon, Wed, Fri Morning      !! 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 - Potential duplicate medications found. Please discuss with provider.      STOP taking these medications       alendronate (FOSAMAX) 70 MG tablet Comments:   Reason for Stopping:         alum & mag  hydroxide-simethicone (MYLANTA/MAALOX) 200-200-20 MG/5ML SUSP suspension Comments:   Reason for Stopping:         Calcium Oyster Shell 500 MG TABS Comments:   Reason for Stopping:         capsaicin (ZOSTRIX) 0.025 % external cream Comments:   Reason for Stopping:         Cyanocobalamin (B-12) 1000 MCG TBCR Comments:   Reason for Stopping:         FEROSUL 325 (65 Fe) MG tablet Comments:   Reason for Stopping:         guaiFENesin-dextromethorphan (ROBITUSSIN DM) 100-10 MG/5ML syrup Comments:   Reason for Stopping:         loperamide (IMODIUM) 1 MG/5ML solution Comments:   Reason for Stopping:         magnesium hydroxide (MILK OF MAGNESIA) 400 MG/5ML suspension Comments:   Reason for Stopping:         ondansetron (ZUPLENZ) 4 MG FILM Comments:   Reason for Stopping:         trolamine salicylate (ASPERCREME) 10 % external cream Comments:   Reason for Stopping:         White Petrolatum ointment Comments:   Reason for Stopping:             Allergies   Allergies   Allergen Reactions     Nsaids      Gastric bypass surgery

## 2023-05-31 NOTE — PROGRESS NOTES
[facility]    Medicine Progress Note - Hospitalist Service    Date of Admission:  5/5/2023    Assessment & Plan   Bhavana Marr is an 82-year-old female patient with PMH of asthma; dyslipidemia; depression/anxiety; bipolar disorder; borderline personality disorder; osteoporosis; prior gastric bypass; and recent hospitalization (4/21/2023 to 4/28/2023) with issues including suicidal ideation and dementia with behavioral disturbance. She presented from her care facility with decreased oral intake, low glucose level, low blood pressure, depression and possible suicidal ideations.     On initial evaluation, patient was afebrile, normotensive, not tachycardic.  Labs notable for BMP with sodium 133 otherwise normal; CBC was normal; LFTs showed low albumin of 3.0 and low protein 5.3 and low AST of 9, otherwise normal; glucose 109; urinalysis showed 60 ketones and not suggestive of infection.       Decreased oral intake with hypoglycemia, low blood pressures: improving  Hyponatremia, suspect due to decreased PO intake: resolved   Failure to thrive, possibly due to Psychiatric Illness; see below   * Initial presentation as above. Pt found with low sodium as well as ketones in the urine. Given fluids in the ED.    * No abdominal pain or lab findings to suggest a primary GI etiology.  * d/roberto IVF on 5/8 due to improved oral intake    -- Mental health management as below.     Psychiatric issues  Depression/anxiety, bipolar d/o, borderline personality d/o with possible suicidal ideations  * Of note is that the patient was recently hospitalized through [facility] with issues including suicidal ideation.  She was seen by psychiatry during that hospitalization; they did not recommend any type of psychiatric admission.    * Lives in LTC facility.  Per review of psych note, that the facility director of nursing reported that patient does not have hx of Dementia and was entered in her chart erroneously during  prior admission in 2021.     * Initial presentation as above. Noted manic episode and some confusion. On admit, pt denied suicidal ideations on admit but did endorse feeling depressed.  She was seen by the DEC  in the ED and not felt to be actively suicidal.  * UDS has been completed on 5/24 pre request of inpat psych, results are negative    -- Psychiatry following intermittently, last evaluated on 5/25, continued on current olanzapine 5 mg daily and 10 mg nightly along with sertraline 150 mg daily and Remeron 22.5 mg at night; on 5/22, started Depakote sprinkles 125 mg BID and 250 mg at bedtime  -- Appreciate psychiatry help, evaluated patient this morning, we will increase Depakote to 250 mg twice daily and 375 mg nightly  -- Decreasing sertraline from 150 mg to 100 mg.  -- Decreasing mirtazapine from 22.5 mg at night to 15 mg daily  -- Recheck valproic acid level on June 5, 2023.  -- Continuing to recommend inpatient psychiatry admission  -- Had court hearing for commitment on 5/23, commitment supported by [facility]  -- sitter was discontinued on 5/23  -- Patient has been getting evaluated by inpatient psych, at this time patient is medically stable does not have any diarrhea is more prone to constipation and does require intermittent administration of his stool softener, patient has also been evaluated by physical therapy yesterday and seems to be at her baseline for movement.     Suspect Moderate malnutrition In Context of:  Acute illness or injury  -- Nutrition following; supplements for nutrition  -- will start checking weight weekly   -- oral intake is slowly improving   -- Will check BMP tomorrow      Prior gastric bypass  B12 deficiency  Iron deficienty anemia  GERD  -- Can resume PTA B12 and iron on discharge  -- Continue PTA famotidine  -- was planned for outpatient UGI endoscopy (5/22) for nausea/vomiting with h/o gastric bypass (was seen in clinic by Yessi 4/18/23); currently tolerating PO without  significant G.I. issues, can reschedule EGD with the PCP as outpatient on follow up     Asthma  --  PRN albuterol inhaler     Dyslipidemia  -- Continue PTA simvastatin.      Osteoporosis  -- Resume alendronate after discharge.     Constipation-resolved  Patient with frequent loose stools on 5/26 needing frequent nursing assistance. Scheduled Senna-colace discontinued. Loose/soft stools are likely related to bowel regimen. She does not have abdominal pain, fever, nausea or vomiting.     -- Discontinue scheduled bowel regimen on 5/26  -- Last BM now recorded on 5/27.  And is given a stool softener this morning.  -- monitor stools, utilize prn stool     Diet: Room Service  Combination Diet Regular Diet; Safe Tray - with utensils    DVT Prophylaxis: Pneumatic Compression Devices and Anti-embolisim stockings (TEDs)  Cortez Catheter: Not present  Lines: None     Cardiac Monitoring: None  Code Status: Full Code      Clinically Significant Risk Factors              # Hypoalbuminemia: Lowest albumin = 3 g/dL at 5/5/2023  4:53 PM, will monitor as appropriate                     Disposition Plan  awaiting inpt psych admission. medically stable    Expected Discharge Date: 05/31/2023,  6:00 PM  Discharge Delays: Specialist Consult (enter specialist & decision needed in comments)  *Medically Ready for Discharge  Placement - Mental Health/Addiction/Geripsych    Discharge Comments: .72 hour hold--up on Monday 1214.   [place] to meet on Friday for commitment.       Inpateint psych recommended.        Radha Yan MD  Hospitalist Service  [facility]  Securely message with [employer]! (more info)  Text page via [employer] Paging/Directory   ______________________________________________________________________    Interval History :    Patient was seen and examined, lying in bed, continues to feel slightly anxious, asking about her discharge plan, discussed with her that we are awaiting for bed availability.  Continues to  have intermittent suicidal ideations.  Said this morning that she is sick of feeling this way and if she had gun available she would have ended her life.  Not have any homicidal ideations      Physical Exam   Vital Signs: Temp: 97.8  F (36.6  C) Temp src: Axillary BP: 114/57 Pulse: 57   Resp: 16 SpO2: 96 % O2 Device: None (Room air)    Weight: 111 lbs 3.2 oz    General Appearance: Alert,awake and no apparent distress  Respiratory: CTAB, no wheezing  Cardiovascular: regular rate and rhythm  GI: soft and non-tender  Skin: warm and dry      Medical Decision Making     35 MINUTES SPENT BY ME on the date of service doing chart review, history, exam, documentation & further activities per the note.  MANAGEMENT DISCUSSED with the following over the past 24 hours: patient, RN, charge RN/care transition team   NOTE(S)/MEDICAL RECORDS REVIEWED over the past 24 hours: social work note      Data         Imaging results reviewed over the past 24 hrs:   No results found for this or any previous visit (from the past 24 hour(s)).

## 2023-06-01 ENCOUNTER — TELEPHONE (OUTPATIENT)
Dept: BEHAVIORAL HEALTH | Facility: CLINIC | Age: 83
End: 2023-06-01
Payer: COMMERCIAL

## 2023-06-01 LAB
ALBUMIN UR-MCNC: NEGATIVE MG/DL
APPEARANCE UR: CLEAR
BILIRUB UR QL STRIP: NEGATIVE
COLOR UR AUTO: YELLOW
CREAT SERPL-MCNC: 0.67 MG/DL (ref 0.51–0.95)
GFR SERPL CREATININE-BSD FRML MDRD: 87 ML/MIN/1.73M2
GLUCOSE UR STRIP-MCNC: NEGATIVE MG/DL
HGB UR QL STRIP: NEGATIVE
KETONES UR STRIP-MCNC: NEGATIVE MG/DL
LEUKOCYTE ESTERASE UR QL STRIP: ABNORMAL
MUCOUS THREADS #/AREA URNS LPF: PRESENT /LPF
NITRATE UR QL: NEGATIVE
PH UR STRIP: 6 [PH] (ref 5–7)
PLATELET # BLD AUTO: 226 10E3/UL (ref 150–450)
RBC URINE: 1 /HPF
SP GR UR STRIP: 1.02 (ref 1–1.03)
SQUAMOUS EPITHELIAL: 4 /HPF
UROBILINOGEN UR STRIP-MCNC: NORMAL MG/DL
WBC URINE: 50 /HPF

## 2023-06-01 PROCEDURE — 250N000013 HC RX MED GY IP 250 OP 250 PS 637: Performed by: INTERNAL MEDICINE

## 2023-06-01 PROCEDURE — 120N000001 HC R&B MED SURG/OB

## 2023-06-01 PROCEDURE — 250N000013 HC RX MED GY IP 250 OP 250 PS 637: Performed by: HOSPITALIST

## 2023-06-01 PROCEDURE — 81001 URINALYSIS AUTO W/SCOPE: CPT | Performed by: INTERNAL MEDICINE

## 2023-06-01 PROCEDURE — 99231 SBSQ HOSP IP/OBS SF/LOW 25: CPT | Performed by: INTERNAL MEDICINE

## 2023-06-01 PROCEDURE — 36415 COLL VENOUS BLD VENIPUNCTURE: CPT | Performed by: INTERNAL MEDICINE

## 2023-06-01 PROCEDURE — 85049 AUTOMATED PLATELET COUNT: CPT | Performed by: INTERNAL MEDICINE

## 2023-06-01 PROCEDURE — 83935 ASSAY OF URINE OSMOLALITY: CPT | Performed by: INTERNAL MEDICINE

## 2023-06-01 PROCEDURE — 250N000013 HC RX MED GY IP 250 OP 250 PS 637

## 2023-06-01 PROCEDURE — 82565 ASSAY OF CREATININE: CPT | Performed by: INTERNAL MEDICINE

## 2023-06-01 PROCEDURE — 87086 URINE CULTURE/COLONY COUNT: CPT | Performed by: INTERNAL MEDICINE

## 2023-06-01 PROCEDURE — 250N000011 HC RX IP 250 OP 636: Performed by: HOSPITALIST

## 2023-06-01 PROCEDURE — 84300 ASSAY OF URINE SODIUM: CPT | Performed by: INTERNAL MEDICINE

## 2023-06-01 PROCEDURE — 250N000011 HC RX IP 250 OP 636: Performed by: INTERNAL MEDICINE

## 2023-06-01 RX ADMIN — ACETAMINOPHEN 650 MG: 325 TABLET ORAL at 10:27

## 2023-06-01 RX ADMIN — ONDANSETRON 4 MG: 4 TABLET, ORALLY DISINTEGRATING ORAL at 18:24

## 2023-06-01 RX ADMIN — ENOXAPARIN SODIUM 40 MG: 40 INJECTION SUBCUTANEOUS at 12:06

## 2023-06-01 RX ADMIN — FAMOTIDINE 20 MG: 20 TABLET ORAL at 10:28

## 2023-06-01 RX ADMIN — DIVALPROEX SODIUM 250 MG: 125 CAPSULE, COATED PELLETS ORAL at 14:41

## 2023-06-01 RX ADMIN — HYDROXYZINE HYDROCHLORIDE 25 MG: 25 TABLET, FILM COATED ORAL at 12:08

## 2023-06-01 RX ADMIN — SERTRALINE HYDROCHLORIDE 100 MG: 100 TABLET ORAL at 10:28

## 2023-06-01 RX ADMIN — VALACYCLOVIR HYDROCHLORIDE 500 MG: 500 TABLET, FILM COATED ORAL at 10:27

## 2023-06-01 RX ADMIN — DIVALPROEX SODIUM 375 MG: 125 CAPSULE, COATED PELLETS ORAL at 20:23

## 2023-06-01 RX ADMIN — OLANZAPINE 5 MG: 2.5 TABLET, FILM COATED ORAL at 10:28

## 2023-06-01 RX ADMIN — DIVALPROEX SODIUM 250 MG: 125 CAPSULE, COATED PELLETS ORAL at 10:28

## 2023-06-01 RX ADMIN — MIRTAZAPINE 15 MG: 15 TABLET, FILM COATED ORAL at 20:23

## 2023-06-01 RX ADMIN — SIMVASTATIN 40 MG: 20 TABLET, FILM COATED ORAL at 20:22

## 2023-06-01 RX ADMIN — ACETAMINOPHEN 650 MG: 325 TABLET ORAL at 20:22

## 2023-06-01 RX ADMIN — SENNOSIDES AND DOCUSATE SODIUM 2 TABLET: 50; 8.6 TABLET ORAL at 10:28

## 2023-06-01 RX ADMIN — OLANZAPINE 10 MG: 5 TABLET, FILM COATED ORAL at 20:23

## 2023-06-01 ASSESSMENT — ACTIVITIES OF DAILY LIVING (ADL)
ADLS_ACUITY_SCORE: 27
ADLS_ACUITY_SCORE: 22
ADLS_ACUITY_SCORE: 23
ADLS_ACUITY_SCORE: 27
ADLS_ACUITY_SCORE: 27
ADLS_ACUITY_SCORE: 22
ADLS_ACUITY_SCORE: 27
ADLS_ACUITY_SCORE: 22

## 2023-06-01 NOTE — PROGRESS NOTES
Summary: Low BP, depression, decreased oral intake, failure to thrive  DATE & TIME: 5/31/23 Evening  Cognitive Concerns/ Orientation : A & O x2-3, disoriented to place/situation. Mild [place].   BEHAVIOR & AGGRESSION TOOL COLOR: Yellow, flat affect, has passive SI thoughts.  ABNL VS/O2: VSS on RA  MOBILITY: SBA with GB and walker.   PAIN MANAGMENT: Denies. On scheduled Tylenol.   DIET: Regular- Safe tray  BOWEL/BLADDER: Continent this shift. Up to bathroom, no BM since 05/27. 2 more senokot given. BS + and abdomen flat.   ABNL LAB/BG: N/A  DRAIN/DEVICES: No PIV   SKIN: Pale. Blanchable redness on R elbow and hip, mepilex placed.   TESTS/PROCEDURES: None  D/C DATE: Pending IP psych placement  Discharge Barriers: Patient needs to be fully independent for acceptance into inpatient psych. SW following.  OTHER IMPORTANT INFO: Patient on a court hold. Often nauseas due to previous gastric bypass surgery - PRN Zofran and Compazine given.

## 2023-06-01 NOTE — PROGRESS NOTES
[facility]    Medicine Progress Note - Hospitalist Service    Date of Admission:  5/5/2023    Assessment & Plan   Bhavana Marr is an 82-year-old female patient with PMH of asthma; dyslipidemia; depression/anxiety; bipolar disorder; borderline personality disorder; osteoporosis; prior gastric bypass; and recent hospitalization (4/21/2023 to 4/28/2023) with issues including suicidal ideation and dementia with behavioral disturbance. She presented from her care facility with decreased oral intake, low glucose level, low blood pressure, depression and possible suicidal ideations.     On initial evaluation, patient was afebrile, normotensive, not tachycardic.  Labs notable for BMP with sodium 133 otherwise normal; CBC was normal; LFTs showed low albumin of 3.0 and low protein 5.3 and low AST of 9, otherwise normal; glucose 109; urinalysis showed 60 ketones and not suggestive of infection.       Decreased oral intake with hypoglycemia, low blood pressures: improving  Hyponatremia, suspect due to decreased PO intake: resolved   Failure to thrive, possibly due to Psychiatric Illness; see below   * Initial presentation as above. Pt found with low sodium as well as ketones in the urine. Given fluids in the ED.    * No abdominal pain or lab findings to suggest a primary GI etiology.  * d/roberto IVF on 5/8 due to improved oral intake    -- Mental health management as below.     Psychiatric issues  Depression/anxiety, bipolar d/o, borderline personality d/o with possible suicidal ideations  * Of note is that the patient was recently hospitalized through [facility] with issues including suicidal ideation.  She was seen by psychiatry during that hospitalization; they did not recommend any type of psychiatric admission.    * Lives in LTC facility.  Per review of psych note, that the facility director of nursing reported that patient does not have hx of Dementia and was entered in her chart erroneously during  prior admission in 2021.     * Initial presentation as above. Noted manic episode and some confusion. On admit, pt denied suicidal ideations on admit but did endorse feeling depressed.  She was seen by the DEC  in the ED and not felt to be actively suicidal.  * UDS has been completed on 5/24 pre request of inpat psych, results are negative    -- Psychiatry following intermittently, last evaluated on 5/25, continued on current olanzapine 5 mg daily and 10 mg nightly along with sertraline 150 mg daily and Remeron 22.5 mg at night; on 5/22, started Depakote sprinkles 125 mg BID and 250 mg at bedtime  -- Appreciate psychiatry help, evaluated patient this morning, we will increase Depakote to 250 mg twice daily and 375 mg nightly  -- Decreasing sertraline from 150 mg to 100 mg.  -- Decreasing mirtazapine from 22.5 mg at night to 15 mg daily  -- Recheck valproic acid level on June 5, 2023.  -- Continuing to recommend inpatient psychiatry admission  -- Had court hearing for commitment on 5/23, commitment supported by [facility]  -- sitter was discontinued on 5/23  -- Patient has been getting evaluated by inpatient psych, at this time patient is medically stable does not have any diarrhea is more prone to constipation and does require intermittent administration of his stool softener, patient has also been evaluated by physical therapy yesterday and seems to be at her baseline for movement.  -- Of note, patient remains anxious and depressed , but has not shown any symptoms of aggressions towards staff , she has polite demeanor     Urinary urgency and burning :  -- patient is complaining of urgency and frequency , will check UA with reflex cultures      Suspect Moderate malnutrition In Context of:  Acute illness or injury  -- Nutrition following; supplements for nutrition  -- will start checking weight weekly   -- oral intake is slowly improving   -- Will check BMP tomorrow , creatinine and platelets have been stable ,  getting checked every 3rd day      Prior gastric bypass  B12 deficiency  Iron deficienty anemia  GERD  -- Can resume PTA B12 and iron on discharge  -- Continue PTA famotidine  -- was planned for outpatient UGI endoscopy (5/22) for nausea/vomiting with h/o gastric bypass (was seen in clinic by Yessi 4/18/23); currently tolerating PO without significant G.I. issues, can reschedule EGD with the PCP as outpatient on follow up  -- will check CBC tomorrow while patient is awaiting placement      Asthma  --  PRN albuterol inhaler     Dyslipidemia  -- Continue PTA simvastatin.      Osteoporosis  -- Resume alendronate after discharge.     Constipation-resolved  Patient with frequent loose stools on 5/26 needing frequent nursing assistance. Scheduled Senna-colace discontinued. Loose/soft stools are likely related to bowel regimen. She does not have abdominal pain, fever, nausea or vomiting.     -- Discontinue scheduled bowel regimen on 5/26  -- Last BM now recorded on 5/27  -- patient is now receiving stool softners again as no BM is noticed since 05/27   .    Diet: Room Service  Combination Diet Regular Diet; Safe Tray - with utensils  Diet    DVT Prophylaxis: Pneumatic Compression Devices and Anti-embolisim stockings (TEDs)  Cortez Catheter: Not present  Lines: None     Cardiac Monitoring: None  Code Status: Full Code      Clinically Significant Risk Factors              # Hypoalbuminemia: Lowest albumin = 3 g/dL at 5/5/2023  4:53 PM, will monitor as appropriate                     Disposition Plan  awaiting inpt psych admission. medically stable, needs UA follow up          Radha Yan MD  Hospitalist Service  [facility]  Securely message with [employer] (more info)  Text page via [employer] Paging/Directory   ______________________________________________________________________    Interval History :    Patient was seen and examined, lying in bed, remains anxious about her discharge plan with otherwise  polite, complaining of some urinary burning when passing urine today, we discussed about checking urine analysis.    Plan of care was also discussed in detail with bedside nursing and charge nurse, patient has been on the list for psych bed.      Physical Exam   Vital Signs: Temp: 97.5  F (36.4  C) Temp src: Oral BP: 110/65 Pulse: 71   Resp: 16 SpO2: 97 % O2 Device: None (Room air)    Weight: 111 lbs 3.2 oz    General Appearance: Alert,awake and no apparent distress  Respiratory: CTAB, no wheezing  Cardiovascular: regular rate and rhythm  GI: soft and non-tender  Skin: warm and dry      Medical Decision Making     35 MINUTES SPENT BY ME on the date of service doing chart review, history, exam, documentation & further activities per the note.  MANAGEMENT DISCUSSED with the following over the past 24 hours: patient, RN, charge RN/care transition team   NOTE(S)/MEDICAL RECORDS REVIEWED over the past 24 hours: social work note      Data         Imaging results reviewed over the past 24 hrs:   No results found for this or any previous visit (from the past 24 hour(s)).

## 2023-06-01 NOTE — PLAN OF CARE
Goal Outcome Evaluation:    Shift: 05/31/23 [id number]  Summary: failure to thrive  Cognitive Concerns/ Orientation : A & O x2 disoriented to place/situation.  BEHAVIOR & AGGRESSION TOOL COLOR: Yellow, flat affect.   ABNL VS/O2: VSS on RA  MOBILITY: SBA with GB and walker.   PAIN MANAGMENT: Denies.  DIET: Regular  BOWEL/BLADDER: Continent. No BM this shift.   ABNL LAB/BG: N/A  DRAIN/DEVICES: No PIV   SKIN: Pale. Blanchable redness on R elbow and hip, mepilex in place   TESTS/PROCEDURES: None  D/C DATE: Pending IP psych placement  OTHER IMPORTANT INFO: Patient on a court hold.

## 2023-06-01 NOTE — TELEPHONE ENCOUNTER
[place]; [place]  Access Inpatient Bed Call Log 5/31/2023 10:47PM      Intake has called facilities that have not updated their bed status within the last 12 hours.            [facility] is posting 0 beds.      [facility] is posting 0 beds. [phone]      [facility] is posting 0 beds. [phone]     [facility] is posting 0 beds. [phone] Per call @ 8:30am, Management in meeting. Intake to call back.    [facility] is posting 0 beds. [phone]     [facility] is posting 0 beds. [phone]     [facility] is posting 0 beds. [phone]     [facility] is posting 0 beds. [phone]       [facility], is posting 2 beds [phone] Per website @7:01am.    [facility] is posting 5 beds. [phone] Per website @11:20pm    [facility] is posting 6 beds. Mixed unit 12+. Low acuity only.  [phone] Per website @6:38am pt not appropriate for bed    [facility] is positing 1 bed. No aggression.  [phone]     [facility] is posting 0 beds. [phone]     [facility] is posting 0 beds. Low acuity only. [phone]      [facility] is posting 2 beds. [phone]     [facility] is posting 1 beds. Low acuity. [phone]  Per website @6:19am pt not appropriate for bed     [facility] is posting 2 beds. 72 hr hold preferred. [phone]     [facility] is posting 1 bed. [phone]     [facility] is posting 1 bed. Vol only, No Hx of aggression, violence, or assault. No sexual offenders. No 72 hr holds. [phone] Per call @8:20am, Will have bed availability in about an hour.      [facility] is posting 8 beds. (Must have the cognitive ability to do programming. No aggressive or violent behavior or recent HX in the last 2 yrs. MH must be primary.) [phone]  Unable to contact staff per call @8:25am    [facility] is posting 0 beds. Low acuity only. Violence and  aggression capped. [phone]     [facility] is posting 0 bed. Low acuity, Neg Covid. [phone] Per call @8:20am    [facility] is posting 2 beds. Covid neg. Vol only. Combined adolescent and adult unit. No aggressive or violent behavior. No registered sex offenders. [phone]  Per website @7:48am    [facility] posting 0 beds. Negative covid.      [facility] is posting 0 bed. [phone] Per call @8:17am    [facility] is posting 7 beds. [phone] per call @ 7:57am, with 9 discharges.    [facility] is posting 4 beds. [phone] Per website @1:42am              Patient is awaiting placement for unit  3B and remains on patient placement work list pending appropriate bed availability.

## 2023-06-01 NOTE — TELEPHONE ENCOUNTER
[place]  Access Inpatient Bed Call Log 6/1/2023 @8:10 AM     Intake has called facilities that have not updated their bed status within the last 12 hours.        Adults:         [facility] is posting 0 beds.            Pt awaiting placement for 3B. Pt remains on work list pending appropriate bed availability.

## 2023-06-01 NOTE — TELEPHONE ENCOUNTER
[facility] Access Inpatient Bed Call Log 6/1/2023 2:27 AM      Intake has called facilities that have not updated their bed status within the last 12 hours.     Adults:         [facility] is posting 0 beds.      [facility] is posting 0 beds. [phone]      [facility] is posting 0 beds. [phone]     [facility] is posting 0 beds. [phone] - 1:52am Chris Alyssa, they are capped.     [facility] is posting 0 beds. [phone]     [facility] is posting 0 bed. [phone]      [facility] is posting 0 beds. [phone]     [facility] is posting 0 beds. [phone]     [facility] is posting 1 beds. [phone]     [facility] is posting 0 beds. [phone]       [facility] is posting 8 beds. Mixed unit 12+. Low acuity only.  [phone]     [facility] is posting 0 beds. No aggression.  [phone]     [facility] is posting 0 beds. [phone]      [facility] is posting 2 beds. [phone]      [facility] is posting 2 bed. [phone]      [facility] is posting 1 beds. Low acuity. [phone]     [facility] is posting 0 beds. 72 hr hold preferred. [phone]     [facility] is posting 3 bed.  [phone]        [facility] is posting 5 bed. Voluntary only- no holds/commitments, No Hx of aggression, violence, or assault. No sexual offenders. No 72 hr holds. [phone]     [facility] is posting 8 beds. (Must have the cognitive ability to do programming. No aggressive or violent behavior or recent HX in the last 2 yrs. MH must be primary.) [phone]    [facility] is posting 0 beds. Low acuity only. Violence and aggression capped. [phone]      [facility] is posting 1 bed. Low acuity, Neg Covid. [phone]     [facility] is posting 2 beds. Covid neg. Vol only. Combined adolescent and adult unit. No aggressive or violent behavior. No  registered sex offenders. [phone]     [facility] posting 0 beds. Negative covid.      [facility] is posting 14 beds. Call for details. [phone]      [facility] is posting 4 beds. [phone]       Pt remains on work list pending appropriate bed availability.

## 2023-06-02 ENCOUNTER — TELEPHONE (OUTPATIENT)
Dept: BEHAVIORAL HEALTH | Facility: CLINIC | Age: 83
End: 2023-06-02
Payer: COMMERCIAL

## 2023-06-02 LAB
ANION GAP SERPL CALCULATED.3IONS-SCNC: 10 MMOL/L (ref 7–15)
BUN SERPL-MCNC: 8.5 MG/DL (ref 8–23)
CALCIUM SERPL-MCNC: 9.2 MG/DL (ref 8.8–10.2)
CHLORIDE SERPL-SCNC: 93 MMOL/L (ref 98–107)
CREAT SERPL-MCNC: 0.67 MG/DL (ref 0.51–0.95)
DEPRECATED HCO3 PLAS-SCNC: 22 MMOL/L (ref 22–29)
ERYTHROCYTE [DISTWIDTH] IN BLOOD BY AUTOMATED COUNT: 12.8 % (ref 10–15)
GFR SERPL CREATININE-BSD FRML MDRD: 87 ML/MIN/1.73M2
GLUCOSE BLDC GLUCOMTR-MCNC: 145 MG/DL (ref 70–99)
GLUCOSE BLDC GLUCOMTR-MCNC: 53 MG/DL (ref 70–99)
GLUCOSE SERPL-MCNC: 208 MG/DL (ref 70–99)
HCT VFR BLD AUTO: 36.5 % (ref 35–47)
HGB BLD-MCNC: 12.2 G/DL (ref 11.7–15.7)
MCH RBC QN AUTO: 30 PG (ref 26.5–33)
MCHC RBC AUTO-ENTMCNC: 33.4 G/DL (ref 31.5–36.5)
MCV RBC AUTO: 90 FL (ref 78–100)
OSMOLALITY SERPL: 282 MMOL/KG (ref 280–301)
OSMOLALITY UR: 562 MMOL/KG (ref 100–1200)
PLATELET # BLD AUTO: 226 10E3/UL (ref 150–450)
POTASSIUM SERPL-SCNC: 3.8 MMOL/L (ref 3.4–5.3)
PROCALCITONIN SERPL IA-MCNC: 0.04 NG/ML
RBC # BLD AUTO: 4.07 10E6/UL (ref 3.8–5.2)
SODIUM SERPL-SCNC: 125 MMOL/L (ref 136–145)
SODIUM SERPL-SCNC: 131 MMOL/L (ref 136–145)
SODIUM UR-SCNC: 29 MMOL/L
TSH SERPL DL<=0.005 MIU/L-ACNC: 1.29 UIU/ML (ref 0.3–4.2)
WBC # BLD AUTO: 7.1 10E3/UL (ref 4–11)

## 2023-06-02 PROCEDURE — 97116 GAIT TRAINING THERAPY: CPT | Mod: GP | Performed by: PHYSICAL THERAPIST

## 2023-06-02 PROCEDURE — 250N000011 HC RX IP 250 OP 636: Performed by: INTERNAL MEDICINE

## 2023-06-02 PROCEDURE — 97530 THERAPEUTIC ACTIVITIES: CPT | Mod: GP | Performed by: PHYSICAL THERAPIST

## 2023-06-02 PROCEDURE — 258N000003 HC RX IP 258 OP 636: Performed by: INTERNAL MEDICINE

## 2023-06-02 PROCEDURE — 83930 ASSAY OF BLOOD OSMOLALITY: CPT | Performed by: INTERNAL MEDICINE

## 2023-06-02 PROCEDURE — 250N000013 HC RX MED GY IP 250 OP 250 PS 637: Performed by: INTERNAL MEDICINE

## 2023-06-02 PROCEDURE — 250N000013 HC RX MED GY IP 250 OP 250 PS 637: Performed by: HOSPITALIST

## 2023-06-02 PROCEDURE — 36415 COLL VENOUS BLD VENIPUNCTURE: CPT | Performed by: INTERNAL MEDICINE

## 2023-06-02 PROCEDURE — 84145 PROCALCITONIN (PCT): CPT | Performed by: INTERNAL MEDICINE

## 2023-06-02 PROCEDURE — 250N000011 HC RX IP 250 OP 636: Performed by: HOSPITALIST

## 2023-06-02 PROCEDURE — 84443 ASSAY THYROID STIM HORMONE: CPT | Performed by: INTERNAL MEDICINE

## 2023-06-02 PROCEDURE — 82947 ASSAY GLUCOSE BLOOD QUANT: CPT | Performed by: INTERNAL MEDICINE

## 2023-06-02 PROCEDURE — 84295 ASSAY OF SERUM SODIUM: CPT | Performed by: INTERNAL MEDICINE

## 2023-06-02 PROCEDURE — 85027 COMPLETE CBC AUTOMATED: CPT | Performed by: INTERNAL MEDICINE

## 2023-06-02 PROCEDURE — 99233 SBSQ HOSP IP/OBS HIGH 50: CPT | Performed by: INTERNAL MEDICINE

## 2023-06-02 PROCEDURE — 120N000001 HC R&B MED SURG/OB

## 2023-06-02 PROCEDURE — 250N000013 HC RX MED GY IP 250 OP 250 PS 637

## 2023-06-02 PROCEDURE — 84134 ASSAY OF PREALBUMIN: CPT | Performed by: INTERNAL MEDICINE

## 2023-06-02 PROCEDURE — 82310 ASSAY OF CALCIUM: CPT | Performed by: INTERNAL MEDICINE

## 2023-06-02 RX ORDER — SODIUM CHLORIDE 9 MG/ML
INJECTION, SOLUTION INTRAVENOUS CONTINUOUS
Status: DISCONTINUED | OUTPATIENT
Start: 2023-06-02 | End: 2023-06-03

## 2023-06-02 RX ORDER — CIPROFLOXACIN 500 MG/1
500 TABLET, FILM COATED ORAL EVERY 12 HOURS SCHEDULED
Status: DISCONTINUED | OUTPATIENT
Start: 2023-06-02 | End: 2023-06-04

## 2023-06-02 RX ADMIN — ACETAMINOPHEN 650 MG: 325 TABLET ORAL at 21:15

## 2023-06-02 RX ADMIN — SENNOSIDES AND DOCUSATE SODIUM 2 TABLET: 50; 8.6 TABLET ORAL at 08:20

## 2023-06-02 RX ADMIN — ENOXAPARIN SODIUM 40 MG: 40 INJECTION SUBCUTANEOUS at 12:20

## 2023-06-02 RX ADMIN — DIVALPROEX SODIUM 375 MG: 125 CAPSULE, COATED PELLETS ORAL at 21:16

## 2023-06-02 RX ADMIN — HYDROXYZINE HYDROCHLORIDE 25 MG: 25 TABLET, FILM COATED ORAL at 04:14

## 2023-06-02 RX ADMIN — MIRTAZAPINE 15 MG: 15 TABLET, FILM COATED ORAL at 21:16

## 2023-06-02 RX ADMIN — OLANZAPINE 10 MG: 5 TABLET, FILM COATED ORAL at 21:16

## 2023-06-02 RX ADMIN — OLANZAPINE 5 MG: 2.5 TABLET, FILM COATED ORAL at 08:19

## 2023-06-02 RX ADMIN — ACETAMINOPHEN 650 MG: 325 TABLET ORAL at 08:20

## 2023-06-02 RX ADMIN — DIVALPROEX SODIUM 250 MG: 125 CAPSULE, COATED PELLETS ORAL at 08:19

## 2023-06-02 RX ADMIN — ONDANSETRON 4 MG: 4 TABLET, ORALLY DISINTEGRATING ORAL at 12:19

## 2023-06-02 RX ADMIN — CIPROFLOXACIN HYDROCHLORIDE 500 MG: 500 TABLET, FILM COATED ORAL at 13:40

## 2023-06-02 RX ADMIN — SODIUM CHLORIDE: 9 INJECTION, SOLUTION INTRAVENOUS at 12:29

## 2023-06-02 RX ADMIN — FAMOTIDINE 20 MG: 20 TABLET ORAL at 08:19

## 2023-06-02 RX ADMIN — ONDANSETRON 4 MG: 2 INJECTION INTRAMUSCULAR; INTRAVENOUS at 17:41

## 2023-06-02 RX ADMIN — POLYETHYLENE GLYCOL 3350 17 G: 17 POWDER, FOR SOLUTION ORAL at 08:20

## 2023-06-02 RX ADMIN — VALACYCLOVIR HYDROCHLORIDE 500 MG: 500 TABLET, FILM COATED ORAL at 08:19

## 2023-06-02 RX ADMIN — HYDROXYZINE HYDROCHLORIDE 25 MG: 25 TABLET, FILM COATED ORAL at 12:32

## 2023-06-02 RX ADMIN — SENNOSIDES AND DOCUSATE SODIUM 2 TABLET: 50; 8.6 TABLET ORAL at 04:14

## 2023-06-02 RX ADMIN — CIPROFLOXACIN HYDROCHLORIDE 500 MG: 500 TABLET, FILM COATED ORAL at 21:16

## 2023-06-02 RX ADMIN — DIVALPROEX SODIUM 250 MG: 125 CAPSULE, COATED PELLETS ORAL at 13:40

## 2023-06-02 RX ADMIN — SIMVASTATIN 40 MG: 20 TABLET, FILM COATED ORAL at 21:16

## 2023-06-02 RX ADMIN — SERTRALINE HYDROCHLORIDE 100 MG: 100 TABLET ORAL at 08:19

## 2023-06-02 ASSESSMENT — ACTIVITIES OF DAILY LIVING (ADL)
ADLS_ACUITY_SCORE: 22

## 2023-06-02 NOTE — PROGRESS NOTES
[facility]    Medicine Progress Note - Hospitalist Service    Date of Admission:  5/5/2023    Assessment & Plan   Bhavana Marr is an 82-year-old female patient with PMH of asthma; dyslipidemia; depression/anxiety; bipolar disorder; borderline personality disorder; osteoporosis; prior gastric bypass; and recent hospitalization (4/21/2023 to 4/28/2023) with issues including suicidal ideation and dementia with behavioral disturbance. She presented from her care facility with decreased oral intake, low glucose level, low blood pressure, depression and possible suicidal ideations.     On initial evaluation, patient was afebrile, normotensive, not tachycardic.  Labs notable for BMP with sodium 133 otherwise normal; CBC was normal; LFTs showed low albumin of 3.0 and low protein 5.3 and low AST of 9, otherwise normal; glucose 109; urinalysis showed 60 ketones and not suggestive of infection.       Decreased oral intake with hypoglycemia, low blood pressures: improving  Hyponatremia, suspect due to decreased PO intake: resolved   Failure to thrive, possibly due to Psychiatric Illness; see below   * Initial presentation as above. Pt found with low sodium as well as ketones in the urine. Given fluids in the ED.    * No abdominal pain or lab findings to suggest a primary GI etiology.  * d/roberto IVF on 5/8 due to improved oral intake    -- Mental health management as below.     Psychiatric issues  Depression/anxiety, bipolar d/o, borderline personality d/o with possible suicidal ideations  * Of note is that the patient was recently hospitalized through [facility] with issues including suicidal ideation.  She was seen by psychiatry during that hospitalization; they did not recommend any type of psychiatric admission.    * Lives in LTC facility.  Per review of psych note, that the facility director of nursing reported that patient does not have hx of Dementia and was entered in her chart erroneously during  prior admission in 2021.     * Initial presentation as above. Noted manic episode and some confusion. On admit, pt denied suicidal ideations on admit but did endorse feeling depressed.  She was seen by the DEC  in the ED and not felt to be actively suicidal.  * UDS has been completed on 5/24 pre request of inpat psych, results are negative    -- Psychiatry following intermittently, last evaluated on 5/25, continued on current olanzapine 5 mg daily and 10 mg nightly along with sertraline 150 mg daily and Remeron 22.5 mg at night; on 5/22, started Depakote sprinkles 125 mg BID and 250 mg at bedtime  -- Appreciate psychiatry help, evaluated patient on 05/31, increased Depakote to 250 mg twice daily and 375 mg nightly  -- Decreasing sertraline from 150 mg to 100 mg.  -- Decreasing mirtazapine from 22.5 mg at night to 15 mg daily  -- Recheck valproic acid level on June 5, 2023.  -- Continuing to recommend inpatient psychiatry admission  -- Had court hearing for commitment on 5/23, commitment supported by [facility]  -- Sitter was discontinued on 5/23  -- Has also been noticed to be anxious from time to time, hydroxyzine has also been added as needed for anxiety this week  -- Was medically stable earlier this week, now complaining of some urgency and frequency, possible UTI and sodium has dropped to 125, might not be ready to be discharged over the weekend   -- see management for UTI and hyponatremia below   -- Patient remains suicidal this morning, told me that she does not want to eat lunch as she wants to die.    Urinary urgency and burning , possible UTI  -- patient was complaining of urgency and frequency , checked UA.  --UA is showing negative nitrite but large leukocyte esterase with 50 WBC counts, urine culture is pending  --No leukocytosis, we will follow-up on the cultures.  --Start patient on ciprofloxacin 500 mg p.o. twice daily, follow-up on cultures.    Hyponatremia:   -- Most likely secondary to  euvolemia secondary to psych medications, mild component of hypovolemia cannot be completely excluded   -- Start patient on normal saline at 50 mill per hour for 2 L.  --Check urine sodium, will add urine osmolality and will check plasma osmolality.  --We will check TSH  --Repeat sodium levels tomorrow  --Might need salt tablet for few days if patient is continued on psychotic medications     Suspect Moderate malnutrition In Context of:  Acute illness or injury  -- Nutrition following; supplements for nutrition  -- will start checking weight weekly   -- oral intake is slowly improving   -- Will check BMP tomorrow , creatinine and platelets have been stable , getting checked every 3rd day      Prior gastric bypass  B12 deficiency  Iron deficienty anemia  GERD  -- Can resume PTA B12 and iron on discharge  -- Continue PTA famotidine  -- was planned for outpatient UGI endoscopy (5/22) for nausea/vomiting with h/o gastric bypass (was seen in clinic by Yessi 4/18/23); currently tolerating PO without significant G.I. issues, can reschedule EGD with the PCP as outpatient on follow up  -- Check CBC this morning while patient is awaiting placement, hemoglobin is in normal range of 12.2  --Continue plan for outpatient EGD.     Asthma  --  PRN albuterol inhaler     Dyslipidemia  -- Continue PTA simvastatin.      Osteoporosis  -- Resume alendronate after discharge.     Constipation-resolved  Patient with frequent loose stools on 5/26 needing frequent nursing assistance. Scheduled Senna-colace discontinued. Loose/soft stools are likely related to bowel regimen. She does not have abdominal pain, fever, nausea or vomiting.     -- Discontinue scheduled bowel regimen on 5/26  -- Last BM now recorded on 5/27  -- patient is now receiving stool softners again as no BM is noticed since 05/27   .    Diet: Room Service  Combination Diet Regular Diet; Safe Tray - with utensils  Diet    DVT Prophylaxis: Pneumatic Compression Devices and  Anti-embolisim stockings (TEDs)  Cortez Catheter: Not present  Lines: None     Cardiac Monitoring: None  Code Status: Full Code      Clinically Significant Risk Factors              # Hypoalbuminemia: Lowest albumin = 3 g/dL at 5/5/2023  4:53 PM, will monitor as appropriate                     Disposition Plan  awaiting inpt psych admission. medically stable, needs UA follow up    Expected Discharge Date: 06/02/2023,  6:00 PM  Discharge Delays: Specialist Consult (enter specialist & decision needed in comments)  *Medically Ready for Discharge  Placement - Mental Health/Addiction/Geripsych    Discharge Comments: .72 hour hold--up on Monday 1214.   [place] to meet on Friday for commitment.       Inpateint psych recommended.        Radha Yan MD  Hospitalist Service  [facility]  Securely message with [employer] (more info)  Text page via [employer] Paging/Directory   ______________________________________________________________________    Interval History :    Patient was seen and examined, lying in bed.  Less anxious today, told me that she will not order lunch as she wants to die.  Patient has noticed some urinary urgency and burning, discussed with her that she might have urinary tract infection, agreeable to get some IV fluids and antibiotics to feel better.  Encourage patient to increase her oral intake.      Physical Exam   Vital Signs: Temp: 97.4  F (36.3  C) Temp src: Oral BP: 129/81 Pulse: 62   Resp: 16 SpO2: 96 % O2 Device: None (Room air)    Weight: 111 lbs 3.2 oz    General Appearance: Alert,awake and no apparent distress  Respiratory: CTAB, no wheezing  Cardiovascular: regular rate and rhythm  GI: soft and non-tender  Skin: warm and dry      Medical Decision Making     50 MINUTES SPENT BY ME on the date of service doing chart review, history, exam, documentation & further activities per the note.  MANAGEMENT DISCUSSED with the following over the past 24 hours: patient, RN, charge RN/care  transition team   NOTE(S)/MEDICAL RECORDS REVIEWED over the past 24 hours: social work note      Data     I have personally reviewed the following data over the past 24 hrs:    N/A  \   N/A   / N/A     N/A N/A N/A /  N/A   N/A N/A N/A \       Imaging results reviewed over the past 24 hrs:   No results found for this or any previous visit (from the past 24 hour(s)).

## 2023-06-02 NOTE — TELEPHONE ENCOUNTER
[facility] Access Inpatient Bed Call Log 6/2/2023 12AM          Intake has called facilities that have not updated their bed status within the last 12 hours.           Adults:                    [facility] is posting 0 beds.                [facility] is posting 0 [phone]                [facility] is posting 0 beds. [phone]               [facility] is posting 0 beds. [phone] - Kathy @ 11:59PM they do not have beds available [facility] is posting 0 beds. [phone]               [facility] is posting 0 beds. [phone]               [facility] is posting 0 beds. [phone]               [facility] is posting 0 beds. [phone]               [facility], is posting 1 beds. [phone]. - Jaelyn @ 11:58PM reports no beds available             [facility] is posting 0 beds. [phone]                      [facility] is posting 8 beds. Mixed unit 12+. Low acuity only.  [phone]               [facility] is positing 0 beds. No aggression.  [phone]               [facility] is posting 0 beds. [phone]               [facility] is posting 4 beds. Low acuity only. [phone]  - Zara @ 12AM reports they have beds available             [facility] is posting 2 beds. [phone] - Jaelyn @ 11:58PM reports no beds available               [facility] is posting 1 beds. Low acuity. [phone] - Zara @ 12AM reports they have no beds available at this location             [facility] is posting 0 beds. 72 hr hold preferred. [phone]               [facility] is posting 6 beds. [phone] - Zara @ 12AM reports they have beds available              [facility] is posting 5 beds. Vol only, No Hx of aggression, violence, or assault. No sexual offenders. No 72 hr holds. [phone]                      [facility] is posting 6 beds. (Must have the cognitive ability to do programming. No aggressive or violent behavior or recent HX in the last 2 yrs. MH must be primary.) [phone]               [facility] is posting 0 beds. Low acuity only. Violence and aggression capped. [phone]       [facility] is posting 1 bed. Low acuity, Neg Covid. [phone] - Keegan @ 12:01AM reports they are at [facility] is posting 3 beds. Covid neg. Vol only. Combined adolescent and adult unit. No aggressive or violent behavior. No registered sex offenders. [phone]       [facility] posting 0 beds. Negative covid.       [facility] is posting 0 beds.    [phone]       [facility] is posting 3 beds. Call for details. [phone]       [facility] is posting 5 beds. [phone] Mixed unit.          Pt remains on work list pending appropriate bed availability.

## 2023-06-02 NOTE — PLAN OF CARE
Goal Outcome Evaluation:      Plan of Care Reviewed With: patient    Overall Patient Progress: no changeOverall Patient Progress: no change     Shift: 06/01-06/02 6627-2486   Summary: failure to thrive  Cognitive Concerns/ Orientation : A&O x4- forgetful. Asks repeat questions. Flat/calm/depressed mood, anxious   BEHAVIOR & AGGRESSION TOOL COLOR: Green  ABNL VS/O2: VSS on RA  MOBILITY: SBA with GB&W   PAIN MANAGMENT: C/O pain with urination. Scheduled tylenol given.   DIET: Regular  BOWEL/BLADDER: Continent. Frequent urination. Constipated. BS+, abdomen soft, non tender. PRN senna given.   ABNL LAB/BG: WDL   DRAIN/DEVICES: No PIV   SKIN: Pale. Blanchable redness on R elbow and hip. Pt removed mepilex on hip. New mepilex applied..  TESTS/PROCEDURES: None  D/C DATE: Pending IP psych placement  OTHER IMPORTANT INFO: Patient on a court hold. Pt restless during the night, stating that she was anxious. Atarax given x1 @ [phone].

## 2023-06-02 NOTE — PROGRESS NOTES
[phone])  Pt A&O x3, disoriented to situation. calm and cooperative. Up x SBA with walker. Adequate I&O, ambulated to the BR. Up in chair for dinner. Denied pain. Emesis x1, Zofran given. C/o burning when urinating, UA ordered, urine sample was sent. Discharge to IP psych pending.

## 2023-06-02 NOTE — PLAN OF CARE
Shift: 06/02/23 7-3 pm   Summary: Failure to thrive  Cognitive Concerns/ Orientation : A & O x 3, disoriented to situation. Mild [place]. Flat/depressed mood, anxious at times  BEHAVIOR & AGGRESSION TOOL COLOR: Yellow still having passive SI thoughts.  ABNL VS/O2: VSS on RA  MOBILITY: SBA with GB&W   PAIN MANAGMENT: Scheduled Tylenol.   DIET: Regular  BOWEL/BLADDER: Continent. Constipated. BS+, abdomen soft, non tender. PRN senna administered.   ABNL LAB/BG: Sodium 125, Chloride 93, and .   DRAIN/DEVICES: PIV infusing NS @ 50 mL/hr.   SKIN: Pale. Blanchable redness on R elbow and hip. New mepilex applied after the shower.   TESTS/PROCEDURES: None  D/C DATE: Pending  OTHER IMPORTANT INFO: Patient on a court hold. Started IV fluids for low sodium, and oral abx for possible UTI. Nausea x1, Zofran effective. Hydroxyzine x1 for anxiety.

## 2023-06-03 ENCOUNTER — TELEPHONE (OUTPATIENT)
Dept: BEHAVIORAL HEALTH | Facility: CLINIC | Age: 83
End: 2023-06-03
Payer: COMMERCIAL

## 2023-06-03 LAB
ANION GAP SERPL CALCULATED.3IONS-SCNC: 11 MMOL/L (ref 7–15)
BACTERIA UR CULT: NORMAL
BUN SERPL-MCNC: 6.8 MG/DL (ref 8–23)
CALCIUM SERPL-MCNC: 9 MG/DL (ref 8.8–10.2)
CHLORIDE SERPL-SCNC: 100 MMOL/L (ref 98–107)
CREAT SERPL-MCNC: 0.64 MG/DL (ref 0.51–0.95)
DEPRECATED HCO3 PLAS-SCNC: 20 MMOL/L (ref 22–29)
GFR SERPL CREATININE-BSD FRML MDRD: 88 ML/MIN/1.73M2
GLUCOSE BLDC GLUCOMTR-MCNC: 76 MG/DL (ref 70–99)
GLUCOSE SERPL-MCNC: 197 MG/DL (ref 70–99)
POTASSIUM SERPL-SCNC: 3.9 MMOL/L (ref 3.4–5.3)
SODIUM SERPL-SCNC: 131 MMOL/L (ref 136–145)

## 2023-06-03 PROCEDURE — 250N000013 HC RX MED GY IP 250 OP 250 PS 637: Performed by: INTERNAL MEDICINE

## 2023-06-03 PROCEDURE — 250N000013 HC RX MED GY IP 250 OP 250 PS 637: Performed by: HOSPITALIST

## 2023-06-03 PROCEDURE — 250N000013 HC RX MED GY IP 250 OP 250 PS 637

## 2023-06-03 PROCEDURE — 120N000001 HC R&B MED SURG/OB

## 2023-06-03 PROCEDURE — 250N000011 HC RX IP 250 OP 636: Performed by: HOSPITALIST

## 2023-06-03 PROCEDURE — 80048 BASIC METABOLIC PNL TOTAL CA: CPT | Performed by: INTERNAL MEDICINE

## 2023-06-03 PROCEDURE — 250N000011 HC RX IP 250 OP 636: Performed by: INTERNAL MEDICINE

## 2023-06-03 PROCEDURE — 99232 SBSQ HOSP IP/OBS MODERATE 35: CPT | Performed by: INTERNAL MEDICINE

## 2023-06-03 PROCEDURE — 36415 COLL VENOUS BLD VENIPUNCTURE: CPT | Performed by: INTERNAL MEDICINE

## 2023-06-03 RX ORDER — SODIUM CHLORIDE 1 G/1
1 TABLET ORAL
Status: DISCONTINUED | OUTPATIENT
Start: 2023-06-03 | End: 2023-06-04

## 2023-06-03 RX ORDER — BISACODYL 10 MG
10 SUPPOSITORY, RECTAL RECTAL DAILY PRN
Status: DISCONTINUED | OUTPATIENT
Start: 2023-06-03 | End: 2023-06-07 | Stop reason: HOSPADM

## 2023-06-03 RX ADMIN — POLYETHYLENE GLYCOL 3350 17 G: 17 POWDER, FOR SOLUTION ORAL at 08:40

## 2023-06-03 RX ADMIN — ACETAMINOPHEN 650 MG: 325 TABLET ORAL at 08:40

## 2023-06-03 RX ADMIN — OLANZAPINE 10 MG: 5 TABLET, FILM COATED ORAL at 21:03

## 2023-06-03 RX ADMIN — ACETAMINOPHEN 650 MG: 325 TABLET ORAL at 19:50

## 2023-06-03 RX ADMIN — FAMOTIDINE 20 MG: 20 TABLET ORAL at 08:40

## 2023-06-03 RX ADMIN — ENOXAPARIN SODIUM 40 MG: 40 INJECTION SUBCUTANEOUS at 14:06

## 2023-06-03 RX ADMIN — CIPROFLOXACIN HYDROCHLORIDE 500 MG: 500 TABLET, FILM COATED ORAL at 08:39

## 2023-06-03 RX ADMIN — CIPROFLOXACIN HYDROCHLORIDE 500 MG: 500 TABLET, FILM COATED ORAL at 19:48

## 2023-06-03 RX ADMIN — DIVALPROEX SODIUM 250 MG: 125 CAPSULE, COATED PELLETS ORAL at 14:06

## 2023-06-03 RX ADMIN — PROCHLORPERAZINE EDISYLATE 5 MG: 5 INJECTION INTRAMUSCULAR; INTRAVENOUS at 18:00

## 2023-06-03 RX ADMIN — SIMVASTATIN 40 MG: 20 TABLET, FILM COATED ORAL at 21:04

## 2023-06-03 RX ADMIN — VALACYCLOVIR HYDROCHLORIDE 500 MG: 500 TABLET, FILM COATED ORAL at 08:40

## 2023-06-03 RX ADMIN — MIRTAZAPINE 15 MG: 15 TABLET, FILM COATED ORAL at 21:04

## 2023-06-03 RX ADMIN — SERTRALINE HYDROCHLORIDE 100 MG: 100 TABLET ORAL at 08:40

## 2023-06-03 RX ADMIN — SENNOSIDES AND DOCUSATE SODIUM 2 TABLET: 50; 8.6 TABLET ORAL at 08:40

## 2023-06-03 RX ADMIN — ONDANSETRON 4 MG: 2 INJECTION INTRAMUSCULAR; INTRAVENOUS at 14:01

## 2023-06-03 RX ADMIN — HYDROXYZINE HYDROCHLORIDE 25 MG: 25 TABLET, FILM COATED ORAL at 14:06

## 2023-06-03 RX ADMIN — DIVALPROEX SODIUM 250 MG: 125 CAPSULE, COATED PELLETS ORAL at 08:39

## 2023-06-03 RX ADMIN — SODIUM CHLORIDE TAB 1 GM 1 G: 1 TAB at 19:48

## 2023-06-03 RX ADMIN — DIVALPROEX SODIUM 375 MG: 125 CAPSULE, COATED PELLETS ORAL at 21:04

## 2023-06-03 RX ADMIN — OLANZAPINE 5 MG: 2.5 TABLET, FILM COATED ORAL at 08:40

## 2023-06-03 ASSESSMENT — ACTIVITIES OF DAILY LIVING (ADL)
ADLS_ACUITY_SCORE: 20
ADLS_ACUITY_SCORE: 25
ADLS_ACUITY_SCORE: 25
ADLS_ACUITY_SCORE: 22
ADLS_ACUITY_SCORE: 20
ADLS_ACUITY_SCORE: 20
ADLS_ACUITY_SCORE: 27
ADLS_ACUITY_SCORE: 22
ADLS_ACUITY_SCORE: 22
ADLS_ACUITY_SCORE: 20
ADLS_ACUITY_SCORE: 20
ADLS_ACUITY_SCORE: 27

## 2023-06-03 NOTE — TELEPHONE ENCOUNTER
R: Pt on medical unit awaiting 3B bed @ [facility] or outside placement.   No Beds within [facility] [place] -  Bed search initiated @ 9:30am:     [facility] @ Capacity, [facility]: @ cap per website;   [facility]: @ cap per website;  [facility]: @ cap per website  [facility]: @ cap per website;  [facility]: Posting 9 beds. Mixed unit/Low acuity only; must be independent w/ ADLS and able to participate in programming. Per RN they are already reviewing another referral - recommends calling back later  [facility]; [facility]: @ cap per website;  [facility]: Already reviewed pt and declined;     [facility]: @Capacity. [facility]: Posting 2 beds. Mixed unit; Non-aggressive patients; must need minimal assistance w/ ADLS; Voluntary only. Per chart, pt has limited transportation home;   [facility]: Posting 4 beds. Mixed unit. Per chart, pt has limited transportation home    Pt remains on the adult worklist pending appropriate bed availability

## 2023-06-03 NOTE — PROVIDER NOTIFICATION
MD Notification    Notified Person: MD    Notified Person Name: Nataliia    Notification Date/Time: 6/2/2023, 1015     Notification Interaction: amcom     Purpose of Notification:   Hi, I noticed a Na was drawn today at 2000. Na had jumped from 125 to 131. I was unaware it was being checked. NS currently running at 50mL/hr. No symptoms at this time. Suggestions? Thanks!     Orders Received:  Patient care order: continue NS at current rate     Comments:

## 2023-06-03 NOTE — PLAN OF CARE
Shift: 06/03/23, 7-3 pm   Summary: Failure to thrive  Cognitive Concerns/ Orientation : A & O x 3, disoriented to situation. Mild [place]. Flat/depressed mood, anxious at times  BEHAVIOR & AGGRESSION TOOL COLOR: Green   ABNL VS/O2: VSS on RA  MOBILITY: SBA with GB&W  PAIN MANAGMENT: Denies  DIET: Regular, fair appetite   BOWEL/BLADDER: Continent. Constipated. BS+, abdomen soft, non tender. PRN senna and miralax administered. Needs suppository.   ABNL LAB/BG: Sodium 131 and   DRAIN/DEVICES: PIV SL  SKIN: Pale. Blanchable redness on R elbow and hip. New mepilex applied on days.   TESTS/PROCEDURES: None  D/C DATE: Pending clinical improvement  OTHER IMPORTANT INFO: Patient on a court hold, Overall better today. Vomiting this afternoon. PRN Zofran and hydroxyzine administered.

## 2023-06-03 NOTE — PROGRESS NOTES
[facility]    Medicine Progress Note - Hospitalist Service    Date of Admission:  5/5/2023    Assessment & Plan   Bhavana Marr is an 82-year-old female patient with PMH of asthma; dyslipidemia; depression/anxiety; bipolar disorder; borderline personality disorder; osteoporosis; prior gastric bypass; and recent hospitalization (4/21/2023 to 4/28/2023) with issues including suicidal ideation and dementia with behavioral disturbance. She presented from her care facility with decreased oral intake, low glucose level, low blood pressure, depression and possible suicidal ideations.     On initial evaluation, patient was afebrile, normotensive, not tachycardic.  Labs notable for BMP with sodium 133 otherwise normal; CBC was normal; LFTs showed low albumin of 3.0 and low protein 5.3 and low AST of 9, otherwise normal; glucose 109; urinalysis showed 60 ketones and not suggestive of infection.       Decreased oral intake with hypoglycemia, low blood pressures: improving  Hyponatremia, suspect due to decreased PO intake: resolved   Failure to thrive, possibly due to Psychiatric Illness; see below   * Initial presentation as above. Pt found with low sodium as well as ketones in the urine. Given fluids in the ED.    * No abdominal pain or lab findings to suggest a primary GI etiology.  * d/roberto IVF on 5/8 due to improved oral intake    -- Mental health management as below.     Psychiatric issues  Depression/anxiety, bipolar d/o, borderline personality d/o with possible suicidal ideations  * Of note is that the patient was recently hospitalized through [facility] with issues including suicidal ideation.  She was seen by psychiatry during that hospitalization; they did not recommend any type of psychiatric admission.    * Lives in LTC facility.  Per review of psych note, that the facility director of nursing reported that patient does not have hx of Dementia and was entered in her chart erroneously during  prior admission in 2021.     * Initial presentation as above. Noted manic episode and some confusion. On admit, pt denied suicidal ideations on admit but did endorse feeling depressed.  She was seen by the DEC  in the ED and not felt to be actively suicidal.  * [facility]    -- Psychiatry following intermittently  --  5/22, started Depakote sprinkles 125 mg BID and 250 mg at bedtime  --  Had court hearing for commitment on 5/23, commitment supported by [facility]  --  Sitter was discontinued on 5/23  --  5/25:          -- psych recommended continuing on olanzapine 5 mg daily and 10 mg nightly,  Sertraline 150 mg daily and Remeron 22.5 mg at night  -- 05/31:           -- increased Depakote to 250 mg twice daily and 375 mg nightly         -- Decreased sertraline from 150 mg to 100 mg.         -- Decreasing mirtazapine from 22.5 mg at night to 15 mg daily    -- Recheck valproic acid level on June 5, 2023.  -- Continuing to recommend inpatient psychiatry admission  -- Has also been noticed to be anxious from time to time, hydroxyzine has also been added as needed for anxiety this week  -- Was medically stable earlier this week, now complaining of some urgency and frequency, possible UTI and sodium was noticed to be 125, might not be ready to be discharged over the weekend , expecting patient to be ready for discharge on Monday   -- see management for UTI and hyponatremia below   -- Patient remains suicidal and continues to make comments about wanting to die in the last few days   -- Patient has been on the waiting list to be transferred to inpatient psych     Urinary urgency and burning , low probability of UTI,culture growing normal martine     -- patient was complaining of urgency and frequency , checked UA.  -- UA was negative for nitrite but large leukocyte esterase with 50 WBC counts  -- No leukocytosis, culture grew normal martine   -- [place]  patient on ciprofloxacin 500 mg p.o. twice daily while awaiting culture, will only do 3 day empiric course due to patient symptoms although culture not growing any infectious organism     Hyponatremia: improved   -- Most likely secondary to euvolemia secondary to psych medications, mild component of hypovolemia cannot be completely excluded   -- Start patient on normal saline at 50 mill per hour for 2 L, NA improved to 131 last night , off IV fluids now   -- Checked BMP this morning , results showing sodium of 131   -- discontinued IV fluids today  -- Checked urine sodium, urine osmolality and  plasma osmolality, in normal range , indicating hyponatremia is most likely sec to SIADH type picture from medications   -- TSH was checked , came back in normal range   -- will benefit from weekly BMP for some time while Psych meds are adjusted   -- will give patient Salt tablet once a day for 3 doses   -- recheck sodium tomorrow morning      Suspect Moderate malnutrition In Context of:  Acute illness or injury  -- Nutrition following; supplements for nutrition  -- will start checking weight weekly   -- oral intake is slowly improving   -- Added pre albumin to the lab , will follow up on the results      Prior gastric bypass  B12 deficiency  Iron deficienty anemia  GERD    -- Can resume PTA B12 and iron on discharge  -- Continue PTA famotidine  -- was planned for outpatient UGI endoscopy (5/22) for nausea/vomiting with h/o gastric bypass (was seen in clinic by Yessi 4/18/23); currently tolerating PO without significant G.I. issues, can reschedule EGD with the PCP as outpatient on follow up  -- Checked CBC on 06/02/23 while patient is awaiting placement, hemoglobin is in normal range of 12.2  --Continue plan for outpatient EGD.     Asthma  --  PRN albuterol inhaler     Dyslipidemia  -- Continue PTA simvastatin.      Osteoporosis  -- Resume alendronate after discharge.     Constipation-resolved  Patient with frequent loose  stools on 5/26 needing frequent nursing assistance. Scheduled Senna-colace discontinued. Loose/soft stools are likely related to bowel regimen. She does not have abdominal pain, fever, nausea or vomiting.     -- Discontinue scheduled bowel regimen on 5/26  -- Last BM now recorded on 5/27  -- patient is now receiving stool softners again as no BM is noticed since 05/27   -- Discussed with bedside nursing regarding administrating Dulcolax suppository today, patient has been receiving stool softeners for the last 3 days.  .    Diet: Room Service  Combination Diet Regular Diet; Safe Tray - with utensils  Diet    DVT Prophylaxis: Pneumatic Compression Devices and Anti-embolisim stockings (TEDs)  Cortez Catheter: Not present  Lines: None     Cardiac Monitoring: None  Code Status: Full Code      Clinically Significant Risk Factors         # Hyponatremia: Lowest Na = 125 mmol/L in last 2 days, will monitor as appropriate      # Hypoalbuminemia: Lowest albumin = 3 g/dL at 5/5/2023  4:53 PM, will monitor as appropriate                     Disposition Plan  awaiting inpt psych admission. medically stable, needs UA follow up    Expected Discharge Date: 06/03/2023,  6:00 PM  Discharge Delays: Specialist Consult (enter specialist & decision needed in comments)  *Medically Ready for Discharge  Placement - Mental Health/Addiction/Geripsych    Discharge Comments: .72 hour hold--up on Monday 1214.   County to meet on Friday for commitment.       Inpateint psych recommended.      Radha Yan MD  Hospitalist Service  [facility]  Securely message with MoneyMail (more info)  Text page via Goalbook Paging/Directory   ______________________________________________________________________    Interval History :    Patient was seen and examined this morning , looks clinically improved  Did have breakfast this morning , told me that she is feeling better and her urinary symptoms are much better now   Encouraged her to have  lunch later also   Denying any other complaints      Physical Exam   Vital Signs: Temp: 97.8  F (36.6  C) Temp src: Oral BP: 127/74 Pulse: 66   Resp: 18 SpO2: 99 % O2 Device: None (Room air)    Weight: 111 lbs 3.2 oz    General Appearance: Alert,awake and no apparent distress  Respiratory: CTAB, no wheezing  Cardiovascular: regular rate and rhythm  GI: soft and non-tender  Skin: warm and dry      Medical Decision Making     35 MINUTES SPENT BY ME on the date of service doing chart review, history, exam, documentation & further activities per the note.  MANAGEMENT DISCUSSED with the following over the past 24 hours: patient, RN, charge RN/care transition team   NOTE(S)/MEDICAL RECORDS REVIEWED over the past 24 hours: social work note      Data     I have personally reviewed the following data over the past 24 hrs:    7.1  \   12.2   / 226     131 (L) 93 (L) 8.5 /  76   3.8 22 0.67 \       TSH: 1.29 T4: N/A A1C: N/A       Procal: 0.04 CRP: N/A Lactic Acid: N/A         Imaging results reviewed over the past 24 hrs:   No results found for this or any previous visit (from the past 24 hour(s)).

## 2023-06-03 NOTE — TELEPHONE ENCOUNTER
R:  No appropriate [facility] beds are available.    [facility] Access Inpatient Bed Call Log 6/2/2023 8:14 PM         Intake has called facilities that have not updated their bed status within the last 12 hours.          Adults:           [facility] is posting 0 beds.        [facility] is posting 0 [phone]        [facility] is posting 0 beds. [phone]       [facility] is posting 2 beds. [phone] Per call @8:13am Step down Units available     [facility] is posting 0 beds. [phone]       [facility] is posting 0 beds. [phone]       [facility] is posting 0 beds. [phone]       [facility] is posting 0 beds. [phone]       [facility] is posting 1 bed. [phone] Per website @7:03pm         [facility] is posting 0 beds. [phone]         [facility] is posting 9 beds. Mixed unit 12+. Low acuity only.  [phone] Per website @7:20am      [facility] is positing 0 beds. No aggression.  [phone]       [facility] is posting 0 beds. [phone] @7:42am, @cap     [facility] is posting 5 beds. Low acuity only. [phone] Per website @7:27am     [facility] is posting 2 beds. [phone] Per website @7:02am     [facility] is posting 5 beds. Low acuity. [phone] Per website @7:11am     [facility] is posting 0 beds. 72 hr hold preferred. [phone] Reviewing own pt. Intake to call this afternoon      [facility] is posting 5 beds. [phone] Per website @12:32am      [facility] is posting 5 beds. Vol only, No Hx of aggression, violence, or assault. No sexual offenders. No 72 hr holds. [phone] Per call @8:03am, beds available           [facility] is posting 6 beds. (Must have the cognitive ability to do programming. No aggressive or violent behavior or recent HX in the last 2 yrs.  must be primary.) [phone] Per website @4:05am     [facility] is posting 0 beds. Low acuity only. Violence and aggression capped. [phone] @7:37am, No availability currently d/t capability       [facility] is posting 3 beds. Low acuity, Neg Covid. [phone] Per David @7:51pm, no beds available       [facility] is posting 3 beds. Covid neg. Vol only. Combined adolescent and adult unit. No aggressive or violent behavior. No registered sex offenders. [phone]       [facility] posting 0 beds. Negative covid.       [facility] is posting 0 beds. [phone] Per call @7:51am. No beds, will update state s website when available     [facility] is posting 16 beds. Call for details. [phone] Per phone call @7:24am     [facility] is posting 5 beds. [phone] Per call @7:32am, beds available.       Pt remains on work list pending appropriate bed availability.

## 2023-06-03 NOTE — PLAN OF CARE
Goal Outcome Evaluation:      Plan of Care Reviewed With: patient    Shift: 06/02/23, [phone]  Summary: Failure to thrive  Cognitive Concerns/ Orientation : A & O x 3, disoriented to situation. Mild [place]. Flat/depressed mood, anxious at times  BEHAVIOR & AGGRESSION TOOL COLOR: Green   ABNL VS/O2: VSS on RA  MOBILITY: SBA with GB&W, refusing to get OOB this evening  PAIN MANAGMENT: denies pain, on Scheduled Tylenol.   DIET: Regular, fair appetite   BOWEL/BLADDER: Continent. Constipated. BS+, abdomen soft, non tender. PRN senna administered on days.   ABNL LAB/BG: Sodium 125->131 (MD notified about increase, no change to plan of care at this time), Chloride 93, and  earlier in the day, random BG check was 53->145 during this shift-improved with complex carbohydrates  DRAIN/DEVICES: PIV infusing NS @ 50 mL/hr.   SKIN: Pale. Blanchable redness on R elbow and hip. New mepilex applied on days.   TESTS/PROCEDURES: None  D/C DATE: Pending clinical improvement  OTHER IMPORTANT INFO: Patient on a court hold. Started IV fluids for low sodium, and oral abx for possible UTI. Nausea/vomiting x1, IV Zofran effective. Hydroxyzine PRN for anxiety -not given on this shift

## 2023-06-03 NOTE — PLAN OF CARE
Shift: 06/02/23, [phone]  Summary: Failure to thrive  Cognitive Concerns/ Orientation : A & O x 3, disoriented to situation. Mild [place]. Flat/depressed mood, anxious at times  BEHAVIOR & AGGRESSION TOOL COLOR: Green   ABNL VS/O2: VSS on RA  MOBILITY: SBA with GB&W, refusing to get OOB this evening  PAIN MANAGMENT: denies pain, at night   DIET: Regular, fair appetite   BOWEL/BLADDER: Continent. Constipated. BS+, abdomen soft, non tender. PRN senna administered on days, no BM, just gassy   ABNL LAB/BG: see chart, no new changes pending today's labs. Random BG check was 76 at night, offered apple juice  DRAIN/DEVICES: PIV infusing NS @ 50 mL/hr.   SKIN: Pale. Blanchable redness on R elbow and hip. New mepilex applied on days.   TESTS/PROCEDURES: None  D/C DATE: Pending clinical improvement  OTHER IMPORTANT INFO: Patient on a court hold Pending determination after monday

## 2023-06-04 ENCOUNTER — TELEPHONE (OUTPATIENT)
Dept: BEHAVIORAL HEALTH | Facility: CLINIC | Age: 83
End: 2023-06-04
Payer: COMMERCIAL

## 2023-06-04 LAB
CREAT SERPL-MCNC: 0.59 MG/DL (ref 0.51–0.95)
GFR SERPL CREATININE-BSD FRML MDRD: 90 ML/MIN/1.73M2
GLUCOSE BLDC GLUCOMTR-MCNC: 123 MG/DL (ref 70–99)
PLATELET # BLD AUTO: 220 10E3/UL (ref 150–450)
SODIUM SERPL-SCNC: 136 MMOL/L (ref 136–145)

## 2023-06-04 PROCEDURE — 250N000013 HC RX MED GY IP 250 OP 250 PS 637: Performed by: HOSPITALIST

## 2023-06-04 PROCEDURE — 120N000001 HC R&B MED SURG/OB

## 2023-06-04 PROCEDURE — 250N000011 HC RX IP 250 OP 636: Performed by: INTERNAL MEDICINE

## 2023-06-04 PROCEDURE — 99232 SBSQ HOSP IP/OBS MODERATE 35: CPT | Performed by: INTERNAL MEDICINE

## 2023-06-04 PROCEDURE — 36415 COLL VENOUS BLD VENIPUNCTURE: CPT | Performed by: INTERNAL MEDICINE

## 2023-06-04 PROCEDURE — 250N000013 HC RX MED GY IP 250 OP 250 PS 637

## 2023-06-04 PROCEDURE — 82565 ASSAY OF CREATININE: CPT | Performed by: INTERNAL MEDICINE

## 2023-06-04 PROCEDURE — 85049 AUTOMATED PLATELET COUNT: CPT | Performed by: INTERNAL MEDICINE

## 2023-06-04 PROCEDURE — 84295 ASSAY OF SERUM SODIUM: CPT | Performed by: INTERNAL MEDICINE

## 2023-06-04 PROCEDURE — 250N000011 HC RX IP 250 OP 636: Performed by: HOSPITALIST

## 2023-06-04 PROCEDURE — 250N000013 HC RX MED GY IP 250 OP 250 PS 637: Performed by: INTERNAL MEDICINE

## 2023-06-04 RX ORDER — CIPROFLOXACIN 500 MG/1
500 TABLET, FILM COATED ORAL EVERY 12 HOURS SCHEDULED
Status: DISCONTINUED | OUTPATIENT
Start: 2023-06-04 | End: 2023-06-06

## 2023-06-04 RX ADMIN — ONDANSETRON 4 MG: 2 INJECTION INTRAMUSCULAR; INTRAVENOUS at 17:26

## 2023-06-04 RX ADMIN — FAMOTIDINE 20 MG: 20 TABLET ORAL at 08:56

## 2023-06-04 RX ADMIN — HYDROXYZINE HYDROCHLORIDE 25 MG: 25 TABLET, FILM COATED ORAL at 12:23

## 2023-06-04 RX ADMIN — OLANZAPINE 10 MG: 5 TABLET, FILM COATED ORAL at 20:23

## 2023-06-04 RX ADMIN — MIRTAZAPINE 15 MG: 15 TABLET, FILM COATED ORAL at 20:23

## 2023-06-04 RX ADMIN — CIPROFLOXACIN HYDROCHLORIDE 500 MG: 500 TABLET, FILM COATED ORAL at 08:56

## 2023-06-04 RX ADMIN — SERTRALINE HYDROCHLORIDE 100 MG: 100 TABLET ORAL at 08:56

## 2023-06-04 RX ADMIN — ENOXAPARIN SODIUM 40 MG: 40 INJECTION SUBCUTANEOUS at 12:18

## 2023-06-04 RX ADMIN — DIVALPROEX SODIUM 250 MG: 125 CAPSULE, COATED PELLETS ORAL at 13:21

## 2023-06-04 RX ADMIN — ACETAMINOPHEN 650 MG: 325 TABLET ORAL at 20:23

## 2023-06-04 RX ADMIN — OLANZAPINE 5 MG: 2.5 TABLET, FILM COATED ORAL at 08:56

## 2023-06-04 RX ADMIN — POLYETHYLENE GLYCOL 3350 17 G: 17 POWDER, FOR SOLUTION ORAL at 08:57

## 2023-06-04 RX ADMIN — DIVALPROEX SODIUM 250 MG: 125 CAPSULE, COATED PELLETS ORAL at 08:56

## 2023-06-04 RX ADMIN — BISACODYL 10 MG: 10 SUPPOSITORY RECTAL at 08:57

## 2023-06-04 RX ADMIN — CIPROFLOXACIN HYDROCHLORIDE 500 MG: 500 TABLET, FILM COATED ORAL at 20:24

## 2023-06-04 RX ADMIN — SIMVASTATIN 40 MG: 20 TABLET, FILM COATED ORAL at 20:24

## 2023-06-04 RX ADMIN — ACETAMINOPHEN 650 MG: 325 TABLET ORAL at 08:56

## 2023-06-04 RX ADMIN — DIVALPROEX SODIUM 375 MG: 125 CAPSULE, COATED PELLETS ORAL at 20:24

## 2023-06-04 RX ADMIN — VALACYCLOVIR HYDROCHLORIDE 500 MG: 500 TABLET, FILM COATED ORAL at 08:56

## 2023-06-04 ASSESSMENT — ACTIVITIES OF DAILY LIVING (ADL)
ADLS_ACUITY_SCORE: 27
ADLS_ACUITY_SCORE: 26
ADLS_ACUITY_SCORE: 24
ADLS_ACUITY_SCORE: 26
ADLS_ACUITY_SCORE: 24
ADLS_ACUITY_SCORE: 26
ADLS_ACUITY_SCORE: 27

## 2023-06-04 NOTE — PLAN OF CARE
Goal Outcome Evaluation:      Plan of Care Reviewed With: patient    Shift: 06/03/23, [phone]  Summary: Failure to thrive  Cognitive Concerns/ Orientation : A & O x 3, disoriented to situation. Mild [place]. Flat/depressed mood, appears anxious at times  BEHAVIOR & AGGRESSION TOOL COLOR: Green   ABNL VS/O2: VSS on RA  MOBILITY: SBA with GB&W, not motivated to get OOB  PAIN MANAGMENT: Denies, on scheduled tylenol.   DIET: Regular, fair appetite. Vomited x 1-compazine provided with some relief  BOWEL/BLADDER: Continent. Constipated. BS+, abdomen soft, non tender. PRN senna and miralax administered on days. Refusing suppository this evening  ABNL LAB/BG: Sodium 131  DRAIN/DEVICES: PIV/SL  SKIN: Pale. Blanchable redness on R. elbow and hip. Mepilex in place.    TESTS/PROCEDURES: None  D/C DATE: Pending clinical improvement  OTHER IMPORTANT INFO: Patient on a court hold. PRN atarax available for increased anxiety.

## 2023-06-04 NOTE — PLAN OF CARE
Goal Outcome Evaluation:       Cognitive Concerns/ Orientation : A/O x 3, disoriented to situation, mild [place], flat/depressed mood  BEHAVIOR & AGGRESSION TOOL COLOR: Green, calm/cooperative   ABNL VS/O2: VSS on RA  MOBILITY: SBA with GB&W to BR  PAIN MANAGMENT: Denies, on scheduled Tylenol.   DIET: Regular, fair appetite  BOWEL/BLADDER: Continent of urine, constipated. BS+, abdomen soft, non-tender. PRN senna and miralax administered on days, no BM  ABNL LAB/BG: Sodium 131  DRAIN/DEVICES: PIV/SL  SKIN: Pale. Blanchable redness on R. elbow and hip. Mepilex in place.    TESTS/PROCEDURES: None  D/C DATE: Pending clinical improvement  OTHER IMPORTANT INFO: Pt slept well, denies pain, no N/V, up to BR x 2, voiding, Patient on a court hold.

## 2023-06-04 NOTE — PROGRESS NOTES
VSS on RA, affect is flat, denies pain, appetite is poor, needs to be encouraged to take more fluids, A1 GB and W, ambulated in the hallway, encourage to take the shower later today.  Continue to monitor.

## 2023-06-04 NOTE — PROGRESS NOTES
[facility]    Medicine Progress Note - Hospitalist Service    Date of Admission:  5/5/2023    Assessment & Plan   Bhavana Marr is an 82-year-old female patient with PMH of asthma; dyslipidemia; depression/anxiety; bipolar disorder; borderline personality disorder; osteoporosis; prior gastric bypass; and recent hospitalization (4/21/2023 to 4/28/2023) with issues including suicidal ideation and dementia with behavioral disturbance. She presented from her care facility with decreased oral intake, low glucose level, low blood pressure, depression and possible suicidal ideations.     On initial evaluation, patient was afebrile, normotensive, not tachycardic.  Labs notable for BMP with sodium 133 otherwise normal; CBC was normal; LFTs showed low albumin of 3.0 and low protein 5.3 and low AST of 9, otherwise normal; glucose 109; urinalysis showed 60 ketones and not suggestive of infection.       Decreased oral intake with hypoglycemia, low blood pressures: improving  Hyponatremia, suspect due to decreased PO intake: resolved   Failure to thrive, possibly due to Psychiatric Illness; see below   * Initial presentation as above. Pt found with low sodium as well as ketones in the urine. Given fluids in the ED.    * No abdominal pain or lab findings to suggest a primary GI etiology.  * d/roberto IVF on 5/8 due to improved oral intake    -- Mental health management as below.     Psychiatric issues  Depression/anxiety, bipolar d/o, borderline personality d/o with possible suicidal ideations  * Of note is that the patient was recently hospitalized through [facility] with issues including suicidal ideation.  She was seen by psychiatry during that hospitalization; they did not recommend any type of psychiatric admission.    * Lives in LTC facility.  Per review of psych note, that the facility director of nursing reported that patient does not have hx of Dementia and was entered in her chart erroneously during  prior admission in 2021.     * Initial presentation as above. Noted manic episode and some confusion. On admit, pt denied suicidal ideations on admit but did endorse feeling depressed.  She was seen by the DEC  in the ED and not felt to be actively suicidal.  * [facility]    -- Psychiatry following intermittently  --  5/22, started Depakote sprinkles 125 mg BID and 250 mg at bedtime  --  Had court hearing for commitment on 5/23, commitment supported by [facility]  --  Sitter was discontinued on 5/23  --  5/25:          -- psych recommended continuing on olanzapine 5 mg daily and 10 mg nightly,  Sertraline 150 mg daily and Remeron 22.5 mg at night  -- 05/31:           -- increased Depakote to 250 mg twice daily and 375 mg nightly         -- Decreased sertraline from 150 mg to 100 mg.         -- Decreasing mirtazapine from 22.5 mg at night to 15 mg daily    -- Recheck valproic acid level on June 5, 2023, please order tomorrow     --Patient still requires inpatient psych admission as per recommendation  -- Has also been noticed to be anxious from time to time, hydroxyzine has also been added as needed for anxiety this week  -- Was medically stable earlier this week, now complaining of some urgency and frequency, possible UTI and sodium was noticed to be 125, might not be ready to be discharged over the weekend , expecting patient to be ready for discharge on Monday   -- see management for UTI and hyponatremia below   -- Patient remains suicidal and continues to make comments about wanting to die in the last few days   -- Patient has been on the waiting list to be transferred to inpatient psych     Urinary urgency and burning , low probability of UTI,culture growing normal martine     -- patient was complaining of urgency and frequency , checked UA.  -- UA was negative for nitrite but large leukocyte esterase with 50 WBC counts  -- No leukocytosis,  culture grew normal martine   -- Started patient on ciprofloxacin 500 mg p.o. twice daily while awaiting culture, will only do 3 day empiric course due to patient symptoms although culture not growing any infectious organism     Hyponatremia: improved   -- Most likely secondary to euvolemia secondary to psych medications, mild component of hypovolemia cannot be completely excluded   -- Started patient on normal saline at 50 mill per hour for 2 L, NA improved to 131 , off IV fluids now   -- Checked urine sodium, urine osmolality and  plasma osmolality, in normal range , indicating hyponatremia is most likely sec to SIADH type picture from medications   -- TSH was checked , came back in normal range   -- will benefit from weekly BMP for some time while Psych meds are adjusted   -- Sodium was rechecked this morning, has improved to 136 this morning, patient has 6 points increase in sodium in first 24 hours and then 4 points increase in sodium in the last 24 hours.    Suspect Moderate malnutrition In Context of:  Acute illness or injury  -- Nutrition following; supplements for nutrition  -- will start checking weight weekly   -- oral intake is slowly improving   -- Added pre albumin to the lab , will follow up on the results, in process      Prior gastric bypass  B12 deficiency  Iron deficienty anemia  GERD    -- Can resume PTA B12 and iron on discharge  -- Continue PTA famotidine  -- was planned for outpatient UGI endoscopy (5/22) for nausea/vomiting with h/o gastric bypass (was seen in clinic by Yessi 4/18/23); currently tolerating PO without significant G.I. issues, can reschedule EGD with the PCP as outpatient on follow up  -- Checked CBC on 06/02/23 while patient is awaiting placement, hemoglobin is in normal range of 12.2  --Continue plan for outpatient EGD.     Asthma  --  PRN albuterol inhaler     Dyslipidemia  -- Continue PTA simvastatin.      Osteoporosis  -- Resume alendronate after  discharge.     Constipation-resolved  Patient with frequent loose stools on 5/26 needing frequent nursing assistance. Scheduled Senna-colace discontinued. Loose/soft stools are likely related to bowel regimen. She does not have abdominal pain, fever, nausea or vomiting.     -- Discontinued scheduled bowel regimen on 5/26  -- Last BM now recorded on 5/27  -- patient is now receiving stool softners again as no BM is noticed since 05/27   -- Discussed with bedside nursing regarding administrating Dulcolax suppository on 06/03/23 today, patient has been receiving stool softeners for the last 4 days, might need enema today discussed with bedside nursing   .    Diet: Room Service  Combination Diet Regular Diet; Safe Tray - with utensils  Diet    DVT Prophylaxis: Pneumatic Compression Devices and Anti-embolisim stockings (TEDs)  Cortez Catheter: Not present  Lines: None     Cardiac Monitoring: None  Code Status: Full Code      Clinically Significant Risk Factors         # Hyponatremia: Lowest Na = 125 mmol/L in last 2 days, will monitor as appropriate      # Hypoalbuminemia: Lowest albumin = 3 g/dL at 5/5/2023  4:53 PM, will monitor as appropriate                     Disposition Plan  awaiting inpt psych admission. medically stable, needs UA follow up    Expected Discharge Date: 06/05/2023,  6:00 PM  Discharge Delays: Specialist Consult (enter specialist & decision needed in comments)  *Medically Ready for Discharge  Placement - Mental Health/Addiction/Geripsych    Discharge Comments: on Court hold, now with UTI/hyponatremia. Will need to inpt psych      Radha Yan MD  Hospitalist Service  [facility]  Securely message with [employer] (more info)  Text page via [employer] Paging/Directory   ______________________________________________________________________    Interval History :    Patient was seen and examined this morning, looks clinically improved.  Patient oral intake has improved last 24 hours,  sodium is now within normal range of 136, and she does not have any further urinary symptoms, will be done with her antibiotics today only getting treated for 3 days as preemptive UTI treatment as her cultures came back negative.  Patient still does not have any bowel movement, has been receiving stool softeners has received suppository yesterday we will discussed with bedside nursing regarding administrating enema today.    Physical Exam   Vital Signs: Temp: 98  F (36.7  C) Temp src: Oral BP: 109/60 Pulse: 70   Resp: 18 SpO2: 97 % O2 Device: None (Room air)    Weight: 111 lbs 3.2 oz    General Appearance: Alert,awake and no apparent distress  Respiratory: CTAB, no wheezing  Cardiovascular: regular rate and rhythm  GI: soft and non-tender  Skin: warm and dry      Medical Decision Making     35 MINUTES SPENT BY ME on the date of service doing chart review, history, exam, documentation & further activities per the note.  MANAGEMENT DISCUSSED with the following over the past 24 hours: patient, RN, charge RN/care transition team   NOTE(S)/MEDICAL RECORDS REVIEWED over the past 24 hours: social work note      Data     I have personally reviewed the following data over the past 24 hrs:    N/A  \   N/A   / N/A     131 (L) 100 6.8 (L) /  197 (H)   3.9 20 (L) 0.64 \       Imaging results reviewed over the past 24 hrs:   No results found for this or any previous visit (from the past 24 hour(s)).

## 2023-06-04 NOTE — TELEPHONE ENCOUNTER
R:  No appropriate [facility] beds are available.    [facility] Access Inpatient Bed Call Log 6/4/23 @6:30 PM       Intake has called facilities that have not updated their bed status within the last 12 hours.          Adults:           [facility] is posting 0 beds.        [facility] is posting 0 [phone]   per call      [facility] is posting 0 beds. [phone]       [facility] is posting 0 beds. [phone]      [facility] is posting 0 beds. [phone]       [facility] is posting 0 beds. [phone]       [facility] is posting 0 beds. [phone]       [facility] is posting 0 beds. [phone]       [facility] is posting 1 bed. [phone] [facility] is posting 0 beds. [phone]         [facility] is posting 4 beds. Mixed unit 12+. Low acuity only.  [phone];      [facility] is positing 0 beds. No aggression.  [phone]       [facility] is posting 0 beds. [phone]      [facility] is posting 2 beds. Low acuity only. [phone]      [facility] is posting 4 beds. Low acuity. No current aggression. [phone]      [facility] is posting 2 beds. Low acuity. [phone]      [facility] is posting 0 beds. 72 hr hold preferred. [phone];      [facility] is posting 5 beds. [phone]      [facility] is posting 5 beds. Vol only, No Hx of aggression, violence, or assault. No sexual offenders. No 72 hr holds. [phone]            [facility] is posting 5 beds. (Must have the cognitive ability to do programming. No aggressive or violent behavior or recent HX in the last 2 yrs. MH must be primary.) [phone]      [facility] is posting 0 beds. Low acuity only. Violence and aggression capped. [phone]      [facility] is posting 0 beds. Low acuity, Neg Covid. [phone];      [facility] is posting 3 beds. Covid neg. Vol only. Combined adolescent and adult unit. No aggressive or violent behavior. No registered sex offenders. [phone];     [facility] posting 0 beds. Negative covid.       [facility] is posting 0 beds. [phone];       [facility] is posting 12 beds. Call for details. [phone]       [facility] is posting 5 beds. Mixed unit. [phone] ;          Pt remains on work list pending appropriate bed availability.       6:54 PM Discussed Pt with [facility] and they wlll call back with updates    11:14 PM Talked to Nunu with [facility] and there are still 3 people being reviewed before Pt as well as one Pt internally through their ED so probably no bed available for the PT.  They can call and be reviewed tomorrow for available. Beds.

## 2023-06-04 NOTE — PLAN OF CARE
Shift: 06/04/23, [phone]   Summary: Failure to thrive  Cognitive Concerns/ Orientation : A & O x 3, disoriented to situation, mild [place], flat/depressed mood/anxious at times  BEHAVIOR & AGGRESSION TOOL COLOR: Yellow still having passive SI thoughts.  ABNL VS/O2: VSS on RA  MOBILITY: SBA with GB&W   PAIN MANAGMENT: Denies, on scheduled Tylenol.   DIET: Regular, fair appetite  BOWEL/BLADDER: Continent of urine, constipated. BS+, abdomen soft, non-tender. PRN Miralax and suppository administered x1, with results of a small BM.  ABNL LAB/BG: Sodium 136  DRAIN/DEVICES: PIV SL  SKIN: Pale. Blanchable redness on R. elbow and hip.   TESTS/PROCEDURES: None  D/C DATE: Pending inpatient psych bed availability.   OTHER IMPORTANT INFO: Patient on a court hold. Stable. No changes.

## 2023-06-04 NOTE — TELEPHONE ENCOUNTER
R:  No appropriate [facility] 3B beds available.    [facility] Access Inpatient Bed Call Log 6/3/23 @9:00 PM        Intake has called facilities that have not updated their bed status within the last 12 hours.          Adults:           [facility] is posting 0 beds.        [facility] is posting 0 [phone]   per call @ 7:21 am to Sharon, they are at cap.     [facility] is posting 0 beds. [phone]       [facility] is posting 2 beds. [phone] Per call @ 7:26 am to Thao, she will ask her charge rn to call us back with bed availability     [facility] is posting 0 beds. [phone]       [facility] is posting 0 beds. [phone]       [facility] is posting 0 beds. [phone]       [facility] is posting 0 beds. [phone]       [facility] is posting 1 bed. [phone] - currently reviewing other clinical         [facility] is posting 0 beds. [phone]         [facility] is posting 7 beds. Mixed unit 12+. Low acuity only.  [phone]      [facility] is positing 0 beds. No aggression.  [phone]       [facility] is posting 0 beds. [phone] per call at 7:30 am, message said not to leave a message; no option to speak with a live rep     [facility] is posting 4 beds. Low acuity only. [phone]      [facility] is posting 5 beds. Low acuity. No current aggression. [phone]      [facility] is posting 5 beds. Low acuity. [phone]      [facility] is posting 0 beds. 72 hr hold preferred. [phone]; per call at 7:33 am to Ariana, they are at cap     [facility] is posting 6 beds. [phone];  per call at 7:35 am to Mary, she can t give the exact number of open beds but said they have beds but no aggression     [facility] is posting 5 beds. Vol only, No Hx of aggression, violence, or assault. No sexual offenders. No 72 hr holds. [phone]            [facility] is posting 6 beds. (Must have the cognitive ability to do programming. No aggressive or violent behavior or recent HX in the last 2 yrs. MH must be primary.) [phone]      [facility] is posting 0 beds. Low acuity only. Violence and aggression capped. [phone]      [facility] is posting 3 beds. Low acuity, Neg Covid. [phone]; per call at 7:39 am to Nilda, she will ask nurse to call us back re: bed availability     [facility] is posting 3 beds. Covid neg. Vol only. Combined adolescent and adult unit. No aggressive or violent behavior. No registered sex offenders. [phone]  per call at 7:42 am to More, they have 1 bed avail.     [facility] posting 0 beds. Negative covid.       [facility] is posting 0 beds. [phone];  per call at 7:44 am to Tamika, they have 1 bed;  no medical issues  and no hx of aggression     [facility] is posting 16 beds. Call for details. [phone]       [facility] is posting 5 beds. Mixed unit. [phone] ; per call at 7:47 am to Rachael, 5 beds       Pt remains on work list pending appropriate bed availability.

## 2023-06-05 ENCOUNTER — APPOINTMENT (OUTPATIENT)
Dept: PHYSICAL THERAPY | Facility: CLINIC | Age: 83
DRG: 644 | End: 2023-06-05
Payer: COMMERCIAL

## 2023-06-05 LAB
PREALB SERPL IA-MCNC: 21 MG/DL (ref 15–45)
VALPROATE SERPL-MCNC: 59.5 UG/ML

## 2023-06-05 PROCEDURE — 250N000013 HC RX MED GY IP 250 OP 250 PS 637

## 2023-06-05 PROCEDURE — 97116 GAIT TRAINING THERAPY: CPT | Mod: GP | Performed by: PHYSICAL THERAPIST

## 2023-06-05 PROCEDURE — 36415 COLL VENOUS BLD VENIPUNCTURE: CPT

## 2023-06-05 PROCEDURE — 80164 ASSAY DIPROPYLACETIC ACD TOT: CPT

## 2023-06-05 PROCEDURE — 97530 THERAPEUTIC ACTIVITIES: CPT | Mod: GP | Performed by: PHYSICAL THERAPIST

## 2023-06-05 PROCEDURE — 250N000013 HC RX MED GY IP 250 OP 250 PS 637: Performed by: HOSPITALIST

## 2023-06-05 PROCEDURE — 250N000013 HC RX MED GY IP 250 OP 250 PS 637: Performed by: INTERNAL MEDICINE

## 2023-06-05 PROCEDURE — 250N000011 HC RX IP 250 OP 636: Performed by: HOSPITALIST

## 2023-06-05 PROCEDURE — 250N000011 HC RX IP 250 OP 636: Performed by: INTERNAL MEDICINE

## 2023-06-05 PROCEDURE — 120N000001 HC R&B MED SURG/OB

## 2023-06-05 PROCEDURE — 97110 THERAPEUTIC EXERCISES: CPT | Mod: GP | Performed by: PHYSICAL THERAPIST

## 2023-06-05 RX ADMIN — SIMVASTATIN 40 MG: 20 TABLET, FILM COATED ORAL at 20:45

## 2023-06-05 RX ADMIN — ACETAMINOPHEN 650 MG: 325 TABLET ORAL at 08:52

## 2023-06-05 RX ADMIN — ONDANSETRON 4 MG: 4 TABLET, ORALLY DISINTEGRATING ORAL at 17:30

## 2023-06-05 RX ADMIN — POLYETHYLENE GLYCOL 3350 17 G: 17 POWDER, FOR SOLUTION ORAL at 08:52

## 2023-06-05 RX ADMIN — DIVALPROEX SODIUM 375 MG: 125 CAPSULE, COATED PELLETS ORAL at 20:44

## 2023-06-05 RX ADMIN — OLANZAPINE 10 MG: 5 TABLET, FILM COATED ORAL at 20:45

## 2023-06-05 RX ADMIN — ACETAMINOPHEN 650 MG: 325 TABLET ORAL at 20:44

## 2023-06-05 RX ADMIN — FAMOTIDINE 20 MG: 20 TABLET ORAL at 08:52

## 2023-06-05 RX ADMIN — CIPROFLOXACIN HYDROCHLORIDE 500 MG: 500 TABLET, FILM COATED ORAL at 08:52

## 2023-06-05 RX ADMIN — ENOXAPARIN SODIUM 40 MG: 40 INJECTION SUBCUTANEOUS at 12:35

## 2023-06-05 RX ADMIN — CIPROFLOXACIN HYDROCHLORIDE 500 MG: 500 TABLET, FILM COATED ORAL at 20:45

## 2023-06-05 RX ADMIN — PROCHLORPERAZINE MALEATE 5 MG: 5 TABLET ORAL at 12:34

## 2023-06-05 RX ADMIN — SENNOSIDES AND DOCUSATE SODIUM 2 TABLET: 50; 8.6 TABLET ORAL at 08:52

## 2023-06-05 RX ADMIN — DIVALPROEX SODIUM 250 MG: 125 CAPSULE, COATED PELLETS ORAL at 08:52

## 2023-06-05 RX ADMIN — MIRTAZAPINE 15 MG: 15 TABLET, FILM COATED ORAL at 20:45

## 2023-06-05 RX ADMIN — HYDROXYZINE HYDROCHLORIDE 25 MG: 25 TABLET, FILM COATED ORAL at 09:59

## 2023-06-05 RX ADMIN — DIVALPROEX SODIUM 250 MG: 125 CAPSULE, COATED PELLETS ORAL at 15:06

## 2023-06-05 RX ADMIN — OLANZAPINE 5 MG: 2.5 TABLET, FILM COATED ORAL at 08:52

## 2023-06-05 RX ADMIN — SERTRALINE HYDROCHLORIDE 100 MG: 100 TABLET ORAL at 08:52

## 2023-06-05 RX ADMIN — VALACYCLOVIR HYDROCHLORIDE 500 MG: 500 TABLET, FILM COATED ORAL at 08:52

## 2023-06-05 ASSESSMENT — ACTIVITIES OF DAILY LIVING (ADL)
ADLS_ACUITY_SCORE: 26
ADLS_ACUITY_SCORE: 26
ADLS_ACUITY_SCORE: 30
ADLS_ACUITY_SCORE: 26
ADLS_ACUITY_SCORE: 30
ADLS_ACUITY_SCORE: 26
ADLS_ACUITY_SCORE: 30
ADLS_ACUITY_SCORE: 26

## 2023-06-05 NOTE — PLAN OF CARE
Goal Outcome Evaluation:      Plan of Care Reviewed With: patient    Shift: 06/04/23, [phone]  Summary: Failure to thrive  Cognitive Concerns/ Orientation : A & O x 3, disoriented to situation, mild [place], flat/depressed mood/anxious at times  BEHAVIOR & AGGRESSION TOOL COLOR: Yellow still having passive SI thoughts.  ABNL VS/O2: VSS on RA  MOBILITY: SBA with GB&W, encouraged activity this evening but refused to walk or get into chair    PAIN MANAGMENT: Denies, on scheduled Tylenol.   DIET: Regular, fair appetite. Continues to have episodes of nausea and vomiting, managed with PRN zofran  BOWEL/BLADDER: Continent of urine, constipated. BS+, abdomen soft, non-tender. PRN Miralax and suppository administered x1 on days, with results of a small BM   ABNL LAB/BG: Sodium 136  DRAIN/DEVICES: PIV/SL  SKIN: Pale. Blanchable redness on R. elbow and hip. Encouraged weight shifting  TESTS/PROCEDURES: None  D/C DATE: Pending inpatient psych bed availability.   OTHER IMPORTANT INFO: Patient on a court hold

## 2023-06-05 NOTE — PLAN OF CARE
Goal Outcome Evaluation:       Shift: 06/04/23, [phone]  Summary: Failure to thrive  Cognitive Concerns/ Orientation : A/O x 3, disoriented to situation, mild [place], flat/depressed mood/anxious at times  BEHAVIOR & AGGRESSION TOOL COLOR: green  ABNL VS/O2: VSS on RA  MOBILITY: SBA with GB&W to BR  PAIN MANAGMENT: Denies, on scheduled Tylenol.   DIET: Regular, poor appetite,no N/V this shift  BOWEL/BLADDER: occasionally incontinent of urine, constipated. BS+, abd soft, non-tender, no BM  ABNL LAB/BG: Sodium 136  DRAIN/DEVICES: PIV/SL  SKIN: Pale. Blanchable redness on R elbow/hip. Encouraged weight shifting  TESTS/PROCEDURES: None  D/C DATE: Pending inpatient Psych bed availability.   OTHER IMPORTANT INFO: Pt slept well, denies pain/N/V, up to BR voiding, no BM, patient on a court hold.

## 2023-06-05 NOTE — PLAN OF CARE
Shift: 06/05/23, 7-3 pm   Summary: Failure to thrive  Cognitive Concerns/ Orientation : A & O x 3, disoriented to situation, mild [place], flat/depressed mood/anxious at times  BEHAVIOR & AGGRESSION TOOL COLOR: Yellow still having passive SI thoughts.  ABNL VS/O2: VSS on RA except mild HTN  MOBILITY: SBA with GB&W   PAIN MANAGMENT: C/O of generalized body aches, on scheduled Tylenol.   DIET: Regular, fair appetite   BOWEL/BLADDER: Occasionally incontinent of urine, continent for me. Constipated. BS+, abd soft, non-tender, only small BM from yesterday.   ABNL LAB/BG: No labs today.   DRAIN/DEVICES: PIV SL  SKIN: Pale. Blanchable redness on R. elbow and hip. Encouraged weight shifting. Mepilex placed R hip.   TESTS/PROCEDURES: None  D/C DATE: Pending inpatient Psych bed availability.   OTHER IMPORTANT INFO: Pt still sad and depressed. Continues to state she wants to die or go back home. Writer explained next steps/plan for pt. Patient on a court hold. Emesis x1, PO compazine effective. Showered. Ambulated in hallway a couple times.

## 2023-06-05 NOTE — PLAN OF CARE
Shift: 06/05/23 3pm-7pm  Summary: Failure to thrive  Cognitive Concerns/ Orientation : A&Ox3, disoriented to situation, flat/depressed mood/anxious at times  BEHAVIOR & AGGRESSION TOOL COLOR: green  ABNL VS/O2: VSS, room air  MOBILITY: SBA/GB/Walker   PAIN MANAGMENT: on scheduled Tylenol.   DIET: Regular  BOWEL/BLADDER: Occasionally incontinent of urine, Constipated  DRAIN/DEVICES: PIV SL  SKIN: Pale. Blanchable redness on R. elbow and hip. Encouraged weight shifting. Mepilex  R hip.   TESTS/PROCEDURES: None  D/C DATE: Pending inpatient Psych bed availability.   OTHER IMPORTANT INFO: Patient on a court hold. Emesis x1 just small amount of liquids, ODT zofran given x1.

## 2023-06-05 NOTE — PROGRESS NOTES
[facility]    Medicine Progress Note - Hospitalist Service    Date of Admission:  5/5/2023    Assessment & Plan     Bhavana Marr is an 82-year-old female patient with PMH of asthma; dyslipidemia; depression/anxiety; bipolar disorder; borderline personality disorder; osteoporosis; prior gastric bypass; and recent hospitalization (4/21/2023 to 4/28/2023) with issues including suicidal ideation and dementia with behavioral disturbance. She presented from her care facility with decreased oral intake, low glucose level, low blood pressure, depression and possible suicidal ideations.     On initial evaluation, patient was afebrile, normotensive, not tachycardic.  Labs notable for BMP with sodium 133 otherwise normal; CBC was normal; LFTs showed low albumin of 3.0 and low protein 5.3 and low AST of 9, otherwise normal; glucose 109; urinalysis showed 60 ketones and not suggestive of infection.     Decreased oral intake with hypoglycemia, low blood pressures: improving  Hyponatremia, suspect due to decreased PO intake: resolved   Failure to thrive, possibly due to Psychiatric Illness; see below   * Initial presentation as above. Pt found with low sodium as well as ketones in the urine. Given fluids in the ED.    * No abdominal pain or lab findings to suggest a primary GI etiology.  * d/roberto IVF on 5/8 due to improved oral intake  -- Mental health management as below.     Psychiatric issues  Depression/anxiety, bipolar d/o, borderline personality d/o with possible suicidal ideations  * Of note is that the patient was recently hospitalized through [facility] with issues including suicidal ideation.  She was seen by psychiatry during that hospitalization; they did not recommend any type of psychiatric admission.    * Lives in LTC facility.  Per review of psych note, that the facility director of nursing reported that patient does not have hx of Dementia and was entered in her chart erroneously during  prior admission in 2021.     * Initial presentation as above. Noted manic episode and some confusion. On admit, pt denied suicidal ideations on admit but did endorse feeling depressed.  She was seen by the DEC  in the ED and not felt to be actively suicidal.  * [facility]  -- Psychiatry following intermittently  --  5/22, started Depakote sprinkles 125 mg BID and 250 mg at bedtime  --  Had court hearing for commitment on 5/23, commitment supported by [facility]  --  Sitter was discontinued on 5/23  --  5/25:          -- psych recommended continuing on olanzapine 5 mg daily and 10 mg nightly,  Sertraline 150 mg daily and Remeron 22.5 mg at night  -- 05/31:           -- increased Depakote to 250 mg twice daily and 375 mg nightly         -- Decreased sertraline from 150 mg to 100 mg.         -- Decreasing mirtazapine from 22.5 mg at night to 15 mg daily  -- Recheck valproic acid level     --Patient still requires inpatient psych admission as per recommendation  -- Has also been noticed to be anxious from time to time, hydroxyzine has also been added as needed for anxiety this week  -- Was medically stable earlier this week, now complaining of some urgency and frequency, possible UTI and sodium was noticed to be 125, might not be ready to be discharged over the weekend , expecting patient to be ready for discharge on Monday   -- see management for UTI and hyponatremia below   -- Patient remains suicidal and continues to make comments about wanting to die in the last few days   -- Patient has been on the waiting list to be transferred to inpatient psych      Urinary urgency and burning , low probability of UTI,culture growing normal martine   -- patient was complaining of urgency and frequency , checked UA.  -- UA was negative for nitrite but large leukocyte esterase with 50 WBC counts  -- No leukocytosis, culture grew normal martine   -- Started  patient on ciprofloxacin 500 mg p.o. twice daily while awaiting culture, will only do 3 day empiric course due to patient symptoms although culture not growing any infectious organism      Hyponatremia: improved   -- Most likely secondary to euvolemia secondary to psych medications, mild component of hypovolemia cannot be completely excluded   -- Started patient on normal saline at 50 mill per hour for 2 L, NA improved to 131 , off IV fluids now   -- Checked urine sodium, urine osmolality and  plasma osmolality, in normal range , indicating hyponatremia is most likely sec to SIADH type picture from medications   -- TSH was checked , came back in normal range   -- will benefit from weekly BMP for some time while Psych meds are adjusted      Suspect Moderate malnutrition In Context of:  Acute illness or injury  -- Nutrition following; supplements for nutrition  -- will start checking weight weekly   -- oral intake is slowly improving   -- Added pre albumin to the lab , will follow up on the results, in process      Prior gastric bypass  B12 deficiency  Iron deficienty anemia  GERD  -- Can resume PTA B12 and iron on discharge  -- Continue PTA famotidine  -- was planned for outpatient UGI endoscopy (5/22) for nausea/vomiting with h/o gastric bypass (was seen in clinic by Yessi 4/18/23); currently tolerating PO without significant G.I. issues, can reschedule EGD with the PCP as outpatient on follow up  -- Checked CBC on 06/02/23 while patient is awaiting placement, hemoglobin is in normal range of 12.2  --Continue plan for outpatient EGD.     Asthma  --  PRN albuterol inhaler     Dyslipidemia  -- Continue PTA simvastatin.      Osteoporosis  -- Resume alendronate after discharge.     Constipation-resolved  Patient with frequent loose stools on 5/26 needing frequent nursing assistance. Scheduled Senna-colace discontinued. Loose/soft stools are likely related to bowel regimen. She does not have abdominal pain, fever, nausea  or vomiting.   -- Discontinued scheduled bowel regimen on 5/26  -- Last BM now recorded on 5/27  -- patient is now receiving stool softners again as no BM is noticed since 05/27      DVT Prophylaxis: Pneumatic Compression Devices and Anti-embolisim stockings (TEDs)    Disposition Plan  awaiting inpt psych admission. medically stable, UA follow up  Urine Culture  Order: [id number]   Collected 6/1/2023  5:00 PM      Status: Final result      Visible to patient: No (inaccessible in MyChart)     Specimen Information: Urine, Midstream    0 Result Notes  Culture 10,000-50,000 CFU/mL Mixture of urogenital martine              Discharge Comments: on Court hold, now with UTI/hyponatremia. Will need to inpt psych        Diet: Room Service  Combination Diet Regular Diet; Safe Tray - with utensils  Diet      Cortez Catheter: Not present  Lines: None     Cardiac Monitoring: None  Code Status: Full Code      Clinically Significant Risk Factors              # Hypoalbuminemia: Lowest albumin = 3 g/dL at 5/5/2023  4:53 PM, will monitor as appropriate                     Disposition Plan     Expected Discharge Date: 06/05/2023, 12:00 PM  Discharge Delays: Specialist Consult (enter specialist & decision needed in comments)  *Medically Ready for Discharge  Placement - Mental Health/Addiction/Geripsych    Discharge Comments: on Court hold, now with UTI/hyponatremia. Will need to inpt psych          Negrito Wolf DO  Hospitalist Service  [facility]  Securely message with [employer] (more info)  Text page via [employer] Paging/Directory   ______________________________________________________________________    Interval History   Patient refusing to talk to this writer.    Physical Exam   Vital Signs: Temp: 97.9  F (36.6  C) Temp src: Oral BP: 129/73 Pulse: 74   Resp: 18 SpO2: 98 % O2 Device: None (Room air)    Weight: 111 lbs 3.2 oz    Patient declined physical exam.    Medical Decision Making       5 MINUTES SPENT  BY ME on the date of service doing chart review, history, exam, documentation & further activities per the note.      Data         Imaging results reviewed over the past 24 hrs:   No results found for this or any previous visit (from the past 24 hour(s)).

## 2023-06-06 ENCOUNTER — TELEPHONE (OUTPATIENT)
Dept: BEHAVIORAL HEALTH | Facility: CLINIC | Age: 83
End: 2023-06-06
Payer: COMMERCIAL

## 2023-06-06 LAB
ANION GAP SERPL CALCULATED.3IONS-SCNC: 13 MMOL/L (ref 7–15)
BUN SERPL-MCNC: 6.4 MG/DL (ref 8–23)
CALCIUM SERPL-MCNC: 9.6 MG/DL (ref 8.8–10.2)
CHLORIDE SERPL-SCNC: 95 MMOL/L (ref 98–107)
CREAT SERPL-MCNC: 0.61 MG/DL (ref 0.51–0.95)
DEPRECATED HCO3 PLAS-SCNC: 21 MMOL/L (ref 22–29)
ERYTHROCYTE [DISTWIDTH] IN BLOOD BY AUTOMATED COUNT: 12.8 % (ref 10–15)
GFR SERPL CREATININE-BSD FRML MDRD: 89 ML/MIN/1.73M2
GLUCOSE SERPL-MCNC: 139 MG/DL (ref 70–99)
HCT VFR BLD AUTO: 40.5 % (ref 35–47)
HGB BLD-MCNC: 13.5 G/DL (ref 11.7–15.7)
MCH RBC QN AUTO: 30.2 PG (ref 26.5–33)
MCHC RBC AUTO-ENTMCNC: 33.3 G/DL (ref 31.5–36.5)
MCV RBC AUTO: 91 FL (ref 78–100)
OSMOLALITY SERPL: 270 MMOL/KG (ref 280–301)
OSMOLALITY UR: 506 MMOL/KG (ref 100–1200)
PLATELET # BLD AUTO: 252 10E3/UL (ref 150–450)
POTASSIUM SERPL-SCNC: 3.6 MMOL/L (ref 3.4–5.3)
RBC # BLD AUTO: 4.47 10E6/UL (ref 3.8–5.2)
SODIUM SERPL-SCNC: 129 MMOL/L (ref 136–145)
SODIUM UR-SCNC: 53 MMOL/L
WBC # BLD AUTO: 9.2 10E3/UL (ref 4–11)

## 2023-06-06 PROCEDURE — 36415 COLL VENOUS BLD VENIPUNCTURE: CPT | Performed by: HOSPITALIST

## 2023-06-06 PROCEDURE — 80048 BASIC METABOLIC PNL TOTAL CA: CPT | Performed by: HOSPITALIST

## 2023-06-06 PROCEDURE — 99232 SBSQ HOSP IP/OBS MODERATE 35: CPT | Performed by: HOSPITALIST

## 2023-06-06 PROCEDURE — 80164 ASSAY DIPROPYLACETIC ACD TOT: CPT | Performed by: HOSPITALIST

## 2023-06-06 PROCEDURE — 120N000001 HC R&B MED SURG/OB

## 2023-06-06 PROCEDURE — 83930 ASSAY OF BLOOD OSMOLALITY: CPT | Performed by: HOSPITALIST

## 2023-06-06 PROCEDURE — 99232 SBSQ HOSP IP/OBS MODERATE 35: CPT

## 2023-06-06 PROCEDURE — 250N000013 HC RX MED GY IP 250 OP 250 PS 637: Performed by: INTERNAL MEDICINE

## 2023-06-06 PROCEDURE — 250N000013 HC RX MED GY IP 250 OP 250 PS 637

## 2023-06-06 PROCEDURE — 83935 ASSAY OF URINE OSMOLALITY: CPT | Performed by: HOSPITALIST

## 2023-06-06 PROCEDURE — 84300 ASSAY OF URINE SODIUM: CPT | Performed by: HOSPITALIST

## 2023-06-06 PROCEDURE — 250N000011 HC RX IP 250 OP 636: Performed by: HOSPITALIST

## 2023-06-06 PROCEDURE — 85014 HEMATOCRIT: CPT | Performed by: HOSPITALIST

## 2023-06-06 PROCEDURE — 250N000013 HC RX MED GY IP 250 OP 250 PS 637: Performed by: HOSPITALIST

## 2023-06-06 RX ORDER — OLANZAPINE 2.5 MG/1
2.5 TABLET, FILM COATED ORAL EVERY MORNING
Status: DISCONTINUED | OUTPATIENT
Start: 2023-06-07 | End: 2023-06-07 | Stop reason: HOSPADM

## 2023-06-06 RX ADMIN — SERTRALINE HYDROCHLORIDE 100 MG: 100 TABLET ORAL at 08:10

## 2023-06-06 RX ADMIN — DIVALPROEX SODIUM 250 MG: 125 CAPSULE, COATED PELLETS ORAL at 14:23

## 2023-06-06 RX ADMIN — OLANZAPINE 5 MG: 2.5 TABLET, FILM COATED ORAL at 08:10

## 2023-06-06 RX ADMIN — OLANZAPINE 10 MG: 5 TABLET, FILM COATED ORAL at 20:01

## 2023-06-06 RX ADMIN — FAMOTIDINE 20 MG: 20 TABLET ORAL at 08:09

## 2023-06-06 RX ADMIN — CIPROFLOXACIN HYDROCHLORIDE 500 MG: 500 TABLET, FILM COATED ORAL at 08:10

## 2023-06-06 RX ADMIN — VALACYCLOVIR HYDROCHLORIDE 500 MG: 500 TABLET, FILM COATED ORAL at 08:09

## 2023-06-06 RX ADMIN — DIVALPROEX SODIUM 250 MG: 125 CAPSULE, COATED PELLETS ORAL at 08:10

## 2023-06-06 RX ADMIN — Medication 1 MG: at 20:01

## 2023-06-06 RX ADMIN — POLYETHYLENE GLYCOL 3350 17 G: 17 POWDER, FOR SOLUTION ORAL at 17:46

## 2023-06-06 RX ADMIN — MIRTAZAPINE 15 MG: 15 TABLET, FILM COATED ORAL at 20:01

## 2023-06-06 RX ADMIN — ENOXAPARIN SODIUM 40 MG: 40 INJECTION SUBCUTANEOUS at 11:10

## 2023-06-06 RX ADMIN — SIMVASTATIN 40 MG: 20 TABLET, FILM COATED ORAL at 20:01

## 2023-06-06 RX ADMIN — DIVALPROEX SODIUM 375 MG: 125 CAPSULE, COATED PELLETS ORAL at 20:01

## 2023-06-06 RX ADMIN — ACETAMINOPHEN 650 MG: 325 TABLET ORAL at 08:09

## 2023-06-06 RX ADMIN — ACETAMINOPHEN 650 MG: 325 TABLET ORAL at 20:00

## 2023-06-06 ASSESSMENT — ACTIVITIES OF DAILY LIVING (ADL)
ADLS_ACUITY_SCORE: 26
ADLS_ACUITY_SCORE: 26
ADLS_ACUITY_SCORE: 29
ADLS_ACUITY_SCORE: 26
ADLS_ACUITY_SCORE: 29
ADLS_ACUITY_SCORE: 26

## 2023-06-06 NOTE — PROGRESS NOTES
[facility]    Medicine Progress Note - Hospitalist Service    Date of Admission:  5/5/2023    Assessment & Plan     Bhavana Marr is an 82-year-old female patient with PMH of asthma; dyslipidemia; depression/anxiety; bipolar disorder; borderline personality disorder; osteoporosis; prior gastric bypass; and recent hospitalization (4/21/2023 to 4/28/2023) with issues including suicidal ideation and dementia with behavioral disturbance. She presented from her care facility with decreased oral intake, low glucose level, low blood pressure, depression and possible suicidal ideations.     On initial evaluation, patient was afebrile, normotensive, not tachycardic.  Labs notable for BMP with sodium 133 otherwise normal; CBC was normal; LFTs showed low albumin of 3.0 and low protein 5.3 and low AST of 9, otherwise normal; glucose 109; urinalysis showed 60 ketones and not suggestive of infection.     Decreased oral intake with hypoglycemia, low blood pressures: improving  Hyponatremia, recurrent.  Failure to thrive, possibly due to Psychiatric Illness; see below   * Initial presentation as above. Pt found with low sodium as well as ketones in the urine. -fluids on admission    * No abdominal pain or lab findings to suggest a primary GI etiology.  * d/roberto IVF on 5/8 due to improved oral intake, Na WNL.  -- Mental health management as below.  -Sodium 6/6/2023 at 129, last sodium 6/4/2023 normal at 136.  Review with pharmacist indicates medications contributory to hyponatremia includes Zyprexa, SSRI and Depakote.  She has been on Zyprexa and SSRI longstanding, Depakote was started recently by psychiatry, 5/22/2023.  Psychiatry consult placed regarding alternatives to Zyprexa as relates to the hyponatremia.  Obtaining urine/serum osmole/sodium, review of I's and O's indicates positive fluid balance, will update weight.  Therefore for now we will place on fluid restrictions, recheck sodium in AM.       Psychiatric issues  Depression/anxiety, bipolar d/o, borderline personality d/o with possible suicidal ideations  * Of note is that the patient was [facility] with issues including suicidal ideation.  She was seen by psychiatry during that hospitalization; they did not recommend any type of psychiatric admission.    * Lives in LTC facility.  Per review of psych note, that the facility director of nursing reported that patient does not have hx of Dementia and was entered in her chart erroneously during prior admission in 2021.     * Initial presentation as above. Noted manic episode and some confusion. On admit, pt denied suicidal ideations on admit but did endorse feeling depressed.  She was seen by the DEC  in the ED and not felt to be actively suicidal.  * [facility]  -- Psychiatry following intermittently  --  5/22, started Depakote sprinkles 125 mg BID and 250 mg at bedtime  --  Had court hearing for commitment on 5/23, commitment supported by [facility]  --  Sitter was discontinued on 5/23  --  5/25:          -- psych recommended continuing on olanzapine 5 mg daily and 10 mg nightly,  Sertraline 150 mg daily and Remeron 22.5 mg at night  -- 05/31:           -- increased Depakote to 250 mg twice daily and 375 mg nightly         -- Decreased sertraline from 150 mg to 100 mg.         -- Decreasing mirtazapine from 22.5 mg at night to 15 mg daily  -- Recheck valproic acid level     --Patient still requires inpatient psych admission as per recommendation  -- Has also been noticed to be anxious from time to time, hydroxyzine has also been added as needed for anxiety this week  -- Was medically stable earlier this week, now complaining of some urgency and frequency, possible UTI and sodium was noticed to be 125, might not be ready to be discharged over the weekend , expecting patient to be ready for  discharge on Monday   -- see management for UTI and hyponatremia below   -- Patient remains suicidal and continues to make comments about wanting to die in the last few days   -- Patient has been on the waiting list to be transferred to inpatient psych      Urinary urgency and burning , low probability of UTI,culture growing normal martine   -- patient was complaining of urgency and frequency , checked UA.  -- UA was negative for nitrite but large leukocyte esterase with 50 WBC counts  -- No leukocytosis, culture grew normal martine   -- Started patient on ciprofloxacin 500 mg p.o. twice daily while awaiting culture, 6/6/2023 urogenital martine, DC Cipro.        Hyponatremia: improved   -- Most likely secondary to euvolemia secondary to psych medications, mild component of hypovolemia cannot be completely excluded   -- Started patient on normal saline at 50 mill per hour for 2 L, NA improved to 131 , off IV fluids now   -- Checked urine sodium, urine osmolality and  plasma osmolality, in normal range , indicating hyponatremia is most likely sec to SIADH type picture from medications   -- TSH was checked , came back in normal range   -- See #1.     Suspect Moderate malnutrition In Context of:  Acute illness or injury  -- Nutrition following; supplements for nutrition  -- will start checking weight weekly   -- oral intake is slowly improving   -- Added pre albumin to the lab , will follow up on the results, in process      Prior gastric bypass  B12 deficiency  Iron deficienty anemia  GERD  -- Can resume PTA B12 and iron on discharge  -- Continue PTA famotidine  -- was planned for outpatient UGI endoscopy (5/22) for nausea/vomiting with h/o gastric bypass (was seen in clinic by Yessi 4/18/23); currently tolerating PO without significant G.I. issues, can reschedule EGD with the PCP as outpatient on follow up  -- Checked CBC on 06/02/23 while patient is awaiting placement, hemoglobin is in normal range of 12.2  --Continue plan  for outpatient EGD.  -No GI complaints.     Asthma  --  PRN albuterol inhaler     Dyslipidemia  -- Continue PTA simvastatin.      Osteoporosis  -- Resume alendronate after discharge.     Constipation-resolved  Patient with frequent loose stools on 5/26 needing frequent nursing assistance. Scheduled Senna-colace discontinued. Loose/soft stools are likely related to bowel regimen. She does not have abdominal pain, fever, nausea or vomiting.   -- Discontinued scheduled bowel regimen on 5/26  -No bowel complaints.     DVT Prophylaxis: Pneumatic Compression Devices and Anti-embolisim stockings (TEDs)       Diet: Room Service  Combination Diet Regular Diet; Safe Tray - with utensils  Diet  Fluid restriction 1500 ML FLUID      Cortez Catheter: Not present  Lines: None     Cardiac Monitoring: None  Code Status: Full Code      Clinically Significant Risk Factors         # Hyponatremia: Lowest Na = 129 mmol/L in last 2 days, will monitor as appropriate      # Hypoalbuminemia: Lowest albumin = 3 g/dL at 5/5/2023  4:53 PM, will monitor as appropriate                         Disposition Plan: Goal: Inpatient psychiatry, current active medical issues includes hyponatremia.    Discharge Comments: on Court hold,       Peggy Houston MD  Hospitalist Service  [facility]  Securely message with [employer] (more info)  Text page via [employer] Paging/Directory      Total time 50 minutes for today 6/6/2023 :   time consisted of the following, assuming Patient care with review of records including labs, imaging results, medications, interdisciplinary notes; examination of Patient;  and completing documentation and orders.  Care Management included  Coordination of Care time with Nursing  and Pharmacy re: hypoNa, medications, behaviors  care plan, management and surveillance.  Will obtain inpatient Psychiatry consultation to review medications especially Depakote as related to  hyponatremia.  ______________________________________________________________________    Interval History   Minimal history from patient, she appears comfortable.  No complaints.  Eating, reports bowels moving.  Nursing notes no acute behavioral issues.    Physical Exam   Vital Signs: Temp: 97.7  F (36.5  C) Temp src: Oral BP: (!) 146/83 Pulse: 74   Resp: 18 SpO2: 100 % O2 Device: None (Room air)    Weight: 111 lbs 3.2 oz    General/Constitutional:   NAD, awake, calm, cooperative    Chest/Respiratory: Respirations nonlabored room air  Neuro.  Gross motor tested, nonfocal,  Psych  affect calm; no suicidal ideation, thoughts or action plan.    Data     I have personally reviewed the following data over the past 24 hrs:    9.2  \   13.5   / 252     129 (L) 95 (L) 6.4 (L) /  139 (H)   3.6 21 (L) 0.61 \       Imaging results reviewed over the past 24 hrs:

## 2023-06-06 NOTE — PLAN OF CARE
Goal Outcome Evaluation:    Shift: 06/05/23 [id number]  Summary: Failure to thrive  Cognitive Concerns/ Orientation : A/Ox3 - disoriented to situation, flat affect - mild [place],  BEHAVIOR & AGGRESSION TOOL COLOR: green  ABNL VS/O2: VSS, room air  MOBILITY: SBA/GB/Walker   PAIN MANAGMENT: Denies    DIET: Regular  BOWEL/BLADDER: Occasionally incontinent of urine  - incontinent of urine x1 this shift  ABNL LAB/BG: No labs today.   DRAIN/DEVICES: PIV SL  SKIN: Pale. Intact   TESTS/PROCEDURES: None  D/C DATE: Pending inpatient Psych bed availability.   OTHER IMPORTANT INFO: Pt did not make any statements pertaining to death/self harm this shift - predominantly pleasant with a flat affect. Patient on a court hold.

## 2023-06-06 NOTE — PLAN OF CARE
"Goal Outcome Evaluation:    Orientation: A&O x4. Flat affect. Mild [place].    Vitals/Tele: VSS, RA.    IV Access/drains: PIV, SL.    Diet: Regular, 1500cc FR    Mobility: SBA with GB/W. Ambulating in the hallway.     GI/: Continent, at times incontinent    Wound/Skin: Pale, Intact    Consults: Psych following    Discharge Plan: TBD pending inpatient psych bed    When asked if pt has any thoughts, ideas, or plans to harm herself or others, pt stated\" not right now\". Pt is on a court hold 72 hours.           See Flow sheets for assessment     "

## 2023-06-06 NOTE — CONSULTS
"      Psychiatry Consultation; Follow up              Reason for Consult, requesting source:    Follow up  Requesting source: Nuno Courtney    Labs and imaging reviewed, discussed with nursing.               Interim history:    From initial note: \"Bhavana Marr is a 82 year old female with a history of bipolar I disorder, gastric bypass surgery (unknown procedure type), osteoporosis, asthma, and dyslipidemia who presented from her LTC facility [facility] on 5/5/23 for poor PO intake with subsequent hypoglycemia and hypotension, depression, and suicidal ideation.      Bhavana lives in LTC facility [facility]; the director of nursing at this facility has expressed significant concerns for Bhavana's mental health- has been depressed, not getting out of bed, not eating, not talking to them, asking for them to let her die. She had expressed wanting to walk into traffic. They have sent her to the ED 3 times recently (4/20/23, 4/21/2023, and this admission 5/5/23) for similar concerns. She has been discharged the past two times with no changes to psychiatric medications and no improvement in symptoms. They do not feel she is safe to return at this point. I have attempted to speak with Bhavana on two separate occasions, both times she refused to talk to me and remained mute for most of both assessment attempts. She did not respond to questions regarding mood, suicidal ideation, medications, psychiatric history, or my recommendation for her to be admitted to inpatient psychiatry. She did however speak to the aide who was in the room and told me \"I have nothing to say to you\". \"     5/17 follow up: She is somewhat more willing to engage in conversation today but overall quite guarded still. She reports her mood is \"better\" and does endorse recent depression. Denies SI currently or recently but does report a past history of one suicide attempt \"with a rope\", when asked what stopped her at that time she responded \"there was no " "where to hang it\". Does not recall when this happened- many years ago. Repeatedly states she's going to sleep now when further questions asked, does not respond to questions.      5/22 Follow up:  Seen laying in bed, awake. Minimally willing to engage in conversation, repeatedly states \"I don't want to talk\" but does answer some questions. She continues to endorse depressed mood, today states she thinks she needs IP psych admission which she was previously not even willing to discuss. She is aware of commitment hearing tomorrow, asked what to expect with that but when I began explaining, she stated again that she is done talking. She does not ever recall taking Depakote and is agreeable to trying this for bipolar depression.    5/23 Follow up: Laying in bed, awake. Again minimally willing to engage in conversation, immediately states \"I don't want to talk\". Does answer some questions but repeatedly states she is done talking. States she did participate in court hearing this morning, \"they told me it went well\" but she is unable/unwilling to provide any further details. She continues to endorse depressed mood. Denies SI/HI/AVH. Denies any side effects from Depakote.    5/25: Again laying in bed, shades drawn. Continues to repeatedly state \"I don't want to talk\" but does answer a few questions before insisting that she is done talking. Initially states she slept well, later states her sleep has been poor. She is eating. Low energy/motivation. Denies SI/HI/AVH.     5/31 follow up: Seen sitting up in chair, eating lunch. Blinds are open in her room today. Immediately states \"I don't want to talk\" but does engage somewhat in assessment. Repeatedly states she's done talking but will answer some questions. States her mood is \"depressed\" and that \"I want to die\". She denies any intent to kill herself, but also states \"there's nothing here to use\" and laughs. She denies any anxiety, though nursing reports she has been having " "frequent panic attacks usually in the afternoon. She denies feeling restless. States she is sleeping poorly and appetite is poor, though she had just finished eating her entire lunch tray when I entered the room.    6/6 Follow up: Bhavana was more willing to engage in conversation today, including pleasantries such as asking how I am doing and talking about a phone call she had earlier. She does continue to repeatedly state \"I'm going to take a nap now\" and state that she is done talking, does continue to respond to a few questions but then stops responding entirely, closing her eyes and adjusting as if to sleep. She states her mood is \"still depressed\" and that she has felt this way for a long time. Denies suicidal ideation today but does admit to recent SI. She is not interested in discussing medications today- tells me she is done talking and then refuses to respond to questions.        Current Medications:       acetaminophen  650 mg Oral BID     divalproex sodium delayed-release  250 mg Oral BID 09 12     divalproex sodium delayed-release  375 mg Oral At Bedtime     enoxaparin ANTICOAGULANT  40 mg Subcutaneous Q24H     famotidine  20 mg Oral Daily     mirtazapine  15 mg Oral At Bedtime     OLANZapine  10 mg Oral At Bedtime     OLANZapine  5 mg Oral QAM     sertraline  100 mg Oral Daily     simvastatin  40 mg Oral At Bedtime     sodium chloride (PF)  3 mL Intracatheter Q8H     valACYclovir  500 mg Oral Daily              MSE:   Appearance: awake, alert and sitting in chair, eating lunch.  Attitude:  guarded and somewhat cooperative  Eye Contact:  poor   Mood:  depressed  Affect:  flat affect  Speech:  slightly pressured in comparison to previous paucity of speech  Psychomotor Behavior:  no evidence of tardive dyskinesia, dystonia, or tics  Thought Process:  linear and goal oriented  Associations:  no loose associations  Thought Content:  no evidence of psychotic thought and passive suicidal ideation " "present  Insight:  fair  Judgement:  fair  Oriented to:  time, person, and place  Attention Span and Concentration:  fair  Recent and Remote Memory:  intact      Vital signs:  Temp: 97.7  F (36.5  C) Temp src: Oral BP: (!) 146/83 Pulse: 74   Resp: 18 SpO2: 100 % O2 Device: None (Room air)   Height: 160 cm (5' 3\") Weight: 50.4 kg (111 lb 3.2 oz)  Estimated body mass index is 19.7 kg/m  as calculated from the following:    Height as of this encounter: 1.6 m (5' 3\").    Weight as of this encounter: 50.4 kg (111 lb 3.2 oz).              DSM-5 Diagnosis:   Bipolar I disorder, current episode depressed, severe, without psychosis     She does NOT have a history of dementia          Assessment:   Mood does seem to be improving slowly since addition of Depakote- she was significantly more interactive with me today than on previous assessments. VPA level in therapeutic range at 59.5 on 6/5/23 trough.  I do note that she has developed hyponatremia recently. Chart review shows that she does have a history of hyponatremia pre-dating the addition of Depakote, requiring IV saline and/or sodium tabs. Sertraline, olanzapine, and divalproex are all possible contributors to hyponatremia. Will decrease dose of sertraline further to 50mg daily. Could decrease day time dose of olanzapine as well to 2.5mg. If sodium levels do not improve with dose reductions to olanzapine and sertraline, then will need to taper Depakote and explore possible alternatives.     Commitment supported by [facility]. Awaiting IP psych admission.     From my initial note 5/10: \"Per her LTC facility [facility] director of nursing, she does NOT have a history of dementia. This was entered into her chart erroneously during a prior admission in 2021. She has lived at [facility] for 5 years where she sees both a primary care provider and psychiatrist regularly, neither of whom have diagnosed her with dementia. Upon review of chart, in the H&P from 6/2021 admission it " "states she has a \"history of apparent dementia\" but this is not listed in the most recent outpatient PCP nursing home visit not just two months prior in 4/2021.\" She does not live in a memory care facility but rather a long term care facility for persons with mental illness.            Summary of Recommendations:   1.  Decreased sertraline to 50mg daily    2.  Decrease olanzapine to 2.5mg daily and 10mg nightly    3.  Continue to monitor sodium level; if no improvement with above changes then Depakote likely culprit and may need to taper/discuss alternatives.    4.  Continue to recommend IP psych admission.    5.  Will continue to follow.        DANIEL Koch [facility]  Consult/Liaison Psychiatry   [facility]    Contact information available via [facility] Paging/Directory  If I am not available, then [facility] MARIBEL line ([phone]) should know who is covering our consult service.   \"Much or all of the text in this note was generated through the use of Dragon Dictate voice to text software. Errors in spelling or words which appear to be out of contact are unintentional, may be present due having escaped editing\"           "

## 2023-06-06 NOTE — PLAN OF CARE
Goal Outcome Evaluation:Shift: 06/06/23. 5932-1368  Summary: Failure to thrive  Cognitive Concerns/ Orientation : A/Ox3 - disoriented to situation, flat affect - mild [place],  BEHAVIOR & AGGRESSION TOOL COLOR: green  ABNL VS/O2: VSS, room air  MOBILITY: SBA/GB/Walker , ambulated on the [address] x 2  PAIN MANAGMENT: Denies    DIET: Regular, FR 1500cc   BOWEL/BLADDER: continent , incontinent at times.  ABNL LAB/BG: Na 129, needs urine sample.  DRAIN/DEVICES: PIV SL  SKIN: Pale. Intact   TESTS/PROCEDURES: None  D/C DATE: Pending inpatient Psych bed.   OTHER IMPORTANT INFO: Pt did not make any statements pertaining to death/self harm this shift - predominantly pleasant with a flat affect. Patient on a court hold. Psych re consulted.

## 2023-06-06 NOTE — TELEPHONE ENCOUNTER
R:  No appropriate [facility] beds available.    [facility] Access Inpatient Bed Call Log 6/6/23 @8:35 AM      Intake has called facilities that have not updated their bed status within the last 12 hours.            Adults:             [facility] is posting 0 beds.         [facility] is posting 0 beds. [phone]      [facility] is posting 0 beds. [phone]        [facility] is posting 0 beds. [phone]       is posting 0 beds. [phone]        [facility] is posting 0 beds. [phone]        [facility] is posting 0 beds. [phone]        [facility] is posting 0 beds. [phone]        [facility] is posting 1 bed. [phone]           [facility] is posting 0 beds. [phone]          [facility] is posting 2 beds. Mixed unit 12+. Low acuity only. [phone] Per website @7:27am     [facility] is positing 0 beds. No aggression.  [phone]        [facility] is posting 0 beds. [phone]       [facility] is posting 2 beds. Low acuity only. [phone]  @12:24 PM no beds left     [facility] is posting 0 beds. Low acuity. No current aggression. [phone]       [facility] is posting 0 beds. Low acuity. [phone]       [facility] is posting 0 beds. 72 HH preferred. [phone].      [facility] is posting 3 beds. Low acuity. [phone] Per website @4:30am     [facility] is posting 7 beds. Vol only, No Hx of aggression, violence, or assault. No sexual offenders. No 72 hr holds. [phone]              [facility] is posting 6 beds. (Must have the cognitive ability to do programming. No aggressive or violent behavior or recent HX in the last 2 yrs. MH must be primary.) [phone] Per website @6:56am     [facility] is posting 2 beds. Low acuity only. Violence and aggression capped. [phone] Per call  @8:19am, currently reviewing pts in their ED     [facility] is [facility] 0 beds. Low acuity, Neg Covid. [phone] Per call @8:27, no available beds     [facility] is posting 3 beds. Covid neg. Vol only. Combined adolescent and adult unit. No aggressive or violent behavior. No registered sex offenders. [phone]     [facility] posting 4 beds. Negative covid.        [facility] is posting 0 beds. [phone] no wounds, lines, drains, C-paps, tubes and must be able to care for themselves; no hx of aggression      [facility] is posting 12 beds. Call for details. [phone] Per call @8:06am beds available     [facility] is posting 5 beds. Mixed unit. [phone] Per website @1:05pm     Pt remains on work list pending appropriate bed availability.        11:34 AM Discussed Pt with Jamil, unit 3B Charge RN and no appropriate available IP 3 Bbeds available today.    Pt will remain on IPMH worklist until an appropriate IP bed is available.

## 2023-06-06 NOTE — TELEPHONE ENCOUNTER
[facility] Access Inpatient Bed Call Log 6/6/2023 12:57 AM      Intake has called facilities that have not updated their bed status within the last 12 hours.     Adults:         [facility] is posting 0 beds.      [facility] is posting 0 beds. [phone]      [facility] is posting 0 beds. [phone]     [facility] is posting 0 beds. [phone] - 1:02am Per [place] they are capped.     [facility] is posting 0 beds. [phone]     [facility] is posting 0 bed. [phone]      [facility] is posting 0 beds. [phone]     [facility] is posting 0 beds. [phone]     [facility] is posting 1 beds. [phone]     [facility] is posting 0 beds. [phone]       [facility] is posting 2 beds. Mixed unit 12+. Low acuity only.  [phone]     [facility] is posting 0 beds. No aggression.  [phone]     [facility] is posting 0 beds. [phone]      [facility] is posting 0 beds. [phone]      [facility] is posting 0 bed. [phone]      [facility] is posting 0 beds. Low acuity. [phone]     [facility] is posting 0 beds. 72 hr hold preferred. [phone]     [facility] is posting 3 bed.  [phone]        [facility] is posting 7 bed. Voluntary only- no holds/commitments, No Hx of aggression, violence, or assault. No sexual offenders. No 72 hr holds. [phone]     [facility] is posting 6 beds. (Must have the cognitive ability to do programming. No aggressive or violent behavior or recent HX in the last 2 yrs. MH must be primary.) [phone]    [facility] is posting 2 beds. Low acuity only. Violence and aggression capped. [phone]      [facility] is posting 1 bed. Low acuity, Neg Covid. [phone]     [facility] is posting 2 beds. Covid neg. Vol only. Combined adolescent and adult unit. No aggressive or violent behavior. No  registered sex offenders. [phone] - 1;00am Per Mimi, call back after 8am.    [facility] [place] posting 0 beds. Adina covid.      [facility] is posting 6 beds. Call for details. [phone]      [facility] is posting 5 beds. [phone]       Pt remains on work list pending appropriate bed availability.

## 2023-06-07 ENCOUNTER — HOSPITAL ENCOUNTER (INPATIENT)
Facility: CLINIC | Age: 83
LOS: 39 days | Discharge: ANOTHER HOSPITAL | DRG: 885 | End: 2023-07-16
Attending: PSYCHIATRY & NEUROLOGY | Admitting: PSYCHIATRY & NEUROLOGY
Payer: COMMERCIAL

## 2023-06-07 ENCOUNTER — APPOINTMENT (OUTPATIENT)
Dept: PHYSICAL THERAPY | Facility: CLINIC | Age: 83
DRG: 644 | End: 2023-06-07
Payer: COMMERCIAL

## 2023-06-07 ENCOUNTER — TELEPHONE (OUTPATIENT)
Dept: BEHAVIORAL HEALTH | Facility: CLINIC | Age: 83
End: 2023-06-07
Payer: COMMERCIAL

## 2023-06-07 VITALS
OXYGEN SATURATION: 98 % | SYSTOLIC BLOOD PRESSURE: 122 MMHG | DIASTOLIC BLOOD PRESSURE: 70 MMHG | HEART RATE: 75 BPM | TEMPERATURE: 98.3 F | WEIGHT: 111.2 LBS | RESPIRATION RATE: 18 BRPM | HEIGHT: 63 IN | BODY MASS INDEX: 19.7 KG/M2

## 2023-06-07 PROBLEM — R45.851 SUICIDAL IDEATION: Status: ACTIVE | Noted: 2023-06-07

## 2023-06-07 LAB
ANION GAP SERPL CALCULATED.3IONS-SCNC: 11 MMOL/L (ref 7–15)
BUN SERPL-MCNC: 13.1 MG/DL (ref 8–23)
CALCIUM SERPL-MCNC: 9.3 MG/DL (ref 8.8–10.2)
CHLORIDE SERPL-SCNC: 95 MMOL/L (ref 98–107)
CREAT SERPL-MCNC: 0.73 MG/DL (ref 0.51–0.95)
DEPRECATED HCO3 PLAS-SCNC: 23 MMOL/L (ref 22–29)
GFR SERPL CREATININE-BSD FRML MDRD: 82 ML/MIN/1.73M2
GLUCOSE SERPL-MCNC: 174 MG/DL (ref 70–99)
PLATELET # BLD AUTO: 233 10E3/UL (ref 150–450)
POTASSIUM SERPL-SCNC: 3.6 MMOL/L (ref 3.4–5.3)
SODIUM SERPL-SCNC: 129 MMOL/L (ref 136–145)

## 2023-06-07 PROCEDURE — 82947 ASSAY GLUCOSE BLOOD QUANT: CPT | Performed by: HOSPITALIST

## 2023-06-07 PROCEDURE — 250N000013 HC RX MED GY IP 250 OP 250 PS 637: Performed by: PSYCHIATRY & NEUROLOGY

## 2023-06-07 PROCEDURE — 250N000011 HC RX IP 250 OP 636: Performed by: HOSPITALIST

## 2023-06-07 PROCEDURE — 124N000003 HC R&B MH SENIOR/ADOLESCENT

## 2023-06-07 PROCEDURE — 36415 COLL VENOUS BLD VENIPUNCTURE: CPT | Performed by: HOSPITALIST

## 2023-06-07 PROCEDURE — 97116 GAIT TRAINING THERAPY: CPT | Mod: GP

## 2023-06-07 PROCEDURE — 250N000013 HC RX MED GY IP 250 OP 250 PS 637

## 2023-06-07 PROCEDURE — 99239 HOSP IP/OBS DSCHRG MGMT >30: CPT | Performed by: HOSPITALIST

## 2023-06-07 PROCEDURE — 250N000013 HC RX MED GY IP 250 OP 250 PS 637: Performed by: HOSPITALIST

## 2023-06-07 PROCEDURE — 250N000013 HC RX MED GY IP 250 OP 250 PS 637: Performed by: INTERNAL MEDICINE

## 2023-06-07 PROCEDURE — 80051 ELECTROLYTE PANEL: CPT | Performed by: HOSPITALIST

## 2023-06-07 PROCEDURE — 85049 AUTOMATED PLATELET COUNT: CPT | Performed by: INTERNAL MEDICINE

## 2023-06-07 RX ORDER — MAGNESIUM HYDROXIDE/ALUMINUM HYDROXICE/SIMETHICONE 120; 1200; 1200 MG/30ML; MG/30ML; MG/30ML
30 SUSPENSION ORAL EVERY 4 HOURS PRN
Status: DISCONTINUED | OUTPATIENT
Start: 2023-06-07 | End: 2023-07-16 | Stop reason: HOSPADM

## 2023-06-07 RX ORDER — OLANZAPINE 5 MG/1
10 TABLET ORAL AT BEDTIME
DISCHARGE
Start: 2023-06-07 | End: 2024-08-08

## 2023-06-07 RX ORDER — HYDROXYZINE HYDROCHLORIDE 25 MG/1
25 TABLET, FILM COATED ORAL EVERY 4 HOURS PRN
Status: DISCONTINUED | OUTPATIENT
Start: 2023-06-07 | End: 2023-06-08

## 2023-06-07 RX ORDER — OLANZAPINE 2.5 MG/1
2.5 TABLET, FILM COATED ORAL EVERY MORNING
Status: DISCONTINUED | OUTPATIENT
Start: 2023-06-08 | End: 2023-06-08

## 2023-06-07 RX ORDER — ACETAMINOPHEN 325 MG/1
650 TABLET ORAL EVERY 4 HOURS PRN
Status: DISCONTINUED | OUTPATIENT
Start: 2023-06-07 | End: 2023-07-16 | Stop reason: HOSPADM

## 2023-06-07 RX ORDER — MIRTAZAPINE 15 MG/1
15 TABLET, FILM COATED ORAL AT BEDTIME
Status: DISCONTINUED | OUTPATIENT
Start: 2023-06-07 | End: 2023-06-09

## 2023-06-07 RX ORDER — VALACYCLOVIR HYDROCHLORIDE 500 MG/1
500 TABLET, FILM COATED ORAL DAILY
Status: DISCONTINUED | OUTPATIENT
Start: 2023-06-08 | End: 2023-07-16 | Stop reason: HOSPADM

## 2023-06-07 RX ORDER — BUDESONIDE AND FORMOTEROL FUMARATE DIHYDRATE 80; 4.5 UG/1; UG/1
2 AEROSOL RESPIRATORY (INHALATION) 2 TIMES DAILY PRN
Status: DISCONTINUED | OUTPATIENT
Start: 2023-06-07 | End: 2023-07-16 | Stop reason: HOSPADM

## 2023-06-07 RX ORDER — SIMVASTATIN 40 MG
40 TABLET ORAL AT BEDTIME
Status: DISCONTINUED | OUTPATIENT
Start: 2023-06-07 | End: 2023-07-16 | Stop reason: HOSPADM

## 2023-06-07 RX ORDER — SERTRALINE HYDROCHLORIDE 25 MG/1
50 TABLET, FILM COATED ORAL DAILY
Status: DISCONTINUED | OUTPATIENT
Start: 2023-06-08 | End: 2023-06-08

## 2023-06-07 RX ORDER — AMOXICILLIN 250 MG
1 CAPSULE ORAL 2 TIMES DAILY PRN
Status: DISCONTINUED | OUTPATIENT
Start: 2023-06-07 | End: 2023-07-13

## 2023-06-07 RX ORDER — CALCIUM CARBONATE 500 MG/1
500 TABLET, CHEWABLE ORAL 3 TIMES DAILY PRN
Status: DISCONTINUED | OUTPATIENT
Start: 2023-06-07 | End: 2023-07-16 | Stop reason: HOSPADM

## 2023-06-07 RX ORDER — OLANZAPINE 10 MG/1
10 TABLET ORAL AT BEDTIME
Status: DISCONTINUED | OUTPATIENT
Start: 2023-06-07 | End: 2023-06-08

## 2023-06-07 RX ORDER — DIVALPROEX SODIUM 125 MG/1
375 CAPSULE, COATED PELLETS ORAL AT BEDTIME
Status: DISCONTINUED | OUTPATIENT
Start: 2023-06-07 | End: 2023-06-08

## 2023-06-07 RX ORDER — FAMOTIDINE 20 MG/1
20 TABLET, FILM COATED ORAL DAILY
Status: DISCONTINUED | OUTPATIENT
Start: 2023-06-08 | End: 2023-06-26

## 2023-06-07 RX ORDER — LANOLIN ALCOHOL/MO/W.PET/CERES
3 CREAM (GRAM) TOPICAL
Status: DISCONTINUED | OUTPATIENT
Start: 2023-06-07 | End: 2023-06-26

## 2023-06-07 RX ORDER — OLANZAPINE 2.5 MG/1
2.5 TABLET, FILM COATED ORAL EVERY MORNING
Status: ON HOLD | DISCHARGE
Start: 2023-06-08 | End: 2023-07-23

## 2023-06-07 RX ORDER — OLANZAPINE 5 MG/1
10 TABLET ORAL AT BEDTIME
Status: ON HOLD | DISCHARGE
Start: 2023-06-07 | End: 2023-07-23

## 2023-06-07 RX ORDER — DIVALPROEX SODIUM 125 MG/1
250 CAPSULE, COATED PELLETS ORAL 2 TIMES DAILY
Status: DISCONTINUED | OUTPATIENT
Start: 2023-06-07 | End: 2023-06-08

## 2023-06-07 RX ADMIN — ENOXAPARIN SODIUM 40 MG: 40 INJECTION SUBCUTANEOUS at 11:00

## 2023-06-07 RX ADMIN — OLANZAPINE 10 MG: 10 TABLET, FILM COATED ORAL at 21:43

## 2023-06-07 RX ADMIN — SERTRALINE HYDROCHLORIDE 50 MG: 50 TABLET ORAL at 08:52

## 2023-06-07 RX ADMIN — OLANZAPINE 2.5 MG: 2.5 TABLET, FILM COATED ORAL at 08:53

## 2023-06-07 RX ADMIN — FAMOTIDINE 20 MG: 20 TABLET ORAL at 08:52

## 2023-06-07 RX ADMIN — DIVALPROEX SODIUM 250 MG: 125 CAPSULE, COATED PELLETS ORAL at 13:58

## 2023-06-07 RX ADMIN — VALACYCLOVIR HYDROCHLORIDE 500 MG: 500 TABLET, FILM COATED ORAL at 08:52

## 2023-06-07 RX ADMIN — ACETAMINOPHEN 650 MG: 325 TABLET ORAL at 08:51

## 2023-06-07 RX ADMIN — DIVALPROEX SODIUM 250 MG: 125 CAPSULE, COATED PELLETS ORAL at 08:52

## 2023-06-07 RX ADMIN — DIVALPROEX SODIUM 375 MG: 125 CAPSULE, COATED PELLETS ORAL at 21:43

## 2023-06-07 RX ADMIN — MIRTAZAPINE 15 MG: 15 TABLET, FILM COATED ORAL at 21:43

## 2023-06-07 RX ADMIN — DIVALPROEX SODIUM 250 MG: 125 CAPSULE, COATED PELLETS ORAL at 21:42

## 2023-06-07 RX ADMIN — SIMVASTATIN 40 MG: 40 TABLET, FILM COATED ORAL at 21:43

## 2023-06-07 ASSESSMENT — ACTIVITIES OF DAILY LIVING (ADL)
FALL_HISTORY_WITHIN_LAST_SIX_MONTHS: NO
DESCRIBE_HEARING_LOSS: HEARING LOSS ON RIGHT SIDE;HEARING LOSS ON LEFT SIDE
ADLS_ACUITY_SCORE: 29
WERE_AUXILIARY_AIDS_OFFERED?: NO
ADLS_ACUITY_SCORE: 29
DRESSING/BATHING_DIFFICULTY: YES
PATIENT'S_PREFERRED_MEANS_OF_COMMUNICATION: VERBAL
ADLS_ACUITY_SCORE: 29
TOILETING_ISSUES: NO
TRANSFERRING: 0-->INDEPENDENT
DIFFICULTY_COMMUNICATING: NO
CHANGE_IN_FUNCTIONAL_STATUS_SINCE_ONSET_OF_CURRENT_ILLNESS/INJURY: NO
DRESS: 0-->ASSISTANCE NEEDED (DEVELOPMENTALLY APPROPRIATE)
ADLS_ACUITY_SCORE: 29
WALKING_OR_CLIMBING_STAIRS_DIFFICULTY: YES
WALKING_OR_CLIMBING_STAIRS: AMBULATION DIFFICULTY, REQUIRES EQUIPMENT
DRESSING/BATHING: BATHING DIFFICULTY, ASSISTANCE 1 PERSON
DIFFICULTY_EATING/SWALLOWING: NO
ADLS_ACUITY_SCORE: 62
EQUIPMENT_CURRENTLY_USED_AT_HOME: WALKER, ROLLING
CONCENTRATING,_REMEMBERING_OR_MAKING_DECISIONS_DIFFICULTY: YES
ADLS_ACUITY_SCORE: 29
DRESS: 1-->ASSISTANCE (EQUIPMENT/PERSON) NEEDED
ADLS_ACUITY_SCORE: 29
WEAR_GLASSES_OR_BLIND: NO
ADLS_ACUITY_SCORE: 29
ADLS_ACUITY_SCORE: 29
BATHING: 1-->ASSISTANCE NEEDED
ADLS_ACUITY_SCORE: 45
TRANSFERRING: 0-->INDEPENDENT
ADLS_ACUITY_SCORE: 29
ADLS_ACUITY_SCORE: 29
DOING_ERRANDS_INDEPENDENTLY_DIFFICULTY: NO
HYGIENE/GROOMING: WITH ASSISTANCE
HEARING_DIFFICULTY_OR_DEAF: YES

## 2023-06-07 ASSESSMENT — LIFESTYLE VARIABLES
SKIP TO QUESTIONS 9-10: 1
AUDIT-C TOTAL SCORE: 0

## 2023-06-07 NOTE — PLAN OF CARE
Goal Outcome Evaluation:Shift: 06/07/23 9600- 1530  Summary: Failure to thrive  Cognitive Concerns/ Orientation : A/Ox3 - disoriented to situation, flat affect - mild [place],  BEHAVIOR & AGGRESSION TOOL COLOR: Green  ABNL VS/O2: VSS, room air  MOBILITY: SBA/GB/Walker   PAIN MANAGMENT: Denies    DIET: Regular, FR 1500cc   BOWEL/BLADDER: continent , incontinent at times.  ABNL LAB/BG: Na 129- keep monitoring with medication changes  DRAIN/DEVICES: PIV SL  SKIN: Pale. Intact   TESTS/PROCEDURES: Following Na with all the psych meds  D/C DATE:plan to discharge to  inpatient Psych when medically stable.  OTHER IMPORTANT INFO: denies suicidal ideations this shift.

## 2023-06-07 NOTE — DISCHARGE SUMMARY
[facility]    Discharge Summary  Hospitalist    Date of Admission:  5/5/2023  Date of Discharge:  6/7/2023  Discharging Provider: Peggy Houston MD  Date of Service (when I saw the patient): 06/07/23      History of Present Illness   Bhavana Marr is an 82-year-old female patient with PMH of asthma; dyslipidemia; depression/anxiety; bipolar disorder; borderline personality disorder; osteoporosis; prior gastric bypass; and recent hospitalization (4/21/2023 to 4/28/2023) with issues including suicidal ideation and dementia with behavioral disturbance. She presented from her care facility with decreased oral intake, low glucose level, low blood pressure, depression and possible suicidal ideations.     On initial evaluation, patient was afebrile, normotensive, not tachycardic.  Labs notable for BMP with sodium 133 otherwise normal; CBC was normal; LFTs showed low albumin of 3.0 and low protein 5.3 and low AST of 9, otherwise normal; glucose 109; urinalysis showed 60 ketones and not suggestive of infection.    Hospital Course   Bhavana Marr was admitted on 5/5/2023.  The following problems were addressed during her hospitalization:    Please see progress note 6/7/2023 for details.  Previously patient was scheduled to transfer to [facility] on 5/31/2023 with orders from Previous Hospitalist, see below and updated today.  At 1700 today Dr. Ramos from [facility] inpatient Psychiatry excepted the patient in transfer, she acknowledges that patient with hyponatremia at 129 today, patient on fluid restrictions with adjustments in her sertraline, Zyprexa per Psychiatry, with current concerns of Depakote as contributory, see below.  [facility] Psychiatry will continue to follow the patient's serum sodium with evaluation and adjustments in medications accordingly.    Decreased oral intake with hypoglycemia, low blood pressures: improving  Hyponatremia,  recurrent.  Failure to thrive, possibly due to Psychiatric Illness; see below   * Initial presentation as above. Pt found with low sodium as well as ketones in the urine. -fluids on admission    * No abdominal pain or lab findings to suggest a primary GI etiology.  * d/roberto IVF on 5/8 due to improved oral intake, Na WNL.  -- Mental health management as below.  -Sodium 6/6/2023 at 129, last sodium 6/4/2023 normal at 136.  Review with pharmacist indicates medications contributory to hyponatremia includes Zyprexa, SSRI and Depakote.  She has been on Zyprexa and SSRI longstanding, Depakote was started recently by psychiatry, 5/22/2023.  Psychiatry consult placed regarding alternatives to Zyprexa as relates to the hyponatremia.  Obtaining urine/serum osmole/sodium, review of I's and O's indicates positive fluid balance, will update weight.  Therefore for now we will place on fluid restrictions, recheck sodium in AM = 129  -6/7/2023, reviewed Psychiatry note.  Recognize that patient had hyponatremia on admission before Depakote and suspect initial hyponatremia was in fact due to volume depletion and improved/normalized with IVF.  However now since start on Depakote new hyponatremia and concern that this medication may be contributory to the low sodium on 6/6/2023, nonetheless recognize Psychiatry note and changes in Zyprexa and sertraline, will monitor sodium on Psychiatry decrease in Zyprexa and SSRI, patient currently on fluid restriction.  See above regarding discussion with Psychiatrist Dr. Ramos today, 6/7/2023 who accepted the patient in transfer to [facility] Inpatient Psych[facility] acknowledging patient's hyponatremia and inpatient Psychiatry will continue to monitor and evaluate patient's hyponatremia.  Recommend BMP tomorrow.  Follow-up Depakote level.    Psychiatric issues  Depression/anxiety, bipolar d/o, borderline personality d/o with possible suicidal ideations  * Of note is that the patient was  recently hospitalized through [facility] with issues including suicidal ideation.  She was seen by psychiatry during that hospitalization; they did not recommend any type of psychiatric admission.    * Lives in LTC facility.  Per review of psych note, that the facility director of nursing reported that patient does not have hx of Dementia and was entered in her chart erroneously during prior admission in 2021.     * Initial presentation as above. Noted manic episode and some confusion. On admit, pt denied suicidal ideations on admit but did endorse feeling depressed.  She was seen by the DEC  in the ED and not felt to be actively suicidal.  * [facility]  -- Psychiatry following intermittently  --  5/22, started Depakote sprinkles 125 mg BID and 250 mg at bedtime  --  Had court hearing for commitment on 5/23, commitment supported by [facility]  --  Sitter was discontinued on 5/23  --  5/25:          -- psych recommended continuing on olanzapine 5 mg daily and 10 mg nightly,  Sertraline 150 mg daily and Remeron 22.5 mg at night  -- 05/31:           -- increased Depakote to 250 mg twice daily and 375 mg nightly         -- Decreased sertraline from 150 mg to 100 mg.         -- Decreasing mirtazapine from 22.5 mg at night to 15 mg daily  Depakote level 6/5/2023 59.5.     --Patient still requires inpatient psych admission as per recommendation  -- Has also been noticed to be anxious from time to time, hydroxyzine has also been added as needed for anxiety this week  -- Was medically stable earlier this week, now complaining of some urgency and frequency, possible UTI and sodium was noticed to be 125, might not be ready to be discharged over the weekend , expecting patient to be ready for discharge on Monday   -- see management for UTI and hyponatremia below   -Today patient expresses no suicidal ideation, thoughts or action plan.     Urinary  urgency and burning , low probability of UTI,culture growing normal martine   -- patient was complaining of urgency and frequency , checked UA.  -- UA was negative for nitrite but large leukocyte esterase with 50 WBC counts  -- No leukocytosis, culture grew normal martine   -- Started patient on ciprofloxacin 500 mg p.o. twice daily while awaiting culture, 6/6/2023 urogenital martine, DC Cipro.         Hyponatremia: See above  -- Most likely secondary to euvolemia secondary to psych medications, mild component of hypovolemia cannot be completely excluded   -- Started patient on normal saline at 50 mill per hour for 2 L, NA improved to 131 , off IV fluids now   -- Checked urine sodium, urine osmolality and  plasma osmolality, in normal range , indicating hyponatremia is most likely sec to SIADH type picture from medications   -- TSH was checked , came back in normal range   -- See #1.     Suspect Moderate malnutrition In Context of:  Acute illness or injury  -- Nutrition following; supplements for nutrition  -- will start checking weight weekly   -- oral intake is slowly improving   -- Added pre albumin to the lab , will follow up on the results, in process      Prior gastric bypass  B12 deficiency  Iron deficienty anemia  GERD  -- Can resume PTA B12 and iron on discharge  -- Continue PTA famotidine  -- was planned for outpatient UGI endoscopy (5/22) for nausea/vomiting with h/o gastric bypass (was seen in clinic by Yessi 4/18/23); currently tolerating PO without significant G.I. issues, can reschedule EGD with the PCP as outpatient on follow up  -- Checked CBC on 06/02/23 while patient is awaiting placement, hemoglobin is in normal range of 12.2  --Continue plan for outpatient EGD.  -No GI complaints.     Asthma  --  PRN albuterol inhaler     Dyslipidemia  -- Continue PTA simvastatin.      Osteoporosis  -- Resume alendronate after discharge.     Constipation-resolved  Patient with frequent loose stools on 5/26 needing  frequent nursing assistance. Scheduled Senna-colace discontinued. Loose/soft stools are likely related to bowel regimen. She does not have abdominal pain, fever, nausea or vomiting.   -- Discontinued scheduled bowel regimen on 5/26  -No bowel complaints.     DVT Prophylaxis: Pneumatic Compression Devices and Anti-embolisim stockings (TEDs)     Pending Results   These results will be followed up by InPatient Psychiatry [Patient transferred to [facility] INpatient Psychiatry 6/7/23}   Unresulted Labs Ordered in the Past 30 Days of this Admission     Date and Time Order Name Status Description    6/6/2023 12:02 AM Valproic Acid Free & Total In process           Code Status   Full Code       Primary Care Physician   Inder Lindsey    Physical Exam   Temp: 98.3  F (36.8  C) Temp src: Oral BP: 122/70 Pulse: 75   Resp: 18 SpO2: 98 % O2 Device: None (Room air)    Vitals:    05/05/23 2140 05/30/23 0857   Weight: 54 kg (119 lb 0.8 oz) 50.4 kg (111 lb 3.2 oz)     General/Constitutional:    NAD, calm, cooperative     Chest/Respiratory: Respirations nonlabored room air  Psych  affect calm; no suicidal ideation, thoughts or action plan.    Discharge Disposition   Transferred to [facility] InPatient Psychiatry.   Patient will be tranferred on HOLD-Transport due to court order HOLD    Condition at discharge: Fair    Consultations This Hospital Stay   DIAGNOSTIC EVALUATION CENTER (DEC) ASSESSMENT ORDER  PSYCHIATRY IP CONSULT  CARE MANAGEMENT / SOCIAL WORK IP CONSULT  CARE MANAGEMENT / SOCIAL WORK IP CONSULT  VASCULAR ACCESS ADULT IP CONSULT  VASCULAR ACCESS ADULT IP CONSULT  PSYCHIATRY IP CONSULT  PSYCHIATRY IP CONSULT  PSYCHIATRY IP CONSULT  PSYCHIATRY IP CONSULT  PHYSICAL THERAPY ADULT IP CONSULT  PSYCHIATRY IP CONSULT  PSYCHIATRY IP CONSULT  PSYCHIATRY IP CONSULT  PSYCHIATRY IP CONSULT  VASCULAR ACCESS ADULT IP CONSULT    Time Spent on this Encounter   IPeggy MD, personally saw the patient today and spent  greater than 30 minutes discharging this patient discussing discharge plans with Patient and receiving physician, Dr. Ramos at [facility] Inpatient psychiatry who will accepted patient in transfer.  Completing EMTALA documentation.  Completing discharge orders, medications and follow-up    NOTE:  discharge summary addended 6/8/23 to reflect up to date discharge medications that is reflected in Epic and AVS on discharge 6/7/23.  The remainder of discharge per 6/7/23    Discharge Orders      General info for SNF    Length of Stay Estimate: Short Term Care: Estimated # of Days <30  Condition at Discharge: Improving  Level of care:skilled   Rehabilitation Potential: Good  Admission H&P remains valid and up-to-date: Yes  Recent Chemotherapy: N/A  Use Nursing Home Standing Orders: Yes     Mantoux instructions    Give two-step Mantoux (PPD) Per Facility Policy Yes     Follow Up and recommended labs and tests    Follow-up with psychiatry after the discharge and inpatient psych     Reason for your hospital stay    You were admitted to the hospital secondary to related to thrive secondary to suicidal ideation, you have been evaluated by psychiatry and has been recommended for inpatient psychiatric admission.     Additional Discharge Instructions    During the hospitalization many of your multivitamin and supplementations have been stopped, you are also started on Depakote, your sertraline and Remeron dose has been decreased, you are also receiving hydroxyzine for as needed anxiety, your Zyprexa dose has also been adjusted.  Further medications adjustment will be made by psychiatrist after you are discharged to inpatient psychiatric facility.     Activity - Up ad devan     Fall precautions     Diet    Follow this diet upon discharge: Orders Placed This Encounter      Room Service      Combination Diet Regular Diet; Safe Tray - with utensils     Discharge Medications   Discharge Medication List as of 6/7/2023  7:10 PM       START taking these medications    Details   !! divalproex sodium delayed-release (DEPAKOTE SPRINKLE) 125 MG DR capsule Take 250 mg by mouth 2 times daily, Transitional      !! divalproex sodium delayed-release (DEPAKOTE SPRINKLE) 125 MG DR capsule Take 375 mg by mouth At Bedtime, Transitional      hydrOXYzine (ATARAX) 25 MG tablet Take 1 tablet (25 mg) by mouth every 6 hours as needed for anxiety, Transitional       !! - Potential duplicate medications found. Please discuss with provider.      CONTINUE these medications which have CHANGED    Details   acetaminophen (TYLENOL) 325 MG tablet Take 2 tablets (650 mg) by mouth every 6 hours as needed for mild pain or other (and adjunct with moderate or severe pain or per patient request), Transitional      famotidine (PEPCID) 20 MG tablet Take 1 tablet (20 mg) by mouth daily, Transitional      mirtazapine (REMERON) 15 MG tablet Take 1 tablet (15 mg) by mouth At Bedtime, Transitional      !! OLANZapine (ZYPREXA) 2.5 MG tablet Take 1 tablet (2.5 mg) by mouth every morning, Transitional      !! OLANZapine (ZYPREXA) 5 MG tablet Take 2 tablets (10 mg) by mouth At Bedtime, Transitional         !! sertraline (ZOLOFT) 50 MG tablet Take 1 tablet (50 mg) by mouth daily, Transitional       !! - Potential duplicate medications found. Please discuss with provider.      CONTINUE these medications which have NOT CHANGED    Details   budesonide-formoterol (SYMBICORT) 80-4.5 MCG/ACT Inhaler Inhale 2 puffs into the lungs 2 times daily as needed (Shortness of breath, wheezing), Disp-10.2 g, R-3, E-Prescribe      calcium carbonate (TUMS) 500 MG chewable tablet Chew 2 tabs every 3 hours as needed, Historical      cholecalciferol (VITAMIN D3) 1000 UNIT tablet Take 1 tablet (1,000 Units) by mouth daily, Disp-30 tablet, R-11, E-Prescribe      fluticasone (FLONASE) 50 MCG/ACT nasal spray Spray 2 sprays into both nostrils daily as needed for rhinitis or allergies, Historical      hypromellose  (ARTIFICIAL TEARS) 0.5 % SOLN ophthalmic solution Place 2 drops into both eyes 4 times daily as needed, Historical      Multiple Vitamins-Minerals (CEROVITE SENIOR) TABS Take 1 tablet by mouth daily, Disp-30 tablet, R-3, Historical      Nutritional Supplements (ENSURE ORIGINAL) LIQD Take 1 Container by mouth daily, Historical      !! polyethylene glycol (MIRALAX) 17 g packet Take 17 g by mouth Every Mon, Wed, Fri Morning, Historical      !! 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 - Potential duplicate medications found. Please discuss with provider.      STOP taking these medications       alendronate (FOSAMAX) 70 MG tablet Comments:   Reason for Stopping:         alum & mag hydroxide-simethicone (MYLANTA/MAALOX) 200-200-20 MG/5ML SUSP suspension Comments:   Reason for Stopping:         Calcium Oyster Shell 500 MG TABS Comments:   Reason for Stopping:         capsaicin (ZOSTRIX) 0.025 % external cream Comments:   Reason for Stopping:         Cyanocobalamin (B-12) 1000 MCG TBCR Comments:   Reason for Stopping:         FEROSUL 325 (65 Fe) MG tablet Comments:   Reason for Stopping:         guaiFENesin-dextromethorphan (ROBITUSSIN DM) 100-10 MG/5ML syrup Comments:   Reason for Stopping:         loperamide (IMODIUM) 1 MG/5ML solution Comments:   Reason for Stopping:         magnesium hydroxide (MILK OF MAGNESIA) 400 MG/5ML suspension Comments:   Reason for Stopping:         ondansetron (ZUPLENZ) 4 MG FILM Comments:   Reason for Stopping:         trolamine  salicylate (ASPERCREME) 10 % external cream Comments:   Reason for Stopping:         White Petrolatum ointment Comments:   Reason for Stopping:             Allergies   Allergies   Allergen Reactions     Nsaids      Gastric bypass surgery       Data   Most Recent 3 CBC's:  Recent Labs   Lab Test 06/07/23  0858 06/06/23  1027 06/04/23  0819 06/02/23  0939 05/25/23  0942 05/21/23  0742   WBC  --  9.2  --  7.1  --  6.9   HGB  --  13.5  --  12.2  --  12.6   MCV  --  91  --  90  --  94    252 220 226   < > 255    < > = values in this interval not displayed.      Most Recent 3 BMP's:  Recent Labs   Lab Test 06/07/23  0858 06/06/23  1027 06/04/23  0934 06/04/23  0819 06/03/23  0837   * 129*  --  136 131*   POTASSIUM 3.6 3.6  --   --  3.9   CHLORIDE 95* 95*  --   --  100   CO2 23 21*  --   --  20*   BUN 13.1 6.4*  --   --  6.8*   CR 0.73 0.61  --  0.59 0.64   ANIONGAP 11 13  --   --  11   LAWANDA 9.3 9.6  --   --  9.0   * 139* 123*  --  197*     Most Recent 2 LFT's:  Recent Labs   Lab Test 05/05/23  1653   AST 20   ALT 9*   ALKPHOS 50   BILITOTAL 0.2     Most Recent INR's and Anticoagulation Dosing History:  Anticoagulation Dose History          View : No data to display.                    Most Recent 3 Troponin's:No lab results found.  Most Recent Cholesterol Panel:  Recent Labs   Lab Test 08/06/21  1200   TRIG 53     Most Recent 6 Bacteria Isolates From Any Culture (See EPIC Reports for Culture Details):No lab results found.  Most Recent TSH, T4 and A1c Labs:  Recent Labs   Lab Test 06/02/23  0939   TSH 1.29

## 2023-06-07 NOTE — TELEPHONE ENCOUNTER
R: 5:20PM Pt's medical MD called intake to request that the charge Johanna is called on [facility] for report.    Pt accepted to [facility] after doc to doc with Sofi.      R: 5:45PM Unit [facility] updated with pt placement.  3[facility] will call [address] for report at [phone].

## 2023-06-07 NOTE — PROGRESS NOTES
[facility]    Medicine Progress Note - Hospitalist Service    Date of Admission:  5/5/2023    Assessment & Plan     Bhavana Marr is an 82-year-old female patient with PMH of asthma; dyslipidemia; depression/anxiety; bipolar disorder; borderline personality disorder; osteoporosis; prior gastric bypass; and recent hospitalization (4/21/2023 to 4/28/2023) with issues including suicidal ideation and dementia with behavioral disturbance. She presented from her care facility with decreased oral intake, low glucose level, low blood pressure, depression and possible suicidal ideations.     On initial evaluation, patient was afebrile, normotensive, not tachycardic.  Labs notable for BMP with sodium 133 otherwise normal; CBC was normal; LFTs showed low albumin of 3.0 and low protein 5.3 and low AST of 9, otherwise normal; glucose 109; urinalysis showed 60 ketones and not suggestive of infection.     Decreased oral intake with hypoglycemia, low blood pressures: improving  Hyponatremia, recurrent.  Failure to thrive, possibly due to Psychiatric Illness; see below   * Initial presentation as above. Pt found with low sodium as well as ketones in the urine. -fluids on admission    * No abdominal pain or lab findings to suggest a primary GI etiology.  * d/roberto IVF on 5/8 due to improved oral intake, Na WNL.  -- Mental health management as below.  -Sodium 6/6/2023 at 129, last sodium 6/4/2023 normal at 136.  Review with pharmacist indicates medications contributory to hyponatremia includes Zyprexa, SSRI and Depakote.  She has been on Zyprexa and SSRI longstanding, Depakote was started recently by psychiatry, 5/22/2023.  Psychiatry consult placed regarding alternatives to Zyprexa as relates to the hyponatremia.  Obtaining urine/serum osmole/sodium, review of I's and O's indicates positive fluid balance, will update weight.  Therefore for now we will place on fluid restrictions, recheck sodium in AM =  129  -6/7/2023, reviewed Psychiatry note.  Recognize that patient had hyponatremia on admission before Depakote and suspect initial hyponatremia was in fact due to volume depletion and improved/normalized with IVF.  However now since start on Depakote new hyponatremia and concern that this medication may be contributory to the low sodium on 6/6/2023, nonetheless will monitor sodium on Psychiatry decrease in Zyprexa and SSRI, if not, we will reconsult or per their note evaluate Depakote and or alternatives.     Psychiatric issues  Depression/anxiety, bipolar d/o, borderline personality d/o with possible suicidal ideations  * Of note is that the patient was recently hospitalized through [facility] with issues including suicidal ideation.  She was seen by psychiatry during that hospitalization; they did not recommend any type of psychiatric admission.    * Lives in LTC facility.  Per review of psych note, that the facility director of nursing reported that patient does not have hx of Dementia and was entered in her chart erroneously during prior admission in 2021.     * Initial presentation as above. Noted manic episode and some confusion. On admit, pt denied suicidal ideations on admit but did endorse feeling depressed.  She was seen by the DEC  in the ED and not felt to be actively suicidal.  * UDS has been completed on 5/24 pre request of inpat psych, results are negative  -- Psychiatry following intermittently  --  5/22, started Depakote sprinkles 125 mg BID and 250 mg at bedtime  --  Had court hearing for commitment on 5/23, commitment supported by [facility]  --  Sitter was discontinued on 5/23  --  5/25:          -- psych recommended continuing on olanzapine 5 mg daily and 10 mg nightly,  Sertraline 150 mg daily and Remeron 22.5 mg at night  -- 05/31:           -- increased Depakote to 250 mg twice daily and 375 mg nightly         -- Decreased sertraline from 150 mg to 100 mg.         -- Decreasing  mirtazapine from 22.5 mg at night to 15 mg daily  Depakote level 6/5/2023 59.5.     --Patient still requires inpatient psych admission as per recommendation  -- Has also been noticed to be anxious from time to time, hydroxyzine has also been added as needed for anxiety this week  -- Was medically stable earlier this week, now complaining of some urgency and frequency, possible UTI and sodium was noticed to be 125, might not be ready to be discharged over the weekend , expecting patient to be ready for discharge on Monday   -- see management for UTI and hyponatremia below   -- Patient remains suicidal and continues to make comments about wanting to die in the last few days   -- Patient has been on the waiting list to be transferred to inpatient psych when medical issues resolved.     Urinary urgency and burning , low probability of UTI,culture growing normal martine   -- patient was complaining of urgency and frequency , checked UA.  -- UA was negative for nitrite but large leukocyte esterase with 50 WBC counts  -- No leukocytosis, culture grew normal martine   -- Started patient on ciprofloxacin 500 mg p.o. twice daily while awaiting culture, 6/6/2023 urogenital martine, DC Cipro.        Hyponatremia: improved   -- Most likely secondary to euvolemia secondary to psych medications, mild component of hypovolemia cannot be completely excluded   -- Started patient on normal saline at 50 mill per hour for 2 L, NA improved to 131 , off IV fluids now   -- Checked urine sodium, urine osmolality and  plasma osmolality, in normal range , indicating hyponatremia is most likely sec to SIADH type picture from medications   -- TSH was checked , came back in normal range   -- See #1.     Suspect Moderate malnutrition In Context of:  Acute illness or injury  -- Nutrition following; supplements for nutrition  -- will start checking weight weekly   -- oral intake is slowly improving   -- Added pre albumin to the lab , will follow up on the  results, in process      Prior gastric bypass  B12 deficiency  Iron deficienty anemia  GERD  -- Can resume PTA B12 and iron on discharge  -- Continue PTA famotidine  -- was planned for outpatient UGI endoscopy (5/22) for nausea/vomiting with h/o gastric bypass (was seen in clinic by Yessi 4/18/23); currently tolerating PO without significant G.I. issues, can reschedule EGD with the PCP as outpatient on follow up  -- Checked CBC on 06/02/23 while patient is awaiting placement, hemoglobin is in normal range of 12.2  --Continue plan for outpatient EGD.  -No GI complaints.     Asthma  --  PRN albuterol inhaler     Dyslipidemia  -- Continue PTA simvastatin.      Osteoporosis  -- Resume alendronate after discharge.     Constipation-resolved  Patient with frequent loose stools on 5/26 needing frequent nursing assistance. Scheduled Senna-colace discontinued. Loose/soft stools are likely related to bowel regimen. She does not have abdominal pain, fever, nausea or vomiting.   -- Discontinued scheduled bowel regimen on 5/26  -No bowel complaints.     DVT Prophylaxis: Pneumatic Compression Devices and Anti-embolisim stockings (TEDs)       Diet: Room Service  Combination Diet Regular Diet; Safe Tray - with utensils  Diet  Fluid restriction 1500 ML FLUID      Cortez Catheter: Not present  Lines: None     Cardiac Monitoring: None  Code Status: Full Code      Clinically Significant Risk Factors         # Hyponatremia: Lowest Na = 129 mmol/L in last 2 days, will monitor as appropriate      # Hypoalbuminemia: Lowest albumin = 3 g/dL at 5/5/2023  4:53 PM, will monitor as appropriate                         Disposition Plan: Goal: Inpatient psychiatry, current active medical issues includes hyponatremia.    Discharge Comments: on Court hold,       Peggy Houston MD  Hospitalist Service  [facility]  Securely message with Zim[employer] (more info)  Text page via [employer] Paging/Directory      I spent 35 minutes total  time in management of care today 6/7/2023 reviewing labs, medications, interdisciplinary notes; and completing documentation of encounter and orders with Care Management including counseling/discussion with the Patient regarding hypoNa and fluid restriction and Coordinating Care and plan with Nursing re: fluid restrictions and behaviros and Specialists, Psychiatry regarding medications and hypoNa, management and surveillance   ______________________________________________________________________    Interval History   Minimal history from patient, she was sleeping on morning encounters yet arousable, did not want to engage in conversation.  No complaints, she appears comfortable.  Nursing reports no acute behavioral issues.     Physical Exam   Vital Signs: Temp: 98.7  F (37.1  C) Temp src: Oral BP: 101/67 Pulse: 101   Resp: 16 SpO2: 100 % O2 Device: None (Room air)    Weight: 111 lbs 3.2 oz    General/Constitutional:    NAD, calm, cooperative     Chest/Respiratory: Respirations nonlabored room air  Psych  affect calm; no suicidal ideation, thoughts or action plan.    Data     I have personally reviewed the following data over the past 24 hrs:    N/A  \   N/A   / 233     129 (L) 95 (L) 13.1 /  174 (H)   3.6 23 0.73 \       Imaging results reviewed over the past 24 hrs:

## 2023-06-07 NOTE — TELEPHONE ENCOUNTER
[facility] Access Inpatient Bed Call Log 6/7/2023 1:42 AM      Intake has called facilities that have not updated their bed status within the last 12 hours.     Adults:         [facility] is posting 0 beds.      [facility] is posting 0 beds. [phone]      [facility] is posting 0 beds. [phone]     [facility] is posting 0 beds. [phone] - 1:43am Per Fiorella they are capped    [facility] is posting 0 beds. [phone]     [facility] is posting 0 bed. [phone]      [facility] is posting 0 beds. [phone]     [facility] is posting 0 beds. [phone]     [facility] is posting 1 beds. [phone]     [facility] is posting 0 beds. [phone]       [facility] is posting 1 beds. Mixed unit 12+. Low acuity only.  [phone]     [facility] is posting 0 beds. No aggression.  [phone]     [facility] is posting 0 beds. [phone]      [facility] is posting 1 beds. [phone]      [facility] is posting 0 bed. [phone]      [facility] is posting 0 beds. Low acuity. [phone]     [facility] is posting 0 beds. 72 hr hold preferred. [phone]     [facility] is posting 3 bed.  [phone]        [facility] is posting 7 bed. Voluntary only- no holds/commitments, No Hx of aggression, violence, or assault. No sexual offenders. No 72 hr holds. [phone]     [facility] is posting 6 beds. (Must have the cognitive ability to do programming. No aggressive or violent behavior or recent HX in the last 2 yrs. MH must be primary.) [phone]    [facility] is posting 2 beds. Low acuity only. Violence and aggression capped. [phone]      [facility] is posting 2 bed. Low acuity, Neg Covid. [phone]     [facility] is posting 3 beds. Covid neg. Vol only. Combined adolescent and adult unit. No aggressive or violent behavior. No  registered sex offenders. [phone] - 1;00am Per Mimi, call back after 8am.    [facility] [place] posting 0 beds. Adina covid.      [facility] is posting 10 beds. Call for details. [phone]      [facility] is posting 2 beds. [phone]       Pt remains on work list pending appropriate bed availability.

## 2023-06-07 NOTE — PLAN OF CARE
Goal Outcome Evaluation:         Shift: 06/06/23. [phone]  Summary: Failure to thrive  Cognitive Concerns/ Orientation : A/Ox3 - disoriented to situation, flat affect - mild [place],  BEHAVIOR & AGGRESSION TOOL COLOR: green  ABNL VS/O2: VSS, room air  MOBILITY: SBA/GB/Walker ,  PAIN MANAGMENT: Denies    DIET: Regular, FR 1500cc   BOWEL/BLADDER: continent , incontinent at times.  ABNL LAB/BG: Na 129, DRAIN/DEVICES: PIV SL  SKIN: Pale. Intact   TESTS/PROCEDURES: None  D/C DATE: Pending inpatient Psych bed.   OTHER IMPORTANT INFO: Pt did not make any statements pertaining to death/self harm this shift - predominantly pleasant with a flat affect. Patient on a court hold. Psych re consulted.

## 2023-06-07 NOTE — PLAN OF CARE
Shift: 06/06-06/07/23 Night shift  Summary: Failure to thrive  Cognitive Concerns/ Orientation : A/Ox3 - disoriented to situation, flat affect - mild [place],  BEHAVIOR & AGGRESSION TOOL COLOR: Green  ABNL VS/O2: VSS, room air  MOBILITY: SBA/GB/Walker ,  PAIN MANAGMENT: Denies    DIET: Regular, FR 1500cc   BOWEL/BLADDER: continent , incontinent at times.  ABNL LAB/BG: Na 129- keep monitoring with medication changes  DRAIN/DEVICES: PIV SL  SKIN: Pale. Intact   TESTS/PROCEDURES: Following Na with all the psych meds  D/C DATE: Pending inpatient Psych bed.   OTHER IMPORTANT INFO: Slept most shift; Patient on a court hold. Psych re consulted.

## 2023-06-07 NOTE — DISCHARGE SUMMARY
Discharge    Patient discharged to [facility] Inpatient psych via [facility] with EMS      Listed belongings gathered and given to patient (including from security/pharmacy). Yes  Care Plan and Patient education resolved: Yes  Prescriptions if needed, hard copies sent with patient  Yes  Medication Bin checked and emptied on discharge Yes  SW/care coordinator/charge RN aware of discharge: Yes     Shift: 06/07/23 1500- 7530  Summary: Failure to thrive  Cognitive Concerns/ Orientation : A/Ox3 - disoriented to situation, flat affect - [place],  BEHAVIOR & AGGRESSION TOOL COLOR: Green  ABNL VS/O2: VSS, on RA   MOBILITY: SBA/GB/Walker   PAIN MANAGMENT: Denies    DIET: Regular, FR 1500cc, 480 ml left  BOWEL/BLADDER: Mostly continent , incontinent at times.  ABNL LAB/BG: Na 129  DRAIN/DEVICES: PIV removed  SKIN: Pale. Intact   TESTS/PROCEDURES: None  [facility]  OTHER IMPORTANT INFO:

## 2023-06-08 LAB
ANION GAP SERPL CALCULATED.3IONS-SCNC: 10 MMOL/L (ref 7–15)
BUN SERPL-MCNC: 15 MG/DL (ref 8–23)
CALCIUM SERPL-MCNC: 9.4 MG/DL (ref 8.8–10.2)
CHLORIDE SERPL-SCNC: 94 MMOL/L (ref 98–107)
CREAT SERPL-MCNC: 0.72 MG/DL (ref 0.51–0.95)
DEPRECATED CALCIDIOL+CALCIFEROL SERPL-MC: 35 UG/L (ref 20–75)
DEPRECATED HCO3 PLAS-SCNC: 21 MMOL/L (ref 22–29)
FOLATE SERPL-MCNC: 19.2 NG/ML (ref 4.6–34.8)
GFR SERPL CREATININE-BSD FRML MDRD: 84 ML/MIN/1.73M2
GLUCOSE SERPL-MCNC: 172 MG/DL (ref 70–99)
POTASSIUM SERPL-SCNC: 4.1 MMOL/L (ref 3.4–5.3)
SODIUM SERPL-SCNC: 125 MMOL/L (ref 136–145)
T4 FREE SERPL-MCNC: 1.04 NG/DL (ref 0.9–1.7)
VIT B12 SERPL-MCNC: 410 PG/ML (ref 232–1245)

## 2023-06-08 PROCEDURE — 250N000013 HC RX MED GY IP 250 OP 250 PS 637: Performed by: PSYCHIATRY & NEUROLOGY

## 2023-06-08 PROCEDURE — 250N000011 HC RX IP 250 OP 636: Performed by: PHYSICIAN ASSISTANT

## 2023-06-08 PROCEDURE — 82306 VITAMIN D 25 HYDROXY: CPT

## 2023-06-08 PROCEDURE — 99223 1ST HOSP IP/OBS HIGH 75: CPT | Mod: AI

## 2023-06-08 PROCEDURE — 124N000003 HC R&B MH SENIOR/ADOLESCENT

## 2023-06-08 PROCEDURE — 99222 1ST HOSP IP/OBS MODERATE 55: CPT | Mod: AI | Performed by: PHYSICIAN ASSISTANT

## 2023-06-08 PROCEDURE — 84439 ASSAY OF FREE THYROXINE: CPT

## 2023-06-08 PROCEDURE — 82746 ASSAY OF FOLIC ACID SERUM: CPT

## 2023-06-08 PROCEDURE — 80048 BASIC METABOLIC PNL TOTAL CA: CPT | Performed by: PHYSICIAN ASSISTANT

## 2023-06-08 PROCEDURE — 82607 VITAMIN B-12: CPT

## 2023-06-08 PROCEDURE — 36415 COLL VENOUS BLD VENIPUNCTURE: CPT | Performed by: PHYSICIAN ASSISTANT

## 2023-06-08 PROCEDURE — 250N000013 HC RX MED GY IP 250 OP 250 PS 637

## 2023-06-08 RX ORDER — HYDROXYZINE HCL 10 MG/5 ML
25 SOLUTION, ORAL ORAL EVERY 4 HOURS PRN
Status: DISCONTINUED | OUTPATIENT
Start: 2023-06-08 | End: 2023-06-10

## 2023-06-08 RX ORDER — SERTRALINE HYDROCHLORIDE 20 MG/ML
50 SOLUTION ORAL DAILY
Status: DISCONTINUED | OUTPATIENT
Start: 2023-06-08 | End: 2023-06-08

## 2023-06-08 RX ORDER — OLANZAPINE 10 MG/1
10 TABLET, ORALLY DISINTEGRATING ORAL AT BEDTIME
Status: DISCONTINUED | OUTPATIENT
Start: 2023-06-08 | End: 2023-07-16 | Stop reason: HOSPADM

## 2023-06-08 RX ORDER — OLANZAPINE 5 MG/1
5 TABLET, ORALLY DISINTEGRATING ORAL 3 TIMES DAILY PRN
Status: DISCONTINUED | OUTPATIENT
Start: 2023-06-08 | End: 2023-07-16 | Stop reason: HOSPADM

## 2023-06-08 RX ORDER — SERTRALINE HYDROCHLORIDE 20 MG/ML
50 SOLUTION ORAL DAILY
Status: DISCONTINUED | OUTPATIENT
Start: 2023-06-09 | End: 2023-06-09 | Stop reason: SINTOL

## 2023-06-08 RX ORDER — ONDANSETRON 4 MG/1
4 TABLET, FILM COATED ORAL EVERY 6 HOURS PRN
Status: DISCONTINUED | OUTPATIENT
Start: 2023-06-08 | End: 2023-06-14

## 2023-06-08 RX ADMIN — ONDANSETRON HYDROCHLORIDE 4 MG: 4 TABLET, FILM COATED ORAL at 16:35

## 2023-06-08 RX ADMIN — FAMOTIDINE 20 MG: 20 TABLET ORAL at 09:03

## 2023-06-08 RX ADMIN — MELATONIN TAB 3 MG 3 MG: 3 TAB at 01:35

## 2023-06-08 RX ADMIN — SIMVASTATIN 40 MG: 40 TABLET, FILM COATED ORAL at 21:13

## 2023-06-08 RX ADMIN — DIVALPROEX SODIUM 250 MG: 125 CAPSULE, COATED PELLETS ORAL at 09:03

## 2023-06-08 RX ADMIN — OLANZAPINE 10 MG: 10 TABLET, ORALLY DISINTEGRATING ORAL at 21:12

## 2023-06-08 RX ADMIN — SERTRALINE HYDROCHLORIDE 50 MG: 25 TABLET ORAL at 09:03

## 2023-06-08 RX ADMIN — ALUMINUM HYDROXIDE, MAGNESIUM HYDROXIDE, AND DIMETHICONE 30 ML: 200; 20; 200 SUSPENSION ORAL at 19:24

## 2023-06-08 RX ADMIN — MIRTAZAPINE 15 MG: 15 TABLET, FILM COATED ORAL at 21:13

## 2023-06-08 RX ADMIN — MELATONIN TAB 3 MG 3 MG: 3 TAB at 21:15

## 2023-06-08 RX ADMIN — VALACYCLOVIR 500 MG: 500 TABLET, FILM COATED ORAL at 09:03

## 2023-06-08 RX ADMIN — OLANZAPINE 2.5 MG: 2.5 TABLET, FILM COATED ORAL at 09:03

## 2023-06-08 ASSESSMENT — ACTIVITIES OF DAILY LIVING (ADL)
HYGIENE/GROOMING: WITH ASSISTANCE
ADLS_ACUITY_SCORE: 62
ADLS_ACUITY_SCORE: 72
ADLS_ACUITY_SCORE: 62
HYGIENE/GROOMING: WITH ASSISTANCE
ADLS_ACUITY_SCORE: 72
DRESS: WITH ASSISTANCE
LAUNDRY: UNABLE TO COMPLETE
ADLS_ACUITY_SCORE: 62
ADLS_ACUITY_SCORE: 72
ADLS_ACUITY_SCORE: 62
ADLS_ACUITY_SCORE: 62
ORAL_HYGIENE: PROMPTS
ADLS_ACUITY_SCORE: 62

## 2023-06-08 NOTE — PLAN OF CARE
Problem: Anxiety Signs/Symptoms  Goal: Optimized Energy Level (Anxiety Signs/Symptoms)  Outcome: Progressing  Outcome: Progressing  Intervention: Optimize Energy Level    Problem: Depressive Signs/Symptoms  Goal: Optimized Energy Level (Depressive Signs/Symptoms)  Outcome: Progressing  Intervention: Optimize Energy Level     Goal Outcome Evaluation:      Patient spent time resting in bed this evening, is alert and oriented, able to make needs known. Affect is flat blunted, mood is depressed, was isolative and withdrawn in her room but came out to the lounge for dinner, is social with staff, endorsed anxiety and depression, denied SI/SIB hallucinations and all other mental health symptoms. Patient continue to endorse fear, she said that she is scared being at the hospital and she wants to go home, patient acknowledged speaking to the psychiatry , but said there was nothing important that she told her. Complained of nausea, was given PRN Zofran with effectiveness, ate 50% of her dinner, reported indigestion after dinner and was given PRN Maalox with effectiveness too, pt took all the scheduled medications with PRN melatonin for sleep per request, will continue with care plan at this  time.    Intake this shift was 480 mL   Output 500

## 2023-06-08 NOTE — PLAN OF CARE
Problem: Anxiety Signs/Symptoms  Goal: Optimized Energy Level (Anxiety Signs/Symptoms)  Outcome: Progressing     Problem: Depressive Signs/Symptoms  Goal: Optimized Energy Level (Depressive Signs/Symptoms)  Outcome: Progressing  Goal Outcome Evaluation:    Patient presented with a flat affect, says her mood is anxious and fearful, spent most of the time in bed today, endorsed anxiety and depression, when asked about SI, patient said that she is overwhelmed and scared but she can't tell why,  then went on saying that she is scared of being here at the [facility] and she would like to go home. Patient attended group therapy, said she slept good last night even though she was scared, appetite is fair, is on  2 liters Fluid restrictions, intake today is 680 mL with an output of 480 mL BP was high 166/85 Pulse 65 and a recheck of 140/82 P 66, IM paged and assessed pt. Patient is a high fall risk, uses a walker and denies any weakness, was up to the bathroom alone and took all the scheduled medications with no  Concerns. Plan is to continue with the plan of care encourage good oral intake    Patient reported that she vomited once, got PRN Zofran order for nausea.

## 2023-06-08 NOTE — PROGRESS NOTES
Care Management Discharge Note    Discharge Date: 06/07/2023       Discharge Disposition:  [facility] Patient psychiatry [facility] [phone].    Discharge Services:      Discharge DME:      Discharge Transportation:      Private pay costs discussed: Not applicable    Does the patient's insurance plan have a 3 day qualifying hospital stay waiver?  Yes   Will the waiver be used for post-acute placement? No    PAS Confirmation Code:  n/a  Patient/family educated on Medicare website which has current facility and service quality ratings:      Education Provided on the Discharge Plan:  yes  Persons Notified of Discharge Plans: DON of [facility] Josefina at [phone]   Able to return to prior arrangements: yes  Patient/Family in Agreement with the Plan:      Handoff Referral Completed: No    Additional Information:  Spoke with ZACH Rocha at [facility] this morning and updated her of patient's transfer last evening.  Encouraged her to call [facility] later today once patient has been assigned a CM.  Josefina said her  is becoming concerned with the time patient has been away and may want to fill patient's spot.  She is hopeful she can keep patient's room open for patient to return as patient has lived there 5 years.        LEENA CollazoSW

## 2023-06-08 NOTE — PLAN OF CARE
Initial Psychosocial Assessment    I have reviewed the chart, met with the patient, and developed Care Plan.  Information for assessment was obtained from:     Chart: Reviewed as pt declined to engage in interview    Presenting Problem:  Per HPI: Bhavana Marr is a 82 year old female with a history of depression, borderline personality disorder, bipolar affective disorder, GERD, asthma, hyperlipidemia, age-related osteoporosis who presents with hypoglycemia and hypotension along with suicidal ideation.  Patient lives at assisted living facility and has been reportedly not eating or drinking due to depression.    History of Mental Health and Chemical Dependency:  Past admissions to [facility] ([facility] records note minimal inpatient treatment aside from a series of three [facility] inpatient admission October 11, October 22 and November 16 all in 2017). Was recently admitted at [facility] due to SI threatening to walk into traffic or starve herself     Family Description ([place], Family Psychiatric History):  Pt reports she was raised in [place] and was raised with by her maternal aunt. Pt has 3 adult children.      Significant Life Events (Illness, Abuse, Trauma, Death):  Unable to assess    Living Situation:  Has lived in [facility] for 5 years    Educational Background:  HS graduate, some college     Occupational History:  Worked in manufacturing and as a      Financial Status:  [facility]    Legal Issues:  Civil commitment [facility]    Ethnic/Cultural Issues:  American    Spiritual Orientation:  Unable to assess     Service History:  None    Current Treatment Providers are:  Primary Care Provider: MD Richar Galo [facility]                                        MD Richar [facility]  Psychiatrist: Dr. Clemente Clay - [facility]  Other: : [facility] [phone]  [facility] [phone]      Social Service Assessment/Plan:  Patient has been admitted for  psychiatric stabilization for this acute crisis. Patient will meet with treatment team including a psychiatrist to have psychiatric assessment and medication management. Team will coordinate with the any outpatient medication providers to review and adjust medications as appropriate. Staff will provide therapeutic programming and structure to help maintain a safe environment for healing. Staff will continue to assess patient as needed. Patient will participate in unit groups and activities. Patient will receive individual and group support on the unit. CTC will coordinate with outpatient providers and will place referrals to ensure appropriate follow up care is in place.

## 2023-06-08 NOTE — PHARMACY-ADMISSION MEDICATION HISTORY
Admission medication history completed at [facility]. Please see Pharmacy - Admission Medication History note from 5/5/2023.    Gina Covarrubias, PharmD, BCPS
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
## 2023-06-08 NOTE — PROGRESS NOTES
CLINICAL NUTRITION SERVICES - ASSESSMENT NOTE     Nutrition Prescription    RECOMMENDATIONS FOR MDs/PROVIDERS TO ORDER:  None today.     Malnutrition Status:    Severe malnutrition in the context of starvation related to social and environmental circumstances.     Recommendations already ordered by Registered Dietitian (RD):  -Ensure (rotate flavors) BID with breakfast and dinner   -Flinstones BID per bariatric guidelines    Future/Additional Recommendations:  Monitor tolerance to interventions, monitor oral intakes, monitor weights.      REASON FOR ASSESSMENT  Bhavana Marr is a/an 82 year old female assessed by the dietitian for Provider Order - failure to thrive    PMH  History of HLD, asthma, depression, anxiety, borderline personality, bipolar disorder, GERD, osteoporosis, and gastric bypass who was transferred from [facility] medicine to station 3B with worsening depression. She was initially admitted to [facility] 5/5/2023 with hypoglycemia, hypotension, and possible SI in the setting of failure to thrive    NUTRITION HISTORY  Patient followed by Latisha Ramirez, last evaluated 5/25, she was eating adequately that admission.     Visited with patient on the unit. She was polite and appropriate but appeared confused and had some difficulty answering RD questions. She is complaining of frequent nausea but reports that Zofran has been effective. She reports she had gastric bypass in 1992. Her appetite is okay, she ate scrambled eggs and sausage this morning for breakfast.     CURRENT NUTRITION ORDERS  Diet: Regular  Fluid restriction: 2000 mL   Intake/Tolerance: 50% pe r /Os    LABS   06/03/23 08:37 06/04/23 08:19 06/06/23 10:27 06/07/23 08:58   Sodium 131 (L) 136 129 (L) 129 (L)   Potassium 3.9  3.6 3.6   Chloride 100  95 (L) 95 (L)   Carbon Dioxide (CO2) 20 (L)  21 (L) 23   Urea Nitrogen 6.8 (L)  6.4 (L) 13.1   Creatinine 0.64 0.59 0.61 0.73   GFR Estimate 88 90 89 82     MEDICATIONS  Calcium carbonate daily   Vitamin  "B12 1100 mcg daily   Vitamin D daily     ANTHROPOMETRICS  Height: 162.6 cm (5' 4\")  Most Recent Weight: 50.7 kg (111 lb 12.4 oz)    IBW: 54.47 kg  BMI: Normal BMI - 19.19  Weight History:   Wt Readings from Last 15 Encounters:   06/08/23 50.7 kg (111 lb 12.4 oz)   05/30/23 50.4 kg (111 lb 3.2 oz)   10/17/18 54.4 kg (120 lb)   05/22/18 57.4 kg (126 lb 8 oz)     05/05/23 54 kg (119 lb 0.8 oz)   6.1% wt loss in 1 month, significant for malnutrition.     Dosing Weight: 50.7 kg current BW    ASSESSED NUTRITION NEEDS  Estimated Energy Needs: 1530 - 1785 kcals/day (30 - 35 kcals/kg )  Justification: Maintenance v Repletion  Estimated Protein Needs: 51 - 61 grams protein/day (1 - 1.2 grams of pro/kg)  Justification: Maintenance  Estimated Fluid Needs: 1 mL/kcal  Justification: Maintenance    PHYSICAL FINDINGS  See malnutrition section below.    MALNUTRITION  % Intake: </=75% for >/= 1 month (severe)  % Weight Loss: > 5% in 1 month (severe)  Subcutaneous Fat Loss: Facial region:  Mild  Muscle Loss: Temporal:  severe, Facial & jaw region:  severe and Scapular bone:  severe  Fluid Accumulation/Edema: None noted  Malnutrition Diagnosis: Severe malnutrition in the context of starvation related to social and environmental circumstances.     NUTRITION DIAGNOSIS  Inadequate oral intake related to altered mentation, decreased appetite, early satiety as evidenced by 6.1% wt loss in 1 month.     INTERVENTIONS  Implementation  Nutrition Education: Provided education on role of RD, nutrition interventions available this admission.    Medical food supplement therapy     Goals  Patient to consume % of nutritionally adequate meal trays TID, or the equivalent with supplements/snacks.     Monitoring/Evaluation  Progress toward goals will be monitored and evaluated per protocol.    Moira Bella, MPH, RD, LD  Pediatric & Adult Behavioral Health Dietitian   Pager: [phone]  Weekend/holiday pager: [phone]      "

## 2023-06-08 NOTE — CARE PLAN
06/07/23 2126   Patient Belongings   Did you bring any home meds/supplements to the hospital?  No   Patient Belongings other (see comments)   Belongings Search Yes  (Abraham ARGUELLES)   Clothing Search Yes  (Isabel NGUYEN)   Pt belongings left on unit: t-shirt, sweatpants, ring (in denture box)   No wallet, ID, or phone     ADMISSION:  I am responsible for any personal items that are not sent to the safe or pharmacy. [facility] is not responsible for loss, theft or damage of any property in my possession.    Patient Signature _________________________________________ Date/Time _____________________    Staff Signature ___________________________________________ Date/Time _____________________    2nd Staff person, if patient is unable/unwilling to sign    ________________________________________________________ Date/Time _____________________    DISCHARGE:    All personal items have been returned to me.    Patient Signature _________________________________________ Date/Time _____________________    Staff Signature ___________________________________________ Date/Time _____________________

## 2023-06-08 NOTE — PROVIDER NOTIFICATION
06/08/23 1326   Individualization/Patient Specific Goals   Patient Personal Strengths community support;family/social support;stable living environment   Interprofessional Rounds   Participants CTC;psychiatrist;nursing   Behavioral Team Discussion   Participants Dr. Richard GILLILAND, Daisy Kelley [facility],  Nimisha Palacio, RN   Progress Initiated with hospitalization   Anticipated length of stay Pending stabilization   Continued Stay Criteria/Rationale SI   Medical/Physical See provider note   Plan Stabilization with medication and therapy groups   Anticipated Discharge Disposition assisted living     PRECAUTIONS AND SAFETY    Behavioral Orders   Procedures    Code 1 - Restrict to Unit    Fall precautions    Routine Programming     As clinically indicated    Status 15     Every 15 minutes.       Safety  Safety WDL: WDL, safety equipment

## 2023-06-08 NOTE — PHARMACY-CONSULT NOTE
Pharmacy Consult Note    Pharmacist was consulted by DANIEL Mathur to evaluate which antipsychotics are least likely to cause/contribute to hyponatremia.    Current Facility-Administered Scheduled Medications   Medication Dose Route Frequency     famotidine  20 mg Oral Daily     mirtazapine  15 mg Oral At Bedtime     [START ON 6/9/2023] OLANZapine zydis  2.5 mg Oral QAM     OLANZapine zydis  10 mg Oral At Bedtime     [START ON 6/9/2023] sertraline  50 mg Oral Daily     simvastatin  40 mg Oral At Bedtime     valACYclovir  500 mg Oral Daily     Current Facility-Administered As Needed Medications   Medication Dose Route Frequency     acetaminophen  650 mg Oral Q4H PRN     alum & mag hydroxide-simethicone  30 mL Oral Q4H PRN     budesonide-formoterol  2 puff Inhalation BID PRN     calcium carbonate  500 mg Oral TID PRN     hydrOXYzine  25 mg Oral Q4H PRN     melatonin  3 mg Oral At Bedtime PRN     OLANZapine zydis  5 mg Oral TID PRN     senna-docusate  1 tablet Oral BID PRN     Recent Medication Changes (during [facility] hospitalization (5/5-6/7/23)):  1) PTA sertraline 150mg PO daily continued upon admission, on 6/1 dose was decreased to 100mg PO daily, and on 6/8 dose decreased to 50mg daily.   2) PTA mirtazapine 22.5mg PO HS continued upon admission, on 6/1 dose was decreased to 15mg PO HS  3) PTA olanzapine AM dose was increased from 2.5mg PO AM to 5mg PO AM on 5/11, bedtime dose continued upon admission (10mg PO HS). AM dose decreased back to 2.5mg PO AM on 6/7  4) Divalproex: Started 5/22 at 250mg PO HS, titrated on 5/31 up to 375mg HS until discontinued 6/7    Sodium (mmol/L) Labs:  5/5/23 - 5/21/23: Ranged from 133-137  6/2: 125  6/3: 131  6/4: 136  6/6: 129  6/7: 129    Assessment/Recommendation:  Patient is prescribed several medications that may be contributing to hyponatremia (sertraline, mirtazapine, olanzapine, and Depakote which was discontinued on 6/7).  Drug-induced hyponatremia typically occurs  within the first 2-3 weeks of drug initiation.  Sertraline, mirtazapine and olanzapine are all PTA medications and patient appears to have tolerated in the past.  Given the timing of hyponatremia and initiation of Depakote on 5/22, it is possible this is contributing to hyponatremia, and if so, sodium levels should improve upon drug discontinuation.     There are case reports of hyponatremia with all second generation antipsychotics and there is no evidence that one antipsychotic is preferred over another.  However, given patient s past tolerability to olanzapine, it would be reasonable to optimize this medication vs. switching to an alternative antipsychotic.  Recommend monitoring BMP every 3 days (or sooner if patient showing signs of hyponatremia) to trend sodium levels.  Recommend one medication change at a time in order to appropriately evaluate effects of each medication.  Recommend internal medicine to follow to rule out any other potential causes.     Abigail Nielsen, Pharm.D., [facility]  Behavioral Health Inpatient Pharmacist  [facility] ([facility])  Phone: *[phone] ([place]) or [phone]

## 2023-06-08 NOTE — PLAN OF CARE
Problem: Anxiety Signs/Symptoms  Goal: Improved Sleep (Anxiety Signs/Symptoms)  Outcome: Not Progressing   Goal Outcome Evaluation:    Patient was asleep at the start of the shift. Woke up @ 0130, looking very anxious and expressed that she feels fearful and scared. Verbalized that she does not want her lights turned off while she is sleeping as she gets scared. She requested for a sleeping medication to help her get back to sleep. Melatonin 3 mg. given @ 0135 PRN for sleep. She was noted to be sleeping @ 0200.    Slept for a total of 7.75 hours.  No signs of depression noted. She is scheduled for IM Consult today.

## 2023-06-08 NOTE — CARE PLAN
06/08/23 1400   General Information   Has Not Attended OT as of: 06/08/23     Pt has not attended scheduled occupational therapy sessions. Encourage attendance and participation.

## 2023-06-09 ENCOUNTER — APPOINTMENT (OUTPATIENT)
Dept: CT IMAGING | Facility: CLINIC | Age: 83
DRG: 885 | End: 2023-06-09
Payer: COMMERCIAL

## 2023-06-09 LAB
ANION GAP SERPL CALCULATED.3IONS-SCNC: 11 MMOL/L (ref 7–15)
BUN SERPL-MCNC: 14.9 MG/DL (ref 8–23)
CALCIUM SERPL-MCNC: 10 MG/DL (ref 8.8–10.2)
CHLORIDE SERPL-SCNC: 95 MMOL/L (ref 98–107)
CREAT SERPL-MCNC: 0.71 MG/DL (ref 0.51–0.95)
DEPRECATED HCO3 PLAS-SCNC: 24 MMOL/L (ref 22–29)
GFR SERPL CREATININE-BSD FRML MDRD: 85 ML/MIN/1.73M2
GLUCOSE SERPL-MCNC: 90 MG/DL (ref 70–99)
POTASSIUM SERPL-SCNC: 4.2 MMOL/L (ref 3.4–5.3)
SODIUM SERPL-SCNC: 130 MMOL/L (ref 136–145)

## 2023-06-09 PROCEDURE — 70450 CT HEAD/BRAIN W/O DYE: CPT

## 2023-06-09 PROCEDURE — 250N000013 HC RX MED GY IP 250 OP 250 PS 637: Performed by: PSYCHIATRY & NEUROLOGY

## 2023-06-09 PROCEDURE — 99232 SBSQ HOSP IP/OBS MODERATE 35: CPT

## 2023-06-09 PROCEDURE — 250N000013 HC RX MED GY IP 250 OP 250 PS 637

## 2023-06-09 PROCEDURE — 70450 CT HEAD/BRAIN W/O DYE: CPT | Mod: 26 | Performed by: RADIOLOGY

## 2023-06-09 PROCEDURE — 250N000011 HC RX IP 250 OP 636: Performed by: PHYSICIAN ASSISTANT

## 2023-06-09 PROCEDURE — 36415 COLL VENOUS BLD VENIPUNCTURE: CPT | Performed by: PHYSICIAN ASSISTANT

## 2023-06-09 PROCEDURE — 80048 BASIC METABOLIC PNL TOTAL CA: CPT | Performed by: PHYSICIAN ASSISTANT

## 2023-06-09 PROCEDURE — 124N000003 HC R&B MH SENIOR/ADOLESCENT

## 2023-06-09 RX ORDER — MIRTAZAPINE 15 MG/1
15 TABLET, ORALLY DISINTEGRATING ORAL AT BEDTIME
Status: DISCONTINUED | OUTPATIENT
Start: 2023-06-09 | End: 2023-07-16 | Stop reason: HOSPADM

## 2023-06-09 RX ORDER — UBIDECARENONE 75 MG
100 CAPSULE ORAL DAILY
Status: DISCONTINUED | OUTPATIENT
Start: 2023-06-09 | End: 2023-06-28

## 2023-06-09 RX ORDER — VITAMIN B COMPLEX
25 TABLET ORAL DAILY
Status: DISCONTINUED | OUTPATIENT
Start: 2023-06-09 | End: 2023-06-28

## 2023-06-09 RX ORDER — CALCIUM CARBONATE 500(1250)
500 TABLET ORAL DAILY
Status: DISCONTINUED | OUTPATIENT
Start: 2023-06-09 | End: 2023-06-28

## 2023-06-09 RX ORDER — FLUOXETINE 20 MG/5ML
5 SOLUTION ORAL DAILY
Status: DISCONTINUED | OUTPATIENT
Start: 2023-06-09 | End: 2023-06-13

## 2023-06-09 RX ORDER — FERROUS SULFATE 325(65) MG
325 TABLET ORAL EVERY OTHER DAY
Status: DISCONTINUED | OUTPATIENT
Start: 2023-06-09 | End: 2023-06-21

## 2023-06-09 RX ADMIN — MIRTAZAPINE 15 MG: 15 TABLET, ORALLY DISINTEGRATING ORAL at 21:17

## 2023-06-09 RX ADMIN — Medication 500 MG: at 08:12

## 2023-06-09 RX ADMIN — ALUMINUM HYDROXIDE, MAGNESIUM HYDROXIDE, AND DIMETHICONE 30 ML: 200; 20; 200 SUSPENSION ORAL at 14:27

## 2023-06-09 RX ADMIN — OLANZAPINE 10 MG: 10 TABLET, ORALLY DISINTEGRATING ORAL at 21:05

## 2023-06-09 RX ADMIN — MELATONIN TAB 3 MG 3 MG: 3 TAB at 21:07

## 2023-06-09 RX ADMIN — FLUOXETINE HYDROCHLORIDE 5 MG: 20 SOLUTION ORAL at 08:13

## 2023-06-09 RX ADMIN — ONDANSETRON HYDROCHLORIDE 4 MG: 4 TABLET, FILM COATED ORAL at 21:07

## 2023-06-09 RX ADMIN — Medication 25 MCG: at 08:05

## 2023-06-09 RX ADMIN — SIMVASTATIN 40 MG: 40 TABLET, FILM COATED ORAL at 21:05

## 2023-06-09 RX ADMIN — ONDANSETRON HYDROCHLORIDE 4 MG: 4 TABLET, FILM COATED ORAL at 08:08

## 2023-06-09 RX ADMIN — Medication 100 MCG: at 08:05

## 2023-06-09 RX ADMIN — FERROUS SULFATE TAB 325 MG (65 MG ELEMENTAL FE) 325 MG: 325 (65 FE) TAB at 08:05

## 2023-06-09 RX ADMIN — FAMOTIDINE 20 MG: 20 TABLET ORAL at 08:05

## 2023-06-09 RX ADMIN — OLANZAPINE 2.5 MG: 5 TABLET, ORALLY DISINTEGRATING ORAL at 08:05

## 2023-06-09 RX ADMIN — Medication 2 TABLET: at 14:25

## 2023-06-09 RX ADMIN — VALACYCLOVIR 500 MG: 500 TABLET, FILM COATED ORAL at 08:05

## 2023-06-09 ASSESSMENT — ACTIVITIES OF DAILY LIVING (ADL)
HYGIENE/GROOMING: WITH ASSISTANCE
ADLS_ACUITY_SCORE: 72
LAUNDRY: UNABLE TO COMPLETE
ADLS_ACUITY_SCORE: 72
DRESS: WITH ASSISTANCE
ADLS_ACUITY_SCORE: 72
ORAL_HYGIENE: PROMPTS
ADLS_ACUITY_SCORE: 72

## 2023-06-09 NOTE — PLAN OF CARE
Goal Outcome Evaluation:      Plan of Care Reviewed With: patient    Outcome Evaluation: Please see 6/9 note for detail. MVI ordered, Ensure BID. Patient to consume % of nutritionally adequate meal trays TID, or the equivalent with supplements/snacks.    Moira Bella, MPH, RD, LD  Pediatric & Adult Behavioral Health Dietitian   Pager: [phone]  Weekend/holiday pager: [phone]

## 2023-06-09 NOTE — PROGRESS NOTES
Brief Medicine Note:    IM following BMP in the setting of hyponatremia, likely SIADH. Depakote stopped 6/8 per psychiatry. Na improved to 130 today (125).   - Will defer psychiatric meds to psychiatry team, but it does appear as if stopping Depakote has helped Na. Would continue to hold if possible   - Continue 2L fluid restriction  - BMP 6/11  - Medicine will peripherally follow BMP on 6/11    Arlene Marcano PA-C  Internal Medicine [facility]  [phone]

## 2023-06-09 NOTE — PLAN OF CARE
Physical Therapy Discharge Summary    Reason for therapy discharge:    Discharged to [facility] psychiatry Station [facility]     Progress towards therapy goal(s). See goals on Care Plan in [facility] electronic health record for goal details.  Goals not met.  Barriers to achieving goals:   discharge from facility.    Therapy recommendation(s):    Will benefit from SBA with functional mobiltiy at discharge. Suspect pt is nearing her baseline with mboility

## 2023-06-09 NOTE — PLAN OF CARE
"  Problem: Anxiety Signs/Symptoms  Goal: Optimized Energy Level (Anxiety Signs/Symptoms)  Outcome: Progressing  Intervention: Optimize Energy Level    Problem: Depressive Signs/Symptoms  Goal: Optimized Energy Level (Depressive Signs/Symptoms)  Outcome: Progressing  Intervention: Optimize Energy Level    Goal Outcome Evaluation:    Plan of Care Reviewed With: patient      Patient is alert and oriented, able to verbalize needs, presented with a flat affect, mood was calm today, endorsed anxiety and depression, denied SI/HI/SIB. Patient continue to endorse fear and make comments of feeling scared due to being at the hospital, contracted for safety, ambulates with a walker steadily, had labs and head CT w/o contrast done today,  Continue to run high BP of 150s, IM and the psychiatry NP notified. Patient reports sleeping good, appetite is fair, got PRN zofran for nausea, she also got PRN maalox and took all the scheduled medications.  Plan is  to continue 2 L fluid restriction and the current plan of care.  Intake 420  Output 800      Blood pressure (!) 159/89, pulse 83, temperature 98  F (36.7  C), temperature source Temporal, resp. rate 18, height 1.626 m (5' 4\"), weight 50.7 kg (111 lb 12.4 oz), SpO2 98 %, not currently breastfeeding.      "

## 2023-06-09 NOTE — PLAN OF CARE
Assessment/Intervention/Current Symptoms and Care Coordination  - chart review  - team meeting  - team rounds/pt interview addressed patient needs/concerns  - Current Symptoms include the following: suicidal    Discharge Plan or Goal  Pending stabilization & development of a safe discharge plan.  Considerations include:  [facility] [place]      Barriers to Discharge   Patient requires further psychiatric stabilization due to current symptomology      Referral Status  None      Legal Status  Patient is under MI commitment in [facility]

## 2023-06-09 NOTE — PLAN OF CARE
Problem: Anxiety Signs/Symptoms  Goal: Improved Mood Symptoms (Anxiety Signs/Symptoms)  Outcome: Progressing   Goal Outcome Evaluation:    Patient was about to sleep when the first rounds was made. She was reminded that she is on a 2000 ml fluid restriction and strict intake and output and she needs to inform staff if she drinks water or pee in the bathroom. Fell asleep @ 0035. She is using a medical bed due to a history of osteoporosis.     Total intake: 120 ml. juice. Total output: 320 ml clear, yellow, straw colored urine.     Slept for a total of 7.25 hours. No depressive behavior noted.     She is scheduled for BMP check and Nutritional Consult today.

## 2023-06-09 NOTE — PROGRESS NOTES
"PSYCHIATRY  PROGRESS NOTE     DATE OF SERVICE   6/9/2023         CHIEF COMPLAINT   \"I lied about everything.\"       SUBJECTIVE   Nursing reports:     Patient spent time resting in bed during evening. Pt able to make needs known. Affect is flat blunted, mood is depressed, was isolative and withdrawn in her room but came out to the lounge for dinner, is social with staff, endorsed anxiety and depression, denied SI/SIB hallucinations and all other mental health symptoms. Patient continue to endorse fear, she said that she is scared being at the hospital and she wants to go home, patient acknowledged speaking to the psychiatry , but said there was nothing important that she told her. Complained of nausea, was given PRN Zofran with effectiveness, ate 50% of her dinner, reported indigestion after dinner and was given PRN Maalox with effectiveness too, pt took all the scheduled medications with PRN melatonin for sleep per request. Pt continues on intake and output monitoring.     Slept for a total of 7.25 hours. No depressive behavior noted.      reports:    Assessment/Intervention/Current Symptoms and Care Coordination  - chart review  - team meeting  - team rounds/pt interview addressed patient needs/concerns  - Current Symptoms include the following: suicidal     Discharge Plan or Goal  Pending stabilization & development of a safe discharge plan.  Considerations include:  [facility] [place]        Barriers to Discharge   Patient requires further psychiatric stabilization due to current symptomology        Referral Status  None        Legal Status  Patient is under MI commitment in [facility]                   OBJECTIVE   Met with patient in conference room on unit 3B with Dr. Ramos. Upon patient interview, patient reports, \" I lied about everything.\" Pt affect is labile and pt thoughts are disorganized. Pt is oriented to person, place, and time. Pt states, \" I lied about everything yesterday are " "you going to kick me out?\" Provider assured patient that she will not be discharged.  Pt then requests to leave the room multiple times. Patient then requests to take a shower and left room. Provider notified nursing to assist patient with taking a shower this morning. Patient utilizes a walker appropriately; patient's gait is slightly stiff however steady. Pt noted to be experiencing some ongoing HTN; IM notified per nursing.     11:50: Patient reapproached providers in the conference room on unit 3B. Patient reports [place]; patient endorses finding out that she was lied to and her mother was actually her aunt.  Patient reports being upset by this and drinking for a year.  Patient also reports being  for 6 years and that the marriage was \"terrible.\"  Patients reports having a gastric bypass surgery and endorses chronic gastric issues including nausea, vomiting, diarrhea, and constipation r/t this procedure. Patient also reports she livies in an assisted living. Patient states, \"They have my money.\"  Provider assured patient regarding her concern about finances and explained that unit  as well as her  can assist patient as needed with any financial concerns. Patient verbalized understanding. Pt reports mood as \"good\" and denies depression or anxiety. Pt does not report SI, intent, or plan. Patient has no other questions or concerns at this time.    Plan will be to continue Prozac 5 mg PO liquid daily, Olanzapine ODT 2.5 mg every morning, Olanzapine 10 mg ODT tablet at bedtime, Mirtazapine 15 mg PO disintegrating tablet at HS, and Olanzapine ODT 5 mg 3 times daily as needed for severe agitation. Consult with dietitian scheduled for today to address pt's nutritional needs. BMP to be collected every 3 days to monitor ongoing hyponatremia; pt also placed on 2 L fluid restriction per internal medicine. Sodium level today is 130 mmol/L which is improved from yesterday. " "Head CT completed this morning to evaluate new episode of psychosis and results indicate no acute intracranial pathology; a benign-appearing lytic lesion in the lateral right parietal bone was identified as most likely an incidental venous lake. Recommend follow-up head CT or brain MRI in 3-6 months.         MEDICATIONS   Medications:  Scheduled Meds:    calcium carbonate  500 mg Oral Daily     cyanocobalamin  100 mcg Oral Daily     famotidine  20 mg Oral Daily     ferrous sulfate  325 mg Oral Every Other Day     FLUoxetine  5 mg Oral Daily     mirtazapine  15 mg Orally disintegrating tablet At Bedtime     OLANZapine zydis  2.5 mg Oral QAM     OLANZapine zydis  10 mg Oral At Bedtime     simvastatin  40 mg Oral At Bedtime     valACYclovir  500 mg Oral Daily     cholecalciferol  25 mcg Oral Daily     Continuous Infusions:  PRN Meds:.acetaminophen, alum & mag hydroxide-simethicone, budesonide-formoterol, calcium carbonate, hydrOXYzine, melatonin, OLANZapine zydis, ondansetron, senna-docusate    Medication adherence issues: MS Med Adherence Y/N: No  Medication side effects: MEDICATION SIDE EFFECTS: no side effects reported  Benefit: Yes / No: Yes       ROS   Pertinent items are noted in HPI.       MENTAL STATUS EXAM   Vitals: BP (!) 159/89 (BP Location: Right arm, Patient Position: Sitting, Cuff Size: Adult Small)   Pulse 83   Temp 98  F (36.7  C) (Temporal)   Resp 18   Ht 1.626 m (5' 4\")   Wt 50.7 kg (111 lb 12.4 oz)   LMP  (LMP Unknown)   SpO2 98%   BMI 19.19 kg/m      Appearance:  Poorly groomed and Disheveled  Mood:  {Mood: Irritable   Affect: flat and guarded  was congruent to speech  Suicidal Ideation: PRESENT / ABSENT: present Patient endorses passive SI.  Able to contract for safety  Homicidal Ideation: PRESENT / ABSENT: absent   Thought process: disorganized   Thought content: significant for preoccupations.   Fund of Knowledge: Average however difficult to determine.  Attention/Concentration: " Poor  Language ability:  Intact  Memory:  Immediate recall impaired and Short-term memory impaired  Insight:  limited.  Judgement: limited  Orientation: Pt not oriented to place, time, or situation  Psychomotor Behavior: normal or unremarkable    Muscle Strength and Tone: MuscleStrength: Normal  Gait and Station: gait is slightly stiff however steady.      LABS   personally reviewed.      Latest Reference Range & Units 06/07/23 08:58 06/08/23 12:38 06/09/23 08:49 06/09/23 10:09   Sodium 136 - 145 mmol/L 129 (L) 125 (L) 130 (L)    Potassium 3.4 - 5.3 mmol/L 3.6 4.1 4.2    Chloride 98 - 107 mmol/L 95 (L) 94 (L) 95 (L)    Carbon Dioxide (CO2) 22 - 29 mmol/L 23 21 (L) 24    Urea Nitrogen 8.0 - 23.0 mg/dL 13.1 15.0 14.9    Creatinine 0.51 - 0.95 mg/dL 0.73 0.72 0.71    GFR Estimate >60 mL/min/1.73m2 82 84 85    Calcium 8.8 - 10.2 mg/dL 9.3 9.4 10.0    Anion Gap 7 - 15 mmol/L 11 10 11    Folate 4.6 - 34.8 ng/mL  19.2     Glucose 70 - 99 mg/dL 174 (H) 172 (H) 90    T4 Free 0.90 - 1.70 ng/dL  1.04     Vitamin B12 232 - 1,245 pg/mL  410     Vitamin D Deficiency screening 20 - 75 ug/L  35     Platelet Count 150 - 450 10e3/uL 233      CT HEAD W/O CONTRAST     Rpt   (L): Data is abnormally low  (H): Data is abnormally high  Rpt: View report in Results Review for more information    Lab Results   Component Value Date    VALPROATE 59.5 06/05/2023          DIAGNOSIS   Principal Problem:    Suicidal ideation  MDD, severe, recurrent episode, with psychotic features  R/O Bipolar Disorder Type 1    Active Problem List:  Patient Active Problem List   Diagnosis     Arthritis     Depressive disorder     Borderline personality disorder (H)     GERD (gastroesophageal reflux disease)     Holosystolic murmur     Asthma     History of gastric bypass     Hyperlipidemia LDL goal <130     Other insomnia     Mild mitral regurgitation     Mild aortic stenosis     Weight loss     Bilateral low back pain without sciatica     Adhesive capsulitis of  shoulder, right     Mild intermittent asthma, unspecified whether complicated     Bipolar affective disorder, remission status unspecified (H)     Constipation, unspecified constipation type     Age-related osteoporosis without current pathological fracture     Plantar warts     Hypoglycemia     Failure to thrive in adult     Hypotension, unspecified hypotension type     Suicidal ideation          PLAN   1. Education given regarding diagnostic and treatment options with risks, benefits and alternatives and adequate verbalization of understanding.  2.  Medications:  Hospital  -Continue Prozac 5 mg PO liquid daily.   -Continue Olanzapine ODT 2.5 mg tablet every morning,   -Continue Olanzapine 10 mg ODT tablet at bedtime  -Continue Mirtazapine 15 mg PO disintegrating tablet at HS.   -Continue Olanzapine ODT 5 mg PO tablet 3 times daily as needed for severe agitation.   3. Consultations:  Hospitalist to follow as needed.  Dietician consulted- multivitamin added.  4. Structure and Supervision  Unit 3B  Precautions placed.  Fall precautions ordered.  5.   is following in regards to collecting and reviewing collateral information, referrals and disposition planning.  Legal: civil commitment.   Referrals:  Per CTC  Care Coordination:  Per CTC  Placement:  TBD  Anticipated Discharge:  TBD    Further treatment programming to be determined throughout the hospital course.        Risk Assessment: [facility] RISK ASSESSMENT: Patient able to contract for safety and Patient on precautions    Coordination of Care:   Treatment Plan reviewed and physician signed, Care discussed with Care/Treatment Team Members, Chart reviewed and Patient seen      Re-Certification I certify that the inpatient psychiatric facility services furnished since the previous certification were, and continue to be, medically necessary for, either, treatment which could reasonably be expected to improve the patient s condition or diagnostic study  and that the hospital records indicate that the services furnished were, either, intensive treatment services, admission and related services necessary for diagnostic study, or equivalent services.     I certify that the patient continues to need, on a daily basis, active treatment furnished directly by or requiring the supervision of inpatient psychiatric facility personnel.   I estimate 7-14 days of hospitalization is necessary for proper treatment of the patient. My plans for post-hospital care for this patient are  TBD     DANIEL Bennett CNP    -     6/9/2023     -     8:09 AM    Total time  35 minutes with > 50%spent on coordination of cares and psycho-education.    This note was created with help of Dragon dictation system. Grammatical / typing errors are not intentional.    DANIEL Bennett CNP

## 2023-06-10 LAB
ALBUMIN SERPL BCG-MCNC: 3.9 G/DL (ref 3.5–5.2)
ALBUMIN UR-MCNC: NEGATIVE MG/DL
ALP SERPL-CCNC: 49 U/L (ref 35–104)
ALT SERPL W P-5'-P-CCNC: 13 U/L (ref 10–35)
ANION GAP SERPL CALCULATED.3IONS-SCNC: 7 MMOL/L (ref 7–15)
APPEARANCE UR: CLEAR
AST SERPL W P-5'-P-CCNC: 19 U/L (ref 10–35)
BASOPHILS # BLD AUTO: 0 10E3/UL (ref 0–0.2)
BASOPHILS NFR BLD AUTO: 0 %
BILIRUB SERPL-MCNC: 0.3 MG/DL
BILIRUB UR QL STRIP: NEGATIVE
BUN SERPL-MCNC: 14.3 MG/DL (ref 8–23)
CALCIUM SERPL-MCNC: 9.8 MG/DL (ref 8.8–10.2)
CHLORIDE SERPL-SCNC: 95 MMOL/L (ref 98–107)
COLOR UR AUTO: NORMAL
CREAT SERPL-MCNC: 0.75 MG/DL (ref 0.51–0.95)
DEPRECATED HCO3 PLAS-SCNC: 27 MMOL/L (ref 22–29)
EOSINOPHIL # BLD AUTO: 0 10E3/UL (ref 0–0.7)
EOSINOPHIL NFR BLD AUTO: 0 %
ERYTHROCYTE [DISTWIDTH] IN BLOOD BY AUTOMATED COUNT: 12.9 % (ref 10–15)
GFR SERPL CREATININE-BSD FRML MDRD: 80 ML/MIN/1.73M2
GLUCOSE SERPL-MCNC: 99 MG/DL (ref 70–99)
GLUCOSE UR STRIP-MCNC: NEGATIVE MG/DL
HCT VFR BLD AUTO: 37.8 % (ref 35–47)
HGB BLD-MCNC: 12.9 G/DL (ref 11.7–15.7)
HGB UR QL STRIP: NEGATIVE
IMM GRANULOCYTES # BLD: 0 10E3/UL
IMM GRANULOCYTES NFR BLD: 0 %
KETONES UR STRIP-MCNC: NEGATIVE MG/DL
LEUKOCYTE ESTERASE UR QL STRIP: NEGATIVE
LYMPHOCYTES # BLD AUTO: 2 10E3/UL (ref 0.8–5.3)
LYMPHOCYTES NFR BLD AUTO: 17 %
MCH RBC QN AUTO: 31.1 PG (ref 26.5–33)
MCHC RBC AUTO-ENTMCNC: 34.1 G/DL (ref 31.5–36.5)
MCV RBC AUTO: 91 FL (ref 78–100)
MONOCYTES # BLD AUTO: 1 10E3/UL (ref 0–1.3)
MONOCYTES NFR BLD AUTO: 8 %
NEUTROPHILS # BLD AUTO: 8.5 10E3/UL (ref 1.6–8.3)
NEUTROPHILS NFR BLD AUTO: 75 %
NITRATE UR QL: NEGATIVE
NRBC # BLD AUTO: 0 10E3/UL
NRBC BLD AUTO-RTO: 0 /100
PH UR STRIP: 6.5 [PH] (ref 5–7)
PLATELET # BLD AUTO: 249 10E3/UL (ref 150–450)
POTASSIUM SERPL-SCNC: 3.9 MMOL/L (ref 3.4–5.3)
PROT SERPL-MCNC: 6.2 G/DL (ref 6.4–8.3)
RBC # BLD AUTO: 4.15 10E6/UL (ref 3.8–5.2)
SODIUM SERPL-SCNC: 129 MMOL/L (ref 136–145)
SP GR UR STRIP: 1.01 (ref 1–1.03)
UROBILINOGEN UR STRIP-MCNC: NORMAL MG/DL
WBC # BLD AUTO: 11.5 10E3/UL (ref 4–11)

## 2023-06-10 PROCEDURE — 36415 COLL VENOUS BLD VENIPUNCTURE: CPT | Performed by: PSYCHIATRY & NEUROLOGY

## 2023-06-10 PROCEDURE — 250N000013 HC RX MED GY IP 250 OP 250 PS 637: Performed by: PSYCHIATRY & NEUROLOGY

## 2023-06-10 PROCEDURE — 250N000013 HC RX MED GY IP 250 OP 250 PS 637

## 2023-06-10 PROCEDURE — 85025 COMPLETE CBC W/AUTO DIFF WBC: CPT | Performed by: PSYCHIATRY & NEUROLOGY

## 2023-06-10 PROCEDURE — 81003 URINALYSIS AUTO W/O SCOPE: CPT | Performed by: PSYCHIATRY & NEUROLOGY

## 2023-06-10 PROCEDURE — 124N000003 HC R&B MH SENIOR/ADOLESCENT

## 2023-06-10 PROCEDURE — 80053 COMPREHEN METABOLIC PANEL: CPT | Performed by: PSYCHIATRY & NEUROLOGY

## 2023-06-10 PROCEDURE — 250N000011 HC RX IP 250 OP 636: Performed by: PHYSICIAN ASSISTANT

## 2023-06-10 RX ORDER — HYDROXYZINE HCL 10 MG/5 ML
25 SOLUTION, ORAL ORAL EVERY 4 HOURS PRN
Status: DISCONTINUED | OUTPATIENT
Start: 2023-06-10 | End: 2023-06-28

## 2023-06-10 RX ADMIN — SIMVASTATIN 40 MG: 40 TABLET, FILM COATED ORAL at 21:25

## 2023-06-10 RX ADMIN — FLUOXETINE HYDROCHLORIDE 5 MG: 20 SOLUTION ORAL at 08:53

## 2023-06-10 RX ADMIN — HYDROXYZINE HYDROCHLORIDE 25 MG: 10 SOLUTION ORAL at 11:28

## 2023-06-10 RX ADMIN — Medication 25 MCG: at 08:51

## 2023-06-10 RX ADMIN — MIRTAZAPINE 15 MG: 15 TABLET, ORALLY DISINTEGRATING ORAL at 21:25

## 2023-06-10 RX ADMIN — ALUMINUM HYDROXIDE, MAGNESIUM HYDROXIDE, AND DIMETHICONE 30 ML: 200; 20; 200 SUSPENSION ORAL at 21:30

## 2023-06-10 RX ADMIN — VALACYCLOVIR 500 MG: 500 TABLET, FILM COATED ORAL at 08:51

## 2023-06-10 RX ADMIN — Medication 100 MCG: at 08:51

## 2023-06-10 RX ADMIN — OLANZAPINE 10 MG: 10 TABLET, ORALLY DISINTEGRATING ORAL at 21:24

## 2023-06-10 RX ADMIN — MELATONIN TAB 3 MG 3 MG: 3 TAB at 21:25

## 2023-06-10 RX ADMIN — Medication 2 TABLET: at 08:53

## 2023-06-10 RX ADMIN — FAMOTIDINE 20 MG: 20 TABLET ORAL at 08:51

## 2023-06-10 RX ADMIN — OLANZAPINE 2.5 MG: 5 TABLET, ORALLY DISINTEGRATING ORAL at 08:51

## 2023-06-10 RX ADMIN — ALUMINUM HYDROXIDE, MAGNESIUM HYDROXIDE, AND DIMETHICONE 30 ML: 200; 20; 200 SUSPENSION ORAL at 02:31

## 2023-06-10 RX ADMIN — Medication 500 MG: at 08:51

## 2023-06-10 RX ADMIN — ONDANSETRON HYDROCHLORIDE 4 MG: 4 TABLET, FILM COATED ORAL at 17:48

## 2023-06-10 ASSESSMENT — ACTIVITIES OF DAILY LIVING (ADL)
ADLS_ACUITY_SCORE: 62
ADLS_ACUITY_SCORE: 62
ADLS_ACUITY_SCORE: 72
ADLS_ACUITY_SCORE: 62
ORAL_HYGIENE: PROMPTS
LAUNDRY: UNABLE TO COMPLETE
HYGIENE/GROOMING: WITH ASSISTANCE
ADLS_ACUITY_SCORE: 72
ADLS_ACUITY_SCORE: 62
DRESS: INDEPENDENT;SCRUBS (BEHAVIORAL HEALTH)
ORAL_HYGIENE: PROMPTS
HYGIENE/GROOMING: WITH ASSISTANCE
ADLS_ACUITY_SCORE: 62
LAUNDRY: UNABLE TO COMPLETE
ADLS_ACUITY_SCORE: 72
DRESS: INDEPENDENT;SCRUBS (BEHAVIORAL HEALTH)
ADLS_ACUITY_SCORE: 72
ADLS_ACUITY_SCORE: 72

## 2023-06-10 NOTE — PLAN OF CARE
Goal Outcome Evaluation:    Problem: Adult Behavioral Health Plan of Care  Goal: Plan of Care Review  Outcome: Progressing     Problem: Anxiety Signs/Symptoms  Goal: Optimized Energy Level (Anxiety Signs/Symptoms)  Outcome: Progressing     Problem: Depressive Signs/Symptoms  Goal: Optimized Energy Level (Depressive Signs/Symptoms)  Outcome: Progressing     Patient was visible on the unit walking back and fort the hallway with her walker. Presented with bright affect when approached, denies SI/SIB/HI. BP this shift was within normal limits 106/71. Pt complained of nausea PRN Zofran given with relief. Pt ate 50% of her dinner. Received PRN Melatonin with her hs medications for sleep per her request. Pt continues on 2L fluid restrictions. Intake this shift 460 ML and output 550 ML

## 2023-06-10 NOTE — PLAN OF CARE
Problem: Anxiety Signs/Symptoms  Goal: Optimized Energy Level (Anxiety Signs/Symptoms)  Outcome: Progressing  Intervention: Optimize Energy Level    Problem: Depressive Signs/Symptoms  Goal: Optimized Energy Level (Depressive Signs/Symptoms)  Outcome: Progressing  Intervention: Optimize Energy Level     Goal Outcome Evaluation:    Plan of Care Reviewed With: patient        Patient affect is flat guarded, mood is calm, thoughts are disorganized, continue to be isolative and withdrawn in her room but visible at the milieu for meals, is alert and oriented, able to make needs known, endorsed  Anxiety, denied depression SI/HI/SIB. No complains of pain, labs were done today Na was 129 and UA was negative, IM paged with no call back, appetite tonight was poor, ate 25%, need encouragement with fluid intake, PRN Zofran and maalox given for nausea and indigestion with relief and took all the scheduled medications  Intake 240  Output 400

## 2023-06-10 NOTE — PLAN OF CARE
"  Problem: Anxiety Signs/Symptoms  Goal: Optimized Energy Level (Anxiety Signs/Symptoms)  Outcome: Progressing     Problem: Depressive Signs/Symptoms  Goal: Optimized Energy Level (Depressive Signs/Symptoms)  Outcome: Progressing   Goal Outcome Evaluation:    Patient is alert and oriented, able to make needs known, was present at the milieu,  Affect is flat gurded, mood is labile with disorganized thoughts. Denied anxiety and depression but patient seems very anxious and confused today, was up and down all morning saying am \"am not feeling good\" vital signs stable, was given PRN hydroxyzine, MD was notified, new orders in place to do CBC , CMP and UA/UC. Uirne sample has been sent down to lab awaiting for results. Patient appetite  Is fair, ate 50% of breakfast and 25% of lunch and was medication compliant  Intake this shift was 460 mL with an output of 650    "

## 2023-06-10 NOTE — PLAN OF CARE
Problem: Anxiety Signs/Symptoms  Goal: Improved Sleep (Anxiety Signs/Symptoms)  Outcome: Not Progressing   Goal Outcome Evaluation:    Patient was awake at the start of the shift. Kept on getting up and down from her bed. Looked anxious and was noted to be peeping from her room from time to time. When asked if she was feeling anxious, she denied anxiety.  Chamomile tea and lavender patch were offered but patient did not respond positively.     Patient complained of abdominal discomfort and nausea. Maalox 30 ml. given @ 0231. HOB kept elevated @ 30 degrees.  No vomiting noted.    She is using a medical bed due to history of osteoporosis.     She is on 2000 ml Fluid Restriction and Strict Intake and Output. Total intake: 0 Total output: 1000 ml. clear, yellow straw colored urine.     Slept for a total of 3.25 hours. No other behavioral issues noted.

## 2023-06-11 ENCOUNTER — APPOINTMENT (OUTPATIENT)
Dept: GENERAL RADIOLOGY | Facility: CLINIC | Age: 83
DRG: 885 | End: 2023-06-11
Attending: PHYSICIAN ASSISTANT
Payer: COMMERCIAL

## 2023-06-11 LAB
LIPASE SERPL-CCNC: 21 U/L (ref 13–60)
VALPROATE FREE MFR SERPL: 10 %
VALPROATE FREE SERPL-MCNC: 9 UG/ML
VALPROATE SERPL-MCNC: 86 UG/ML

## 2023-06-11 PROCEDURE — 74018 RADEX ABDOMEN 1 VIEW: CPT

## 2023-06-11 PROCEDURE — 124N000003 HC R&B MH SENIOR/ADOLESCENT

## 2023-06-11 PROCEDURE — 99232 SBSQ HOSP IP/OBS MODERATE 35: CPT | Performed by: PHYSICIAN ASSISTANT

## 2023-06-11 PROCEDURE — 83690 ASSAY OF LIPASE: CPT | Performed by: PHYSICIAN ASSISTANT

## 2023-06-11 PROCEDURE — 250N000013 HC RX MED GY IP 250 OP 250 PS 637: Performed by: PSYCHIATRY & NEUROLOGY

## 2023-06-11 PROCEDURE — 250N000011 HC RX IP 250 OP 636: Performed by: PHYSICIAN ASSISTANT

## 2023-06-11 PROCEDURE — 258N000003 HC RX IP 258 OP 636: Performed by: PHYSICIAN ASSISTANT

## 2023-06-11 PROCEDURE — 74018 RADEX ABDOMEN 1 VIEW: CPT | Mod: 26 | Performed by: RADIOLOGY

## 2023-06-11 PROCEDURE — 250N000013 HC RX MED GY IP 250 OP 250 PS 637

## 2023-06-11 PROCEDURE — 36415 COLL VENOUS BLD VENIPUNCTURE: CPT | Performed by: PHYSICIAN ASSISTANT

## 2023-06-11 PROCEDURE — 250N000013 HC RX MED GY IP 250 OP 250 PS 637: Performed by: PHYSICIAN ASSISTANT

## 2023-06-11 RX ORDER — PROCHLORPERAZINE MALEATE 5 MG
5 TABLET ORAL EVERY 6 HOURS PRN
Status: DISCONTINUED | OUTPATIENT
Start: 2023-06-11 | End: 2023-07-16 | Stop reason: HOSPADM

## 2023-06-11 RX ADMIN — ALUMINUM HYDROXIDE, MAGNESIUM HYDROXIDE, AND DIMETHICONE 30 ML: 200; 20; 200 SUSPENSION ORAL at 10:55

## 2023-06-11 RX ADMIN — Medication 2 TABLET: at 08:58

## 2023-06-11 RX ADMIN — OLANZAPINE 2.5 MG: 5 TABLET, ORALLY DISINTEGRATING ORAL at 08:58

## 2023-06-11 RX ADMIN — FLUOXETINE HYDROCHLORIDE 5 MG: 20 SOLUTION ORAL at 09:00

## 2023-06-11 RX ADMIN — ONDANSETRON HYDROCHLORIDE 4 MG: 4 TABLET, FILM COATED ORAL at 13:31

## 2023-06-11 RX ADMIN — FERROUS SULFATE TAB 325 MG (65 MG ELEMENTAL FE) 325 MG: 325 (65 FE) TAB at 08:58

## 2023-06-11 RX ADMIN — VALACYCLOVIR 500 MG: 500 TABLET, FILM COATED ORAL at 08:58

## 2023-06-11 RX ADMIN — PROCHLORPERAZINE MALEATE 5 MG: 5 TABLET ORAL at 18:26

## 2023-06-11 RX ADMIN — SIMVASTATIN 40 MG: 40 TABLET, FILM COATED ORAL at 21:48

## 2023-06-11 RX ADMIN — Medication 25 MCG: at 08:58

## 2023-06-11 RX ADMIN — SODIUM CHLORIDE, POTASSIUM CHLORIDE, SODIUM LACTATE AND CALCIUM CHLORIDE 500 ML: 600; 310; 30; 20 INJECTION, SOLUTION INTRAVENOUS at 10:03

## 2023-06-11 RX ADMIN — Medication 100 MCG: at 08:58

## 2023-06-11 RX ADMIN — Medication 500 MG: at 08:58

## 2023-06-11 RX ADMIN — OLANZAPINE 10 MG: 10 TABLET, ORALLY DISINTEGRATING ORAL at 21:48

## 2023-06-11 RX ADMIN — FAMOTIDINE 20 MG: 20 TABLET ORAL at 08:58

## 2023-06-11 RX ADMIN — MIRTAZAPINE 15 MG: 15 TABLET, ORALLY DISINTEGRATING ORAL at 21:49

## 2023-06-11 ASSESSMENT — ACTIVITIES OF DAILY LIVING (ADL)
ADLS_ACUITY_SCORE: 62
HYGIENE/GROOMING: WITH ASSISTANCE
ADLS_ACUITY_SCORE: 62
LAUNDRY: UNABLE TO COMPLETE
ADLS_ACUITY_SCORE: 62
ADLS_ACUITY_SCORE: 72
ADLS_ACUITY_SCORE: 62
ADLS_ACUITY_SCORE: 72
ADLS_ACUITY_SCORE: 72
DRESS: WITH ASSISTANCE
ORAL_HYGIENE: PROMPTS
ADLS_ACUITY_SCORE: 62

## 2023-06-11 NOTE — PLAN OF CARE
Problem: Anxiety Signs/Symptoms  Goal: Optimized Energy Level (Anxiety Signs/Symptoms)  Outcome: Progressing     Problem: Depressive Signs/Symptoms  Goal: Optimized Energy Level (Depressive Signs/Symptoms)  Outcome: Progressing   Goal Outcome Evaluation:    Plan of Care Reviewed With: patient      Patient is bright upon approach, mood is calm and pleasant with disorganized thoughts, is alert oriented with confusions, endorsed anxiety and depression low, denied all other mental health symptoms. Was hypotensive in the morning and got a bolus of 500 ml Lactated Ringer, ordered X-Ray was done too due to nausea and vomiting, medicine paged for results with no call back but did an order for PRN compazine. Refused to eat/drink anything on her try this evening, later requested and was able to drink 180 ml of water,  /58 with a pulse of 96, and the latest 122/63 with a pulse of 99, pt had 3 more episode of watery emesis, bed linens and scrubs changed,  IM notified about the patient condition, new order compazine 5 mg every 6 hrs in place, incase Zofran does not work, first dose given this evening with slight effect. Will continue to monitor and assess patient as needed.   240 mL  Output 300

## 2023-06-11 NOTE — PROGRESS NOTES
Pt was out for breakfast and was polite on approach. Pt did not eat much, only had diced pears and skim milk refusing to eat the rest of her breakfast. Pt was hypotensive, 85/53, P113. Pt stated that she was feeling dizzy. Provider was contacted and a bolus of 500 ml of LR was ordered and administered. At this time, her blood pressure improved, 121//81. Her IV was saline locked, but pt pulled it out while in her room. Pt has been having emesis on and off this shift up to 5 x of phlegmy undigested food. Pt has received Maalox and Zofran to help with nausea and vomiting this shift. Provider informed and a portable abd x-ray was ordered and some labs which have been drawn. Results pending. Pt refused to have any lunch. And appears pleasantly confused with a flat affect. She was calling the IV poll Mr. Gates, she she was asking staff to join her going to red lobster this evening. She denied all other concerns. Will continue to monitor and assess patient as needed.

## 2023-06-11 NOTE — PROGRESS NOTES
"Internal Medicine Progress Note    Assessment and plan:  Medicine primarily following in the setting of chronic hypovolemia. Asked to see patient today due to new hypotension.     Chronic hyponatremia: Lkely SIADH. BMP set to be repeated today but CMP was obtained 6/10 per psychiatry. Na stable at 129 (130) since stopping Depakote which was likely contributory.   - Repeat BMP qM/Th  - Continue fluid restriction     Hypotension: BP slightly lower today: 93/62 sitting --> 79/57 standing. Per d/w nursing, patient symptomatic with dizziness. Appetite poor this am but patient was able to eat an applesauce and yogurt in the day room while patient was being seen by writer. Etiology hypovolemia vs 2/2 psychiatric meds  - 500 ml bolus (despite fluid restriction as patient not meeting 2L fluid restriction). Continue to encourage oral intake  - Please repeat BP now and page medicine with the result  - Medicine will continue to follow BP    Tachycardia: HR up to 113 with am vitals. Normal on exam. Monitor, contact medicine if HR>120    Medicine will continue to follow    Objective:  BP (!) 85/53 (BP Location: Right leg, Patient Position: Sitting, Cuff Size: Adult Small)   Pulse 113   Temp 99  F (37.2  C) (Oral)   Resp 18   Ht 1.626 m (5' 4\")   Wt 48.9 kg (107 lb 12.9 oz)   LMP  (LMP Unknown)   SpO2 97%   BMI 18.50 kg/m      Vitals signs reviewed and noted    GENERAL: Alert and oriented x3, sitting in the day room. IVF running. Eating Applesauce and yogurt   HEENT: Anicteric sclera. MMM  CV: RRR, S1, S2. No murmur noted  RESPIRATORY: Effort normal on room air. Lungs CTAB with no wheezing or rales  GI: Abdomen soft, non-tender with active bowel sounds. No rebound or guarding  NEUROLOGICAL: No focal deficits. Moves all extremities  EXTREMITIES: No peripheral edema. Intact bilateral pedal pulses  SKIN: No jaundice, no rash    Pertinent labs and procedures were reviewed     Subjective:   Patient reports poor appetite this " morning. She is willing to try eating some applesauce and yogurt. She denies upset stomach. No N/V. No fever or chills. Some dizziness this morning but was able to walk on the unit without issues or falls     Arlene Marcano PA-C  Internal Medicine  [phone]

## 2023-06-11 NOTE — PLAN OF CARE
Problem: Anxiety Signs/Symptoms  Goal: Improved Sleep (Anxiety Signs/Symptoms)  Outcome: Progressing   Goal Outcome Evaluation:    Patient was awake at the start of the shift and went to sleep @ 0045 .She is using a medical bed due to history of osteoporosis.     Woke up @ 0330,came out to the lounge and asked the staff if everyone was drunk. She was reoriented to date, time, and place and was redirected back to her room. Encouraged to go back to bed and sleep.     Total Intake: 0. Total Output: 200 ml clear, yellow/straw colored urine.     Slept for a total of 6.5 hours. No behavioral issues noted.

## 2023-06-12 LAB
ANION GAP SERPL CALCULATED.3IONS-SCNC: 12 MMOL/L (ref 7–15)
BUN SERPL-MCNC: 26.3 MG/DL (ref 8–23)
CALCIUM SERPL-MCNC: 10 MG/DL (ref 8.8–10.2)
CHLORIDE SERPL-SCNC: 93 MMOL/L (ref 98–107)
CREAT SERPL-MCNC: 0.95 MG/DL (ref 0.51–0.95)
DEPRECATED HCO3 PLAS-SCNC: 24 MMOL/L (ref 22–29)
GFR SERPL CREATININE-BSD FRML MDRD: 60 ML/MIN/1.73M2
GLUCOSE SERPL-MCNC: 201 MG/DL (ref 70–99)
POTASSIUM SERPL-SCNC: 4 MMOL/L (ref 3.4–5.3)
SARS-COV-2 RNA RESP QL NAA+PROBE: NEGATIVE
SODIUM SERPL-SCNC: 129 MMOL/L (ref 136–145)

## 2023-06-12 PROCEDURE — 36415 COLL VENOUS BLD VENIPUNCTURE: CPT | Performed by: PHYSICIAN ASSISTANT

## 2023-06-12 PROCEDURE — 99232 SBSQ HOSP IP/OBS MODERATE 35: CPT

## 2023-06-12 PROCEDURE — 250N000013 HC RX MED GY IP 250 OP 250 PS 637

## 2023-06-12 PROCEDURE — 250N000011 HC RX IP 250 OP 636: Performed by: PHYSICIAN ASSISTANT

## 2023-06-12 PROCEDURE — 250N000013 HC RX MED GY IP 250 OP 250 PS 637: Performed by: PHYSICIAN ASSISTANT

## 2023-06-12 PROCEDURE — 124N000003 HC R&B MH SENIOR/ADOLESCENT

## 2023-06-12 PROCEDURE — 250N000013 HC RX MED GY IP 250 OP 250 PS 637: Performed by: PSYCHIATRY & NEUROLOGY

## 2023-06-12 PROCEDURE — 82310 ASSAY OF CALCIUM: CPT | Performed by: PHYSICIAN ASSISTANT

## 2023-06-12 PROCEDURE — 87635 SARS-COV-2 COVID-19 AMP PRB: CPT | Performed by: PSYCHIATRY & NEUROLOGY

## 2023-06-12 RX ORDER — MEMANTINE HYDROCHLORIDE 5 MG/1
5 TABLET ORAL DAILY
Status: DISCONTINUED | OUTPATIENT
Start: 2023-06-12 | End: 2023-06-12

## 2023-06-12 RX ORDER — MEMANTINE HYDROCHLORIDE 5 MG/1
2.5 TABLET ORAL 2 TIMES DAILY
Status: DISCONTINUED | OUTPATIENT
Start: 2023-06-12 | End: 2023-06-20

## 2023-06-12 RX ORDER — MEMANTINE HYDROCHLORIDE 5 MG/1
5 TABLET ORAL 2 TIMES DAILY
Status: DISCONTINUED | OUTPATIENT
Start: 2023-06-12 | End: 2023-06-12 | Stop reason: DRUGHIGH

## 2023-06-12 RX ADMIN — MELATONIN TAB 3 MG 3 MG: 3 TAB at 23:57

## 2023-06-12 RX ADMIN — SIMVASTATIN 40 MG: 40 TABLET, FILM COATED ORAL at 21:16

## 2023-06-12 RX ADMIN — ONDANSETRON HYDROCHLORIDE 4 MG: 4 TABLET, FILM COATED ORAL at 10:04

## 2023-06-12 RX ADMIN — Medication 2.5 MG: at 21:39

## 2023-06-12 RX ADMIN — MIRTAZAPINE 15 MG: 15 TABLET, ORALLY DISINTEGRATING ORAL at 21:16

## 2023-06-12 RX ADMIN — VALACYCLOVIR 500 MG: 500 TABLET, FILM COATED ORAL at 09:03

## 2023-06-12 RX ADMIN — FAMOTIDINE 20 MG: 20 TABLET ORAL at 09:04

## 2023-06-12 RX ADMIN — Medication 2 TABLET: at 09:03

## 2023-06-12 RX ADMIN — Medication 25 MCG: at 09:04

## 2023-06-12 RX ADMIN — FLUOXETINE HYDROCHLORIDE 5 MG: 20 SOLUTION ORAL at 09:06

## 2023-06-12 RX ADMIN — OLANZAPINE 2.5 MG: 5 TABLET, ORALLY DISINTEGRATING ORAL at 09:04

## 2023-06-12 RX ADMIN — ONDANSETRON HYDROCHLORIDE 4 MG: 4 TABLET, FILM COATED ORAL at 23:51

## 2023-06-12 RX ADMIN — Medication 100 MCG: at 09:03

## 2023-06-12 RX ADMIN — OLANZAPINE 10 MG: 10 TABLET, ORALLY DISINTEGRATING ORAL at 21:15

## 2023-06-12 RX ADMIN — Medication 500 MG: at 09:03

## 2023-06-12 RX ADMIN — PROCHLORPERAZINE MALEATE 5 MG: 5 TABLET ORAL at 11:27

## 2023-06-12 ASSESSMENT — ACTIVITIES OF DAILY LIVING (ADL)
ADLS_ACUITY_SCORE: 72
LAUNDRY: UNABLE TO COMPLETE
ORAL_HYGIENE: PROMPTS
DRESS: WITH ASSISTANCE
ADLS_ACUITY_SCORE: 72
HYGIENE/GROOMING: WITH ASSISTANCE
ADLS_ACUITY_SCORE: 72
ADLS_ACUITY_SCORE: 72

## 2023-06-12 NOTE — PLAN OF CARE
Problem: Anxiety Signs/Symptoms  Goal: Improved Sleep (Anxiety Signs/Symptoms)  Outcome: Progressing   Goal Outcome Evaluation:    Patient was awake at the start of the shift and slept @ 12mn. She is using a medical bed due to a history of osteoporosis.     Patient woke up again @ 0200 and was heard coughing. Encouraged to drink some water and she drank about 100 ml. Vital signs taken: BP-90/60, P-111, R-16, O2 sat-94% @ RA. Voided 150 ml, alberto colored urine.     She is on Fluid Restriction 2000 ml and on Strict I &O. Total Intake: 250 ml. water Total Output: 150 ml clear, yellow, straw colored urine.     Patient is confused and kept on talking  about her son. She was talking of not being happy here, not belonging here and asking how she can leave the unit.     Slept for a total of 7.5 hours. No depressive behavior noted. She is scheduled for a BMP check today.

## 2023-06-12 NOTE — PLAN OF CARE
"  Problem: Adult Behavioral Health Plan of Care  Goal: Absence of New-Onset Illness or Injury  Outcome: Not Progressing     Problem: Anxiety Signs/Symptoms  Goal: Optimized Energy Level (Anxiety Signs/Symptoms)  Outcome: Not Progressing     Problem: Depressive Signs/Symptoms  Goal: Optimized Energy Level (Depressive Signs/Symptoms)  Outcome: Not Progressing   Goal Outcome Evaluation:    Pt came out for breakfast and ate 100% of her marsh and 120 ml of orange juice. Pt denied nausea. Pt returned to her room quickly after eating. Pt's affect is flat with inconsistent eye contact. Pt reports that she feels \"very depressed\". Pt does appear anxious. Pt is unable to identify this.     1000- On check in patient noted to have had a small emesis. Pt shoulder was wet and she had clear mucus/with food bites on the floor next to her bed. Pt reported nausea at that time. Pt was offered/accepted prn zofran. Pt also stated that she would try and drink some ginger ale. Diet order modified to include ginger ale with meals.   Pt was assisted with changing her scrubs and bed linen.    When asked patient does report feeling constipated. Pt will be offered senna.    1045- Pt was approached with prn senna. Pt reported no nausea on approach but before writer could administer tablet she had a small emesis with marsh pieces in it. Evangelina held.    1110- Ranjana/Medical PA web paged to return call regarding emesis.     1127- Prn compazine administered due to continued nausea and emesis.       Pt drank most of a ginger ale and an ice cream at lunch and asked for her tray to be removed.     After lunch writer was informed that there was water all over patient's floor. Writer approached patient and asked patient what had happened. Pt reported that it was water on her floor and that she had put it there. When asked why she had did this she stated that she wanted to \"electocute\" herself and then fall.   Pt also noted to have an emesis on scrubs. Pt " declined to change her scrubs at that time. .  Dr Ramos informed and SIO was started.     Writer approached patient around 1445. Writer was informed bu SIO staff that patient was throwing up but swallowing it. Pt told writer that she didn't want to talk to writer. Pt's affect is flat with appropriate eye contact.   Pt was offered assistance with changing her scrubs and she declined.   Pt was offered Sea bands and she declined.

## 2023-06-12 NOTE — PLAN OF CARE
Assessment/Intervention/Current Symptoms and Care Coordination  - chart review  - team meeting  - team rounds/pt interview addressed patient needs/concerns  - Current Symptoms include the following: suicidal  - Confirmed with Josefina from [facility] that pt is able to return home when stable as she has lived with them for 5 years.      Discharge Plan or Goal  Pending stabilization & development of a safe discharge plan.  Considerations include:  [facility]        Barriers to Discharge   Patient requires further psychiatric stabilization due to current symptomology        Referral Status  None        Legal Status  Patient is under MI commitment in [facility]

## 2023-06-13 LAB
ANION GAP SERPL CALCULATED.3IONS-SCNC: 11 MMOL/L (ref 7–15)
BASOPHILS # BLD AUTO: 0 10E3/UL (ref 0–0.2)
BASOPHILS NFR BLD AUTO: 0 %
BUN SERPL-MCNC: 16 MG/DL (ref 8–23)
CALCIUM SERPL-MCNC: 9.7 MG/DL (ref 8.8–10.2)
CHLORIDE SERPL-SCNC: 96 MMOL/L (ref 98–107)
CREAT SERPL-MCNC: 0.73 MG/DL (ref 0.51–0.95)
DEPRECATED HCO3 PLAS-SCNC: 26 MMOL/L (ref 22–29)
EOSINOPHIL # BLD AUTO: 0 10E3/UL (ref 0–0.7)
EOSINOPHIL NFR BLD AUTO: 0 %
ERYTHROCYTE [DISTWIDTH] IN BLOOD BY AUTOMATED COUNT: 13 % (ref 10–15)
GFR SERPL CREATININE-BSD FRML MDRD: 82 ML/MIN/1.73M2
GLUCOSE SERPL-MCNC: 102 MG/DL (ref 70–99)
HCT VFR BLD AUTO: 37.7 % (ref 35–47)
HGB BLD-MCNC: 12.6 G/DL (ref 11.7–15.7)
IMM GRANULOCYTES # BLD: 0 10E3/UL
IMM GRANULOCYTES NFR BLD: 0 %
LYMPHOCYTES # BLD AUTO: 1.4 10E3/UL (ref 0.8–5.3)
LYMPHOCYTES NFR BLD AUTO: 13 %
MCH RBC QN AUTO: 30.7 PG (ref 26.5–33)
MCHC RBC AUTO-ENTMCNC: 33.4 G/DL (ref 31.5–36.5)
MCV RBC AUTO: 92 FL (ref 78–100)
MONOCYTES # BLD AUTO: 0.8 10E3/UL (ref 0–1.3)
MONOCYTES NFR BLD AUTO: 8 %
NEUTROPHILS # BLD AUTO: 8.2 10E3/UL (ref 1.6–8.3)
NEUTROPHILS NFR BLD AUTO: 79 %
NRBC # BLD AUTO: 0 10E3/UL
NRBC BLD AUTO-RTO: 0 /100
PLATELET # BLD AUTO: 262 10E3/UL (ref 150–450)
POTASSIUM SERPL-SCNC: 3.8 MMOL/L (ref 3.4–5.3)
RBC # BLD AUTO: 4.11 10E6/UL (ref 3.8–5.2)
SODIUM SERPL-SCNC: 133 MMOL/L (ref 136–145)
WBC # BLD AUTO: 10.4 10E3/UL (ref 4–11)

## 2023-06-13 PROCEDURE — G0177 OPPS/PHP; TRAIN & EDUC SERV: HCPCS

## 2023-06-13 PROCEDURE — 250N000013 HC RX MED GY IP 250 OP 250 PS 637

## 2023-06-13 PROCEDURE — 99232 SBSQ HOSP IP/OBS MODERATE 35: CPT

## 2023-06-13 PROCEDURE — 85025 COMPLETE CBC W/AUTO DIFF WBC: CPT

## 2023-06-13 PROCEDURE — 250N000013 HC RX MED GY IP 250 OP 250 PS 637: Performed by: PSYCHIATRY & NEUROLOGY

## 2023-06-13 PROCEDURE — 36415 COLL VENOUS BLD VENIPUNCTURE: CPT

## 2023-06-13 PROCEDURE — 250N000011 HC RX IP 250 OP 636: Performed by: PHYSICIAN ASSISTANT

## 2023-06-13 PROCEDURE — 124N000003 HC R&B MH SENIOR/ADOLESCENT

## 2023-06-13 PROCEDURE — 80048 BASIC METABOLIC PNL TOTAL CA: CPT

## 2023-06-13 RX ORDER — FLUOXETINE 20 MG/5ML
10 SOLUTION ORAL DAILY
Status: DISCONTINUED | OUTPATIENT
Start: 2023-06-14 | End: 2023-06-19

## 2023-06-13 RX ADMIN — Medication 500 MG: at 08:51

## 2023-06-13 RX ADMIN — VALACYCLOVIR 500 MG: 500 TABLET, FILM COATED ORAL at 08:51

## 2023-06-13 RX ADMIN — MIRTAZAPINE 15 MG: 15 TABLET, ORALLY DISINTEGRATING ORAL at 21:11

## 2023-06-13 RX ADMIN — Medication 25 MCG: at 08:51

## 2023-06-13 RX ADMIN — FERROUS SULFATE TAB 325 MG (65 MG ELEMENTAL FE) 325 MG: 325 (65 FE) TAB at 08:51

## 2023-06-13 RX ADMIN — SIMVASTATIN 40 MG: 40 TABLET, FILM COATED ORAL at 21:11

## 2023-06-13 RX ADMIN — MELATONIN TAB 3 MG 3 MG: 3 TAB at 21:11

## 2023-06-13 RX ADMIN — FAMOTIDINE 20 MG: 20 TABLET ORAL at 08:51

## 2023-06-13 RX ADMIN — Medication 2.5 MG: at 21:11

## 2023-06-13 RX ADMIN — FLUOXETINE HYDROCHLORIDE 5 MG: 20 SOLUTION ORAL at 08:53

## 2023-06-13 RX ADMIN — HYDROXYZINE HYDROCHLORIDE 25 MG: 10 SOLUTION ORAL at 03:42

## 2023-06-13 RX ADMIN — Medication 100 MCG: at 08:51

## 2023-06-13 RX ADMIN — Medication 2 TABLET: at 08:51

## 2023-06-13 RX ADMIN — Medication 2.5 MG: at 08:52

## 2023-06-13 RX ADMIN — ONDANSETRON HYDROCHLORIDE 4 MG: 4 TABLET, FILM COATED ORAL at 12:12

## 2023-06-13 RX ADMIN — OLANZAPINE 10 MG: 10 TABLET, ORALLY DISINTEGRATING ORAL at 21:10

## 2023-06-13 RX ADMIN — OLANZAPINE 2.5 MG: 5 TABLET, ORALLY DISINTEGRATING ORAL at 08:51

## 2023-06-13 RX ADMIN — ONDANSETRON HYDROCHLORIDE 4 MG: 4 TABLET, FILM COATED ORAL at 17:25

## 2023-06-13 ASSESSMENT — ACTIVITIES OF DAILY LIVING (ADL)
ADLS_ACUITY_SCORE: 72
ADLS_ACUITY_SCORE: 76
ORAL_HYGIENE: PROMPTS
DRESS: WITH ASSISTANCE
ADLS_ACUITY_SCORE: 76
ADLS_ACUITY_SCORE: 72
HYGIENE/GROOMING: WITH ASSISTANCE
ADLS_ACUITY_SCORE: 72
ADLS_ACUITY_SCORE: 72
DRESS: WITH ASSISTANCE
ORAL_HYGIENE: PROMPTS
HYGIENE/GROOMING: WITH ASSISTANCE
ADLS_ACUITY_SCORE: 72
LAUNDRY: UNABLE TO COMPLETE
LAUNDRY: UNABLE TO COMPLETE
ADLS_ACUITY_SCORE: 76
ADLS_ACUITY_SCORE: 72
ADLS_ACUITY_SCORE: 76
ADLS_ACUITY_SCORE: 72
ADLS_ACUITY_SCORE: 72

## 2023-06-13 NOTE — CARE PLAN
"   06/13/23 1218   Group Therapy Session   Group Attendance attended group session   Time Session Began 1020   Time Session Ended 1115   Total Time (minutes) 55   Total # Attendees 8   Group Type expressive therapy;task skill;psychotherapeutic   Group Topic Covered coping skills/lifestyle management;problem-solving;cognitive activities;structured socialization;balanced lifestyle   Group Session Detail [facility] group to facilitate coping skill exploration, use of cognitive skills and problem solving, creative expression, clinical observation and facilitation of social, cognitive, and kinesthetic performance skills.    Patient Response/Contribution cooperative with task;listened actively   Patient Participation Detail Attended for the first session since admission. See other OT note by this author for OT goals chosen by pt and plan for OT. She was pleasant, spit into her \"basin\" 2X during the group. Otherwise appeared focused and willing to fill out the OT form and start a task she seemed to follow the lead of peers in choices. She asked for assistance and needed cues on follow through with 1 step directions. Affect appeared bright. She used the Pocket Talker to amplify volume in the room which she stated to be helpful. Asked others to repeat information when seeming to have difficulty hearing.       "
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
## 2023-06-13 NOTE — CARE PLAN
06/13/23 1522   Group Therapy Session   Group Attendance attended group session   Time Session Began 1300   Time Session Ended 1400   Total Time (minutes) 50   Total # Attendees 7   Group Type psychoeducation;psychotherapeutic   Group Topic Covered coping skills/lifestyle management;relapse prevention;cognitive therapy techniques;structured socialization;balanced lifestyle   Group Session Detail  Activity focused on Stress management, identifying areas on one's life that are balanced and areas of strength and affirmation to help channel stress.    Patient Response/Contribution cooperative with task;listened actively;discussed personal experience with topic;expressed understanding of topic   Patient Participation Detail Left group 2X when spitting up seemingly thick phloem. Left with staff to change shirts with her apologizing.  Asked to use the Pocket Talker again and stated it helpful. Seems interested in being actively involved and comfortable speaking up. Pleasant with others. Elaborated on comments asking if she could add something. Several times her answers were concrete and tangential to discussion.

## 2023-06-13 NOTE — CARE PLAN
"Occupational Therapy     06/13/23 1400   Group Therapy Session   Group Attendance attended group session   Time Session Began 1115   Time Session Ended 1215   Total Time (minutes) 60   Total # Attendees 5-6   Group Type recreation   Group Topic Covered balanced lifestyle;cognitive activities;coping skills/lifestyle management;leisure exploration/use of leisure time;structured socialization   Group Session Detail OT: Education on 8 Dimensions of Wellness with interactive social activities (Exercise & Name 5) to increase concentration, focus, attention to task/detail, memory recall, coping with stress, health distraction engagement, social wellness, physical wellness, and intellectual wellness   Patient Response/Contribution cooperative with task;confused;listened actively;offered helpful suggestions to peers;other (see comments)  (engaged)   Patient Participation Detail Pt reported during check-in she \"gets good sleep\" to support her physical and intellectual wellness. Pt sat among peers to complete presented activity and engaged in brief social interactions with peers. Pt engaged in all physical movement activities and answered all trivia questions; pt able to identify several unique responses but intermittently needed reminders of answers that have been previously given. Pt verbalized understanding of importance of physical wellness and intellectual wellness in a healthy lifestyle.        "

## 2023-06-13 NOTE — PLAN OF CARE
CTC received a call from [facility]  for pt.  want the CTC to have her information.     CTC wrote the information on the sticky note.     [facility] [place]  - [place] [phone]

## 2023-06-13 NOTE — PLAN OF CARE
"Problem: Adult Behavioral Health Plan of Care  Goal: Plan of Care Review  Outcome: Not Progressing  Patient Agreement with Plan of Care: unable to participate   Goal Outcome Evaluation:  Plan of Care Reviewed With: patient      Patient stayed in her room most of the shift. Alert and oriented x3. VSS. Pt denied pain and discomfort. Pt is isolative and withdrawn. Pt did not attend groups. Pt ambulates with a walker. Pt came out to eat dinner. Pt ate 10% of her dinner. Pt vomited right after dinner. Pt reported that she has hard anthony keeping anything she eats/drinks down. Pt was asking if she could reverse her gastric bypass by surgery. PRN Zofran given. Staff observed pt putting two fingers in her mouth to induce vomiting many times. When writer asked the reason, pt said vomiting makes her feel better. Pt had oral fluid intake 300 ml; pt voided 200 ml and couple unmeasured times. Pt also drinks water from the bathroom sink. Pt denied anxiety and depression. Pt denied other mental health symptoms. Patient described mood as \"normal\" and affect was flat. Patient is cooperative with cares. Patient is med-compliant, and reports \"sleeps through the night\". Patient evaluation continues.     Pt has medical bed due to hx of osteoperosis.  Pt is not susannah for safety; continue with SIO 5 ft due to SI and SIB.    VS reviewed: /75 (BP Location: Right arm, Patient Position: Supine, Cuff Size: Adult Small)   Pulse 103   Temp 99.8  F (37.7  C) (Temporal)   Resp 16   Ht 1.626 m (5' 4\")   Wt 48.9 kg (107 lb 12.9 oz)   LMP  (LMP Unknown)   SpO2 95%   BMI 18.50 kg/m   .       Length of stay: 6  "

## 2023-06-13 NOTE — PROGRESS NOTES
"PSYCHIATRY  PROGRESS NOTE     DATE OF SERVICE   6/12/2023         CHIEF COMPLAINT   \"I feel really bad.\"       SUBJECTIVE   Nursing reports:     6/11/23: Pt pleasant with disorganized thoughts, is alert oriented with confusions, endorsed anxiety and depression low, denied all other mental health symptoms. Was hypotensive in the morning and got a bolus of 500 ml Lactated Ringer, ordered X-Ray was done too due to nausea and vomiting, medicine paged for results with no call back but did an order for PRN compazine. Refused to eat/drink anything on her try this evening, later requested and was able to drink 180 ml of water,  /58 with a pulse of 96, and the latest 122/63 with a pulse of 99, pt had 3 more episode of watery emesis, bed linens and scrubs changed,  IM notified about the patient condition, new order compazine 5 mg every 6 hrs in place, incase Zofran does not work, first dose given this evening with slight effect. Will continue to monitor and assess patient as needed.   240 mL  Output 300    Overnight: Patient was awake at the start of the shift and slept @ 12mn. She is using a medical bed due to a history of osteoporosis.      Patient woke up again @ 0200 and was heard coughing. Encouraged to drink some water and she drank about 100 ml. Vital signs taken: BP-90/60, P-111, R-16, O2 sat-94% @ RA. Voided 150 ml, alberto colored urine.      She is on Fluid Restriction 2000 ml and on Strict I &O. Total Intake: 250 ml. water Total Output: 150 ml clear, yellow, straw colored urine.      Patient is confused and kept on talking  about her son. She was talking of not being happy here, not belonging here and asking how she can leave the unit.      Slept for a total of 7.5 hours. No depressive behavior noted. She is scheduled for a BMP check today.       reports:    Assessment/Intervention/Current Symptoms and Care Coordination  - chart review  - team meeting  - team rounds/pt interview addressed " "patient needs/concerns  - Current Symptoms include the following: suicidal  - Confirmed with Josefina from [facility] that pt is able to return home when stable as she has lived with them for 5 years.      Discharge Plan or Goal  Pending stabilization & development of a safe discharge plan.  Considerations include:  [facility]        Barriers to Discharge   Patient requires further psychiatric stabilization due to current symptomology        Referral Status  None        Legal Status  Patient is under MI commitment in [facility]           OBJECTIVE   Met with pt in her room on unit 3B. Upon patient interview, patient sitting on edge of her hospital bed and reports, \" I feel really bad.\" Pt affect appears flat and pt mood is depressed. Pt does also endorse passive SI and reports that she would like to shoot herself with a gun however able to contract for safety and reports she will not hurt herself as long as she is in the hospital. Pt reports ongoing severe nausea and vomiting which are causing her notable discomfort. Pt observed to have emesis on her scrub top and provider offered for pt to have nursing staff assist her with taking a shower. Pt does not verbally agree to this at this time, however provider updated nursing staff  to assist pt with cleaning up after having an emesis. Pt reports this is normal for her due to gastric bypass surgery; provider did order a COVID test to rule out which results are negative. Nursing staff provided PRN medication to treat patients nausea and notified IM of emesis.    Over the weekend, pt had a UA ordered which was negative. Pt also had an abdominal xray which showed a nonobstructive bowel gas pattern with large colonic stool burden. IM also examined pt on 6/11/23 to address hypotension in the setting of chronic hypovolemia which was treated with IVF. Today BP is WNL. Pt continues to present as confused and does not recognize this provider. Thoughts are " "disorganized and difficult to follow. Plan to treat dementia symptoms by initiating Namenda 2.5 mg PO BID. Plan for now is to continue other medications with no further changes today. Plan will be to recheck labs BMP and CBC tomorrow to monitor ongoing hyponatremia and for signs of infection.     1445: SIO monitoring initiated due to pt reporting active SI with plan to nursing staff.       MEDICATIONS   Medications:  Scheduled Meds:    calcium carbonate  500 mg Oral Daily     childrens multivitamin with iron  2 tablet Oral Daily     cyanocobalamin  100 mcg Oral Daily     famotidine  20 mg Oral Daily     ferrous sulfate  325 mg Oral Every Other Day     FLUoxetine  5 mg Oral Daily     memantine  2.5 mg Oral BID     mirtazapine  15 mg Orally disintegrating tablet At Bedtime     OLANZapine zydis  2.5 mg Oral QAM     OLANZapine zydis  10 mg Oral At Bedtime     simvastatin  40 mg Oral At Bedtime     valACYclovir  500 mg Oral Daily     cholecalciferol  25 mcg Oral Daily     Continuous Infusions:  PRN Meds:.acetaminophen, alum & mag hydroxide-simethicone, budesonide-formoterol, calcium carbonate, hydrOXYzine, melatonin, OLANZapine zydis, ondansetron, prochlorperazine, senna-docusate    Medication adherence issues: MS Med Adherence Y/N: No  Medication side effects: MEDICATION SIDE EFFECTS: no side effects reported, however pt experiencing ongoing nausea/vomiting and hyponatremia   Benefit: Yes / No: Yes       ROS   Pertinent items are noted in HPI.       MENTAL STATUS EXAM   Vitals: BP (!) 144/79 (BP Location: Right arm, Patient Position: Supine, Cuff Size: Adult Small)   Pulse 95   Temp 99.2  F (37.3  C) (Oral)   Resp 16   Ht 1.626 m (5' 4\")   Wt 48.9 kg (107 lb 12.9 oz)   LMP  (LMP Unknown)   SpO2 96%   BMI 18.50 kg/m      Appearance:  Poorly groomed and Disheveled  Mood:  {Mood: Depressed   Affect: flat  was congruent to speech  Suicidal Ideation: PRESENT / ABSENT: present pt endorses passive SI however able to " contract for safety.  Homicidal Ideation: PRESENT / ABSENT: absent   Thought process: difficult to follow and disorganized   Thought content: endorses suicidal ideation without plan; without intent [details in Interim History].   Fund of Knowledge: average  Attention/Concentration: Poor  Language ability:  Intact  Memory:  Immediate recall impaired, Short-term memory impaired and Long-term memory impaired  Insight:  limited.  Judgement: limited  Orientation: Person:  yes  Place:  yes aware she is in a hospital  Time:  pt reports correct year and day.   Psychomotor Behavior: normal or unremarkable    Muscle Strength and Tone: MuscleStrength: Normal  Gait and Station: slightly stiff however steady with walker       LABS   personally reviewed.       Latest Reference Range & Units 06/10/23 13:09 06/10/23 14:42 06/11/23 14:11 06/11/23 14:21 06/12/23 08:00 06/12/23 12:07   Sodium 136 - 145 mmol/L 129 (L)    129 (L)    Potassium 3.4 - 5.3 mmol/L 3.9    4.0    Chloride 98 - 107 mmol/L 95 (L)    93 (L)    Carbon Dioxide (CO2) 22 - 29 mmol/L 27    24    Urea Nitrogen 8.0 - 23.0 mg/dL 14.3    26.3 (H)    Creatinine 0.51 - 0.95 mg/dL 0.75    0.95    GFR Estimate >60 mL/min/1.73m2 80    60 (L)    Calcium 8.8 - 10.2 mg/dL 9.8    10.0    Anion Gap 7 - 15 mmol/L 7    12    Albumin 3.5 - 5.2 g/dL 3.9        Protein Total 6.4 - 8.3 g/dL 6.2 (L)        Alkaline Phosphatase 35 - 104 U/L 49        ALT 10 - 35 U/L 13        AST 10 - 35 U/L 19        Bilirubin Total <=1.2 mg/dL 0.3        Glucose 70 - 99 mg/dL 99    201 (H)    Lipase 13 - 60 U/L    21     WBC 4.0 - 11.0 10e3/uL 11.5 (H)        Hemoglobin 11.7 - 15.7 g/dL 12.9        Hematocrit 35.0 - 47.0 % 37.8        Platelet Count 150 - 450 10e3/uL 249        RBC Count 3.80 - 5.20 10e6/uL 4.15        MCV 78 - 100 fL 91        MCH 26.5 - 33.0 pg 31.1        MCHC 31.5 - 36.5 g/dL 34.1        RDW 10.0 - 15.0 % 12.9        % Neutrophils % 75        % Lymphocytes % 17        % Monocytes % 8         % Eosinophils % 0        % Basophils % 0        Absolute Basophils 0.0 - 0.2 10e3/uL 0.0        Absolute Eosinophils 0.0 - 0.7 10e3/uL 0.0        Absolute Immature Granulocytes <=0.4 10e3/uL 0.0        Absolute Lymphocytes 0.8 - 5.3 10e3/uL 2.0        Absolute Monocytes 0.0 - 1.3 10e3/uL 1.0        % Immature Granulocytes % 0        Absolute Neutrophils 1.6 - 8.3 10e3/uL 8.5 (H)        Absolute NRBCs 10e3/uL 0.0        NRBCs per 100 WBC <1 /100 0        Color Urine Colorless, Straw, Light Yellow, Yellow   Light Yellow       Appearance Urine Clear   Clear       Glucose Urine Negative mg/dL  Negative       Bilirubin Urine Negative   Negative       Ketones Urine Negative mg/dL  Negative       Specific Gravity Urine 1.003 - 1.035   1.009       pH Urine 5.0 - 7.0   6.5       Protein Albumin Urine Negative mg/dL  Negative       Urobilinogen mg/dL Normal, 2.0 mg/dL  Normal       Nitrite Urine Negative   Negative       Blood Urine Negative   Negative       Leukocyte Esterase Urine Negative   Negative       SARS CoV2 PCR Negative       Negative   XR ABDOMEN 1 VIEW    Rpt      (L): Data is abnormally low  (H): Data is abnormally high  Rpt: View report in Results Review for more information    Lab Results   Component Value Date    VALPROATE 59.5 06/05/2023          DIAGNOSIS   Principal Problem:    Suicidal ideation  MDD, severe, recurrent episode, with psychotic features  R/O Bipolar Disorder Type 1    Active Problem List:  Patient Active Problem List   Diagnosis     Arthritis     Depressive disorder     Borderline personality disorder (H)     GERD (gastroesophageal reflux disease)     Holosystolic murmur     Asthma     History of gastric bypass     Hyperlipidemia LDL goal <130     Other insomnia     Mild mitral regurgitation     Mild aortic stenosis     Weight loss     Bilateral low back pain without sciatica     Adhesive capsulitis of shoulder, right     Mild intermittent asthma, unspecified whether complicated      Bipolar affective disorder, remission status unspecified (H)     Constipation, unspecified constipation type     Age-related osteoporosis without current pathological fracture     Plantar warts     Hypoglycemia     Failure to thrive in adult     Hypotension, unspecified hypotension type     Suicidal ideation          PLAN   1. Education given regarding diagnostic and treatment options with risks, benefits and alternatives and adequate verbalization of understanding.  2.  Medications:  Hospital  -Start Namenda 2.5 mg tablet PO BID   -Continue Prozac 5 mg PO liquid daily.   -Continue Olanzapine ODT 2.5 mg tablet every morning,   -Continue Olanzapine 10 mg ODT tablet at bedtime  -Continue Mirtazapine 15 mg PO disintegrating tablet at HS.   -Continue Olanzapine ODT 5 mg PO tablet 3 times daily as needed for severe agitation.   3. Consultations:  Hospitalist to follow as needed.  Dietician consulted and will follow pt.  4. Structure and Supervision  Unit 3B  Precautions placed.  Fall precautions ordered.  Ordered SIO monitoring due to reporting active SI with plan and unable to contract for safety.  5.   is following in regards to collecting and reviewing collateral information, referrals and disposition planning.  Legal: civil commitment.   Referrals:  Per CTC  Care Coordination:  Per [facility]  Placement:  TBD  Anticipated Discharge:  TBD     Further treatment programming to be determined throughout the hospital course.      Risk Assessment: [facility] RISK ASSESSMENT: Patient on precautions    Coordination of Care:   Treatment Plan reviewed and physician signed, Care discussed with Care/Treatment Team Members, Chart reviewed and Patient seen      Re-Certification I certify that the inpatient psychiatric facility services furnished since the previous certification were, and continue to be, medically necessary for, either, treatment which could reasonably be expected to improve the patient s condition or  diagnostic study and that the hospital records indicate that the services furnished were, either, intensive treatment services, admission and related services necessary for diagnostic study, or equivalent services.     I certify that the patient continues to need, on a daily basis, active treatment furnished directly by or requiring the supervision of inpatient psychiatric facility personnel.   I estimate 7-14 days of hospitalization is necessary for proper treatment of the patient. My plans for post-hospital care for this patient are  TBD     DANIEL Bennett CNP    -     6/12/2023     -     07:32 PM    Total time  35 minutes with > 50%spent on coordination of cares and psycho-education.    This note was created with help of Dragon dictation system. Grammatical / typing errors are not intentional.    DANIEL Bennett CNP

## 2023-06-13 NOTE — PLAN OF CARE
Problem: Anxiety Signs/Symptoms  Goal: Optimized Energy Level (Anxiety Signs/Symptoms)  Outcome: Progressing   Goal Outcome Evaluation:    Plan of Care Reviewed With: patient      Patient's mood was labile this shift with periods of restlessness and anxiety. She was able to attend groups with encouragement. She denies being anxious or depressed in the morning but later endorsed high anxiety and moderate depression. She reports feeling nauseous, was seen placing two finger down her throat multiple times to induce gagging and vomiting. Pt was redirected from self inducing vomiting behaviors and was giving PRN Zofran for nausea. Pt declined to eat meals this shift stating she will eat later when she feels less nauseous.     Pt continues to be SIO for Self Injury Risk and medical bed due to Osteoporoses.

## 2023-06-13 NOTE — PLAN OF CARE
Problem: Anxiety Signs/Symptoms  Goal: Improved Sleep (Anxiety Signs/Symptoms)  Outcome: Progressing   Goal Outcome Evaluation:    Patient remains on SIO due to risk for self-injury and suicide. She is using a medical bed to help with mobility due to a history of osteoporosis.     Patient vomited a small amount of whitish vomitus without any food particles. Verbalized that she still feel nauseated and Zofran 4 mg. given @ 2351 PRN for nausea.  She has been restless since the start of the shift and could hardly sleep. Sleeping medication was offered to her and she agreed to take it. Melatonin 3 mg. given @ 2357 PRN for sleep. At about 0230, patient was seen sticking two of her fingers down her throat and caused her to gag and threw up a small amount of whitish secretions, mostly saliva. Patient continued to be restless and kept on turning and tossing in bed the next three hours.  Hydroxyzine 25 mg. given @0342 PRN for anxiety. Patient was noted to be confused and disorganized. She was disoriented to place as she asked the staff for the location of the bathroom. Patient went to the bathroom, and sat on the toilet several times during the night, but she voided twice only.     Total Intake: 120 ml water. Total Output: 150 ml, clear, yellow/ straw colored urine. There was some unmeasured urine as patient took out the hat from the toilet seat.    Slept for a total of 3.5 hours.      She is scheduled for a BMP and CBC with Platelets and Differential count check today.

## 2023-06-13 NOTE — CARE PLAN
"   06/13/23 1200   General Information   Date Initially Attended OT 06/13/23   Clinical Impression   Affect Appropriate to situation   Orientation Oriented to person, place and time   Appearance and ADLs General cleanliness observed in most areas   Attention to Internal Stimuli No observed signs   Interaction Skills Initiates appropriately with staff   Ability to Communicate Needs Independent   Verbal Content Appropriate to topic;Needs further assessment   Ability to Maintain Boundaries Maintains appropriate physical boundaries;Maintains appropriate verbal boundaries   Participation Participates with minimal encouragement   Concentration Concentrates 20-30 minutes   Ability to Concentrate With structure   Follows and Comprehends Directions Independently follows 1 step verbal directions;Needs further assessment   Memory Needs further assessment   Organization Needs occasional assistance    Decision Making Follows the leads of peers   Planning and Problem Solving Occasionally needs assist/feedback   Ability to Apply and Learn Concepts Applies within group structure;Needs further assessment   Frustrations / Stress Tolerance Needs further assessment   Level of Insight Some insight   Self Esteem Can identify positives   Social Supports Has knowledge of support systems;Needs further assessment   General Observation/Plan   General Observations/Plan See Comments   BEH OT Care Plan Goals   OT Care Plan cognition     Was oriented to the OT program. Chose the focus on goals to deal with frustrations more effectively, improve esteem, motivation and concentration and finish what she starts. She stated reason for admission as \"my life mess\". A personal strength she identified was \"help pried\"? Question difficulty with expressive aphasia vs confusion with additional answers of support people being \"fried\" and changes to make at time of discharge is \"Look to see\".     Plan:  Encourage attendance. Offer opportunity for success " oriented, more structured task work, grade complexity as needed, provide the opportunity to increase focus with attendance and reassurance with task focus. Provide structured assistance as difficulties are noted according to cognitive needs with the plan to decrease frustrations. Assess and document.

## 2023-06-14 PROCEDURE — 250N000013 HC RX MED GY IP 250 OP 250 PS 637

## 2023-06-14 PROCEDURE — 250N000013 HC RX MED GY IP 250 OP 250 PS 637: Performed by: PSYCHIATRY & NEUROLOGY

## 2023-06-14 PROCEDURE — 124N000003 HC R&B MH SENIOR/ADOLESCENT

## 2023-06-14 PROCEDURE — 99232 SBSQ HOSP IP/OBS MODERATE 35: CPT

## 2023-06-14 RX ORDER — ONDANSETRON 4 MG/1
4 TABLET, FILM COATED ORAL
Status: DISCONTINUED | OUTPATIENT
Start: 2023-06-15 | End: 2023-07-16 | Stop reason: HOSPADM

## 2023-06-14 RX ADMIN — Medication 2.5 MG: at 12:35

## 2023-06-14 RX ADMIN — SIMVASTATIN 40 MG: 40 TABLET, FILM COATED ORAL at 20:40

## 2023-06-14 RX ADMIN — Medication 100 MCG: at 12:34

## 2023-06-14 RX ADMIN — Medication 25 MCG: at 12:34

## 2023-06-14 RX ADMIN — MIRTAZAPINE 15 MG: 15 TABLET, ORALLY DISINTEGRATING ORAL at 20:39

## 2023-06-14 RX ADMIN — Medication 2.5 MG: at 20:39

## 2023-06-14 RX ADMIN — OLANZAPINE 10 MG: 10 TABLET, ORALLY DISINTEGRATING ORAL at 20:39

## 2023-06-14 RX ADMIN — OLANZAPINE 2.5 MG: 5 TABLET, ORALLY DISINTEGRATING ORAL at 09:04

## 2023-06-14 RX ADMIN — FAMOTIDINE 20 MG: 20 TABLET ORAL at 12:35

## 2023-06-14 RX ADMIN — Medication 2 TABLET: at 09:05

## 2023-06-14 RX ADMIN — FLUOXETINE HYDROCHLORIDE 10 MG: 20 SOLUTION ORAL at 12:36

## 2023-06-14 RX ADMIN — Medication 500 MG: at 12:35

## 2023-06-14 RX ADMIN — VALACYCLOVIR 500 MG: 500 TABLET, FILM COATED ORAL at 12:35

## 2023-06-14 RX ADMIN — HYDROXYZINE HYDROCHLORIDE 25 MG: 10 SOLUTION ORAL at 01:58

## 2023-06-14 ASSESSMENT — ACTIVITIES OF DAILY LIVING (ADL)
DRESS: WITH ASSISTANCE
ADLS_ACUITY_SCORE: 76
LAUNDRY: UNABLE TO COMPLETE
HYGIENE/GROOMING: WITH ASSISTANCE
ADLS_ACUITY_SCORE: 76
ORAL_HYGIENE: PROMPTS
ADLS_ACUITY_SCORE: 76

## 2023-06-14 NOTE — PLAN OF CARE
Goal Outcome Evaluation:      Plan of Care Reviewed With: patient    Outcome Evaluation: Please see 6/14 note for details. Adjustments to supplements. Patient to consume at least 25-50% meals, 100% nutrition supplements.  Patient to maintain/gain weight.    Moira Bella, MPH, RD, LD  Pediatric & Adult Behavioral Health Dietitian   Pager: [phone]  Weekend/holiday pager: [phone]

## 2023-06-14 NOTE — PLAN OF CARE
"Goal Outcome Evaluation:    Flat affect, mood irritable. Pt seems confused, calling the lounge the \"restrant\", pt declines reality reorientation at this time. Pt declined to check in with this writer, pt verbalized \"go away and leave me alone\". Pt took AM multivitamin and Zyprexa, pt declined other AM meds, plan to re-approach, pt took other AM meds at lunch time. Pt in room most in bed most of shift, out for meals. Pt ate 25% for breakfast and 50% for lunch, declines supplemental drink at this time. Gait slow and steady. Pt denies feeling nauseous.     No emesis reported or observed this shift    I: 1,020mL  O: 200 mL clear yellow non-foul smelling urine    Medical bed for \"hx of osteoperosis\"    Remains on SIO for suicide risk and self-injury risk    /67 (BP Location: Right arm, Patient Position: Supine, Cuff Size: Adult Small)   Pulse 101   Temp 99  F (37.2  C) (Temporal)   Resp 14   Ht 1.626 m (5' 4\")   Wt 48.9 kg (107 lb 12.9 oz)   LMP  (LMP Unknown)   SpO2 93%   BMI 18.50 kg/m       Plan:  Continue to encourage medication compliance   Continue to encourage fluids and oral nutrition  Continue to encourage increased activity  Continue to encourage group participation  Continue to encourage choices and independence  Continue to encourage non-pharmacological coping skills    "

## 2023-06-14 NOTE — DISCHARGE INSTRUCTIONS
"Behavioral Discharge Planning and Instructions    Summary: You were admitted on 6/7/2023 due to Suicidal Ideations.  You were treated by Doctor Zaina and provisionally discharged on ***/***/*** from  to  [facility], [address], [facility].  Phone: [phone]     Main Diagnosis:   Suicidal ideation  MDD, severe, recurrent episode, with psychotic features  R/O Bipolar Disorder Type 1    Health Care Follow-up:   You appointments made because you will get psychiatry and medical follow up at [facility].    Major Treatments, Procedures and Findings:  You were provided with: a psychiatric assessment, assessed for medical stability, medication evaluation and/or management, milieu management, and medical interventions    Symptoms to Report: feeling more aggressive, increased confusion, losing more sleep, mood getting worse, or thoughts of suicide    Early warning signs can include: increased depression or anxiety sleep disturbances increased thoughts or behaviors of suicide or self-harm  increased unusual thinking, such as paranoia or hearing voices    Safety and Wellness:  Take all medicines as directed.  Make no changes unless your doctor suggests them.      Follow treatment recommendations.  Refrain from alcohol and non-prescribed drugs.  If there is a concern for safety, call 911.    Resources:   Crisis Intervention: 246.707.1121 or 439-031-4901 (TTY: 682.420.1601).  Call anytime for help.  National [place] on Mental Illness (www.[place].tish.org): 115.413.9594 or 101-925-2910.  Suicide Awareness Voices of Education (SAVE) (www.save.org): 340-232-SGNB (83)  National Suicide Prevention Line (www.mentalhealthmn.org): 651-645-KEVX (8566)  Mental Health Consumer/Survivor Network of [place] (www.mhcsn.net): 442.402.7565 or 695-050-0885  Mental Health Association of [place] (www.mentalhealth.org): 595.872.4236 or 122-913-9892  Text 4 Life: txt \"LIFE\" to 75608 for immediate support and crisis " "intervention  Crisis text line: Text \"[place]\" to 336800. Free, confidential, 24/7.  Crisis Intervention: 217.408.2499 or 008-595-1657. Call anytime for help.     General Medication Instructions:   See your medication sheet(s) for instructions.   Take all medicines as directed.  Make no changes unless your doctor suggests them.   Go to all your doctor visits.  Be sure to have all your required lab tests. This way, your medicines can be refilled on time.  Do not use any drugs not prescribed by your doctor.  Avoid alcohol.    Advance Directives:   Scanned document on file with [facility]? No scanned doc  Is document scanned? Pt states no documents  Honoring Choices Your Rights Handout: Informed and given  Was more information offered? Pt declined    The Treatment team has appreciated the opportunity to work with you. If you have any questions or concerns about your recent admission, you can contact the unit which can receive your call 24 hours a day, 7 days a week. They will be able to get in touch with a Provider if needed. The unit number is [phone].  "

## 2023-06-14 NOTE — PLAN OF CARE
"  Problem: Adult Behavioral Health Plan of Care  Goal: Optimized Coping Skills in Response to Life Stressors  Outcome: Not Progressing   Goal Outcome Evaluation:     Pt isolative in her room this shift, refused to come out to dinning room to eat, when offered food, she ate bites, poured water on tray, did not drink any ensure and refused to talk to writer or answer any assessment questions, pt incontinent of bladder, assisted to change into dry briefs, was irritable, denies pain shouting, \"get out of here\" remains on SIO 5 ft on medical bed for mobility, will continue to encourage pt to drink oral supplements .       Nepro drink offered, drank sips, pt med compliant.                 "

## 2023-06-14 NOTE — PROGRESS NOTES
"CLINICAL NUTRITION SERVICES - REASSESSMENT NOTE     Nutrition Prescription    RECOMMENDATIONS FOR MDs/PROVIDERS TO ORDER:  Could consider scheduling Zofran 30 minutes before meals to improve PO tolerance.     Malnutrition Status:    Severe malnutrition in the context of starvation related to social and environmental circumstances.     Recommendations already ordered by Registered Dietitian (RD):  -Nepro (rotate flavors) between meals at 10 AM and 2PM, if out substitute for TwoCal or vice versa   -Patient care order: Please help patient get Nepro between meals and encourage intake.    Future/Additional Recommendations:  Monitor oral intakes, monitor emesis, monitor weights.      EVALUATION OF THE PROGRESS TOWARD GOALS   Diet: Regular  Supplement: Ensure BID   Intake: 0-50% per I/Os      NEW FINDINGS   Per 6/13 nursing note, \"Pt ate 10% of her dinner. Pt vomited right after dinner. Pt reported that she has hard anthony keeping anything she eats/drinks down. Pt was asking if she could reverse her gastric bypass by surgery.\"     Visited with patient at bedside. Difficult to maintain conversation due to altered mentation, RD attempted to describe efficacy of small frequent meals and obtain her preference for snacks, but patient reported she did not want to try that today and would 'maybe try next week.' RD asked if she could follow up with patient on Friday to obtain preferences, patient then said she had somewhere to be and got out of bed and walked out of room.     RD spoke with patients RN. RN reported that emesis yesterday was self-induced and she was denying nausea overnight.     Will switch oral nutrition supplement to in between meals to encourage tolerance and simulate small frequent meals, will switch to TwoCal to provide more nutrition provisions.     Weights:  06/13/23 0811 48.9 kg (107 lb 12.9 oz)   06/11/23 0817 48.9 kg (107 lb 12.9 oz)   06/08/23 0827 50.7 kg (111 lb 12.4 oz)   06/07/23 2048 51.1 kg (112 lb " 10.5 oz)   4.3% wt loss in 1 week, significant.     Labs reviewed. Patient hyponatremic since admission.     MALNUTRITION  % Intake: </=75% for >/= 1 month (severe)  % Weight Loss: > 2% in 1 week (severe)  Subcutaneous Fat Loss: global moderate  Muscle Loss: global severe   Fluid Accumulation/Edema: None noted  Malnutrition Diagnosis: Severe malnutrition in the context of starvation related to social and environmental circumstances.     Previous Goals   Patient to consume % of nutritionally adequate meal trays TID, or the equivalent with supplements/snacks.  Evaluation: Not met    Previous Nutrition Diagnosis  Inadequate oral intake related to altered mentation, decreased appetite, early satiety as evidenced by 6.1% wt loss in 1 month.   Evaluation: Declining    CURRENT NUTRITION DIAGNOSIS  Inadequate oral intake related to altered mentation, decreased appetite, early satiety as evidenced by 6.1% wt loss in 1 month, 4.3% wt loss in 1 week.     INTERVENTIONS  Implementation  Medical food supplement therapy  Modify composition of meals/snacks    Goals  Patient to consume at least 25-50% meals, 100% nutrition supplements.  Patient to maintain/gain weight.     Monitoring/Evaluation  Progress toward goals will be monitored and evaluated per protocol.    Moira Bella, MPH, RD, LD  Pediatric & Adult Behavioral Health Dietitian   Pager: [phone]  Weekend/holiday pager: [phone]

## 2023-06-14 NOTE — PLAN OF CARE
"  Problem: Sleep Disturbance  Goal: Adequate Sleep/Rest  Outcome: Progressing   Goal Outcome Evaluation:       Patient still awake at the start of the shift. Patient asked the staff whether the latter is a \"conductor\". She thought she is in a train. Patient denied nausea. No emotional outburst observed. She is using a medical bed for mobility. Patient is on SIO r/t SIB/self injury risk. She slept a total of 4.75 hours.                 "

## 2023-06-14 NOTE — PLAN OF CARE
Assessment/Intervention/Current Symptoms and Care Coordination  - chart review  - team meeting  - team rounds/pt interview addressed patient needs/concerns  - Current Symptoms include the following: anxiety  - Spoke to Josefina at [facility] (formerly [facility] at [phone] .  They are holding pt's bed for her and will let us know before they give it to someone else.  She reported that pt has been very stable, but their recent move to a new building/location may have been a trigger for her as she started to decompensation shortly after the move.  A couple of their staff would like to visit pt here to cheer pt up.  Writer instructed them to call the unit number before their visit to confirm it was a good time for a visit. Josefina also confirmed that they have a psychiatrist that visits the facility so no follow up appointments are needed upon discharge.      Discharge Plan or Goal  Pending stabilization & development of a safe discharge plan.  Considerations include:  [facility]     Barriers to Discharge   Patient requires further psychiatric stabilization due to current symptomology     Referral Status   None     Legal Status  Patient is under MI commitment in [facility]  Pt will need PD upon discharge.

## 2023-06-14 NOTE — PROGRESS NOTES
"PSYCHIATRY  PROGRESS NOTE     DATE OF SERVICE   6/14/2023         CHIEF COMPLAINT   \" I tried too many restaurants.\"       SUBJECTIVE   Nursing reports:     Patient stayed in her room most of the shift. Alert and oriented x3. VSS. Pt denied pain and discomfort. Pt is isolative and withdrawn. Pt did not attend groups. Pt ambulates with a walker. Pt came out to eat dinner. Pt ate 10% of her dinner. Pt vomited right after dinner. Pt reported that she has hard anthony keeping anything she eats/drinks down. Pt was asking if she could reverse her gastric bypass by surgery. PRN Zofran given. Staff observed pt putting two fingers in her mouth to induce vomiting many times. When writer asked the reason, pt said vomiting makes her feel better. Pt had oral fluid intake 300 ml; pt voided 200 ml and couple unmeasured times. Pt also drinks water from the bathroom sink. Pt denied anxiety and depression. Pt denied other mental health symptoms. Patient described mood as \"normal\" and affect was flat. Patient is cooperative with cares. Patient is med-compliant, and reports \"sleeps through the night\". Patient evaluation continues.      Patient still awake at the start of the shift. Patient asked the staff whether the latter is a \"conductor\". She thought she is in a train. Patient denied nausea. No emotional outburst observed. She is using a medical bed for mobility. Patient is on SIO r/t SIB/self injury risk. She slept a total of 4.75 hours.      reports:     Assessment/Intervention/Current Symptoms and Care Coordination  - chart review  - team meeting  - team rounds/pt interview addressed patient needs/concerns  - Current Symptoms include the following: anxiety  - Spoke to Josefina at [facility] at [phone] [phone] .  They are holding pt's bed for her and will let us know before they give it to someone else.  She reported that pt has been very stable, but their recent move to a new " "building/location may have been a trigger for her as she started to decompensation shortly after the move.  A couple of their staff would like to visit pt here to cheer pt up.  Writer instructed them to call the unit number before their visit to confirm it was a good time for a visit. Josefina also confirmed that they have a psychiatrist that visits the facility so no follow up appointments are needed upon discharge.      Discharge Plan or Goal  Pending stabilization & development of a safe discharge plan.  Considerations include:  [facility]     Barriers to Discharge   Patient requires further psychiatric stabilization due to current symptomology     Referral Status   None     Legal Status  Patient is under MI commitment in [facility]  Pt will need PD upon discharge       OBJECTIVE   Met with pt in her hospital room on unit 3B. Upon patient interview, patient reports, \"I tried too many restaurants.\" Pt affect appears flat and pt mood is reported as \" pretty good.\"  Thoughts continue to be disorganized and difficult to follow.  Patient denies anxiety symptoms.  Patient reports depression and describes this as, \"limits your outlook.\"  Patient appears to have ongoing confusion and does not recognize writer although patient has spoke with this provider daily during current psychiatric hospitalization. Today, patient is oriented to time and date, however reports that she is in an apartment.  Patient is able to explain that she was brought in for being suicidal and that she is committed with [facility]. Patient denies SI, intent, or plan. Patient continues on SIO monitoring due to previosuly reporting active SI with plan and ongoing confusion/impulsive unsafe behaviors observed by staff.  Patient reports that she had a gastric bypass surgery; pt denies nausea and/or vomiting today however staff on unit have reported observing patient inducing vomiting.  Dietitian has been consulted for patient due to " history of gastric bypass and severe malnutrition in the context of starvation related to social and environmental circumstances. Dietitian saw patient today and made adjustments to supplements; dietitian also recommended provider consider scheduling Zofran 30 minutes before meals to improve PO tolerance. Provider changed current order for Zofran 4mg PO PRN to scheduled 30 minutes before meals TID to improve PO tolerance. Patient's goals for nutrition include: patient to consume at least 25-50% meals, 100% nutrition supplements, and for patient to maintain/gain weight. Patient's weight today is 48.9 kg (107 lb 12.9 oz); patient has lost 5 pounds since admission on 6/7/2023.     Patient continues to experience hyponatremia however improving; next BMP to monitor sodium level is scheduled for 6/15/2023. Patient continues to be on a 2 L fluid restriction per IM to treat hyponatremia. Plan for today will be to continue Prozac 10 mg liquid daily to target ongoing depression symptoms. Also, continue Namenda 2.5 mg PO BID, Olanzapine ODT 2.5 mg tablet every morning, Olanzapine 10 mg ODT tablet at bedtime, and Mirtazapine 15 mg PO disintegrating tablet at HS.        MEDICATIONS   Medications:  Scheduled Meds:    calcium carbonate  500 mg Oral Daily     childrens multivitamin with iron  2 tablet Oral Daily     cyanocobalamin  100 mcg Oral Daily     famotidine  20 mg Oral Daily     ferrous sulfate  325 mg Oral Every Other Day     FLUoxetine  10 mg Oral Daily     memantine  2.5 mg Oral BID     mirtazapine  15 mg Orally disintegrating tablet At Bedtime     OLANZapine zydis  2.5 mg Oral QAM     OLANZapine zydis  10 mg Oral At Bedtime     simvastatin  40 mg Oral At Bedtime     valACYclovir  500 mg Oral Daily     cholecalciferol  25 mcg Oral Daily     Continuous Infusions:  PRN Meds:.acetaminophen, alum & mag hydroxide-simethicone, budesonide-formoterol, calcium carbonate, hydrOXYzine, melatonin, OLANZapine zydis, ondansetron,  "prochlorperazine, senna-docusate    Medication adherence issues: MS Med Adherence Y/N: No  Medication side effects: MEDICATION SIDE EFFECTS: no side effects reported however patient has ongoing hyponatremia which is trending up.   Benefit: Yes / No: Yes       ROS   Pertinent items are noted in HPI.       MENTAL STATUS EXAM   Vitals: /64 (BP Location: Right arm)   Pulse 85   Temp 98.2  F (36.8  C) (Oral)   Resp 14   Ht 1.626 m (5' 4\")   Wt 48.9 kg (107 lb 12.9 oz)   LMP  (LMP Unknown)   SpO2 98%   BMI 18.50 kg/m      Appearance:  Poorly groomed and Disheveled  Mood: \"Pretty good.\"  Affect: flat  was congruent to speech.  Suicidal Ideation: absent  Homicidal Ideation: PRESENT / ABSENT: absent   Thought process: difficult to follow and disorganized   Thought content: endorses preoccupations  Fund of Knowledge: average  Attention/Concentration: Poor  Language ability:  Intact  Memory:  Immediate recall impaired, Short-term memory impaired and Long-term memory impaired  Insight:  limited.  Judgement: limited  Orientation: Person:  yes  Place: No, reports she is in an apartment.  Time: Yes  Situation: yes  Psychomotor Behavior: normal or unremarkable    Muscle Strength and Tone: MuscleStrength: Normal  Gait and Station: slightly stiff however steady with walker       LABS   Personally reviewed.      Latest Reference Range & Units 06/10/23 13:09 06/10/23 14:42 06/11/23 14:11 06/11/23 14:21 06/12/23 08:00 06/12/23 12:07 06/13/23 07:46   Sodium 136 - 145 mmol/L 129 (L)    129 (L)  133 (L)   Potassium 3.4 - 5.3 mmol/L 3.9    4.0  3.8   Chloride 98 - 107 mmol/L 95 (L)    93 (L)  96 (L)   Carbon Dioxide (CO2) 22 - 29 mmol/L 27    24  26   Urea Nitrogen 8.0 - 23.0 mg/dL 14.3    26.3 (H)  16.0   Creatinine 0.51 - 0.95 mg/dL 0.75    0.95  0.73   GFR Estimate >60 mL/min/1.73m2 80    60 (L)  82   Calcium 8.8 - 10.2 mg/dL 9.8    10.0  9.7   Anion Gap 7 - 15 mmol/L 7    12  11   Albumin 3.5 - 5.2 g/dL 3.9         Protein " Total 6.4 - 8.3 g/dL 6.2 (L)         Alkaline Phosphatase 35 - 104 U/L 49         ALT 10 - 35 U/L 13         AST 10 - 35 U/L 19         Bilirubin Total <=1.2 mg/dL 0.3         Glucose 70 - 99 mg/dL 99    201 (H)  102 (H)   Lipase 13 - 60 U/L    21      WBC 4.0 - 11.0 10e3/uL 11.5 (H)      10.4   Hemoglobin 11.7 - 15.7 g/dL 12.9      12.6   Hematocrit 35.0 - 47.0 % 37.8      37.7   Platelet Count 150 - 450 10e3/uL 249      262   RBC Count 3.80 - 5.20 10e6/uL 4.15      4.11   MCV 78 - 100 fL 91      92   MCH 26.5 - 33.0 pg 31.1      30.7   MCHC 31.5 - 36.5 g/dL 34.1      33.4   RDW 10.0 - 15.0 % 12.9      13.0   % Neutrophils % 75      79   % Lymphocytes % 17      13   % Monocytes % 8      8   % Eosinophils % 0      0   % Basophils % 0      0   Absolute Basophils 0.0 - 0.2 10e3/uL 0.0      0.0   Absolute Eosinophils 0.0 - 0.7 10e3/uL 0.0      0.0   Absolute Immature Granulocytes <=0.4 10e3/uL 0.0      0.0   Absolute Lymphocytes 0.8 - 5.3 10e3/uL 2.0      1.4   Absolute Monocytes 0.0 - 1.3 10e3/uL 1.0      0.8   % Immature Granulocytes % 0      0   Absolute Neutrophils 1.6 - 8.3 10e3/uL 8.5 (H)      8.2   Absolute NRBCs 10e3/uL 0.0      0.0   NRBCs per 100 WBC <1 /100 0      0   Color Urine Colorless, Straw, Light Yellow, Yellow   Light Yellow        Appearance Urine Clear   Clear        Glucose Urine Negative mg/dL  Negative        Bilirubin Urine Negative   Negative        Ketones Urine Negative mg/dL  Negative        Specific Gravity Urine 1.003 - 1.035   1.009        pH Urine 5.0 - 7.0   6.5        Protein Albumin Urine Negative mg/dL  Negative        Urobilinogen mg/dL Normal, 2.0 mg/dL  Normal        Nitrite Urine Negative   Negative        Blood Urine Negative   Negative        Leukocyte Esterase Urine Negative   Negative        SARS CoV2 PCR Negative       Negative    XR ABDOMEN 1 VIEW    Rpt       (L): Data is abnormally low  (H): Data is abnormally high  Rpt: View report in Results Review for more  information        Lab Results   Component Value Date    VALPROATE 59.5 06/05/2023          DIAGNOSIS   Principal Problem:    Suicidal ideation  MDD, severe, recurrent episode, with psychotic features  R/O Bipolar Disorder Type 1    Active Problem List:  Patient Active Problem List   Diagnosis     Arthritis     Depressive disorder     Borderline personality disorder (H)     GERD (gastroesophageal reflux disease)     Holosystolic murmur     Asthma     History of gastric bypass     Hyperlipidemia LDL goal <130     Other insomnia     Mild mitral regurgitation     Mild aortic stenosis     Weight loss     Bilateral low back pain without sciatica     Adhesive capsulitis of shoulder, right     Mild intermittent asthma, unspecified whether complicated     Bipolar affective disorder, remission status unspecified (H)     Constipation, unspecified constipation type     Age-related osteoporosis without current pathological fracture     Plantar warts     Hypoglycemia     Failure to thrive in adult     Hypotension, unspecified hypotension type     Suicidal ideation          PLAN   1. Education given regarding diagnostic and treatment options with risks, benefits and alternatives and adequate verbalization of understanding.  2.  Medications:  Hospital  -Continue Prozac to 10 mg PO liquid daily to target depressed mood.  -Continue Namenda 2.5 mg tablet PO BID to treat dementia symptoms observed.  -Continue Olanzapine ODT 2.5 mg tablet every morning  -Continue Olanzapine 10 mg ODT tablet at bedtime  -Continue Mirtazapine 15 mg PO disintegrating tablet at HS.   -Continue Olanzapine ODT 5 mg PO tablet 3 times daily as needed for severe agitation.   3. Consultations:  Hospitalist to follow as needed.  Dietician consulted and will follow pt; plan is for pt to eat small meals/snack throughout the day d/t history of gastric bypass. Supplements also ordered for between meals.  Changed Zofran 4mg PO PRN to scheduled 30 minutes before meals  TID to improve PO tolerance.   4. Structure and Supervision  Unit 3B  Precautions in place.  Fall precautions.  Continue on SIO monitoring due to reporting active SI with plan and confused impulsive behavior.  5.   is following in regards to collecting and reviewing collateral information, referrals and disposition planning.  Legal: civil commitment.   Referrals:  Per CTC  Care Coordination:  Per CTC  Placement:  TBD  Anticipated Discharge:  TBD     Further treatment programming to be determined throughout the hospital course.      Risk Assessment: [facility] RISK ASSESSMENT: Patient on precautions    Coordination of Care:   Treatment Plan reviewed and physician signed, Care discussed with Care/Treatment Team Members, Chart reviewed and Patient seen      Re-Certification I certify that the inpatient psychiatric facility services furnished since the previous certification were, and continue to be, medically necessary for, either, treatment which could reasonably be expected to improve the patient s condition or diagnostic study and that the hospital records indicate that the services furnished were, either, intensive treatment services, admission and related services necessary for diagnostic study, or equivalent services.     I certify that the patient continues to need, on a daily basis, active treatment furnished directly by or requiring the supervision of inpatient psychiatric facility personnel.   I estimate 7-14 days of hospitalization is necessary for proper treatment of the patient. My plans for post-hospital care for this patient are  TBD     DANIEL Bennett CNP    -     6/14/2023     -     11:30 AM    Total time  35 minutes with > 50%spent on coordination of cares and psycho-education.    This note was created with help of Dragon dictation system. Grammatical / typing errors are not intentional.    DANIEL Bennett CNP

## 2023-06-15 ENCOUNTER — APPOINTMENT (OUTPATIENT)
Dept: GENERAL RADIOLOGY | Facility: CLINIC | Age: 83
DRG: 885 | End: 2023-06-15
Payer: COMMERCIAL

## 2023-06-15 ENCOUNTER — APPOINTMENT (OUTPATIENT)
Dept: SPEECH THERAPY | Facility: CLINIC | Age: 83
DRG: 885 | End: 2023-06-15
Payer: COMMERCIAL

## 2023-06-15 LAB
ALBUMIN SERPL BCG-MCNC: 3.4 G/DL (ref 3.5–5.2)
ALP SERPL-CCNC: 44 U/L (ref 35–104)
ALT SERPL W P-5'-P-CCNC: 12 U/L (ref 0–50)
ANION GAP SERPL CALCULATED.3IONS-SCNC: 11 MMOL/L (ref 7–15)
AST SERPL W P-5'-P-CCNC: 16 U/L (ref 0–45)
BASOPHILS # BLD AUTO: 0 10E3/UL (ref 0–0.2)
BASOPHILS NFR BLD AUTO: 0 %
BILIRUB DIRECT SERPL-MCNC: <0.2 MG/DL (ref 0–0.3)
BILIRUB SERPL-MCNC: 0.4 MG/DL
BUN SERPL-MCNC: 15.2 MG/DL (ref 8–23)
CALCIUM SERPL-MCNC: 9.7 MG/DL (ref 8.8–10.2)
CHLORIDE SERPL-SCNC: 97 MMOL/L (ref 98–107)
CREAT SERPL-MCNC: 0.74 MG/DL (ref 0.51–0.95)
DEPRECATED HCO3 PLAS-SCNC: 26 MMOL/L (ref 22–29)
EOSINOPHIL # BLD AUTO: 0.1 10E3/UL (ref 0–0.7)
EOSINOPHIL NFR BLD AUTO: 1 %
ERYTHROCYTE [DISTWIDTH] IN BLOOD BY AUTOMATED COUNT: 12.9 % (ref 10–15)
GFR SERPL CREATININE-BSD FRML MDRD: 81 ML/MIN/1.73M2
GLUCOSE SERPL-MCNC: 160 MG/DL (ref 70–99)
HCT VFR BLD AUTO: 41.3 % (ref 35–47)
HGB BLD-MCNC: 13.4 G/DL (ref 11.7–15.7)
IMM GRANULOCYTES # BLD: 0 10E3/UL
IMM GRANULOCYTES NFR BLD: 0 %
LYMPHOCYTES # BLD AUTO: 1.8 10E3/UL (ref 0.8–5.3)
LYMPHOCYTES NFR BLD AUTO: 28 %
MCH RBC QN AUTO: 30.3 PG (ref 26.5–33)
MCHC RBC AUTO-ENTMCNC: 32.4 G/DL (ref 31.5–36.5)
MCV RBC AUTO: 93 FL (ref 78–100)
MONOCYTES # BLD AUTO: 0.5 10E3/UL (ref 0–1.3)
MONOCYTES NFR BLD AUTO: 7 %
NEUTROPHILS # BLD AUTO: 4 10E3/UL (ref 1.6–8.3)
NEUTROPHILS NFR BLD AUTO: 64 %
NRBC # BLD AUTO: 0 10E3/UL
NRBC BLD AUTO-RTO: 0 /100
PLATELET # BLD AUTO: 365 10E3/UL (ref 150–450)
POTASSIUM SERPL-SCNC: 4 MMOL/L (ref 3.4–5.3)
PROT SERPL-MCNC: 6.2 G/DL (ref 6.4–8.3)
RBC # BLD AUTO: 4.42 10E6/UL (ref 3.8–5.2)
SODIUM SERPL-SCNC: 134 MMOL/L (ref 136–145)
WBC # BLD AUTO: 6.4 10E3/UL (ref 4–11)

## 2023-06-15 PROCEDURE — 71046 X-RAY EXAM CHEST 2 VIEWS: CPT | Mod: 26 | Performed by: RADIOLOGY

## 2023-06-15 PROCEDURE — 250N000011 HC RX IP 250 OP 636

## 2023-06-15 PROCEDURE — 99232 SBSQ HOSP IP/OBS MODERATE 35: CPT

## 2023-06-15 PROCEDURE — 85025 COMPLETE CBC W/AUTO DIFF WBC: CPT

## 2023-06-15 PROCEDURE — 250N000013 HC RX MED GY IP 250 OP 250 PS 637: Performed by: PSYCHIATRY & NEUROLOGY

## 2023-06-15 PROCEDURE — 99232 SBSQ HOSP IP/OBS MODERATE 35: CPT | Performed by: PHYSICIAN ASSISTANT

## 2023-06-15 PROCEDURE — 124N000003 HC R&B MH SENIOR/ADOLESCENT

## 2023-06-15 PROCEDURE — 92610 EVALUATE SWALLOWING FUNCTION: CPT | Mod: GN

## 2023-06-15 PROCEDURE — 80053 COMPREHEN METABOLIC PANEL: CPT | Performed by: PHYSICIAN ASSISTANT

## 2023-06-15 PROCEDURE — 82248 BILIRUBIN DIRECT: CPT | Performed by: PHYSICIAN ASSISTANT

## 2023-06-15 PROCEDURE — 71046 X-RAY EXAM CHEST 2 VIEWS: CPT

## 2023-06-15 PROCEDURE — 36415 COLL VENOUS BLD VENIPUNCTURE: CPT | Performed by: PHYSICIAN ASSISTANT

## 2023-06-15 PROCEDURE — 250N000013 HC RX MED GY IP 250 OP 250 PS 637

## 2023-06-15 RX ADMIN — Medication 2.5 MG: at 08:56

## 2023-06-15 RX ADMIN — Medication 2 TABLET: at 08:54

## 2023-06-15 RX ADMIN — Medication 500 MG: at 08:55

## 2023-06-15 RX ADMIN — ONDANSETRON 4 MG: 4 TABLET ORAL at 11:41

## 2023-06-15 RX ADMIN — VALACYCLOVIR 500 MG: 500 TABLET, FILM COATED ORAL at 08:55

## 2023-06-15 RX ADMIN — ONDANSETRON 4 MG: 4 TABLET ORAL at 16:40

## 2023-06-15 RX ADMIN — FAMOTIDINE 20 MG: 20 TABLET ORAL at 08:55

## 2023-06-15 RX ADMIN — FERROUS SULFATE TAB 325 MG (65 MG ELEMENTAL FE) 325 MG: 325 (65 FE) TAB at 08:54

## 2023-06-15 RX ADMIN — OLANZAPINE 10 MG: 10 TABLET, ORALLY DISINTEGRATING ORAL at 21:35

## 2023-06-15 RX ADMIN — Medication 2.5 MG: at 21:36

## 2023-06-15 RX ADMIN — Medication 25 MCG: at 08:54

## 2023-06-15 RX ADMIN — OLANZAPINE 2.5 MG: 5 TABLET, ORALLY DISINTEGRATING ORAL at 08:55

## 2023-06-15 RX ADMIN — ACETAMINOPHEN 650 MG: 325 TABLET, FILM COATED ORAL at 22:45

## 2023-06-15 RX ADMIN — ONDANSETRON 4 MG: 4 TABLET ORAL at 08:54

## 2023-06-15 RX ADMIN — FLUOXETINE HYDROCHLORIDE 10 MG: 20 SOLUTION ORAL at 08:56

## 2023-06-15 RX ADMIN — MIRTAZAPINE 15 MG: 15 TABLET, ORALLY DISINTEGRATING ORAL at 21:35

## 2023-06-15 RX ADMIN — Medication 100 MCG: at 08:55

## 2023-06-15 RX ADMIN — SIMVASTATIN 40 MG: 40 TABLET, FILM COATED ORAL at 21:35

## 2023-06-15 ASSESSMENT — ACTIVITIES OF DAILY LIVING (ADL)
ADLS_ACUITY_SCORE: 76

## 2023-06-15 NOTE — PROVIDER NOTIFICATION
06/15/23 1415   Individualization/Patient Specific Goals   Patient Personal Strengths community support;family/social support;stable living environment   Individualized Care Needs Becomes agitated with staff and dismiss with staff direction.   Interprofessional Rounds   Participants CTC;psychiatrist;nursing;OT   Behavioral Team Discussion   Participants Dr. Richard GILLILAND, Daisy Kelley [facility],  Jalyn Gaspar, RN, Caryn Stack OT   Progress Little improvement   Anticipated length of stay Pending stabilization   Continued Stay Criteria/Rationale SI   Medical/Physical See provider note   Plan Stabilization with medication and therapy groups   Anticipated Discharge Disposition assisted living     PRECAUTIONS AND SAFETY    Behavioral Orders   Procedures    Code 1 - Restrict to Unit    Code 2     To xray 6/15/23    Fall precautions    Routine Programming     As clinically indicated    Status 15     Every 15 minutes.    Status Individual Observation     Patient SIO status reviewed with team/RN.  Please also refer to RN/team documentation for add'l detail.    -SIO staff to monitor following which have contributed to patient being on SIO:  Unsafe behavior/putting water on the floor to electrocute herself/fall  -Possible interventions SIO staff could use to support patient's treatment progress: monitor for unsafe behaviors  -When following observed, team will review discontinuation of SIO:  Pt no longer at risk for self harm     Order Specific Question:   CONTINUOUS 24 hours / day     Answer:   5 feet     Order Specific Question:   Indications for SIO     Answer:   Suicide risk     Order Specific Question:   Indications for SIO     Answer:   Self-injury risk       Safety  Safety WDL: WDL  Patient Location: lounge, patient room, own  Observed Behavior: other (see comments) (resting)  Safety Measures: environmental rounds completed, 1:1 observation maintained  Suicidality: Status 15

## 2023-06-15 NOTE — PROGRESS NOTES
"PSYCHIATRY  PROGRESS NOTE     DATE OF SERVICE   6/15/2023         CHIEF COMPLAINT   \"Fine.\"       SUBJECTIVE   Nursing reports:     Pt isolative in her room this shift, refused to come out to dinning room to eat, when offered food, she ate bites, poured water on tray, did not drink any ensure and refused to talk to writer or answer any assessment questions, pt incontinent of bladder, assisted to change into dry briefs, was irritable, denies pain shouting, \"get out of here\" remains on SIO 5 ft on medical bed for mobility, will continue to encourage pt to drink oral supplements .      Sharon Mariah a provider called to inform the unit that she would be visiting the unit either tomorrow evening between 5-7 pm or sat 1-3pm.     Patient slept for a total of 11.25 hours without any interruptions. She did not get up to void even once during the night. No complaints made the whole shift. She is using a medical bed to help with bed mobility due to history of osteoporosis.       reports:    Assessment/Intervention/Current Symptoms and Care Coordination  - chart review  - team meeting  - team rounds/pt interview addressed patient needs/concerns  - Current Symptoms include the following: anxiety      Discharge Plan or Goal  Pending stabilization & development of a safe discharge plan.  Considerations include:  [facility] [place]     Barriers to Discharge   Patient requires further psychiatric stabilization due to current symptomology     Referral Status   None     Legal Status  Patient is under MI commitment in [facility]  Pt will need PD upon discharge.                OBJECTIVE   Met with pt in her hospital room on unit 3B. Upon patient interview, patient does not initally respond to this provider however is awake and makes poor eye contact. Pt affect appears flat and pt reports mood as \" fine.\" Pt does not respond to questions regarding depression or anxiety. When asked if pt has any questions or if there " "is anything this provider can do to help pt states, \"120 million dollars.\" Patient continues on SIO monitoring due to previously reporting active SI with plan and ongoing confusion/impulsive unsafe behaviors observed by staff. Dietitian following pt due to Inadequate oral intake related to altered mentation, decreased appetite, and early satiety as evidenced by 6.1% wt loss in 1 month, 4.3% wt loss in 1 week. Patient's goals for nutrition include: patient to consume at least 25-50% meals, 100% nutrition supplements, and for patient to maintain/gain weight. Patient's weight 48.9 kg (107 lb 12.9 oz) as of 6/13/2023. Pt has been experiencing frequent emesis in small amounts after eating which seems likely related to hx of gastric bypass. Today, nursing staff also report pt is exhibiting increased agitation and increased confusion compared to previous days. Subsequently internal medicine was consulted to address worsening altered mental status. Provider also placed orders for CBC and UA/UC. Also, placed order for Chest Xray and a consult with speech therapy for a Clinical Swallow Evaluation to evaluate for aspiration. Patient continues to experience hyponatremia however improving; sodium level today is 134 and improved from yesterday (133). Patient continues to be on a 2 L fluid restriction per IM to treat hyponatremia; BMP every Monday and Thursday to monitor.    Plan for today will be to continue Prozac 10 mg liquid daily to target ongoing depression symptoms. Also, continue Namenda 2.5 mg PO BID, Olanzapine ODT 2.5 mg tablet every morning, Olanzapine 10 mg ODT tablet at bedtime, and Mirtazapine 15 mg PO disintegrating tablet at HS.     Per IM provider documentation pt's altered mental status and confusion symptoms are most likely secondary to a primary psychiatric disorder with high likelihood for underlying dementia. Pt's labs remain stable. Chest Xray impression showed Mild hazy/streaky basilar/perihilar mixed " "pulmonary opacities, B-lines are present; findings are consistent with pulmonary edema with possible superimposed atelectasis. No focal consolidation. Prominent pulmonary vasculature consistent with pulmonary artery hypertension. No pneumothorax. Per speech therapy documentation: patient appears appropriate to continue current diet of Regular textures and thin liquids. Do not anticipate that she will be able to maintain adequate nutrition & hydration if frequent reflux/regurgitation continues. Recommend consideration of GI consult. Also, recommending 1:1 supervision needed to encourage intake and monitor for reflux/regurgitation. Pt will continue on SIO monitoring. GI consult placed.        MEDICATIONS   Medications:  Scheduled Meds:    calcium carbonate  500 mg Oral Daily     childrens multivitamin with iron  2 tablet Oral Daily     cyanocobalamin  100 mcg Oral Daily     famotidine  20 mg Oral Daily     ferrous sulfate  325 mg Oral Every Other Day     FLUoxetine  10 mg Oral Daily     memantine  2.5 mg Oral BID     mirtazapine  15 mg Orally disintegrating tablet At Bedtime     OLANZapine zydis  2.5 mg Oral QAM     OLANZapine zydis  10 mg Oral At Bedtime     ondansetron  4 mg Oral TID AC     simvastatin  40 mg Oral At Bedtime     valACYclovir  500 mg Oral Daily     cholecalciferol  25 mcg Oral Daily     Continuous Infusions:  PRN Meds:.acetaminophen, alum & mag hydroxide-simethicone, budesonide-formoterol, calcium carbonate, hydrOXYzine, melatonin, OLANZapine zydis, prochlorperazine, senna-docusate    Medication adherence issues: MS Med Adherence Y/N: No  Medication side effects: MEDICATION SIDE EFFECTS: no side effects reported however patient has ongoing hyponatremia which is trending up.   Benefit: Yes / No: Yes       ROS   Pertinent items are noted in HPI.       MENTAL STATUS EXAM   Vitals: /64 (BP Location: Right arm)   Pulse 85   Temp 98.2  F (36.8  C) (Oral)   Resp 14   Ht 1.626 m (5' 4\")   Wt 48.9 " "kg (107 lb 12.9 oz)   LMP  (LMP Unknown)   SpO2 98%   BMI 18.50 kg/m      Appearance: Poorly groomed and Disheveled  Mood: \"Fine.\"  Affect: flat  was congruent to speech.  Suicidal Ideation: absent  Homicidal Ideation: PRESENT / ABSENT: absent   Thought process: difficult to follow and disorganized   Thought content: endorses preoccupations  Fund of Knowledge: average  Attention/Concentration: Poor  Language ability:  Intact  Memory:  Immediate recall impaired, Short-term memory impaired and Long-term memory impaired  Insight:  limited.  Judgement: limited  Orientation: oriented to self  Psychomotor Behavior: normal or unremarkable    Muscle Strength and Tone: MuscleStrength: Normal  Gait and Station: slightly stiff however steady with walker       LABS   Personally reviewed.      Latest Reference Range & Units 06/11/23 14:21 06/12/23 08:00 06/12/23 12:07 06/13/23 07:46 06/15/23 09:21 06/15/23 10:54   Sodium 136 - 145 mmol/L  129 (L)  133 (L) 134 (L)    Potassium 3.4 - 5.3 mmol/L  4.0  3.8 4.0    Chloride 98 - 107 mmol/L  93 (L)  96 (L) 97 (L)    Carbon Dioxide (CO2) 22 - 29 mmol/L  24  26 26    Urea Nitrogen 8.0 - 23.0 mg/dL  26.3 (H)  16.0 15.2    Creatinine 0.51 - 0.95 mg/dL  0.95  0.73 0.74    GFR Estimate >60 mL/min/1.73m2  60 (L)  82 81    Calcium 8.8 - 10.2 mg/dL  10.0  9.7 9.7    Anion Gap 7 - 15 mmol/L  12  11 11    Albumin 3.5 - 5.2 g/dL     3.4 (L)    Protein Total 6.4 - 8.3 g/dL     6.2 (L)    Alkaline Phosphatase 35 - 104 U/L     44    ALT 0 - 50 U/L     12    AST 0 - 45 U/L     16    Bilirubin Direct 0.00 - 0.30 mg/dL     <0.20    Bilirubin Total <=1.2 mg/dL     0.4    Glucose 70 - 99 mg/dL  201 (H)  102 (H) 160 (H)    Lipase 13 - 60 U/L 21        WBC 4.0 - 11.0 10e3/uL    10.4 6.4    Hemoglobin 11.7 - 15.7 g/dL    12.6 13.4    Hematocrit 35.0 - 47.0 %    37.7 41.3    Platelet Count 150 - 450 10e3/uL    262 365    RBC Count 3.80 - 5.20 10e6/uL    4.11 4.42    MCV 78 - 100 fL    92 93    MCH 26.5 - " 33.0 pg    30.7 30.3    MCHC 31.5 - 36.5 g/dL    33.4 32.4    RDW 10.0 - 15.0 %    13.0 12.9    % Neutrophils %    79 64    % Lymphocytes %    13 28    % Monocytes %    8 7    % Eosinophils %    0 1    % Basophils %    0 0    Absolute Basophils 0.0 - 0.2 10e3/uL    0.0 0.0    Absolute Eosinophils 0.0 - 0.7 10e3/uL    0.0 0.1    Absolute Immature Granulocytes <=0.4 10e3/uL    0.0 0.0    Absolute Lymphocytes 0.8 - 5.3 10e3/uL    1.4 1.8    Absolute Monocytes 0.0 - 1.3 10e3/uL    0.8 0.5    % Immature Granulocytes %    0 0    Absolute Neutrophils 1.6 - 8.3 10e3/uL    8.2 4.0    Absolute NRBCs 10e3/uL    0.0 0.0    NRBCs per 100 WBC <1 /100    0 0    SARS CoV2 PCR Negative    Negative      XR CHEST 2 VIEWS       Rpt   (L): Data is abnormally low  (H): Data is abnormally high  Rpt: View report in Results Review for more information    Lab Results   Component Value Date    VALPROATE 59.5 06/05/2023          DIAGNOSIS   Principal Problem:    Suicidal ideation  MDD, severe, recurrent episode, with psychotic features  R/O Bipolar Disorder Type 1    Active Problem List:  Patient Active Problem List   Diagnosis     Arthritis     Depressive disorder     Borderline personality disorder (H)     GERD (gastroesophageal reflux disease)     Holosystolic murmur     Asthma     History of gastric bypass     Hyperlipidemia LDL goal <130     Other insomnia     Mild mitral regurgitation     Mild aortic stenosis     Weight loss     Bilateral low back pain without sciatica     Adhesive capsulitis of shoulder, right     Mild intermittent asthma, unspecified whether complicated     Bipolar affective disorder, remission status unspecified (H)     Constipation, unspecified constipation type     Age-related osteoporosis without current pathological fracture     Plantar warts     Hypoglycemia     Failure to thrive in adult     Hypotension, unspecified hypotension type     Suicidal ideation          PLAN   1. Education given regarding diagnostic  and treatment options with risks, benefits and alternatives and adequate verbalization of understanding.  2.  Medications:  Hospital  -Continue Prozac to 10 mg PO liquid daily to target depressed mood.  -Continue Namenda 2.5 mg tablet PO BID to treat dementia symptoms observed.  -Continue Olanzapine ODT 2.5 mg tablet every morning  -Continue Olanzapine 10 mg ODT tablet at bedtime  -Continue Mirtazapine 15 mg PO disintegrating tablet at HS.   -Continue Olanzapine ODT 5 mg PO tablet 3 times daily as needed for severe agitation.   3. Consultations:  Hospitalist to follow as needed.  GI consult placed for 6/16/23 to evaluate frequent emesis and difficulty maintaining adequate nutrition and hydration d/t frequent reflux/regurgitation.  IM consulted 6/15/23 to evaluate altered mental status.  Speech therapy consult completed 6/15/23 for Clinical Swallow Evaluation to evaluate for aspiration r/t frequent emesis and concern for aspiration.  Dietician consulted and will follow pt during admission; plan is for pt to eat small meals/snack throughout the day d/t history of gastric bypass. Supplements also ordered for between meals.  Changed Zofran 4mg PO PRN to scheduled 30 minutes before meals TID to improve PO tolerance.   4. Labs/Tests   BMP every Monday and Thursday to monitor Hyponatremia.  UA/UC to evaluate altered mental status and r/o UTI.  Chest X ray 2 views to evaluate for aspiration r/t frequent vomiting  5. Structure and Supervision  Unit 3B  Precautions in place.  Fall precautions.  Continue on SIO monitoring due to reporting active SI with plan and confused impulsive behavior.  6.   is following in regards to collecting and reviewing collateral information, referrals and disposition planning.  Legal: civil commitment.   Referrals:  Per CTC  Care Coordination:  Per CTC  Placement:  TBD  Anticipated Discharge:  TBD     Further treatment programming to be determined throughout the hospital  course.      Risk Assessment: [facility] RISK ASSESSMENT: Patient on precautions    Coordination of Care:   Treatment Plan reviewed and physician signed, Care discussed with Care/Treatment Team Members, Chart reviewed and Patient seen      Re-Certification I certify that the inpatient psychiatric facility services furnished since the previous certification were, and continue to be, medically necessary for, either, treatment which could reasonably be expected to improve the patient s condition or diagnostic study and that the hospital records indicate that the services furnished were, either, intensive treatment services, admission and related services necessary for diagnostic study, or equivalent services.     I certify that the patient continues to need, on a daily basis, active treatment furnished directly by or requiring the supervision of inpatient psychiatric facility personnel.   I estimate 7-14 days of hospitalization is necessary for proper treatment of the patient. My plans for post-hospital care for this patient are  TBD     DANIEL Bennett CNP    -     6/15/2023     -     11:45 AM    Total time  35 minutes with > 50%spent on coordination of cares and psycho-education.    This note was created with help of Dragon dictation system. Grammatical / typing errors are not intentional.    DANIEL Bennett CNP

## 2023-06-15 NOTE — PLAN OF CARE
Assessment/Intervention/Current Symptoms and Care Coordination  - chart review  - team meeting  - team rounds/pt interview addressed patient needs/concerns  - Current Symptoms include the following: anxiety      Discharge Plan or Goal  Pending stabilization & development of a safe discharge plan.  Considerations include:  [facility] [place]     Barriers to Discharge   Patient requires further psychiatric stabilization due to current symptomology     Referral Status   None     Legal Status  Patient is under MI commitment in [facility]  Pt will need PD upon discharge.

## 2023-06-15 NOTE — PLAN OF CARE
Problem: Sleep Disturbance  Goal: Adequate Sleep/Rest  Outcome: Progressing    Patient slept for a total of 11.25 hours without any interruptions. She did not get up to void even once during the night. No complaints made the whole shift. She is using a medical bed to help with bed mobility due to history of osteoporosis.

## 2023-06-15 NOTE — PROGRESS NOTES
"While on SIO with pt this writer asked pt if she wanted to go down to the lounge for dinner. Pt stated she didn't want to eat dinner because she is \"on a diet\". Food was delivered to pt's room. Pt only took a few bites before stating \"I can't eat any of this because I am on a diet!\" and dumped her juice on her food and tray. RN notified.    Sharon Lemus a provider called to inform the unit that she would be visiting the unit either tomorrow evening between 5-7 pm or sat 1-3pm.  "

## 2023-06-15 NOTE — CONSULTS
"[facility]  Consult Note - Hospitalist Service  Date of Admission:  6/7/2023  Consult Requested by: Galilea Olguin CNP  Reason for Consult: Confusion    Assessment & Plan   Bhavana Marr is a 82 year old female with history of HLD, asthma, depression, anxiety, borderline personality, bipolar disorder, GERD, osteoporosis, and gastric bypass who was transferred from [facility] medicine to station 3B with worsening depression. She was initially admitted to [facility] 5/5/2023 with hypoglycemia, hypotension, and possible SI in the setting of failure to thrive    Confusion: Patient admitted with worsening depression with psychotic features and has exhibited disorganized thoughts at times. There is also suspicion that she may have underlying dementia. There was concern that patient has been more confused and agitated over the past few days. Upon my evaluation today the patient is able to tell me place of \"hospital in [place],\" and then proceeds to tell me that she \"doesn't feel like talking right now.\" Her labs are notable for a stable chronic hypoNa but are otherwise unremarkable.    - Her waxing and waning symptoms are most likely secondary to a primary psychiatric disorder with high likelihood for underlying dementia. There is no clear reversible organic cause of her symptoms.    - Management per Psychiatry    Failure to thrive  Severe protein calorie malnutrition  Chronic nausea and vomiting  Hx of gastric bypass  Patient reports 10-year history of difficulties eating, intermittent nausea and vomiting and this is documented throughout her chart. It seems that she is at her recent baseline of poor oral intake and intermittent n/v (some of which is self-induced as witnessed by unit staff). Her labs remain stable.    - RD following   - Zofran prior to meals    Medicine will follow results of UA (not yet obtained) and BMP peripherally. Please do not hesitate to contact if new " "questions or concerns arise.     Ranjana Weaver PA-C  Hospitalist Service  Securely message with [employer] (more info)  Text page via [facility] Paging/Directory   ______________________________________________________________________    Chief Complaint   \"I don't feel like talking right now\"    History is obtained from the patient, chart review    History of Present Illness   Bhavana Marr is a 82 year old female with history of HLD, asthma, depression, anxiety, borderline personality, bipolar disorder, GERD, osteoporosis, and gastric bypass who was [facility] medicine to station 3B with worsening depression. She was initially admitted to [facility] 5/5/2023 with hypoglycemia, hypotension, and possible SI in the setting of failure to thrive.  She has continued to eat poorly since admission.  Despite this her labs have remained stable with no hypoglycemia or significant electrolyte abnormalities.  She did require a fluid bolus last week for some symptomatic hypotension and her blood pressures have since improved.  She continues to have intermittent nausea and vomiting.  The vomiting is very small amounts and is nonbloody.  Has been witnessed to self-induced or vomiting.  At times the patient abdominal x-ray last week was notable for constipation.    Now over the past few days there was concern that she has been more confused and agitated.  Psychiatry is wondering if there could be a reversible organic cause of this change.    Today the patient tells me that she has a 10-year history of nausea and vomiting.  Her current symptoms are her baseline.  She denies having any abdominal pain.  She is able to tell me that she is in a place of a hospital in [place].  She then tells me that she does not feel like talking and stopped answering my questions.      Past Medical History    Past Medical History:   Diagnosis Date     Arthritis 1998     Asthma      Asthma 6/8/2018     Blood transfusion 2000     Borderline " personality disorder (H)      Chronic sinus infection      Depressive disorder      GERD (gastroesophageal reflux disease)      History of blood transfusion      Hyperlipidemia      MDD (major depressive disorder)        Past Surgical History   Past Surgical History:   Procedure Laterality Date     ABDOMEN SURGERY      2 fatty tumors removed     APPENDECTOMY       BIOPSY       COLONOSCOPY       GASTRIC BYPASS       GI SURGERY  2000    gastric bypass     GYN SURGERY      complete hysterectomy     [facility] CORRECT BUNION,DOUBLE OSTEOTOMY      Description: Hallux Valgus (Bunion) Correction;  Recorded: 05/07/2012;     ORTHOPEDIC SURGERY      both hip,two foot surgies on each foot     ORTHOPEDIC SURGERY      right elbow     [facility] APPENDECTOMY      Description: Appendectomy;  Recorded: 05/07/2012;     [facility] TOTAL ABDOM HYSTERECTOMY      Description: Total Abdominal Hysterectomy;  Recorded: 05/07/2012;       Medications   Medications Prior to Admission   Medication Sig Dispense Refill Last Dose     acetaminophen (TYLENOL) 325 MG tablet Take 2 tablets (650 mg) by mouth every 6 hours as needed for mild pain or other (and adjunct with moderate or severe pain or per patient request)        budesonide-formoterol (SYMBICORT) 80-4.5 MCG/ACT Inhaler Inhale 2 puffs into the lungs 2 times daily as needed (Shortness of breath, wheezing) 10.2 g 3      calcium carbonate (TUMS) 500 MG chewable tablet Chew 2 tabs every 3 hours as needed        cholecalciferol (VITAMIN D3) 1000 UNIT tablet Take 1 tablet (1,000 Units) by mouth daily 30 tablet 11      divalproex sodium delayed-release (DEPAKOTE SPRINKLE) 125 MG DR capsule Take 250 mg by mouth 2 times daily        divalproex sodium delayed-release (DEPAKOTE SPRINKLE) 125 MG DR capsule Take 375 mg by mouth At Bedtime        famotidine (PEPCID) 20 MG tablet Take 1 tablet (20 mg) by mouth daily        fluticasone (FLONASE) 50 MCG/ACT nasal spray Spray 2 sprays into both nostrils daily as needed for  rhinitis or allergies        hydrOXYzine (ATARAX) 25 MG tablet Take 1 tablet (25 mg) by mouth every 6 hours as needed for anxiety        hypromellose (ARTIFICIAL TEARS) 0.5 % SOLN ophthalmic solution Place 2 drops into both eyes 4 times daily as needed        mirtazapine (REMERON) 15 MG tablet Take 1 tablet (15 mg) by mouth At Bedtime        Multiple Vitamins-Minerals (CEROVITE SENIOR) TABS Take 1 tablet by mouth daily 30 tablet 3      Nutritional Supplements (ENSURE ORIGINAL) LIQD Take 1 Container by mouth daily        OLANZapine (ZYPREXA) 2.5 MG tablet Take 1 tablet (2.5 mg) by mouth every morning        OLANZapine (ZYPREXA) 5 MG tablet Take 2 tablets (10 mg) by mouth At Bedtime        polyethylene glycol (MIRALAX) 17 g packet Take 17 g by mouth Every Mon, Wed, Fri Morning        polyethylene glycol (MIRALAX) 17 g packet Take 17 g by mouth daily as needed for constipation        SENNA-docusate sodium (SENNA S) 8.6-50 MG tablet Take 1 tablet by mouth once as needed (constipation)        sertraline (ZOLOFT) 100 MG tablet Take 1 tablet (100 mg) by mouth daily        sertraline (ZOLOFT) 50 MG tablet Take 1 tablet (50 mg) by mouth daily        simvastatin (ZOCOR) 40 MG tablet Take 1 tablet (40 mg) by mouth At Bedtime 30 tablet 11      valACYclovir (VALTREX) 500 MG tablet Give 500 mg by mouth one time a day related to other herpesviral infection for outbreak prevention  3         Physical Exam   Vital Signs: Temp: 98.2  F (36.8  C) Temp src: Oral BP: 101/64 Pulse: 85     SpO2: 98 % O2 Device: None (Room air)    Weight: 107 lbs 12.88 oz    Constitutional: Awake and alert, in no apparent distress.   Eyes: Sclera clear, anicteric   Respiratory: Breathing comfortably on room air. Clear to auscultation bilaterally with no crackles, wheezing, or rhonchi. Good air entry throughout.   Cardiovascular:  RRR, normal S1/S2. No rubs or murmurs.   GI: Soft, non-tender, non-distended. Normoactive bowel sounds.   Skin:  Good color. No  jaundice. No visible rashes, lesions, or bruising of concern.   Neurologic: Alert and oriented to self and general place. Does not answer date question. No focal deficits.       Medical Decision Making       45 MINUTES SPENT BY ME on the date of service doing chart review, history, exam, documentation & further activities per the note.      Data   ------------------------- PAST 24 HR DATA REVIEWED -----------------------------------------------    I have personally reviewed the following data over the past 24 hrs:    6.4  \   13.4   / 365     134 (L) 97 (L) 15.2 /  160 (H)   4.0 26 0.74 \       ALT: 12 AST: 16 AP: 44 TBILI: 0.4   ALB: 3.4 (L) TOT PROTEIN: 6.2 (L) LIPASE: N/A       Imaging results reviewed over the past 24 hrs:   No results found for this or any previous visit (from the past 24 hour(s)).

## 2023-06-15 NOTE — PROGRESS NOTES
"Pt refused to come out for breakfast but after much encouragement she got up walked a few steps stop , writer was concern and  Asked  pt why was she not moving pt responded ' I am peeing \" pt voided a large amount saturating her incontinent pad and headed back to bed , now refusing to get change her soiled clothing  . Pt was later  changed and headed to the lounge to eat . Pt ate all of her marsh . Pt was handed her medication she took them and then decided to spit them out on her clothing . She took them after encouragement but later had 2 medium size emesis . Pt refused to participate in mental health assessment   "

## 2023-06-16 LAB
ALBUMIN UR-MCNC: NEGATIVE MG/DL
APPEARANCE UR: ABNORMAL
BILIRUB UR QL STRIP: NEGATIVE
COLOR UR AUTO: YELLOW
GLUCOSE UR STRIP-MCNC: NEGATIVE MG/DL
HGB UR QL STRIP: NEGATIVE
KETONES UR STRIP-MCNC: NEGATIVE MG/DL
LEUKOCYTE ESTERASE UR QL STRIP: ABNORMAL
MUCOUS THREADS #/AREA URNS LPF: PRESENT /LPF
NITRATE UR QL: NEGATIVE
PH UR STRIP: 6 [PH] (ref 5–7)
RBC URINE: 3 /HPF
SP GR UR STRIP: 1.01 (ref 1–1.03)
SQUAMOUS EPITHELIAL: 14 /HPF
TRANSITIONAL EPI: 1 /HPF
UROBILINOGEN UR STRIP-MCNC: NORMAL MG/DL
WBC URINE: 87 /HPF

## 2023-06-16 PROCEDURE — 250N000013 HC RX MED GY IP 250 OP 250 PS 637

## 2023-06-16 PROCEDURE — 250N000013 HC RX MED GY IP 250 OP 250 PS 637: Performed by: PSYCHIATRY & NEUROLOGY

## 2023-06-16 PROCEDURE — 124N000003 HC R&B MH SENIOR/ADOLESCENT

## 2023-06-16 PROCEDURE — 81001 URINALYSIS AUTO W/SCOPE: CPT

## 2023-06-16 PROCEDURE — 87086 URINE CULTURE/COLONY COUNT: CPT

## 2023-06-16 PROCEDURE — 99232 SBSQ HOSP IP/OBS MODERATE 35: CPT

## 2023-06-16 PROCEDURE — 250N000013 HC RX MED GY IP 250 OP 250 PS 637: Performed by: PHYSICIAN ASSISTANT

## 2023-06-16 PROCEDURE — 250N000011 HC RX IP 250 OP 636

## 2023-06-16 PROCEDURE — 99223 1ST HOSP IP/OBS HIGH 75: CPT | Mod: 95 | Performed by: DIETITIAN, REGISTERED

## 2023-06-16 RX ORDER — POLYETHYLENE GLYCOL 3350 17 G/17G
17 POWDER, FOR SOLUTION ORAL 2 TIMES DAILY
Status: DISCONTINUED | OUTPATIENT
Start: 2023-06-16 | End: 2023-06-23

## 2023-06-16 RX ORDER — PANTOPRAZOLE SODIUM 40 MG/1
40 TABLET, DELAYED RELEASE ORAL
Status: DISCONTINUED | OUTPATIENT
Start: 2023-06-16 | End: 2023-07-16 | Stop reason: HOSPADM

## 2023-06-16 RX ORDER — SENNOSIDES 8.6 MG
8.6 TABLET ORAL 2 TIMES DAILY
Status: DISCONTINUED | OUTPATIENT
Start: 2023-06-16 | End: 2023-07-12

## 2023-06-16 RX ADMIN — FLUOXETINE HYDROCHLORIDE 10 MG: 20 SOLUTION ORAL at 08:18

## 2023-06-16 RX ADMIN — PANTOPRAZOLE SODIUM 40 MG: 40 TABLET, DELAYED RELEASE ORAL at 12:47

## 2023-06-16 RX ADMIN — FAMOTIDINE 20 MG: 20 TABLET ORAL at 08:08

## 2023-06-16 RX ADMIN — Medication 100 MCG: at 08:08

## 2023-06-16 RX ADMIN — Medication 2.5 MG: at 21:15

## 2023-06-16 RX ADMIN — HYDROXYZINE HYDROCHLORIDE 25 MG: 10 SOLUTION ORAL at 00:36

## 2023-06-16 RX ADMIN — Medication 25 MCG: at 08:09

## 2023-06-16 RX ADMIN — ONDANSETRON 4 MG: 4 TABLET ORAL at 12:47

## 2023-06-16 RX ADMIN — Medication 2 TABLET: at 08:08

## 2023-06-16 RX ADMIN — Medication 2.5 MG: at 08:18

## 2023-06-16 RX ADMIN — Medication 500 MG: at 08:11

## 2023-06-16 RX ADMIN — MELATONIN TAB 3 MG 3 MG: 3 TAB at 00:22

## 2023-06-16 RX ADMIN — MIRTAZAPINE 15 MG: 15 TABLET, ORALLY DISINTEGRATING ORAL at 21:05

## 2023-06-16 RX ADMIN — SIMVASTATIN 40 MG: 40 TABLET, FILM COATED ORAL at 21:05

## 2023-06-16 RX ADMIN — VALACYCLOVIR 500 MG: 500 TABLET, FILM COATED ORAL at 08:09

## 2023-06-16 RX ADMIN — OLANZAPINE 2.5 MG: 5 TABLET, ORALLY DISINTEGRATING ORAL at 08:07

## 2023-06-16 RX ADMIN — ONDANSETRON 4 MG: 4 TABLET ORAL at 16:32

## 2023-06-16 RX ADMIN — ONDANSETRON 4 MG: 4 TABLET ORAL at 08:08

## 2023-06-16 RX ADMIN — SENNOSIDES 8.6 MG: 8.6 TABLET ORAL at 21:05

## 2023-06-16 RX ADMIN — OLANZAPINE 10 MG: 10 TABLET, ORALLY DISINTEGRATING ORAL at 21:05

## 2023-06-16 RX ADMIN — POLYETHYLENE GLYCOL 3350 17 G: 17 POWDER, FOR SOLUTION ORAL at 21:06

## 2023-06-16 ASSESSMENT — ACTIVITIES OF DAILY LIVING (ADL)
ADLS_ACUITY_SCORE: 76
DRESS: WITH ASSISTANCE
ADLS_ACUITY_SCORE: 76
ORAL_HYGIENE: PROMPTS
ADLS_ACUITY_SCORE: 76
HYGIENE/GROOMING: WITH ASSISTANCE
ADLS_ACUITY_SCORE: 76

## 2023-06-16 NOTE — PROGRESS NOTES
Brief Medicine Note    Medicine following for hyponatremia, poor oral intake. Please see previous consult notes for additional details.     Patient continues to have poor oral intake, intermittent nausea and vomiting. Her n/v episodes seem to be more triggered by eating. Has a hx of gastric bypass. SLP leigha additionally raised concern for possible esophageal dysmotility.     She had an episode of transient hypotension this morning that sounds like it was orthostatic in etiology.     Plan:   - Start pantoprazole 40 mg once daily (ordered)   - Agree with GI consult as already placed by primary team   - At this time she does not need IVF or enteral feedings. Will continue to closely monitor vital signs and labs twice weekly (ordered).     Discussed with Galilea Olguin CNP. Medicine will follow labs peripherally. Please do not hesitate to contact if new questions or concerns arise.     Addendum: Discussed with Milly NESBITT.  There are some concern for an underlying esophageal dysmotility issue.  We will obtain an esophagram and upper GI series.  Her abdominal x-ray last week which showed a large stool burden.  We will start senna 1 tablet twice daily and MiraLAX 1 packet twice daily.  If no stool output in 2 days then will administer an enema.  We will additionally hold her iron supplement as her hemoglobin has been normal recently and this will only worsen her constipation.  We will recheck iron studies with her next lab draw on Monday.    Ranjana Weaver PA-C  Hospitalist Service  Pager: [phone]

## 2023-06-16 NOTE — PROGRESS NOTES
"PSYCHIATRY  PROGRESS NOTE     DATE OF SERVICE   6/16/2023         CHIEF COMPLAINT   \"I am very depressed.\"       SUBJECTIVE   Nursing reports:     Pt out before supper ate a small amount of pudding but had an emesis after eating . She refused to come and eat for supper . Pt states she is anxious and her depressed  high . She does admits having visual and auditory hallucination and having thought of wanting to hurt herself but contracts for safety . She told writer she did something bad in the past but refused to talk about it . Writer asked if she would talk to her provider and she agreed promising to talk to her provider tomorrow .   Pt was visited by Speech therapist see therapist notes        Patient continues to be on SIO due to risk for self-injury and suicide. She is on 1500 Fluid Restriction and Strict Intake and Output. She is using a medical bed due to history of osteoporosis.     Patient was assisted to the bathroom by the SIO staff after she verbalized that she wanted to pee. But as soon as she saw the urine measuring hat on the toilet bowl, despite the explanation given to her about the rationale of putting the hat, she refused to sit and pee and went back to bed. She said she would not pee with the hat on the toilet bowl. She appeared restless, kept on turning and tossing in bed  and could hardly sleep. She was also noted to be coughing every now and then.  Melatonin 3 mg. was given @ 0022 PRN for sleep. Hydroxyzine 25 mg. given @ 0036 PRN for anxiety. Patient was asked to get up and pee before she will fall asleep and she said,\"No.\" Slept @ 0100.     Patient admits that she still feels depressed but refused to rate her depression. She is quite irritable and uncooperative.      Total Intake: 100 ml water     Total Output: 150 ml clear alberto colored urine.     Slept for a total of  6.25 hours.      She is scheduled for GI Adult Luminal Consult and UA/UC today.      " "reports:    Assessment/Intervention/Current Symptoms and Care Coordination  - chart review  - team meeting  - team rounds/pt interview addressed patient needs/concerns  - Current Symptoms include the following: anxiety      Discharge Plan or Goal  Pending stabilization & development of a safe discharge plan.  Considerations include:  [facility] [place]     Barriers to Discharge   Patient requires further psychiatric stabilization due to current symptomology     Referral Status   None     Legal Status  Patient is under MI commitment in [facility]  Pt will need PD upon discharge.        OBJECTIVE   Met with pt in her hospital room on unit 3B with Dr. Pickett. Upon patient interview, patient reports, \" I am very depressed. I have been depressed all my life. I need counseling.\" Pt affect appears flat. Pt also endorses SI without a plan due to being in the hospital. Also, per SIO staff patient expressed to them wanting someone to shoot her. Patient does not endorse AH or VH. Pt will continue on SIO monitoring which is in place due to patient reporting SI and ongoing confusion/impulsive behaviors. Provider encouraged patient to go to group as this will provide structure, a therapeutic environment, and promote engaging with peers. Patient verbalizes understanding and is in agreement. Patient subsequently got out of bed and began walking to the group room. Patient states, \"See I am trying.\"  Patient has no other questions or concerns. Pt has been experiencing ongoing frequent emesis in small amounts after eating which she has reported is related to gastric bypass surgery; today patient denied any nausea. Pt has also been experiencing ongoing confusion and intermittent increased agitation; UA/UC still needs to be collected to r/o UTI. Nursing aware, however pt is resistant to allowing staff to collect urine sample. Provider encouraged nursing staff to continue to try to collect a sample. Plan for today will be to " "continue Prozac 10 mg liquid daily to target ongoing depression symptoms. Also, continue Namenda 2.5 mg PO BID, Olanzapine ODT 2.5 mg tablet every morning, Olanzapine 10 mg ODT tablet at bedtime, and Mirtazapine 15 mg PO disintegrating tablet at HS.     On 6/15/23 IM was consulted to evaluate pt due to altered mental status. Per IM provider pt's waxing and waning symptoms are most likely secondary to a primary psychiatric disorder with high likelihood for underlying dementia; there is no clear reversible organic cause of her symptoms. Speech therapy was also consulted 6/15/23 and Chest Xray was also ordered to evaluate her due to concern for aspiration; chest xray did not show acute concern for aspiration. Per speech therapy recommendation: patient appears appropriate to continue current diet of Regular textures and thin liquids; do not anticipate that she will be able to maintain adequate nutrition & hydration if frequent reflux/regurgitation continues. GI consult consult was subsequently ordered for today 6/16/23. Pt also experienced an episode of hypotension this morning with blood pressure 88/58 P: 75.  With position change of sitting up from lying down patient blood pressure normalized to 107/71 P: 94. Per nursing staff patient reported her heart was \"racing\" during episode or hypotension.  Pt denied pain at that time. This provider notified internal medicine provider regarding hypotension; per IM provider recommendation: Start pantoprazole 40 mg once daily (ordered), agree with GI consult as already placed by primary team, and at this time she does not need IVF or enteral feedings. Will continue to closely monitor vital signs and labs twice weekly. Patient also continues to experience hyponatremia however improving; sodium level on 6/15/23 was 134 and improving. Patient continues to be on a 2 L fluid restriction per IM to treat hyponatremia; BMP ordered for every Monday and Thursday to monitor.     Dietitian " is also following pt due to inadequate oral intake related to altered mentation, decreased appetite, and early satiety. Patient's goals for nutrition include: patient to consume at least 25-50% meals, 100% nutrition supplements, and for patient to maintain/gain weight. To support pt's dietary goal of maintaining/gaining weight provider placed patient care order for staff to promote patient eating small meals and snacks throughout the day; order also includes that pt needs supervision while eating to encourage intake and monitor for reflux/regurgitation. Pt weights will be monitored every Tuesday, Thursday, and Saturday; provider also ordered intake and output monitoring.    Today, GI consult completed and recommending:  -Esophagram with Upper GI series to evaluate for GI stricture which is scheduled for 6/19/23.  -Change famotidine to PO Protonix 40 mg once daily (as anticipate cannot get PIV to have IV).  -Bowel med regimen: Enema (fleet or pink lady) then schedule Miralax 17 gm BID, senna-docusate 1-2 tablets BID.  -STRICT documentation of stool outputs.  -Hold iron supplements until improved stools/sx regurgitation/N/V.  -Recheck iron studies with next planned labs to evaluate need to continue iron supplementation (po vs infusions pending approp per psych status)  -Diet textures/liquids per SLP.  Agree with small/frequent meals with PO supplements.  -Liberalize po fluid restriction if possible.  -Appreciate RD involvement and assistance with supplementation/evaluation of PO.  -Analgesia/Antiemetics per primary team.  -If sx without improvement to all above interventions and esophagram/upper GI negative, would consider CT C/A/P to evaluate for acute pathology/malignancy in the setting of weight loss.  -If esophagram/upper GI concerning for stricture, consider transfer to [place] this admission for EGD with MAC to further evaluate for etiology.       MEDICATIONS   Medications:  Scheduled Meds:    calcium  "carbonate  500 mg Oral Daily     childrens multivitamin with iron  2 tablet Oral Daily     cyanocobalamin  100 mcg Oral Daily     famotidine  20 mg Oral Daily     [Held by provider] ferrous sulfate  325 mg Oral Every Other Day     FLUoxetine  10 mg Oral Daily     memantine  2.5 mg Oral BID     mirtazapine  15 mg Orally disintegrating tablet At Bedtime     OLANZapine zydis  2.5 mg Oral QAM     OLANZapine zydis  10 mg Oral At Bedtime     ondansetron  4 mg Oral TID AC     pantoprazole  40 mg Oral QAM AC     polyethylene glycol  17 g Oral BID     sennosides  8.6 mg Oral BID     simvastatin  40 mg Oral At Bedtime     valACYclovir  500 mg Oral Daily     cholecalciferol  25 mcg Oral Daily     Continuous Infusions:  PRN Meds:.acetaminophen, alum & mag hydroxide-simethicone, budesonide-formoterol, calcium carbonate, hydrOXYzine, melatonin, OLANZapine zydis, prochlorperazine, senna-docusate    Medication adherence issues: MS Med Adherence Y/N: No  Medication side effects: MEDICATION SIDE EFFECTS: no side effects reported however patient has ongoing hyponatremia which is trending up.   Benefit: Yes / No: Yes       ROS   Pertinent items are noted in HPI.       MENTAL STATUS EXAM   Vitals: /71   Pulse 68   Temp 99.1  F (37.3  C) (Oral)   Resp 18   Ht 1.626 m (5' 4\")   Wt 48.9 kg (107 lb 12.9 oz)   LMP  (LMP Unknown)   SpO2 98%   BMI 18.50 kg/m      Appearance: Poorly groomed and Disheveled  Mood: \"I am very depressed.\"  Affect: flat  was congruent to speech.  Suicidal Ideation: absent  Homicidal Ideation: PRESENT / ABSENT: absent   Thought process: difficult to follow and disorganized   Thought content: endorses preoccupations  Fund of Knowledge: average  Attention/Concentration: Poor  Language ability:  Intact  Memory:  Immediate recall impaired, Short-term memory impaired and Long-term memory impaired  Insight:  limited.  Judgement: limited  Orientation: oriented to self  Psychomotor Behavior: normal or " unremarkable    Muscle Strength and Tone: MuscleStrength: Normal  Gait and Station: slightly stiff however steady with walker       LABS   Personally reviewed.      Latest Reference Range & Units 06/11/23 14:21 06/12/23 08:00 06/12/23 12:07 06/13/23 07:46 06/15/23 09:21 06/15/23 10:54   Sodium 136 - 145 mmol/L  129 (L)  133 (L) 134 (L)    Potassium 3.4 - 5.3 mmol/L  4.0  3.8 4.0    Chloride 98 - 107 mmol/L  93 (L)  96 (L) 97 (L)    Carbon Dioxide (CO2) 22 - 29 mmol/L  24  26 26    Urea Nitrogen 8.0 - 23.0 mg/dL  26.3 (H)  16.0 15.2    Creatinine 0.51 - 0.95 mg/dL  0.95  0.73 0.74    GFR Estimate >60 mL/min/1.73m2  60 (L)  82 81    Calcium 8.8 - 10.2 mg/dL  10.0  9.7 9.7    Anion Gap 7 - 15 mmol/L  12  11 11    Albumin 3.5 - 5.2 g/dL     3.4 (L)    Protein Total 6.4 - 8.3 g/dL     6.2 (L)    Alkaline Phosphatase 35 - 104 U/L     44    ALT 0 - 50 U/L     12    AST 0 - 45 U/L     16    Bilirubin Direct 0.00 - 0.30 mg/dL     <0.20    Bilirubin Total <=1.2 mg/dL     0.4    Glucose 70 - 99 mg/dL  201 (H)  102 (H) 160 (H)    Lipase 13 - 60 U/L 21        WBC 4.0 - 11.0 10e3/uL    10.4 6.4    Hemoglobin 11.7 - 15.7 g/dL    12.6 13.4    Hematocrit 35.0 - 47.0 %    37.7 41.3    Platelet Count 150 - 450 10e3/uL    262 365    RBC Count 3.80 - 5.20 10e6/uL    4.11 4.42    MCV 78 - 100 fL    92 93    MCH 26.5 - 33.0 pg    30.7 30.3    MCHC 31.5 - 36.5 g/dL    33.4 32.4    RDW 10.0 - 15.0 %    13.0 12.9    % Neutrophils %    79 64    % Lymphocytes %    13 28    % Monocytes %    8 7    % Eosinophils %    0 1    % Basophils %    0 0    Absolute Basophils 0.0 - 0.2 10e3/uL    0.0 0.0    Absolute Eosinophils 0.0 - 0.7 10e3/uL    0.0 0.1    Absolute Immature Granulocytes <=0.4 10e3/uL    0.0 0.0    Absolute Lymphocytes 0.8 - 5.3 10e3/uL    1.4 1.8    Absolute Monocytes 0.0 - 1.3 10e3/uL    0.8 0.5    % Immature Granulocytes %    0 0    Absolute Neutrophils 1.6 - 8.3 10e3/uL    8.2 4.0    Absolute NRBCs 10e3/uL    0.0 0.0    NRBCs per  100 WBC <1 /100    0 0    SARS CoV2 PCR Negative    Negative      XR CHEST 2 VIEWS       Rpt   (L): Data is abnormally low  (H): Data is abnormally high  Rpt: View report in Results Review for more information    Lab Results   Component Value Date    VALPROATE 59.5 06/05/2023          DIAGNOSIS   Principal Problem:    Suicidal ideation  MDD, severe, recurrent episode, with psychotic features  R/O Bipolar Disorder Type 1    Active Problem List:  Patient Active Problem List   Diagnosis     Arthritis     Depressive disorder     Borderline personality disorder (H)     GERD (gastroesophageal reflux disease)     Holosystolic murmur     Asthma     History of gastric bypass     Hyperlipidemia LDL goal <130     Other insomnia     Mild mitral regurgitation     Mild aortic stenosis     Weight loss     Bilateral low back pain without sciatica     Adhesive capsulitis of shoulder, right     Mild intermittent asthma, unspecified whether complicated     Bipolar affective disorder, remission status unspecified (H)     Constipation, unspecified constipation type     Age-related osteoporosis without current pathological fracture     Plantar warts     Hypoglycemia     Failure to thrive in adult     Hypotension, unspecified hypotension type     Suicidal ideation          PLAN   1. Education given regarding diagnostic and treatment options with risks, benefits and alternatives and adequate verbalization of understanding.  2.  Medications:  Hospital  -Continue Prozac to 10 mg PO liquid daily to target depressed mood.  -Continue Namenda 2.5 mg tablet PO BID to treat dementia symptoms observed.  -Continue Olanzapine ODT 2.5 mg tablet every morning  -Continue Olanzapine 10 mg ODT tablet at bedtime  -Continue Mirtazapine 15 mg PO disintegrating tablet at HS.   -Continue Olanzapine ODT 5 mg PO tablet 3 times daily as needed for severe agitation.   3. Consultations:  IM consulted 6/15/23 to evaluate altered mental status.  GI consult placed for  6/16/23, per GI provider:  -Esophagram with Upper GI series to evaluate for GI stricture which is scheduled for 6/19/23.  -Change famotidine to PO to Protonix 40 mg once daily (as anticipate cannot get PIV to have IV).  -Bowel med regimen: Enema (fleet or pink lady) then schedule Miralax 17 gm BID, senna-docusate 1-2 tablets BID.  -STRICT documentation of stool outputs.  -Hold iron supplements until improved stools/sx regurgitation/N/V.  Speech therapy consult completed 6/15/23 for Clinical Swallow Evaluation:  -continue regular texture diet with thin liquids  - 1:1 monitoring while eating to promote oral intake and monitor for reflux/regurgitation.  Dietician consulted and will follow pt during admission  -Weights will be monitored every Tuesday, Thursday, and Saturday  -Intake and output monitoring.  -Plan is for pt to eat small meals/snack throughout the day d/t history of gastric bypass.   -Supplements ordered for between meal  - Zofran 4mg PO PRN to scheduled 30 minutes before meals TID to improve PO tolerance.   4. Labs/Tests   BMP every Monday and Thursday to monitor Hyponatremia.  UA/UC to evaluate altered mental status and r/o UTI.  Esophagram with Upper GI series to evaluate for GI stricture which is scheduled for 6/19/23.  5. Structure and Supervision  Unit 3B  Precautions in place.  Fall precautions.  Continue on SIO monitoring due to reporting active SI with plan and confused impulsive behavior.  6.   is following in regards to collecting and reviewing collateral information, referrals and disposition planning.  Legal: civil commitment.   Referrals:  Per CTC  Care Coordination:  Per CTC  Placement:  TBD  Anticipated Discharge:  TBD     Further treatment programming to be determined throughout the hospital course.      [facility] Patient on precautions    Coordination of Care:   Treatment Plan reviewed and physician signed, Care discussed with Care/Treatment  Team Members, Chart reviewed and Patient seen      Re-Certification I certify that the inpatient psychiatric facility services furnished since the previous certification were, and continue to be, medically necessary for, either, treatment which could reasonably be expected to improve the patient s condition or diagnostic study and that the hospital records indicate that the services furnished were, either, intensive treatment services, admission and related services necessary for diagnostic study, or equivalent services.     I certify that the patient continues to need, on a daily basis, active treatment furnished directly by or requiring the supervision of inpatient psychiatric facility personnel.   I estimate 7-14 days of hospitalization is necessary for proper treatment of the patient. My plans for post-hospital care for this patient are  TBD     DANIEL Bennett CNP    -     6/16/2023     -     13:45 PM    Total time  35 minutes with > 50%spent on coordination of cares and psycho-education.    This note was created with help of Dragon dictation system. Grammatical / typing errors are not intentional.    DANIEL Bennett CNP

## 2023-06-16 NOTE — PLAN OF CARE
"  Problem: Adult Behavioral Health Plan of Care  Goal: Plan of Care Review  Outcome: Progressing   Goal Outcome Evaluation:                    Patient alert this morning on approach. She is hard of hearing, however, verbally assertive about her needs. Patient with help of PA staff/SIO, she had about 35% of her breakfast and about 474 mL of fluids from her breakfast tray and med administration.   At about 0905, writer informed by a PA that SIO staff noted that patient expressed raising heart rate. Writer went right into patient's room and got a set of vitals. Vitals while supine at 0910 were 98.5 oral temp, 11 RR, 75 HR and 88/58 BP.    Last rechecked vitals noted below after waiting about 2 minutes and while patient was siting up on side of bed.   /71 (BP Location: Right leg, Patient Position: Supine)   Pulse 94   Temp 98.5  F (36.9  C) (Oral)   Resp 18   Ht 1.626 m (5' 4\")   Wt 48.9 kg (107 lb 12.9 oz)   LMP  (LMP Unknown)   SpO2 100%   BMI 18.50 kg/m      Rechecks of BP done after provider notified with help of another RN and PA, patient given sips of water after elevating her head and later elevating her feet while her head was lower. BP increased and patient reported that she was feeling okay and she has been resting in bed with no other complaints.    New order for Protonix 40 mg for before breakfast scheduled for 1030. Writer called the pharmacist and was told to give med before lunch since patient had eaten breakfast.    PA noted that patient poured her water in frustration. She also needed more encouragement before taking the lunchtime medication. She said no a few times sating she did not want more medication, won't take her medications. She finally took it with another staff PA approach using apple sauce.     Patient threw up once, after eating about 2-3 bites of her mashed potato, however, she also had a cup of pudding and did not vomit after eating it.     Patient remains on SIO 5 feet for " SPENCER and SI.

## 2023-06-16 NOTE — PROGRESS NOTES
"  Gastroenterology Consultation      Date of Admission:  6/7/2023  Reason for Admission: Suicidal ideation  Date of Consult  6/16/2023   Requesting Physician:  Vanita Mahajan,*      Video-Visit Details  Type of service:  Video Visit    Video Start Time: 1:13 PM  Video End Time:  1:20 PM    Originating Location (pt. Location): Other [facility] ([facility] [place])    Distant Location (provider location):  [facility] ([place])  Platform used: Other: [facility]             ASSESSMENT AND RECOMMENDATIONS:   Assessment:  Bhavana Marr is a 82 year old female with a PMH significant for HLD, asthma, depression, anxiety, borderline personality, bipolar disorder, GERD, osteoporosis, and gastric bypass (reportedly 27 years ago, unclear if RNY or other type/no CT imaging nor op note available) who was initially adm to [facility] on 5/5/23, on 6/7 transferred to [facility] (behavioral/psych) due to worsening depression with possible SI as well as FTT, hypoglycemia and hypotension.  GI consulted 6/16 to assist with evaluation and assist with management of reflux/regurgitation as having difficulty maintaining hydration/nutrition.    #Reflux/regurgitation vs N/V vs eating disorder (self induced emesis)  #Severe protein-calorie malnutrition  #Chronic constipation with overflow diarrhea  #Possible esophageal dysphagia  -Reported chronic 10 year hx of \"issues\" with eating/poor po with chronic N/V vs regurgitation since s/p gastric bypass 27 years ago but possibly recently worsening since 4/2023 (per [facility] notes).  Per medicine provider/RN observations, describe pt to more have \"spitting up\" episodes rather than emesis and have also witnessed self-induced N/V by staff this adm. Now with weight loss 13% loss x2 months (124# in April, now 107#) with poor po this adm and intermittent hypotension.  -Previously followed in [facility] clinic (last seen 4/18/2023) and had planned for OP EGD on " 5/22/23 to evaluate N/V with hx of gastric bypass and possible strictured outlet or anastomotic ulcer disease. Unfortunately was unable to complete due to ongoing frequent hospitalizations for psych adm r/t SI.  -SLP consulted and attempted eval 6/15 but unable to complete eval as pt with poor participation/comprehension.  CXR without e/o pneumonia and low concern for aspiration at this time.  -AXR 6/11 with e/o large stool burden and LBM documented this adm on 6/10. Current bowel med regimen senna-docusate PRN (none administered this adm) and per review of MAR, no enemas nor suppositories given this adm.  -Regular diet with Nepro (renal supplement?) and 2 L fluid restriction (per medicine, hyponatremia with c/f SIADH r/t depakote vs hypovolemic hyponatremia with poor po intakes.  -Per review of meds, noted on iron (PTA on daily and currently changed to every other day, no recent iron studies in EMR) and Calcium carb which likely exacerbating chronic constipation; additionally on Olanzapine with reported ADR of constipation (4-11%) but pt has been on this medications long term (since at least 2018).  Noted just started on memantine this adm on 6/12 for dementia with ADR of constipation (3% to 5%) and vomiting (2% to 3%) but pt having worsening sx even prior to start of this med.    Based on all above, sx seem more c/w regurgitation > emesis and suspect DDx multifactorial and most likely includes esophageal dysphagia (d/t outlet stricture or post gastric bypass anastomotic ulcer), uncontrolled GERD (as currently on H2B rather than PPI), acute on chronic constipation/mechanical obstruction and/or psychiatric etiology. Low suspicion for pancreatitis (given normal lipase), biliary etiology (no imaging to evaluate GB/biliary tract but noted LFTs normal and no abd pain associated with po) nor CNS etiology (given head CT this adm negative for any acute or concerning CNS pathology).    Recommendations:  -Esophagram with  Upper GI series to evaluate for GI stricture.  -Change famotidine to PO Protonix 40 mg once daily (as anticipate cannot get PIV to have IV).  -Bowel med regimen: Enema (fleet or pink lady) then schedule Miralax 17 gm BID, senna-docusate 1-2 tablets BID.  -STRICT documentation of stool outputs.  -Hold iron supplements until improved stools/sx regurgitation/N/V.  -Recheck iron studies with next planned labs to evaluate need to continue iron supplementation (po vs infusions pending approp per psych status)  -Diet textures/liquids per SLP.  Agree with small/frequent meals with PO supplements.  -Liberalize po fluid restriction if possible.  -Appreciate RD involvement and assistance with supplementation/evaluation of PO.  -Analgesia/Antiemetics per primary team.  -If sx without improvement to all above interventions and esophagram/upper GI negative, would consider CT C/A/P to evaluate for acute pathology/malignancy in the setting of weight loss.  -If esophagram/upper GI concerning for stricture, consider transfer to [place] this admission for EGD with MAC to further evaluate for etiology.  -Discussed with primary medicine team.    Gastroenterology follow up recommendations:   -TBD pending clinical course.   -Anticipate may need follow up with [facility] OP (pending inpatient evaluation).    Thank you for involving us in this patient's care. Please do not hesitate to contact the GI service with any questions or concerns.     Pt seen and care plan discussed with Dr. Grimm, GI staff physician.    Overall time spent on the date of this encounter preparing to see the patient (including chart review of available notes, clinical status events, imaging and labs); obtaining and/or reviewing separately obtained history; ordering medications, tests or procedures; communicating with other health care professionals; and documenting the above clinical information in the electronic medical record was 90 minutes.    Milly Gerard,  "ERICK  GI Service  [facility]  Text Page  -------------------------------------------------------------------------------------------------------------------       Reason for Consultation:   \"Pt experiencing frequent emesis of small amounts and having difficulty maintaining adequate nutrition & hydration due to reflux/regurgitation\"         History of Present Illness:   Bhavana Marr is a 82 year old female with a PMH significant for HLD, asthma, depression, anxiety, borderline personality, bipolar disorder, GERD, osteoporosis, and gastric bypass (reportedly 27 years ago, unclear if RNY or other type/no CT imaging nor op note available) who was initially adm to  [facility] on 5/5/23, on 6/7 transferred to [facility] (behavioral/psych) due to worsening depression with possible SI as well as FTT, hypoglycemia and hypotension.  GI consulted 6/16 to assist with evaluation and assist with management of reflux/regurgitation as having difficulty maintaining hydration/nutrition.    Patient seen and examined at 13:10. History is obtained from patient via virtual visit but minimally participating in conversation and eventually rolled over in bed/put covers up and wouldn't speak with provider.  Reported \"throwing up\" after attempts at taking pills but also reports after just liquids and also with solids.  Unclear when she last had a BM.  Does not believe she has had an EGD ever.  Denies any abd pain.    Per medicine provider report, reports that pt has benign abd exam today and this adm.  Relayed reports from RNs that appears \"throwing up\" appears to be more consistent with spitting up/regurgtiation.  Unclear when last BM was.    Previous Procedures:  No prior EGD on record.  Pt reports no prior EGD (but question if pt accurate historian)    8/22/2018: Upper GI and VSS ([facility])  FINDINGS:  The examination was performed in conjunction with speech pathology and was videotaped. Please " refer to [facility] for the speech pathology report. Swallowing appeared normal with thin barium, puree, barium-coated cracker and other barium-coated or mixed products.  There is no aspiration, penetration or significant retention. Peristalsis and motility are normal.    IMPRESSION:  Video swallow within normal limits.            Past Medical History:   Reviewed and edited as appropriate  Past Medical History:   Diagnosis Date     Arthritis 1998     Asthma      Asthma 6/8/2018     Blood transfusion 2000     Borderline personality disorder (H)      Chronic sinus infection      Depressive disorder      GERD (gastroesophageal reflux disease)      History of blood transfusion      Hyperlipidemia      MDD (major depressive disorder)             Past Surgical History:   Reviewed and edited as appropriate   Past Surgical History:   Procedure Laterality Date     ABDOMEN SURGERY      2 fatty tumors removed     APPENDECTOMY       BIOPSY       COLONOSCOPY       GASTRIC BYPASS       GI SURGERY  2000    gastric bypass     GYN SURGERY      complete hysterectomy     HC CORRECT BUNION,DOUBLE OSTEOTOMY      Description: Hallux Valgus (Bunion) Correction;  Recorded: 05/07/2012;     ORTHOPEDIC SURGERY      both hip,two foot surgies on each foot     ORTHOPEDIC SURGERY      right elbow     Z APPENDECTOMY      Description: Appendectomy;  Recorded: 05/07/2012;     [facility] [place] TOTAL ABDOM HYSTERECTOMY      Description: Total Abdominal Hysterectomy;  Recorded: 05/07/2012;            Social History:   The patient lives [place] ([facility]). .         Family History:   Patient's family history is reviewed today and is non-contributory    Family History   Problem Relation Age of Onset     Depression Maternal Aunt      Depression Maternal Uncle      Depression Paternal Aunt      Depression Paternal Uncle            Allergies:   Reviewed and edited as appropriate     Allergies   Allergen Reactions     Nsaids      Gastric bypass surgery               Medications:     Current Facility-Administered Medications   Medication     acetaminophen (TYLENOL) tablet 650 mg     alum & mag hydroxide-simethicone (MAALOX) suspension 30 mL     budesonide-formoterol (SYMBICORT) 80-4.5 MCG/ACT Inhaler 2 puff     calcium carbonate (TUMS) chewable tablet 500 mg     calcium carbonate 500 mg (elemental) (OSCAL) tablet 500 mg     childrens multivitamin with iron (FLINTSTONES COMPLETE) chewable tablet 2 tablet     cyanocobalamin (VITAMIN B-12) tablet 100 mcg     famotidine (PEPCID) tablet 20 mg     ferrous sulfate (FEROSUL) tablet 325 mg     FLUoxetine (PROzac) solution 10 mg     hydrOXYzine (ATARAX) syrup 25 mg     melatonin tablet 3 mg     memantine (NAMENDA) half-tab 2.5 mg     mirtazapine (REMERON SOL-TAB) ODT tab 15 mg     OLANZapine zydis (zyPREXA) ODT half-tab 2.5 mg     OLANZapine zydis (zyPREXA) ODT tab 10 mg     OLANZapine zydis (zyPREXA) ODT tab 5 mg     ondansetron (ZOFRAN) tablet 4 mg     prochlorperazine (COMPAZINE) tablet 5 mg     senna-docusate (SENOKOT-S/PERICOLACE) 8.6-50 MG per tablet 1 tablet     simvastatin (ZOCOR) tablet 40 mg     valACYclovir (VALTREX) tablet 500 mg     Vitamin D3 (CHOLECALCIFEROL) tablet 25 mcg             Review of Systems:     A complete review of systems was performed and is negative except as noted in the HPI           Physical Exam:                      Wt:   Wt Readings from Last 2 Encounters:   06/13/23 48.9 kg (107 lb 12.9 oz)   05/30/23 50.4 kg (111 lb 3.2 oz)     (limited per virtual visit and pt participation)  General: thin-appearing, disheveled, elderly female who minimally participates in virtual visit but appears in NAD.  Only intermittently answering questions, refusing to interact with provider/closing eyes frequently and eventually rolled over in bed and no longer participated in visit.  HEENT: Head is AT/NC. Sclera anicteric. No conjunctival injection.   Lungs: Non-labored breathing on RA.   Heart: (Unable to  examine)  Abdomen: Pt refused to self-palpate abd/unable to assess.  MSK: Moving b/l extremities spontaneously.  Moderate b/l temporal, clavicular, UE/LE muscle wasting  Skin: No jaundice or rash  Neurologic: Grossly non-focal.  CN 2-12 grossly intact.   Psych: flat affect, poor attention and disengaged           Data:   Labs and imaging below were independently reviewed and interpreted    LAB WORK:    BMP  Recent Labs   Lab 06/15/23  0921 06/13/23  0746 06/12/23  0800 06/10/23  1309   * 133* 129* 129*   POTASSIUM 4.0 3.8 4.0 3.9   CHLORIDE 97* 96* 93* 95*   LAWANDA 9.7 9.7 10.0 9.8   CO2 26 26 24 27   BUN 15.2 16.0 26.3* 14.3   CR 0.74 0.73 0.95 0.75   * 102* 201* 99     CBC  Recent Labs   Lab 06/15/23  0921 06/13/23  0746 06/10/23  1309   WBC 6.4 10.4 11.5*   RBC 4.42 4.11 4.15   HGB 13.4 12.6 12.9   HCT 41.3 37.7 37.8   MCV 93 92 91   MCH 30.3 30.7 31.1   MCHC 32.4 33.4 34.1   RDW 12.9 13.0 12.9    262 249     INRNo lab results found in last 7 days.  LFTs  Recent Labs   Lab 06/15/23  0921 06/10/23  1309   ALKPHOS 44 49   AST 16 19   ALT 12 13   BILITOTAL 0.4 0.3   PROTTOTAL 6.2* 6.2*   ALBUMIN 3.4* 3.9      PANC  Recent Labs   Lab 06/11/23  1421   LIPASE 21       IMAGING:  Results for orders placed or performed during the hospital encounter of 06/07/23   CT Head w/o contrast*    Impression    IMPRESSION:   1. No acute intracranial pathology.   2. Benign-appearing lytic lesion in the lateral right parietal bone,  most likely an incidental venous lake. Comparison with previous  head/brain imaging would be useful, if available to ensure stability.  If no prior imaging is available, recommend follow-up head CT or brain  MRI in 3-6 months.    SAHRA HOWARD MD         SYSTEM ID:  [id number]   XR Abdomen 1 View    Impression    IMPRESSION:   Nonobstructive bowel gas pattern. Large colonic stool burden.    I have personally reviewed the examination and initial interpretation  and I agree with the  findings.    VALENTIN CORTEZ MD         SYSTEM ID:  [id number]   X-ray Chest 2 vws*    Impression    IMPRESSION:   1.  Mild hazy/streaky basilar/perihilar mixed pulmonary opacities,  B-lines are present. Findings are consistent with pulmonary edema with  possible superimposed atelectasis. No focal consolidation.  2.  Prominent pulmonary vasculature consistent with pulmonary artery  hypertension.  3.  No pneumothorax.    I have personally reviewed the examination and initial interpretation  and I agree with the findings.    KYLEI DELGADO MD         SYSTEM ID:  [id number]              =======================================================================

## 2023-06-16 NOTE — PLAN OF CARE
"  Problem: Sleep Disturbance  Goal: Adequate Sleep/Rest  Outcome: Progressing   Goal Outcome Evaluation:    Patient continues to be on SIO due to risk for self-injury and suicide. She is on 1500 Fluid Restriction and Strict Intake and Output. She is using a medical bed due to history of osteoporosis.    Patient was assisted to the bathroom by the SIO staff after she verbalized that she wanted to pee. But as soon as she saw the urine measuring hat on the toilet bowl, despite the explanation given to her about the rationale of putting the hat, she refused to sit and pee and went back to bed. She said she would not pee with the hat on the toilet bowl. She appeared restless, kept on turning and tossing in bed  and could hardly sleep. She was also noted to be coughing every now and then.  Melatonin 3 mg. was given @ 0022 PRN for sleep. Hydroxyzine 25 mg. given @ 0036 PRN for anxiety. Patient was asked to get up and pee before she will fall asleep and she said,\"No.\" Slept @ 0100.    Patient admits that she still feels depressed but refused to rate her depression. She is quite irritable and uncooperative.     Total Intake: 100 ml water     Total Output: 150 ml clear alberto colored urine.    Slept for a total of  6.25 hours.     She is scheduled for GI Adult Luminal Consult and UA/UC today.                               "

## 2023-06-16 NOTE — PLAN OF CARE
Problem: Adult Behavioral Health Plan of Care  Goal: Optimized Coping Skills in Response to Life Stressors  Outcome: Not Progressing   Goal Outcome Evaluation:  Pt out before supper ate a small amount of pudding but had an emesis after eating . She refused to come and eat for supper . Pt states she is anxious and her depressed  high . She does admits having visual and auditory hallucination and having thought of wanting to hurt herself but contracts for safety . She told writer she did something bad in the past but refused to talk about it . Writer asked if she would talk to her provider and she agreed promising to talk to her provider tomorrow .   Pt was visited by Speech therapist see therapist notes

## 2023-06-16 NOTE — PROGRESS NOTES
"Clinical Swallow Evaluation with Speech Pathology     06/15/23 1760   Appointment Info   Signing Clinician's Name / Credentials (SLP) Carole Booth MA, CCC-SLP   General Information   Onset of Illness/Injury or Date of Surgery 06/07/23   Referring Physician Galilea Olguin APRN CNP   Pertinent History of Current Problem   Per H&P note, \"This is a 82 year old female with history of Suicidal ideation, borderline personality disorder, major depressive disorder.  Patient medical history includes history of gastric bypass in 2000, arthritis, GERD, hyponatremia, hyperlipidemia, osteoporosis, asthma, hx of bilateral hip replacements, and mitral valve regurgitation. Pt current legal status is committed with [facility]. Pt was admitted to [facility] from 5/5/2023 - 6/7/2023. She presented from her care facility with decreased oral intake, low glucose level, low blood pressure, depression, and suicidal ideations. Pt was also recently hospitalized at [facility] from 4/21/2023 to 4/28/2023 with issues including suicidal ideation with behavioral disturbance. Per chart review pt has been living at a long term care facility for the past 5 years and her mental health has been stable during this time period per collateral information obtained during hospitalization at [facility] from her LT facility nursing staff. In approximately early February 2023, pt started to exhibit behaviors including: unable to keep any solid food down, screaming about wanting to die, threatening to walk out into traffic, and threatening to starve herself. Pt was compliant with prescribed medications due to staff administering medications which included: sertraline, hydroxyzine, olanzapine, and mirtazapine.\"     Per comments in consult for Speech Therapy, \"Pt frequently vomiting small amounts and concern for pt aspirating.\"     General Observations Patient sitting at table, picking at dinner tray upon arrival.   Type of " "Evaluation   Type of Evaluation Swallow Evaluation   Oral Motor   Oral Musculature unable to assess due to poor participation/comprehension   Cough/Swallow/Gag Reflex (Oral Motor)   Volitional Throat Clear/Cough (Oral Motor) unable/difficult to assess   Volitional Swallow (Oral Motor) unable/difficult to assess   Vocal Quality/Secretion Management (Oral Motor)   Vocal Quality (Oral Motor) WFL   Secretion Management (Oral Motor) WNL   General Swallowing Observations   Past History of Dysphagia   Patient participated in a video swallow study at [facility] [employer] on 8/22/18. Evaluating SLP's clinical impressions/recommendations were as follows: \"There was premature spillage into the vallecula and pyriform sinuses on mixed textures with thin liquids. Also consistent trace-transient penetration of the larynx on thin liquids, but it was consistently ejected from the airway. Otherwise normal results. I recommended taking pills in puree, avoiding mixed textures, and to take small sips with thin liquids.\"     Respiratory Support (General Swallowing Observations) none   Current Diet/Method of Nutritional Intake (General Swallowing Observations, NIS) regular diet;thin liquids (level 0)   Swallowing Evaluation Clinical swallow evaluation   Clinical Swallow Evaluation   Feeding Assistance set up only required   Clinical Swallow Evaluation Textures Trialed thin liquids   Clinical Swallow Eval: Thin Liquid Texture Trial   Mode of Presentation, Thin Liquids straw;self-fed   Volume of Liquid or Food Presented 4 ounces   Oral Phase of Swallow WFL   Pharyngeal Phase of Swallow intact  (No overt s/sx of aspiration)   Diagnostic Statement No overt clinical s/sx of aspiration observed with self-administered sips of thin liquid taken by straw.   Esophageal Phase of Swallow   Patient reports or presents with symptoms of esophageal dysphagia Yes   Esophageal comments Per discharge summary from [facility] dated 6/7/23: \"Was planned for " "outpatient UGI endoscopy (5/22) for nausea/vomiting with h/o gastric bypass (was seen in clinic by Yessi 4/18/23); currently tolerating PO without significant G.I. issues, can reschedule EGD with the PCP as outpatient on follow up.\"   Swallowing Recommendations   Diet Consistency Recommendations regular diet;thin liquids (level 0)   Supervision Level for Intake 1:1 supervision needed  (To encourage intake and monitor for reflux/regurgitation)   Mode of Delivery Recommendations bolus size, small;slow rate of intake   Medication Administration Recommendations, Swallowing (SLP) As tolerated   Comment, Swallowing Recommendations Recommend esophagram and/or GI consult to evaluate for possible esophageal stricture and/or dysmotility issue(s).   Clinical Impression   Criteria for Skilled Therapeutic Interventions Met (SLP Eval) Evaluation only   SLP Diagnosis Esophageal dysphagia   Clinical Impression Comments   Clinical swallow evaluation completed per provider order. Study was limited to sips of thin liquid only, as patient refused any solid foods. Prior to SLP's arrival, she had taken a few small bites of mashed potatoes and maryanne food cake. No overt clinical s/sx of aspiration observed with self-administered sips of liquid taken by straw.      Approximately one minute after taking her last sip, patient began to purposely drool/spit on her shirt. Shortly after doing so, she gagged and experienced regurgitation of most of the food and liquid that she had recently consumed. This did not appear to be a true emesis, as no stomach acid was noted. It was clear fluid with white food particles in it. When asked if this happens frequently when she eats/drinks, patient stated, \"All the time.\" She reports that it has been happening for over 10 years. Notably, patient is very thin. As staff walked her back to her room, her pants slid off her hips and fell to the floor.     Pharyngeal dysphagia is not suspected due to absence of " clinical s/sx of aspiration. Additionally, CXR completed this PM was not suggestive of aspiration pneumonia. Suspect that patient's dysphagia is esophageal in nature. Per review of EMR, she was scheduled to have an EGD on 5/22/23, but this was cancelled. It was recommended that she follow up with GI as an outpatient, but she has required frequent hospitalizations since April.     Anticipate that patient would benefit from an esophagram, but question whether she would participate due to current cognitive behavioral status. Recommend consideration of GI consult. Until then, consider starting her on a PPI as appropriate. Speech Therapy will sign off at this time, as patient is unlikely to benefit from diet modification or compensatory swallow strategies until esophageal issues are addressed.     SLP Total Evaluation Time   Eval: oral/pharyngeal swallow function, clinical swallow Minutes (14748) 10   SLP Discharge Planning   SLP Plan Ongoing Speech Therapy is not indicated at this time. Will complete current order. Please re-consult as appropriate after esophageal issues have been addressed.   SLP Discharge Recommendation   (Per treatment team)   SLP Brief overview of current status  Patient appears appropriate to continue current diet of Regular textures and thin liquids. Do not anticipate that she will be able to maintain adequate nutrition & hydration if frequent reflux/regurgitation continues. Recommend consideration of GI consult.   Total Session Time   Total Session Time (sum of timed and untimed services) 10

## 2023-06-17 PROCEDURE — 124N000003 HC R&B MH SENIOR/ADOLESCENT

## 2023-06-17 PROCEDURE — 250N000013 HC RX MED GY IP 250 OP 250 PS 637: Performed by: PSYCHIATRY & NEUROLOGY

## 2023-06-17 PROCEDURE — 250N000013 HC RX MED GY IP 250 OP 250 PS 637

## 2023-06-17 PROCEDURE — 250N000011 HC RX IP 250 OP 636

## 2023-06-17 PROCEDURE — 250N000013 HC RX MED GY IP 250 OP 250 PS 637: Performed by: PHYSICIAN ASSISTANT

## 2023-06-17 RX ADMIN — ONDANSETRON 4 MG: 4 TABLET ORAL at 16:27

## 2023-06-17 RX ADMIN — FLUOXETINE HYDROCHLORIDE 10 MG: 20 SOLUTION ORAL at 08:34

## 2023-06-17 RX ADMIN — SENNOSIDES 8.6 MG: 8.6 TABLET ORAL at 08:33

## 2023-06-17 RX ADMIN — SIMVASTATIN 40 MG: 40 TABLET, FILM COATED ORAL at 21:18

## 2023-06-17 RX ADMIN — OLANZAPINE 2.5 MG: 5 TABLET, ORALLY DISINTEGRATING ORAL at 08:27

## 2023-06-17 RX ADMIN — POLYETHYLENE GLYCOL 3350 17 G: 17 POWDER, FOR SOLUTION ORAL at 21:18

## 2023-06-17 RX ADMIN — PANTOPRAZOLE SODIUM 40 MG: 40 TABLET, DELAYED RELEASE ORAL at 05:32

## 2023-06-17 RX ADMIN — OLANZAPINE 10 MG: 10 TABLET, ORALLY DISINTEGRATING ORAL at 21:18

## 2023-06-17 RX ADMIN — Medication 500 MG: at 08:33

## 2023-06-17 RX ADMIN — FAMOTIDINE 20 MG: 20 TABLET ORAL at 08:33

## 2023-06-17 RX ADMIN — MELATONIN TAB 3 MG 3 MG: 3 TAB at 00:35

## 2023-06-17 RX ADMIN — ONDANSETRON 4 MG: 4 TABLET ORAL at 12:15

## 2023-06-17 RX ADMIN — SENNOSIDES 8.6 MG: 8.6 TABLET ORAL at 21:18

## 2023-06-17 RX ADMIN — MIRTAZAPINE 15 MG: 15 TABLET, ORALLY DISINTEGRATING ORAL at 21:18

## 2023-06-17 RX ADMIN — VALACYCLOVIR 500 MG: 500 TABLET, FILM COATED ORAL at 08:32

## 2023-06-17 RX ADMIN — Medication 2.5 MG: at 08:34

## 2023-06-17 RX ADMIN — Medication 2.5 MG: at 21:29

## 2023-06-17 RX ADMIN — ONDANSETRON 4 MG: 4 TABLET ORAL at 05:32

## 2023-06-17 RX ADMIN — Medication 25 MCG: at 08:33

## 2023-06-17 RX ADMIN — Medication 100 MCG: at 08:33

## 2023-06-17 ASSESSMENT — ACTIVITIES OF DAILY LIVING (ADL)
ADLS_ACUITY_SCORE: 76

## 2023-06-17 NOTE — PLAN OF CARE
"  Problem: Adult Behavioral Health Plan of Care  Goal: Plan of Care Review  Outcome: Progressing  Flowsheets (Taken 6/16/2023 1645)  Patient Agreement with Plan of Care: (\"Nothing is helping. Everyday i feel horrible.\") disagrees (describe)   Goal Outcome Evaluation:    Plan of Care Reviewed With: patient    Pt has been observed isolating in room but often she has been out of her room, seen interacting with peers and staff in lounge and hallway with bright affect and calm mood. Occasionally she was observed with tense mood. Pt has been cooperative with staff with nursing cares, intermittently she was hesitant in doing things but with encouragement she did most of it. She denied n/v, she has been frequently to the bathroom and reports peeing, denies c/o pain, reports anxiety and depression on a scale of 6/10, she denied all other MH symptoms. Pt started on senna and miralax first dose tonight for constipation, iron supplements were held by provider, pt continues on FR 2000, total fluid intake this shift was 500 ml and food intake less than 25%, pt has several unmeasured urine output in addition to 30 ml that was collected for UA/UC. UA/UC was collected clean catch and sent to lab late this evening. Urinalysis completed, urine culture in progress. Pt was med compliant and contracted for safety, no behavior outburst this shift. She continues on SIO. Pt VSS    As per day shift nurse, radiology called and reported today's scheduled GI procedure was delayed till Monday 1pm because of N/V. Pt need to be NPO 8hrs prior to procedure per radiology report.    "

## 2023-06-17 NOTE — PLAN OF CARE
Problem: Sleep Disturbance  Goal: Adequate Sleep/Rest  Outcome: Not Progressing   Goal Outcome Evaluation:               Received in bed resting, pt has a medical bed to aid with mobility with HOB elevated to prevent reflux. Pt remains on SIO for self injury risk related to unsafe behaviors.  Calm and quiet but unable to sleep, offered prn sleep medication @2345 but pt declined, re approached at 0030 and pt was willing to take the medication after explanations.  0035 Melatonin 3 mg given.Fell asleep at 0145   Intake 60 ml  Output : voided x 2  Initially declined to take scheduled medications, stated she does not need it, took it after it was explained to her what the medications are for.  Pushed fluids, but only took sips of water.  Slept for 6 hours

## 2023-06-17 NOTE — PLAN OF CARE
Assessment/Intervention/Current Symptoms and Care Coordination  - chart review  - team meeting - Team reported that pt continues to be on SIO due to risk for self-injury and suicide.   - team rounds/pt interview addressed patient needs/concerns  - Current Symptoms include the following: anxiety      Discharge Plan or Goal  Pending stabilization & development of a safe discharge plan.  Considerations include:  [facility]     Barriers to Discharge   Patient requires further psychiatric stabilization due to current symptomology     Referral Status   None     Legal Status  Patient is under MI commitment in [facility]  Pt will need PD upon discharge.

## 2023-06-18 LAB — BACTERIA UR CULT: NORMAL

## 2023-06-18 PROCEDURE — 250N000013 HC RX MED GY IP 250 OP 250 PS 637: Performed by: PHYSICIAN ASSISTANT

## 2023-06-18 PROCEDURE — 250N000013 HC RX MED GY IP 250 OP 250 PS 637

## 2023-06-18 PROCEDURE — 250N000013 HC RX MED GY IP 250 OP 250 PS 637: Performed by: PSYCHIATRY & NEUROLOGY

## 2023-06-18 PROCEDURE — 124N000003 HC R&B MH SENIOR/ADOLESCENT

## 2023-06-18 PROCEDURE — 250N000011 HC RX IP 250 OP 636

## 2023-06-18 RX ORDER — BISACODYL 10 MG
10 SUPPOSITORY, RECTAL RECTAL DAILY PRN
Status: DISCONTINUED | OUTPATIENT
Start: 2023-06-18 | End: 2023-07-09

## 2023-06-18 RX ADMIN — Medication 100 MCG: at 08:03

## 2023-06-18 RX ADMIN — OLANZAPINE 2.5 MG: 5 TABLET, ORALLY DISINTEGRATING ORAL at 07:42

## 2023-06-18 RX ADMIN — MIRTAZAPINE 15 MG: 15 TABLET, ORALLY DISINTEGRATING ORAL at 21:27

## 2023-06-18 RX ADMIN — ONDANSETRON 4 MG: 4 TABLET ORAL at 11:35

## 2023-06-18 RX ADMIN — OLANZAPINE 10 MG: 10 TABLET, ORALLY DISINTEGRATING ORAL at 21:27

## 2023-06-18 RX ADMIN — Medication 500 MG: at 08:03

## 2023-06-18 RX ADMIN — SENNOSIDES 8.6 MG: 8.6 TABLET ORAL at 08:03

## 2023-06-18 RX ADMIN — POLYETHYLENE GLYCOL 3350 17 G: 17 POWDER, FOR SOLUTION ORAL at 08:03

## 2023-06-18 RX ADMIN — Medication 25 MCG: at 08:03

## 2023-06-18 RX ADMIN — ONDANSETRON 4 MG: 4 TABLET ORAL at 05:24

## 2023-06-18 RX ADMIN — POLYETHYLENE GLYCOL 3350 17 G: 17 POWDER, FOR SOLUTION ORAL at 19:25

## 2023-06-18 RX ADMIN — FAMOTIDINE 20 MG: 20 TABLET ORAL at 07:37

## 2023-06-18 RX ADMIN — SIMVASTATIN 40 MG: 40 TABLET, FILM COATED ORAL at 21:27

## 2023-06-18 RX ADMIN — Medication 2.5 MG: at 19:25

## 2023-06-18 RX ADMIN — SENNOSIDES 8.6 MG: 8.6 TABLET ORAL at 19:25

## 2023-06-18 RX ADMIN — VALACYCLOVIR 500 MG: 500 TABLET, FILM COATED ORAL at 08:03

## 2023-06-18 RX ADMIN — ONDANSETRON 4 MG: 4 TABLET ORAL at 17:07

## 2023-06-18 RX ADMIN — Medication 2 TABLET: at 08:03

## 2023-06-18 RX ADMIN — BISACODYL 10 MG: 10 SUPPOSITORY RECTAL at 13:40

## 2023-06-18 RX ADMIN — PANTOPRAZOLE SODIUM 40 MG: 40 TABLET, DELAYED RELEASE ORAL at 05:36

## 2023-06-18 ASSESSMENT — ACTIVITIES OF DAILY LIVING (ADL)
ADLS_ACUITY_SCORE: 76

## 2023-06-18 NOTE — PROGRESS NOTES
"Pt out on the milieu for meals and has been corporative with saff going to the bathroom to void without been told . Pt after being quiet for a while stated she wish someone will come into her room and shoot her . Writer want into talk to pt but she got loud and angry \"  and  asked writer to leave her  Room . Pt after saying that turned away from writer and yell out ' get out , I cant do anything in here you got guards watching me \" will continue to POC   "

## 2023-06-18 NOTE — PLAN OF CARE
Problem: Sleep Disturbance  Goal: Adequate Sleep/Rest  Outcome: Progressing  Intervention: Promote Sleep/Rest  Flowsheets (Taken 6/18/2023 0057)  Sleep/Rest Enhancement:    regular sleep/rest pattern promoted    noise level reduced    awakenings minimized    Patient was on SIO 5 feet for suicide risk and self-injury risk. She was using a medical bed due to pt has a history of osteoporosis. She was given her scheduled Zofran and Protonix while awake early this morning. Pt appeared to sleep 9.25 hours this shift. No nausea nor vomiting noted. No complaints of pain.

## 2023-06-19 ENCOUNTER — APPOINTMENT (OUTPATIENT)
Dept: GENERAL RADIOLOGY | Facility: CLINIC | Age: 83
DRG: 885 | End: 2023-06-19
Attending: PHYSICIAN ASSISTANT
Payer: COMMERCIAL

## 2023-06-19 LAB
ANION GAP SERPL CALCULATED.3IONS-SCNC: 8 MMOL/L (ref 7–15)
BUN SERPL-MCNC: 7.8 MG/DL (ref 8–23)
CALCIUM SERPL-MCNC: 9.5 MG/DL (ref 8.8–10.2)
CHLORIDE SERPL-SCNC: 104 MMOL/L (ref 98–107)
CREAT SERPL-MCNC: 0.7 MG/DL (ref 0.51–0.95)
DEPRECATED HCO3 PLAS-SCNC: 26 MMOL/L (ref 22–29)
FERRITIN SERPL-MCNC: 178 NG/ML (ref 11–328)
GFR SERPL CREATININE-BSD FRML MDRD: 86 ML/MIN/1.73M2
GLUCOSE SERPL-MCNC: 93 MG/DL (ref 70–99)
IRON BINDING CAPACITY (ROCHE): 179 UG/DL (ref 240–430)
IRON SATN MFR SERPL: 28 % (ref 15–46)
IRON SERPL-MCNC: 50 UG/DL (ref 37–145)
MAGNESIUM SERPL-MCNC: 2.2 MG/DL (ref 1.7–2.3)
PHOSPHATE SERPL-MCNC: 3.1 MG/DL (ref 2.5–4.5)
POTASSIUM SERPL-SCNC: 4.4 MMOL/L (ref 3.4–5.3)
SODIUM SERPL-SCNC: 138 MMOL/L (ref 136–145)

## 2023-06-19 PROCEDURE — 82728 ASSAY OF FERRITIN: CPT | Performed by: PHYSICIAN ASSISTANT

## 2023-06-19 PROCEDURE — 250N000013 HC RX MED GY IP 250 OP 250 PS 637: Performed by: PSYCHIATRY & NEUROLOGY

## 2023-06-19 PROCEDURE — 124N000003 HC R&B MH SENIOR/ADOLESCENT

## 2023-06-19 PROCEDURE — 250N000011 HC RX IP 250 OP 636

## 2023-06-19 PROCEDURE — 84100 ASSAY OF PHOSPHORUS: CPT | Performed by: PHYSICIAN ASSISTANT

## 2023-06-19 PROCEDURE — 74240 X-RAY XM UPR GI TRC 1CNTRST: CPT

## 2023-06-19 PROCEDURE — 36415 COLL VENOUS BLD VENIPUNCTURE: CPT | Performed by: PHYSICIAN ASSISTANT

## 2023-06-19 PROCEDURE — 83735 ASSAY OF MAGNESIUM: CPT | Performed by: PHYSICIAN ASSISTANT

## 2023-06-19 PROCEDURE — 250N000013 HC RX MED GY IP 250 OP 250 PS 637: Performed by: PHYSICIAN ASSISTANT

## 2023-06-19 PROCEDURE — 83550 IRON BINDING TEST: CPT | Performed by: PHYSICIAN ASSISTANT

## 2023-06-19 PROCEDURE — 99232 SBSQ HOSP IP/OBS MODERATE 35: CPT

## 2023-06-19 PROCEDURE — 250N000013 HC RX MED GY IP 250 OP 250 PS 637

## 2023-06-19 PROCEDURE — 74240 X-RAY XM UPR GI TRC 1CNTRST: CPT | Mod: 26 | Performed by: STUDENT IN AN ORGANIZED HEALTH CARE EDUCATION/TRAINING PROGRAM

## 2023-06-19 PROCEDURE — 74220 X-RAY XM ESOPHAGUS 1CNTRST: CPT

## 2023-06-19 PROCEDURE — 82310 ASSAY OF CALCIUM: CPT | Performed by: PHYSICIAN ASSISTANT

## 2023-06-19 RX ORDER — FLUOXETINE 20 MG/5ML
20 SOLUTION ORAL DAILY
Status: DISCONTINUED | OUTPATIENT
Start: 2023-06-20 | End: 2023-06-27

## 2023-06-19 RX ADMIN — Medication 100 MCG: at 14:51

## 2023-06-19 RX ADMIN — OLANZAPINE 10 MG: 10 TABLET, ORALLY DISINTEGRATING ORAL at 21:00

## 2023-06-19 RX ADMIN — MELATONIN TAB 3 MG 3 MG: 3 TAB at 01:16

## 2023-06-19 RX ADMIN — Medication 500 MG: at 14:50

## 2023-06-19 RX ADMIN — VALACYCLOVIR 500 MG: 500 TABLET, FILM COATED ORAL at 14:50

## 2023-06-19 RX ADMIN — Medication 2.5 MG: at 14:56

## 2023-06-19 RX ADMIN — ONDANSETRON 4 MG: 4 TABLET ORAL at 06:25

## 2023-06-19 RX ADMIN — Medication 2 TABLET: at 14:50

## 2023-06-19 RX ADMIN — MIRTAZAPINE 15 MG: 15 TABLET, ORALLY DISINTEGRATING ORAL at 21:00

## 2023-06-19 RX ADMIN — SIMVASTATIN 40 MG: 40 TABLET, FILM COATED ORAL at 21:02

## 2023-06-19 RX ADMIN — ONDANSETRON 4 MG: 4 TABLET ORAL at 14:50

## 2023-06-19 RX ADMIN — SENNOSIDES 8.6 MG: 8.6 TABLET ORAL at 14:56

## 2023-06-19 RX ADMIN — POLYETHYLENE GLYCOL 3350 17 G: 17 POWDER, FOR SOLUTION ORAL at 14:56

## 2023-06-19 RX ADMIN — ONDANSETRON 4 MG: 4 TABLET ORAL at 17:30

## 2023-06-19 RX ADMIN — POLYETHYLENE GLYCOL 3350 17 G: 17 POWDER, FOR SOLUTION ORAL at 20:56

## 2023-06-19 RX ADMIN — FAMOTIDINE 20 MG: 20 TABLET ORAL at 14:50

## 2023-06-19 RX ADMIN — Medication 25 MCG: at 14:50

## 2023-06-19 RX ADMIN — SENNOSIDES 8.6 MG: 8.6 TABLET ORAL at 20:57

## 2023-06-19 RX ADMIN — OLANZAPINE 2.5 MG: 5 TABLET, ORALLY DISINTEGRATING ORAL at 08:32

## 2023-06-19 RX ADMIN — PANTOPRAZOLE SODIUM 40 MG: 40 TABLET, DELAYED RELEASE ORAL at 06:25

## 2023-06-19 RX ADMIN — Medication 2.5 MG: at 20:57

## 2023-06-19 RX ADMIN — FLUOXETINE HYDROCHLORIDE 10 MG: 20 SOLUTION ORAL at 08:32

## 2023-06-19 ASSESSMENT — ACTIVITIES OF DAILY LIVING (ADL)
ADLS_ACUITY_SCORE: 66
DRESS: INDEPENDENT
ADLS_ACUITY_SCORE: 76
ORAL_HYGIENE: PROMPTS
LAUNDRY: UNABLE TO COMPLETE
HYGIENE/GROOMING: WITH ASSISTANCE
ADLS_ACUITY_SCORE: 76

## 2023-06-19 NOTE — PLAN OF CARE
Assessment/Intervention/Current Symptoms and Care Coordination  [facility] spoke with director of nursing, Josefina, at [facility] today to provide an update.  Josefina indicates that staff from the facility visited patient on the weekend.  Josefina is aware that patient is not stable for discharge yet.  She indicates she has been holding patient's bed since 5/5, when patient first went into the hospital.  She will be unable to hold the bed for very much longer but does want patient to be able to return when stable if they can still meet her needs.  She will contact us to let us know once the bed has been let go.  We would then need to re-refer when patient is stable.  Josefina indicates that patient did not have memory issues prior to their facility moving to a new building about 2-3 months before patient's hospitalization and that the level of depression is new as well.  We will continue to communicate with Josefina as needed.      Discharge Plan or Goal  Return to [facility] (formerly [facility]) when stable vs new placement if needed.      Barriers to Discharge   Patient's depression is significant and medication adjustments are ongoing in addition to medical/GI work-up.      Referral Status  None    Legal Status  Commitment [facility].  PD will be needed at discharge.

## 2023-06-19 NOTE — PLAN OF CARE
"  Problem: Adult Behavioral Health Plan of Care  Goal: Absence of New-Onset Illness or Injury  Outcome: Not Progressing  Intervention: Identify and Manage Fall Risk  Recent Flowsheet Documentation  Taken 6/18/2023 1835 by Jalyn Gaspar, RN  Safety Measures:   1:1 observation maintained   safety rounds completed  Taken 6/18/2023 1013 by Jalyn Gaspar, RN  Safety Measures:   1:1 observation maintained   safety rounds completed   Goal Outcome Evaluation:  Pt presenting bright and at times irritable asking staff to leave her room . Pt out for meals ate few spoons and left the table . She has taken a total of  500ml of fluid and voided 300 ml with episodes of incontinent of bladder . Pt was given suppository and had a medium BM . Pt endorse depression and anxiety and wishing she could die \" I am too old to be here \" she states . She denies auditory and visual hallucination                        "

## 2023-06-19 NOTE — PLAN OF CARE
"  Problem: Depressive Signs/Symptoms  Goal: Optimized Energy Level (Depressive Signs/Symptoms)  Outcome: Not Progressing  Intervention: Optimize Energy Level  Recent Flowsheet Documentation  Taken 6/19/2023 7538 by Negrito Cruz, RN  Activity (Behavioral Health):    activity adjusted per tolerance    up ad devan   Goal Outcome Evaluation:    Plan of Care Reviewed With: patient      Patient's mood is calm, affect is flat. Patient reports depression as high 10/10, also reports anxiety 10/10, when writer asked follow up questions regarding anxiety and depression, patient stated they have had depression for a long time and they are done answering questions. Other MH assessments not completed. Patient remained isolative to room this shift, currently NPO awaiting XR (Esophagram, Upper GI without KUB). No reports of nausea or emesis, pain or other concerns.  Returned to unit after XR, @ ~1330 ate mashed potatoes and drank 240 ml, voided 75ml.  Prozac increased   Medical bed to assist with mobility    /81 (BP Location: Right arm, Patient Position: Supine, Cuff Size: Adult Small)   Pulse 68   Temp 98.1  F (36.7  C) (Oral)   Resp 16   Ht 1.626 m (5' 4\")   Wt 48.7 kg (107 lb 5.8 oz)   LMP  (LMP Unknown)   SpO2 99%   BMI 18.43 kg/m      "

## 2023-06-19 NOTE — PROGRESS NOTES
"PSYCHIATRY  PROGRESS NOTE     DATE OF SERVICE   6/19/2023         CHIEF COMPLAINT   \"Not so hot.\"       SUBJECTIVE   Nursing reports:     Pt presenting bright and at times irritable asking staff to leave her room . Pt out for meals ate few spoons and left the table . She has taken a total of  500ml of fluid and voided 300 ml with episodes of incontinent of bladder . Pt was given suppository and had a medium BM . Pt endorse depression and anxiety and wishing she could die \" I am too old to be here \" she states . She denies auditory and visual hallucination       Patient was on SIO 5 feet for suicide and self-injury risk. She was using a medical bed due to pt has a history of osteoporosis. Pt was sleeping well this shift. She's NPO 8 hours before esophagram and upper GI with small bowel follow through. Pt was given a PRN Melatonin 3 mg at 1:16 AM. She appeared to have slept 5.5 hours. She voided x1, unmeasured. She took her scheduled Zofran and Protonix this morning. No nausea nor vomiting reported. Denied pain.        reports:    Assessment/Intervention/Current Symptoms and Care Coordination  - chart review  - team meeting  - team rounds/pt interview addressed patient needs/concerns  - Current Symptoms include the following: anxiety      Discharge Plan or Goal  Pending stabilization & development of a safe discharge plan.  Considerations include:  Hope Desmet     Barriers to Discharge   Patient requires further psychiatric stabilization due to current symptomology     Referral Status   None     Legal Status  Patient is under MI commitment in [facility]  Pt will need PD upon discharge.        OBJECTIVE   Met with pt in her hospital room on unit 3B with Dr. Pickett. Pt affect appears flat and guarded today. Pt observed lying in hospital bed with blankets pulled up to her chin. Patient reports mood as, \"not so hot.\"  Patient does not respond to any further questions and states, \"I am done " "talking now.  I don't want to see you.\" Pt then covers her face with blanket. Pt will continue on SIO monitoring which is in place due to patient reporting SI and ongoing confusion/impulsive behaviors. Plan for today will be to increase Prozac to 20 mg liquid daily to target ongoing depression symptoms. Also, continue Namenda 2.5 mg PO BID, Olanzapine ODT 2.5 mg tablet every morning, Olanzapine 10 mg ODT tablet at bedtime, and Mirtazapine 15 mg PO disintegrating tablet at HS.    Pt VS noted to be stable over the weekend as well as today with no episodes of hypotension per VS flowsheet. Patient has been experiencing ongoing hyponatremia however today 6/19/23 sodium level was WNL= 138 mmol/L. Patient continues to be on a 2 L fluid restriction per IM to treat hyponatremia; BMP ordered for every Monday and Thursday to monitor. IM has been following pt to evaluate hyponatremia and IM was notified on 6/15/23 of altered level of consciousness which presented as increased confusion compared to baseline since admission and increased agitation. Per IM provider pt's waxing and waning symptoms most likely secondary to a primary psychiatric disorder with high likelihood for underlying dementia; there is no clear reversible organic cause of her symptoms. Speech therapy was also consulted 6/15/23 and Chest Xray was also ordered to evaluate due to concern for aspiration with frequent emesis; chest xray did not show acute concern for aspiration. Per speech therapy recommendation: patient appears appropriate to continue current diet of Regular textures and thin liquids; recommended GI consult d/t difficuulty maintaining adequate nutrition & hydration if frequent reflux/regurgitation continues. GI was subsequently consulted and orders paced by GI provider for bowel regimen and esophagram and upper GI studies which were completed today. Per IM provider documentation, GI provider reports that results of esophagram and upper GI studies " indicate delayed emptying, but no concern for strictures; also if patient is able to tolerate oral intake, she can have follow-up EGD as outpatient. If unable to tolerate any oral intake a CT abdomen with contrast should be performed to rule out intra-abdominal pathology. Labs ordered by IM also completed today; IM to see pt tomorrow to f/u on test and lab results.  Provider will continue to monitor patient's ability to tolerate oral intake. Pt did have a bowel movement over the weekend per flow sheet documentation.     Dietitian is also following pt due to inadequate oral intake related to altered mentation, decreased appetite, and early satiety. Patient's goals for nutrition include: patient to consume at least 25-50% meals, 100% nutrition supplements, and for patient to maintain/gain weight. To support pt's dietary goal of maintaining/gaining weight provider placed patient care order for staff to promote patient eating small meals and snacks throughout the day; order also includes that pt needs supervision while eating to encourage intake and monitor for reflux/regurgitation. Pt weights will be monitored every Tuesday, Thursday, and Saturday; provider also ordered intake and output monitoring.  Patient weight on 6/17/2023 was 107 lb 5 oz; previous weight on 6/13/2023 was 107 lbs 12.9 oz.  Provider will page dietitian requesting consideration of ordering baby food or softer diet for patient as patient continues to have frequent small emesis after meals which staff report appears to be undigested food.        MEDICATIONS   Medications:  Scheduled Meds:    calcium carbonate  500 mg Oral Daily     childrens multivitamin with iron  2 tablet Oral Daily     cyanocobalamin  100 mcg Oral Daily     famotidine  20 mg Oral Daily     [Held by provider] ferrous sulfate  325 mg Oral Every Other Day     [START ON 6/20/2023] FLUoxetine  20 mg Oral Daily     memantine  2.5 mg Oral BID     mirtazapine  15 mg Orally disintegrating  "tablet At Bedtime     OLANZapine zydis  2.5 mg Oral QAM     OLANZapine zydis  10 mg Oral At Bedtime     ondansetron  4 mg Oral TID AC     pantoprazole  40 mg Oral QAM AC     polyethylene glycol  17 g Oral BID     sennosides  8.6 mg Oral BID     simvastatin  40 mg Oral At Bedtime     valACYclovir  500 mg Oral Daily     cholecalciferol  25 mcg Oral Daily     Continuous Infusions:  PRN Meds:.acetaminophen, alum & mag hydroxide-simethicone, bisacodyl, budesonide-formoterol, calcium carbonate, hydrOXYzine, melatonin, OLANZapine zydis, prochlorperazine, senna-docusate    Medication adherence issues: MS Med Adherence Y/N: No  Medication side effects: MEDICATION SIDE EFFECTS: no side effects reported   Benefit: Yes / No: Yes       ROS   Pertinent items are noted in HPI.       MENTAL STATUS EXAM   Vitals: /70 (BP Location: Right arm, Patient Position: Sitting, Cuff Size: Adult Regular)   Pulse 61   Temp 97.9  F (36.6  C) (Oral)   Resp 18   Ht 1.626 m (5' 4\")   Wt 48.7 kg (107 lb 5.8 oz)   LMP  (LMP Unknown)   SpO2 98%   BMI 18.43 kg/m      Appearance: Poorly groomed and Disheveled  Mood: \"Not so hot.\"  Affect: flat  was congruent to speech.  Suicidal Ideation: absent  Homicidal Ideation: PRESENT / ABSENT: absent   Thought process: difficult to follow and disorganized   Thought content: endorses preoccupations  Fund of Knowledge: average  Attention/Concentration: Poor  Language ability:  Intact  Memory:  Immediate recall impaired, Short-term memory impaired and Long-term memory impaired  Insight:  limited.  Judgement: limited  Orientation: oriented to self  Psychomotor Behavior: normal or unremarkable    Muscle Strength and Tone: MuscleStrength: Normal  Gait and Station: slightly stiff however steady with walker       LABS   Personally reviewed.      Latest Reference Range & Units 06/13/23 07:46 06/15/23 09:21 06/15/23 10:54 06/16/23 18:45 06/19/23 07:40 06/19/23 13:43 06/19/23 13:45   Sodium 136 - 145 mmol/L " 133 (L) 134 (L)   138     Potassium 3.4 - 5.3 mmol/L 3.8 4.0   4.4     Chloride 98 - 107 mmol/L 96 (L) 97 (L)   104     Carbon Dioxide (CO2) 22 - 29 mmol/L 26 26   26     Urea Nitrogen 8.0 - 23.0 mg/dL 16.0 15.2   7.8 (L)     Creatinine 0.51 - 0.95 mg/dL 0.73 0.74   0.70     GFR Estimate >60 mL/min/1.73m2 82 81   86     Calcium 8.8 - 10.2 mg/dL 9.7 9.7   9.5     Anion Gap 7 - 15 mmol/L 11 11   8     Magnesium 1.7 - 2.3 mg/dL     2.2     Phosphorus 2.5 - 4.5 mg/dL     3.1     Albumin 3.5 - 5.2 g/dL  3.4 (L)        Protein Total 6.4 - 8.3 g/dL  6.2 (L)        Alkaline Phosphatase 35 - 104 U/L  44        ALT 0 - 50 U/L  12        AST 0 - 45 U/L  16        Bilirubin Direct 0.00 - 0.30 mg/dL  <0.20        Bilirubin Total <=1.2 mg/dL  0.4        Ferritin 11 - 328 ng/mL     178     Glucose 70 - 99 mg/dL 102 (H) 160 (H)   93     Iron 37 - 145 ug/dL     50     Iron Binding Capacity 240 - 430 ug/dL     179 (L)     Iron Sat Index 15 - 46 %     28     WBC 4.0 - 11.0 10e3/uL 10.4 6.4        Hemoglobin 11.7 - 15.7 g/dL 12.6 13.4        Hematocrit 35.0 - 47.0 % 37.7 41.3        Platelet Count 150 - 450 10e3/uL 262 365        RBC Count 3.80 - 5.20 10e6/uL 4.11 4.42        MCV 78 - 100 fL 92 93        MCH 26.5 - 33.0 pg 30.7 30.3        MCHC 31.5 - 36.5 g/dL 33.4 32.4        RDW 10.0 - 15.0 % 13.0 12.9        % Neutrophils % 79 64        % Lymphocytes % 13 28        % Monocytes % 8 7        % Eosinophils % 0 1        % Basophils % 0 0        Absolute Basophils 0.0 - 0.2 10e3/uL 0.0 0.0        Absolute Eosinophils 0.0 - 0.7 10e3/uL 0.0 0.1        Absolute Immature Granulocytes <=0.4 10e3/uL 0.0 0.0        Absolute Lymphocytes 0.8 - 5.3 10e3/uL 1.4 1.8        Absolute Monocytes 0.0 - 1.3 10e3/uL 0.8 0.5        % Immature Granulocytes % 0 0        Absolute Neutrophils 1.6 - 8.3 10e3/uL 8.2 4.0        Absolute NRBCs 10e3/uL 0.0 0.0        NRBCs per 100 WBC <1 /100 0 0        Color Urine Colorless, Straw, Light Yellow, Yellow     Yellow       Appearance Urine Clear     Slightly Cloudy !      Glucose Urine Negative mg/dL    Negative      Bilirubin Urine Negative     Negative      Ketones Urine Negative mg/dL    Negative      Specific Gravity Urine 1.003 - 1.035     1.012      pH Urine 5.0 - 7.0     6.0      Protein Albumin Urine Negative mg/dL    Negative      Urobilinogen mg/dL Normal, 2.0 mg/dL    Normal      Nitrite Urine Negative     Negative      Blood Urine Negative     Negative      Leukocyte Esterase Urine Negative     Large !      WBC Urine <=5 /HPF    87 (H)      RBC Urine <=2 /HPF    3 (H)      Squamous Epithelial /HPF Urine <=1 /HPF    14 (H)      Transitional Epi <=1 /HPF    1      Mucus Urine None Seen /LPF    Present !      URINE CULTURE     Rpt      XR CHEST 2 VIEWS    Rpt       XR ESOPHAGRAM       Rpt    XR UPPER GI WITHOUT KUB        Rpt   (L): Data is abnormally low  (H): Data is abnormally high  !: Data is abnormal  Rpt: View report in Results Review for more information     DIAGNOSIS   Principal Problem:    Suicidal ideation  MDD, severe, recurrent episode, with psychotic features  R/O Bipolar Disorder Type 1    Active Problem List:  Patient Active Problem List   Diagnosis     Arthritis     Depressive disorder     Borderline personality disorder (H)     GERD (gastroesophageal reflux disease)     Holosystolic murmur     Asthma     History of gastric bypass     Hyperlipidemia LDL goal <130     Other insomnia     Mild mitral regurgitation     Mild aortic stenosis     Weight loss     Bilateral low back pain without sciatica     Adhesive capsulitis of shoulder, right     Mild intermittent asthma, unspecified whether complicated     Bipolar affective disorder, remission status unspecified (H)     Constipation, unspecified constipation type     Age-related osteoporosis without current pathological fracture     Plantar warts     Hypoglycemia     Failure to thrive in adult     Hypotension, unspecified hypotension type     Suicidal ideation           PLAN   1. Education given regarding diagnostic and treatment options with risks, benefits and alternatives and adequate verbalization of understanding.  2.  Medications:  Hospital  -Increase Prozac to 20 mg PO liquid daily to target depressed mood.  -Continue Namenda 2.5 mg tablet PO BID to treat dementia symptoms observed.  -Continue Olanzapine ODT 2.5 mg tablet every morning  -Continue Olanzapine 10 mg ODT tablet at bedtime  -Continue Mirtazapine 15 mg PO disintegrating tablet at HS.   -Continue Olanzapine ODT 5 mg PO tablet 3 times daily as needed for severe agitation.   3. Consultations:  IM consulted 6/15/23 to evaluate altered mental status.  GI consult placed for 6/16/23, per GI provider:  -Esophagram with Upper GI series to evaluate for GI stricture- completed on 6/19/23.  -Change famotidine to PO to Protonix 40 mg once daily (as anticipate cannot get PIV to have IV).  -Bowel med regimen: Enema (fleet or pink lady) then schedule Miralax 17 gm BID, senna-docusate 1-2 tablets BID.  -STRICT documentation of stool outputs.  -Hold iron supplements until improved stools/sx regurgitation/N/V.  Speech therapy consult completed 6/15/23 for Clinical Swallow Evaluation:  -continue regular texture diet with thin liquids  - 1:1 monitoring while eating to promote oral intake and monitor for reflux/regurgitation.  Dietician consulted and will follow pt during admission  -Weights will be monitored every Tuesday, Thursday, and Saturday  -Intake and output monitoring.  -Plan is for pt to eat small meals/snack throughout the day d/t history of gastric bypass.   -Supplements ordered for between meal  -Provider to request baby food or softer foods to promote PO intake tolerance.  - Zofran 4mg PO PRN to scheduled 30 minutes before meals TID to improve PO tolerance.   4. Labs/Tests   BMP every Monday and Thursday to monitor Hyponatremia.  UA/UC to evaluate altered mental status and r/o UTI- completed and culture  negative.  5. Structure and Supervision  Unit 3B  Precautions in place.  Fall precautions.  Continue on SIO monitoring due to reporting active SI with plan and confused impulsive behavior.  6.   is following in regards to collecting and reviewing collateral information, referrals and disposition planning.  Legal: civil commitment.   Referrals:  Per CTC  Care Coordination:  Per CTC  Placement:  TBD  Anticipated Discharge:  TBD     Further treatment programming to be determined throughout the hospital course.      Risk Assessment: [facility] RISK ASSESSMENT: Patient on precautions    Coordination of Care:   Treatment Plan reviewed and physician signed, Care discussed with Care/Treatment Team Members, Chart reviewed and Patient seen      Re-Certification I certify that the inpatient psychiatric facility services furnished since the previous certification were, and continue to be, medically necessary for, either, treatment which could reasonably be expected to improve the patient s condition or diagnostic study and that the hospital records indicate that the services furnished were, either, intensive treatment services, admission and related services necessary for diagnostic study, or equivalent services.     I certify that the patient continues to need, on a daily basis, active treatment furnished directly by or requiring the supervision of inpatient psychiatric facility personnel.   I estimate 7-14 days of hospitalization is necessary for proper treatment of the patient. My plans for post-hospital care for this patient are  TBD     DANIEL Bennett CNP    -     6/19/2023     -     01:45 PM    Total time  35 minutes with > 50%spent on coordination of cares and psycho-education.    This note was created with help of Dragon dictation system. Grammatical / typing errors are not intentional.    DANIEL Bennett CNP

## 2023-06-19 NOTE — PLAN OF CARE
Problem: Sleep Disturbance  Goal: Adequate Sleep/Rest  Outcome: Progressing  Intervention: Promote Sleep/Rest  Flowsheets (Taken 6/19/2023 0004)  Sleep/Rest Enhancement:    regular sleep/rest pattern promoted    noise level reduced     Patient was on SIO 5 feet for suicide and self-injury risk. She was using a medical bed due to pt has a history of osteoporosis. Pt was sleeping well this shift. She's NPO 8 hours before esophagram and upper GI with small bowel follow through. Pt was given a PRN Melatonin 3 mg at 1:16 AM. She appeared to have slept 5.5 hours. She voided x1, unmeasured. She took her scheduled Zofran and Protonix this morning. No nausea nor vomiting reported. Denied pain.

## 2023-06-19 NOTE — PROGRESS NOTES
Brief Medicine  Following up on esophagram and upper GI studies.  Discussed results GI, there appears to be delayed emptying, but no concern for strictures. States that if patient is able to tolerate oral intake, she can have follow-up EGD as outpatient. Otherwise, recommended if unable to tolerate any oral intake a CT abdomen with contrast should be performed to rule out intra-abdominal pathology.  Per nursing notes, patient was able to tolerate mashed potatoes after procedure.  We will plan to see patient formally tomorrow to discuss results and plan going forward.    Trisha Hinton PA-C  Hospitalist Service

## 2023-06-20 ENCOUNTER — APPOINTMENT (OUTPATIENT)
Dept: CT IMAGING | Facility: CLINIC | Age: 83
DRG: 885 | End: 2023-06-20
Payer: COMMERCIAL

## 2023-06-20 PROCEDURE — 250N000009 HC RX 250: Performed by: PSYCHIATRY & NEUROLOGY

## 2023-06-20 PROCEDURE — 74177 CT ABD & PELVIS W/CONTRAST: CPT | Mod: 26 | Performed by: RADIOLOGY

## 2023-06-20 PROCEDURE — 99232 SBSQ HOSP IP/OBS MODERATE 35: CPT

## 2023-06-20 PROCEDURE — 250N000011 HC RX IP 250 OP 636: Performed by: PSYCHIATRY & NEUROLOGY

## 2023-06-20 PROCEDURE — 250N000011 HC RX IP 250 OP 636

## 2023-06-20 PROCEDURE — 250N000013 HC RX MED GY IP 250 OP 250 PS 637: Performed by: PSYCHIATRY & NEUROLOGY

## 2023-06-20 PROCEDURE — 74177 CT ABD & PELVIS W/CONTRAST: CPT

## 2023-06-20 PROCEDURE — 124N000003 HC R&B MH SENIOR/ADOLESCENT

## 2023-06-20 PROCEDURE — 250N000013 HC RX MED GY IP 250 OP 250 PS 637

## 2023-06-20 PROCEDURE — 250N000013 HC RX MED GY IP 250 OP 250 PS 637: Performed by: PHYSICIAN ASSISTANT

## 2023-06-20 RX ORDER — IOPAMIDOL 755 MG/ML
100 INJECTION, SOLUTION INTRAVASCULAR ONCE
Status: COMPLETED | OUTPATIENT
Start: 2023-06-20 | End: 2023-06-20

## 2023-06-20 RX ORDER — MEMANTINE HYDROCHLORIDE 5 MG/1
5 TABLET ORAL 2 TIMES DAILY
Status: DISCONTINUED | OUTPATIENT
Start: 2023-06-20 | End: 2023-06-27

## 2023-06-20 RX ADMIN — ONDANSETRON 4 MG: 4 TABLET ORAL at 06:35

## 2023-06-20 RX ADMIN — SENNOSIDES 8.6 MG: 8.6 TABLET ORAL at 21:47

## 2023-06-20 RX ADMIN — Medication 2 TABLET: at 10:03

## 2023-06-20 RX ADMIN — ONDANSETRON 4 MG: 4 TABLET ORAL at 11:59

## 2023-06-20 RX ADMIN — MEMANTINE 5 MG: 5 TABLET ORAL at 21:47

## 2023-06-20 RX ADMIN — PANTOPRAZOLE SODIUM 40 MG: 40 TABLET, DELAYED RELEASE ORAL at 06:35

## 2023-06-20 RX ADMIN — OLANZAPINE 2.5 MG: 5 TABLET, ORALLY DISINTEGRATING ORAL at 10:04

## 2023-06-20 RX ADMIN — Medication 500 MG: at 10:04

## 2023-06-20 RX ADMIN — SENNOSIDES 8.6 MG: 8.6 TABLET ORAL at 10:03

## 2023-06-20 RX ADMIN — HYDROXYZINE HYDROCHLORIDE 25 MG: 10 SOLUTION ORAL at 16:56

## 2023-06-20 RX ADMIN — ONDANSETRON 4 MG: 4 TABLET ORAL at 16:56

## 2023-06-20 RX ADMIN — POLYETHYLENE GLYCOL 3350 17 G: 17 POWDER, FOR SOLUTION ORAL at 21:47

## 2023-06-20 RX ADMIN — SIMVASTATIN 40 MG: 40 TABLET, FILM COATED ORAL at 21:47

## 2023-06-20 RX ADMIN — SODIUM CHLORIDE 53 ML: 9 INJECTION, SOLUTION INTRAVENOUS at 12:30

## 2023-06-20 RX ADMIN — FLUOXETINE HYDROCHLORIDE 20 MG: 20 SOLUTION ORAL at 10:05

## 2023-06-20 RX ADMIN — FAMOTIDINE 20 MG: 20 TABLET ORAL at 10:04

## 2023-06-20 RX ADMIN — MIRTAZAPINE 15 MG: 15 TABLET, ORALLY DISINTEGRATING ORAL at 21:47

## 2023-06-20 RX ADMIN — Medication 100 MCG: at 10:03

## 2023-06-20 RX ADMIN — VALACYCLOVIR 500 MG: 500 TABLET, FILM COATED ORAL at 10:04

## 2023-06-20 RX ADMIN — POLYETHYLENE GLYCOL 3350 17 G: 17 POWDER, FOR SOLUTION ORAL at 10:06

## 2023-06-20 RX ADMIN — IOPAMIDOL 53 ML: 755 INJECTION, SOLUTION INTRAVENOUS at 12:29

## 2023-06-20 RX ADMIN — MEMANTINE 5 MG: 5 TABLET ORAL at 10:10

## 2023-06-20 RX ADMIN — Medication 25 MCG: at 10:04

## 2023-06-20 RX ADMIN — OLANZAPINE 10 MG: 10 TABLET, ORALLY DISINTEGRATING ORAL at 21:56

## 2023-06-20 ASSESSMENT — ACTIVITIES OF DAILY LIVING (ADL)
ADLS_ACUITY_SCORE: 66
DRESS: INDEPENDENT
ORAL_HYGIENE: PROMPTS
ADLS_ACUITY_SCORE: 66
HYGIENE/GROOMING: WITH ASSISTANCE
HYGIENE/GROOMING: WITH ASSISTANCE
DRESS: INDEPENDENT
ORAL_HYGIENE: PROMPTS
ADLS_ACUITY_SCORE: 66
ADLS_ACUITY_SCORE: 66

## 2023-06-20 NOTE — PROGRESS NOTES
"CLINICAL NUTRITION SERVICES - REASSESSMENT NOTE     Nutrition Prescription    RECOMMENDATIONS FOR MDs/PROVIDERS TO ORDER:  None today, RD will continue to monitor per protocol. If patient continues to struggle with PO intake, will downgrade to pureed diet.     Malnutrition Status:    Severe malnutrition in the context of starvation related to social and environmental circumstances.    Recommendations already ordered by Registered Dietitian (RD):  -Minced and moist   -Ensure clear (in a cup) with meals     Future/Additional Recommendations:  Monitor tolerance to interventions, monitor oral intakes, monitor weights.      EVALUATION OF THE PROGRESS TOWARD GOALS   Diet: Regular  Supplement: Nepro (rotate flavors) between meals at 10 AM and 2PM, if out substitute for TwoCal or vice versa. Do not count towards fluid restriction.  Fluid restriction: 2000 mL   Intake: 0 - 25% per I/Os      NEW FINDINGS   Received page from patient provider, \"Could we have baby food or softer foods ordered? She continues to have frequent emesis after eating of undigested meals.\" RD will trial minced and moist diet to provide more variety and flexibility, and improved tolerance. If patient continues to struggle with tolerating oral intakes, will downgrade to pureed. Per chart review, patient continues to self-induce vomiting on the unit.     Patient had an esophogram and upper GI study yesterday, per medicine note \" there appears to be delayed emptying, but no concern for strictures. States that if patient is able to tolerate oral intake, she can have follow-up EGD as outpatient. Otherwise, recommended if unable to tolerate any oral intake a CT abdomen with contrast should be performed to rule out intra-abdominal pathology.\"     Visited with patient on the unit, she was eating mashed potatoes with butter. She reports that she does not like eating because it does not make her feel good, she does not like oral nutrition supplements. RD " attempted to gather more information from patient but she was confused and had difficulty participating in the assessment.    RD spoke with patients nurse. They report that there have been some instances where patient has self-induced emesis and others where it has just come up and she appears upset about it. We agreed to hold off on locking her room after meals until CT is read and a plan is established.    Weights:  06/17/23 1646 48.7 kg (107 lb 5.8 oz) Standing scale   06/13/23 0811 48.9 kg (107 lb 12.9 oz) --   06/11/23 0817 48.9 kg (107 lb 12.9 oz) --   06/08/23 0827 50.7 kg (111 lb 12.4 oz) --   06/07/23 2048 51.1 kg (112 lb 10.5 oz)    4.7% wt loss in 10 days, significant. Appears weight stable over the last 5 days,     Labs reviewed. Patients sodium was WNL 6/19/23.     MALNUTRITION  % Intake: </= 50% for >/= 5 days (severe)  % Weight Loss: > 2% in 1 week (severe)  Subcutaneous Fat Loss: global severe   Muscle Loss: global severe   Fluid Accumulation/Edema: None noted  Malnutrition Diagnosis: Severe malnutrition in the context of starvation related to social and environmental circumstances.     Previous Goals   Patient to consume at least 25-50% meals, 100% nutrition supplements.  Patient to maintain/gain weight.   Evaluation: Not met    Previous Nutrition Diagnosis  Inadequate oral intake related to altered mentation, decreased appetite, early satiety, nausea/vomiting as evidenced by 4.7% wt loss in 10 days.   Evaluation: No change    CURRENT NUTRITION DIAGNOSIS  Inadequate oral intake related to altered mentation, decreased appetite, early satiety, nausea/vomiting as evidenced by 4.7% wt loss in 10 days.     INTERVENTIONS  Implementation  Medical food supplement therapy  Modify composition of meals/snacks    Goals  Patient to consume at least 25-50% meals, 100% nutrition supplements.  Patient to maintain/gain weight.     Monitoring/Evaluation  Progress toward goals will be monitored and evaluated per  protocol.    Moira Bella, MPH, RD, LD  Pediatric & Adult Behavioral Health Dietitian   Pager: [phone]  Weekend/holiday pager: [phone]

## 2023-06-20 NOTE — PLAN OF CARE
"Problem: Adult Behavioral Health Plan of Care  Goal: Plan of Care Review  Outcome: Not Progressing  Flowsheets (Taken 6/19/2023 2100)  Patient Agreement with Plan of Care: agrees   Goal Outcome Evaluation:  Plan of Care Reviewed With: patient      Patient is alert and oriented to person. Disoriented to situation. VSS. Pt denied pain and discomfort. Pt stayed in her room most of the shift. Pt is isolative and withdrawn. Pt did not attend groups. Pt endorsed depression and anxiety; rated 7/10 and 8/10 respectively. Patient described mood as \"anxious\" and affect is labile, anxious. Pt is disorganized and forgetful. Patient is cooperative with some care. Pt ate 10% of her dinner and some snacks; Pt denied nausea, but had small amount of emesis twice. Pt had oral fluid intake about 360 ml. Pt voided twice unmeasured. Patient is med-compliant, and reports \"sleeps through the night\". Pt evaluation continues.     Continue with SIO 5 ft due to SI and Self-injury risk.   Pt has medical bed due to hx of osteopetrosis and to assist with mobility.      VS reviewed: /70 (BP Location: Right arm, Patient Position: Sitting, Cuff Size: Adult Regular)   Pulse 61   Temp 97.9  F (36.6  C) (Oral)   Resp 18   Ht 1.626 m (5' 4\")   Wt 48.7 kg (107 lb 5.8 oz)   LMP  (LMP Unknown)   SpO2 98%   BMI 18.43 kg/m   .     Length of stay: 12  "

## 2023-06-20 NOTE — PLAN OF CARE
Goal Outcome Evaluation:      Plan of Care Reviewed With: patient    Outcome Evaluation: Please see 6/20 note for details. Minced and moist diet, ensure clear (in a cup to attempt to improve patient interest) with meals. Patient to consume at least 25-50% meals, 100% nutrition supplements.  Patient to maintain/gain weight.    Moira Bella, MPH, RD, LD  Pediatric & Adult Behavioral Health Dietitian   Pager: [phone]  Weekend/holiday pager: [phone]

## 2023-06-20 NOTE — PLAN OF CARE
Problem: Anxiety Signs/Symptoms  Goal: Improved Mood Symptoms (Anxiety Signs/Symptoms)  Outcome: Progressing  Goal: Improved Sleep (Anxiety Signs/Symptoms)  Outcome: Progressing   Goal Outcome Evaluation:         Received in bed asleep, pt is on a medical bed to aid with mobility due to Osteoporosis and HOB elevated at 30 degrees to prevent reflux.. Pt is on SIO for SI and self injury risk  Pt independent with toileting x 2.  Denied pain/discomforts  Intake : 100 ml    Slept for 7 hours  No behavioral issues encountered tonight.  Incontinent of small amount of stool, soft and brown in color

## 2023-06-20 NOTE — CONSULTS
Pt was approached for completion of OT Cognitive Assessment as requested, this morning 2X. She refused participation.

## 2023-06-20 NOTE — CONSULTS
Verbal Order was received for requested OT Cognitive Assessment. This author discussed with pt the content of the testing to assess memory. She agreed to meet in the am of 6-.

## 2023-06-20 NOTE — PROGRESS NOTES
GI Progress Note  Date of Service:  6/20/2023      Assessment:   82-year-old female with history of GERD, borderline personality, dementia with behavioral disturbances, history of gastric bypass, depression, anxiety who is admitted for psychiatric decompensation since April 2023.  GI consulted for persistent vomiting and decreased oral intake.  As per charts patient was seen by me in GI in April 2023 with plans for outpatient upper endoscopy but due to psychiatric issues she ended up getting admitted to the hospital.  X-ray esophagogram was done as suggested by GI to look for any evidence of stricture, this reported stasis in the esophagus but there was no clear evidence of any stricture.     Today patient continues to have low food intake. She is a limited historian likely due to underlying memory issues and due to current psychiatric decompensation. CT imaging was done this AM and an official report is awaited. Preliminary image review shows evidence of large stool burden. She has various risk factors for constipatio including advanced age, dementia, psych medications with anticholinergic side effects like olanzapine. Suspect that persistent nausea and vomiting is likely due to constipation with retention of large amount of feces throughout the intestines.     Recommendations:   -- Follow official read of CT imaging  -- Golytely prep (4liters) for bowel clearance, can hold Miralax during time of prep and resume it after completion  -- Continue Senna twice daily     Patient was discussed with Dr. Akash Farah MD   Fellow   Gastroenterology & Hepatology     __________________________________________________________________________________  Subjective:  Nursing notes reviewed and noted.  Patient reports that she has no pain when swallowing but once food reaches the stomach,  she reports that it just comes out.  She reports decreased appetite . upon questioning further she said that she cannot explain it  "further and said that she is done.   As per reports from staff, patient has complained of nausea and has significantly reduced intake of food.  Yesterday she ate a little bit of mashed potatoes for lunch and about 10% of her dinner at nighttime, within minutes after having anything orally she would vomit them right away.  Patient has been on an aggressive laxative regimen as per staff she had 2 large bowel movements this a.m. She is taking Miralax on her own time, takes about 6-8 hours to complete 17 gm dose.     Physical Examination:  Vital Signs:  BP 94/63 (Patient Position: Sitting)   Pulse 114   Temp 97.3  F (36.3  C) (Temporal)   Resp 18   Ht 1.626 m (5' 4\")   Wt 48.7 kg (107 lb 5.8 oz)   LMP  (LMP Unknown)   SpO2 97%   BMI 18.43 kg/m       Not done as requested by the patient    DATA:  Labs:    Reviewed and noted              "

## 2023-06-20 NOTE — PROGRESS NOTES
"PSYCHIATRY  PROGRESS NOTE     DATE OF SERVICE   6/20/2023         CHIEF COMPLAINT   \"I'm pissed off.\"       SUBJECTIVE   Nursing reports:     Patient is alert and oriented to person. Disoriented to situation. VSS. Pt denied pain and discomfort. Pt stayed in her room most of the shift. Pt is isolative and withdrawn. Pt did not attend groups. Pt endorsed depression and anxiety; rated 7/10 and 8/10 respectively. Patient described mood as \"anxious\" and affect is labile, anxious. Pt is disorganized and forgetful. Patient is cooperative with some care. Pt ate 10% of her dinner and some snacks; Pt denied nausea, but had small amount of emesis twice. Pt had oral fluid intake about 360 ml. Pt voided twice unmeasured. Patient is med-compliant, and reports \"sleeps through the night\". Pt evaluation continues.      Continue with SIO 5 ft due to SI and Self-injury risk.   Pt has medical bed due to hx of osteopetrosis and to assist with mobility.     Received in bed asleep, pt is on a medical bed to aid with mobility due to Osteoporosis and HOB elevated at 30 degrees to prevent reflux.. Pt is on SIO for SI and self injury risk  Pt independent with toileting x 2.  Denied pain/discomforts  Intake : 100 ml      Slept for 7 hours. No behavioral issues encountered tonight.    Incontinent of small amount of stool, soft and brown in color       reports:    Assessment/Intervention/Current Symptoms and Care Coordination  - chart review  - team meeting  - team rounds/pt interview addressed patient needs/concerns  - Current Symptoms include the following: anxiety      Discharge Plan or Goal  Pending stabilization & development of a safe discharge plan.  Considerations include:  [facility]     Barriers to Discharge   Patient requires further psychiatric stabilization due to current symptomology     Referral Status   None     Legal Status  Patient is under MI commitment in [facility]  Pt will need PD upon " "discharge.        OBJECTIVE   Met with pt in dining room on unit 3B. Pt affect appears flat and pt makes poor eye contact. Pt observed eating a vanilla pudding cup independently. Patient reports mood as, \"I'm pissed off.\"  Pt does not participate any further in conversation with provider or respond to questions about patient's mental health symptoms.  Patient states, \"I am done talking now.\" Pt will continue on SIO monitoring which is in place due to patient reporting SI and ongoing confusion/impulsive behaviors. OT on unit attempted to see patient today to complete cognitive testing however patient refused. Plan for today will be to increase Namenda to 5 mg PO BID to target symptoms of dementia observed. Continue Prozac 20 mg liquid daily, Olanzapine ODT 2.5 mg tablet every morning, Olanzapine 10 mg ODT tablet at bedtime, and Mirtazapine 15 mg PO disintegrating tablet at HS.    Pt VS noted to be stable this morning BP: 103/63 P:75. Pt had no episodes of hypotension per VS flowsheet. Patient continues to be on a 2 L fluid restriction per IM to treat hyponatremia; last sodium level was WNL: 138 mml/L. BMP ordered for every Monday and Thursday to monitor. IM has been following pt to evaluate hyponatremia and IM was notified on 6/15/23 of altered level of consciousness which presented as increased confusion compared to baseline since admission and increased agitation. Per IM provider pt's waxing and waning symptoms most likely secondary to a primary psychiatric disorder with high likelihood for underlying dementia; there is no clear reversible organic cause of her symptoms. Speech therapy was also consulted 6/15/23 and Chest Xray was also ordered to evaluate due to concern for aspiration with frequent emesis; chest xray did not show acute concern for aspiration. Per speech therapy recommendation: 1:1 supervision with eating d/t frequent regurgitation and recommended GI consult d/t difficulty maintaining adequate " nutrition & hydration d/t frequent reflux/regurgitation. GI was subsequently consulted and orders paced by GI provider for bowel regimen and esophagram and upper GI studies which were completed 6/19/23. Per IM provider documentation, GI provider reports that results of esophagram and upper GI studies indicate delayed emptying, but no concern for strictures; If unable to tolerate any oral intake a CT abdomen with contrast should be performed to rule out intra-abdominal pathology. Today, nursing staff reports pt continues to not tolerate PO intake. CT abdomen/pelvis with contrast ordered and completed today 6/20/2023: report of results indicates a large volume predominantly low density colonic stool intermixed with hyperattenuating contrast (from 6/19/2023 esophagram) extending from the cecum through the entire length of the colon to the rectum. GI examined pt today and recommends: Follow official read of CT imaging, Golytely prep (4liters) for bowel clearance (can hold Miralax during time of prep and resume it after completion), and to continue Senna twice daily. Per nursing staff pt has had multiple bowel movements today and GI aware of this. This provider updated internal medicine provider following pt of this and internal medicine to discuss with GI and consider ordering an MRI as recommended in abdominal/pelvic CT results report.    Dietitian is also following pt due to inadequate oral intake related to altered mentation, decreased appetite, and early satiety. Patient's goals for nutrition include: patient to consume at least 25-50% meals, 100% nutrition supplements, and for patient to maintain/gain weight. To support pt's dietary goal of maintaining/gaining weight patient care order in place for staff to promote patient eating small meals and snacks throughout the day; order also includes that pt needs supervision while eating to encourage intake and monitor for reflux/regurgitation. Pt weights will be monitored  "every Tuesday, Thursday, and Saturday; provider also ordered intake and output monitoring. Patient weight on 6/17/2023 was 107 lb 5 oz; previous weight on 6/13/2023 was 107 lbs 12.9 oz. Dietitian saw patient today 6/20/2023 d/t pt continuing to have difficulty tolerating p.o. intake and experiencing ongoing emesis of undigested food after eating.  Dietitian recommends changing patient's diet to minced and moist with Ensure clear (in a cup) with meals; If patient continues to struggle with PO intake, will downgrade to pureed diet.        MEDICATIONS   Medications:  Scheduled Meds:    calcium carbonate  500 mg Oral Daily     childrens multivitamin with iron  2 tablet Oral Daily     cyanocobalamin  100 mcg Oral Daily     famotidine  20 mg Oral Daily     [Held by provider] ferrous sulfate  325 mg Oral Every Other Day     FLUoxetine  20 mg Oral Daily     memantine  2.5 mg Oral BID     mirtazapine  15 mg Orally disintegrating tablet At Bedtime     OLANZapine zydis  2.5 mg Oral QAM     OLANZapine zydis  10 mg Oral At Bedtime     ondansetron  4 mg Oral TID AC     pantoprazole  40 mg Oral QAM AC     polyethylene glycol  17 g Oral BID     sennosides  8.6 mg Oral BID     simvastatin  40 mg Oral At Bedtime     valACYclovir  500 mg Oral Daily     cholecalciferol  25 mcg Oral Daily     Continuous Infusions:  PRN Meds:.acetaminophen, alum & mag hydroxide-simethicone, bisacodyl, budesonide-formoterol, calcium carbonate, hydrOXYzine, melatonin, OLANZapine zydis, prochlorperazine, senna-docusate    Medication adherence issues: MS Med Adherence Y/N: No  Medication side effects: MEDICATION SIDE EFFECTS: no side effects reported   Benefit: Yes / No: Yes       ROS   Pertinent items are noted in HPI.       MENTAL STATUS EXAM   Vitals: /70 (BP Location: Right arm, Patient Position: Sitting, Cuff Size: Adult Regular)   Pulse 61   Temp 97.9  F (36.6  C) (Oral)   Resp 18   Ht 1.626 m (5' 4\")   Wt 48.7 kg (107 lb 5.8 oz)   LMP  " "(LMP Unknown)   SpO2 98%   BMI 18.43 kg/m      Appearance: Poorly groomed and Disheveled  Mood: \"I'm pissed off.\"  Affect: flat  was congruent to speech.  Suicidal Ideation: absent  Homicidal Ideation: PRESENT / ABSENT: absent   Thought process: difficult to follow and disorganized   Thought content: endorses preoccupations  Fund of Knowledge: average  Attention/Concentration: Poor  Language ability:  Intact  Memory:  Immediate recall impaired, Short-term memory impaired and Long-term memory impaired  Insight:  limited.  Judgement: limited  Orientation: Oriented to self  Psychomotor Behavior: normal or unremarkable    Muscle Strength and Tone: MuscleStrength: Normal  Gait and Station: slightly stiff however steady with walker       LABS   Personally reviewed.  Provider updated internal medicine regarding iron binding capacity of 179 and notified them that the report of results of CT of abdomen and pelvis in.     Latest Reference Range & Units 06/12/23 08:00 06/12/23 12:07 06/13/23 07:46 06/15/23 09:21 06/15/23 10:54 06/16/23 18:45 06/19/23 07:40 06/19/23 13:43 06/19/23 13:45 06/20/23 12:37   Sodium 136 - 145 mmol/L 129 (L)  133 (L) 134 (L)   138      Potassium 3.4 - 5.3 mmol/L 4.0  3.8 4.0   4.4      Chloride 98 - 107 mmol/L 93 (L)  96 (L) 97 (L)   104      Carbon Dioxide (CO2) 22 - 29 mmol/L 24  26 26   26      Urea Nitrogen 8.0 - 23.0 mg/dL 26.3 (H)  16.0 15.2   7.8 (L)      Creatinine 0.51 - 0.95 mg/dL 0.95  0.73 0.74   0.70      GFR Estimate >60 mL/min/1.73m2 60 (L)  82 81   86      Calcium 8.8 - 10.2 mg/dL 10.0  9.7 9.7   9.5      Anion Gap 7 - 15 mmol/L 12  11 11   8      Magnesium 1.7 - 2.3 mg/dL       2.2      Phosphorus 2.5 - 4.5 mg/dL       3.1      Albumin 3.5 - 5.2 g/dL    3.4 (L)         Protein Total 6.4 - 8.3 g/dL    6.2 (L)         Alkaline Phosphatase 35 - 104 U/L    44         ALT 0 - 50 U/L    12         AST 0 - 45 U/L    16         Bilirubin Direct 0.00 - 0.30 mg/dL    <0.20         Bilirubin " Total <=1.2 mg/dL    0.4         Ferritin 11 - 328 ng/mL       178      Glucose 70 - 99 mg/dL 201 (H)  102 (H) 160 (H)   93      Iron 37 - 145 ug/dL       50      Iron Binding Capacity 240 - 430 ug/dL       179 (L)      Iron Sat Index 15 - 46 %       28      WBC 4.0 - 11.0 10e3/uL   10.4 6.4         Hemoglobin 11.7 - 15.7 g/dL   12.6 13.4         Hematocrit 35.0 - 47.0 %   37.7 41.3         Platelet Count 150 - 450 10e3/uL   262 365         RBC Count 3.80 - 5.20 10e6/uL   4.11 4.42         MCV 78 - 100 fL   92 93         MCH 26.5 - 33.0 pg   30.7 30.3         MCHC 31.5 - 36.5 g/dL   33.4 32.4         RDW 10.0 - 15.0 %   13.0 12.9         % Neutrophils %   79 64         % Lymphocytes %   13 28         % Monocytes %   8 7         % Eosinophils %   0 1         % Basophils %   0 0         Absolute Basophils 0.0 - 0.2 10e3/uL   0.0 0.0         Absolute Eosinophils 0.0 - 0.7 10e3/uL   0.0 0.1         Absolute Immature Granulocytes <=0.4 10e3/uL   0.0 0.0         Absolute Lymphocytes 0.8 - 5.3 10e3/uL   1.4 1.8         Absolute Monocytes 0.0 - 1.3 10e3/uL   0.8 0.5         % Immature Granulocytes %   0 0         Absolute Neutrophils 1.6 - 8.3 10e3/uL   8.2 4.0         Absolute NRBCs 10e3/uL   0.0 0.0         NRBCs per 100 WBC <1 /100   0 0         Color Urine Colorless, Straw, Light Yellow, Yellow       Yellow       Appearance Urine Clear       Slightly Cloudy !       Glucose Urine Negative mg/dL      Negative       Bilirubin Urine Negative       Negative       Ketones Urine Negative mg/dL      Negative       Specific Gravity Urine 1.003 - 1.035       1.012       pH Urine 5.0 - 7.0       6.0       Protein Albumin Urine Negative mg/dL      Negative       Urobilinogen mg/dL Normal, 2.0 mg/dL      Normal       Nitrite Urine Negative       Negative       Blood Urine Negative       Negative       Leukocyte Esterase Urine Negative       Large !       WBC Urine <=5 /HPF      87 (H)       RBC Urine <=2 /HPF      3 (H)        Squamous Epithelial /HPF Urine <=1 /HPF      14 (H)       Transitional Epi <=1 /HPF      1       Mucus Urine None Seen /LPF      Present !       URINE CULTURE       Rpt       SARS CoV2 PCR Negative   Negative           XR CHEST 2 VIEWS      Rpt        XR ESOPHAGRAM         Rpt     XR UPPER GI WITHOUT KUB          Rpt    CT ABDOMEN PELVIS W CONTRAST           Rpt   (L): Data is abnormally low  (H): Data is abnormally high  !: Data is abnormal  Rpt: View report in Results Review for more information     DIAGNOSIS   Principal Problem:    Suicidal ideation  MDD, severe, recurrent episode, with psychotic features  R/O Bipolar Disorder Type 1    Active Problem List:  Patient Active Problem List   Diagnosis     Arthritis     Depressive disorder     Borderline personality disorder (H)     GERD (gastroesophageal reflux disease)     Holosystolic murmur     Asthma     History of gastric bypass     Hyperlipidemia LDL goal <130     Other insomnia     Mild mitral regurgitation     Mild aortic stenosis     Weight loss     Bilateral low back pain without sciatica     Adhesive capsulitis of shoulder, right     Mild intermittent asthma, unspecified whether complicated     Bipolar affective disorder, remission status unspecified (H)     Constipation, unspecified constipation type     Age-related osteoporosis without current pathological fracture     Plantar warts     Hypoglycemia     Failure to thrive in adult     Hypotension, unspecified hypotension type     Suicidal ideation          PLAN   1. Education given regarding diagnostic and treatment options with risks, benefits and alternatives and adequate verbalization of understanding.  2.  Medications:  Hospital  -Continue Prozac to 20 mg PO liquid daily to target depressed mood.  -Increase Namenda 5 mg tablet PO BID to treat dementia symptoms observed.  -Continue Olanzapine ODT 2.5 mg tablet every morning  -Continue Olanzapine 10 mg ODT tablet at bedtime  -Continue Mirtazapine 15  mg PO disintegrating tablet at HS.   -Continue Olanzapine ODT 5 mg PO tablet 3 times daily as needed for severe agitation.   3. Consultations:  IM following patient due to treatment of hyponatremia and altered mental status.  GI following pt- per GI provider 6/20/23:  -Follow official read of CT imaging  -Golytely prep (4liters) for bowel clearance (can hold Miralax during time of prep and resume it after completion)  -continue Senna twice daily.   Speech therapy consult completed 6/15/23 for Clinical Swallow Evaluation:  - 1:1 monitoring while eating to promote oral intake and monitor for reflux/regurgitation.  Dietician consulted and will follow pt during admission  -Weights will be monitored every Tuesday, Thursday, and Saturday  -Intake and output monitoring.  -Plan is for pt to eat small meals/snack throughout the day d/t history of gastric bypass.   -Supplements ordered for between meal  - 6/20/23: Diet change to minced and moist with Ensure clear (in a cup) with meals; If patient continues to struggle with PO intake, will downgrade to pureed diet.   - Zofran 4mg PO PRN to scheduled 30 minutes before meals TID to improve PO tolerance.   4. Labs/Tests   BMP every Monday and Thursday to monitor Hyponatremia.  UA/UC to evaluate altered mental status and r/o UTI- completed and culture negative.  5. Structure and Supervision  Unit 3B  Precautions in place.  Fall precautions.  Continue on SIO monitoring due to reporting active SI with plan and confused impulsive behavior.  6.   is following in regards to collecting and reviewing collateral information, referrals and disposition planning.  Legal: civil commitment.   Referrals:  Per CTC  Care Coordination:  Per CTC  Placement:  TBD  Anticipated Discharge:  TBD     Further treatment programming to be determined throughout the hospital course.      Risk Assessment: IP MHAC RISK ASSESSMENT: Patient on precautions    Coordination of Care:   Treatment Plan  reviewed and physician signed, Care discussed with Care/Treatment Team Members, Chart reviewed and Patient seen      Re-Certification I certify that the inpatient psychiatric facility services furnished since the previous certification were, and continue to be, medically necessary for, either, treatment which could reasonably be expected to improve the patient s condition or diagnostic study and that the hospital records indicate that the services furnished were, either, intensive treatment services, admission and related services necessary for diagnostic study, or equivalent services.     I certify that the patient continues to need, on a daily basis, active treatment furnished directly by or requiring the supervision of inpatient psychiatric facility personnel.   I estimate 7-14 days of hospitalization is necessary for proper treatment of the patient. My plans for post-hospital care for this patient are  TBD     DANIEL Bennett CNP    -     6/20/2023     -     02:00 PM    Total time  35 minutes with > 50%spent on coordination of cares and psycho-education.    This note was created with help of Dragon dictation system. Grammatical / typing errors are not intentional.    DANIEL Bennett CNP

## 2023-06-20 NOTE — PLAN OF CARE
"Goal Outcome Evaluation:    Plan of Care Reviewed With: patient      Patient's mood is labile, irritated this morning, yelling at staff to leave them alone. Refused breakfast and medications. Patient seemed depressed, and hopeless, stating I have nothing to live for. Admitted to wishing to be dead, but no active plans to harm self, contracts for safety. Patient was intermittently confused, making incoherent statements and asking, \"where am I?\". Patient later requested RN to come back, because they were now ready to take their medications. Patient mood was calm and cooperative. Patient went down for CT of abdomen, preliminary results show stool burden, GI providers following patient.   Patient had poor oral intake this shift 500 ml fluid. Ate mashed potatoes and a cookie for lunch.  Output: 200 ml, patient also had large incontinent stool requiring shower.    BP 94/63 (Patient Position: Sitting)   Pulse 114   Temp 97.3  F (36.3  C) (Temporal)   Resp 18   Ht 1.626 m (5' 4\")   Wt 48.7 kg (107 lb 5.8 oz)   LMP  (LMP Unknown)   SpO2 97%   BMI 18.43 kg/m    BP soft, patient denies dizziness will continue to monitor.    "

## 2023-06-21 ENCOUNTER — APPOINTMENT (OUTPATIENT)
Dept: GENERAL RADIOLOGY | Facility: CLINIC | Age: 83
DRG: 885 | End: 2023-06-21
Payer: COMMERCIAL

## 2023-06-21 PROCEDURE — 250N000013 HC RX MED GY IP 250 OP 250 PS 637: Performed by: PSYCHIATRY & NEUROLOGY

## 2023-06-21 PROCEDURE — 250N000011 HC RX IP 250 OP 636

## 2023-06-21 PROCEDURE — 74270 X-RAY XM COLON 1CNTRST STD: CPT | Mod: 26 | Performed by: STUDENT IN AN ORGANIZED HEALTH CARE EDUCATION/TRAINING PROGRAM

## 2023-06-21 PROCEDURE — 250N000013 HC RX MED GY IP 250 OP 250 PS 637

## 2023-06-21 PROCEDURE — 250N000013 HC RX MED GY IP 250 OP 250 PS 637: Performed by: PHYSICIAN ASSISTANT

## 2023-06-21 PROCEDURE — 99232 SBSQ HOSP IP/OBS MODERATE 35: CPT

## 2023-06-21 PROCEDURE — 124N000003 HC R&B MH SENIOR/ADOLESCENT

## 2023-06-21 PROCEDURE — 74270 X-RAY XM COLON 1CNTRST STD: CPT

## 2023-06-21 RX ORDER — POLYETHYLENE GLYCOL 3350 17 G/17G
238 POWDER, FOR SOLUTION ORAL ONCE
Status: DISCONTINUED | OUTPATIENT
Start: 2023-06-21 | End: 2023-06-21

## 2023-06-21 RX ADMIN — DIATRIZOATE MEGLUMINE AND DIATRIZOATE SODIUM 600 ML: 660; 100 SOLUTION ORAL; RECTAL at 15:07

## 2023-06-21 RX ADMIN — PROCHLORPERAZINE MALEATE 5 MG: 5 TABLET ORAL at 18:21

## 2023-06-21 RX ADMIN — VALACYCLOVIR 500 MG: 500 TABLET, FILM COATED ORAL at 08:23

## 2023-06-21 RX ADMIN — ONDANSETRON 4 MG: 4 TABLET ORAL at 05:34

## 2023-06-21 RX ADMIN — SENNOSIDES 8.6 MG: 8.6 TABLET ORAL at 20:20

## 2023-06-21 RX ADMIN — OLANZAPINE 10 MG: 10 TABLET, ORALLY DISINTEGRATING ORAL at 21:35

## 2023-06-21 RX ADMIN — FAMOTIDINE 20 MG: 20 TABLET ORAL at 08:23

## 2023-06-21 RX ADMIN — MEMANTINE 5 MG: 5 TABLET ORAL at 08:23

## 2023-06-21 RX ADMIN — MEMANTINE 5 MG: 5 TABLET ORAL at 20:20

## 2023-06-21 RX ADMIN — Medication 500 MG: at 08:24

## 2023-06-21 RX ADMIN — ONDANSETRON 4 MG: 4 TABLET ORAL at 11:53

## 2023-06-21 RX ADMIN — MELATONIN TAB 3 MG 3 MG: 3 TAB at 00:07

## 2023-06-21 RX ADMIN — PANTOPRAZOLE SODIUM 40 MG: 40 TABLET, DELAYED RELEASE ORAL at 05:33

## 2023-06-21 RX ADMIN — SIMVASTATIN 40 MG: 40 TABLET, FILM COATED ORAL at 21:31

## 2023-06-21 RX ADMIN — POLYETHYLENE GLYCOL 3350 17 G: 17 POWDER, FOR SOLUTION ORAL at 08:23

## 2023-06-21 RX ADMIN — MIRTAZAPINE 15 MG: 15 TABLET, ORALLY DISINTEGRATING ORAL at 21:31

## 2023-06-21 RX ADMIN — ONDANSETRON 4 MG: 4 TABLET ORAL at 17:11

## 2023-06-21 RX ADMIN — Medication 2 TABLET: at 08:23

## 2023-06-21 RX ADMIN — PROCHLORPERAZINE MALEATE 5 MG: 5 TABLET ORAL at 08:22

## 2023-06-21 RX ADMIN — SENNOSIDES 8.6 MG: 8.6 TABLET ORAL at 08:23

## 2023-06-21 RX ADMIN — Medication 100 MCG: at 08:22

## 2023-06-21 RX ADMIN — FLUOXETINE HYDROCHLORIDE 20 MG: 20 SOLUTION ORAL at 08:24

## 2023-06-21 RX ADMIN — OLANZAPINE 2.5 MG: 5 TABLET, ORALLY DISINTEGRATING ORAL at 08:23

## 2023-06-21 RX ADMIN — Medication 25 MCG: at 08:23

## 2023-06-21 ASSESSMENT — ACTIVITIES OF DAILY LIVING (ADL)
DRESS: WITH ASSISTANCE
ADLS_ACUITY_SCORE: 88
DRESS: PROMPTS;INDEPENDENT
ADLS_ACUITY_SCORE: 76
ORAL_HYGIENE: PROMPTS
ADLS_ACUITY_SCORE: 66
ADLS_ACUITY_SCORE: 66
ADLS_ACUITY_SCORE: 88
ADLS_ACUITY_SCORE: 66
ADLS_ACUITY_SCORE: 66
ADLS_ACUITY_SCORE: 76
HYGIENE/GROOMING: WITH ASSISTANCE
ADLS_ACUITY_SCORE: 76
ORAL_HYGIENE: PROMPTS
ADLS_ACUITY_SCORE: 66
ADLS_ACUITY_SCORE: 88
ADLS_ACUITY_SCORE: 66
HYGIENE/GROOMING: WITH ASSISTANCE

## 2023-06-21 NOTE — PLAN OF CARE
"Goal Outcome Evaluation:    Plan of Care Reviewed With: patient      Problem: Adult Behavioral Health Plan of Care  Goal: Plan of Care Review  Outcome: Progressing  Flowsheets (Taken 6/20/2023 1805)  Patient Agreement with Plan of Care: agrees     Pt presented with a flat affect, brightens upon approach. Pt endorsed anxiety 8/10, PRN hydroxyzine helpful. Depression 10/10, having suicidal thoughts without a plan, stated \" waiting for someone to do it\", no visual hallucinations at this moment, hearing voices \"telling me to behavior and watch myself\". Denied pain, HI, and contracted for safety. Pt expressed hopelessness, stating \" I don't care any more\". Writer encouraged pt to look at life from the bright side, hard to convince, remained negative. Pt had low BP 85/60, high  at 1629, fluids encouraged, recheck at 1805, BP was 115/75 and HR was 105. Pt did not attend evening group, declined. Compliant with all scheduled medications, no side effects reported or noted. Pt ambulated in the hallway a couple times, gait steady with walker. Pt voided x 2, unmeasured because pt removes the hat, had a large stool. No safety concerns.               "

## 2023-06-21 NOTE — PROGRESS NOTES
"PSYCHIATRY  PROGRESS NOTE     DATE OF SERVICE   6/21/2023         CHIEF COMPLAINT   \" I'm horrible.\"       SUBJECTIVE   Nursing reports:     Pt presented with a flat affect, brightens upon approach. Pt endorsed anxiety 8/10, PRN hydroxyzine helpful. Depression 10/10, having suicidal thoughts without a plan, stated \" waiting for someone to do it\", no visual hallucinations at this moment, hearing voices \"telling me to behavior and watch myself\". Denied pain, HI, and contracted for safety. Pt expressed hopelessness, stating \" I don't care any more\". Writer encouraged pt to look at life from the bright side, hard to convince, remained negative. Pt had low BP 85/60, high  at 1629, fluids encouraged, recheck at 1805, BP was 115/75 and HR was 105. Pt did not attend evening group, declined. Compliant with all scheduled medications, no side effects reported or noted. Pt ambulated in the hallway a couple times, gait steady with walker. Pt voided x 2, unmeasured because pt removes the hat, had a large stool. No safety concerns.     Received in bed awake, pt is on SIO for self injury risk and utilizing a medical bed to aid with mobility due to Osteoporosis and HOB elevated at 30 degrees to prevent reflux. Restless and made passive SI comments  like wanting to be dead and \" I wish someone will come in and shoot me\". Pt denied pain or any forms of hallucinations.  0007 Melatonin 3 mg given. Pleasant and conversant with staff. Went to the toilet x 3 and drank 240 ml of water. Pt fell asleep at 0200 but only slept on and off for 2.5 hours this shift + 3.h hours the previous shift  Continued to endorse Si when she woke up , stating \" I want to commit suicide,I'm not getting any better, will someone help me here?\" Denied having any plans. And contracted for safety.  No BM this shift, Medication compliant.        reports:    Assessment/Intervention/Current Symptoms and Care Coordination  Pt experiencing GI issues " "and being followed by medical for this. Continues to report high levels of anxiety and depression.      Discharge Plan or Goal  Return to [facility] ([facility]) when stable vs new placement if needed.       Barriers to Discharge   Patient's depression is significant and medication adjustments are ongoing in addition to medical/GI work-up.       Referral Status  None     Legal Status  Commitment [facility].  PD will be needed at discharge.                OBJECTIVE   Met with pt in room on unit 3B. Pt affect appears flat and pt makes poor eye contact.  Patient's thoughts continue to be disorganized and pt is observed to have ongoing confusion. Patient reports mood as, \"I'm horrible.\"  Pt does not respond to questions about patient's mental health symptoms.  Provider discussed with patient that her abdominal/pelvic CT which was completed yesterday showed that she has a large amount of stool in her colon area which may be contributing to her issues with ongoing emesis after eating. Patient does not verbalize understanding however does state, \"I feel like I wanna die.\"  Provided emotional support for patient and explained to her that the plan for today will include the GI doctor following up today to make further recommendations. Pt will continue on SIO monitoring which is in place due to patient reporting SI and ongoing confusion/impulsive behaviors. Plan for today will also be to continue Namenda to 5 mg PO BID to target symptoms of dementia observed. Also, continue Prozac 20 mg liquid daily, Olanzapine ODT 2.5 mg tablet every morning, Olanzapine 10 mg ODT tablet at bedtime, and Mirtazapine 15 mg PO disintegrating tablet at HS.    Patient continues to be on a 2 L fluid restriction per IM to treat hyponatremia; last sodium level was WNL: 138 mml/L. BMP ordered for every Monday and Thursday to monitor. IM has been following pt to evaluate hyponatremia and IM was notified on 6/15/23 of altered " level of consciousness which presented as increased confusion compared to baseline since admission and increased agitation. Per IM provider pt's waxing and waning symptoms most likely secondary to a primary psychiatric disorder with high likelihood for underlying dementia; there is no clear reversible organic cause of her symptoms. Speech therapy was also consulted 6/15/23 and Chest Xray was also ordered to evaluate due to concern for aspiration with frequent emesis; chest xray did not show acute concern for aspiration. Per speech therapy recommendation: 1:1 supervision with eating d/t frequent regurgitation and recommended GI consult d/t difficulty maintaining adequate nutrition & hydration d/t frequent reflux/regurgitation. GI was subsequently consulted and orders paced by GI provider for bowel regimen and esophagram and upper GI studies which were completed 6/19/23. Per IM provider documentation, GI provider reports that results of esophagram and upper GI studies indicate delayed emptying, but no concern for strictures; If unable to tolerate any oral intake a CT abdomen with contrast should be performed to rule out intra-abdominal pathology. Today, nursing staff reports pt continues to not tolerate PO intake. CT abdomen/pelvis with contrast ordered and completed 6/20/2023: report of results indicates a large volume predominantly low density colonic stool intermixed with hyperattenuating contrast (from 6/19/2023 esophagram) extending from the cecum through the entire length of the colon to the rectum. GI examined pt on 6/20/23 and recommends: Follow official read of CT imaging, Golytely prep (4liters) for bowel clearance (can hold Miralax during time of prep and resume it after completion), and to continue Senna twice daily. Today, pt unable to tolerate Golytely 4 L bowel prep due to ongoing difficulty tolerating p.o. intake; this provider discussed this with GI provider. Subsequently a therapeutic gastrograffin  enema with IR was completed today 6/21/23. GI recommendations today also include: Golytely prep (4L, ordered for you) for bowel clearance. Encourage to have patient to drink 8-12 oz every 15 minutes for flushing effect, Can hold Miralax during time of prep but recommend resume it after completion of prep/enema, Continue Senna twice daily, If unable to tolerate/comply with oral therapies, alternatively consider/discuss, Discontinue iron supplementation and schedule repeat iron studies in OP setting. If develops iron deficiency off supplementation, consider IV iron infusions > PO iron supplementation. This provider restarted scheduled MiraLAX for tomorrow as she is unable to tolerate the GoLytely prep for bowel clearance. Also, discussed with GI provider regarding if an MRI should be ordered as recommended in abdominal/pelvic CT results report and per GI provider it is not necessary to complete an MRI at this time.  Plan will be to continue to coordinate patient's care with GI to determine an effective bowel regimen for patient.    Dietitian is also following pt due to inadequate oral intake related to altered mentation, decreased appetite, and early satiety. Patient's goals for nutrition include: patient to consume at least 25-50% meals, 100% nutrition supplements, and for patient to maintain/gain weight. To support pt's dietary goal of maintaining/gaining weight patient care order in place for staff to promote patient eating small meals and snacks throughout the day; order also includes that pt needs supervision while eating to encourage intake and monitor for reflux/regurgitation. Pt weights will be monitored every Tuesday, Thursday, and Saturday; provider also ordered intake and output monitoring. Patient weight on 6/17/2023 was 107 lb 5 oz; previous weight on 6/13/2023 was 107 lbs 12.9 oz. Dietitian saw patient 6/20/2023 d/t pt continuing to have difficulty tolerating p.o. intake and experiencing ongoing emesis  "of undigested food after eating.  Dietitian recommends changing patient's diet to minced and moist with Ensure clear (in a cup) with meals; If patient continues to struggle with PO intake, will downgrade to pureed diet.        MEDICATIONS   Medications:  Scheduled Meds:    calcium carbonate  500 mg Oral Daily     childrens multivitamin with iron  2 tablet Oral Daily     cyanocobalamin  100 mcg Oral Daily     famotidine  20 mg Oral Daily     [Held by provider] ferrous sulfate  325 mg Oral Every Other Day     FLUoxetine  20 mg Oral Daily     memantine  5 mg Oral BID     mirtazapine  15 mg Orally disintegrating tablet At Bedtime     OLANZapine zydis  2.5 mg Oral QAM     OLANZapine zydis  10 mg Oral At Bedtime     ondansetron  4 mg Oral TID AC     pantoprazole  40 mg Oral QAM AC     polyethylene glycol  17 g Oral BID     sennosides  8.6 mg Oral BID     simvastatin  40 mg Oral At Bedtime     valACYclovir  500 mg Oral Daily     cholecalciferol  25 mcg Oral Daily     Continuous Infusions:  PRN Meds:.acetaminophen, alum & mag hydroxide-simethicone, bisacodyl, budesonide-formoterol, calcium carbonate, hydrOXYzine, melatonin, OLANZapine zydis, prochlorperazine, senna-docusate    Medication adherence issues: MS Med Adherence Y/N: No  Medication side effects: MEDICATION SIDE EFFECTS: no side effects reported   Benefit: Yes / No: Yes       ROS   Pertinent items are noted in HPI.       MENTAL STATUS EXAM   Vitals: /73 (BP Location: Left arm, Patient Position: Right side, Cuff Size: Adult Small)   Pulse 63   Temp 98.1  F (36.7  C) (Oral)   Resp 18   Ht 1.626 m (5' 4\")   Wt 48.7 kg (107 lb 5.8 oz)   LMP  (LMP Unknown)   SpO2 100%   BMI 18.43 kg/m      Appearance: Poorly groomed and Disheveled  Mood: \"I'm horrible.\"  Affect: flat  was congruent to speech.  Suicidal Ideation: absent  Homicidal Ideation: PRESENT / ABSENT: absent   Thought process: difficult to follow and disorganized   Thought content: endorses " preoccupations  Fund of Knowledge: average  Attention/Concentration: Poor  Language ability:  Intact  Memory:  Immediate recall impaired, Short-term memory impaired and Long-term memory impaired  Insight:  limited.  Judgement: limited  Orientation: Oriented to self  Psychomotor Behavior: normal or unremarkable    Muscle Strength and Tone: MuscleStrength: Normal  Gait and Station: slightly stiff however steady with walker       LABS   Personally reviewed.       Latest Reference Range & Units 06/13/23 07:46 06/15/23 09:21 06/15/23 10:54 06/16/23 18:45 06/19/23 07:40 06/19/23 13:43 06/19/23 13:45 06/20/23 12:37 06/21/23 15:05   Sodium 136 - 145 mmol/L 133 (L) 134 (L)   138       Potassium 3.4 - 5.3 mmol/L 3.8 4.0   4.4       Chloride 98 - 107 mmol/L 96 (L) 97 (L)   104       Carbon Dioxide (CO2) 22 - 29 mmol/L 26 26   26       Urea Nitrogen 8.0 - 23.0 mg/dL 16.0 15.2   7.8 (L)       Creatinine 0.51 - 0.95 mg/dL 0.73 0.74   0.70       GFR Estimate >60 mL/min/1.73m2 82 81   86       Calcium 8.8 - 10.2 mg/dL 9.7 9.7   9.5       Anion Gap 7 - 15 mmol/L 11 11   8       Magnesium 1.7 - 2.3 mg/dL     2.2       Phosphorus 2.5 - 4.5 mg/dL     3.1       Albumin 3.5 - 5.2 g/dL  3.4 (L)          Protein Total 6.4 - 8.3 g/dL  6.2 (L)          Alkaline Phosphatase 35 - 104 U/L  44          ALT 0 - 50 U/L  12          AST 0 - 45 U/L  16          Bilirubin Direct 0.00 - 0.30 mg/dL  <0.20          Bilirubin Total <=1.2 mg/dL  0.4          Ferritin 11 - 328 ng/mL     178       Glucose 70 - 99 mg/dL 102 (H) 160 (H)   93       Iron 37 - 145 ug/dL     50       Iron Binding Capacity 240 - 430 ug/dL     179 (L)       Iron Sat Index 15 - 46 %     28       WBC 4.0 - 11.0 10e3/uL 10.4 6.4          Hemoglobin 11.7 - 15.7 g/dL 12.6 13.4          Hematocrit 35.0 - 47.0 % 37.7 41.3          Platelet Count 150 - 450 10e3/uL 262 365          RBC Count 3.80 - 5.20 10e6/uL 4.11 4.42          MCV 78 - 100 fL 92 93          MCH 26.5 - 33.0 pg 30.7 30.3           MCHC 31.5 - 36.5 g/dL 33.4 32.4          RDW 10.0 - 15.0 % 13.0 12.9          % Neutrophils % 79 64          % Lymphocytes % 13 28          % Monocytes % 8 7          % Eosinophils % 0 1          % Basophils % 0 0          Absolute Basophils 0.0 - 0.2 10e3/uL 0.0 0.0          Absolute Eosinophils 0.0 - 0.7 10e3/uL 0.0 0.1          Absolute Immature Granulocytes <=0.4 10e3/uL 0.0 0.0          Absolute Lymphocytes 0.8 - 5.3 10e3/uL 1.4 1.8          Absolute Monocytes 0.0 - 1.3 10e3/uL 0.8 0.5          % Immature Granulocytes % 0 0          Absolute Neutrophils 1.6 - 8.3 10e3/uL 8.2 4.0          Absolute NRBCs 10e3/uL 0.0 0.0          NRBCs per 100 WBC <1 /100 0 0          Color Urine Colorless, Straw, Light Yellow, Yellow     Yellow        Appearance Urine Clear     Slightly Cloudy !        Glucose Urine Negative mg/dL    Negative        Bilirubin Urine Negative     Negative        Ketones Urine Negative mg/dL    Negative        Specific Gravity Urine 1.003 - 1.035     1.012        pH Urine 5.0 - 7.0     6.0        Protein Albumin Urine Negative mg/dL    Negative        Urobilinogen mg/dL Normal, 2.0 mg/dL    Normal        Nitrite Urine Negative     Negative        Blood Urine Negative     Negative        Leukocyte Esterase Urine Negative     Large !        WBC Urine <=5 /HPF    87 (H)        RBC Urine <=2 /HPF    3 (H)        Squamous Epithelial /HPF Urine <=1 /HPF    14 (H)        Transitional Epi <=1 /HPF    1        Mucus Urine None Seen /LPF    Present !        URINE CULTURE     Rpt        XR CHEST 2 VIEWS    Rpt         XR ESOPHAGRAM       Rpt      XR UPPER GI WITHOUT KUB        Rpt     XR COLON WATER SOLUBLE DIAGNOSTIC          Rpt   CT ABDOMEN PELVIS W CONTRAST         Rpt    (L): Data is abnormally low  (H): Data is abnormally high  !: Data is abnormal  Rpt: View report in Results Review for more information       DIAGNOSIS   Principal Problem:    Suicidal ideation  MDD, severe, recurrent episode, with  psychotic features  R/O Bipolar Disorder Type 1    Active Problem List:  Patient Active Problem List   Diagnosis     Arthritis     Depressive disorder     Borderline personality disorder (H)     GERD (gastroesophageal reflux disease)     Holosystolic murmur     Asthma     History of gastric bypass     Hyperlipidemia LDL goal <130     Other insomnia     Mild mitral regurgitation     Mild aortic stenosis     Weight loss     Bilateral low back pain without sciatica     Adhesive capsulitis of shoulder, right     Mild intermittent asthma, unspecified whether complicated     Bipolar affective disorder, remission status unspecified (H)     Constipation, unspecified constipation type     Age-related osteoporosis without current pathological fracture     Plantar warts     Hypoglycemia     Failure to thrive in adult     Hypotension, unspecified hypotension type     Suicidal ideation          PLAN   1. Education given regarding diagnostic and treatment options with risks, benefits and alternatives and adequate verbalization of understanding.  2.  Medications:  Hospital  -Continue Prozac to 20 mg PO liquid daily to target depressed mood.  -Continue Namenda 5 mg tablet PO BID to treat dementia symptoms observed.  -Continue Olanzapine ODT 2.5 mg tablet every morning  -Continue Olanzapine 10 mg ODT tablet at bedtime  -Continue Mirtazapine 15 mg PO disintegrating tablet at HS.   -Continue Olanzapine ODT 5 mg PO tablet 3 times daily as needed for severe agitation.   3. Consultations:  IM following patient due to treatment of hyponatremia and altered mental status.  GI following pt- per GI provider 6/21/23:  -- Golytely prep (4L, ordered for you) for bowel clearance. Encourage to have patient to drink 8-12 oz every 15 minutes for flushing effect.  -- Can hold Miralax during time of prep but recommend resume it after completion of prep/enema.  -- Continue Senna twice daily.  -- Pt unable to tolerate/comply with oral therapies,  gastrograffin enema with IR completed 6/21/23.  -- Discontinue iron supplementation and schedule repeat iron studies in OP setting.  If develops iron deficiency off supplementation, consider IV iron infusions > PO iron supplementation  Speech therapy consult completed 6/15/23 for Clinical Swallow Evaluation:  - 1:1 monitoring while eating to promote oral intake and monitor for reflux/regurgitation.  Dietician consulted and will follow pt during admission  -Weights will be monitored every Tuesday, Thursday, and Saturday  -Intake and output monitoring.  -Plan is for pt to eat small meals/snack throughout the day d/t history of gastric bypass.   -Supplements ordered for between meal  - 6/20/23: Diet change to minced and moist with Ensure clear (in a cup) with meals; If patient continues to struggle with PO intake, will downgrade to pureed diet.   - Zofran 4mg PO PRN to scheduled 30 minutes before meals TID to improve PO tolerance.   4. Labs/Tests   BMP every Monday and Thursday to monitor Hyponatremia.  UA/UC to evaluate altered mental status and r/o UTI- completed and culture negative.  5. Structure and Supervision  Unit 3B  Precautions in place.  Fall precautions.  Continue on SIO monitoring due to reporting active SI with plan and confused impulsive behavior.  6.   is following in regards to collecting and reviewing collateral information, referrals and disposition planning.  Legal: civil commitment.   Referrals:  Per CTC  Care Coordination:  Per [facility]  Placement:  TBD  Anticipated Discharge:  TBD     Further treatment programming to be determined throughout the hospital course.      Risk Assessment: IP MHAC RISK ASSESSMENT: Patient on precautions    Coordination of Care:   Treatment Plan reviewed and physician signed, Care discussed with Care/Treatment Team Members, Chart reviewed and Patient seen      Re-Certification I certify that the inpatient psychiatric facility services furnished since the  previous certification were, and continue to be, medically necessary for, either, treatment which could reasonably be expected to improve the patient s condition or diagnostic study and that the hospital records indicate that the services furnished were, either, intensive treatment services, admission and related services necessary for diagnostic study, or equivalent services.     I certify that the patient continues to need, on a daily basis, active treatment furnished directly by or requiring the supervision of inpatient psychiatric facility personnel.   I estimate 7-14 days of hospitalization is necessary for proper treatment of the patient. My plans for post-hospital care for this patient are  TBD     DANIEL Bennett CNP    -     6/21/2023     -     10:30 AM    Total time  35 minutes with > 50%spent on coordination of cares and psycho-education.    This note was created with help of Dragon dictation system. Grammatical / typing errors are not intentional.    DANIEL Bennett CNP

## 2023-06-21 NOTE — PROGRESS NOTES
"    GASTROENTEROLOGY PROGRESS NOTE    Date of Admission: 6/7/2023  Reason for Admission: Suicidal ideation      ASSESSMENT:  Bhavana Marr is a 82 year old female with a PMH significant for HLD, asthma, depression, anxiety, borderline personality, bipolar disorder, GERD, osteoporosis, and gastric bypass (reportedly 27 years ago, unclear if RNY or other type/no CT imaging nor op note available) who was initially adm to [facility] on 5/5/23, on 6/7 transferred to [facility] (behavioral/psych) due to worsening depression with possible SI as well as FTT, hypoglycemia and hypotension.  GI consulted 6/16 to assist with evaluation and assist with management of reflux/regurgitation as having difficulty maintaining hydration/nutrition.     #Reflux/regurgitation vs N/V vs eating disorder (self induced emesis)  #Severe protein-calorie malnutrition  #Acute on chronic constipation with hx of overflow diarrhea   #Possible esophageal dysphagia  -Reported chronic 10 year hx of \"issues\" with eating/poor po with chronic N/V vs regurgitation since s/p gastric bypass 27 years ago but possibly recently worsening since 4/2023 (per [place] notes).  Per medicine provider/RN observations, describe pt to more have \"spitting up\" episodes rather than emesis and have also witnessed self-induced N/V by staff this adm. Now with weight loss 13% loss x2 months (124# in April, now 107#) with poor po this adm and intermittent hypotension.  -Previously followed in [facility] clinic (last seen 4/18/2023) and had planned for OP EGD on 5/22/23 to evaluate N/V with hx of gastric bypass and possible strictured outlet or anastomotic ulcer disease. Unfortunately was unable to complete due to ongoing frequent hospitalizations for psych adm r/t SI.  -SLP consulted and attempted eval 6/15 but unable to complete eval as pt with poor participation/comprehension.  CXR without e/o pneumonia and low concern for aspiration at this time.  -CT A/P (6/20) without e/o " obstruction or acute pathology and large/significant stool burden throughout colon (cecum to rectum). Noted to have constipation on multiple previous AXR studies as well both during and prior to this adm.  -Patient has various risk factors for constipation including advanced age, dementia, psych medications with anticholinergic side effects like olanzapine and iron supplementation. Suspect that persistent nausea and vomiting is likely due to constipation with retention of large amount of feces throughout the intestines. Noted pt has been taking Miralax but takes 6-8 hours to complete even 1 dose and may need enemas in addition to PO.  Recommend aggressively treat severe constipation to see if helps with sx/po intakes. Given age and history of dementia/underlying psychiatric illnesses would not favor further invasive endoscopic investigation.     RECOMMENDATIONS:  -- Golytely prep (4L, ordered for you) for bowel clearance. Encourage to have patient to drink 8-12 oz every 15 minutes for flushing effect.  -- Can hold Miralax during time of prep but recommend resume it after completion of prep/enema.  -- Continue Senna twice daily.  -- If unable to tolerate/comply with oral therapies, alternatively consider/discuss therapeutic gastrograffin enema with IR.  -- Discontinue iron supplementation and schedule repeat iron studies in OP setting.  If develops iron deficiency off supplementation, consider IV iron infusions > PO iron supplementation.    Discussed plan with JOSELO Olguin and ERICK Arcos.    The patient was discussed and plan agreed upon with GI staff, Dr Bustos.    GI Follow up (we will arrange):  TBD pending clinical course.    This note is a product of chart review only, no charge.      Milly Gerard PA-C  GI Service  [facility]  Text Page  _______________________________________________________________      Subjective: Nursing notes and 24hr events reviewed. Did not  "interview/nor examine patient.  Per chart review/RN notes, continues to SI, intermittent hypotension and poor po.  Large stool yesterday, none yet today.    ROS:   4 pt ROS negative unless noted in subjective.     Objective:  Blood pressure (!) 153/77, pulse 65, temperature 97.5  F (36.4  C), temperature source Oral, resp. rate 15, height 1.626 m (5' 4\"), weight 48.7 kg (107 lb 5.8 oz), SpO2 98 %, not currently breastfeeding.    Did not examine.    Date 06/21/23 0700 - 06/22/23 0659   Shift 2837-0345 6848-1440 2577-1364 24 Hour Total   INTAKE   P.O. 300   300   Shift Total(mL/kg) 300(6.16)   300(6.16)   OUTPUT   Shift Total(mL/kg)       Weight (kg) 48.7 48.7 48.7 48.7     PROCEDURES:  None this adm.    LABS:  BMP  Recent Labs   Lab 06/19/23  0740 06/15/23  0921    134*   POTASSIUM 4.4 4.0   CHLORIDE 104 97*   LAWANDA 9.5 9.7   CO2 26 26   BUN 7.8* 15.2   CR 0.70 0.74   GLC 93 160*     CBC  Recent Labs   Lab 06/15/23  0921   WBC 6.4   RBC 4.42   HGB 13.4   HCT 41.3   MCV 93   MCH 30.3   MCHC 32.4   RDW 12.9        INRNo lab results found in last 7 days.  LFTs  Recent Labs   Lab 06/15/23  0921   ALKPHOS 44   AST 16   ALT 12   BILITOTAL 0.4   PROTTOTAL 6.2*   ALBUMIN 3.4*      PANCNo lab results found in last 7 days.      IMAGING:  (Personally reviewed)    Results for orders placed or performed during the hospital encounter of 06/07/23   CT Head w/o contrast*    Impression    IMPRESSION:   1. No acute intracranial pathology.   2. Benign-appearing lytic lesion in the lateral right parietal bone,  most likely an incidental venous lake. Comparison with previous  head/brain imaging would be useful, if available to ensure stability.  If no prior imaging is available, recommend follow-up head CT or brain  MRI in 3-6 months.    SAHRA HOWARD MD         SYSTEM ID:  [id number]   XR Abdomen 1 View    Impression    IMPRESSION:   Nonobstructive bowel gas pattern. Large colonic stool burden.    I have personally reviewed " the examination and initial interpretation  and I agree with the findings.    VALENTIN CORTEZ MD         SYSTEM ID:  [id number]   X-ray Chest 2 vws*    Impression    IMPRESSION:   1.  Mild hazy/streaky basilar/perihilar mixed pulmonary opacities,  B-lines are present. Findings are consistent with pulmonary edema with  possible superimposed atelectasis. No focal consolidation.  2.  Prominent pulmonary vasculature consistent with pulmonary artery  hypertension.  3.  No pneumothorax.    I have personally reviewed the examination and initial interpretation  and I agree with the findings.    KYLIE DELGADO MD         SYSTEM ID:  [id number]   XR Esophagram    Impression    Impression:  1. Postoperative changes from gastric bypass. No evidence for other  structural abnormalities.  2. Insufficient esophageal motility with stasis of contrast while  patient is supine.    ELE SONI DO         SYSTEM ID:  [id number]   XR Upper GI without KUB    Impression    Impression:  1. Postoperative changes from gastric bypass. No evidence for other  structural abnormalities.  2. Inefficient esophageal motility with stasis of contrast while  patient is supine.    ELE SONI DO         SYSTEM ID:  [id number]   CT Abdomen Pelvis w Contrast    Impression    IMPRESSION:   1. Postsurgical changes of Conner-en-Y gastric bypass. No evidence of  obstruction or other complication.  2. Large volume predominantly low density colonic stool intermixed  with hyperattenuating contrast (from 6/19/2023 esophagram) extending  from the cecum through the entire length of the colon to the rectum.  Consider steatorrhea.  3. Numerous rounded and slightly irregular hypoattenuating to  intermediately attenuating foci in the nonenlarged spleen, favored  benign but indeterminate. Recommend further characterization with MRI.  4. 10 mm hyperenhancing focus in hepatic segment 2, likely a flash  filling hemangioma or vascular shunt in the absence of underlying  liver  disease, though this can be further characterized at MRI  recommended above.  5. Mild central intrahepatic biliary dilation without convincing  evidence of biliary obstruction.    I have personally reviewed the examination and initial interpretation  and I agree with the findings.    TRACI MARX,          SYSTEM ID:  [id number]

## 2023-06-21 NOTE — PLAN OF CARE
Goal Outcome Evaluation:    Plan of Care Reviewed With: patient          Problem: Depressive Signs/Symptoms  Goal: Optimized Energy Level (Depressive Signs/Symptoms)  Outcome: Progressing  Intervention: Optimize Energy Level  Recent Flowsheet Documentation  Taken 6/21/2023 1057 by TESSA CONTEH  Patient Performed Hygiene: dressed  Activity (Behavioral Health):    up ad devan    activity encouraged   Pt alert and oriented, with flat affect, endorses 4/10 anxiety and  depression, Pt on SIO with 1 person with ADLs, uses  walker for ambulation, pt is pleasant and social with the select p[employer] in the milieu. Pt had gastrograffin enema done at 1330, 3 X-ray completed, Pt tolerated the procedure well.  Pt complained of the soreness on the rectal area due to the procedure, will continue to monitor.

## 2023-06-21 NOTE — PLAN OF CARE
"  Problem: Suicidal Behavior  Goal: Suicidal Behavior is Absent or Managed  Outcome: Not Progressing     Problem: Sleep Disturbance  Goal: Adequate Sleep/Rest  Outcome: Not Progressing   Goal Outcome Evaluation:               Received in bed awake, pt is on SIO for self injury risk and utilizing a medical bed to aid with mobility due to Osteoporosis and HOB elevated at 30 degrees to prevent reflux. Restless and made passive SI comments  like wanting to be dead and \" I wish someone will come in and shoot me\". Pt denied pain or any forms of hallucinations.  0007 Melatonin 3 mg given. Pleasant and conversant with staff. Went to the toilet x 3 and drank 240 ml of water. Pt fell asleep at 0200 but only slept on and off for 2.5 hours this shift + 3.h hours the previous shift  Continued to endorse Si when she woke up , stating \" I want to commit suicide,I'm not getting any better, will someone help me here?\" Denied having any plans. And contracted for safety.  No BM this shift, Medication compliant.           "

## 2023-06-21 NOTE — PLAN OF CARE
Assessment/Intervention/Current Symptoms and Care Coordination  Pt experiencing GI issues and being followed by medical for this. Continues to report high levels of anxiety and depression.      Discharge Plan or Goal  Return to [facility] when stable vs new placement if needed.       Barriers to Discharge   Patient's depression is significant and medication adjustments are ongoing in addition to medical/GI work-up.       Referral Status  None     Legal Status  Commitment [facility].  PD will be needed at discharge.

## 2023-06-22 LAB
ALBUMIN SERPL BCG-MCNC: 3.3 G/DL (ref 3.5–5.2)
ALP SERPL-CCNC: 42 U/L (ref 35–104)
ALT SERPL W P-5'-P-CCNC: 10 U/L (ref 0–50)
ANION GAP SERPL CALCULATED.3IONS-SCNC: 10 MMOL/L (ref 7–15)
ANION GAP SERPL CALCULATED.3IONS-SCNC: 11 MMOL/L (ref 7–15)
AST SERPL W P-5'-P-CCNC: 12 U/L (ref 0–45)
BASOPHILS # BLD AUTO: 0 10E3/UL (ref 0–0.2)
BASOPHILS NFR BLD AUTO: 0 %
BILIRUB SERPL-MCNC: 0.3 MG/DL
BUN SERPL-MCNC: 6 MG/DL (ref 8–23)
BUN SERPL-MCNC: 6.1 MG/DL (ref 8–23)
CALCIUM SERPL-MCNC: 9 MG/DL (ref 8.8–10.2)
CALCIUM SERPL-MCNC: 9.4 MG/DL (ref 8.8–10.2)
CHLORIDE SERPL-SCNC: 101 MMOL/L (ref 98–107)
CHLORIDE SERPL-SCNC: 103 MMOL/L (ref 98–107)
CREAT SERPL-MCNC: 0.73 MG/DL (ref 0.51–0.95)
CREAT SERPL-MCNC: 0.78 MG/DL (ref 0.51–0.95)
DEPRECATED HCO3 PLAS-SCNC: 23 MMOL/L (ref 22–29)
DEPRECATED HCO3 PLAS-SCNC: 23 MMOL/L (ref 22–29)
EOSINOPHIL # BLD AUTO: 0.1 10E3/UL (ref 0–0.7)
EOSINOPHIL NFR BLD AUTO: 1 %
ERYTHROCYTE [DISTWIDTH] IN BLOOD BY AUTOMATED COUNT: 13.1 % (ref 10–15)
GFR SERPL CREATININE-BSD FRML MDRD: 76 ML/MIN/1.73M2
GFR SERPL CREATININE-BSD FRML MDRD: 82 ML/MIN/1.73M2
GLUCOSE SERPL-MCNC: 141 MG/DL (ref 70–99)
GLUCOSE SERPL-MCNC: 162 MG/DL (ref 70–99)
HCT VFR BLD AUTO: 37.5 % (ref 35–47)
HGB BLD-MCNC: 12.1 G/DL (ref 11.7–15.7)
IMM GRANULOCYTES # BLD: 0 10E3/UL
IMM GRANULOCYTES NFR BLD: 0 %
LYMPHOCYTES # BLD AUTO: 1.5 10E3/UL (ref 0.8–5.3)
LYMPHOCYTES NFR BLD AUTO: 19 %
MAGNESIUM SERPL-MCNC: 2.2 MG/DL (ref 1.7–2.3)
MCH RBC QN AUTO: 30.5 PG (ref 26.5–33)
MCHC RBC AUTO-ENTMCNC: 32.3 G/DL (ref 31.5–36.5)
MCV RBC AUTO: 95 FL (ref 78–100)
MONOCYTES # BLD AUTO: 0.5 10E3/UL (ref 0–1.3)
MONOCYTES NFR BLD AUTO: 6 %
NEUTROPHILS # BLD AUTO: 6.1 10E3/UL (ref 1.6–8.3)
NEUTROPHILS NFR BLD AUTO: 74 %
NRBC # BLD AUTO: 0 10E3/UL
NRBC BLD AUTO-RTO: 0 /100
PHOSPHATE SERPL-MCNC: 3.1 MG/DL (ref 2.5–4.5)
PLATELET # BLD AUTO: 445 10E3/UL (ref 150–450)
POTASSIUM SERPL-SCNC: 4.2 MMOL/L (ref 3.4–5.3)
POTASSIUM SERPL-SCNC: 4.2 MMOL/L (ref 3.4–5.3)
PROT SERPL-MCNC: 5.5 G/DL (ref 6.4–8.3)
RBC # BLD AUTO: 3.97 10E6/UL (ref 3.8–5.2)
SODIUM SERPL-SCNC: 134 MMOL/L (ref 136–145)
SODIUM SERPL-SCNC: 137 MMOL/L (ref 136–145)
WBC # BLD AUTO: 8.2 10E3/UL (ref 4–11)

## 2023-06-22 PROCEDURE — 36415 COLL VENOUS BLD VENIPUNCTURE: CPT

## 2023-06-22 PROCEDURE — 99232 SBSQ HOSP IP/OBS MODERATE 35: CPT

## 2023-06-22 PROCEDURE — 84100 ASSAY OF PHOSPHORUS: CPT

## 2023-06-22 PROCEDURE — 250N000011 HC RX IP 250 OP 636

## 2023-06-22 PROCEDURE — 250N000013 HC RX MED GY IP 250 OP 250 PS 637: Performed by: PHYSICIAN ASSISTANT

## 2023-06-22 PROCEDURE — 36415 COLL VENOUS BLD VENIPUNCTURE: CPT | Performed by: PHYSICIAN ASSISTANT

## 2023-06-22 PROCEDURE — 250N000013 HC RX MED GY IP 250 OP 250 PS 637: Performed by: PSYCHIATRY & NEUROLOGY

## 2023-06-22 PROCEDURE — 124N000003 HC R&B MH SENIOR/ADOLESCENT

## 2023-06-22 PROCEDURE — 83735 ASSAY OF MAGNESIUM: CPT

## 2023-06-22 PROCEDURE — 250N000013 HC RX MED GY IP 250 OP 250 PS 637

## 2023-06-22 PROCEDURE — 84075 ASSAY ALKALINE PHOSPHATASE: CPT

## 2023-06-22 PROCEDURE — 80053 COMPREHEN METABOLIC PANEL: CPT | Performed by: PHYSICIAN ASSISTANT

## 2023-06-22 PROCEDURE — 85025 COMPLETE CBC W/AUTO DIFF WBC: CPT

## 2023-06-22 PROCEDURE — 250N000013 HC RX MED GY IP 250 OP 250 PS 637: Performed by: DIETITIAN, REGISTERED

## 2023-06-22 RX ADMIN — ONDANSETRON 4 MG: 4 TABLET ORAL at 16:50

## 2023-06-22 RX ADMIN — Medication 2 TABLET: at 08:49

## 2023-06-22 RX ADMIN — ONDANSETRON 4 MG: 4 TABLET ORAL at 06:25

## 2023-06-22 RX ADMIN — OLANZAPINE 10 MG: 10 TABLET, ORALLY DISINTEGRATING ORAL at 21:32

## 2023-06-22 RX ADMIN — VALACYCLOVIR 500 MG: 500 TABLET, FILM COATED ORAL at 08:50

## 2023-06-22 RX ADMIN — PANTOPRAZOLE SODIUM 40 MG: 40 TABLET, DELAYED RELEASE ORAL at 06:25

## 2023-06-22 RX ADMIN — Medication 100 MCG: at 08:51

## 2023-06-22 RX ADMIN — Medication 25 MCG: at 08:51

## 2023-06-22 RX ADMIN — POLYETHYLENE GLYCOL 3350 17 G: 17 POWDER, FOR SOLUTION ORAL at 20:50

## 2023-06-22 RX ADMIN — MEMANTINE 5 MG: 5 TABLET ORAL at 08:51

## 2023-06-22 RX ADMIN — Medication 500 MG: at 08:51

## 2023-06-22 RX ADMIN — ONDANSETRON 4 MG: 4 TABLET ORAL at 12:49

## 2023-06-22 RX ADMIN — FLUOXETINE HYDROCHLORIDE 20 MG: 20 SOLUTION ORAL at 11:10

## 2023-06-22 RX ADMIN — MEMANTINE 5 MG: 5 TABLET ORAL at 20:50

## 2023-06-22 RX ADMIN — SENNOSIDES 8.6 MG: 8.6 TABLET ORAL at 09:11

## 2023-06-22 RX ADMIN — FAMOTIDINE 20 MG: 20 TABLET ORAL at 08:50

## 2023-06-22 RX ADMIN — MIRTAZAPINE 15 MG: 15 TABLET, ORALLY DISINTEGRATING ORAL at 21:32

## 2023-06-22 RX ADMIN — POLYETHYLENE GLYCOL 3350 17 G: 17 POWDER, FOR SOLUTION ORAL at 09:10

## 2023-06-22 RX ADMIN — SENNOSIDES 8.6 MG: 8.6 TABLET ORAL at 20:50

## 2023-06-22 RX ADMIN — OLANZAPINE 2.5 MG: 5 TABLET, ORALLY DISINTEGRATING ORAL at 08:51

## 2023-06-22 RX ADMIN — SIMVASTATIN 40 MG: 40 TABLET, FILM COATED ORAL at 21:32

## 2023-06-22 ASSESSMENT — ACTIVITIES OF DAILY LIVING (ADL)
ADLS_ACUITY_SCORE: 88

## 2023-06-22 NOTE — PLAN OF CARE
Assessment/Intervention/Current Symptoms and Care Coordination  Pt case discussed in team rounds this AM. Pt continues to experience GI issues and being followed by medical for this. Continues to report high levels of anxiety and depression.      Discharge Plan or Goal  [facility] when stable vs new placement if needed.       Barriers to Discharge   Patient's depression is significant and medication adjustments are ongoing in addition to medical/GI work-up.       Referral Status  None     Legal Status  Commitment [facility].  PD will be needed at discharge.

## 2023-06-22 NOTE — PLAN OF CARE
Problem: Anxiety Signs/Symptoms  Goal: Optimized Energy Level (Anxiety Signs/Symptoms)  Outcome: Progressing     Problem: Bowel Elimination Management  Goal: Effective Bowel Elimination/Continence  Outcome: Progressing   Goal Outcome Evaluation:    Plan of Care Reviewed With: patient      Pt stayed in her room all shift. She was emptying her stomach due to the enema she had at 1330. She loose stools all evening. The writer provided pericare and changed her incontinent pads and linen throughout the shift. Pt denied SI/HI and AVHs but endorsed anxiety and depression. Her mood was anxious, and her affect was flat. Pt appetite is poor, but her sleeping pattern was okay. She took her medications without difficulty. She took her routine pills and prn compazine and reported no adverse side effects. She ate 25% of her supper, drank 360cc of water, and had one emesis after eating.

## 2023-06-22 NOTE — PLAN OF CARE
Problem: Adult Behavioral Health Plan of Care  Goal: Adheres to Safety Considerations for Self and Others  Outcome: Progressing   Goal Outcome Evaluation:  Pt continue on a 1:1 using her walker to her destination patient ate 25% of her breakfast and had emesis with food content she ate 25% again for lunch and had a medium size BM .  Pt is medication complaint . Pt refuses to participate in mental health assessment . But is quiet with a flat affect .

## 2023-06-22 NOTE — PROGRESS NOTES
Brief medicine note:     In short, Bhavana Marr is a 82 year old female with history of HLD, asthma, depression, anxiety, borderline personality, bipolar disorder, GERD, osteoporosis, and gastric bypass who was transferred from [facility] medicine to station 3B with worsening depression. She was initially admitted to [facility] 5/5/2023 with hypoglycemia, hypotension, and possible SI in the setting of failure to thrive. Medicine following for nausea/vomiting, FTT in setting of gastric bypass.     Followed up on results of gastrograffin. Appears patient had quite a bit of output through the evening. RN notes she is doing ok today.     Failure to thrive  Severe protein calorie malnutrition  Chronic nausea and vomiting  Hx of gastric bypass  Had gastrograffin enema yesterday w/ good output per RN.  -See GI note from 6/20 and 6/21 for plan  -Continue senna BID, continue Miralax BID  -Continue GERD medications  -Agree with stopping ferrous sulfate (6/21)  -Check labs twice weekly, will order sodium for AM given variability in lab this morning    Notify medicine if any acute changes or worsening in symptoms. Medicine will continue to peripherally follow BMPs on Mondays and Thursdays. Will repeat sodium tomorrow given variation this morning.     Scarlet Waters PA-C

## 2023-06-22 NOTE — PLAN OF CARE
Problem: Anxiety Signs/Symptoms  Goal: Improved Sleep (Anxiety Signs/Symptoms)  Outcome: Progressing   Goal Outcome Evaluation:         Slept well for 8.75 hours.Remained on SIO for self injury risk and utilizing a medical bed to aid with mobility due to Osteoporosis and HOB elevated at 30 degrees to prevent reflux.  Pt voided x 2 and had 1 small BM, no complaints of  nausea/vomiting.  Medication compliant. No SI statements made.

## 2023-06-23 LAB
HOLD SPECIMEN: NORMAL
SODIUM SERPL-SCNC: 137 MMOL/L (ref 136–145)

## 2023-06-23 PROCEDURE — 250N000013 HC RX MED GY IP 250 OP 250 PS 637

## 2023-06-23 PROCEDURE — 250N000013 HC RX MED GY IP 250 OP 250 PS 637: Performed by: PHYSICIAN ASSISTANT

## 2023-06-23 PROCEDURE — 99232 SBSQ HOSP IP/OBS MODERATE 35: CPT

## 2023-06-23 PROCEDURE — 84295 ASSAY OF SERUM SODIUM: CPT | Performed by: PHYSICIAN ASSISTANT

## 2023-06-23 PROCEDURE — 250N000013 HC RX MED GY IP 250 OP 250 PS 637: Performed by: DIETITIAN, REGISTERED

## 2023-06-23 PROCEDURE — 36415 COLL VENOUS BLD VENIPUNCTURE: CPT | Performed by: PHYSICIAN ASSISTANT

## 2023-06-23 PROCEDURE — 250N000013 HC RX MED GY IP 250 OP 250 PS 637: Performed by: PSYCHIATRY & NEUROLOGY

## 2023-06-23 PROCEDURE — 250N000011 HC RX IP 250 OP 636

## 2023-06-23 PROCEDURE — 124N000003 HC R&B MH SENIOR/ADOLESCENT

## 2023-06-23 RX ORDER — POLYETHYLENE GLYCOL 3350 17 G/17G
17 POWDER, FOR SOLUTION ORAL 3 TIMES DAILY
Status: DISCONTINUED | OUTPATIENT
Start: 2023-06-23 | End: 2023-06-27

## 2023-06-23 RX ADMIN — POLYETHYLENE GLYCOL 3350 17 G: 17 POWDER, FOR SOLUTION ORAL at 09:09

## 2023-06-23 RX ADMIN — FAMOTIDINE 20 MG: 20 TABLET ORAL at 09:13

## 2023-06-23 RX ADMIN — FLUOXETINE HYDROCHLORIDE 20 MG: 20 SOLUTION ORAL at 10:36

## 2023-06-23 RX ADMIN — SENNOSIDES 8.6 MG: 8.6 TABLET ORAL at 12:00

## 2023-06-23 RX ADMIN — ONDANSETRON 4 MG: 4 TABLET ORAL at 06:34

## 2023-06-23 RX ADMIN — Medication 25 MCG: at 10:35

## 2023-06-23 RX ADMIN — OLANZAPINE 2.5 MG: 5 TABLET, ORALLY DISINTEGRATING ORAL at 09:11

## 2023-06-23 RX ADMIN — Medication 2 TABLET: at 12:00

## 2023-06-23 RX ADMIN — MEMANTINE 5 MG: 5 TABLET ORAL at 19:19

## 2023-06-23 RX ADMIN — ONDANSETRON 4 MG: 4 TABLET ORAL at 16:33

## 2023-06-23 RX ADMIN — ONDANSETRON 4 MG: 4 TABLET ORAL at 11:58

## 2023-06-23 RX ADMIN — MIRTAZAPINE 15 MG: 15 TABLET, ORALLY DISINTEGRATING ORAL at 21:06

## 2023-06-23 RX ADMIN — PANTOPRAZOLE SODIUM 40 MG: 40 TABLET, DELAYED RELEASE ORAL at 06:34

## 2023-06-23 RX ADMIN — Medication 100 MCG: at 10:35

## 2023-06-23 RX ADMIN — Medication 500 MG: at 12:00

## 2023-06-23 RX ADMIN — POLYETHYLENE GLYCOL 3350 17 G: 17 POWDER, FOR SOLUTION ORAL at 19:19

## 2023-06-23 RX ADMIN — SIMVASTATIN 40 MG: 40 TABLET, FILM COATED ORAL at 21:06

## 2023-06-23 RX ADMIN — VALACYCLOVIR 500 MG: 500 TABLET, FILM COATED ORAL at 10:35

## 2023-06-23 RX ADMIN — MEMANTINE 5 MG: 5 TABLET ORAL at 12:00

## 2023-06-23 RX ADMIN — SENNOSIDES 8.6 MG: 8.6 TABLET ORAL at 19:19

## 2023-06-23 RX ADMIN — OLANZAPINE 10 MG: 10 TABLET, ORALLY DISINTEGRATING ORAL at 21:06

## 2023-06-23 ASSESSMENT — ACTIVITIES OF DAILY LIVING (ADL)
HYGIENE/GROOMING: WITH ASSISTANCE;INDEPENDENT
ADLS_ACUITY_SCORE: 88
ADLS_ACUITY_SCORE: 88
ADLS_ACUITY_SCORE: 81
ADLS_ACUITY_SCORE: 88
ADLS_ACUITY_SCORE: 88
ADLS_ACUITY_SCORE: 81
ADLS_ACUITY_SCORE: 88
ADLS_ACUITY_SCORE: 88
ADLS_ACUITY_SCORE: 81
ORAL_HYGIENE: PROMPTS;WITH ASSISTANCE
ADLS_ACUITY_SCORE: 88
ADLS_ACUITY_SCORE: 81
ADLS_ACUITY_SCORE: 88
LAUNDRY: UNABLE TO COMPLETE

## 2023-06-23 NOTE — PROGRESS NOTES
"Brief GI Luminal Service Note:     The patient was not seen or examined today as they are located on the Psychiatric Behavioral Unit on [facility]. This note is a product of chart review.     Bhavana Marr is a 82 year old female with a PMH significant for HLD, asthma, depression, anxiety, borderline personality, bipolar disorder, GERD, osteoporosis, and gastric bypass (reportedly 27 years ago, unclear if RNY or other type/no CT imaging nor op note available) who was initially adm to [facility] on 5/5/23, on 6/7 transferred to [facility] (behavioral/psych) due to worsening depression with possible SI as well as Failure To Thrive (FTT), hypoglycemia and hypotension.  GI consulted 6/16 to assist with evaluation and assist with management of reflux/regurgitation as having difficulty maintaining hydration/nutrition.    S/p Gastrografin enema 6/21 with good output per RN (per chart review).     Day RN Claire noted patient denied nausea when asked.   Spoke with RAUL Hall: patient's appetite remains poor/fair, small loose stool 3 times today. Vomiting once this morning.      Per MAR, bowel regimen as follows: Miralax BID, Senna BID.     #Reflux/regurgitation vs N/V vs eating disorder (self induced emesis)  #Severe protein-calorie malnutrition  #Acute on chronic constipation with hx of overflow diarrhea   #Possible esophageal dysphagia  -Reported chronic 10 year hx of \"issues\" with eating/poor po with chronic N/V vs regurgitation since s/p gastric bypass 27 years ago but possibly recently worsening since 4/2023 (per [facility] notes).  Per medicine provider/RN observations, describe pt to more have \"spitting up\" episodes rather than emesis and have also witnessed self-induced N/V by staff this adm. Now with weight loss 13% loss x2 months (124 pounds in April, now 107 pounds) with poor po this admission and intermittent hypotension.  -Previously followed in [facility] clinic (last seen 4/18/2023) and had planned for " outpatient EGD on 5/22/23 to evaluate N/V with hx of gastric bypass and possible strictured outlet or anastomotic ulcer disease. Unfortunately was unable to complete due to ongoing frequent hospitalizations for psych admission related to SI.  -SLP consulted and attempted eval 6/15 but unable to complete eval as pt with poor participation/comprehension.  CXR without evidence of pneumonia and low concern for aspiration at this time.  -CT A/P (6/20) without evidence of obstruction or acute pathology and large/significant stool burden throughout colon (cecum to rectum). Noted to have constipation on multiple previous AXR studies as well both during and prior to this admission.  -Patient has various risk factors for constipation including advanced age, dementia, psych medications with anticholinergic adverse effects (such as olanzapine) and iron supplementation. Suspect that persistent nausea and vomiting is likely due to constipation with retention of large amount of feces throughout the intestines. Noted patient has been taking Miralax but takes 6-8 hours to complete even 1 dose and may need enemas in addition to PO.  Recommend continuing to aggressively treat severe constipation to see if this continues to help with symptoms and PO intake. Given age and history of dementia/underlying psychiatric illnesses would not favor further invasive endoscopic investigation at this time.     Recommendations:   -- Given small amounts of stool output, will increase Miralax to TID.   - Please encourage patient to finish each dose of Miralax in less than 15 minutes for bowel flushing effect.   -- Continue Senna PO BID.   -- Appreciate detailed documentation of stool appearance, including color, consistency, frequency and amount.    - Can smear stool thinly on white paper towel to distinguish color.   -- Continue Supportive Cares  - Adequate volume resuscitation with IVF, pRBCs.  - Monitor Hemoglobin closely. Transfuse to keep Hgb > 7  g/dL from GI standpoint.   - Monitor Platelets closely. Keep PLT > 50 10e3/uL from GI standpoint.  - Maintain hemodynamics: MAP >65 mmHg and HR <100.  - Monitor and optimize electrolytes.  - Monitor and optimize nutrition. --> Diet per primary team.   - Reposition every 30 minutes while awake.  - Encourage Ambulation: 4-6 walks per day.   -- No indication for acute GI intervention at this time.   -- Avoid NSAIDs.  -- Analgesics/Antiemetics per primary team.  -- If the patient experiences overt GI bleeding with hemodynamic instability, please page the GI Luminal Service (listed on [facility]).   -- If any GI questions/concerns over the weekend, please page the GI Fellow on call.     Thank you for allowing us to participate in the care of this patient. Please do not hesitate to page the GI service with any questions or concerns.     Plan discussed and agreed upon with Dr. Sarbjit Bustos, GI Luminal Staff Physician.     Jody Tanner PA-C  Inpatient Gastroenterology Service  [facility]  Pager: *[phone]  Text Page

## 2023-06-23 NOTE — PROGRESS NOTES
Brief Medicine Note    Medicine following up on repeat Na which was ordered due to lab variation noted 6/22. Na wnl today.     Remainder of cares as outlined in progress note by Joseph Waters from 6/22.    Dawn Cutler PA-C  Hospitalist Service

## 2023-06-23 NOTE — PROGRESS NOTES
"PSYCHIATRY  PROGRESS NOTE     DATE OF SERVICE   6/22/2023         CHIEF COMPLAINT   \"I feel sick from what they gave me.\"       SUBJECTIVE   Nursing reports:     Pt stayed in her room all shift. She was emptying her stomach due to the enema she had at 1330. She loose stools all evening. The writer provided pericare and changed her incontinent pads and linen throughout the shift. Pt denied SI/HI and AVHs but endorsed anxiety and depression. Her mood was anxious, and her affect was flat. Pt appetite is poor, but her sleeping pattern was okay. She took her medications without difficulty. She took her routine pills and prn compazine and reported no adverse side effects. She ate 25% of her supper, drank 360cc of water, and had one emesis after eating.      Slept well for 8.75 hours.Remained on SIO for self injury risk and utilizing a medical bed to aid with mobility due to Osteoporosis and HOB elevated at 30 degrees to prevent reflux.  Pt voided x 2 and had 1 small BM, no complaints of  nausea/vomiting.  Medication compliant. No SI statements made      reports:  Assessment/Intervention/Current Symptoms and Care Coordination  Pt case discussed in team rounds this AM. Pt continues to experience GI issues and being followed by medical for this. Continues to report high levels of anxiety and depression.      Discharge Plan or Goal  Return to [facility] (formerly [facility]) when stable vs new placement if needed.       Barriers to Discharge   Patient's depression is significant and medication adjustments are ongoing in addition to medical/GI work-up.       Referral Status  None     Legal Status  Commitment [facility].  PD will be needed at discharge.           OBJECTIVE   Met with pt in hospital room on unit 3B with Dr. Ramos. Pt affect appears flat and pt reports, \"I feel sick from what they gave me.\" Pt's thoughts continue to be disorganized however slightly improving today and pt is " more responsive to questions. Pt does continue to have ongoing confusion.  Provider discussed with patient that yesterday patient had a gastrograffin enema to treat her severe constipation.  Patient verbalizes understanding. Pt also endorses feeling anxiety symptoms related to feeling the sensation of needing to have a bowel movement.  Provided education that this would be expected after having an enema.  Patient also expresses that she may not be able to attend groups today feeling this way; provided emotional support for patient and encouraged her to attend groups if she is feeling better. Also, explained to patient that she has unit staff with her at all times who can help if she has a bowel movement and needs assistance.  Patient verbalizes understanding and in agreement with this. Patient also endorses symptoms of depression and reports thoughts of SI with no plan due to being in a hospital. Pt will continue on SIO monitoring which is in place due to patient reporting SI and ongoing confusion/impulsive behaviors. Plan for today will also be to continue Namenda to 5 mg PO BID to target symptoms of dementia observed. Also, continue Prozac 20 mg liquid daily, Olanzapine ODT 2.5 mg tablet every morning, Olanzapine 10 mg ODT tablet at bedtime, and Mirtazapine 15 mg PO disintegrating tablet at HS.    Patient continues to be on a 2 L fluid restriction per IM to treat hyponatremia; sodium level today 6/22/2023 was 134 mml/L.  Plan will be to recheck sodium level tomorrow. BMP ordered for every Monday and Thursday to monitor. IM has been following pt to evaluate hyponatremia and IM was notified on 6/15/23 of altered level of consciousness which presented as increased confusion compared to baseline since admission and increased agitation. Per IM provider pt's waxing and waning symptoms most likely secondary to a primary psychiatric disorder with high likelihood for underlying dementia; there is no clear reversible  organic cause of her symptoms. Speech therapy was also consulted 6/15/23 and Chest Xray was also ordered to evaluate due to concern for aspiration with frequent emesis; chest xray did not show acute concern for aspiration. Per speech therapy recommendation: 1:1 supervision with eating d/t frequent regurgitation and recommended GI consult d/t difficulty maintaining adequate nutrition & hydration d/t frequent reflux/regurgitation. GI was subsequently consulted and orders paced by GI provider for bowel regimen and esophagram and upper GI studies which were completed 6/19/23. Per IM provider documentation, GI provider reports that results of esophagram and upper GI studies indicate delayed emptying, but no concern for strictures; If unable to tolerate any oral intake a CT abdomen with contrast should be performed to rule out intra-abdominal pathology. Pt was not tolerating p.o. intake and a CT abdomen/pelvis with contrast was completed 6/20/2023: report of results indicated a large volume predominantly low density colonic stool intermixed with hyperattenuating contrast (from 6/19/2023 esophagram) extending from the cecum through the entire length of the colon to the rectum. GI examined pt on 6/20/23 and recommends: Follow official read of CT imaging, Golytely prep (4liters) for bowel clearance (can hold Miralax during time of prep and resume it after completion), and to continue Senna twice daily. Pt was unable to tolerate Golytely 4 L bowel prep due to ongoing difficulty tolerating p.o. intake; this provider discussed this with GI provider. Subsequently a therapeutic gastrograffin enema with IR was completed 6/21/23. GI luminal team will continue to follow pt and recommendations today 6/22/23 include: Continue senna twice daily continue MiraLAX twice daily, continue GERD medications, discontinue iron supplementation and schedule repeat iron studies in OP setting. If develops iron deficiency off supplementation,  consider IV iron infusions > PO iron supplementation. Per GI provider regarding O.K to hold off on an MRI at this time. Plan will be to continue to coordinate patient's care with GI to determine an effective bowel regimen for patient.    Dietitian is also following pt due to inadequate oral intake related to altered mentation, decreased appetite, and early satiety. Patient's goals for nutrition include: patient to consume at least 25-50% meals, 100% nutrition supplements, and for patient to maintain/gain weight. To support pt's dietary goal of maintaining/gaining weight patient care order in place for staff to promote patient eating small meals and snacks throughout the day; order also includes that pt needs supervision while eating to encourage intake and monitor for reflux/regurgitation. Dietitian saw patient 6/20/2023 d/t pt continuing to have difficulty tolerating p.o. intake and experiencing ongoing emesis of undigested food after eating.  Dietitian recommends changing patient's diet to minced and moist with Ensure clear (in a cup) with meals; If patient continues to struggle with PO intake, will downgrade to pureed diet. Pt weights will be monitored every Tuesday, Thursday, and Saturday; also monitoring intake and output. Provider notes that patient lost approximately 4 pounds since last weight however patient did have a notably large stool burden removed with a Gastrografin enema on 6/21/2023.  Plan will be to continue to monitor weights and p.o. intake which is also improving.  Vitals:    06/08/23 0827 06/11/23 0817 06/13/23 0811 06/17/23 1646   Weight: 50.7 kg (111 lb 12.4 oz) 48.9 kg (107 lb 12.9 oz) 48.9 kg (107 lb 12.9 oz) 48.7 kg (107 lb 5.8 oz)    06/22/23 0822   Weight: 47.1 kg (103 lb 13.4 oz)          MEDICATIONS   Medications:  Scheduled Meds:    calcium carbonate  500 mg Oral Daily     childrens multivitamin with iron  2 tablet Oral Daily     cyanocobalamin  100 mcg Oral Daily     famotidine  20  "mg Oral Daily     FLUoxetine  20 mg Oral Daily     memantine  5 mg Oral BID     mirtazapine  15 mg Orally disintegrating tablet At Bedtime     OLANZapine zydis  2.5 mg Oral QAM     OLANZapine zydis  10 mg Oral At Bedtime     ondansetron  4 mg Oral TID AC     pantoprazole  40 mg Oral QAM AC     polyethylene glycol  17 g Oral BID     sennosides  8.6 mg Oral BID     simvastatin  40 mg Oral At Bedtime     valACYclovir  500 mg Oral Daily     cholecalciferol  25 mcg Oral Daily     Continuous Infusions:  PRN Meds:.acetaminophen, alum & mag hydroxide-simethicone, bisacodyl, budesonide-formoterol, calcium carbonate, hydrOXYzine, melatonin, OLANZapine zydis, prochlorperazine, senna-docusate    Medication adherence issues: MS Med Adherence Y/N: No  Medication side effects: MEDICATION SIDE EFFECTS: no side effects reported   Benefit: Yes / No: Yes       ROS   Pertinent items are noted in HPI.       MENTAL STATUS EXAM   Vitals:/69   Pulse 66   Temp 99  F (37.2  C) (Oral)   Resp 18   Ht 1.626 m (5' 4\")   Wt 47.1 kg (103 lb 13.4 oz)   LMP  (LMP Unknown)   SpO2 99%   BMI 17.82 kg/m      Appearance: Poorly groomed and Disheveled  Mood:  \"I feel sick from what they gave me.\"  Affect: flat  was congruent to speech.  Suicidal Ideation: absent  Homicidal Ideation: PRESENT / ABSENT: absent   Thought process: difficult to follow and disorganized however slightly improved today   Thought content: endorses preoccupations  Fund of Knowledge: average  Attention/Concentration: Poor  Language ability:  Intact  Memory:  Immediate recall impaired, Short-term memory impaired and Long-term memory impaired  Insight:  limited.  Judgement: limited  Orientation: Oriented to self  Psychomotor Behavior: normal or unremarkable    Muscle Strength and Tone: MuscleStrength: Normal  Gait and Station: slightly stiff however steady with walker       LABS   Personally reviewed.       Latest Reference Range & Units 06/11/23 14:11 06/11/23 14:21 " 06/12/23 08:00 06/12/23 12:07 06/13/23 07:46 06/15/23 09:21 06/15/23 10:54 06/16/23 18:45 06/19/23 07:40 06/19/23 13:43 06/19/23 13:45 06/20/23 12:37 06/21/23 15:05 06/22/23 08:41 06/22/23 09:38   Sodium 136 - 145 mmol/L   129 (L)  133 (L) 134 (L)   138     137 134 (L)   Potassium 3.4 - 5.3 mmol/L   4.0  3.8 4.0   4.4     4.2 4.2   Chloride 98 - 107 mmol/L   93 (L)  96 (L) 97 (L)   104     103 101   Carbon Dioxide (CO2) 22 - 29 mmol/L   24  26 26   26     23 23   Urea Nitrogen 8.0 - 23.0 mg/dL   26.3 (H)  16.0 15.2   7.8 (L)     6.0 (L) 6.1 (L)   Creatinine 0.51 - 0.95 mg/dL   0.95  0.73 0.74   0.70     0.78 0.73   GFR Estimate >60 mL/min/1.73m2   60 (L)  82 81   86     76 82   Calcium 8.8 - 10.2 mg/dL   10.0  9.7 9.7   9.5     9.4 9.0   Anion Gap 7 - 15 mmol/L   12  11 11   8     11 10   Magnesium 1.7 - 2.3 mg/dL         2.2      2.2   Phosphorus 2.5 - 4.5 mg/dL         3.1      3.1   Albumin 3.5 - 5.2 g/dL      3.4 (L)         3.3 (L)   Protein Total 6.4 - 8.3 g/dL      6.2 (L)         5.5 (L)   Alkaline Phosphatase 35 - 104 U/L      44         42   ALT 0 - 50 U/L      12         10   AST 0 - 45 U/L      16         12   Bilirubin Direct 0.00 - 0.30 mg/dL      <0.20            Bilirubin Total <=1.2 mg/dL      0.4         0.3   Ferritin 11 - 328 ng/mL         178         Glucose 70 - 99 mg/dL   201 (H)  102 (H) 160 (H)   93     141 (H) 162 (H)   Iron 37 - 145 ug/dL         50         Iron Binding Capacity 240 - 430 ug/dL         179 (L)         Iron Sat Index 15 - 46 %         28         Lipase 13 - 60 U/L  21                WBC 4.0 - 11.0 10e3/uL     10.4 6.4         8.2   Hemoglobin 11.7 - 15.7 g/dL     12.6 13.4         12.1   Hematocrit 35.0 - 47.0 %     37.7 41.3         37.5   Platelet Count 150 - 450 10e3/uL     262 365         445   RBC Count 3.80 - 5.20 10e6/uL     4.11 4.42         3.97   MCV 78 - 100 fL     92 93         95   MCH 26.5 - 33.0 pg     30.7 30.3         30.5   MCHC 31.5 - 36.5 g/dL     33.4  32.4         32.3   RDW 10.0 - 15.0 %     13.0 12.9         13.1   % Neutrophils %     79 64         74   % Lymphocytes %     13 28         19   % Monocytes %     8 7         6   % Eosinophils %     0 1         1   % Basophils %     0 0         0   Absolute Basophils 0.0 - 0.2 10e3/uL     0.0 0.0         0.0   Absolute Eosinophils 0.0 - 0.7 10e3/uL     0.0 0.1         0.1   Absolute Immature Granulocytes <=0.4 10e3/uL     0.0 0.0         0.0   Absolute Lymphocytes 0.8 - 5.3 10e3/uL     1.4 1.8         1.5   Absolute Monocytes 0.0 - 1.3 10e3/uL     0.8 0.5         0.5   % Immature Granulocytes %     0 0         0   Absolute Neutrophils 1.6 - 8.3 10e3/uL     8.2 4.0         6.1   Absolute NRBCs 10e3/uL     0.0 0.0         0.0   NRBCs per 100 WBC <1 /100     0 0         0   Color Urine Colorless, Straw, Light Yellow, Yellow         Yellow          Appearance Urine Clear         Slightly Cloudy !          Glucose Urine Negative mg/dL        Negative          Bilirubin Urine Negative         Negative          Ketones Urine Negative mg/dL        Negative          Specific Gravity Urine 1.003 - 1.035         1.012          pH Urine 5.0 - 7.0         6.0          Protein Albumin Urine Negative mg/dL        Negative          Urobilinogen mg/dL Normal, 2.0 mg/dL        Normal          Nitrite Urine Negative         Negative          Blood Urine Negative         Negative          Leukocyte Esterase Urine Negative         Large !          WBC Urine <=5 /HPF        87 (H)          RBC Urine <=2 /HPF        3 (H)          Squamous Epithelial /HPF Urine <=1 /HPF        14 (H)          Transitional Epi <=1 /HPF        1          Mucus Urine None Seen /LPF        Present !          URINE CULTURE         Rpt          SARS CoV2 PCR Negative     Negative              XR ABDOMEN 1 VIEW  Rpt                 XR CHEST 2 VIEWS        Rpt           XR ESOPHAGRAM           Rpt        XR UPPER GI WITHOUT KUB            Rpt       XR COLON WATER  SOLUBLE DIAGNOSTIC              Rpt     CT ABDOMEN PELVIS W CONTRAST             Rpt      (L): Data is abnormally low  (H): Data is abnormally high  !: Data is abnormal  Rpt: View report in Results Review for more information     DIAGNOSIS   Principal Problem:    Suicidal ideation  MDD, severe, recurrent episode, with psychotic features  R/O Bipolar Disorder Type 1    Active Problem List:  Patient Active Problem List   Diagnosis     Arthritis     Depressive disorder     Borderline personality disorder (H)     GERD (gastroesophageal reflux disease)     Holosystolic murmur     Asthma     History of gastric bypass     Hyperlipidemia LDL goal <130     Other insomnia     Mild mitral regurgitation     Mild aortic stenosis     Weight loss     Bilateral low back pain without sciatica     Adhesive capsulitis of shoulder, right     Mild intermittent asthma, unspecified whether complicated     Bipolar affective disorder, remission status unspecified (H)     Constipation, unspecified constipation type     Age-related osteoporosis without current pathological fracture     Plantar warts     Hypoglycemia     Failure to thrive in adult     Hypotension, unspecified hypotension type     Suicidal ideation          PLAN   1. Education given regarding diagnostic and treatment options with risks, benefits and alternatives and adequate verbalization of understanding.  2.  Medications:  Hospital  -Continue Prozac to 20 mg PO liquid daily to target depressed mood.  -Continue Namenda 5 mg tablet PO BID to treat dementia symptoms observed.  -Continue Olanzapine ODT 2.5 mg tablet every morning  -Continue Olanzapine 10 mg ODT tablet at bedtime  -Continue Mirtazapine 15 mg PO disintegrating tablet at HS.   -Continue Olanzapine ODT 5 mg PO tablet 3 times daily as needed for severe agitation.   3. Consultations:  IM following patient due to treatment of hyponatremia and altered mental status.  GI following pt- per GI provider 6/21/23:  --Continue  MiraLAX twice daily  -- Continue Senna twice daily.  --Continue GERD medications  -- Discontinue iron supplementation and schedule repeat iron studies in OP setting.  If develops iron deficiency off supplementation, consider IV iron infusions > PO iron supplementation  Speech therapy consult completed 6/15/23 for Clinical Swallow Evaluation:  - 1:1 monitoring while eating to promote oral intake and monitor for reflux/regurgitation.  Dietician consulted and will follow pt during admission  -Weights will be monitored every Tuesday, Thursday, and Saturday  -Intake and output monitoring.  -Plan is for pt to eat small meals/snack throughout the day d/t history of gastric bypass.   -Supplements ordered for between meal  - 6/20/23: Diet change to minced and moist with Ensure clear (in a cup) with meals; If patient continues to struggle with PO intake, will downgrade to pureed diet.   - Zofran 4mg PO PRN to scheduled 30 minutes before meals TID to improve PO tolerance.   4. Labs/Tests   BMP every Monday and Thursday to monitor Hyponatremia.  Sodium level recheck 6/23/2023  5. Structure and Supervision  Unit 3B  Precautions in place.  Fall precautions.  Continue on SIO monitoring due to reporting active SI with plan and confused impulsive behavior.  6.   is following in regards to collecting and reviewing collateral information, referrals and disposition planning.  Legal: civil commitment.   Referrals:  Per CTC  Care Coordination:  Per CTC  Placement:  TBD  Anticipated Discharge:  TBD     Further treatment programming to be determined throughout the hospital course.      [facility] Patient on precautions    Coordination of Care:   Treatment Plan reviewed and physician signed, Care discussed with Care/Treatment Team Members, Chart reviewed and Patient seen      Re-Certification I certify that the inpatient psychiatric facility services furnished since the previous certification  were, and continue to be, medically necessary for, either, treatment which could reasonably be expected to improve the patient s condition or diagnostic study and that the hospital records indicate that the services furnished were, either, intensive treatment services, admission and related services necessary for diagnostic study, or equivalent services.     I certify that the patient continues to need, on a daily basis, active treatment furnished directly by or requiring the supervision of inpatient psychiatric facility personnel.   I estimate 7-14 days of hospitalization is necessary for proper treatment of the patient. My plans for post-hospital care for this patient are  TBD     DANIEL Bennett CNP    -     6/22/2023     -     10:30 AM    Total time  35 minutes with > 50%spent on coordination of cares and psycho-education.    This note was created with help of Dragon dictation system. Grammatical / typing errors are not intentional.    DANIEL Bennett CNP

## 2023-06-23 NOTE — PLAN OF CARE
Assessment/Intervention/Current Symptoms and Care Coordination  - chart review  - team meeting  - team rounds/pt interview addressed patient needs/concerns  - Medication adjustment continues as pt remains symptomatic.     Discharge Plan or Goal  Pending stabilization & development of a safe discharge plan.  Considerations include:  [facility] ([facility]     Barriers to Discharge   Patient requires further psychiatric stabilization due to current symptomology     Referral Status   None     Legal Status  Patient is under MI commitment in [facility]  Pt will need PD upon discharge.

## 2023-06-23 NOTE — PLAN OF CARE
Goal Outcome Evaluation:  Problem: Anxiety Signs/Symptoms  Goal: Optimized Energy Level (Anxiety Signs/Symptoms)  Outcome: Not Progressing     Problem: Adult Behavioral Health Plan of Care  Goal: Plan of Care Review  Outcome: Progressing     Patient spent most of the evening in her room with SIO staff present. Pt presented with anxious mood earlier in the shift. But appeared calmer as the shift progress. Her affect was flat, appetite was fair ate 50% of her dinner and drink 360 ML liquid. No reported nausea or vomiting this shift. Pt was incontinent of bowel x 1 this shift. Two redden areas on both hip noted. Pt is medication complaint. Remain on 2000 ML fluid restriction. Will cont. To monitor.

## 2023-06-23 NOTE — PLAN OF CARE
Problem: Anxiety Signs/Symptoms  Goal: Improved Sleep (Anxiety Signs/Symptoms)  Outcome: Progressing   Goal Outcome Evaluation:       Received in bed sleeping, pt has a  medical bed to aid with mobility due to Osteoporosis and HOB elevated at 30 degrees to prevent reflux and on SIO order for self  injury risk. Pt denies pain/discomforts . Observed for stool incontinence.  Pt woke up at 0415 and wanted to leave, walked towards the exit door but was easily redirected when she was told what time it was, went back to bed.Slept for 8.75 hours  Intake 60 ml  Voided once this shift

## 2023-06-23 NOTE — PLAN OF CARE
Problem: Adult Behavioral Health Plan of Care  Goal: Absence of New-Onset Illness or Injury  Intervention: Identify and Manage Fall RiskPt   Recent Flowsheet Documentation  Taken 6/23/2023 1522 by Claire Koroma RN  Safety Measures:    suicide assessment completed    safety rounds completed    environmental rounds completed     Problem: Anxiety Signs/Symptoms  Goal: Optimized Energy Level (Anxiety Signs/Symptoms)  Intervention: Optimize Energy Level  Recent Flowsheet Documentation  Taken 6/23/2023 1522 by Claire Koroma RN  Patient Performed Hygiene: dressed  Activity (Behavioral Health): up ad devan   Goal Outcome Evaluation:    Plan of Care Reviewed With: patient      Pt has been isolative and withdrawn this shift. Pt needed prompts to come out of her room. Pt out briefly for breakfast and returned to her room to bed. Pt observed spitting out approximately 60 ml of thick clear mucus with food particles. Pt did not gag or make any noise prior to spitting this out. The head of patient's bed was elevated to 20-30 degrees to see if this would help prevent patient from spitting up.  Pt came out for AM group and then returned to her room due needing to use the bathroom. Pt did come out again and sat in the lounge for over an hour and returned to bed after lunch. Pt has not eaten more than a few bites of solids at each meal. Pt did take apple Clear this AM, water and orange juice ( approx 480 ml). Pt voided a large amount of alberto colored urine this AM. Pt has also had 3 small inc loose stools and one small soft continent stool.   Pt's medications were given thru out the morning to prevent her from having emesis or spitting them up. Pt has denied nausea when asked.    Pt's affect is flat with inconsistent eye contact. Pt does admit to having thoughts of wishing she were dead. Pt denies plan or intent.    Pt remains on SIO for safety due to poor judgement.     Pt noted to have a reddened area on her right hip that  also has a very small/ old scab on scar( from previous hip surgery). Area was covered with mepiplex. Pt encouraged to change position frequently. Pt was also supplied with a soft care mattress. No redness noted on left hip. Pt also noted to have a possible bruise on the bottom of her left heal. Pt denied pain in this area.

## 2023-06-23 NOTE — CARE PLAN
06/23/23 1245   Group Therapy Session   Group Attendance attended group session   Time Session Began 1000   Time Session Ended 1155   Total Time (minutes) 5  (no charge)   Total # Attendees 7   Group Type expressive therapy;task skill;psychotherapeutic   Group Session Detail [facility] group to facilitate coping skill exploration, use of cognitive skills and problem solving, creative expression, clinical observation and facilitation of social, cognitive, and kinesthetic performance skills.    Patient Participation Detail Stayed in group for 5 minutes, was pleasant on approach, asked names and stated request to be present and not work on any structured task.  She stated at that point the need to leave as she needed the bathroom and it was already too late, asking help from her 1:1 staff to get to her room.

## 2023-06-23 NOTE — PROGRESS NOTES
GI brief note     Pt asleep this AM at the time of rounds  Discussed with RN, she had multiple bowel movements after gastrograffin enema yesterday and had one BM this morning.   Pt eating a little more than before but conitnues to have nausea and intermittent vomiting.   Continue Rx for constipation  GI luminal team will continue to follow

## 2023-06-24 PROCEDURE — 250N000013 HC RX MED GY IP 250 OP 250 PS 637: Performed by: PHYSICIAN ASSISTANT

## 2023-06-24 PROCEDURE — 250N000013 HC RX MED GY IP 250 OP 250 PS 637: Performed by: PSYCHIATRY & NEUROLOGY

## 2023-06-24 PROCEDURE — 250N000011 HC RX IP 250 OP 636

## 2023-06-24 PROCEDURE — 124N000003 HC R&B MH SENIOR/ADOLESCENT

## 2023-06-24 PROCEDURE — 250N000013 HC RX MED GY IP 250 OP 250 PS 637

## 2023-06-24 RX ADMIN — POLYETHYLENE GLYCOL 3350 17 G: 17 POWDER, FOR SOLUTION ORAL at 08:48

## 2023-06-24 RX ADMIN — OLANZAPINE 10 MG: 10 TABLET, ORALLY DISINTEGRATING ORAL at 22:20

## 2023-06-24 RX ADMIN — ONDANSETRON 4 MG: 4 TABLET ORAL at 12:09

## 2023-06-24 RX ADMIN — Medication 25 MCG: at 08:52

## 2023-06-24 RX ADMIN — Medication 2 TABLET: at 08:52

## 2023-06-24 RX ADMIN — PANTOPRAZOLE SODIUM 40 MG: 40 TABLET, DELAYED RELEASE ORAL at 05:52

## 2023-06-24 RX ADMIN — MEMANTINE 5 MG: 5 TABLET ORAL at 20:41

## 2023-06-24 RX ADMIN — ONDANSETRON 4 MG: 4 TABLET ORAL at 17:13

## 2023-06-24 RX ADMIN — FAMOTIDINE 20 MG: 20 TABLET ORAL at 08:43

## 2023-06-24 RX ADMIN — SENNOSIDES 8.6 MG: 8.6 TABLET ORAL at 08:51

## 2023-06-24 RX ADMIN — FLUOXETINE HYDROCHLORIDE 20 MG: 20 SOLUTION ORAL at 08:55

## 2023-06-24 RX ADMIN — MEMANTINE 5 MG: 5 TABLET ORAL at 08:51

## 2023-06-24 RX ADMIN — VALACYCLOVIR 500 MG: 500 TABLET, FILM COATED ORAL at 08:51

## 2023-06-24 RX ADMIN — ONDANSETRON 4 MG: 4 TABLET ORAL at 05:52

## 2023-06-24 RX ADMIN — SIMVASTATIN 40 MG: 40 TABLET, FILM COATED ORAL at 22:17

## 2023-06-24 RX ADMIN — MELATONIN TAB 3 MG 3 MG: 3 TAB at 22:21

## 2023-06-24 RX ADMIN — Medication 100 MCG: at 08:50

## 2023-06-24 RX ADMIN — POLYETHYLENE GLYCOL 3350 17 G: 17 POWDER, FOR SOLUTION ORAL at 14:03

## 2023-06-24 RX ADMIN — MIRTAZAPINE 15 MG: 15 TABLET, ORALLY DISINTEGRATING ORAL at 22:17

## 2023-06-24 RX ADMIN — OLANZAPINE 2.5 MG: 5 TABLET, ORALLY DISINTEGRATING ORAL at 08:43

## 2023-06-24 RX ADMIN — Medication 500 MG: at 08:52

## 2023-06-24 ASSESSMENT — ACTIVITIES OF DAILY LIVING (ADL)
LAUNDRY: UNABLE TO COMPLETE
LAUNDRY: UNABLE TO COMPLETE
HYGIENE/GROOMING: WITH ASSISTANCE
ADLS_ACUITY_SCORE: 81
ADLS_ACUITY_SCORE: 81
ORAL_HYGIENE: WITH ASSISTANCE
ORAL_HYGIENE: WITH ASSISTANCE;PROMPTS
ORAL_HYGIENE: PROMPTS
ADLS_ACUITY_SCORE: 81
LAUNDRY: UNABLE TO COMPLETE
HYGIENE/GROOMING: WITH ASSISTANCE;PROMPTS
DRESS: SCRUBS (BEHAVIORAL HEALTH);PROMPTS;WITH ASSISTANCE
DRESS: SCRUBS (BEHAVIORAL HEALTH);WITH ASSISTANCE
DRESS: SCRUBS (BEHAVIORAL HEALTH)
ADLS_ACUITY_SCORE: 71
ADLS_ACUITY_SCORE: 71
ADLS_ACUITY_SCORE: 81
ADLS_ACUITY_SCORE: 71
HYGIENE/GROOMING: WITH ASSISTANCE;SHOWER
ADLS_ACUITY_SCORE: 81
ADLS_ACUITY_SCORE: 81

## 2023-06-24 NOTE — PLAN OF CARE
Goal Outcome Evaluation:    Problem: Anxiety Signs/Symptoms  Goal: Optimized Energy Level (Anxiety Signs/Symptoms)  Outcome: Not Progressing     Problem: Depressive Signs/Symptoms  Goal: Optimized Energy Level (Depressive Signs/Symptoms)  Outcome: Not Progressing     Patient was mostly isolative to her room with SIO staff present. Pt was encourage to come out to the [facility] area and socialize with peers. Pt was out briefly, walked around and returned back to her room. Pt came out for dinner ate 100% of her mashed potatoes and 100% mitchell. Pudding. Had total of 360 ml of water this shift. Voided 3 time with one loose stool. Miralx increased to 3 times daily by IM. No emesis of spitting up reported this shift. Denies nausea. Hygiene is neglected, pt encouraged to shower this shift but declined.  Pt continues to have passive thoughts of wanting to die with no plan or intent. Mepilex to right hip remains intact. Pt is medication complaint. Continues on 2000 Ml fluid restriction.

## 2023-06-24 NOTE — PLAN OF CARE
Problem: Anxiety Signs/Symptoms  Goal: Improved Sleep (Anxiety Signs/Symptoms)  Outcome: Progressing   Goal Outcome Evaluation:                  Received in bed sleeping,pt has a  medical bed to aid with mobility due to Osteoporosis and HOB elevated at 30 degrees to prevent reflux and on SIO order for self  injury risk. Slept on and off for 7 hours tonight, overall calm and able to make needs known. No bowel incontinence and voided twice.  Intake 240 ml  Medication compliant.

## 2023-06-24 NOTE — PLAN OF CARE
"  Problem: Adult Behavioral Health Plan of Care  Goal: Adheres to Safety Considerations for Self and Others  Intervention: Develop and Maintain Individualized Safety Plan  Recent Flowsheet Documentation  Taken 6/24/2023 0937 by Claire Koroma RN  Safety Measures:    suicide assessment completed    safety rounds completed    environmental rounds completed     Problem: Anxiety Signs/Symptoms  Goal: Optimized Energy Level (Anxiety Signs/Symptoms)  Intervention: Optimize Energy Level  Recent Flowsheet Documentation  Taken 6/24/2023 0937 by Claire Koroma RN  Patient Performed Hygiene: dressed  Activity (Behavioral Health): up ad edvan     Problem: Suicidal Behavior  Goal: Suicidal Behavior is Absent or Managed  Outcome: Not Progressing     Problem: Bowel Elimination Management  Goal: Effective Bowel Elimination/Continence  Outcome: Progressing   Goal Outcome Evaluation:    Plan of Care Reviewed With: patient      Pt pleasant during interactions this AM. Pt however continues to be very depressed. Pt's affect is flat and her eye contact is poor. Pt report that she did not sleep well last night. When asked why she stated it was due to \"confusion\". Pt able to state the Saturday, June, 24th, 2023 and [place].   Pt continues to report passive wishes to die with out a plan or intent.  Pt was able to eat 100% of her scrambled eggs and fried potatoes this AM. Pt also took 200 CC of apple Clear and 120 ml of water.   Pt was able to take all of her AM medications without any issue.   Pt denied pain this AM.    Pt was assisted with a shower this afternoon. Pt cooperative with this but needed prompts. Pt brushed her teeth after returning to her room but also needed prompts to do this. Pt was assisted with nail care and voiced appreciation of assistance.                    "

## 2023-06-24 NOTE — PROGRESS NOTES
"PSYCHIATRY  PROGRESS NOTE     DATE OF SERVICE   6/23/2023         CHIEF COMPLAINT   \"I still don't feel good.\"       SUBJECTIVE   Nursing reports:   Patient spent most of the evening in her room with SIO staff present. Pt presented with anxious mood earlier in the shift. But appeared calmer as the shift progress. Her affect was flat, appetite was fair ate 50% of her dinner and drink 360 ML liquid. No reported nausea or vomiting this shift. Pt was incontinent of bowel x 1 this shift. Two redden areas on both hip noted. Pt is medication complaint. Remain on 2000 ML fluid restriction. Will cont. To monitor.        Received in bed sleeping, pt has a  medical bed to aid with mobility due to Osteoporosis and HOB elevated at 30 degrees to prevent reflux and on SIO order for self  injury risk. Pt denies pain/discomforts . Observed for stool incontinence.  Pt woke up at 0415 and wanted to leave, walked towards the exit door but was easily redirected when she was told what time it was, went back to bed.Slept for 8.75 hours  Intake 60 ml  Voided once this shift        Body    reports:  Assessment/Intervention/Current Symptoms and Care Coordination  Pt case discussed in team rounds this AM. Pt continues to experience GI issues and being followed by medical for this. Continues to report high levels of anxiety and depression.      Discharge Plan or Goal  Return to [facility] when stable vs new placement if needed.       Barriers to Discharge   Patient's depression is significant and medication adjustments are ongoing in addition to medical/GI work-up.       Referral Status  None     Legal Status  Commitment [facility].  PD will be needed at discharge.           OBJECTIVE   Met with pt in hospital room on unit 3B. Pt affect appears flat and pt reports, \"I still don't feel good.\" Pt's thoughts continue to be disorganized however slightly improving today and pt is more " responsive to questions. Pt does continue to have ongoing confusion.  Provider discussed with patient that pt's discomfort likely related to severe constipation that she has been experiencing; reviewed with patient that she had a Gastrografin enema to treat this earlier this week.  Plan to address also includes continuing scheduled MiraLAX and stool softeners to promote bowel movements. Patient verbalizes understanding and in agreement with this. Also, explained to patient that she has unit staff with her at all times who can help if she has a bowel movement and needs assistance.  Patient verbalizes understanding. Patient also continues to endorse symptoms of depression and reports thoughts of SI with no plan due to being in a hospital. Pt will continue on SIO monitoring which is in place due to patient reporting SI and ongoing confusion/impulsive behaviors. Plan for today will also be to continue Namenda to 5 mg PO BID to target symptoms of dementia observed. Also, continue Prozac 20 mg liquid daily, Olanzapine ODT 2.5 mg tablet every morning, Olanzapine 10 mg ODT tablet at bedtime, and Mirtazapine 15 mg PO disintegrating tablet at HS.    Patient continues to be on a 2 L fluid restriction per IM to treat hyponatremia; sodium level today 6/23/2023 was 137 mml/L.  BMP ordered for every Monday and Thursday to monitor. IM has been following pt to evaluate hyponatremia and IM was notified on 6/15/23 of altered level of consciousness which presented as increased confusion compared to baseline since admission and increased agitation. Per IM provider pt's waxing and waning symptoms most likely secondary to a primary psychiatric disorder with high likelihood for underlying dementia; there is no clear reversible organic cause of her symptoms. Speech therapy was also consulted 6/15/23 and Chest Xray was also ordered to evaluate due to concern for aspiration with frequent emesis; chest xray did not show acute concern for  aspiration. Per speech therapy recommendation: 1:1 supervision with eating d/t frequent regurgitation and recommended GI consult d/t difficulty maintaining adequate nutrition & hydration d/t frequent reflux/regurgitation. GI was subsequently consulted and orders paced by GI provider for bowel regimen and esophagram and upper GI studies which were completed 6/19/23. Per IM provider documentation, GI provider reports that results of esophagram and upper GI studies indicate delayed emptying, but no concern for strictures; If unable to tolerate any oral intake a CT abdomen with contrast should be performed to rule out intra-abdominal pathology. Pt was not tolerating p.o. intake and a CT abdomen/pelvis with contrast was completed 6/20/2023: report of results indicated a large volume predominantly low density colonic stool intermixed with hyperattenuating contrast (from 6/19/2023 esophagram) extending from the cecum through the entire length of the colon to the rectum. GI examined pt on 6/20/23 and recommends: Follow official read of CT imaging, Golytely prep (4liters) for bowel clearance (can hold Miralax during time of prep and resume it after completion), and to continue Senna twice daily. Pt was unable to tolerate Golytely 4 L bowel prep due to ongoing difficulty tolerating p.o. intake; this provider discussed this with GI provider. Subsequently a therapeutic gastrograffin enema with IR was completed 6/21/23.     GI luminal team will continue to follow pt and recommendations today 6/23/23 include:  -- Given small amounts of stool output, will increase Miralax to TID.   - Please encourage patient to finish each dose of Miralax in less than 15 minutes for bowel flushing effect.   -- Continue Senna PO BID.   -- Appreciate detailed documentation of stool appearance, including color, consistency, frequency and amount.               - Can smear stool thinly on white paper towel to distinguish color.   -- Continue Supportive  Cares  - Adequate volume resuscitation with IVF, pRBCs.  - Monitor Hemoglobin closely. Transfuse to keep Hgb > 7 g/dL from GI standpoint.   - Monitor Platelets closely. Keep PLT > 50 10e3/uL from GI standpoint.  - Maintain hemodynamics: MAP >65 mmHg and HR <100.  - Monitor and optimize electrolytes.  - Monitor and optimize nutrition. --> Diet per primary team.   - Reposition every 30 minutes while awake.  - Encourage Ambulation: 4-6 walks per day.   -- No indication for acute GI intervention at this time.   -- Avoid NSAIDs.  -- Analgesics/Antiemetics per primary team.  -- If the patient experiences overt GI bleeding with hemodynamic instability, please page the GI Luminal Service (listed on [facility]).   -- If any GI questions/concerns over the weekend, please page the GI Fellow on call.      Thank you for allowing us to participate in the care of this patient. Please do not hesitate to page the GI service with any questions or concerns.     Plan also includes to discontinue iron supplementation and schedule repeat iron studies in OP setting. If develops iron deficiency off supplementation, consider IV iron infusions > PO iron supplementation. Per GI provider regarding O.K to hold off on an MRI at this time. Plan will be to continue to coordinate patient's care with GI to determine an effective bowel regimen for patient.    Dietitian is also following pt due to inadequate oral intake related to altered mentation, decreased appetite, and early satiety. Patient's goals for nutrition include: patient to consume at least 25-50% meals, 100% nutrition supplements, and for patient to maintain/gain weight. To support pt's dietary goal of maintaining/gaining weight patient care order in place for staff to promote patient eating small meals and snacks throughout the day; order also includes that pt needs supervision while eating to encourage intake and monitor for reflux/regurgitation. Dietitian saw patient 6/20/2023 d/t pt  continuing to have difficulty tolerating p.o. intake and experiencing ongoing emesis of undigested food after eating.  Dietitian recommends changing patient's diet to minced and moist with Ensure clear (in a cup) with meals; If patient continues to struggle with PO intake, will downgrade to pureed diet. Pt weights will be monitored every Tuesday, Thursday, and Saturday; also monitoring intake and output. Provider notes that patient lost approximately 4 pounds since last weight however patient did have a notably large stool burden removed with a Gastrografin enema on 6/21/2023.  Plan will be to continue to monitor weights and p.o. intake which was decreased today compared to yesterday.  Vitals:    06/08/23 0827 06/11/23 0817 06/13/23 0811 06/17/23 1646   Weight: 50.7 kg (111 lb 12.4 oz) 48.9 kg (107 lb 12.9 oz) 48.9 kg (107 lb 12.9 oz) 48.7 kg (107 lb 5.8 oz)    06/22/23 0822   Weight: 47.1 kg (103 lb 13.4 oz)          MEDICATIONS   Medications:  Scheduled Meds:    calcium carbonate  500 mg Oral Daily     childrens multivitamin with iron  2 tablet Oral Daily     cyanocobalamin  100 mcg Oral Daily     famotidine  20 mg Oral Daily     FLUoxetine  20 mg Oral Daily     memantine  5 mg Oral BID     mirtazapine  15 mg Orally disintegrating tablet At Bedtime     OLANZapine zydis  2.5 mg Oral QAM     OLANZapine zydis  10 mg Oral At Bedtime     ondansetron  4 mg Oral TID AC     pantoprazole  40 mg Oral QAM AC     polyethylene glycol  17 g Oral TID     sennosides  8.6 mg Oral BID     simvastatin  40 mg Oral At Bedtime     valACYclovir  500 mg Oral Daily     cholecalciferol  25 mcg Oral Daily     Continuous Infusions:  PRN Meds:.acetaminophen, alum & mag hydroxide-simethicone, bisacodyl, budesonide-formoterol, calcium carbonate, hydrOXYzine, melatonin, OLANZapine zydis, prochlorperazine, senna-docusate    Medication adherence issues: MS Med Adherence Y/N: No  Medication side effects: MEDICATION SIDE EFFECTS: no side effects  "reported   Benefit: Yes / No: Yes       ROS   Pertinent items are noted in HPI.       MENTAL STATUS EXAM   Vitals:BP 98/67 (BP Location: Right arm, Patient Position: Sitting, Cuff Size: Adult Small)   Pulse 101   Temp 98.2  F (36.8  C) (Oral)   Resp 16   Ht 1.626 m (5' 4\")   Wt 47.1 kg (103 lb 13.4 oz)   LMP  (LMP Unknown)   SpO2 98%   BMI 17.82 kg/m    Appearance: Poorly groomed and Disheveled  Mood:  \"I still don't feel good.\"  Affect: flat  was congruent to speech.  Suicidal Ideation: absent  Homicidal Ideation: PRESENT / ABSENT: absent   Thought process: difficult to follow and disorganized however slightly improved today   Thought content: endorses preoccupations  Fund of Knowledge: average  Attention/Concentration: Poor  Language ability:  Intact  Memory:  Immediate recall impaired, Short-term memory impaired and Long-term memory impaired  Insight:  limited.  Judgement: limited  Orientation: Oriented to self  Psychomotor Behavior: normal or unremarkable    Muscle Strength and Tone: MuscleStrength: Normal  Gait and Station: slightly stiff however steady with walker       LABS   Personally reviewed.     Latest Reference Range & Units 06/15/23 10:54 06/16/23 18:45 06/19/23 07:40 06/19/23 13:43 06/19/23 13:45 06/20/23 12:37 06/21/23 15:05 06/22/23 08:41 06/22/23 09:38 06/23/23 07:57   Sodium 136 - 145 mmol/L   138     137 134 (L) 137   Potassium 3.4 - 5.3 mmol/L   4.4     4.2 4.2    Chloride 98 - 107 mmol/L   104     103 101    Carbon Dioxide (CO2) 22 - 29 mmol/L   26     23 23    Urea Nitrogen 8.0 - 23.0 mg/dL   7.8 (L)     6.0 (L) 6.1 (L)    Creatinine 0.51 - 0.95 mg/dL   0.70     0.78 0.73    GFR Estimate >60 mL/min/1.73m2   86     76 82    Calcium 8.8 - 10.2 mg/dL   9.5     9.4 9.0    Anion Gap 7 - 15 mmol/L   8     11 10    Magnesium 1.7 - 2.3 mg/dL   2.2      2.2    Phosphorus 2.5 - 4.5 mg/dL   3.1      3.1    Albumin 3.5 - 5.2 g/dL         3.3 (L)    Protein Total 6.4 - 8.3 g/dL         5.5 (L)  "   Alkaline Phosphatase 35 - 104 U/L         42    ALT 0 - 50 U/L         10    AST 0 - 45 U/L         12    Bilirubin Total <=1.2 mg/dL         0.3    Ferritin 11 - 328 ng/mL   178          Glucose 70 - 99 mg/dL   93     141 (H) 162 (H)    Iron 37 - 145 ug/dL   50          Iron Binding Capacity 240 - 430 ug/dL   179 (L)          Iron Sat Index 15 - 46 %   28          WBC 4.0 - 11.0 10e3/uL         8.2    Hemoglobin 11.7 - 15.7 g/dL         12.1    Hematocrit 35.0 - 47.0 %         37.5    Platelet Count 150 - 450 10e3/uL         445    RBC Count 3.80 - 5.20 10e6/uL         3.97    MCV 78 - 100 fL         95    MCH 26.5 - 33.0 pg         30.5    MCHC 31.5 - 36.5 g/dL         32.3    RDW 10.0 - 15.0 %         13.1    % Neutrophils %         74    % Lymphocytes %         19    % Monocytes %         6    % Eosinophils %         1    % Basophils %         0    Absolute Basophils 0.0 - 0.2 10e3/uL         0.0    Absolute Eosinophils 0.0 - 0.7 10e3/uL         0.1    Absolute Immature Granulocytes <=0.4 10e3/uL         0.0    Absolute Lymphocytes 0.8 - 5.3 10e3/uL         1.5    Absolute Monocytes 0.0 - 1.3 10e3/uL         0.5    % Immature Granulocytes %         0    Absolute Neutrophils 1.6 - 8.3 10e3/uL         6.1    Absolute NRBCs 10e3/uL         0.0    NRBCs per 100 WBC <1 /100         0    Color Urine Colorless, Straw, Light Yellow, Yellow   Yellow           Appearance Urine Clear   Slightly Cloudy !           Glucose Urine Negative mg/dL  Negative           Bilirubin Urine Negative   Negative           Ketones Urine Negative mg/dL  Negative           Specific Gravity Urine 1.003 - 1.035   1.012           pH Urine 5.0 - 7.0   6.0           Protein Albumin Urine Negative mg/dL  Negative           Urobilinogen mg/dL Normal, 2.0 mg/dL  Normal           Nitrite Urine Negative   Negative           Blood Urine Negative   Negative           Leukocyte Esterase Urine Negative   Large !           WBC Urine <=5 /HPF  87 (H)            RBC Urine <=2 /HPF  3 (H)           Squamous Epithelial /HPF Urine <=1 /HPF  14 (H)           Transitional Epi <=1 /HPF  1           Mucus Urine None Seen /LPF  Present !           URINE CULTURE   Rpt           XR CHEST 2 VIEWS  Rpt            XR ESOPHAGRAM     Rpt         XR UPPER GI WITHOUT KUB      Rpt        XR COLON WATER SOLUBLE DIAGNOSTIC        Rpt      CT ABDOMEN PELVIS W CONTRAST       Rpt       (L): Data is abnormally low  (H): Data is abnormally high  !: Data is abnormal  Rpt: View report in Results Review for more information       DIAGNOSIS   Principal Problem:    Suicidal ideation  MDD, severe, recurrent episode, with psychotic features  R/O Bipolar Disorder Type 1    Active Problem List:  Patient Active Problem List   Diagnosis     Arthritis     Depressive disorder     Borderline personality disorder (H)     GERD (gastroesophageal reflux disease)     Holosystolic murmur     Asthma     History of gastric bypass     Hyperlipidemia LDL goal <130     Other insomnia     Mild mitral regurgitation     Mild aortic stenosis     Weight loss     Bilateral low back pain without sciatica     Adhesive capsulitis of shoulder, right     Mild intermittent asthma, unspecified whether complicated     Bipolar affective disorder, remission status unspecified (H)     Constipation, unspecified constipation type     Age-related osteoporosis without current pathological fracture     Plantar warts     Hypoglycemia     Failure to thrive in adult     Hypotension, unspecified hypotension type     Suicidal ideation          PLAN   1. Education given regarding diagnostic and treatment options with risks, benefits and alternatives and adequate verbalization of understanding.  2.  Medications:  Hospital  -Continue Prozac to 20 mg PO liquid daily to target depressed mood.  -Continue Namenda 5 mg tablet PO BID to treat dementia symptoms observed.  -Continue Olanzapine ODT 2.5 mg tablet every morning  -Continue Olanzapine 10  mg ODT tablet at bedtime  -Continue Mirtazapine 15 mg PO disintegrating tablet at HS.   -Continue Olanzapine ODT 5 mg PO tablet 3 times daily as needed for severe agitation.   3. Consultations:  IM following patient due to treatment of hyponatremia and altered mental status.  GI following pt- per GI provider 6/23/23:  Recommendations:   -- Given small amounts of stool output, will increase Miralax to TID.   - Please encourage patient to finish each dose of Miralax in less than 15 minutes for bowel flushing effect.   -- Continue Senna PO BID.   -- Appreciate detailed documentation of stool appearance, including color, consistency, frequency and amount.               - Can smear stool thinly on white paper towel to distinguish color.   -- Continue Supportive Cares  - Adequate volume resuscitation with IVF, pRBCs.  - Monitor Hemoglobin closely. Transfuse to keep Hgb > 7 g/dL from GI standpoint.   - Monitor Platelets closely. Keep PLT > 50 10e3/uL from GI standpoint.  - Maintain hemodynamics: MAP >65 mmHg and HR <100.  - Monitor and optimize electrolytes.  - Monitor and optimize nutrition. --> Diet per primary team.   - Reposition every 30 minutes while awake.  - Encourage Ambulation: 4-6 walks per day.   -- No indication for acute GI intervention at this time.   -- Avoid NSAIDs.  -- Analgesics/Antiemetics per primary team.  -- If the patient experiences overt GI bleeding with hemodynamic instability, please page the GI Luminal Service (listed on [facility]).   -- If any GI questions/concerns over the weekend, please page the GI Fellow on call.   -- Discontinue iron supplementation and schedule repeat iron studies in OP setting.  If develops iron deficiency off supplementation, consider IV iron infusions > PO iron supplementation  Speech therapy consult completed 6/15/23 for Clinical Swallow Evaluation:  - 1:1 monitoring while eating to promote oral intake and monitor for reflux/regurgitation.  Dietician consulted and will  follow pt during admission  -Weights will be monitored every Tuesday, Thursday, and Saturday  -Intake and output monitoring.  -Plan is for pt to eat small meals/snack throughout the day d/t history of gastric bypass.   -Supplements ordered for between meal  - 6/20/23: Diet change to minced and moist with Ensure clear (in a cup) with meals; If patient continues to struggle with PO intake, will downgrade to pureed diet.   - Zofran 4mg PO PRN to scheduled 30 minutes before meals TID to improve PO tolerance.   4. Labs/Tests   BMP every Monday and Thursday to monitor Hyponatremia.  Sodium level recheck 6/23/2023: 137 mmol/L  5. Structure and Supervision  Unit 3B  Precautions in place.  Fall precautions.  Continue on SIO monitoring due to reporting active SI with plan and confused impulsive behavior.  6.   is following in regards to collecting and reviewing collateral information, referrals and disposition planning.  Legal: civil commitment.   Referrals:  Per CTC  Care Coordination:  Per CTC  Placement:  TBD  Anticipated Discharge:  TBD     Further treatment programming to be determined throughout the hospital course.      Risk Assessment: [facility] RISK ASSESSMENT: Patient on precautions    Coordination of Care:   Treatment Plan reviewed and physician signed, Care discussed with Care/Treatment Team Members, Chart reviewed and Patient seen      Re-Certification I certify that the inpatient psychiatric facility services furnished since the previous certification were, and continue to be, medically necessary for, either, treatment which could reasonably be expected to improve the patient s condition or diagnostic study and that the hospital records indicate that the services furnished were, either, intensive treatment services, admission and related services necessary for diagnostic study, or equivalent services.     I certify that the patient continues to need, on a daily basis, active treatment furnished  directly by or requiring the supervision of inpatient psychiatric facility personnel.   I estimate 7-14 days of hospitalization is necessary for proper treatment of the patient. My plans for post-hospital care for this patient are  TBD     DANIEL Bennett CNP    -     6/23/2023     -     01:30 PM    Total time  35 minutes with > 50%spent on coordination of cares and psycho-education.    This note was created with help of Dragon dictation system. Grammatical / typing errors are not intentional.    DANIEL Bennett CNP

## 2023-06-25 PROCEDURE — 250N000013 HC RX MED GY IP 250 OP 250 PS 637

## 2023-06-25 PROCEDURE — 250N000011 HC RX IP 250 OP 636

## 2023-06-25 PROCEDURE — 250N000013 HC RX MED GY IP 250 OP 250 PS 637: Performed by: PSYCHIATRY & NEUROLOGY

## 2023-06-25 PROCEDURE — 124N000003 HC R&B MH SENIOR/ADOLESCENT

## 2023-06-25 PROCEDURE — 250N000013 HC RX MED GY IP 250 OP 250 PS 637: Performed by: PHYSICIAN ASSISTANT

## 2023-06-25 RX ADMIN — SIMVASTATIN 40 MG: 40 TABLET, FILM COATED ORAL at 21:00

## 2023-06-25 RX ADMIN — ONDANSETRON 4 MG: 4 TABLET ORAL at 17:59

## 2023-06-25 RX ADMIN — Medication 2 TABLET: at 07:51

## 2023-06-25 RX ADMIN — SENNOSIDES 8.6 MG: 8.6 TABLET ORAL at 20:51

## 2023-06-25 RX ADMIN — MEMANTINE 5 MG: 5 TABLET ORAL at 20:51

## 2023-06-25 RX ADMIN — OLANZAPINE 2.5 MG: 5 TABLET, ORALLY DISINTEGRATING ORAL at 07:51

## 2023-06-25 RX ADMIN — MEMANTINE 5 MG: 5 TABLET ORAL at 07:51

## 2023-06-25 RX ADMIN — VALACYCLOVIR 500 MG: 500 TABLET, FILM COATED ORAL at 07:51

## 2023-06-25 RX ADMIN — Medication 100 MCG: at 07:51

## 2023-06-25 RX ADMIN — POLYETHYLENE GLYCOL 3350 17 G: 17 POWDER, FOR SOLUTION ORAL at 20:51

## 2023-06-25 RX ADMIN — POLYETHYLENE GLYCOL 3350 17 G: 17 POWDER, FOR SOLUTION ORAL at 14:06

## 2023-06-25 RX ADMIN — PANTOPRAZOLE SODIUM 40 MG: 40 TABLET, DELAYED RELEASE ORAL at 06:37

## 2023-06-25 RX ADMIN — ONDANSETRON 4 MG: 4 TABLET ORAL at 06:37

## 2023-06-25 RX ADMIN — MIRTAZAPINE 15 MG: 15 TABLET, ORALLY DISINTEGRATING ORAL at 21:00

## 2023-06-25 RX ADMIN — SENNOSIDES 8.6 MG: 8.6 TABLET ORAL at 07:51

## 2023-06-25 RX ADMIN — OLANZAPINE 10 MG: 10 TABLET, ORALLY DISINTEGRATING ORAL at 21:00

## 2023-06-25 RX ADMIN — ONDANSETRON 4 MG: 4 TABLET ORAL at 11:47

## 2023-06-25 RX ADMIN — Medication 500 MG: at 07:51

## 2023-06-25 RX ADMIN — FLUOXETINE HYDROCHLORIDE 20 MG: 20 SOLUTION ORAL at 07:52

## 2023-06-25 RX ADMIN — Medication 25 MCG: at 07:51

## 2023-06-25 RX ADMIN — POLYETHYLENE GLYCOL 3350 17 G: 17 POWDER, FOR SOLUTION ORAL at 07:50

## 2023-06-25 RX ADMIN — FAMOTIDINE 20 MG: 20 TABLET ORAL at 07:51

## 2023-06-25 ASSESSMENT — ACTIVITIES OF DAILY LIVING (ADL)
LAUNDRY: UNABLE TO COMPLETE
DRESS: SCRUBS (BEHAVIORAL HEALTH)
ADLS_ACUITY_SCORE: 81
ORAL_HYGIENE: WITH ASSISTANCE
ORAL_HYGIENE: WITH ASSISTANCE
ADLS_ACUITY_SCORE: 81
ADLS_ACUITY_SCORE: 71
ADLS_ACUITY_SCORE: 71
HYGIENE/GROOMING: WITH ASSISTANCE
ADLS_ACUITY_SCORE: 71
ADLS_ACUITY_SCORE: 81
DRESS: SCRUBS (BEHAVIORAL HEALTH);WITH ASSISTANCE
ADLS_ACUITY_SCORE: 71
ADLS_ACUITY_SCORE: 81
HYGIENE/GROOMING: WITH ASSISTANCE
ADLS_ACUITY_SCORE: 71

## 2023-06-25 NOTE — PLAN OF CARE
Problem: Anxiety Signs/Symptoms  Goal: Improved Sleep (Anxiety Signs/Symptoms)  Outcome: Progressing   Goal Outcome Evaluation:                  Received asleep,pt is using a medical bed to aid with mobility and HOB elevated .Pt is on SIo for self injury risk. Observed awake on few occasions but was able to fall right back to sleep w/o medication. Went to the toilet once with standby assist.No episodes of stool incontinence or nausea/vomiting  Slept for 6.5 hours  Intake 100 ml

## 2023-06-25 NOTE — PLAN OF CARE
"  Problem: Adult Behavioral Health Plan of Care  Goal: Adheres to Safety Considerations for Self and Others  Intervention: Develop and Maintain Individualized Safety Plan  Recent Flowsheet Documentation  Taken 6/25/2023 0926 by Claire Koroma RN  Safety Measures:    suicide assessment completed    safety rounds completed    environmental rounds completed     Problem: Anxiety Signs/Symptoms  Goal: Optimized Energy Level (Anxiety Signs/Symptoms)  Outcome: Not Progressing  Flowsheets (Taken 6/25/2023 0929)  Mutually Determined Action Steps (Optimized Energy Level): dresses/ready for morning activity     Problem: Depressive Signs/Symptoms  Goal: Optimized Energy Level (Depressive Signs/Symptoms)  Outcome: Not Progressing  Flowsheets (Taken 6/25/2023 0929)  Mutually Determined Action Steps (Optimized Energy Level): dresses/ready for morning activity  Intervention: Optimize Energy Level  Recent Flowsheet Documentation  Taken 6/25/2023 0926 by Claire Koroma RN  Patient Performed Hygiene: dressed  Activity (Behavioral Health): up ad devan   Goal Outcome Evaluation:    Plan of Care Reviewed With: patient      Pt was up and out for breakfast early this AM. Pt ate approximately 20% of her solids and drank her apple Clear. Pt was able to take her AM medications without any issue including miralax that was mixed with 240 ml of water. Pt somewhat irritable and wanted to lay down and sleep. Pt's feet were lotioned. Pt noted to have peeling skin on her left heel.     Pt continues to isolate but has been cooperative with coming out of her room for brief periods of time and she has attended portions of both groups this AM.   Pt has walked in the buchanan x 2 this shift.    Pt continues to report feeling very depressed and admits to having thoughts of wanting to die without a plan or intent. Pt was asked if there was anything that writer could do to make her feel better and she stated \"hire someone to shoot me\". Pt then smiled at " writer.     Pt ate approximately 20 % of lunch and returned to bed.     1400. Pt approached for 1400 medication. Pt agreeable to walk in the buchanan and then came to the lounge to eat a yogurt.

## 2023-06-25 NOTE — PLAN OF CARE
"  Problem: Anxiety Signs/Symptoms  Goal: Optimized Energy Level (Anxiety Signs/Symptoms)  Outcome: Progressing  Intervention: Optimize Energy Level  Recent Flowsheet Documentation  Taken 6/24/2023 1810 by Angela Collazo, RN  Patient Performed Hygiene: dressed  Activity (Behavioral Health): up ad devan     Problem: Depressive Signs/Symptoms  Goal: Optimized Energy Level (Depressive Signs/Symptoms)  Outcome: Progressing  Intervention: Optimize Energy Level  Recent Flowsheet Documentation  Taken 6/24/2023 1810 by Angela Collazo, RN  Patient Performed Hygiene: dressed  Activity (Behavioral Health): up ad devan   Goal Outcome Evaluation:    Plan of Care Reviewed With: patient    Pt  has flat blunted affect, poor eye contact, appears miserable on approach. Pt continues on SIO,  is A&O x 3, able to make needs known. Fair appetite, ate 50% of dinner, encouraged fluids at 480 ml at dinner and 120 ml with med's.Total of 600 ml's on our shift.Pt ate all the Vanilla pudding. Pt had two loose, in cont small stools, elvia care done and changed into clean scrubs. Senna and milalax not given. Transfers independently using a walker.  Denies SI/HI/SIB.  Staff will continue to monitor and update changes.      /62 (BP Location: Right arm, Patient Position: Sitting)   Pulse 67   Temp 98.9  F (37.2  C) (Oral)   Resp 16   Ht 1.626 m (5' 4\")   Wt 47.1 kg (103 lb 13.4 oz)   LMP  (LMP Unknown)   SpO2 96%   BMI 17.82 kg/m               "

## 2023-06-26 LAB
ANION GAP SERPL CALCULATED.3IONS-SCNC: 7 MMOL/L (ref 7–15)
BUN SERPL-MCNC: 13.6 MG/DL (ref 8–23)
CALCIUM SERPL-MCNC: 9.2 MG/DL (ref 8.8–10.2)
CHLORIDE SERPL-SCNC: 105 MMOL/L (ref 98–107)
CREAT SERPL-MCNC: 0.8 MG/DL (ref 0.51–0.95)
DEPRECATED HCO3 PLAS-SCNC: 26 MMOL/L (ref 22–29)
GFR SERPL CREATININE-BSD FRML MDRD: 74 ML/MIN/1.73M2
GLUCOSE SERPL-MCNC: 92 MG/DL (ref 70–99)
MAGNESIUM SERPL-MCNC: 2.1 MG/DL (ref 1.7–2.3)
PHOSPHATE SERPL-MCNC: 3.3 MG/DL (ref 2.5–4.5)
POTASSIUM SERPL-SCNC: 4 MMOL/L (ref 3.4–5.3)
SODIUM SERPL-SCNC: 138 MMOL/L (ref 136–145)

## 2023-06-26 PROCEDURE — 250N000013 HC RX MED GY IP 250 OP 250 PS 637

## 2023-06-26 PROCEDURE — 99232 SBSQ HOSP IP/OBS MODERATE 35: CPT

## 2023-06-26 PROCEDURE — 84100 ASSAY OF PHOSPHORUS: CPT | Performed by: PHYSICIAN ASSISTANT

## 2023-06-26 PROCEDURE — 80048 BASIC METABOLIC PNL TOTAL CA: CPT | Performed by: PHYSICIAN ASSISTANT

## 2023-06-26 PROCEDURE — 250N000013 HC RX MED GY IP 250 OP 250 PS 637: Performed by: PHYSICIAN ASSISTANT

## 2023-06-26 PROCEDURE — 83735 ASSAY OF MAGNESIUM: CPT | Performed by: PHYSICIAN ASSISTANT

## 2023-06-26 PROCEDURE — 124N000003 HC R&B MH SENIOR/ADOLESCENT

## 2023-06-26 PROCEDURE — 250N000013 HC RX MED GY IP 250 OP 250 PS 637: Performed by: PSYCHIATRY & NEUROLOGY

## 2023-06-26 PROCEDURE — 36415 COLL VENOUS BLD VENIPUNCTURE: CPT | Performed by: PHYSICIAN ASSISTANT

## 2023-06-26 PROCEDURE — 250N000011 HC RX IP 250 OP 636

## 2023-06-26 RX ORDER — LANOLIN ALCOHOL/MO/W.PET/CERES
3 CREAM (GRAM) TOPICAL EVERY EVENING
Status: DISCONTINUED | OUTPATIENT
Start: 2023-06-26 | End: 2023-06-28

## 2023-06-26 RX ORDER — LANOLIN ALCOHOL/MO/W.PET/CERES
3 CREAM (GRAM) TOPICAL AT BEDTIME
Status: DISCONTINUED | OUTPATIENT
Start: 2023-06-26 | End: 2023-06-26

## 2023-06-26 RX ADMIN — FLUOXETINE HYDROCHLORIDE 20 MG: 20 SOLUTION ORAL at 08:38

## 2023-06-26 RX ADMIN — Medication 100 MCG: at 08:41

## 2023-06-26 RX ADMIN — Medication 500 MG: at 08:40

## 2023-06-26 RX ADMIN — Medication 25 MCG: at 08:41

## 2023-06-26 RX ADMIN — SIMVASTATIN 40 MG: 40 TABLET, FILM COATED ORAL at 20:01

## 2023-06-26 RX ADMIN — PROCHLORPERAZINE MALEATE 5 MG: 5 TABLET ORAL at 20:11

## 2023-06-26 RX ADMIN — ONDANSETRON 4 MG: 4 TABLET ORAL at 11:43

## 2023-06-26 RX ADMIN — VALACYCLOVIR 500 MG: 500 TABLET, FILM COATED ORAL at 08:40

## 2023-06-26 RX ADMIN — MEMANTINE 5 MG: 5 TABLET ORAL at 08:41

## 2023-06-26 RX ADMIN — SENNOSIDES 8.6 MG: 8.6 TABLET ORAL at 08:42

## 2023-06-26 RX ADMIN — Medication 2 TABLET: at 08:42

## 2023-06-26 RX ADMIN — POLYETHYLENE GLYCOL 3350 17 G: 17 POWDER, FOR SOLUTION ORAL at 19:59

## 2023-06-26 RX ADMIN — PANTOPRAZOLE SODIUM 40 MG: 40 TABLET, DELAYED RELEASE ORAL at 05:33

## 2023-06-26 RX ADMIN — OLANZAPINE 10 MG: 10 TABLET, ORALLY DISINTEGRATING ORAL at 20:02

## 2023-06-26 RX ADMIN — POLYETHYLENE GLYCOL 3350 17 G: 17 POWDER, FOR SOLUTION ORAL at 14:07

## 2023-06-26 RX ADMIN — ONDANSETRON 4 MG: 4 TABLET ORAL at 17:28

## 2023-06-26 RX ADMIN — MIRTAZAPINE 15 MG: 15 TABLET, ORALLY DISINTEGRATING ORAL at 20:01

## 2023-06-26 RX ADMIN — POLYETHYLENE GLYCOL 3350 17 G: 17 POWDER, FOR SOLUTION ORAL at 08:38

## 2023-06-26 RX ADMIN — Medication 3 MG: at 20:02

## 2023-06-26 RX ADMIN — MEMANTINE 5 MG: 5 TABLET ORAL at 20:02

## 2023-06-26 RX ADMIN — FAMOTIDINE 20 MG: 20 TABLET ORAL at 07:57

## 2023-06-26 RX ADMIN — OLANZAPINE 2.5 MG: 5 TABLET, ORALLY DISINTEGRATING ORAL at 07:57

## 2023-06-26 RX ADMIN — ONDANSETRON 4 MG: 4 TABLET ORAL at 05:33

## 2023-06-26 ASSESSMENT — ACTIVITIES OF DAILY LIVING (ADL)
HYGIENE/GROOMING: PROMPTS
ADLS_ACUITY_SCORE: 81
ADLS_ACUITY_SCORE: 71
LAUNDRY: UNABLE TO COMPLETE
HYGIENE/GROOMING: PROMPTS
ORAL_HYGIENE: PROMPTS
ADLS_ACUITY_SCORE: 81
DRESS: SCRUBS (BEHAVIORAL HEALTH);PROMPTS
ADLS_ACUITY_SCORE: 71
DRESS: SCRUBS (BEHAVIORAL HEALTH);PROMPTS
ADLS_ACUITY_SCORE: 71
ADLS_ACUITY_SCORE: 71
ORAL_HYGIENE: PROMPTS
ADLS_ACUITY_SCORE: 81
ADLS_ACUITY_SCORE: 71
ADLS_ACUITY_SCORE: 71
ADLS_ACUITY_SCORE: 81

## 2023-06-26 NOTE — PROGRESS NOTES
CLINICAL NUTRITION SERVICES - REASSESSMENT NOTE     Nutrition Prescription    RECOMMENDATIONS FOR MDs/PROVIDERS TO ORDER:  Will monitor over the next few days, if emesis continues will downgrade to purees.     Malnutrition Status:    Severe malnutrition in the context of starvation related to social and environmental circumstances v chronic disease.     Recommendations already ordered by Registered Dietitian (RD):  Continue with interventions as ordered at this time.     Future/Additional Recommendations:  Monitor oral intakes, emesis, weights, labs.      EVALUATION OF THE PROGRESS TOWARD GOALS   Diet: Level 5: Minced & Moist Dysphagia Diet   Fluid restriction: 2000 mL   Supplement: Ensure clear TID with meals   Intake: generally 25% per I/Os      NEW FINDINGS   Per chart review, patient continues to endorse nausea with intermittent emesis. GI has been following and monitoring bowel regimen and stooling. Patient appears to last have had emesis on 6/22.     Spoke with patients care team on the unit. They report that eating went better over the weekend, with no reported emesis. RN noted that patient did have emesis today, was observed to be putting her fingers down her throat to make herself throw up, SIO is re-directing this behavior. Patient is drinking Ensure Clear, care team pushing fluids for soft blood pressures. Na+ WNL today.     Weights:  06/25/23 0820 48.3 kg (106 lb 7.7 oz) --   06/22/23 0822 47.1 kg (103 lb 13.4 oz) Standing scale   06/17/23 1646 48.7 kg (107 lb 5.8 oz) Standing scale   06/13/23 0811 48.9 kg (107 lb 12.9 oz) --   06/11/23 0817 48.9 kg (107 lb 12.9 oz) --   06/08/23 0827 50.7 kg (111 lb 12.4 oz) --   06/07/23 2048 51.1 kg (112 lb 10.5 oz)    Weights are variable, suspect fluctuations 2/2 fluid shifts and stool burden.     Labs and meds reviewed. Zofran scheduled for 30 minutes before meals.     MALNUTRITION  % Intake: </= 50% for >/= 5 days (severe)  % Weight Loss: weights are variable,  difficult to assess   Subcutaneous Fat Loss: global severe   Muscle Loss: global severe   Fluid Accumulation/Edema: None noted  Malnutrition Diagnosis: Severe malnutrition in the context of starvation related to social and environmental circumstances v chronic disease.      Previous Goals   Patient to consume at least 25-50% meals, 100% nutrition supplements.  Patient to maintain/gain weight.   Evaluation: Not met    Previous Nutrition Diagnosis  Inadequate oral intake related to altered mentation, decreased appetite, early satiety, nausea/vomiting as evidenced by 4.7% wt loss in 10 days.   Evaluation: No change    CURRENT NUTRITION DIAGNOSIS  Inadequate oral intake related to altered mentation, decreased appetite, early satiety, nausea/vomiting as evidenced by 4.7% wt loss in 10 days.      INTERVENTIONS  Implementation  Continue with interventions as ordered.     Goals  Patient to consume at least 25-50% meals, 100% nutrition supplements.  Patient to maintain/gain weight.     Monitoring/Evaluation  Progress toward goals will be monitored and evaluated per protocol.    Moira Bella, MPH, RD, LD  Pediatric & Adult Behavioral Health Dietitian   Pager: [phone]  Weekend/holiday pager: [phone]

## 2023-06-26 NOTE — PLAN OF CARE
Assessment/Intervention/Current Symptoms and Care Coordination  - chart review  - team meeting  - team rounds/pt interview addressed patient needs/concerns  - Medication adjustment continues as pt remains symptomatic.      Discharge Plan or Goal  Pending stabilization & development of a safe discharge plan.  Considerations include:  [facility] (formerly [facility])     Barriers to Discharge   Patient requires further psychiatric stabilization due to current symptomology     Referral Status   None     Legal Status  Patient is under MI commitment in [facility]  Pt will need PD upon discharge.

## 2023-06-26 NOTE — PROGRESS NOTES
"Gastroenterology Brief Note *SIGN OFF NOTE*    Chart reviewed, did not interview/examine patient.     Bhavana Marr is a 82 year old female with a PMH significant for HLD, asthma, depression, anxiety, borderline personality, bipolar disorder, GERD, osteoporosis, and gastric bypass (reportedly 27 years ago, unclear if RNY or other type/no CT imaging nor op note available) who was initially adm to [facility] on 5/5/23, on 6/7 transferred to [facility] (behavioral/psych) due to worsening depression with possible SI as well as Failure To Thrive (FTT), hypoglycemia and hypotension.  GI consulted 6/16 to assist with evaluation and assist with management of reflux/regurgitation as having difficulty maintaining hydration/nutrition.    S/p Gastrografin enema 6/21 with good output per RN notes.      Having x1-3 daily BMs 6/20-6/23, none documented 6/24-6/25 but per RN notes had x3 med/large brown stools this AM.      Meds: Miralax TID, Senna BID, Zofran 4 mg TID pre-prandially, Protonix 40 mg q AM    Noted to have improved po, no N/V over weekend but this AM reporting \"emesis\" after nursing staff observed self-inducing with finger inserted down her throat.    Recommendations:  -- No indication for endoscopic GI intervention at this time given age, history of dementia/underlying psychiatric illnesses and based on esophagram/upper GI and CT imaging obtained this admission.  -- Continue with psych therapies to treat depression and evaluate for eating disorder given ongoing observed self-induced vomiting.  -- Consider palliative consult to help further assist with GOC if without improvement in course/sx.  -- Continue with scheduled bowel med regimen and titrate to ensure patient having at least x1 soft BM daily given chronic hx of constipation.  -- Continue detailed documentation of stool appearance, including color, consistency, frequency and amount.   -- Continue PPI at discharge.    Communicated recs to psych MD  " Nia and Abigail Velazquez from consulting medicine team.    Above in collaboration/discussed with Dr. Prasad, GI attending.    The inpatient gastroenterology service will sign off at this time. Thank you for allowing us to participate in the care of this patient. Please do not hesitate to page the GI service with any questions or concerns.      Milly Gerard PA-C   GI Service  Pager *[phone]

## 2023-06-26 NOTE — PLAN OF CARE
Problem: Adult Behavioral Health Plan of Care  Goal: Adheres to Safety Considerations for Self and Others  Intervention: Develop and Maintain Individualized Safety Plan  Recent Flowsheet Documentation  Taken 6/26/2023 1237 by Claire Koroma RN  Safety Measures:   suicide assessment completed   safety rounds completed   environmental rounds completed     Problem: Anxiety Signs/Symptoms  Goal: Optimized Energy Level (Anxiety Signs/Symptoms)  Intervention: Optimize Energy Level  Recent Flowsheet Documentation  Taken 6/26/2023 1237 by Claire Koroma RN  Patient Performed Hygiene: dressed  Activity (Behavioral Health): up ad devan     Problem: Suicidal Behavior  Goal: Suicidal Behavior is Absent or Managed  Outcome: Not Progressing     Problem: Bowel Elimination Management  Goal: Effective Bowel Elimination/Continence  Outcome: Progressing   Goal Outcome Evaluation:    Plan of Care Reviewed With: patient     Pt continues to have a flat affect with inconsistent eye contact. Pt continues to report thoughts of wanting to die. Pt denies having a plan or intent. Pt intake has been poor this shift. Pt has spit up after lunch and then patient was observed putting her fingers in her throat and making herself throw up. Pt directed to not do this behavior.  Pt has had 3 med/large inc loose brown stools this AM.     Pt has walked in the buchanan x 3 this shift. Pt needs prompts to be out of her room, to completed cares, etc.

## 2023-06-26 NOTE — PROGRESS NOTES
"PSYCHIATRY  PROGRESS NOTE     DATE OF SERVICE   6/26/2023         CHIEF COMPLAINT   \"I'm not well.\"       SUBJECTIVE   Nursing reports:     Over the weekend nursing reports patient having regular bowel movements and continues on scheduled bowel regimen.  Patient sleeping adequately and p.o. intake improving per intake flow sheet.  Patient continues to report depression and thoughts of SI.    Assessed mood,anxiety,thoughts and behavior. Patient is not progressing towards goals. Patient is encouraged to participate in groups and assisted to develop healthy coping skills.  Patient denies auditory or visual hallucinations, but stated to SIO staff close the door the enemy might come in./73   Pulse 66   Temp 97.7  F (36.5  C) (Temporal)   Resp 18   Ht 1.626 m (5' 4\")   Wt 48.3 kg (106 lb 7.7 oz)   LMP  (LMP Unknown)   SpO2 95%   BMI 18.28 kg/m       Slept well tonight, only got up for toileting, no comments made about wanting to die..  Pt has a medical bed for mobility and to keep HOB elevated to prevent reflux, non reported tonight. On SIO for self injury risk.  Medication compliant  Voided x1, drank 100 ml of water  Slept for 9 hours      reports:    Assessment/Intervention/Current Symptoms and Care Coordination  - chart review  - team meeting  - team rounds/pt interview addressed patient needs/concerns  - Medication adjustment continues as pt remains symptomatic.      Discharge Plan or Goal  Pending stabilization & development of a safe discharge plan.  Considerations include:  [facility] ([facility]     Barriers to Discharge   Patient requires further psychiatric stabilization due to current symptomology     Referral Status   None     Legal Status  Patient is under MI commitment in [facility]  Pt will need PD upon discharge.     OBJECTIVE   Met with pt in hospital room on unit 3B. Pt affect appears flat and pt reports, \"I'm not well.\" Pt's thoughts " continue to be disorganized however slightly improving today and pt is more responsive to questions. Pt does continue to have ongoing confusion.  Patient is oriented to self; patient also able to name the month and day however not able to say what year it is or location. Patient reports feeling unwell due to ongoing loose stool; provider discussed with patient that patient continues on a bowel regimen of sheduled MiraLAX and Senokot to treat severe constipation that she was experiencing. Also, reviewed with patient that she had a Gastrografin enema to treat this last week. Plan to address this will be to continue scheduled MiraLAX and stool softeners to promote bowel movements with goal of patient achieving 1 soft bowel movement daily per GI recommendation. Patient verbalizes understanding however reports concern of needing help from staff with pericare due to stool incontinence. Provider explained to patient that she has unit staff with her at all times who can help if she has a bowel movement and needs assistance. Patient verbalizes understanding. Patient also continues to endorse symptoms of depression and reports thoughts of SI with no plan due to being in a hospital.  Pt will continue on SIO monitoring which is in place due to patient reporting SI and ongoing confusion/impulsive behaviors.  Patient also endorses difficulty sleeping despite nursing staff reporting patient slept 9 hours last night; provider adjusted current melatonin 3 mg order to be scheduled in evening to promote sleep.  Patient in agreement with this plan. Plan also includes to continue Namenda to 5 mg PO BID, Prozac 20 mg liquid daily, Olanzapine ODT 2.5 mg tablet every morning, Olanzapine 10 mg ODT tablet at bedtime, and Mirtazapine 15 mg PO disintegrating tablet at HS.    Patient continues to be on a 2 L fluid restriction per IM to treat hyponatremia; sodium level today 6/26/2023 was 138 mml/L.  BMP ordered for every Monday and Thursday to  monitor. IM has been following pt to evaluate hyponatremia and IM was notified on 6/15/23 of altered level of consciousness which presented as increased confusion compared to baseline since admission and increased agitation. Per IM provider pt's waxing and waning symptoms most likely secondary to a primary psychiatric disorder with high likelihood for underlying dementia; there is no clear reversible organic cause of her symptoms. Speech therapy was consulted 6/15/23 and Chest Xray was also ordered to evaluate due to concern for aspiration with frequent emesis; chest xray did not show acute concern for aspiration. Today, 6/26/2023 speech therapy was reconsulted to address ongoing regurgitation of small amounts after eating; patient has been observed to experience regurgitation of small amounts spontaneously which she then swallows. Staff have also observed patient inducing vomiting by sticking fingers in her mouth. With previous consult patient was unable to follow instructions to complete a speech swallow study, however currently patient is more cooperative and able to follow simple instructions. GI has also been following pt to address frequent emesis, poor P.O intake, and constipation. GI started pt on bowel regimen and esophagram and upper GI studies which were completed 6/19/23. Per IM provider documentation, GI provider reports that results of esophagram and upper GI studies indicate delayed emptying, but no concern for strictures; if unable to tolerate any oral intake a CT abdomen with contrast should be performed to rule out intra-abdominal pathology. Pt was not tolerating p.o. intake at that time and a CT abdomen/pelvis with contrast was completed 6/20/2023: report of results indicated a large volume predominantly low density colonic stool intermixed with hyperattenuating contrast (from 6/19/2023 esophagram) extending from the cecum through the entire length of the colon to the rectum. GI examined pt on  6/20/23 and recommended: Golytely prep (4liters) for bowel clearance (can hold Miralax during time of prep and resume it after completion), and to continue Senna twice daily. Pt was unable to tolerate Golytely 4 L bowel prep due to ongoing difficulty tolerating p.o. intake. Subsequently a therapeutic gastrograffin enema with IR was completed 6/21/23 which was effective for removing a large volume of stool.     GI luminal team signed off today 6/26/2023 with the following recommendations:   -- No indication for endoscopic GI intervention at this time given age, history of dementia/underlying psychiatric illnesses and based on esophagram/upper GI and CT imaging obtained this admission.  -- Continue with psych therapies to treat depression and evaluate for eating disorder given ongoing observed self-induced vomiting.  -- Consider palliative consult to help further assist with GOC if without improvement in course/sx.  -- Continue with scheduled bowel med regimen and titrate to ensure patient having at least x1 soft BM daily given chronic hx of constipation. (This provider updated Miralax order to include goal of 1 soft BM daily with the instruction to hold one dose if pt has met this goal.)  -- Continue detailed documentation of stool appearance, including color, consistency, frequency and amount.   -- Continue PPI at discharge (this provider Dc'd scheduled pepcid and continue Protonix scheduled per GI recommendation)    Plan also includes to discontinue iron supplementation and schedule repeat iron studies in OP setting. If develops iron deficiency off supplementation, consider IV iron infusions > PO iron supplementation. Per GI provider regarding O.K to hold off on an MRI at this time. Plan will be to continue to coordinate patient's care with GI to determine an effecive bowel regimen for patient.    Dietitian is also following pt due to inadequate oral intake related to altered mentation, decreased appetite, and  early satiety. Patient's goals for nutrition include: patient to consume at least 25-50% meals, 100% nutrition supplements, and for patient to maintain/gain weight. To support pt's dietary goal of maintaining/gaining weight patient care order in place for staff to promote patient eating small meals and snacks throughout the day; order also includes that pt needs supervision while eating to encourage intake and monitor for reflux/regurgitation. Dietitian saw patient today 6/26/2023 d/t pt continuing to have difficulty tolerating p.o. intake, however this is improving, and pt experiencing ongoing regurgitation of small amounts after eating. Dietitian continues to recommend diet of minced and moist with Ensure clear (in a cup) with meals; If patient continues to struggle with PO intake, will downgrade to pureed diet. Pt weights will be monitored every Tuesday, Thursday, and Saturday; also monitoring intake and output. Provider notes that patient lost approximately 4 pounds since last weight however patient did have a notably large stool burden removed with a Gastrografin enema on 6/21/2023.  Plan will be to continue to monitor weights and p.o. intake. Pt weights:  Vitals:    06/11/23 0817 06/13/23 0811 06/17/23 1646 06/22/23 0822   Weight: 48.9 kg (107 lb 12.9 oz) 48.9 kg (107 lb 12.9 oz) 48.7 kg (107 lb 5.8 oz) 47.1 kg (103 lb 13.4 oz)    06/25/23 0820   Weight: 48.3 kg (106 lb 7.7 oz)            MEDICATIONS   Medications:  Scheduled Meds:    calcium carbonate  500 mg Oral Daily     childrens multivitamin with iron  2 tablet Oral Daily     cyanocobalamin  100 mcg Oral Daily     FLUoxetine  20 mg Oral Daily     melatonin  3 mg Oral At Bedtime     memantine  5 mg Oral BID     mirtazapine  15 mg Orally disintegrating tablet At Bedtime     OLANZapine zydis  2.5 mg Oral QAM     OLANZapine zydis  10 mg Oral At Bedtime     ondansetron  4 mg Oral TID AC     pantoprazole  40 mg Oral QAM AC     polyethylene glycol  17 g Oral  "TID     sennosides  8.6 mg Oral BID     simvastatin  40 mg Oral At Bedtime     valACYclovir  500 mg Oral Daily     cholecalciferol  25 mcg Oral Daily     Continuous Infusions:  PRN Meds:.acetaminophen, alum & mag hydroxide-simethicone, bisacodyl, budesonide-formoterol, calcium carbonate, hydrOXYzine, OLANZapine zydis, prochlorperazine, senna-docusate    Medication adherence issues: MS Med Adherence Y/N: No  Medication side effects: MEDICATION SIDE EFFECTS: no side effects reported   Benefit: Yes / No: Yes       ROS   Pertinent items are noted in HPI.       MENTAL STATUS EXAM   Vitals:/63 (BP Location: Right arm, Patient Position: Sitting, Cuff Size: Adult Regular)   Pulse 74   Temp 98.6  F (37  C) (Temporal)   Resp 16   Ht 1.626 m (5' 4\")   Wt 48.3 kg (106 lb 7.7 oz)   LMP  (LMP Unknown)   SpO2 96%   BMI 18.28 kg/m    Appearance: Poorly groomed and Disheveled  Mood: \"I'm not well.\"  Affect: flat  was congruent to speech.  Suicidal Ideation: absent  Homicidal Ideation: PRESENT / ABSENT: absent   Thought process: difficult to follow and disorganized however slightly improved today   Thought content: endorses preoccupations  Fund of Knowledge: average  Attention/Concentration: Poor  Language ability:  Intact  Memory:  Immediate recall impaired, Short-term memory impaired and Long-term memory impaired  Insight:  limited.  Judgement: limited  Orientation: Oriented to self, month, and day.  Psychomotor Behavior: normal or unremarkable    Muscle Strength and Tone: MuscleStrength: Normal  Gait and Station: slightly stiff however steady with walker       LABS   Personally reviewed.         Latest Reference Range & Units 06/12/23 08:00 06/12/23 12:07 06/13/23 07:46 06/15/23 09:21 06/15/23 10:54 06/16/23 18:45 06/19/23 07:40 06/19/23 13:43 06/19/23 13:45 06/20/23 12:37 06/21/23 15:05 06/22/23 08:41 06/22/23 09:38 06/23/23 07:57 06/26/23 07:15   Sodium 136 - 145 mmol/L 129 (L)  133 (L) 134 (L)   138     137 134 " (L) 137 138   Potassium 3.4 - 5.3 mmol/L 4.0  3.8 4.0   4.4     4.2 4.2  4.0   Chloride 98 - 107 mmol/L 93 (L)  96 (L) 97 (L)   104     103 101  105   Carbon Dioxide (CO2) 22 - 29 mmol/L 24  26 26   26     23 23  26   Urea Nitrogen 8.0 - 23.0 mg/dL 26.3 (H)  16.0 15.2   7.8 (L)     6.0 (L) 6.1 (L)  13.6   Creatinine 0.51 - 0.95 mg/dL 0.95  0.73 0.74   0.70     0.78 0.73  0.80   GFR Estimate >60 mL/min/1.73m2 60 (L)  82 81   86     76 82  74   Calcium 8.8 - 10.2 mg/dL 10.0  9.7 9.7   9.5     9.4 9.0  9.2   Anion Gap 7 - 15 mmol/L 12  11 11   8     11 10  7   Magnesium 1.7 - 2.3 mg/dL       2.2      2.2  2.1   Phosphorus 2.5 - 4.5 mg/dL       3.1      3.1  3.3   Albumin 3.5 - 5.2 g/dL    3.4 (L)         3.3 (L)     Protein Total 6.4 - 8.3 g/dL    6.2 (L)         5.5 (L)     Alkaline Phosphatase 35 - 104 U/L    44         42     ALT 0 - 50 U/L    12         10     AST 0 - 45 U/L    16         12     Bilirubin Direct 0.00 - 0.30 mg/dL    <0.20              Bilirubin Total <=1.2 mg/dL    0.4         0.3     Ferritin 11 - 328 ng/mL       178           Glucose 70 - 99 mg/dL 201 (H)  102 (H) 160 (H)   93     141 (H) 162 (H)  92   Iron 37 - 145 ug/dL       50           Iron Binding Capacity 240 - 430 ug/dL       179 (L)           Iron Sat Index 15 - 46 %       28           WBC 4.0 - 11.0 10e3/uL   10.4 6.4         8.2     Hemoglobin 11.7 - 15.7 g/dL   12.6 13.4         12.1     Hematocrit 35.0 - 47.0 %   37.7 41.3         37.5     Platelet Count 150 - 450 10e3/uL   262 365         445     RBC Count 3.80 - 5.20 10e6/uL   4.11 4.42         3.97     MCV 78 - 100 fL   92 93         95     MCH 26.5 - 33.0 pg   30.7 30.3         30.5     MCHC 31.5 - 36.5 g/dL   33.4 32.4         32.3     RDW 10.0 - 15.0 %   13.0 12.9         13.1     % Neutrophils %   79 64         74     % Lymphocytes %   13 28         19     % Monocytes %   8 7         6     % Eosinophils %   0 1         1     % Basophils %   0 0         0     Absolute Basophils  0.0 - 0.2 10e3/uL   0.0 0.0         0.0     Absolute Eosinophils 0.0 - 0.7 10e3/uL   0.0 0.1         0.1     Absolute Immature Granulocytes <=0.4 10e3/uL   0.0 0.0         0.0     Absolute Lymphocytes 0.8 - 5.3 10e3/uL   1.4 1.8         1.5     Absolute Monocytes 0.0 - 1.3 10e3/uL   0.8 0.5         0.5     % Immature Granulocytes %   0 0         0     Absolute Neutrophils 1.6 - 8.3 10e3/uL   8.2 4.0         6.1     Absolute NRBCs 10e3/uL   0.0 0.0         0.0     NRBCs per 100 WBC <1 /100   0 0         0     Color Urine Colorless, Straw, Light Yellow, Yellow       Yellow            Appearance Urine Clear       Slightly Cloudy !            Glucose Urine Negative mg/dL      Negative            Bilirubin Urine Negative       Negative            Ketones Urine Negative mg/dL      Negative            Specific Gravity Urine 1.003 - 1.035       1.012            pH Urine 5.0 - 7.0       6.0            Protein Albumin Urine Negative mg/dL      Negative            Urobilinogen mg/dL Normal, 2.0 mg/dL      Normal            Nitrite Urine Negative       Negative            Blood Urine Negative       Negative            Leukocyte Esterase Urine Negative       Large !            WBC Urine <=5 /HPF      87 (H)            RBC Urine <=2 /HPF      3 (H)            Squamous Epithelial /HPF Urine <=1 /HPF      14 (H)            Transitional Epi <=1 /HPF      1            Mucus Urine None Seen /LPF      Present !            URINE CULTURE       Rpt            SARS CoV2 PCR Negative   Negative                XR CHEST 2 VIEWS      Rpt             XR ESOPHAGRAM         Rpt          XR UPPER GI WITHOUT KUB          Rpt         XR COLON WATER SOLUBLE DIAGNOSTIC            Rpt       CT ABDOMEN PELVIS W CONTRAST           Rpt        (L): Data is abnormally low  (H): Data is abnormally high  !: Data is abnormal  Rpt: View report in Results Review for more information     DIAGNOSIS   Principal Problem:    Suicidal ideation  MDD, severe, recurrent  episode, with psychotic features  R/O Bipolar Disorder Type 1    Active Problem List:  Patient Active Problem List   Diagnosis     Arthritis     Depressive disorder     Borderline personality disorder (H)     GERD (gastroesophageal reflux disease)     Holosystolic murmur     Asthma     History of gastric bypass     Hyperlipidemia LDL goal <130     Other insomnia     Mild mitral regurgitation     Mild aortic stenosis     Weight loss     Bilateral low back pain without sciatica     Adhesive capsulitis of shoulder, right     Mild intermittent asthma, unspecified whether complicated     Bipolar affective disorder, remission status unspecified (H)     Constipation, unspecified constipation type     Age-related osteoporosis without current pathological fracture     Plantar warts     Hypoglycemia     Failure to thrive in adult     Hypotension, unspecified hypotension type     Suicidal ideation          PLAN   1. Education given regarding diagnostic and treatment options with risks, benefits and alternatives and adequate verbalization of understanding.  2.  Medications:  Hospital  -Changed Melatonin 3 mg PRN to scheduled every evening to promote sleep.  -Continue Prozac to 20 mg PO liquid daily to target depressed mood.  -Continue Namenda 5 mg tablet PO BID to treat dementia symptoms observed.  -Continue Olanzapine ODT 2.5 mg tablet every morning  -Continue Olanzapine 10 mg ODT tablet at bedtime  -Continue Mirtazapine 15 mg PO disintegrating tablet at HS.   -Continue Olanzapine ODT 5 mg PO tablet 3 times daily as needed for severe agitation.   3. Consultations:  IM following patient due to treatment of hyponatremia and altered mental status.  GI following pt and today signing off per GI provider documation 6/26/23:  Recommendations:   -- No indication for endoscopic GI intervention at this time given age, history of dementia/underlying psychiatric illnesses and based on esophagram/upper GI and CT imaging obtained this  admission.  -- Continue with psych therapies to treat depression and evaluate for eating disorder given ongoing observed self-induced vomiting.  -- Consider palliative consult to help further assist with GOC if without improvement in course/sx.  -- Continue with scheduled bowel med regimen and titrate to ensure patient having at least x1 soft BM daily given chronic hx of constipation. (This provider updated Miralax order to include goal of 1 soft BM daily with the instruction to hold one dose if pt has met this goal.)  -- Continue detailed documentation of stool appearance, including color, consistency, frequency and amount.   -- Continue PPI at discharge (this provider Dc'd scheduled pepcid and continue Protonix scheduled per GI recommendatio  ---- Discontinue iron supplementation and schedule repeat iron studies in OP setting.  If develops iron deficiency off supplementation, consider IV iron infusions > PO iron supplementation  Speech therapy reconsulted completed 6/26/23 for Clinical Swallow Evaluation  Dietician consulted and following pt during admission  -Weights will be monitored every Tuesday, Thursday, and Saturday  -Intake and output monitoring.  -Plan is for pt to eat small meals/snack throughout the day d/t history of gastric bypass.   -Supplements ordered for between meal  - 6/20/23: Diet change to minced and moist with Ensure clear (in a cup) with meals; If patient continues to struggle with PO intake, will downgrade to pureed diet.   - Zofran 4mg PO PRN to scheduled 30 minutes before meals TID to improve PO tolerance.   4. Labs/Tests   BMP every Monday and Thursday to monitor Hyponatremia.  Sodium level recheck 6/26/2023: 138 mmol/L  5. Structure and Supervision  Unit 3B  Precautions in place.  Fall precautions.  Continue on SIO monitoring due to reporting active SI with plan and confused impulsive behavior.  6.   is following in regards to collecting and reviewing collateral  information, referrals and disposition planning.  Legal: civil commitment.   Referrals:  Per CTC  Care Coordination:  Per CTC  Placement:  TBD  Anticipated Discharge:  TBD     Further treatment programming to be determined throughout the hospital course.      Risk Assessment:  [facility] RISK ASSESSMENT: Patient on precautions    Coordination of Care:   Treatment Plan reviewed and physician signed, Care discussed with Care/Treatment Team Members, Chart reviewed and Patient seen      Re-Certification I certify that the inpatient psychiatric facility services furnished since the previous certification were, and continue to be, medically necessary for, either, treatment which could reasonably be expected to improve the patient s condition or diagnostic study and that the hospital records indicate that the services furnished were, either, intensive treatment services, admission and related services necessary for diagnostic study, or equivalent services.     I certify that the patient continues to need, on a daily basis, active treatment furnished directly by or requiring the supervision of inpatient psychiatric facility personnel.   I estimate 7-14 days of hospitalization is necessary for proper treatment of the patient. My plans for post-hospital care for this patient are  TBD     DANIEL Bennett CNP    -     6/26/2023     -     01:45 PM    Total time  35 minutes with > 50%spent on coordination of cares and psycho-education.    This note was created with help of Dragon dictation system. Grammatical / typing errors are not intentional.    DANIEL Bennett CNP

## 2023-06-26 NOTE — PLAN OF CARE
Goal Outcome Evaluation:      Plan of Care Reviewed With: patient    Overall Patient Progress: no change    Outcome Evaluation: Patient to consume at least 25-50% meals, 100% nutrition supplements.  Patient to maintain/gain weight.  If patient continues to not tolerate oral intakes, will downgrade to purees to assess tolerance.    Moira Bella, MPH, RD, LD  Pediatric & Adult Behavioral Health Dietitian   Pager: [phone]  Weekend/holiday pager: [phone]

## 2023-06-26 NOTE — PLAN OF CARE
Problem: Anxiety Signs/Symptoms  Goal: Improved Sleep (Anxiety Signs/Symptoms)  Outcome: Progressing   Goal Outcome Evaluation:             Slept well tonight, only got up for toileting, no comments made about wanting to die..  Pt has a medical bed for mobility and to keep HOB elevated to prevent reflux, non reported tonight. On SIO for self injury risk.  Medication compliant  Voided x1, drank 100 ml of water  Slept for 9 hours

## 2023-06-26 NOTE — PLAN OF CARE
"Goal Outcome Evaluation:    Plan of Care Reviewed With: patient             Nursing Assessment    Suicidal ideation [R45.851]    Admit Date: 6/7/2023    Length of Stay: 18    Patient evaluation continues. Assessed mood,anxiety,thoughts and behavior. Patient is not progressing towards goals. Patient is encouraged to participate in groups and assisted to develop healthy coping skills.  Patient denies auditory or visual hallucinations, but stated to SIO staff close the door the enemy might come in./73   Pulse 66   Temp 97.7  F (36.5  C) (Temporal)   Resp 18   Ht 1.626 m (5' 4\")   Wt 48.3 kg (106 lb 7.7 oz)   LMP  (LMP Unknown)   SpO2 95%   BMI 18.28 kg/m       Pt uses medical bed for mobility.    Mood: sad depressed    Patient reports depression 10/10 and reports anxiety 10/10    Affect:flat blunted sad    Sleep: good    Appetite: fair  25%    SI: denies    HI: denies    SIB: denies      Medication Compliance yes    Group participation:none    ADL's: assisted    Fall risk interventions: SIO walker with assist, proper foot wear, orthostatic B/p    Dennys Score Interventions: none    Discharge planning in process    Refer to daily team meeting notes for individualized plan of care. Nursing will continue to assess.    *Scale is 1-10 and 10 is the worst.             "

## 2023-06-27 LAB
ALBUMIN SERPL BCG-MCNC: 3.6 G/DL (ref 3.5–5.2)
ALP SERPL-CCNC: 46 U/L (ref 35–104)
ALT SERPL W P-5'-P-CCNC: 11 U/L (ref 0–50)
ANION GAP SERPL CALCULATED.3IONS-SCNC: 9 MMOL/L (ref 7–15)
AST SERPL W P-5'-P-CCNC: 16 U/L (ref 0–45)
BASOPHILS # BLD AUTO: 0 10E3/UL (ref 0–0.2)
BASOPHILS NFR BLD AUTO: 0 %
BILIRUB SERPL-MCNC: 0.3 MG/DL
BUN SERPL-MCNC: 10.1 MG/DL (ref 8–23)
CALCIUM SERPL-MCNC: 9.7 MG/DL (ref 8.8–10.2)
CHLORIDE SERPL-SCNC: 104 MMOL/L (ref 98–107)
CREAT SERPL-MCNC: 0.76 MG/DL (ref 0.51–0.95)
DEPRECATED HCO3 PLAS-SCNC: 24 MMOL/L (ref 22–29)
EOSINOPHIL # BLD AUTO: 0 10E3/UL (ref 0–0.7)
EOSINOPHIL NFR BLD AUTO: 0 %
ERYTHROCYTE [DISTWIDTH] IN BLOOD BY AUTOMATED COUNT: 13.3 % (ref 10–15)
GFR SERPL CREATININE-BSD FRML MDRD: 78 ML/MIN/1.73M2
GLUCOSE SERPL-MCNC: 87 MG/DL (ref 70–99)
HCT VFR BLD AUTO: 39 % (ref 35–47)
HGB BLD-MCNC: 12.7 G/DL (ref 11.7–15.7)
IMM GRANULOCYTES # BLD: 0 10E3/UL
IMM GRANULOCYTES NFR BLD: 0 %
LYMPHOCYTES # BLD AUTO: 1.8 10E3/UL (ref 0.8–5.3)
LYMPHOCYTES NFR BLD AUTO: 20 %
MAGNESIUM SERPL-MCNC: 2.3 MG/DL (ref 1.7–2.3)
MCH RBC QN AUTO: 30.5 PG (ref 26.5–33)
MCHC RBC AUTO-ENTMCNC: 32.6 G/DL (ref 31.5–36.5)
MCV RBC AUTO: 94 FL (ref 78–100)
MONOCYTES # BLD AUTO: 0.5 10E3/UL (ref 0–1.3)
MONOCYTES NFR BLD AUTO: 6 %
NEUTROPHILS # BLD AUTO: 6.5 10E3/UL (ref 1.6–8.3)
NEUTROPHILS NFR BLD AUTO: 74 %
NRBC # BLD AUTO: 0 10E3/UL
NRBC BLD AUTO-RTO: 0 /100
PHOSPHATE SERPL-MCNC: 3.6 MG/DL (ref 2.5–4.5)
PLATELET # BLD AUTO: 375 10E3/UL (ref 150–450)
POTASSIUM SERPL-SCNC: 3.6 MMOL/L (ref 3.4–5.3)
PROT SERPL-MCNC: 6 G/DL (ref 6.4–8.3)
RBC # BLD AUTO: 4.16 10E6/UL (ref 3.8–5.2)
SODIUM SERPL-SCNC: 137 MMOL/L (ref 136–145)
WBC # BLD AUTO: 8.9 10E3/UL (ref 4–11)

## 2023-06-27 PROCEDURE — 250N000013 HC RX MED GY IP 250 OP 250 PS 637: Performed by: PSYCHIATRY & NEUROLOGY

## 2023-06-27 PROCEDURE — 99232 SBSQ HOSP IP/OBS MODERATE 35: CPT

## 2023-06-27 PROCEDURE — 250N000013 HC RX MED GY IP 250 OP 250 PS 637

## 2023-06-27 PROCEDURE — 36415 COLL VENOUS BLD VENIPUNCTURE: CPT

## 2023-06-27 PROCEDURE — 999N000226 HC STATISTIC SLP IP EVAL DEFER

## 2023-06-27 PROCEDURE — 84100 ASSAY OF PHOSPHORUS: CPT

## 2023-06-27 PROCEDURE — 85025 COMPLETE CBC W/AUTO DIFF WBC: CPT

## 2023-06-27 PROCEDURE — 80053 COMPREHEN METABOLIC PANEL: CPT

## 2023-06-27 PROCEDURE — 250N000013 HC RX MED GY IP 250 OP 250 PS 637: Performed by: PHYSICIAN ASSISTANT

## 2023-06-27 PROCEDURE — 83735 ASSAY OF MAGNESIUM: CPT

## 2023-06-27 PROCEDURE — 93010 ELECTROCARDIOGRAM REPORT: CPT | Performed by: INTERNAL MEDICINE

## 2023-06-27 PROCEDURE — 250N000011 HC RX IP 250 OP 636

## 2023-06-27 PROCEDURE — 124N000003 HC R&B MH SENIOR/ADOLESCENT

## 2023-06-27 PROCEDURE — 80342 ANTIPSYCHOTICS NOS 1-3: CPT

## 2023-06-27 PROCEDURE — 93005 ELECTROCARDIOGRAM TRACING: CPT

## 2023-06-27 RX ORDER — MEMANTINE HYDROCHLORIDE 10 MG/1
10 TABLET ORAL 2 TIMES DAILY
Status: DISCONTINUED | OUTPATIENT
Start: 2023-06-27 | End: 2023-07-16 | Stop reason: HOSPADM

## 2023-06-27 RX ORDER — FLUOXETINE 20 MG/5ML
30 SOLUTION ORAL DAILY
Status: DISCONTINUED | OUTPATIENT
Start: 2023-06-28 | End: 2023-07-05

## 2023-06-27 RX ORDER — POLYETHYLENE GLYCOL 3350 17 G/17G
17 POWDER, FOR SOLUTION ORAL 2 TIMES DAILY
Status: DISCONTINUED | OUTPATIENT
Start: 2023-06-27 | End: 2023-07-09

## 2023-06-27 RX ADMIN — MEMANTINE 10 MG: 10 TABLET ORAL at 19:54

## 2023-06-27 RX ADMIN — HYDROXYZINE HYDROCHLORIDE 25 MG: 10 SOLUTION ORAL at 01:41

## 2023-06-27 RX ADMIN — MEMANTINE 5 MG: 5 TABLET ORAL at 08:46

## 2023-06-27 RX ADMIN — HYDROXYZINE HYDROCHLORIDE 25 MG: 10 SOLUTION ORAL at 06:20

## 2023-06-27 RX ADMIN — SENNOSIDES 8.6 MG: 8.6 TABLET ORAL at 11:39

## 2023-06-27 RX ADMIN — POLYETHYLENE GLYCOL 3350 17 G: 17 POWDER, FOR SOLUTION ORAL at 14:06

## 2023-06-27 RX ADMIN — ONDANSETRON 4 MG: 4 TABLET ORAL at 17:52

## 2023-06-27 RX ADMIN — OLANZAPINE 10 MG: 10 TABLET, ORALLY DISINTEGRATING ORAL at 19:54

## 2023-06-27 RX ADMIN — MIRTAZAPINE 15 MG: 15 TABLET, ORALLY DISINTEGRATING ORAL at 19:54

## 2023-06-27 RX ADMIN — PANTOPRAZOLE SODIUM 40 MG: 40 TABLET, DELAYED RELEASE ORAL at 06:02

## 2023-06-27 RX ADMIN — ONDANSETRON 4 MG: 4 TABLET ORAL at 06:02

## 2023-06-27 RX ADMIN — POLYETHYLENE GLYCOL 3350 17 G: 17 POWDER, FOR SOLUTION ORAL at 08:40

## 2023-06-27 RX ADMIN — Medication 25 MCG: at 08:43

## 2023-06-27 RX ADMIN — SIMVASTATIN 40 MG: 40 TABLET, FILM COATED ORAL at 19:54

## 2023-06-27 RX ADMIN — Medication 100 MCG: at 11:41

## 2023-06-27 RX ADMIN — OLANZAPINE 2.5 MG: 5 TABLET, ORALLY DISINTEGRATING ORAL at 08:42

## 2023-06-27 RX ADMIN — Medication 500 MG: at 11:41

## 2023-06-27 RX ADMIN — FLUOXETINE HYDROCHLORIDE 20 MG: 20 SOLUTION ORAL at 08:41

## 2023-06-27 RX ADMIN — Medication 3 MG: at 19:55

## 2023-06-27 RX ADMIN — Medication 2 TABLET: at 11:39

## 2023-06-27 RX ADMIN — VALACYCLOVIR 500 MG: 500 TABLET, FILM COATED ORAL at 11:40

## 2023-06-27 RX ADMIN — ACETAMINOPHEN 650 MG: 325 TABLET, FILM COATED ORAL at 13:16

## 2023-06-27 RX ADMIN — ONDANSETRON 4 MG: 4 TABLET ORAL at 11:36

## 2023-06-27 ASSESSMENT — ACTIVITIES OF DAILY LIVING (ADL)
ADLS_ACUITY_SCORE: 81
ORAL_HYGIENE: PROMPTS
ADLS_ACUITY_SCORE: 81
DRESS: SCRUBS (BEHAVIORAL HEALTH);PROMPTS
ADLS_ACUITY_SCORE: 81
LAUNDRY: UNABLE TO COMPLETE
ADLS_ACUITY_SCORE: 81
HYGIENE/GROOMING: PROMPTS
ADLS_ACUITY_SCORE: 81

## 2023-06-27 NOTE — CARE PLAN
"Occupational Therapy     06/27/23 1400   Group Therapy Session   Group Attendance attended group session   Time Session Began 1300   Time Session Ended 1400   Total Time (minutes) 35   Total # Attendees 6-8   Group Type psychoeducation   Group Topic Covered balanced lifestyle;coping skills/lifestyle management;emotions/expression;structured socialization   Group Session Detail OT: Education on resiliency and interactive social activity (Character Education--Resilience) to increase concentration, focus, attention to task/detail, coping with stress, symptom management, insight development, and overall wellness   Patient Response/Contribution confused;cooperative with task;verbalizations were off topic;other (see comments)  (engaged; redirectable; poor boundaries)   Patient Participation Detail Pt reported during check-in that something interesting in her life is that \"I'm really depressed. Very depressed; but it's getting better.\" Pt sat among peers for presented activity but did not engage in social interactions with peers. Pt was engaged in large group discussion but struggled to keep her verbalizations on topic intermittently throughout group; pt redirectable. Pt struggled with poor boundaries as she talked over peers and therapists at times. Pt was easily distracted during group but was able to be redirected back to activity. Pt left group and did not return. SIO present for duration.        "

## 2023-06-27 NOTE — CARE PLAN
"Occupational Therapy     06/27/23 1100   Group Therapy Session   Group Attendance attended group session   Time Session Began 1015   Time Session Ended 1115   Total Time (minutes) 10   Total # Attendees 7-8   Group Type task skill   Group Topic Covered cognitive activities;coping skills/lifestyle management;leisure exploration/use of leisure time;problem-solving;structured socialization   Group Session Detail OT: Education on healthy activity engagement with creative hands-on endeavor or cognitive activity (OT clinic) to increase concentration, focus, attention to task/detail, decision making, problem solving, frustration tolerance, task follow through, coping with stress, healthy leisure engagement, creative expression, and social engagement   Patient Response/Contribution other (see comments);refused to participate  (restless; anxious; semi-agitated)   Patient Participation Detail Pt sat among peers while waiting for group to officially start but did not engage in social interactions with peers. Pt became semi-agitated when peer began to show pictures of her cat and bird to the group. Pt was observed to state to SIO \"We're only staying for half an hour!\" Pt appeared more anxious as she became restless (was shifting her weight) and then stated to SIO \"We're only staying 5 minutes!\". SIO encouraged pt to stay in group. Pt abruptly stood up and left group area; pt did not return. SIO present for duration.        "

## 2023-06-27 NOTE — PLAN OF CARE
Problem: Sleep Disturbance  Goal: Adequate Sleep/Rest  Outcome: Progressing  Intervention: Promote Sleep/Rest  Flowsheets (Taken 6/27/2023 0152)  Sleep/Rest Enhancement:    regular sleep/rest pattern promoted    noise level reduced    comfort measures    medication    relaxation techniques promoted    room darkened    awakenings minimized     Patient was on SIO 5 feet for suicide and self-injury risk. She was using a medical bed due to history of osteoporosis. Pt was awake at past 0100 for toileting however she had a difficulty of going back to sleep. She appeared restless in bed. Undressing. Fondling her breasts and touching her private part especially when male PA was doing 15 minute checks. Per staff, she was verbalizing like suicidal thoughts but unable to elaborate more. Pt denied pain. No nausea nor vomiting noted. She went to the BR 5x. Incontinent with large loose bowel movement and bladder x1. Cares done by SIO staff. Bed linens were changed. PRN Hydroxyzine 25 mg at 0141 and 0620 but ineffective. Pt took her her scheduled Zofran and Protonix this morning. Will continue to monitor and assess pt.

## 2023-06-27 NOTE — PLAN OF CARE
Assessment/Intervention/Current Symptoms and Care Coordination  - chart review  - team meeting  - team rounds/pt interview addressed patient needs/concerns  - Medication adjustment continues as pt remains symptomatic and medically compromised.      Discharge Plan or Goal  Pending stabilization & development of a safe discharge plan.  Considerations include:  [facility] ([facility])     Barriers to Discharge   Patient requires further psychiatric stabilization due to current symptomology     Referral Status   None     Legal Status  Patient is under MI commitment in [facility]  Pt will need PD upon discharge.

## 2023-06-27 NOTE — PLAN OF CARE
"  Problem: Bowel Elimination Management  Goal: Effective Bowel Elimination/Continence  Outcome: Not Progressing     Problem: Suicidal Behavior  Goal: Suicidal Behavior is Absent or Managed  Outcome: Not Progressing   Goal Outcome Evaluation:    Plan of Care Reviewed With: patient    Pt stating \"I just want to  die\". \"I don't want to do that job\". \"I don't want that stuff\".    Pt came out for breakfast and ate scrambled eggs, drank her orange juice and 1/2 of her apple Clear. Pt returned to her room. When writer approached her with her morning meds she was able to take a few of her meds and then spontaneously spit up a moderate amount of undigested food and mucus. Pt then proceeded to put her fingers in her mouth in attempt to make herself throw up. Writer redirected patient from this behavior but not before she threw up/with gagging another moderate amount (60+ ml). Pt then pulled up her scrub top and stated \"see what I did\".  Pt's affect is flat with appropriate eye contact. Pt endorses feeling depressed and passive thoughts of wanting to die.     Pt began having loose stools after receiving her AM Miralax. Pt has had 2 med/large loose/unformed incontinent BM's as well as a small unformed continent BM.     Pt has attended portions of two groups today.     Pt reported a HA at approximately 1300. Pt was offered/accepted prn tylenol 650.   Pt had total relief of pain at 1407.    Pt is aware that she will be having a video swallow later in the week.     Pt reports feeling confused. Pt is able to state the correct date, day of the week and [facility]. Pt is unable to state why she is in the hospital.                "

## 2023-06-27 NOTE — PROGRESS NOTES
Speech Therapy update    Order received for repeat Speech Therapy consult due to continued regurgitation of food/liquid after meals.  Chart reviewed.  Results of esophagram and GI consult noted.  Suspect that regurgitation is related to esophageal dysmotility issue(s) and/or may be cognitive-behavioral in nature.  Notably, nursing staff has observed patient to self-induce vomiting by inserting her finger down her throat.     A limited clinical swallow evaluation was completed by Speech Therapy on 6/15/23.  No overt clinical s/sx of aspiration observed at that time, however, patient did have frequent reflux/regurgitation of food & liquid immediately after eating.  While oropharyngeal dysphagia seems unlikely, patient may benefit from a video swallow study (VFSS) to rule this out.  Question possible Zenker's diverticulum(?).  In the meantime, consider advancing diet textures from Minced & Moist (Level 5) to Soft & Bite Sized (Level 6), as these will likely be more appetizing to patient and possibly improve her oral intake.  Recommendations were discussed with DANIEL Mathur CNP, who was in agreement with recommendation for VFSS.  She would like to hold off on diet texture advancement at least until VFSS is completed.  This is scheduled for Friday, 6/30 at 1400.

## 2023-06-27 NOTE — PLAN OF CARE
Problem: Adult Behavioral Health Plan of Care  Goal: Plan of Care Review  Outcome: Not Progressing  Flowsheets (Taken 6/26/2023 2131)  Plan of Care Reviewed With: patient  Overall Patient Progress: no change  Patient Agreement with Plan of Care: disagrees (describe)    Patient was on SIO 5 feet for self-injury risk and suicide risk. Pt was using a medical bed due to history of osteoporosis. Pt vomited x1  Creamy liquid colored wth mucus. She had 2 loose bowel movements yellowish brown in color. Pt ate 15% only at supper. Total fluid intake was 480 ml. Urine output was not measured due to it was mixed with her LBM. Pt declined mental health assessment. When writer was asking questions, she was talking about her bills. She appeared to be having disorganized thoughts. She was compliant with taking all of her medications. Pt denied pain and side effects of medications. Vital signs were stable.

## 2023-06-28 PROCEDURE — 250N000011 HC RX IP 250 OP 636

## 2023-06-28 PROCEDURE — 124N000003 HC R&B MH SENIOR/ADOLESCENT

## 2023-06-28 PROCEDURE — 250N000013 HC RX MED GY IP 250 OP 250 PS 637: Performed by: PSYCHIATRY & NEUROLOGY

## 2023-06-28 PROCEDURE — 99232 SBSQ HOSP IP/OBS MODERATE 35: CPT

## 2023-06-28 PROCEDURE — 250N000013 HC RX MED GY IP 250 OP 250 PS 637: Performed by: PHYSICIAN ASSISTANT

## 2023-06-28 PROCEDURE — G0177 OPPS/PHP; TRAIN & EDUC SERV: HCPCS

## 2023-06-28 PROCEDURE — 250N000013 HC RX MED GY IP 250 OP 250 PS 637

## 2023-06-28 PROCEDURE — 90853 GROUP PSYCHOTHERAPY: CPT

## 2023-06-28 RX ORDER — LANOLIN ALCOHOL/MO/W.PET/CERES
6 CREAM (GRAM) TOPICAL EVERY EVENING
Status: DISCONTINUED | OUTPATIENT
Start: 2023-06-28 | End: 2023-06-28

## 2023-06-28 RX ORDER — VITAMIN B COMPLEX
25 TABLET ORAL
Status: DISCONTINUED | OUTPATIENT
Start: 2023-06-29 | End: 2023-07-16 | Stop reason: HOSPADM

## 2023-06-28 RX ORDER — LANOLIN ALCOHOL/MO/W.PET/CERES
6 CREAM (GRAM) TOPICAL EVERY EVENING
Status: DISCONTINUED | OUTPATIENT
Start: 2023-06-28 | End: 2023-07-16 | Stop reason: HOSPADM

## 2023-06-28 RX ORDER — LANOLIN ALCOHOL/MO/W.PET/CERES
3 CREAM (GRAM) TOPICAL
Status: DISCONTINUED | OUTPATIENT
Start: 2023-06-28 | End: 2023-06-28

## 2023-06-28 RX ORDER — CALCIUM CARBONATE 500(1250)
500 TABLET ORAL AT BEDTIME
Status: DISCONTINUED | OUTPATIENT
Start: 2023-06-29 | End: 2023-07-16 | Stop reason: HOSPADM

## 2023-06-28 RX ORDER — UBIDECARENONE 75 MG
100 CAPSULE ORAL AT BEDTIME
Status: DISCONTINUED | OUTPATIENT
Start: 2023-06-29 | End: 2023-07-16 | Stop reason: HOSPADM

## 2023-06-28 RX ADMIN — MEMANTINE 10 MG: 10 TABLET ORAL at 19:10

## 2023-06-28 RX ADMIN — MEMANTINE 10 MG: 10 TABLET ORAL at 08:09

## 2023-06-28 RX ADMIN — OLANZAPINE 2.5 MG: 5 TABLET, ORALLY DISINTEGRATING ORAL at 08:08

## 2023-06-28 RX ADMIN — Medication 12.5 MG: at 00:22

## 2023-06-28 RX ADMIN — Medication 500 MG: at 12:22

## 2023-06-28 RX ADMIN — POLYETHYLENE GLYCOL 3350 17 G: 17 POWDER, FOR SOLUTION ORAL at 19:10

## 2023-06-28 RX ADMIN — Medication 2 TABLET: at 08:12

## 2023-06-28 RX ADMIN — OLANZAPINE 10 MG: 10 TABLET, ORALLY DISINTEGRATING ORAL at 19:10

## 2023-06-28 RX ADMIN — Medication 6 MG: at 17:11

## 2023-06-28 RX ADMIN — ONDANSETRON 4 MG: 4 TABLET ORAL at 11:29

## 2023-06-28 RX ADMIN — CARBAMIDE PEROXIDE 6.5% 3 DROP: 6.5 LIQUID AURICULAR (OTIC) at 19:16

## 2023-06-28 RX ADMIN — Medication 12.5 MG: at 21:22

## 2023-06-28 RX ADMIN — MIRTAZAPINE 15 MG: 15 TABLET, ORALLY DISINTEGRATING ORAL at 19:11

## 2023-06-28 RX ADMIN — ONDANSETRON 4 MG: 4 TABLET ORAL at 17:11

## 2023-06-28 RX ADMIN — ONDANSETRON 4 MG: 4 TABLET ORAL at 06:50

## 2023-06-28 RX ADMIN — Medication 25 MCG: at 08:10

## 2023-06-28 RX ADMIN — Medication 100 MCG: at 08:10

## 2023-06-28 RX ADMIN — VALACYCLOVIR 500 MG: 500 TABLET, FILM COATED ORAL at 08:09

## 2023-06-28 RX ADMIN — SIMVASTATIN 40 MG: 40 TABLET, FILM COATED ORAL at 19:11

## 2023-06-28 RX ADMIN — PANTOPRAZOLE SODIUM 40 MG: 40 TABLET, DELAYED RELEASE ORAL at 06:50

## 2023-06-28 RX ADMIN — POLYETHYLENE GLYCOL 3350 17 G: 17 POWDER, FOR SOLUTION ORAL at 08:06

## 2023-06-28 RX ADMIN — HYDROXYZINE HYDROCHLORIDE 25 MG: 10 SOLUTION ORAL at 00:19

## 2023-06-28 RX ADMIN — FLUOXETINE 30 MG: 20 SOLUTION ORAL at 08:07

## 2023-06-28 ASSESSMENT — ACTIVITIES OF DAILY LIVING (ADL)
ADLS_ACUITY_SCORE: 81
ADLS_ACUITY_SCORE: 71
LAUNDRY: UNABLE TO COMPLETE
ADLS_ACUITY_SCORE: 71
ADLS_ACUITY_SCORE: 81
ADLS_ACUITY_SCORE: 81
DRESS: SCRUBS (BEHAVIORAL HEALTH)
LAUNDRY: UNABLE TO COMPLETE
HYGIENE/GROOMING: PROMPTS;WITH ASSISTANCE
ADLS_ACUITY_SCORE: 71
ORAL_HYGIENE: PROMPTS;WITH ASSISTANCE
HYGIENE/GROOMING: PROMPTS;WITH ASSISTANCE
DRESS: SCRUBS (BEHAVIORAL HEALTH)
ADLS_ACUITY_SCORE: 71
ADLS_ACUITY_SCORE: 81
ORAL_HYGIENE: PROMPTS;WITH ASSISTANCE
ADLS_ACUITY_SCORE: 71
ADLS_ACUITY_SCORE: 81

## 2023-06-28 NOTE — PROGRESS NOTES
"SPIRITUAL HEALTH SERVICES  SPIRITUAL ASSESSMENT Progress Note  [facility] ([facility] [place]) 3B     REFERRAL SOURCE: Consult request    Pt declined saying \"I don't want to talk right now\".     PLAN: Will check in tomorrow. Spiritual health services remains available for any follow-up or requests     Norma Underwood [facility]   Pager: [phone]    "

## 2023-06-28 NOTE — PLAN OF CARE
"Problem: Adult Behavioral Health Plan of Care  Goal: Plan of Care Review  Flowsheets  Taken 6/27/2023 1930  Plan of Care Reviewed With: patient  Overall Patient Progress: no change  Taken 6/27/2023 1909  Patient Agreement with Plan of Care: disagrees (describe)    Patient was on SIO 5 feet for suicide and self-injury risk. She's using a medical bed to help with mobility(history of osteoporosis). She was resting in bed majority of the shift. She endorsed suicidal thoughts and being depressed. Repetitive with telling staff, \" I just want to die.\" Redirected her to get up and attend groups in which she attend the community meeting. She was also out of her bedroom at supper. Ate 15% of her food. Total fluid intake this shift was 360 ml. She was compliant with taking her medications. Pt had 2 incontinent LBMs this shift, brown yellowish in color. Urine output was unmeasured due to it was mixed with BMs. Writer held BM meds this shift. Patient ambulated with SBA by SIO staff 3x this shift. Will continue to monitor and assess pt.       "

## 2023-06-28 NOTE — PROGRESS NOTES
"PSYCHIATRY  PROGRESS NOTE     DATE OF SERVICE   6/27/2023         CHIEF COMPLAINT   \"I'm very depressed\"       SUBJECTIVE   Nursing reports:     Patient was on SIO 5 feet for self-injury risk and suicide risk. Pt was using a medical bed due to history of osteoporosis. Pt vomited x1  Creamy liquid colored wth mucus. She had 2 loose bowel movements yellowish brown in color. Pt ate 15% only at supper. Total fluid intake was 480 ml. Urine output was not measured due to it was mixed with her LBM. Pt declined mental health assessment. When writer was asking questions, she was talking about her bills. She appeared to be having disorganized thoughts. She was compliant with taking all of her medications. Pt denied pain and side effects of medications. Vital signs were stable.     Patient was on SIO 5 feet for suicide and self-injury risk. She was using a medical bed due to history of osteoporosis. Pt was awake at past 0100 for toileting however she had a difficulty of going back to sleep. She appeared restless in bed. Undressing. Fondling her breasts and touching her private part especially when male PA was doing 15 minute checks. Per staff, she was verbalizing like suicidal thoughts but unable to elaborate more. Pt denied pain. No nausea nor vomiting noted. She went to the BR 5x. Incontinent with large loose bowel movement and bladder x1. Cares done by SIO staff. Bed linens were changed. PRN Hydroxyzine 25 mg at 0141 and 0620 but ineffective. Pt took her her scheduled Zofran and Protonix this morning. Will continue to monitor and assess pt.       reports:    Assessment/Intervention/Current Symptoms and Care Coordination  - chart review  - team meeting  - team rounds/pt interview addressed patient needs/concerns  - Medication adjustment continues as pt remains symptomatic and medically compromised.      Discharge Plan or Goal  Pending stabilization & development of a safe discharge plan.  Considerations " "include:  [facility] ([facility])     Barriers to Discharge   Patient requires further psychiatric stabilization due to current symptomology     Referral Status   None     Legal Status  Patient is under MI commitment in [facility]  Pt will need PD upon discharge.        OBJECTIVE   Met with pt in hospital room on unit 3B. Pt affect appears flat-however improving and pt reports, \"I'm very depressed.\" Pt's thoughts continue to be disorganized however slightly improving today and pt is more responsive to questions. Pt does continue to be observed with confusion. Patient is oriented to self. Patient continues to endorse symptoms of severe depression which she reports she has been experiencing all her life; patient endorses passive SI today and states, \"I just want to die.\"  Patient abruptly ends conversation by requesting to leave the room. Pt will continue on SIO monitoring which is in place due to patient reporting SI and ongoing confusion/impulsive behaviors.  Plan will be to increase scheduled Prozac to 30 mg liquid daily to target depressed mood and increase Namenda to 10 mg PO BID to target dementia symptoms. Will continue Olanzapine ODT 2.5 mg tablet every morning, Olanzapine 10 mg ODT tablet at bedtime, and Mirtazapine 15 mg PO disintegrating tablet at HS. Olanzapine drug level checked on 6/27/2023 and results pending.    Patient continues to be on a 2 L fluid restriction per IM to treat hyponatremia; sodium level 6/26/2023 was 138 mml/L.  CMP ordered for every 3 days to monitor. IM has been following pt to evaluate hyponatremia and IM was notified on 6/15/23 of altered level of consciousness which presented as increased confusion compared to baseline since admission and increased agitation. Per IM provider pt's waxing and waning symptoms most likely secondary to a primary psychiatric disorder with high likelihood for underlying dementia; there is no clear reversible organic " cause of her symptoms. Speech therapy was consulted 6/15/23 and Chest Xray was also ordered to evaluate due to concern for aspiration with frequent regurgitation /emesis; chest xray did not show acute concern for aspiration. Speech therapy was reconsulted to address ongoing regurgitation of small amounts after eating; patient has been observed to experience regurgitation of small amounts spontaneously which she then swallows. Staff have also observed patient inducing vomiting by sticking fingers in her mouth.  Provider discussed with speech therapist and plan will be for XR video swallow to be completed on Thursday, 6/29/2023 at 3 PM.  Provider also added Unisom 12.5 mg 3 times daily as needed to treat nausea as well as insomnia symptoms. GI has also been following pt to address frequent regurgitation /emesis, poor P.O intake, and constipation. GI started pt on bowel regimen and esophagram and upper GI studies were completed 6/19/23. Per IM provider documentation, GI provider reports that results indicate delayed emptying, but no concern for strictures; if unable to tolerate any oral intake a CT abdomen with contrast should be performed to rule out intra-abdominal pathology. Pt was not tolerating p.o. intake at that time and a CT abdomen/pelvis with contrast was completed 6/20/2023: report of results indicated a large volume predominantly low density colonic stool intermixed with hyperattenuating contrast (from 6/19/2023 esophagram) extending from the cecum through the entire length of the colon to the rectum.  Subsequently a therapeutic gastrograffin enema with IR was completed 6/21/23 which was effective for removing a large volume of stool. GI luminal team signed off on 6/26/2023 with recommendations for ongoing bowel regimen as well as recommendations for discharge. Patient reports to nursing staff feeling unwell due to ongoing loose stool; nursing reports patient has had approximately 6 bowel movements previous  a day. Provider updated internal medicine provider of this and plan will be to adjust scheduled MiraLAX and stool softeners with goal of patient achieving 1-2 soft bowel movement daily per GI recommendation. EKG completed today 6/27/2023 and report indicates sinus rhythm, Right bundle branch block, Left anterior fascicular block ; internal medicine notified.  Per internal medicine provider no acute findings and plan will be to continue to monitor; weekly EKGs ordered.     Dietitian is also following pt due to inadequate oral intake related to altered mentation, decreased appetite, and early satiety. Patient's goals for nutrition include: patient to consume at least 25-50% meals, 100% nutrition supplements, and for patient to maintain/gain weight. To support pt's dietary goal of maintaining/gaining weight patient care order in place for staff to promote patient eating small meals and snacks throughout the day; order also includes that pt needs supervision while eating to encourage intake and monitor for reflux/regurgitation. Dietitian continues to recommend diet of minced and moist with Ensure clear (in a cup) with meals; If patient continues to struggle with PO intake, will downgrade to pureed diet. Pt weights will be monitored every Tuesday, Thursday, and Saturday; also monitoring intake and output. Provider notes that patient lost approximately 4 pounds since last weight however patient did have a notably large stool burden removed with a Gastrografin enema on 6/21/2023.  Plan will be to continue to monitor weights and p.o. intake. Pt weights:  Vitals:    06/11/23 0817 06/13/23 0811 06/17/23 1646 06/22/23 0822   Weight: 48.9 kg (107 lb 12.9 oz) 48.9 kg (107 lb 12.9 oz) 48.7 kg (107 lb 5.8 oz) 47.1 kg (103 lb 13.4 oz)    06/25/23 0820   Weight: 48.3 kg (106 lb 7.7 oz)          MEDICATIONS   Medications:  Scheduled Meds:    calcium carbonate  500 mg Oral Daily     childrens multivitamin with iron  2 tablet Oral  "Daily     cyanocobalamin  100 mcg Oral Daily     [START ON 6/28/2023] FLUoxetine  30 mg Oral Daily     melatonin  3 mg Oral QPM     memantine  10 mg Oral BID     mirtazapine  15 mg Orally disintegrating tablet At Bedtime     OLANZapine zydis  2.5 mg Oral QAM     OLANZapine zydis  10 mg Oral At Bedtime     ondansetron  4 mg Oral TID AC     pantoprazole  40 mg Oral QAM AC     polyethylene glycol  17 g Oral BID     sennosides  8.6 mg Oral BID     simvastatin  40 mg Oral At Bedtime     valACYclovir  500 mg Oral Daily     cholecalciferol  25 mcg Oral Daily     Continuous Infusions:  PRN Meds:.acetaminophen, alum & mag hydroxide-simethicone, bisacodyl, budesonide-formoterol, calcium carbonate, doxylamine, hydrOXYzine, OLANZapine zydis, prochlorperazine, senna-docusate    Medication adherence issues: MS Med Adherence Y/N: No  Medication side effects: MEDICATION SIDE EFFECTS: no side effects reported   Benefit: Yes / No: Yes       ROS   Pertinent items are noted in HPI.       MENTAL STATUS EXAM   Vitals:BP 92/54 (BP Location: Left arm, Patient Position: Sitting, Cuff Size: Adult Small)   Pulse 110   Temp 98.4  F (36.9  C) (Oral)   Resp 16   Ht 1.626 m (5' 4\")   Wt 48.3 kg (106 lb 7.7 oz)   LMP  (LMP Unknown)   SpO2 96%   BMI 18.28 kg/m    Appearance:  In hospital scrubs.  Mood: \"I'm very depressed.\"  Affect: flat  was congruent to speech.  Suicidal Ideation: absent  Homicidal Ideation: PRESENT / ABSENT: absent   Thought process: difficult to follow and disorganized however slightly improved today   Thought content: endorses preoccupations  Fund of Knowledge: average  Attention/Concentration: Poor  Language ability:  Intact  Memory:  Immediate recall impaired, Short-term memory impaired and Long-term memory impaired  Insight:  limited.  Judgement: limited  Orientation: Oriented to self, month, and day.  Psychomotor Behavior: normal or unremarkable    Muscle Strength and Tone: MuscleStrength: Normal  Gait and Station: " slightly stiff however steady with walker       LABS   Personally reviewed.  EKG: Sinus rhythm, Right bundle branch block, Left anterior fascicular block ; internal medicine notified.   Latest Reference Range & Units 06/19/23 13:43 06/19/23 13:45 06/20/23 12:37 06/21/23 15:05 06/22/23 08:41 06/22/23 09:38 06/23/23 07:57 06/26/23 07:15 06/27/23 09:55 06/27/23 11:43   Sodium 136 - 145 mmol/L     137 134 (L) 137 138  137   Potassium 3.4 - 5.3 mmol/L     4.2 4.2  4.0  3.6   Chloride 98 - 107 mmol/L     103 101  105  104   Carbon Dioxide (CO2) 22 - 29 mmol/L     23 23  26  24   Urea Nitrogen 8.0 - 23.0 mg/dL     6.0 (L) 6.1 (L)  13.6  10.1   Creatinine 0.51 - 0.95 mg/dL     0.78 0.73  0.80  0.76   GFR Estimate >60 mL/min/1.73m2     76 82  74  78   Calcium 8.8 - 10.2 mg/dL     9.4 9.0  9.2  9.7   Anion Gap 7 - 15 mmol/L     11 10  7  9   Magnesium 1.7 - 2.3 mg/dL      2.2  2.1  2.3   Phosphorus 2.5 - 4.5 mg/dL      3.1  3.3  3.6   Albumin 3.5 - 5.2 g/dL      3.3 (L)    3.6   Protein Total 6.4 - 8.3 g/dL      5.5 (L)    6.0 (L)   Alkaline Phosphatase 35 - 104 U/L      42    46   ALT 0 - 50 U/L      10    11   AST 0 - 45 U/L      12    16   Bilirubin Total <=1.2 mg/dL      0.3    0.3   Glucose 70 - 99 mg/dL     141 (H) 162 (H)  92  87   WBC 4.0 - 11.0 10e3/uL      8.2    8.9   Hemoglobin 11.7 - 15.7 g/dL      12.1    12.7   Hematocrit 35.0 - 47.0 %      37.5    39.0   Platelet Count 150 - 450 10e3/uL      445    375   RBC Count 3.80 - 5.20 10e6/uL      3.97    4.16   MCV 78 - 100 fL      95    94   MCH 26.5 - 33.0 pg      30.5    30.5   MCHC 31.5 - 36.5 g/dL      32.3    32.6   RDW 10.0 - 15.0 %      13.1    13.3   % Neutrophils %      74    74   % Lymphocytes %      19    20   % Monocytes %      6    6   % Eosinophils %      1    0   % Basophils %      0    0   Absolute Basophils 0.0 - 0.2 10e3/uL      0.0    0.0   Absolute Eosinophils 0.0 - 0.7 10e3/uL      0.1    0.0   Absolute Immature Granulocytes <=0.4 10e3/uL       0.0    0.0   Absolute Lymphocytes 0.8 - 5.3 10e3/uL      1.5    1.8   Absolute Monocytes 0.0 - 1.3 10e3/uL      0.5    0.5   % Immature Granulocytes %      0    0   Absolute Neutrophils 1.6 - 8.3 10e3/uL      6.1    6.5   Absolute NRBCs 10e3/uL      0.0    0.0   NRBCs per 100 WBC <1 /100      0    0   XR ESOPHAGRAM  Rpt            XR UPPER GI WITHOUT KUB   Rpt           XR COLON WATER SOLUBLE DIAGNOSTIC     Rpt         CT ABDOMEN PELVIS W CONTRAST    Rpt          EKG 12-LEAD, TRACING ONLY          Rpt    Diastolic Blood Pressure mmHg         See Comment    Systolic Blood Pressure mmHg         See Comment    (L): Data is abnormally low  (H): Data is abnormally high  Rpt: View report in Results Review for more information         DIAGNOSIS   Principal Problem:    Suicidal ideation  MDD, severe, recurrent episode, with psychotic features  R/O Bipolar Disorder Type 1    Active Problem List:  Patient Active Problem List   Diagnosis     Arthritis     Depressive disorder     Borderline personality disorder (H)     GERD (gastroesophageal reflux disease)     Holosystolic murmur     Asthma     History of gastric bypass     Hyperlipidemia LDL goal <130     Other insomnia     Mild mitral regurgitation     Mild aortic stenosis     Weight loss     Bilateral low back pain without sciatica     Adhesive capsulitis of shoulder, right     Mild intermittent asthma, unspecified whether complicated     Bipolar affective disorder, remission status unspecified (H)     Constipation, unspecified constipation type     Age-related osteoporosis without current pathological fracture     Plantar warts     Hypoglycemia     Failure to thrive in adult     Hypotension, unspecified hypotension type     Suicidal ideation          PLAN   1. Education given regarding diagnostic and treatment options with risks, benefits and alternatives and adequate verbalization of understanding.  2.  Medications:  Hospital  -Initiate Unisom 12.5 mg 3 times daily as  needed for insomnia or nausea  -Increase Prozac to 30 mg PO liquid daily to target depressed mood.  -Increase Namenda to 10 mg tablet PO BID to treat dementia symptoms observed.  -Continue Melatonin 3 mg scheduled every evening to promote sleep.  -Continue Olanzapine ODT 2.5 mg tablet every morning  -Continue Olanzapine 10 mg ODT tablet at bedtime  -Continue Mirtazapine 15 mg PO disintegrating tablet at HS.   -Continue Olanzapine ODT 5 mg PO tablet 3 times daily as needed for severe agitation.   3. Consultations:  IM following patient due to treatment of hyponatremia and altered mental status.  GI consulted and signed off on 6/26/2023, recommendations include:  -- Consider palliative consult to help further assist with GOC if without improvement in course/sx.  -- Continue with scheduled bowel med regimen and titrate to ensure patient having at least x1 soft BM daily given chronic hx of constipation. (This provider updated Miralax order to include goal of 1 soft BM daily with the instruction to hold one dose if pt has met this goal.)  -- Continue detailed documentation of stool appearance, including color, consistency, frequency and amount.   -- Continue PPI at discharge (this provider Dc'd scheduled pepcid and continue Protonix scheduled per GI recommendatio  ---- Discontinue iron supplementation and schedule repeat iron studies in OP setting.  If develops iron deficiency off supplementation, consider IV iron infusions > PO iron supplementation  Speech therapy reconsulted completed 6/26/23 for Clinical Swallow Evaluation  -XR Video swallow study now scheduled for 6/29/2023 at 3 PM.  Dietician consulted and following pt during admission  -Weights will be monitored every Tuesday, Thursday, and Saturday  -Intake and output monitoring.  -Plan is for pt to eat small meals/snack throughout the day d/t history of gastric bypass.   -Supplements ordered for between meal  - 6/20/23: Diet change to minced and moist with Ensure  clear (in a cup) with meals; If patient continues to struggle with PO intake, will downgrade to pureed diet.   - Zofran 4mg PO PRN to scheduled 30 minutes before meals TID to improve PO tolerance.   4. Labs/Tests   Labs: CBC, CMP, magnesium, and phosphorus levels every 3 days   EKG ordered weekly  Olanzapine drug level collected 6/27/2023 and results pending.  5. Structure and Supervision  Unit 3B  Precautions in place.  Fall precautions.  Continue on SIO monitoring due to reporting active SI with plan and confused impulsive behavior.  6.   is following in regards to collecting and reviewing collateral information, referrals and disposition planning.  Legal: civil commitment.   Referrals:  Per CTC  Care Coordination:  Per CTC  Placement:  TBD  Anticipated Discharge:  TBD     Further treatment programming to be determined throughout the hospital course.      Risk Assessment: [facility] RISK ASSESSMENT: Patient on precautions    Coordination of Care:   Treatment Plan reviewed and physician signed, Care discussed with Care/Treatment Team Members, Chart reviewed and Patient seen      Re-Certification I certify that the inpatient psychiatric facility services furnished since the previous certification were, and continue to be, medically necessary for, either, treatment which could reasonably be expected to improve the patient s condition or diagnostic study and that the hospital records indicate that the services furnished were, either, intensive treatment services, admission and related services necessary for diagnostic study, or equivalent services.     I certify that the patient continues to need, on a daily basis, active treatment furnished directly by or requiring the supervision of inpatient psychiatric facility personnel.   I estimate 7-14 days of hospitalization is necessary for proper treatment of the patient. My plans for post-hospital care for this patient are  TBD     Galilea Olguin, APRN CNP     -     6/27/2023     -     12:00 PM    Total time  35 minutes with > 50%spent on coordination of cares and psycho-education.    This note was created with help of Dragon dictation system. Grammatical / typing errors are not intentional.    DANIEL Bennett CNP

## 2023-06-28 NOTE — CONSULTS
Brief note:    Medicine following patient for GI issues     Minimal cerumen occlusion- b/l ears  -debrox 3 drops bid b/l ears for 4 days    DANIEL Cyr CNP  Internal Medicine HEATHER Hospitalist  Text paging via [employer] is appreciated [facility] Paging/Directory  June 28, 2023

## 2023-06-28 NOTE — PLAN OF CARE
06/28/23 1200   Individualization/Patient Specific Goals   Patient Personal Strengths community support;family/social support;stable living environment   Patient Vulnerabilities lacks insight into illness   Anxieties, Fears or Concerns Pt has anxiety issues, fears of being hosptailized   Individualized Care Needs Becomes agitated with staff and dismiss with staff direction.   Patient/Family-Specific Goals (Include Timeframe) return to [facility]   Interprofessional Rounds   Summary She presented from her care facility with decreased oral intake, low glucose level, low blood pressure, depression, and suicidal ideations.   Participants CTC;psychiatrist;nursing;OT   Behavioral Team Discussion   Participants Dr. Richard GILLILAND, [facility],  Jalyn Gaspar, RN, Caryn Stack OT   Progress Little improvement   Anticipated length of stay Pending stabilization   Continued Stay Criteria/Rationale SI   Medical/Physical See provider   Plan Stabilization with medication and therapy groups   Safety Plan per unit protocol   Anticipated Discharge Disposition assisted living              PRECAUTIONS AND SAFETY    Behavioral Orders   Procedures    Code 1 - Restrict to Unit    Code 2     To CT 6/20/23    Fall precautions    Routine Programming     As clinically indicated    Status 15     Every 15 minutes.    Status Individual Observation     Patient SIO status reviewed with team/RN.  Please also refer to RN/team documentation for add'l detail.    -SIO staff to monitor following which have contributed to patient being on SIO:  Unsafe behavior/putting water on the floor to electrocute herself/fall  -Possible interventions SIO staff could use to support patient's treatment progress: monitor for unsafe behaviors  -When following observed, team will review discontinuation of SIO:  Pt no longer at risk for self harm     Order Specific Question:   CONTINUOUS 24 hours / day     Answer:   5 feet     Order Specific Question:    Indications for SIO     Answer:   Suicide risk     Order Specific Question:   Indications for SIO     Answer:   Self-injury risk       Safety  Safety WDL: WDL  Patient Location: patient room, own, lounge  Observed Behavior: sleeping  Observed Behavior (Comment): sleeping  Safety Measures: safety rounds completed, 1:1 observation maintained  Diversional Activity: music  Suicidality: SIO (Status Individual Observation)  (NOTE - order will specify distance  Additional Documentation:  (fall precautions in place.)

## 2023-06-28 NOTE — PLAN OF CARE
Problem: Sleep Disturbance  Goal: Adequate Sleep/Rest  Outcome: Progressing  Intervention: Promote Sleep/Rest  Flowsheets (Taken 6/28/2023 1719)  Sleep/Rest Enhancement:    regular sleep/rest pattern promoted    relaxation techniques promoted     Patient was awake at start of the shift. She was restless, having toss and turns in bed. Denied pain. Appeared confused, talking to herself, getting up many times to use the BR but only voided and had a medium incontinent LBM x1. PRNs Hydroxyzine 25 mg was given at 0019 and Unisom 12.5 mg at 0022. PRN medications were effective since she slept for the rest of the night. Pt slept 7 hours. No complaints of pain. Will continue to monitor and assess pt.

## 2023-06-28 NOTE — CARE PLAN
Occupational Therapy     06/28/23 1359   Group Therapy Session   Group Attendance attended group session   Time Session Began 1115   Time Session Ended 1210   Total Time (minutes) 45   Total # Attendees 5   Group Type recreation   Group Topic Covered balanced lifestyle;coping skills/lifestyle management;structured socialization;leisure exploration/use of leisure time   Group Session Detail OT: Education on physical and social wellness with physical movement (gentle exercise) and interactive social activity (Chat Pack) to increase concentration, attention to task, symptom management, coping with stress, sharing information about self, listening to others, physical wellness, social wellness, and overall well-being   Patient Response/Contribution cooperative with task;disorganized;offered helpful suggestions to peers;other (see comments)  (pleasant; cooperative; engaged)   Patient Participation Detail Pt sat among peers to complete presented activity and engaged in social interactions with peers. Pt engaged in all physical movement activities and answered all questions about herself (pt struggled to keep some responses on topic and appeared tangential). Pt able to identify three things she is grateful for. Pt left group early and did not return. SIO present for duration.

## 2023-06-28 NOTE — CARE PLAN
"Occupational Therapy     06/28/23 1300   Group Therapy Session   Group Attendance attended group session   Time Session Began 1015   Time Session Ended 1115   Total Time (minutes) 50   Total # Attendees 6   Group Type task skill   Group Topic Covered cognitive activities;coping skills/lifestyle management;leisure exploration/use of leisure time;problem-solving;structured socialization   Group Session Detail OT: Education on healthy activity engagement with creative hands-on endeavor or cognitive activity (OT clinic) to increase concentration, focus, attention to task/detail, decision making, problem solving, frustration tolerance, task follow through, coping with stress, healthy leisure engagement, creative expression, and social engagement   Patient Response/Contribution cooperative with task;confused;other (see comments)  (pleasant; cooperative; engaged)   Patient Participation Detail Pt sat among peers to complete familiar creative hands on endeavor that patient \"has no memory of\". Pt sat among peers to complete presented activity and engaged in social interactions with peers. Pt accepting of guidance from therapist and SIO regarding process to complete project. Pt stated multiple times \"Oh, I don't know what to do with this.\" and \"I can't do this.\" Pt accepting of positive support from staff and engaged in project. Pt worked in a semi-messy manner to complete project and required max A from SIO for clean-up of self, supplies, and the workspace. SIO present for duration.       "

## 2023-06-28 NOTE — PLAN OF CARE
"  Problem: Anxiety Signs/Symptoms  Goal: Optimized Energy Level (Anxiety Signs/Symptoms)  Intervention: Optimize Energy Level  Recent Flowsheet Documentation  Taken 6/28/2023 1022 by Claire Koroma RN  Patient Performed Hygiene: dressed  Activity (Behavioral Health): up ad devan     Problem: Sleep Disturbance  Goal: Adequate Sleep/Rest  Outcome: Progressing     Problem: Bowel Elimination Management  Goal: Effective Bowel Elimination/Continence  Outcome: Progressing     Problem: Depressive Signs/Symptoms  Goal: Optimized Energy Level (Depressive Signs/Symptoms)  Outcome: Not Progressing  Intervention: Optimize Energy Level  Recent Flowsheet Documentation  Taken 6/28/2023 1022 by Claire Koroma RN  Patient Performed Hygiene: dressed  Activity (Behavioral Health): up ad devan   Goal Outcome Evaluation:    Plan of Care Reviewed With: patient      Pt continues to report feeling depressed. Pt admits to wanting to die. Pt denies intent or plan. Pt affect remains flat but she will smile occasionally during interactions. Pt has verbalized \"I'm so confused\". Pt is able to state that she is in the hospital. Pt initially stated that she was in [place] but then able to state that she was in [place]. Pt able to recall writers name and remember that writer will be off after Thursday. Pt stated the month as July this AM. Pt able to state the year as 2023 and that it was Wednesday.   Pt denies physical pain. Pt has been more willing to be out of her room, however she does need prompts and reminders to stay up.  Pt did not have any emesis/regurgitation after breakfast this AM. Pt was reminded that she needs to stay up for at least 30 minutes after she eats.  Pt ate scrambled eggs and 2 orange juices. Pt given apple Clear mid morning.   Pt had a small incontinent loose stool at the start of the shift. After breakfast patient had a med/large loose inc/continent stool.   Miralax had been given. Scheduled senna was held.     Pt had " pudding egg salad, vanilla pudding and Ensure Clear.   Pt had another vanilla pudding for mid afternoon snack around 1430.    Pt has walked in the [place] several times this shift.

## 2023-06-28 NOTE — PLAN OF CARE
Problem: Adult Behavioral Health Plan of Care  Goal: Plan of Care Review  Flowsheets (Taken 6/28/2023 1702)  Plan of Care Reviewed With: patient  Patient Agreement with Plan of Care: agrees      Patient was awake, resting in bed at early part of the shift. She's on SIO 5 feet for self-injury risk and suicide risk. She has a medical bed to aide with mobility. She ambulated outside of her bedroom several times this shift. She attended the 1900 OT group. Ate 50% at supper. Total fluid intake was 600 ml. Pt had one episode of intentional vomiting. She was putting her fingers inside her mouth which triggered herself to vomit. SIO staff tried to stop her but she already vomited food particles mixed with mucus. Patient stated she's depressed and wanted to die. She said she's hearing voices telling her to do anything she wanted. Pt was given her HS medications early. Writer held Senna but Miralax was given. Administered Debrox ear drops to bilateral ears. PRN Unisom 12.5 mg was given at 2122 to aide with her sleep. Pt denied pain and side effects of meds.
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
## 2023-06-29 LAB
ALBUMIN SERPL BCG-MCNC: 3.5 G/DL (ref 3.5–5.2)
ALP SERPL-CCNC: 47 U/L (ref 35–104)
ALT SERPL W P-5'-P-CCNC: 15 U/L (ref 0–50)
ANION GAP SERPL CALCULATED.3IONS-SCNC: 11 MMOL/L (ref 7–15)
AST SERPL W P-5'-P-CCNC: 16 U/L (ref 0–45)
BASOPHILS # BLD AUTO: 0 10E3/UL (ref 0–0.2)
BASOPHILS NFR BLD AUTO: 0 %
BILIRUB SERPL-MCNC: 0.3 MG/DL
BUN SERPL-MCNC: 11.1 MG/DL (ref 8–23)
CALCIUM SERPL-MCNC: 9.1 MG/DL (ref 8.8–10.2)
CHLORIDE SERPL-SCNC: 105 MMOL/L (ref 98–107)
CREAT SERPL-MCNC: 0.75 MG/DL (ref 0.51–0.95)
DEPRECATED HCO3 PLAS-SCNC: 21 MMOL/L (ref 22–29)
EOSINOPHIL # BLD AUTO: 0.1 10E3/UL (ref 0–0.7)
EOSINOPHIL NFR BLD AUTO: 1 %
ERYTHROCYTE [DISTWIDTH] IN BLOOD BY AUTOMATED COUNT: 13.4 % (ref 10–15)
GFR SERPL CREATININE-BSD FRML MDRD: 80 ML/MIN/1.73M2
GLUCOSE SERPL-MCNC: 252 MG/DL (ref 70–99)
HBA1C MFR BLD: 5.1 %
HCT VFR BLD AUTO: 38.2 % (ref 35–47)
HGB BLD-MCNC: 12.4 G/DL (ref 11.7–15.7)
IMM GRANULOCYTES # BLD: 0 10E3/UL
IMM GRANULOCYTES NFR BLD: 0 %
LYMPHOCYTES # BLD AUTO: 1.3 10E3/UL (ref 0.8–5.3)
LYMPHOCYTES NFR BLD AUTO: 16 %
MCH RBC QN AUTO: 31.1 PG (ref 26.5–33)
MCHC RBC AUTO-ENTMCNC: 32.5 G/DL (ref 31.5–36.5)
MCV RBC AUTO: 96 FL (ref 78–100)
MONOCYTES # BLD AUTO: 0.4 10E3/UL (ref 0–1.3)
MONOCYTES NFR BLD AUTO: 5 %
NEUTROPHILS # BLD AUTO: 6.4 10E3/UL (ref 1.6–8.3)
NEUTROPHILS NFR BLD AUTO: 78 %
NRBC # BLD AUTO: 0 10E3/UL
NRBC BLD AUTO-RTO: 0 /100
PLATELET # BLD AUTO: 308 10E3/UL (ref 150–450)
POTASSIUM SERPL-SCNC: 3.9 MMOL/L (ref 3.4–5.3)
PROT SERPL-MCNC: 5.8 G/DL (ref 6.4–8.3)
RBC # BLD AUTO: 3.99 10E6/UL (ref 3.8–5.2)
SODIUM SERPL-SCNC: 137 MMOL/L (ref 136–145)
WBC # BLD AUTO: 8.3 10E3/UL (ref 4–11)

## 2023-06-29 PROCEDURE — 80053 COMPREHEN METABOLIC PANEL: CPT

## 2023-06-29 PROCEDURE — 250N000013 HC RX MED GY IP 250 OP 250 PS 637

## 2023-06-29 PROCEDURE — 83036 HEMOGLOBIN GLYCOSYLATED A1C: CPT

## 2023-06-29 PROCEDURE — 250N000011 HC RX IP 250 OP 636

## 2023-06-29 PROCEDURE — 250N000013 HC RX MED GY IP 250 OP 250 PS 637: Performed by: PSYCHIATRY & NEUROLOGY

## 2023-06-29 PROCEDURE — 36415 COLL VENOUS BLD VENIPUNCTURE: CPT

## 2023-06-29 PROCEDURE — 250N000013 HC RX MED GY IP 250 OP 250 PS 637: Performed by: PHYSICIAN ASSISTANT

## 2023-06-29 PROCEDURE — 99232 SBSQ HOSP IP/OBS MODERATE 35: CPT

## 2023-06-29 PROCEDURE — 124N000003 HC R&B MH SENIOR/ADOLESCENT

## 2023-06-29 PROCEDURE — 85025 COMPLETE CBC W/AUTO DIFF WBC: CPT

## 2023-06-29 RX ADMIN — ONDANSETRON 4 MG: 4 TABLET ORAL at 07:09

## 2023-06-29 RX ADMIN — PANTOPRAZOLE SODIUM 40 MG: 40 TABLET, DELAYED RELEASE ORAL at 07:09

## 2023-06-29 RX ADMIN — FLUOXETINE 30 MG: 20 SOLUTION ORAL at 08:02

## 2023-06-29 RX ADMIN — OLANZAPINE 2.5 MG: 5 TABLET, ORALLY DISINTEGRATING ORAL at 08:02

## 2023-06-29 RX ADMIN — POLYETHYLENE GLYCOL 3350 17 G: 17 POWDER, FOR SOLUTION ORAL at 08:02

## 2023-06-29 RX ADMIN — SENNOSIDES 8.6 MG: 8.6 TABLET ORAL at 21:34

## 2023-06-29 RX ADMIN — VALACYCLOVIR 500 MG: 500 TABLET, FILM COATED ORAL at 08:02

## 2023-06-29 RX ADMIN — ONDANSETRON 4 MG: 4 TABLET ORAL at 11:55

## 2023-06-29 RX ADMIN — Medication 25 MCG: at 16:13

## 2023-06-29 RX ADMIN — SIMVASTATIN 40 MG: 40 TABLET, FILM COATED ORAL at 21:33

## 2023-06-29 RX ADMIN — MEMANTINE 10 MG: 10 TABLET ORAL at 21:35

## 2023-06-29 RX ADMIN — CARBAMIDE PEROXIDE 6.5% 3 DROP: 6.5 LIQUID AURICULAR (OTIC) at 21:37

## 2023-06-29 RX ADMIN — OLANZAPINE 10 MG: 10 TABLET, ORALLY DISINTEGRATING ORAL at 21:34

## 2023-06-29 RX ADMIN — VITAM B12 100 MCG: 100 TAB at 21:35

## 2023-06-29 RX ADMIN — Medication 6 MG: at 21:33

## 2023-06-29 RX ADMIN — Medication 500 MG: at 21:34

## 2023-06-29 RX ADMIN — SENNOSIDES 8.6 MG: 8.6 TABLET ORAL at 08:02

## 2023-06-29 RX ADMIN — MIRTAZAPINE 15 MG: 15 TABLET, ORALLY DISINTEGRATING ORAL at 21:35

## 2023-06-29 RX ADMIN — POLYETHYLENE GLYCOL 3350 17 G: 17 POWDER, FOR SOLUTION ORAL at 21:35

## 2023-06-29 RX ADMIN — MEMANTINE 10 MG: 10 TABLET ORAL at 08:02

## 2023-06-29 RX ADMIN — ONDANSETRON 4 MG: 4 TABLET ORAL at 16:13

## 2023-06-29 ASSESSMENT — ACTIVITIES OF DAILY LIVING (ADL)
ADLS_ACUITY_SCORE: 71

## 2023-06-29 NOTE — PROGRESS NOTES
"CLINICAL NUTRITION SERVICES - REASSESSMENT NOTE     Nutrition Prescription    RECOMMENDATIONS FOR MDs/PROVIDERS TO ORDER:  None today, RD will continue to follow.     Malnutrition Status:    Severe malnutrition in the context of starvation related to social and environmental circumstances v chronic disease.     Recommendations already ordered by Registered Dietitian (RD):  Continue with interventions as ordered.     Future/Additional Recommendations:  Continue to monitor emesis, appetite, outcome of swallow study, plan of care.      EVALUATION OF THE PROGRESS TOWARD GOALS   Diet: Level 5: Minced & Moist Dysphagia Diet   Supplement: Ensure TID with meals   Fluid restriction: 2000 mL   Intake: 25 - 50% per I/Os      NEW FINDINGS   Per chart review, patient continues to have self-induced emesis on the unit. She ate 50% of dinner last night, but was then observed putting her fingers in her mouth to self induce vomit.    Per 6/29 MD note, \"Today, provider asked patient about her behavior of inducing vomiting; patient reports that she does do this intentionally at times to, \"Get rid of food in my stomach. 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 Patient then asks to leave and observed to spontaneously regurgitate and spit out a small amount of sputum mixed with undigested food. Discussed with patient that plan is for her to have a XR Video swallow study tomorrow 6/30/2023 at 2 PM which will help determine if there is any issues with swallowing that are contributing to her frequent regurgitation.  Patient verbalizes understanding and in agreement with this. Also, provider discussed with Caryn RYAN regarding considering initiating a behavioral plan with patient to address behavior of " "patient inducing vomiting, however plan will be to wait for results of XR video swallow study.\"     Spoke with provider on the unit, patient was sleeping during attempted visit.They suspect that emesis is likely behavioral at this point in time. Will continue to monitor.     Weights:  06/25/23 0820 48.3 kg (106 lb 7.7 oz) --   06/22/23 0822 47.1 kg (103 lb 13.4 oz) Standing scale   06/17/23 1646 48.7 kg (107 lb 5.8 oz) Standing scale   06/13/23 0811 48.9 kg (107 lb 12.9 oz) --   06/11/23 0817 48.9 kg (107 lb 12.9 oz) --   06/08/23 0827 50.7 kg (111 lb 12.4 oz) --   06/07/23 2048 51.1 kg (112 lb 10.5 oz)    Weights are variable and difficult to assess, especially given large decrease in stool burden.     MALNUTRITION  % Intake: </= 50% for >/= 5 days (severe)  % Weight Loss: weights are variable, difficult to assess   Subcutaneous Fat Loss: global severe   Muscle Loss: global severe   Fluid Accumulation/Edema: None noted  Malnutrition Diagnosis: Severe malnutrition in the context of starvation related to social and environmental circumstances v chronic disease.     Previous Goals   Patient to consume at least 25-50% meals, 100% nutrition supplements.  Patient to maintain/gain weight.   Evaluation: Not met    Previous Nutrition Diagnosis  Inadequate oral intake related to altered mentation, decreased appetite, early satiety, nausea/vomiting as evidenced by 4.7% wt loss in 10 days.   Evaluation: No change    CURRENT NUTRITION DIAGNOSIS  Inadequate oral intake related to altered mentation, decreased appetite, early satiety, nausea/vomiting as evidenced by 4.7% wt loss in 10 days.     INTERVENTIONS  Implementation  Collaboration with other providers    Goals  Patient to consume at least 25-50% meals, 100% nutrition supplements.  Patient to maintain/gain weight.    Monitoring/Evaluation  Progress toward goals will be monitored and evaluated per protocol.    Moira Bella, MPH, RD, LD  [facility] " Dietitian   Pager: [phone]  Weekend/holiday pager: [phone]

## 2023-06-29 NOTE — PROGRESS NOTES
"   06/28/23 2200   Group Therapy Session   Group Attendance attended group session   Time Session Began 1910   Time Session Ended 2000   Total Time (minutes) 50   Total # Attendees 7   Group Type psychotherapeutic   Group Topic Covered emotions/expression;structured socialization   Group Session Detail self compassion   Patient Response/Contribution confused;discussed personal experience with topic;disorganized;listened actively   Patient Participation Detail mood awful, thoughtful contributions to conversation , even with some confusion and repeating \" I am bad, I made a mistake.\"       "

## 2023-06-29 NOTE — PROGRESS NOTES
"SPIRITUAL HEALTH SERVICES  SPIRITUAL ASSESSMENT Progress Note  [facility] ([facility] [place]) 3B     REFERRAL SOURCE: Follow-up from consult    Pt declined a visit saying \"I don't want to talk\".    PLAN: No plans to follow as pt expressed no spiritual needs. Spiritual health services remains available for any follow-up or requests     Norma Underwood MTS  Associate   Pager: [phone]    "

## 2023-06-29 NOTE — PLAN OF CARE
Problem: Sleep Disturbance  Goal: Adequate Sleep/Rest  Outcome: Progressing     Patient slept approximately 7.75 hours, safety checks and precautions in place, patient continues on SIO 5 feet, no prn given or requested this shift. No report of pain. This writer will continue to monitor patient as needed for the rest of the shift.

## 2023-06-29 NOTE — PLAN OF CARE
Problem: Anxiety Signs/Symptoms  Goal: Optimized Energy Level (Anxiety Signs/Symptoms)  Intervention: Optimize Energy Level  Recent Flowsheet Documentation  Taken 6/29/2023 1520 by Claire Koroma RN  Patient Performed Hygiene: dressed  Activity (Behavioral Health): up ad devan     Problem: Anxiety Signs/Symptoms  Goal: Optimized Energy Level (Anxiety Signs/Symptoms)  Outcome: Not Progressing  Flowsheets (Taken 6/29/2023 1521)  Mutually Determined Action Steps (Optimized Energy Level): grooms self without prompting  Intervention: Optimize Energy Level  Recent Flowsheet Documentation  Taken 6/29/2023 1520 by Claire Koroma RN  Patient Performed Hygiene: dressed  Activity (Behavioral Health): up ad devan     Problem: Suicidal Behavior  Goal: Suicidal Behavior is Absent or Managed  Outcome: Not Progressing  Flowsheets (Taken 6/29/2023 1521)  Mutually Determined Action Steps (Suicidal Behavior Absent/Managed): sets future-oriented goal     Problem: Bowel Elimination Management  Goal: Effective Bowel Elimination/Continence  Outcome: Not Progressing   Goal Outcome Evaluation:    Plan of Care Reviewed With: patient      Pt continues to have a flat affect with inconsistent eye contact. Pt identifies feeling very depressed. Pt also verbalizes passive wishes to die. Pt denies active plan or intent. Pt needs a lot of encouragement to spend time out of her room, which she has been compliant with this.   Pt has eaten fair during meals. Pt did have an episode of throwing up this afternoon.   Pt voided a large amount of straw colored urine this AM. Pt has not had a BM this shift.   Pt denies physical pain when writer asked.

## 2023-06-29 NOTE — CARE PLAN
06/29/23 1307   Group Therapy Session   Group Attendance attended group session   Time Session Began 1015   Time Session Ended 1115   Total Time (minutes) 15  (no charge)   Total # Attendees 4-6   Group Type expressive therapy;recreation;task skill   Group Topic Covered coping skills/lifestyle management;problem-solving;leisure exploration/use of leisure time;cognitive activities   Group Session Detail [facility] group to facilitate coping skill exploration, use of cognitive skills and problem solving, creative expression, clinical observation and facilitation of social, cognitive, and kinesthetic performance skills.   Patient Response/Contribution became angry or agitated;did not discuss personal experience;refused to participate   Patient Participation Detail pt arrived to group with one to one. pt was provided with set up of previous supplies and creative project. pt chose not to engage in activity. pt appeared irritable. pt sat quietly for duration of stay. pt abruptly left group space and did not return

## 2023-06-30 ENCOUNTER — APPOINTMENT (OUTPATIENT)
Dept: GENERAL RADIOLOGY | Facility: CLINIC | Age: 83
DRG: 885 | End: 2023-06-30
Payer: COMMERCIAL

## 2023-06-30 ENCOUNTER — APPOINTMENT (OUTPATIENT)
Dept: SPEECH THERAPY | Facility: CLINIC | Age: 83
DRG: 885 | End: 2023-06-30
Payer: COMMERCIAL

## 2023-06-30 LAB
ALBUMIN SERPL BCG-MCNC: 3.1 G/DL (ref 3.5–5.2)
ALP SERPL-CCNC: 38 U/L (ref 35–104)
ALT SERPL W P-5'-P-CCNC: 12 U/L (ref 0–50)
ANION GAP SERPL CALCULATED.3IONS-SCNC: 5 MMOL/L (ref 7–15)
AST SERPL W P-5'-P-CCNC: 14 U/L (ref 0–45)
ATRIAL RATE - MUSE: 64 BPM
BASOPHILS # BLD AUTO: 0 10E3/UL (ref 0–0.2)
BASOPHILS NFR BLD AUTO: 1 %
BILIRUB SERPL-MCNC: 0.4 MG/DL
BUN SERPL-MCNC: 9.8 MG/DL (ref 8–23)
CALCIUM SERPL-MCNC: 9.1 MG/DL (ref 8.8–10.2)
CHLORIDE SERPL-SCNC: 107 MMOL/L (ref 98–107)
CREAT SERPL-MCNC: 0.72 MG/DL (ref 0.51–0.95)
DEPRECATED HCO3 PLAS-SCNC: 27 MMOL/L (ref 22–29)
DIASTOLIC BLOOD PRESSURE - MUSE: NORMAL MMHG
EOSINOPHIL # BLD AUTO: 0.1 10E3/UL (ref 0–0.7)
EOSINOPHIL NFR BLD AUTO: 1 %
ERYTHROCYTE [DISTWIDTH] IN BLOOD BY AUTOMATED COUNT: 13.5 % (ref 10–15)
GFR SERPL CREATININE-BSD FRML MDRD: 84 ML/MIN/1.73M2
GLUCOSE SERPL-MCNC: 89 MG/DL (ref 70–99)
HCT VFR BLD AUTO: 34.1 % (ref 35–47)
HGB BLD-MCNC: 11.1 G/DL (ref 11.7–15.7)
IMM GRANULOCYTES # BLD: 0 10E3/UL
IMM GRANULOCYTES NFR BLD: 0 %
INTERPRETATION ECG - MUSE: NORMAL
LYMPHOCYTES # BLD AUTO: 1.4 10E3/UL (ref 0.8–5.3)
LYMPHOCYTES NFR BLD AUTO: 24 %
MAGNESIUM SERPL-MCNC: 2.1 MG/DL (ref 1.7–2.3)
MCH RBC QN AUTO: 30.3 PG (ref 26.5–33)
MCHC RBC AUTO-ENTMCNC: 32.6 G/DL (ref 31.5–36.5)
MCV RBC AUTO: 93 FL (ref 78–100)
MONOCYTES # BLD AUTO: 0.3 10E3/UL (ref 0–1.3)
MONOCYTES NFR BLD AUTO: 5 %
NEUTROPHILS # BLD AUTO: 4.1 10E3/UL (ref 1.6–8.3)
NEUTROPHILS NFR BLD AUTO: 69 %
NRBC # BLD AUTO: 0 10E3/UL
NRBC BLD AUTO-RTO: 0 /100
P AXIS - MUSE: 56 DEGREES
PHOSPHATE SERPL-MCNC: 3.5 MG/DL (ref 2.5–4.5)
PLATELET # BLD AUTO: 248 10E3/UL (ref 150–450)
POTASSIUM SERPL-SCNC: 4.2 MMOL/L (ref 3.4–5.3)
PR INTERVAL - MUSE: 160 MS
PROT SERPL-MCNC: 5.2 G/DL (ref 6.4–8.3)
QRS DURATION - MUSE: 142 MS
QT - MUSE: 442 MS
QTC - MUSE: 455 MS
R AXIS - MUSE: -49 DEGREES
RBC # BLD AUTO: 3.66 10E6/UL (ref 3.8–5.2)
SODIUM SERPL-SCNC: 139 MMOL/L (ref 136–145)
SYSTOLIC BLOOD PRESSURE - MUSE: NORMAL MMHG
T AXIS - MUSE: 59 DEGREES
VENTRICULAR RATE- MUSE: 64 BPM
WBC # BLD AUTO: 5.8 10E3/UL (ref 4–11)

## 2023-06-30 PROCEDURE — 250N000013 HC RX MED GY IP 250 OP 250 PS 637: Performed by: PHYSICIAN ASSISTANT

## 2023-06-30 PROCEDURE — 36415 COLL VENOUS BLD VENIPUNCTURE: CPT

## 2023-06-30 PROCEDURE — 85025 COMPLETE CBC W/AUTO DIFF WBC: CPT

## 2023-06-30 PROCEDURE — 80053 COMPREHEN METABOLIC PANEL: CPT

## 2023-06-30 PROCEDURE — 83735 ASSAY OF MAGNESIUM: CPT

## 2023-06-30 PROCEDURE — 74230 X-RAY XM SWLNG FUNCJ C+: CPT | Mod: 26 | Performed by: SURGERY

## 2023-06-30 PROCEDURE — 124N000003 HC R&B MH SENIOR/ADOLESCENT

## 2023-06-30 PROCEDURE — 250N000011 HC RX IP 250 OP 636

## 2023-06-30 PROCEDURE — 250N000013 HC RX MED GY IP 250 OP 250 PS 637

## 2023-06-30 PROCEDURE — 84100 ASSAY OF PHOSPHORUS: CPT

## 2023-06-30 PROCEDURE — 74230 X-RAY XM SWLNG FUNCJ C+: CPT

## 2023-06-30 PROCEDURE — 250N000013 HC RX MED GY IP 250 OP 250 PS 637: Performed by: PSYCHIATRY & NEUROLOGY

## 2023-06-30 PROCEDURE — 92611 MOTION FLUOROSCOPY/SWALLOW: CPT | Mod: GN | Performed by: SPEECH-LANGUAGE PATHOLOGIST

## 2023-06-30 PROCEDURE — 99232 SBSQ HOSP IP/OBS MODERATE 35: CPT

## 2023-06-30 RX ORDER — PRAZOSIN HYDROCHLORIDE 1 MG/1
1 CAPSULE ORAL AT BEDTIME
Status: DISCONTINUED | OUTPATIENT
Start: 2023-06-30 | End: 2023-07-10

## 2023-06-30 RX ADMIN — FLUOXETINE 30 MG: 20 SOLUTION ORAL at 08:14

## 2023-06-30 RX ADMIN — PANTOPRAZOLE SODIUM 40 MG: 40 TABLET, DELAYED RELEASE ORAL at 06:15

## 2023-06-30 RX ADMIN — MEMANTINE 10 MG: 10 TABLET ORAL at 20:09

## 2023-06-30 RX ADMIN — PRAZOSIN HYDROCHLORIDE 1 MG: 1 CAPSULE ORAL at 20:09

## 2023-06-30 RX ADMIN — PROCHLORPERAZINE MALEATE 5 MG: 5 TABLET ORAL at 10:37

## 2023-06-30 RX ADMIN — PROCHLORPERAZINE MALEATE 5 MG: 5 TABLET ORAL at 18:21

## 2023-06-30 RX ADMIN — CARBAMIDE PEROXIDE 6.5% 3 DROP: 6.5 LIQUID AURICULAR (OTIC) at 20:08

## 2023-06-30 RX ADMIN — Medication 12.5 MG: at 20:58

## 2023-06-30 RX ADMIN — SENNOSIDES 8.6 MG: 8.6 TABLET ORAL at 08:12

## 2023-06-30 RX ADMIN — ONDANSETRON 4 MG: 4 TABLET ORAL at 12:57

## 2023-06-30 RX ADMIN — VITAM B12 100 MCG: 100 TAB at 20:10

## 2023-06-30 RX ADMIN — Medication 500 MG: at 20:10

## 2023-06-30 RX ADMIN — Medication 2 TABLET: at 08:12

## 2023-06-30 RX ADMIN — ONDANSETRON 4 MG: 4 TABLET ORAL at 06:15

## 2023-06-30 RX ADMIN — OLANZAPINE 2.5 MG: 5 TABLET, ORALLY DISINTEGRATING ORAL at 08:12

## 2023-06-30 RX ADMIN — MEMANTINE 10 MG: 10 TABLET ORAL at 08:12

## 2023-06-30 RX ADMIN — ONDANSETRON 4 MG: 4 TABLET ORAL at 17:29

## 2023-06-30 RX ADMIN — VALACYCLOVIR 500 MG: 500 TABLET, FILM COATED ORAL at 08:12

## 2023-06-30 RX ADMIN — Medication 25 MCG: at 17:29

## 2023-06-30 RX ADMIN — Medication 6 MG: at 20:08

## 2023-06-30 RX ADMIN — POLYETHYLENE GLYCOL 3350 17 G: 17 POWDER, FOR SOLUTION ORAL at 08:12

## 2023-06-30 RX ADMIN — CARBAMIDE PEROXIDE 6.5% 3 DROP: 6.5 LIQUID AURICULAR (OTIC) at 08:14

## 2023-06-30 RX ADMIN — MIRTAZAPINE 15 MG: 15 TABLET, ORALLY DISINTEGRATING ORAL at 20:10

## 2023-06-30 RX ADMIN — OLANZAPINE 10 MG: 10 TABLET, ORALLY DISINTEGRATING ORAL at 20:10

## 2023-06-30 RX ADMIN — SIMVASTATIN 40 MG: 40 TABLET, FILM COATED ORAL at 20:08

## 2023-06-30 ASSESSMENT — ACTIVITIES OF DAILY LIVING (ADL)
ADLS_ACUITY_SCORE: 71
ADLS_ACUITY_SCORE: 81
ADLS_ACUITY_SCORE: 81
ADLS_ACUITY_SCORE: 71
ADLS_ACUITY_SCORE: 71
LAUNDRY: UNABLE TO COMPLETE
ADLS_ACUITY_SCORE: 71
HYGIENE/GROOMING: WITH ASSISTANCE
DRESS: WITH ASSISTANCE
ADLS_ACUITY_SCORE: 71
ADLS_ACUITY_SCORE: 81
DRESS: SCRUBS (BEHAVIORAL HEALTH);INDEPENDENT
ORAL_HYGIENE: PROMPTS
ADLS_ACUITY_SCORE: 81
ADLS_ACUITY_SCORE: 71
HYGIENE/GROOMING: WITH ASSISTANCE
ADLS_ACUITY_SCORE: 71
ADLS_ACUITY_SCORE: 71
ORAL_HYGIENE: PROMPTS

## 2023-06-30 NOTE — PROGRESS NOTES
"PSYCHIATRY  PROGRESS NOTE     DATE OF SERVICE   6/29/2023         CHIEF COMPLAINT   \"Terrible. I have anxiety\"       SUBJECTIVE   Nursing reports:     Patient was awake, resting in bed at early part of the shift. She's on SIO 5 feet for self-injury risk and suicide risk. She has a medical bed to aide with mobility. She ambulated outside of her bedroom several times this shift. She attended the 1900 OT group. Ate 50% at supper. Total fluid intake was 600 ml. Pt had one episode of intentional vomiting. She was putting her fingers inside her mouth which triggered herself to vomit. SIO staff tried to stop her but she already vomited food particles mixed with mucus. Patient stated she's depressed and wanted to die. She said she's hearing voices telling her to do anything she wanted. Pt was given her HS medications early. Writer held Senna but Miralax was given. Administered Debrox ear drops to bilateral ears. PRN Unisom 12.5 mg was given at 2122 to aide with her sleep. Pt denied pain and side effects of meds.     Patient slept approximately 7.75 hours, safety checks and precautions in place, patient continues on SIO 5 feet, no prn given or requested this shift. No report of pain. This writer will continue to monitor patient as needed for the rest of the shift.      reports:    Assessment/Intervention/Current Symptoms and Care Coordination  - chart review  - team meeting  - team rounds/pt interview addressed patient needs/concerns  - Medication adjustment continues as pt remains symptomatic and medically compromised.      Discharge Plan or Goal  Pending stabilization & development of a safe discharge plan.  Considerations include:  [facility] ([facility])     Barriers to Discharge   Patient requires further psychiatric stabilization due to current symptomology     Referral Status   None     Legal Status  Patient is under MI commitment in [facility]  Pt will need PD upon " "discharge.        OBJECTIVE   Met with pt in conference room on unit 3B with Dr. Ramos and medical student. Pt affect appears flat-however improving. Pt reports mood as, \"Terrible, I have anxiety.\" Pt's thoughts continue to be disorganized however improving and pt is more responsive to questions. Pt does continue to experience confusion intermittently; patient is oriented to self. Patient continues to endorse symptoms of severe depression and today reports severe anxiety.  Patient states, \" I am afraid of everything.\"  Patient expressed concern regarding not having any place to go and states,\"No one wants me.\"  Explained to patient that she previously was living in a nursing home and they have expressed wanting her to return; also explained that as patient is currently hospitalized we will assist in setting up a safe place for her to discharge once she is psychiatrically stabilized.  Patient verbalized understanding however continues to appear anxious.  Patient has been observed by staff inducing vomiting as well as experiencing spontaneous regurgitation.  Today, provider asked patient about her behavior of inducing vomiting; patient reports that she does do this intentionally at times to, \"Get rid of food in my stomach. 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 Patient then asks to leave and observed to spontaneously regurgitate and spit out a small amount of sputum mixed with undigested food. Discussed with patient that plan is for her to have a XR Video swallow study tomorrow 6/30/2023 at 2 PM which will help determine if there is any issues with swallowing that are contributing to her frequent regurgitation.  Patient verbalizes understanding and in agreement with this. " Also, provider discussed with Caryn RYAN regarding considering initiating a behavioral plan with patient to address behavior of patient inducing vomiting, however plan will be to wait for results of XR video swallow study. Pt will continue on SIO monitoring which is in place due to patient reporting SI and ongoing confusion/impulsive behaviors.  Plan will be to continue scheduled Prozac 30 mg liquid daily to target depressed mood and continue Namenda 10 mg PO BID to target dementia symptoms. Will also continue Olanzapine ODT 2.5 mg tablet every morning, Olanzapine 10 mg ODT tablet at bedtime, and Mirtazapine 15 mg PO disintegrating tablet at HS. Olanzapine drug level checked on 6/27/2023 and results pending.       Patient continues to be on a 2 L fluid restriction per IM to treat hyponatremia; sodium level 6/29/2023 was 137 mml/L.  CMP ordered for every 3 days to monitor. IM has been following pt to evaluate hyponatremia and IM was notified on 6/15/23 of altered level of consciousness which presented as increased confusion compared to baseline since admission and increased agitation. Per IM provider pt's waxing and waning symptoms most likely secondary to a primary psychiatric disorder with high likelihood for underlying dementia; there is no clear reversible organic cause of her symptoms. Speech therapy was consulted 6/15/23 and Chest Xray was also ordered to evaluate due to concern for aspiration with frequent regurgitation /emesis; chest xray did not show acute concern for aspiration. Speech therapy was reconsulted to address ongoing spontaneous regurgitation of small amounts after eating; patient has also been observed inducing vomiting by sticking her fingers in her mouth. Provider discussed with speech therapist and plan will be for XR video swallow to be completed on 6/30/2023 at 2 PM. GI has also been following pt to address frequent regurgitation /emesis, poor P.O intake, and constipation. GI started pt on  bowel regimen and esophagram and upper GI studies were completed 6/19/23. Per IM provider documentation, GI provider reports that results indicate delayed emptying, but no concern for strictures; if unable to tolerate any oral intake a CT abdomen with contrast should be performed to rule out intra-abdominal pathology. Pt was not tolerating p.o. intake at that time and a CT abdomen/pelvis with contrast was completed 6/20/2023: report of results indicated a large volume predominantly low density colonic stool intermixed with hyperattenuating contrast (from 6/19/2023 esophagram) extending from the cecum through the entire length of the colon to the rectum.  Subsequently a therapeutic gastrograffin enema with IR was completed 6/21/23 which was effective for removing a large volume of stool. GI luminal team signed off on 6/26/2023 with recommendations for ongoing bowel regimen as well as recommendations for discharge. Plan is to continue MiraLAX and stool softeners with goal of patient achieving 1-2 soft bowel movement daily per GI recommendation. So far today patient has not had a bowel movement; nursing aware to monitor stool output closely. Patient also reporting having wax buildup in her ears; internal medicine provider ordered Debrox eardrops to treat.  Patient is aware of this.  Nursing staff reported that patient was exhibiting more confusion on evenings yesterday; provider ordered CMP, CBC, as well as a UA/UC. Lab results are stable, however patient's blood glucose level was noted to be elevated at 252 mg/dL and subsequently hemoglobin A1c was ordered; results within normal limits at 5.1%. UA still needs to be collected.     Dietitian is following pt due to inadequate oral intake related to altered mentation, decreased appetite, and early satiety. Patient has been meeting her nutrition goals which include: patient to consume at least 25-50% meals, 100% nutrition supplements, and for patient to maintain/gain  weight. To support pt's dietary goal of maintaining/gaining weight patient care order in place for staff to promote patient eating small meals and snacks throughout the day; order also includes that pt needs supervision while eating to encourage intake and monitor for reflux/regurgitation. Dietitian continues to recommend diet of minced and moist with Ensure clear (in a cup) with meals; If patient continues to struggle with PO intake, will downgrade to pureed diet. Pt weights will be monitored every Tuesday, Thursday, and Saturday; also monitoring intake and output.  Plan will be to continue to monitor weights and p.o. intake. Pt weights:   Vitals:    06/17/23 1646 06/22/23 0822 06/25/23 0820   Weight: 48.7 kg (107 lb 5.8 oz) 47.1 kg (103 lb 13.4 oz) 48.3 kg (106 lb 7.7 oz)            MEDICATIONS   Medications:  Scheduled Meds:    calcium carbonate  500 mg Oral At Bedtime     carbamide peroxide  3 drop Both Ears BID     childrens multivitamin with iron  2 tablet Oral Daily     cyanocobalamin  100 mcg Oral At Bedtime     FLUoxetine  30 mg Oral Daily     melatonin  6 mg Oral QPM     memantine  10 mg Oral BID     mirtazapine  15 mg Orally disintegrating tablet At Bedtime     OLANZapine zydis  2.5 mg Oral QAM     OLANZapine zydis  10 mg Oral At Bedtime     ondansetron  4 mg Oral TID AC     pantoprazole  40 mg Oral QAM AC     polyethylene glycol  17 g Oral BID     sennosides  8.6 mg Oral BID     simvastatin  40 mg Oral At Bedtime     valACYclovir  500 mg Oral Daily     cholecalciferol  25 mcg Oral Daily with supper     Continuous Infusions:  PRN Meds:.acetaminophen, alum & mag hydroxide-simethicone, bisacodyl, budesonide-formoterol, calcium carbonate, doxylamine, OLANZapine zydis, prochlorperazine, senna-docusate    Medication adherence issues: MS Med Adherence Y/N: No  Medication side effects: MEDICATION SIDE EFFECTS: no side effects reported   Benefit: Yes / No: Yes       ROS   Pertinent items are noted in HPI.    "    MENTAL STATUS EXAM   Vitals:BP (!) 151/87   Pulse 67   Temp 97.8  F (36.6  C)   Resp 16   Ht 1.626 m (5' 4\")   Wt 48.3 kg (106 lb 7.7 oz)   LMP  (LMP Unknown)   SpO2 96%   BMI 18.28 kg/m    Appearance:  In hospital scrubs, Disheveled.   Mood: \"Terrible, I have anxiety.\"  Affect: flat  However improving was congruent to speech.  Suicidal Ideation: absent  Homicidal Ideation: PRESENT / ABSENT: absent   Thought process: difficult to follow and disorganized however improving today   Thought content: endorses preoccupations  Fund of Knowledge: average  Attention/Concentration: Poor  Language ability:  Intact  Memory:  Immediate recall impaired, Short-term memory impaired and Long-term memory impaired  Insight:  limited.  Judgement: limited  Orientation: Oriented to self, month, and day.  Psychomotor Behavior: normal or unremarkable    Muscle Strength and Tone: MuscleStrength: Normal  Gait and Station: slightly stiff however steady with walker       LABS   Personally reviewed.     Latest Reference Range & Units 06/22/23 08:41 06/22/23 09:38 06/23/23 07:57 06/26/23 07:15 06/27/23 09:55 06/27/23 11:43 06/29/23 10:22   Sodium 136 - 145 mmol/L 137 134 (L) 137 138  137 137   Potassium 3.4 - 5.3 mmol/L 4.2 4.2  4.0  3.6 3.9   Chloride 98 - 107 mmol/L 103 101  105  104 105   Carbon Dioxide (CO2) 22 - 29 mmol/L 23 23  26  24 21 (L)   Urea Nitrogen 8.0 - 23.0 mg/dL 6.0 (L) 6.1 (L)  13.6  10.1 11.1   Creatinine 0.51 - 0.95 mg/dL 0.78 0.73  0.80  0.76 0.75   GFR Estimate >60 mL/min/1.73m2 76 82  74  78 80   Calcium 8.8 - 10.2 mg/dL 9.4 9.0  9.2  9.7 9.1   Anion Gap 7 - 15 mmol/L 11 10  7  9 11   Magnesium 1.7 - 2.3 mg/dL  2.2  2.1  2.3    Phosphorus 2.5 - 4.5 mg/dL  3.1  3.3  3.6    Albumin 3.5 - 5.2 g/dL  3.3 (L)    3.6 3.5   Protein Total 6.4 - 8.3 g/dL  5.5 (L)    6.0 (L) 5.8 (L)   Alkaline Phosphatase 35 - 104 U/L  42    46 47   ALT 0 - 50 U/L  10    11 15   AST 0 - 45 U/L  12    16 16   Bilirubin Total <=1.2 mg/dL  " 0.3    0.3 0.3   Glucose 70 - 99 mg/dL 141 (H) 162 (H)  92  87 252 (H)   Hemoglobin A1C <5.7 %       5.1   WBC 4.0 - 11.0 10e3/uL  8.2    8.9 8.3   Hemoglobin 11.7 - 15.7 g/dL  12.1    12.7 12.4   Hematocrit 35.0 - 47.0 %  37.5    39.0 38.2   Platelet Count 150 - 450 10e3/uL  445    375 308   RBC Count 3.80 - 5.20 10e6/uL  3.97    4.16 3.99   MCV 78 - 100 fL  95    94 96   MCH 26.5 - 33.0 pg  30.5    30.5 31.1   MCHC 31.5 - 36.5 g/dL  32.3    32.6 32.5   RDW 10.0 - 15.0 %  13.1    13.3 13.4   % Neutrophils %  74    74 78   % Lymphocytes %  19    20 16   % Monocytes %  6    6 5   % Eosinophils %  1    0 1   % Basophils %  0    0 0   Absolute Basophils 0.0 - 0.2 10e3/uL  0.0    0.0 0.0   Absolute Eosinophils 0.0 - 0.7 10e3/uL  0.1    0.0 0.1   Absolute Immature Granulocytes <=0.4 10e3/uL  0.0    0.0 0.0   Absolute Lymphocytes 0.8 - 5.3 10e3/uL  1.5    1.8 1.3   Absolute Monocytes 0.0 - 1.3 10e3/uL  0.5    0.5 0.4   % Immature Granulocytes %  0    0 0   Absolute Neutrophils 1.6 - 8.3 10e3/uL  6.1    6.5 6.4   Absolute NRBCs 10e3/uL  0.0    0.0 0.0   NRBCs per 100 WBC <1 /100  0    0 0   EKG 12-LEAD, TRACING ONLY      Rpt     Diastolic Blood Pressure mmHg     See Comment     Systolic Blood Pressure mmHg     See Comment     (L): Data is abnormally low  (H): Data is abnormally high  Rpt: View report in Results Review for more information         DIAGNOSIS   Principal Problem:    Suicidal ideation  MDD, severe, recurrent episode, with psychotic features  R/O Bipolar Disorder Type 1    Active Problem List:  Patient Active Problem List   Diagnosis     Arthritis     Depressive disorder     Borderline personality disorder (H)     GERD (gastroesophageal reflux disease)     Holosystolic murmur     Asthma     History of gastric bypass     Hyperlipidemia LDL goal <130     Other insomnia     Mild mitral regurgitation     Mild aortic stenosis     Weight loss     Bilateral low back pain without sciatica     Adhesive capsulitis of  shoulder, right     Mild intermittent asthma, unspecified whether complicated     Bipolar affective disorder, remission status unspecified (H)     Constipation, unspecified constipation type     Age-related osteoporosis without current pathological fracture     Plantar warts     Hypoglycemia     Failure to thrive in adult     Hypotension, unspecified hypotension type     Suicidal ideation          PLAN   1. Education given regarding diagnostic and treatment options with risks, benefits and alternatives and adequate verbalization of understanding.  2.  Medications:  Hospital  -Continue Prozac 30 mg PO liquid daily to target depressed mood.  -Continue Namenda 10 mg tablet PO BID to treat dementia symptoms observed.  -Continue melatonin 6 mg scheduled every evening to promote sleep.  -Continue Olanzapine ODT 2.5 mg tablet every morning  -Continue Olanzapine 10 mg ODT tablet at bedtime  -Continue Mirtazapine 15 mg PO disintegrating tablet at HS.   -Continue Olanzapine ODT 5 mg PO tablet 3 times daily as needed for severe agitation.   3. Consultations:  IM consulted 6/28/2023 to address wax buildup in both ears:  -Debrox eardrops initiated by IM.  GI consulted and signed off on 6/26/2023, recommendations include:  -- Consider palliative consult to help further assist with GOC if without improvement in course/sx.  -- Continue with scheduled bowel med regimen and titrate to ensure patient having at least x1 soft BM daily given chronic hx of constipation. (This provider updated Miralax order to include goal of 1 soft BM daily with the instruction to hold one dose if pt has met this goal.)  -- Continue detailed documentation of stool appearance, including color, consistency, frequency and amount.   -- Continue PPI at discharge (this provider Dc'd scheduled pepcid and continue Protonix scheduled per GI recommendatio  ---- Discontinue iron supplementation and schedule repeat iron studies in OP setting.  If develops iron  deficiency off supplementation, consider IV iron infusions > PO iron supplementation  Speech therapy reconsulted completed 6/26/23 for Clinical Swallow Evaluation  -XR Video swallow study now scheduled for 6/30/2023 at 2 PM.  Dietician consulted and following pt during admission  -Weights will be monitored every Tuesday, Thursday, and Saturday  -Intake and output monitoring.  -Plan is for pt to eat small meals/snack throughout the day d/t history of gastric bypass.   -Supplements ordered for between meal  - 6/20/23: Diet change to minced and moist with Ensure clear (in a cup) with meals; If patient continues to struggle with PO intake, will downgrade to pureed diet.   - Zofran 4mg PO PRN to scheduled 30 minutes before meals TID to improve PO tolerance.   4. Labs/Tests   Labs: CBC, CMP, magnesium, and phosphorus levels every 3 days   EKG ordered weekly  Olanzapine drug level collected 6/27/2023 and results pending.  5. Structure and Supervision  Unit 3B  Precautions in place.  Fall precautions.  Continue on SIO monitoring due to reporting active SI with plan and confused impulsive behavior.  6.   is following in regards to collecting and reviewing collateral information, referrals and disposition planning.  Legal: civil commitment.   Referrals:  Per CTC  Care Coordination:  Per CTC  Placement:  TBD  Anticipated Discharge:  TBD     Further treatment programming to be determined throughout the hospital course.      [facility] Patient on precautions    Coordination of Care:   Treatment Plan reviewed and physician signed, Care discussed with Care/Treatment Team Members, Chart reviewed and Patient seen      Re-Certification I certify that the inpatient psychiatric facility services furnished since the previous certification were, and continue to be, medically necessary for, either, treatment which could reasonably be expected to improve the patient s condition or diagnostic  study and that the hospital records indicate that the services furnished were, either, intensive treatment services, admission and related services necessary for diagnostic study, or equivalent services.     I certify that the patient continues to need, on a daily basis, active treatment furnished directly by or requiring the supervision of inpatient psychiatric facility personnel.   I estimate 7-14 days of hospitalization is necessary for proper treatment of the patient. My plans for post-hospital care for this patient are  TBD     DANIEL Bennett CNP    -     6/29/2023     -     10:40 AM  Total time  35 minutes with > 50%spent on coordination of cares and psycho-education.    This note was created with help of Dragon dictation system. Grammatical / typing errors are not intentional.    DANIEL Bennett CNP

## 2023-06-30 NOTE — PLAN OF CARE
Assessment/Intervention/Current Symptoms and Care Coordination  - chart review  - team meeting  - team rounds/pt interview addressed patient needs/concerns  - Medication adjustment continues as pt remains symptomatic and medically compromised.      Discharge Plan or Goal  Pending stabilization & development of a safe discharge plan.    Considerations include:  [facility] ([facility] has been holding patient's bed since 5/5, when patient first went into the hospital.  They will be unable to hold the bed for very much longer but does want patient to be able to return when stable if they can still meet her needs.  She will contact us to let us know once the bed has been let go.  We would then need to re-refer when patient is stable.      Barriers to Discharge   Patient requires further psychiatric stabilization due to current symptomology     Referral Status   None     Legal Status  Patient is under MI commitment in [facility]  Pt will need PD upon discharge.

## 2023-06-30 NOTE — PLAN OF CARE
Problem: Depressive Signs/Symptoms  Goal: Increased Participation and Engagement (Depressive Signs/Symptoms)  Outcome: Progressing  Goal: Improved Mood Symptoms (Depressive Signs/Symptoms)  Outcome: Progressing   Goal Outcome Evaluation:    Plan of Care Reviewed With: patient      Patient is flat, mood was calm. Medication compliant. Patient reports depression, wishes to be dead but denied active plans to harm self. Patient did not attend groups or socialize with peers, stated their goal for today was to sleep. Patient visible for meals only, agreed to stay up 30 mins s/p each meal. Patient reported nausea with emesis prn zofran given with relief. Patient had 3 loose/soft stools this shift. Denies pain.    1400 video swallow study

## 2023-06-30 NOTE — PLAN OF CARE
Problem: Anxiety Signs/Symptoms  Goal: Improved Sleep (Anxiety Signs/Symptoms)  Outcome: Progressing   Goal Outcome Evaluation:               Slept well for 9 hours  Pt remained on SIO for self injury risk, using a medical bed to aid with mobility and HOB kept slightly elevated. No complaints of Nausea/ vomiting and no stool incontinence this shift.     Intake : 60 ml

## 2023-06-30 NOTE — PLAN OF CARE
Problem: Bowel Elimination Management  Goal: Effective Bowel Elimination/Continence  Outcome: Not Progressing   Goal Outcome Evaluation:    Plan of Care Reviewed With: patient      Pt isolated in her room all afternoon. Was agitated, angry and hostile. Yelled, shouted, and cursed at staff. Was physically aggressive towards staff during cares. It took 4 staff members to assist with changing her pull-ups, bedings and clothing due to incontinence of bowel. Was uncooperative with assessment. VS check, and treatment orders to sit up after eating. Refused to eat dinner but ate snack. Drunk about 220 ml fluids. Refused to contact for safety. Continues on SIO for safety.Will continue to monitor.

## 2023-06-30 NOTE — PROGRESS NOTES
"   06/30/23 1523   Appointment Info   Signing Clinician's Name / Credentials (SLP) Shawn Mccarthy MA [facility] SLP   General Information   Onset of Illness/Injury or Date of Surgery 06/07/23   Referring Physician Galilea Olguin APRN CNP   Pertinent History of Current Problem Per provider note: \"Bhavana Marr is an 82-year-old female with history of Suicidal ideation, borderline personality disorder, major depressive disorder.  Patient medical history includes history of gastric bypass in 2000, arthritis, GERD, hyponatremia, hyperlipidemia, osteoporosis, asthma, hx of bilateral hip replacements, and mitral valve regurgitation. Pt current legal status is under civil committment with [facility].  Patient has been living in a long-term care facility with relatively stable mental health for the past 5 years.  In the past few months patient has had multiple psychiatric hospitalizations due to suicidal ideation, depression, and symptoms of psychosis.  During recent hospitalizations it has been noted that patient has been experiencing ongoing hyponatremia which could be caused by volume depletion and/or induced by medications including Depakote and Sertraline. Plan will be for patient to return to long-term care facility if possible once psychiatrically stabilized.\" Patient with frequent episodes of Emesis, which per 6/29 Psychiatry NP note appears to be self-induced. Esophagram completed 6/19/23 revealing \"Contrast  readily moves from the esophagus into the gastric pouch as well as  into the proximal small bowel. No spontaneous reflux was seen.\". VFSS completed today per provider orders.   General Observations Patient upright in chair, initially stating \"I don't want to be here\" and also stating \"I want to die\". After education regarding procedure for VFSS she was agreeable and participated well.   Type of Evaluation   Type of Evaluation Swallow Evaluation   Vocal Quality/Secretion Management (Oral Motor)   Vocal " "Quality (Oral Motor) WNL   General Swallowing Observations   Past History of Dysphagia VFSS completed 8/22/18 revealing: \"There was premature spillage into the vallecula and pyriform sinuses on mixed textures with thin liquids. Also consistent trace-transient penetration of the larynx on thin liquids, but it was consistently ejected from the airway. Otherwise normal results. I recommended taking pills in puree, avoiding mixed textures, and to take small sips with thin liquids.\"   Respiratory Support (General Swallowing Observations) none   Current Diet/Method of Nutritional Intake (General Swallowing Observations, NIS) minced & moist (level 5);thin liquids (level 0)   Swallowing Evaluation Videofluoroscopic swallow study (VFSS)   VFSS Evaluation   Radiologist Dr. Lane   Views Taken left lateral;A/P   Physical Location of Procedure [facility] Radiology Department   VFSS Textures Trialed thin liquids;pureed;solid foods   VFSS Eval: Thin Liquid Texture Trial   Mode of Presentation, Thin Liquid cup;straw;self-fed   Order of Presentation 1-3, 5, 7-8, 9 (AP)   Preparatory Phase WFL   Oral Phase, Thin Liquid WFL   Bolus Location When Swallow Triggered pyriforms   Pharyngeal Phase, Thin Liquid impaired epiglottic movement   Rosenbek's Penetration Aspiration Scale: Thin Liquid Trial Results 2 - contrast enters airway, remains above the vocal cords, no residue remains (penetration)   Diagnostic Statement Consistent flash penetration with thin liquids via cup and straw, cleared spontaneously after initial swallow.   VFSS Evaluation: Puree Solid Texture Trial   Mode of Presentation, Puree spoon;self-fed   Order of Presentation 4   Preparatory Phase WFL   Oral Phase, Puree WFL   Bolus Location When Swallow Triggered valleculae   Pharyngeal Phase, Puree WFL   Rosenbek's Penetration Aspiration Scale: Puree Food Trial Results 1 - no aspiration, contrast does not enter airway   Diagnostic Statement No aspiration or " penetration, no observed residue   VFSS Evaluation: Solid Food Texture Trial   Mode of Presentation, Solid self-fed   Order of Presentation 6   Preparatory Phase other (see comments)  (Mildly extended mastication)   Oral Phase, Solid WFL   Bolus Location When Swallow Triggered   (Patient swallow before fluoro turned on, unable to visualize)   Pharyngeal Phase, Solid other (see comments)  (Patient swallowed solid before fluoro turned on, unable to visual fluoro phase. No residue after the swallow.)   Rosenbek's Penetration Aspiration Scale: Solid Food Trial Results   (Did not observe swallow on fluoro, but no signs of residual penetration or aspiration observed.)   Diagnostic Statement No observed residue after the swallow. Pharyngeal phase not observed on fluoro as patient swallowed solid before fluoro turned on. Do not suspect aspiration or penetration.   Esophageal Phase of Swallow   Patient reports or presents with symptoms of esophageal dysphagia Yes   Esophageal sweep performed during today s vidofluoroscopic exam  Please refer to radiologist's report for details   Esophageal comments History of GERD   Swallowing Recommendations   Diet Consistency Recommendations regular diet;thin liquids (level 0)   Supervision Level for Intake 1:1 supervision needed   Mode of Delivery Recommendations bolus size, small;slow rate of intake   Recommended Feeding/Eating Techniques (Swallow Eval) maintain upright sitting position for eating   Medication Administration Recommendations, Swallowing (SLP) As tolerated   Clinical Impression   Criteria for Skilled Therapeutic Interventions Met (SLP Eval) Evaluation only   SLP Diagnosis Mild pharyngeal dysphagia, Esophageal dysphagia   Risks & Benefits of therapy have been explained evaluation/treatment results reviewed;participants included;patient   Clinical Impression Comments   VFSS completed today per provider orders. Patient presents with mild oropharyngeal dysphagia and esophageal  dysphagia. See 6/19 esophagram report for full details on esophageal phase. Thin liquids, puree textures, and hard solids completed under fluoroscopy. Patient oral phase c/b adequate bolus acceptance and closure, mildly extended mastication phase with hard solids, but adequate A-P movement, with good oral clearance. Pharyngeal phase c/b delayed initiation of the swallow with thin liquids to the pyriforms and with puree textures to the valleculae, delayed epiglottic inversion, and reduced vestibule closure resulting in flash penetration of thin liquids during the swallow, but spontaneously cleared. No aspiration observed under fluoroscopy. No pharyngeal residue with all intake. No reflux observed under fluoroscopy, refer to Radiologist's report for comments on esophageal phase.    At this time, recommend advance patient to regular textures and continue thin liquids diet. Patient should use a slow rate of intake, and take small bites/sips. Follow reflux precautions. Suspect oropharyngeal phase is baseline for patient and does not impact swallow safety, and emesis is self-induced per patient report, staff observation, and Psychiatry provider note (6/29). No further SLP services indicated at this time. SLP will s/o.     SLP Total Evaluation Time   Evaluation, videofluoroscopic eval of swallow function Minutes (08444) 12   SLP Discharge Planning   SLP Discharge Recommendation   Defer to medical team   SLP Rationale for DC Rec Baseline swallow fx

## 2023-07-01 LAB — OLANZAPINE SERPL-MCNC: 35 NG/ML

## 2023-07-01 PROCEDURE — 250N000013 HC RX MED GY IP 250 OP 250 PS 637: Performed by: PHYSICIAN ASSISTANT

## 2023-07-01 PROCEDURE — 250N000013 HC RX MED GY IP 250 OP 250 PS 637: Performed by: PSYCHIATRY & NEUROLOGY

## 2023-07-01 PROCEDURE — 250N000013 HC RX MED GY IP 250 OP 250 PS 637

## 2023-07-01 PROCEDURE — 250N000011 HC RX IP 250 OP 636

## 2023-07-01 PROCEDURE — 124N000003 HC R&B MH SENIOR/ADOLESCENT

## 2023-07-01 RX ADMIN — MIRTAZAPINE 15 MG: 15 TABLET, ORALLY DISINTEGRATING ORAL at 20:37

## 2023-07-01 RX ADMIN — PRAZOSIN HYDROCHLORIDE 1 MG: 1 CAPSULE ORAL at 20:36

## 2023-07-01 RX ADMIN — MEMANTINE 10 MG: 10 TABLET ORAL at 08:14

## 2023-07-01 RX ADMIN — Medication 500 MG: at 20:36

## 2023-07-01 RX ADMIN — Medication 2 TABLET: at 08:14

## 2023-07-01 RX ADMIN — ONDANSETRON 4 MG: 4 TABLET ORAL at 11:50

## 2023-07-01 RX ADMIN — Medication 12.5 MG: at 20:42

## 2023-07-01 RX ADMIN — POLYETHYLENE GLYCOL 3350 17 G: 17 POWDER, FOR SOLUTION ORAL at 20:36

## 2023-07-01 RX ADMIN — OLANZAPINE 2.5 MG: 5 TABLET, ORALLY DISINTEGRATING ORAL at 08:14

## 2023-07-01 RX ADMIN — MEMANTINE 10 MG: 10 TABLET ORAL at 20:36

## 2023-07-01 RX ADMIN — ONDANSETRON 4 MG: 4 TABLET ORAL at 17:43

## 2023-07-01 RX ADMIN — SENNOSIDES 8.6 MG: 8.6 TABLET ORAL at 08:14

## 2023-07-01 RX ADMIN — SIMVASTATIN 40 MG: 40 TABLET, FILM COATED ORAL at 20:37

## 2023-07-01 RX ADMIN — VALACYCLOVIR 500 MG: 500 TABLET, FILM COATED ORAL at 08:14

## 2023-07-01 RX ADMIN — FLUOXETINE 30 MG: 20 SOLUTION ORAL at 08:14

## 2023-07-01 RX ADMIN — VITAM B12 100 MCG: 100 TAB at 20:37

## 2023-07-01 RX ADMIN — CARBAMIDE PEROXIDE 6.5% 3 DROP: 6.5 LIQUID AURICULAR (OTIC) at 08:11

## 2023-07-01 RX ADMIN — SENNOSIDES 8.6 MG: 8.6 TABLET ORAL at 20:36

## 2023-07-01 RX ADMIN — POLYETHYLENE GLYCOL 3350 17 G: 17 POWDER, FOR SOLUTION ORAL at 08:14

## 2023-07-01 RX ADMIN — CARBAMIDE PEROXIDE 6.5% 3 DROP: 6.5 LIQUID AURICULAR (OTIC) at 20:38

## 2023-07-01 RX ADMIN — OLANZAPINE 10 MG: 10 TABLET, ORALLY DISINTEGRATING ORAL at 20:37

## 2023-07-01 RX ADMIN — PANTOPRAZOLE SODIUM 40 MG: 40 TABLET, DELAYED RELEASE ORAL at 07:06

## 2023-07-01 RX ADMIN — Medication 6 MG: at 20:36

## 2023-07-01 RX ADMIN — Medication 25 MCG: at 17:43

## 2023-07-01 RX ADMIN — ONDANSETRON 4 MG: 4 TABLET ORAL at 07:06

## 2023-07-01 ASSESSMENT — ACTIVITIES OF DAILY LIVING (ADL)
ADLS_ACUITY_SCORE: 71
LAUNDRY: UNABLE TO COMPLETE
ADLS_ACUITY_SCORE: 71
ADLS_ACUITY_SCORE: 68
ORAL_HYGIENE: PROMPTS
DRESS: SCRUBS (BEHAVIORAL HEALTH);INDEPENDENT
ADLS_ACUITY_SCORE: 71
ADLS_ACUITY_SCORE: 68
ADLS_ACUITY_SCORE: 71
HYGIENE/GROOMING: WITH ASSISTANCE
ADLS_ACUITY_SCORE: 71
ADLS_ACUITY_SCORE: 68

## 2023-07-01 NOTE — PLAN OF CARE
"Problem: Adult Behavioral Health Plan of Care  Goal: Plan of Care Review  Flowsheets  Taken 7/1/2023 1845  Plan of Care Reviewed With: patient  Overall Patient Progress: no change  Patient Agreement with Plan of Care: agrees  Taken 7/1/2023 1759  Patient Agreement with Plan of Care: agrees    Patient was resting in bed during rounds at early part of the shift. She verbalized being depressed and wanting to die. She said she hears voices to kill herself. Stated, \" I just wanted to die.\" She complained of nausea. Vital signs were ok. Scheduled Zofran was given. She ate 25% with a total of 600 ml fluid intake this shift. Pt voided 2x. No BM noted. Scheduled HS laxatives were given with other meds mixed with apple sauce. She was watching TV with SIO staff watching her at this time at past 2000. No complaints of pain and side effects of medications. Pt had a brighter affect and mood today than previous shift.   "

## 2023-07-01 NOTE — PLAN OF CARE
Problem: Adult Behavioral Health Plan of Care  Goal: Plan of Care Review  Flowsheets  Taken 6/30/2023 2042  Plan of Care Reviewed With: patient  Overall Patient Progress: no change  Patient Agreement with Plan of Care: agrees  Taken 6/30/2023 1800  Patient Agreement with Plan of Care: agrees    Patient was on SIO 5 feet for self-injury and suicide risk. She was using a medical bed to aid in mobility. Patient rested in her bedroom majority of the shift. She was observed vomiting at least 6x. Vomitus appeared creamy liquid and with food particles. Stated she wanted to die. Endorsed being depressed and having auditory hallucinations. Voices telling her to vomit. She was prompted by staff to be outside her bedroom for a while to redirect herself from those disturbing thoughts. She ambulated in the hallway and lounge for a while and was back to her bedroom due to she wanted to vomit again. Patient was given her scheduled Zofran already at the time. PRN Compazine 5 mg at 1821. She was resting in bed with HOB. Writer gave her HS medications with puree (apple sauce) per Speech Language pathologist. Please see Speech Language pathologist notes. No vomiting noted after giving her HS meds. PRN Unisom 12.5 mg was given at 20:58 to aide in sleep. She's compliant with sitting up every after food and medication intake. Pt was a little confused but pleasant and cooperative. Will continue to monitor and assess pt.

## 2023-07-01 NOTE — PLAN OF CARE
"Patient remains on SIO for SI/SIB. She uses a medical bed taisha mobility. Patient spent most of the time in her room. She was irritable angry and tense during nursing assessment. Affect was consistent with mood. She told writer to get out of he room, was uncooperative with nursing assessment. Patient was compliant with medications, no side effects noted. She had scant amount of food particles with secretions twice (emesis). NO bowel movement. She voided twice. She ate 50% of her breakfast and 75 of her lunch. She had pudding and apple sauce in between the two meals. Staff will continue to monitor. /75   Pulse 69   Temp 98.7  F (37.1  C) (Tympanic)   Resp 18   Ht 1.626 m (5' 4\")   Wt 50.6 kg (111 lb 8.8 oz)   LMP  (LMP Unknown)   SpO2 99%   BMI 19.15 kg/m                            "

## 2023-07-01 NOTE — PROGRESS NOTES
"PSYCHIATRY  PROGRESS NOTE     DATE OF SERVICE   6/30/2023         CHIEF COMPLAINT   \"I am afraid of people.\"       SUBJECTIVE   Nursing reports:     Pt isolated in her room all afternoon. Was agitated, angry and hostile. Yelled, shouted, and cursed at staff. Was physically aggressive towards staff during cares. It took 4 staff members to assist with changing her pull-ups, bedings and clothing due to incontinence of bowel. Was uncooperative with assessment. VS check, and treatment orders to sit up after eating. Refused to eat dinner but ate snack. Drunk about 220 ml fluids. Refused to contact for safety. Continues on SIO for safety.Will continue to monitor.          Slept well for 9 hours  Pt remained on SIO for self injury risk, using a medical bed to aid with mobility and HOB kept slightly elevated. No complaints of Nausea/ vomiting and no stool incontinence this shift.  Intake : 60 ml       Assessment/Intervention/Current Symptoms and Care Coordination  - chart review  - team meeting  - team rounds/pt interview addressed patient needs/concerns  - Medication adjustment continues as pt remains symptomatic and medically compromised.      Discharge Plan or Goal  Pending stabilization & development of a safe discharge plan.     Considerations include:  [facility] has been holding patient's bed since 5/5, when patient first went into the hospital.  They will be unable to hold the bed for very much longer but does want patient to be able to return when stable if they can still meet her needs.  She will contact us to let us know once the bed has been let go.  We would then need to re-refer when patient is stable.      Barriers to Discharge   Patient requires further psychiatric stabilization due to current symptomology     Referral Status   None     Legal Status  Patient is under MI commitment in [facility]  Pt will need PD upon discharge.                OBJECTIVE   Met with " "pt in hospital room on unit 3B. Pt affect appears flat and more guarded today. Pt reports mood as, \"I am afraid of people.\"  Patient also states, \" They do not listen to me.  I want to go home.  I don't have a home.\"  Patient is observed lying in hospital bed laying on left side and sucking right thumb intermittently during conversation. Pt's thoughts continue to be disorganized and patient continues to experience confusion intermittently. Patient is oriented to self and patient does recognize writer today. Patient continues to endorse symptoms of severe depression with thoughts of SI and no plan.  Provider explained to patient that she previously was living in a nursing home and they have expressed wanting her to return; also explained that as patient is currently hospitalized we will assist in setting up a safe place for her to discharge once she is psychiatrically stabilized. Patient verbalized understanding.  Patient has been observed by staff inducing vomiting as well as experiencing spontaneous regurgitation. Discussed with patient that plan is for her to have a XR Video swallow study today 6/30/2023 at 2 PM which will help determine if there is any issues with swallowing that are contributing to her frequent regurgitation. Patient verbalizes understanding and in agreement with this. Provider reviewed results of XR video swallow study report which indicate that patient's esophagus is mildly dilated without any other abnormality; no aspiration. Per documentation speech therapist is recommending regular diet with thin liquids as well as for nursing to administer pills in purée such as applesauce. Provider updated diet orders; also, plan for next week will be to initiate a behavioral plan in coordination with Caryn RYAN to address behavior of patient inducing vomiting. Pt will continue on SIO monitoring which is in place due to patient reporting SI and ongoing confusion/impulsive behaviors. Per nursing report " patient became agitated last evening and was physically aggressive toward staff during assistance with ADLs; provider initiating Prazosin 1 mg PO at bedtime to target agitation/anxiety at night time. Also, plan to continue scheduled Prozac 30 mg liquid daily to target depressed mood and continue Namenda 10 mg PO BID to target dementia symptoms. Will also continue Olanzapine ODT 2.5 mg tablet every morning, Olanzapine 10 mg ODT tablet at bedtime, and Mirtazapine 15 mg PO disintegrating tablet at HS. Olanzapine drug level checked on 6/27/2023 and results pending.       Patient continues to be on a 2 L fluid restriction per IM to treat hyponatremia; sodium level 6/30/2023 was 139 mml/L.  CMP ordered for every 3 days to monitor. IM has been following pt to evaluate hyponatremia and IM was notified on 6/15/23 of altered level of consciousness which presented as increased confusion compared to baseline since admission and increased agitation. Per IM provider pt's waxing and waning symptoms most likely secondary to a primary psychiatric disorder with high likelihood for underlying dementia; there is no clear reversible organic cause of her symptoms. Speech therapy was consulted 6/15/23 and Chest Xray was also ordered to evaluate due to concern for aspiration with frequent regurgitation /emesis; chest xray did not show acute concern for aspiration. Speech therapy was reconsulted to address ongoing spontaneous regurgitation of small amounts after eating; patient has also been observed inducing vomiting by sticking her fingers in her mouth. The XR video swallow study was completed on 6/30/2023: results indicate that patient's esophagus is mildly dilated without any other abnormality; no aspiration. Per documentation speech therapist is recommending regular diet with thin liquids as well as for nursing to administer pills in purée such as applesauce. GI followed pt to address frequent regurgitation /emesis, poor P.O intake,  and constipation. GI started pt on bowel regimen and esophagram and upper GI studies were completed 6/19/23. Per IM provider documentation, GI provider reports that results indicate delayed emptying, but no concern for strictures; if unable to tolerate any oral intake a CT abdomen with contrast should be performed to rule out intra-abdominal pathology. Pt was not tolerating p.o. intake at that time and a CT abdomen/pelvis with contrast was completed 6/20/2023: report of results indicated a large volume predominantly low density colonic stool intermixed with hyperattenuating contrast (from 6/19/2023 esophagram) extending from the cecum through the entire length of the colon to the rectum.  Subsequently a therapeutic gastrograffin enema with IR was completed 6/21/23 which was effective for removing a large volume of stool. GI luminal team signed off on 6/26/2023 with recommendations for ongoing bowel regimen as well as recommendations for discharge. Plan is to continue MiraLAX and stool softeners with goal of patient achieving 1-2 soft bowel movement daily per GI recommendation. Nursing staff reported that patient was exhibiting more confusion on evenings on 6/29/23; provider ordered CMP, CBC, as well as a UA/UC. Lab results are stable, however UA still needs to be collected.     Dietitian is following pt due to inadequate oral intake related to altered mentation, decreased appetite, and early satiety. Patient has been meeting her nutrition goals which include: patient to consume at least 25-50% meals, 100% nutrition supplements, and for patient to maintain/gain weight. To support pt's dietary goal of maintaining/gaining weight patient care order in place for staff to promote patient eating small meals and snacks throughout the day; order also includes that pt needs supervision while eating to encourage intake and monitor for reflux/regurgitation. Pt weights will be monitored every Tuesday, Thursday, and Saturday;  "also monitoring intake and output.  Plan will be to continue to monitor weights and p.o. intake. Pt weights:   Vitals:    06/17/23 1646 06/22/23 0822 06/25/23 0820   Weight: 48.7 kg (107 lb 5.8 oz) 47.1 kg (103 lb 13.4 oz) 48.3 kg (106 lb 7.7 oz)          MEDICATIONS   Medications:  Scheduled Meds:    calcium carbonate  500 mg Oral At Bedtime     carbamide peroxide  3 drop Both Ears BID     childrens multivitamin with iron  2 tablet Oral Daily     cyanocobalamin  100 mcg Oral At Bedtime     FLUoxetine  30 mg Oral Daily     melatonin  6 mg Oral QPM     memantine  10 mg Oral BID     mirtazapine  15 mg Orally disintegrating tablet At Bedtime     OLANZapine zydis  2.5 mg Oral QAM     OLANZapine zydis  10 mg Oral At Bedtime     ondansetron  4 mg Oral TID AC     pantoprazole  40 mg Oral QAM AC     polyethylene glycol  17 g Oral BID     prazosin  1 mg Oral At Bedtime     sennosides  8.6 mg Oral BID     simvastatin  40 mg Oral At Bedtime     valACYclovir  500 mg Oral Daily     cholecalciferol  25 mcg Oral Daily with supper     Continuous Infusions:  PRN Meds:.acetaminophen, alum & mag hydroxide-simethicone, bisacodyl, budesonide-formoterol, calcium carbonate, doxylamine, OLANZapine zydis, prochlorperazine, senna-docusate    Medication adherence issues: MS Med Adherence Y/N: No  Medication side effects: MEDICATION SIDE EFFECTS: no side effects reported   Benefit: Yes / No: Yes       ROS   Pertinent items are noted in HPI.       MENTAL STATUS EXAM   Vitals:/69 (BP Location: Left arm, Patient Position: Sitting, Cuff Size: Adult Small)   Pulse 93   Temp 97.5  F (36.4  C) (Oral)   Resp 17   Ht 1.626 m (5' 4\")   Wt 48.3 kg (106 lb 7.7 oz)   LMP  (LMP Unknown)   SpO2 98%   BMI 18.28 kg/m    Appearance:  In hospital scrubs, Disheveled.   Mood: \"Afraid.\"  Affect: flat  However improving was congruent to speech.  Suicidal Ideation: absent  Homicidal Ideation: PRESENT / ABSENT: absent   Thought process: difficult to " follow and disorganized however improving   Thought content: endorses preoccupations  Fund of Knowledge: average  Attention/Concentration: Poor  Language ability:  Intact  Memory:  Immediate recall impaired however improving, Short-term memory impaired and Long-term memory impaired  Insight:  limited.  Judgement: limited  Orientation: Oriented to self, month, and day.  Psychomotor Behavior: normal or unremarkable    Muscle Strength and Tone: MuscleStrength: Normal  Gait and Station: slightly stiff however steady with walker       LABS   Personally reviewed.       Latest Reference Range & Units 06/22/23 08:41 06/22/23 09:38 06/23/23 07:57 06/26/23 07:15 06/27/23 09:55 06/27/23 11:43 06/29/23 10:22 06/30/23 08:00 06/30/23 14:17   Sodium 136 - 145 mmol/L 137 134 (L) 137 138  137 137 139    Potassium 3.4 - 5.3 mmol/L 4.2 4.2  4.0  3.6 3.9 4.2    Chloride 98 - 107 mmol/L 103 101  105  104 105 107    Carbon Dioxide (CO2) 22 - 29 mmol/L 23 23  26  24 21 (L) 27    Urea Nitrogen 8.0 - 23.0 mg/dL 6.0 (L) 6.1 (L)  13.6  10.1 11.1 9.8    Creatinine 0.51 - 0.95 mg/dL 0.78 0.73  0.80  0.76 0.75 0.72    GFR Estimate >60 mL/min/1.73m2 76 82  74  78 80 84    Calcium 8.8 - 10.2 mg/dL 9.4 9.0  9.2  9.7 9.1 9.1    Anion Gap 7 - 15 mmol/L 11 10  7  9 11 5 (L)    Magnesium 1.7 - 2.3 mg/dL  2.2  2.1  2.3  2.1    Phosphorus 2.5 - 4.5 mg/dL  3.1  3.3  3.6  3.5    Albumin 3.5 - 5.2 g/dL  3.3 (L)    3.6 3.5 3.1 (L)    Protein Total 6.4 - 8.3 g/dL  5.5 (L)    6.0 (L) 5.8 (L) 5.2 (L)    Alkaline Phosphatase 35 - 104 U/L  42    46 47 38    ALT 0 - 50 U/L  10    11 15 12    AST 0 - 45 U/L  12    16 16 14    Bilirubin Total <=1.2 mg/dL  0.3    0.3 0.3 0.4    Glucose 70 - 99 mg/dL 141 (H) 162 (H)  92  87 252 (H) 89    Hemoglobin A1C <5.7 %       5.1     WBC 4.0 - 11.0 10e3/uL  8.2    8.9 8.3 5.8    Hemoglobin 11.7 - 15.7 g/dL  12.1    12.7 12.4 11.1 (L)    Hematocrit 35.0 - 47.0 %  37.5    39.0 38.2 34.1 (L)    Platelet Count 150 - 450 10e3/uL  445     375 308 248    RBC Count 3.80 - 5.20 10e6/uL  3.97    4.16 3.99 3.66 (L)    MCV 78 - 100 fL  95    94 96 93    MCH 26.5 - 33.0 pg  30.5    30.5 31.1 30.3    MCHC 31.5 - 36.5 g/dL  32.3    32.6 32.5 32.6    RDW 10.0 - 15.0 %  13.1    13.3 13.4 13.5    % Neutrophils %  74    74 78 69    % Lymphocytes %  19    20 16 24    % Monocytes %  6    6 5 5    % Eosinophils %  1    0 1 1    % Basophils %  0    0 0 1    Absolute Basophils 0.0 - 0.2 10e3/uL  0.0    0.0 0.0 0.0    Absolute Eosinophils 0.0 - 0.7 10e3/uL  0.1    0.0 0.1 0.1    Absolute Immature Granulocytes <=0.4 10e3/uL  0.0    0.0 0.0 0.0    Absolute Lymphocytes 0.8 - 5.3 10e3/uL  1.5    1.8 1.3 1.4    Absolute Monocytes 0.0 - 1.3 10e3/uL  0.5    0.5 0.4 0.3    % Immature Granulocytes %  0    0 0 0    Absolute Neutrophils 1.6 - 8.3 10e3/uL  6.1    6.5 6.4 4.1    Absolute NRBCs 10e3/uL  0.0    0.0 0.0 0.0    NRBCs per 100 WBC <1 /100  0    0 0 0    XR VIDEO SWALLOW WITH SLP OR OT          Rpt   EKG 12-LEAD, TRACING ONLY      Rpt       (L): Data is abnormally low  (H): Data is abnormally high  Rpt: View report in Results Review for more information       DIAGNOSIS   Principal Problem:    Suicidal ideation  MDD, severe, recurrent episode, with psychotic features  R/O Bipolar Disorder Type 1    Active Problem List:  Patient Active Problem List   Diagnosis     Arthritis     Depressive disorder     Borderline personality disorder (H)     GERD (gastroesophageal reflux disease)     Holosystolic murmur     Asthma     History of gastric bypass     Hyperlipidemia LDL goal <130     Other insomnia     Mild mitral regurgitation     Mild aortic stenosis     Weight loss     Bilateral low back pain without sciatica     Adhesive capsulitis of shoulder, right     Mild intermittent asthma, unspecified whether complicated     Bipolar affective disorder, remission status unspecified (H)     Constipation, unspecified constipation type     Age-related osteoporosis without current  pathological fracture     Plantar warts     Hypoglycemia     Failure to thrive in adult     Hypotension, unspecified hypotension type     Suicidal ideation          PLAN   1. Education given regarding diagnostic and treatment options with risks, benefits and alternatives and adequate verbalization of understanding.  2.  Medications:  Hospital  -Initiate Prazosin 1 mg PO at bedtime to target anxiety/agitation at nighttime.  -Continue Prozac 30 mg PO liquid daily to target depressed mood.  -Continue Namenda 10 mg tablet PO BID to treat dementia symptoms observed.  -Continue melatonin 6 mg scheduled every evening to promote sleep.  -Continue Olanzapine ODT 2.5 mg tablet every morning  -Continue Olanzapine 10 mg ODT tablet at bedtime  -Continue Mirtazapine 15 mg PO disintegrating tablet at HS.   -Continue Olanzapine ODT 5 mg PO tablet 3 times daily as needed for severe agitation.   3. Consultations:  IM consulted 6/28/2023 to address wax buildup in both ears:  -Debrox eardrops initiated by IM.  GI consulted and signed off on 6/26/2023, recommendations include:  -- Consider palliative consult to help further assist with GOC if without improvement in course/sx.  -- Continue with scheduled bowel med regimen and titrate to ensure patient having at least x1 soft BM daily given chronic hx of constipation. (This provider updated Miralax order to include goal of 1 soft BM daily with the instruction to hold one dose if pt has met this goal.)  -- Continue detailed documentation of stool appearance, including color, consistency, frequency and amount.   -- Continue PPI at discharge (this provider Dc'd scheduled pepcid and continue Protonix scheduled per GI recommendatio  ---- Discontinue iron supplementation and schedule repeat iron studies in OP setting.  If develops iron deficiency off supplementation, consider IV iron infusions > PO iron supplementation  Speech therapy reconsulted completed 6/26/23 for Clinical Swallow  Evaluation  -XR Video swallow study completed 6/30/2023:  -Recommendations include taking pills in puree, avoiding mixed textures, and to take small sips with thin liquids.  -Speech therapy recommending regular diet with thin liquids-provider updated patient's diet order.  -Continue one-to-one supervision as needed with eating  -Patient should use a slow rate of intake and follow reflux precautions.  Dietician consulted and following pt during admission  -Weights will be monitored every Tuesday, Thursday, and Saturday  -Intake and output monitoring.  -Plan is for pt to eat small meals/snack throughout the day d/t history of gastric bypass.   -Supplements ordered for between meal  - Zofran 4mg PO PRN to scheduled 30 minutes before meals TID to improve PO tolerance.   4. Labs/Tests   Labs: CBC, CMP, magnesium, and phosphorus levels every 3 days   EKG ordered weekly  Olanzapine drug level collected 6/27/2023 and results pending.  5. Structure and Supervision  Unit 3B  Precautions in place.  Fall precautions.  Continue on SIO monitoring due to reporting active SI with plan and confused impulsive behavior.  6.   is following in regards to collecting and reviewing collateral information, referrals and disposition planning.  Legal: civil commitment.   Referrals:  Per CTC  Care Coordination:  Per [facility]  Placement:  TBD  Anticipated Discharge:  TBD     Further treatment programming to be determined throughout the hospital course.      Risk Assessment: [facility] RISK ASSESSMENT: Patient on precautions    Coordination of Care:   Treatment Plan reviewed and physician signed, Care discussed with Care/Treatment Team Members, Chart reviewed and Patient seen      Re-Certification I certify that the inpatient psychiatric facility services furnished since the previous certification were, and continue to be, medically necessary for, either, treatment which could reasonably be expected to improve the patient s condition or  diagnostic study and that the hospital records indicate that the services furnished were, either, intensive treatment services, admission and related services necessary for diagnostic study, or equivalent services.     I certify that the patient continues to need, on a daily basis, active treatment furnished directly by or requiring the supervision of inpatient psychiatric facility personnel.   I estimate 7-14 days of hospitalization is necessary for proper treatment of the patient. My plans for post-hospital care for this patient are  TBD     DANIEL Bennett CNP    -     6/30/2023     -     10:30 AM  Total time  35 minutes with > 50%spent on coordination of cares and psycho-education.    This note was created with help of Dragon dictation system. Grammatical / typing errors are not intentional.    DANIEL Bennett CNP

## 2023-07-01 NOTE — PLAN OF CARE
Problem: Sleep Disturbance  Goal: Adequate Sleep/Rest  Outcome: Progressing   Goal Outcome Evaluation:       Patient slept throughout the night for a total of 10 hours. Nothing unusual noted. No complaints made. She is using a medical bed to facilitate mobility. She turned herself to sides independently.      Intake: 0   Output :0

## 2023-07-02 LAB
ALBUMIN UR-MCNC: NEGATIVE MG/DL
APPEARANCE UR: CLEAR
BILIRUB UR QL STRIP: NEGATIVE
COLOR UR AUTO: ABNORMAL
GLUCOSE UR STRIP-MCNC: NEGATIVE MG/DL
HGB UR QL STRIP: NEGATIVE
KETONES UR STRIP-MCNC: NEGATIVE MG/DL
LEUKOCYTE ESTERASE UR QL STRIP: NEGATIVE
NITRATE UR QL: NEGATIVE
PH UR STRIP: 7.5 [PH] (ref 5–7)
RBC URINE: 0 /HPF
SP GR UR STRIP: 1 (ref 1–1.03)
SQUAMOUS EPITHELIAL: <1 /HPF
UROBILINOGEN UR STRIP-MCNC: NORMAL MG/DL
WBC URINE: <1 /HPF

## 2023-07-02 PROCEDURE — 250N000013 HC RX MED GY IP 250 OP 250 PS 637

## 2023-07-02 PROCEDURE — 81003 URINALYSIS AUTO W/O SCOPE: CPT

## 2023-07-02 PROCEDURE — 250N000013 HC RX MED GY IP 250 OP 250 PS 637: Performed by: PHYSICIAN ASSISTANT

## 2023-07-02 PROCEDURE — 124N000003 HC R&B MH SENIOR/ADOLESCENT

## 2023-07-02 PROCEDURE — 250N000011 HC RX IP 250 OP 636

## 2023-07-02 PROCEDURE — 250N000013 HC RX MED GY IP 250 OP 250 PS 637: Performed by: PSYCHIATRY & NEUROLOGY

## 2023-07-02 RX ADMIN — Medication 12.5 MG: at 20:02

## 2023-07-02 RX ADMIN — OLANZAPINE 5 MG: 5 TABLET, ORALLY DISINTEGRATING ORAL at 00:39

## 2023-07-02 RX ADMIN — SENNOSIDES 8.6 MG: 8.6 TABLET ORAL at 19:51

## 2023-07-02 RX ADMIN — Medication 6 MG: at 19:51

## 2023-07-02 RX ADMIN — Medication 500 MG: at 19:49

## 2023-07-02 RX ADMIN — POLYETHYLENE GLYCOL 3350 17 G: 17 POWDER, FOR SOLUTION ORAL at 08:40

## 2023-07-02 RX ADMIN — POLYETHYLENE GLYCOL 3350 17 G: 17 POWDER, FOR SOLUTION ORAL at 19:48

## 2023-07-02 RX ADMIN — SIMVASTATIN 40 MG: 40 TABLET, FILM COATED ORAL at 19:51

## 2023-07-02 RX ADMIN — OLANZAPINE 5 MG: 5 TABLET, ORALLY DISINTEGRATING ORAL at 12:53

## 2023-07-02 RX ADMIN — FLUOXETINE 30 MG: 20 SOLUTION ORAL at 08:39

## 2023-07-02 RX ADMIN — Medication 2 TABLET: at 08:39

## 2023-07-02 RX ADMIN — Medication 25 MCG: at 16:58

## 2023-07-02 RX ADMIN — ONDANSETRON 4 MG: 4 TABLET ORAL at 16:58

## 2023-07-02 RX ADMIN — VITAM B12 100 MCG: 100 TAB at 19:48

## 2023-07-02 RX ADMIN — MIRTAZAPINE 15 MG: 15 TABLET, ORALLY DISINTEGRATING ORAL at 19:49

## 2023-07-02 RX ADMIN — MEMANTINE 10 MG: 10 TABLET ORAL at 19:50

## 2023-07-02 RX ADMIN — PRAZOSIN HYDROCHLORIDE 1 MG: 1 CAPSULE ORAL at 19:50

## 2023-07-02 RX ADMIN — SENNOSIDES 8.6 MG: 8.6 TABLET ORAL at 08:40

## 2023-07-02 RX ADMIN — CARBAMIDE PEROXIDE 6.5% 3 DROP: 6.5 LIQUID AURICULAR (OTIC) at 08:40

## 2023-07-02 RX ADMIN — ACETAMINOPHEN 650 MG: 325 TABLET, FILM COATED ORAL at 01:48

## 2023-07-02 RX ADMIN — ONDANSETRON 4 MG: 4 TABLET ORAL at 06:45

## 2023-07-02 RX ADMIN — OLANZAPINE 10 MG: 10 TABLET, ORALLY DISINTEGRATING ORAL at 19:49

## 2023-07-02 RX ADMIN — PANTOPRAZOLE SODIUM 40 MG: 40 TABLET, DELAYED RELEASE ORAL at 06:45

## 2023-07-02 RX ADMIN — PROCHLORPERAZINE MALEATE 5 MG: 5 TABLET ORAL at 20:24

## 2023-07-02 RX ADMIN — MEMANTINE 10 MG: 10 TABLET ORAL at 08:40

## 2023-07-02 RX ADMIN — ONDANSETRON 4 MG: 4 TABLET ORAL at 11:27

## 2023-07-02 RX ADMIN — OLANZAPINE 2.5 MG: 5 TABLET, ORALLY DISINTEGRATING ORAL at 08:39

## 2023-07-02 RX ADMIN — VALACYCLOVIR 500 MG: 500 TABLET, FILM COATED ORAL at 08:39

## 2023-07-02 ASSESSMENT — ACTIVITIES OF DAILY LIVING (ADL)
ADLS_ACUITY_SCORE: 74
ADLS_ACUITY_SCORE: 68
ADLS_ACUITY_SCORE: 74
ORAL_HYGIENE: PROMPTS
ADLS_ACUITY_SCORE: 68
HYGIENE/GROOMING: PROMPTS;WITH ASSISTANCE
ADLS_ACUITY_SCORE: 68
LAUNDRY: UNABLE TO COMPLETE
ADLS_ACUITY_SCORE: 74
DRESS: SCRUBS (BEHAVIORAL HEALTH)
DRESS: SCRUBS (BEHAVIORAL HEALTH);INDEPENDENT
HYGIENE/GROOMING: PROMPTS
ADLS_ACUITY_SCORE: 74
ORAL_HYGIENE: PROMPTS

## 2023-07-02 NOTE — PLAN OF CARE
"Problem: Adult Behavioral Health Plan of Care  Goal: Plan of Care Review  Flowsheets  Taken 7/2/2023 1718  Plan of Care Reviewed With: patient  Overall Patient Progress: no change  Patient Agreement with Plan of Care: agrees  Taken 7/2/2023 1646  Patient Agreement with Plan of Care: agrees     Patient was on SIO 5 feet for self-injury and suicide risk. She has a medical bed to help her in mobility. Pt has a flat and blunted affect but smiled upon approach. She requested staff to hold her hands and hug her many times this shift. She appeared restless this evening. Getting up and down from her bed. Walked several times in the hallway. Stated she's depressed and wanted to go to [facility]. She endorsed auditory hallucinations. Writer asked what were the voices telling her and pt said, \" to do good,\" and pt smiled. Scheduled medications were given early. Pt forcefully vomited 3x by putting her fingers inside her mouth after giving medications. She may have vomited some of her medications. Redirected pt however she said, \" I can't help it.\" Vital signs WDL. Writer gave her PRN Compazine 5 mg at 2002 and let her stay at the lounge for a while. She still vomited another time while at the lounge. Pt did not have a bowel movement this shift. Scheduled laxative was given at HS. Intake=25%, 360 ml fluids. Unable to track how many voids this shift.   "

## 2023-07-02 NOTE — PLAN OF CARE
"Bhavana continues on SIO 1:1 staff observation for suicide/self injury risk.     She presents with a blunted affect, appears intermittently restless and highly anxious. Labile at times-demanded I her room on a couple of occasions, but later keeping me out frequently and wanting to hold my hand and called me \"a wonderful woman\". SIO reports pt has been in and out of bed frequently. She walks short distances in the halls, then turns around and wants to go back to her room. She did not attend groups and is social withdrawn from her peers.   When assessed for her mood, she states \"I am not well, I have depression\", \"I am crazy, I just want to die.\" She reports feeling \"nervous\". Thought process is ruminative.  She denies active suicide plan or intent. She was only partially cooperative with further nursing assessment, providing responses that were irrelevant.   Urine specimen collected for UA (ordered 6/29/23).  She initially stated she \"would not\" provide a urine specimen, but did cooperate with encouragement. Results unremarkable.     For breakfast, pt ate about 25% of her tray. She received her scheduled medications including Miralax in orange juice. She then promptly put 2 fingers down her throat to self induce vomiting and had 2 small emesis which appeared to be mostly orange juice. She insisted on going back to bed to lie down, despite encouragement to remain upright.   She ate about 25% of lunch and returned immediately to a resting position in bed. No emesis noted this afternoon.     No BM reported this shift.   Pt did not endorse pain this shift.   Pt is approved for use of a standard medical bed due to mobility and digestive issues.     BP (!) 160/95   Pulse 85   Temp 97.5  F (36.4  C) (Temporal)   Resp 18   Ht 1.626 m (5' 4\")   Wt 50.6 kg (111 lb 8.8 oz)   LMP  (LMP Unknown)   SpO2 99%   BMI 19.15 kg/m     Unable to obtain standing BP due to restlessness.     1300-Pt reported feeling \"too nervous\" to " rest. She inquired about medication to help. PRN Zyprexa administered.     Intake:  Breakfast-25%, 300ml fluid  Lunch- 25%, 60ml fluid  She has been voiding some measured urine, incontinent of small amounts of urine in her briefs.       Problem: Adult Behavioral Health Plan of Care  Goal: Plan of Care Review  Outcome: Progressing  Flowsheets (Taken 7/2/2023 3607)  Patient Agreement with Plan of Care: refuses to participate     Problem: Anxiety Signs/Symptoms  Goal: Improved Mood Symptoms (Anxiety Signs/Symptoms)  Outcome: Progressing     Problem: Bowel Elimination Management  Goal: Effective Bowel Elimination/Continence  Outcome: Progressing   Goal Outcome Evaluation:    Plan of Care Reviewed With: patient

## 2023-07-02 NOTE — PLAN OF CARE
Problem: Sleep Disturbance  Goal: Adequate Sleep/Rest  Outcome: Not Progressing     Problem: Anxiety Signs/Symptoms  Goal: Improved Sleep (Anxiety Signs/Symptoms)  Outcome: Not Progressing   Goal Outcome Evaluation:         Pt continues on SIO for self injury risk. Pt on a medical bed to aid with mobility and head of the bed control. Pt had an episode of anxiety,and agitation. Reported haring voices telling her to keep quiet. PRN Zyprexa was administered for increased agitation. Medication was effective. PRN Tylenol was also administered for right arm pain rated at 5/10 which was effective. Pt took about 50ml of water, voided x4. Patient slept a total of 2 hours.

## 2023-07-02 NOTE — PLAN OF CARE
Problem: Anxiety Signs/Symptoms  Goal: Optimized Energy Level (Anxiety Signs/Symptoms)  Intervention: Optimize Energy Level  Flowsheets  Taken 7/1/2023 2245  Activity (Behavioral Health):   activity adjusted per tolerance   up ad devan   sitting, edge of bed  Taken 7/1/2023 175  Patient Performed Hygiene: dressed  Diversional Activity: (walking once in a while) other (see comments)  Activity (Behavioral Health): activity adjusted per tolerance    Patient was anxious at this time, stated she can't breathe. Vital signs were taken and within normal range. RR=18. Pls check flow sheet. She wanted her nurse and when writer went to check on her, she was already sleeping. Appeared comfortable in bed. No signs of pain nor discomfort.

## 2023-07-03 LAB
ALBUMIN SERPL BCG-MCNC: 3.3 G/DL (ref 3.5–5.2)
ALP SERPL-CCNC: 45 U/L (ref 35–104)
ALT SERPL W P-5'-P-CCNC: 12 U/L (ref 0–50)
ANION GAP SERPL CALCULATED.3IONS-SCNC: 9 MMOL/L (ref 7–15)
AST SERPL W P-5'-P-CCNC: 15 U/L (ref 0–45)
BASOPHILS # BLD AUTO: 0 10E3/UL (ref 0–0.2)
BASOPHILS NFR BLD AUTO: 0 %
BILIRUB SERPL-MCNC: 0.4 MG/DL
BUN SERPL-MCNC: 6 MG/DL (ref 8–23)
CALCIUM SERPL-MCNC: 9.2 MG/DL (ref 8.8–10.2)
CHLORIDE SERPL-SCNC: 103 MMOL/L (ref 98–107)
CREAT SERPL-MCNC: 0.73 MG/DL (ref 0.51–0.95)
DEPRECATED HCO3 PLAS-SCNC: 27 MMOL/L (ref 22–29)
EOSINOPHIL # BLD AUTO: 0.1 10E3/UL (ref 0–0.7)
EOSINOPHIL NFR BLD AUTO: 1 %
ERYTHROCYTE [DISTWIDTH] IN BLOOD BY AUTOMATED COUNT: 13.7 % (ref 10–15)
GFR SERPL CREATININE-BSD FRML MDRD: 82 ML/MIN/1.73M2
GLUCOSE SERPL-MCNC: 92 MG/DL (ref 70–99)
HCT VFR BLD AUTO: 35.3 % (ref 35–47)
HGB BLD-MCNC: 11.6 G/DL (ref 11.7–15.7)
IMM GRANULOCYTES # BLD: 0 10E3/UL
IMM GRANULOCYTES NFR BLD: 0 %
LYMPHOCYTES # BLD AUTO: 1.4 10E3/UL (ref 0.8–5.3)
LYMPHOCYTES NFR BLD AUTO: 18 %
MAGNESIUM SERPL-MCNC: 2.1 MG/DL (ref 1.7–2.3)
MCH RBC QN AUTO: 31.1 PG (ref 26.5–33)
MCHC RBC AUTO-ENTMCNC: 32.9 G/DL (ref 31.5–36.5)
MCV RBC AUTO: 95 FL (ref 78–100)
MONOCYTES # BLD AUTO: 0.7 10E3/UL (ref 0–1.3)
MONOCYTES NFR BLD AUTO: 8 %
NEUTROPHILS # BLD AUTO: 6 10E3/UL (ref 1.6–8.3)
NEUTROPHILS NFR BLD AUTO: 73 %
NRBC # BLD AUTO: 0 10E3/UL
NRBC BLD AUTO-RTO: 0 /100
PHOSPHATE SERPL-MCNC: 3.7 MG/DL (ref 2.5–4.5)
PLATELET # BLD AUTO: 211 10E3/UL (ref 150–450)
POTASSIUM SERPL-SCNC: 4 MMOL/L (ref 3.4–5.3)
PROT SERPL-MCNC: 5.5 G/DL (ref 6.4–8.3)
RBC # BLD AUTO: 3.73 10E6/UL (ref 3.8–5.2)
SODIUM SERPL-SCNC: 139 MMOL/L (ref 136–145)
WBC # BLD AUTO: 8.2 10E3/UL (ref 4–11)

## 2023-07-03 PROCEDURE — 99231 SBSQ HOSP IP/OBS SF/LOW 25: CPT

## 2023-07-03 PROCEDURE — 250N000013 HC RX MED GY IP 250 OP 250 PS 637

## 2023-07-03 PROCEDURE — 99233 SBSQ HOSP IP/OBS HIGH 50: CPT | Performed by: PSYCHIATRY & NEUROLOGY

## 2023-07-03 PROCEDURE — 84100 ASSAY OF PHOSPHORUS: CPT

## 2023-07-03 PROCEDURE — 250N000011 HC RX IP 250 OP 636

## 2023-07-03 PROCEDURE — 83735 ASSAY OF MAGNESIUM: CPT

## 2023-07-03 PROCEDURE — 80053 COMPREHEN METABOLIC PANEL: CPT

## 2023-07-03 PROCEDURE — 250N000013 HC RX MED GY IP 250 OP 250 PS 637: Performed by: PSYCHIATRY & NEUROLOGY

## 2023-07-03 PROCEDURE — 36415 COLL VENOUS BLD VENIPUNCTURE: CPT

## 2023-07-03 PROCEDURE — 124N000003 HC R&B MH SENIOR/ADOLESCENT

## 2023-07-03 PROCEDURE — 250N000013 HC RX MED GY IP 250 OP 250 PS 637: Performed by: PHYSICIAN ASSISTANT

## 2023-07-03 PROCEDURE — 85025 COMPLETE CBC W/AUTO DIFF WBC: CPT

## 2023-07-03 RX ADMIN — PROCHLORPERAZINE MALEATE 5 MG: 5 TABLET ORAL at 10:44

## 2023-07-03 RX ADMIN — SENNOSIDES 8.6 MG: 8.6 TABLET ORAL at 08:59

## 2023-07-03 RX ADMIN — OLANZAPINE 10 MG: 10 TABLET, ORALLY DISINTEGRATING ORAL at 22:15

## 2023-07-03 RX ADMIN — PRAZOSIN HYDROCHLORIDE 1 MG: 1 CAPSULE ORAL at 22:15

## 2023-07-03 RX ADMIN — VALACYCLOVIR 500 MG: 500 TABLET, FILM COATED ORAL at 08:59

## 2023-07-03 RX ADMIN — Medication 2 TABLET: at 08:59

## 2023-07-03 RX ADMIN — VITAM B12 100 MCG: 100 TAB at 22:15

## 2023-07-03 RX ADMIN — OLANZAPINE 2.5 MG: 5 TABLET, ORALLY DISINTEGRATING ORAL at 08:59

## 2023-07-03 RX ADMIN — SIMVASTATIN 40 MG: 40 TABLET, FILM COATED ORAL at 22:15

## 2023-07-03 RX ADMIN — MEMANTINE 10 MG: 10 TABLET ORAL at 08:59

## 2023-07-03 RX ADMIN — POLYETHYLENE GLYCOL 3350 17 G: 17 POWDER, FOR SOLUTION ORAL at 08:59

## 2023-07-03 RX ADMIN — POLYETHYLENE GLYCOL 3350 17 G: 17 POWDER, FOR SOLUTION ORAL at 20:08

## 2023-07-03 RX ADMIN — MEMANTINE 10 MG: 10 TABLET ORAL at 20:08

## 2023-07-03 RX ADMIN — FLUOXETINE 30 MG: 20 SOLUTION ORAL at 09:00

## 2023-07-03 RX ADMIN — ONDANSETRON 4 MG: 4 TABLET ORAL at 12:19

## 2023-07-03 RX ADMIN — ONDANSETRON 4 MG: 4 TABLET ORAL at 16:13

## 2023-07-03 RX ADMIN — PANTOPRAZOLE SODIUM 40 MG: 40 TABLET, DELAYED RELEASE ORAL at 06:55

## 2023-07-03 RX ADMIN — Medication 500 MG: at 22:15

## 2023-07-03 RX ADMIN — SENNOSIDES 8.6 MG: 8.6 TABLET ORAL at 20:08

## 2023-07-03 RX ADMIN — ONDANSETRON 4 MG: 4 TABLET ORAL at 06:55

## 2023-07-03 RX ADMIN — Medication 6 MG: at 20:08

## 2023-07-03 RX ADMIN — MIRTAZAPINE 15 MG: 15 TABLET, ORALLY DISINTEGRATING ORAL at 22:15

## 2023-07-03 RX ADMIN — Medication 25 MCG: at 16:13

## 2023-07-03 ASSESSMENT — ACTIVITIES OF DAILY LIVING (ADL)
ADLS_ACUITY_SCORE: 88
ADLS_ACUITY_SCORE: 84
ORAL_HYGIENE: PROMPTS
ADLS_ACUITY_SCORE: 84
ADLS_ACUITY_SCORE: 88
ADLS_ACUITY_SCORE: 74
ORAL_HYGIENE: PROMPTS
HYGIENE/GROOMING: PROMPTS;WITH ASSISTANCE
ADLS_ACUITY_SCORE: 74
ADLS_ACUITY_SCORE: 74
HYGIENE/GROOMING: WITH ASSISTANCE;PROMPTS
ADLS_ACUITY_SCORE: 84
DRESS: SCRUBS (BEHAVIORAL HEALTH);WITH ASSISTANCE
ADLS_ACUITY_SCORE: 74
ADLS_ACUITY_SCORE: 84
DRESS: PROMPTS;WITH ASSISTANCE

## 2023-07-03 NOTE — PROGRESS NOTES
"Brief Medicine Progress Note  Monday, July 3, 2023    S&O:  Following peripherally for increased stool burden on CT from 6/20 and need for MRI to eval incidental spleen mass finding on CT.    Recent notes, vitals, and labs reviewed.   Pt having stools several times per day, recorded as loose. There are several reports of self-induced vomiting after eating or taking pills. Pt states she \"can't help it\".     VSS. BP (!) 161/77   Pulse 66   Temp 97.4  F (36.3  C) (Temporal)   Resp 16   Ht 1.626 m (5' 4\")   Wt 50.6 kg (111 lb 8.8 oz)   LMP  (LMP Unknown)   SpO2 100%   BMI 19.15 kg/m    Labs drawn 7/3 demonstrate normal electrolytes, stable anemia, and are otherwise WNL.     A&P:  Bhaavna Marr is an 83yo female who is admitted to inpatient psych for possible SI in setting of worsening depression w/ psychotic features. Medicine is currently following pt for constipation/ increased stool burden noted on CT on 6/20/23.    #Constipation, high stool burden on CT 6/20/23  #Incidental spleen & liver masses, CT 6/20/23  Based on nursing reports of pt bowel movements recently, appears pt is ready for abdominal MRI per radiology rec. If can complete this admission, it would be ideal.   - MRI abdomen ordered to eval spleen and liver per CT report recs from radiologist  - if patient not able to be still for exam 2/2 mental status, will inform PCP to obtain outpaitent    #Self-induced vomiting  Given pt loose stools and self- induced vomiting, wanted to eval for electrolyte abnormalities 2/2 GI losses. Labs from 7/3 demonstrate normal sodium, potassium, magnesium, & phosphate.  - continue to redirect pt when demonstrating this behavior  - continue labs Q3days, can order sooner if GI losses are marked    Medicine will continue to follow peripherally regarding MRI scan inpatient vs outpatient.     Please contact medicine with additional questions or concerns regarding patient care.     Jeet Mcguire PA-C  [facility] Hospitalist " Service  Page via [facility] or [employer]

## 2023-07-03 NOTE — PROVIDER NOTIFICATION
"   07/03/23 1034   Sitting Orthostatic BP   Sitting Orthostatic /69   Sitting Orthostatic Pulse 75 bpm   Standing Orthostatic BP   Standing Orthostatic /65   Standing Orthostatic Pulse 77 bpm     Pt c/o dizziness, \"all morning.\"   VS as above.   "

## 2023-07-03 NOTE — PROGRESS NOTES
"[facility], [place]   Psychiatric Progress Note        Interim History:   The patient's care was discussed with the treatment team during the daily team meeting and/or staff's chart notes were reviewed.  Staff report patient did have several episodes of \"induced vomiting\" yesterday. None so far today. Has been better. Slept better.     The patient reports that she is \"all right.\" Mood is anxious. Does endorse SI and feels \"kind of\" safe in the hospital. No plans or urges to harm herself. Denies HI. Does endorse AH telling her to \"be good.\" Says that she sees [facility] of \"people.\" Initially reports good sleep but then says that she didn't sleep well. Eating well.          Medications:       calcium carbonate  500 mg Oral At Bedtime     childrens multivitamin with iron  2 tablet Oral Daily     cyanocobalamin  100 mcg Oral At Bedtime     FLUoxetine  30 mg Oral Daily     melatonin  6 mg Oral QPM     memantine  10 mg Oral BID     mirtazapine  15 mg Orally disintegrating tablet At Bedtime     OLANZapine zydis  2.5 mg Oral QAM     OLANZapine zydis  10 mg Oral At Bedtime     ondansetron  4 mg Oral TID AC     pantoprazole  40 mg Oral QAM AC     polyethylene glycol  17 g Oral BID     prazosin  1 mg Oral At Bedtime     sennosides  8.6 mg Oral BID     simvastatin  40 mg Oral At Bedtime     valACYclovir  500 mg Oral Daily     cholecalciferol  25 mcg Oral Daily with supper          Allergies:     Allergies   Allergen Reactions     Nsaids      Gastric bypass surgery            Labs:     Recent Results (from the past 24 hour(s))   Comprehensive metabolic panel    Collection Time: 07/03/23  7:11 AM   Result Value Ref Range    Sodium 139 136 - 145 mmol/L    Potassium 4.0 3.4 - 5.3 mmol/L    Chloride 103 98 - 107 mmol/L    Carbon Dioxide (CO2) 27 22 - 29 mmol/L    Anion Gap 9 7 - 15 mmol/L    Urea Nitrogen 6.0 (L) 8.0 - 23.0 mg/dL    Creatinine 0.73 0.51 - 0.95 mg/dL    Calcium 9.2 8.8 - 10.2 mg/dL    Glucose 92 " "70 - 99 mg/dL    Alkaline Phosphatase 45 35 - 104 U/L    AST 15 0 - 45 U/L    ALT 12 0 - 50 U/L    Protein Total 5.5 (L) 6.4 - 8.3 g/dL    Albumin 3.3 (L) 3.5 - 5.2 g/dL    Bilirubin Total 0.4 <=1.2 mg/dL    GFR Estimate 82 >60 mL/min/1.73m2   Magnesium    Collection Time: 07/03/23  7:11 AM   Result Value Ref Range    Magnesium 2.1 1.7 - 2.3 mg/dL   Phosphorus    Collection Time: 07/03/23  7:11 AM   Result Value Ref Range    Phosphorus 3.7 2.5 - 4.5 mg/dL   CBC with platelets and differential    Collection Time: 07/03/23  7:11 AM   Result Value Ref Range    WBC Count 8.2 4.0 - 11.0 10e3/uL    RBC Count 3.73 (L) 3.80 - 5.20 10e6/uL    Hemoglobin 11.6 (L) 11.7 - 15.7 g/dL    Hematocrit 35.3 35.0 - 47.0 %    MCV 95 78 - 100 fL    MCH 31.1 26.5 - 33.0 pg    MCHC 32.9 31.5 - 36.5 g/dL    RDW 13.7 10.0 - 15.0 %    Platelet Count 211 150 - 450 10e3/uL    % Neutrophils 73 %    % Lymphocytes 18 %    % Monocytes 8 %    % Eosinophils 1 %    % Basophils 0 %    % Immature Granulocytes 0 %    NRBCs per 100 WBC 0 <1 /100    Absolute Neutrophils 6.0 1.6 - 8.3 10e3/uL    Absolute Lymphocytes 1.4 0.8 - 5.3 10e3/uL    Absolute Monocytes 0.7 0.0 - 1.3 10e3/uL    Absolute Eosinophils 0.1 0.0 - 0.7 10e3/uL    Absolute Basophils 0.0 0.0 - 0.2 10e3/uL    Absolute Immature Granulocytes 0.0 <=0.4 10e3/uL    Absolute NRBCs 0.0 10e3/uL          Psychiatric Examination:     BP (!) 161/77   Pulse 66   Temp 97.4  F (36.3  C) (Temporal)   Resp 16   Ht 1.626 m (5' 4\")   Wt 50.6 kg (111 lb 8.8 oz)   LMP  (LMP Unknown)   SpO2 100%   BMI 19.15 kg/m    Weight is 111 lbs 8.84 oz  Body mass index is 19.15 kg/m .  Orthostatic Vitals       Most Recent      Sitting Orthostatic /69 07/03 1034    Sitting Orthostatic Pulse (bpm) 75 07/03 1034    Standing Orthostatic /65 07/03 1034    Standing Orthostatic Pulse (bpm) 77 07/03 1034            Appearance: awake, alert and adequately groomed  Attitude:  cooperative  Eye Contact:  good  Mood:  " anxious  Affect:  mood congruent  Speech:  clear, coherent  Psychomotor Behavior:  no evidence of tardive dyskinesia, dystonia, or tics  Thought Process:  illogical  Associations:  no loose associations  Thought Content:  passive suicidal ideation present and auditory hallucinations present  Insight:  partial  Judgement:  limited  Oriented to:  time, person, and place  Attention Span and Concentration:  fair  Recent and Remote Memory:  poor         Precautions:     Behavioral Orders   Procedures     Code 1 - Restrict to Unit     Code 2     6/30/23 X-ray     Fall precautions     Routine Programming     As clinically indicated     Status 15     Every 15 minutes.     Status Individual Observation     Patient SIO status reviewed with team/RN.  Please also refer to RN/team documentation for add'l detail.    -SIO staff to monitor following which have contributed to patient being on SIO:  Unsafe behavior/self inducing vomiting and statements of wanting to die  -Possible interventions SIO staff could use to support patient's treatment progress: monitor for unsafe behaviors and redirect if she attempts to induce vomiting  -When following observed, team will review discontinuation of SIO:  Pt no longer at risk for self harm     Order Specific Question:   CONTINUOUS 24 hours / day     Answer:   5 feet     Order Specific Question:   Indications for SIO     Answer:   Suicide risk     Order Specific Question:   Indications for SIO     Answer:   Self-injury risk          Diagnoses:     MDD, severe, recurrent episode, with psychotic features  R/O Bipolar Disorder Type 1    Clinically Significant Risk Factors              # Hypoalbuminemia: Lowest albumin = 3.1 g/dL at 6/30/2023  8:00 AM, will monitor as appropriate             # Severe Malnutrition: based on nutrition assessment                 Plan:     1) Continue Zyprexa. Level therapeutic at 35.   2) Continue Remeron and Prozac.   3) Continue Prazosin.   4) Continue Namenda.    5) Continue SIO due to multiple episodes of self-induced emesis yesterday.   6) Patient under commitment.       Disposition Plan   Reason for ongoing admission: poses an imminent risk to self  Discharge location: TBD  Discharge Medications: not ordered  Follow-up Appointments: not scheduled  Legal Status: full commitment    Entered by: David Ness MD on July 3, 2023 at 11:23 AM

## 2023-07-03 NOTE — PLAN OF CARE
Problem: Sleep Disturbance  Goal: Adequate Sleep/Rest  Outcome: Progressing   Goal Outcome Evaluation:       Patient is sleeping at the start of the shift. She is using a medical bed to facilitate her mobility. Patient remains on SIO r/t self-injury risk. Patient had loose stools x 2. She also voided x 2. Patient walked in the hallway a few times using her WW. She slept a total of 5.5 hours the whole shift.      Intake;0    Output- 700 ml clear yellow urine.

## 2023-07-03 NOTE — PLAN OF CARE
Assessment/Intervention/Current Symptoms and Care Coordination  - chart review  - team meeting  - team rounds/pt interview addressed patient needs/concerns  - Medication adjustment continues as pt remains symptomatic and medically compromised.     CTC took call from patient's TCM Rema today [phone].  Rema has attempted to visit patient twice on the unit but due to patient not feeling well, has been unable to complete application for services that is needed. She requested our guidance/assistance with this.  It was decided that an afternoon visit may be best for patient and that patient may participate from bed in her room if TCM is able to bring a laptop, as patient has SIO staffing at this time.  Rema indicates she can visit on Wednesdays and is considering 7/5 or 7/12.  She will confirm.       Discharge Plan or Goal  Pending stabilization & development of a safe discharge plan.     Considerations include:  [facility] ([facility] has been holding patient's bed since 5/5, when patient first went into the hospital.  They will be unable to hold the bed for very much longer but does want patient to be able to return when stable if they can still meet her needs.  She will contact us to let us know once the bed has been let go.  We would then need to re-refer when patient is stable.      Barriers to Discharge   Patient requires further psychiatric stabilization due to current symptomology     Referral Status   None     Legal Status  Patient is under MI commitment in [facility]  Pt will need PD upon discharge.

## 2023-07-03 NOTE — PLAN OF CARE
"Bhavana continues on SIO 1:1 staff observation for suicide/self injury risk.   Bhavana continues to be mostly isolative and resting in bed for majority of the shift.   She presents with a blunted affect. She appears less tense with less ruminative thoughts than yesterday. She reports that she slept better last night and is less restless today. Pt used humor appropriately, stating \"I could sure use a million dollars to buy a condo and a hot verónica.\"  She did not attend groups and is social withdrawn from her peers. She is talkative with staff and especially her SIO attendant.   She describes her mood as \"not feeling well\" and she describes \"I feel like I need to throw up.\" She continues to make occasional passive comments about wishing to be dead.   She denies active suicide plan or intent. No psychotic sx noted, however she endorsed AH to provider.     PRN Compazine given for c/o nausea.      Pt did not endorse pain this shift.   Pt is approved for use of a standard medical bed due to mobility and digestive issues.      Med compliant.      Intake:  Breakfast-50%, 120ml  Lunch- 75%, 100ml fluid  She has been voiding freely, urine unmeasured  No emesis reported this shift.   She continues on scheduled bowel meds with order NOT to hold for loose stools, which were reported from night shift.   No reported BM this shift.     Problem: Adult Behavioral Health Plan of Care  Goal: Plan of Care Review  Outcome: Progressing  Flowsheets (Taken 7/3/2023 0900)  Patient Agreement with Plan of Care: agrees     Problem: Anxiety Signs/Symptoms  Goal: Improved Mood Symptoms (Anxiety Signs/Symptoms)  Outcome: Progressing   Goal Outcome Evaluation:    Plan of Care Reviewed With: patient                   "

## 2023-07-04 PROCEDURE — 93005 ELECTROCARDIOGRAM TRACING: CPT

## 2023-07-04 PROCEDURE — 250N000013 HC RX MED GY IP 250 OP 250 PS 637

## 2023-07-04 PROCEDURE — 250N000013 HC RX MED GY IP 250 OP 250 PS 637: Performed by: PSYCHIATRY & NEUROLOGY

## 2023-07-04 PROCEDURE — 250N000013 HC RX MED GY IP 250 OP 250 PS 637: Performed by: PHYSICIAN ASSISTANT

## 2023-07-04 PROCEDURE — 250N000011 HC RX IP 250 OP 636

## 2023-07-04 PROCEDURE — 124N000003 HC R&B MH SENIOR/ADOLESCENT

## 2023-07-04 PROCEDURE — 93010 ELECTROCARDIOGRAM REPORT: CPT | Performed by: INTERNAL MEDICINE

## 2023-07-04 RX ADMIN — Medication 6 MG: at 21:32

## 2023-07-04 RX ADMIN — OLANZAPINE 2.5 MG: 5 TABLET, ORALLY DISINTEGRATING ORAL at 09:20

## 2023-07-04 RX ADMIN — MEMANTINE 10 MG: 10 TABLET ORAL at 21:34

## 2023-07-04 RX ADMIN — ONDANSETRON 4 MG: 4 TABLET ORAL at 11:52

## 2023-07-04 RX ADMIN — VALACYCLOVIR 500 MG: 500 TABLET, FILM COATED ORAL at 09:21

## 2023-07-04 RX ADMIN — ACETAMINOPHEN 650 MG: 325 TABLET, FILM COATED ORAL at 15:59

## 2023-07-04 RX ADMIN — FLUOXETINE 30 MG: 20 SOLUTION ORAL at 09:21

## 2023-07-04 RX ADMIN — ONDANSETRON 4 MG: 4 TABLET ORAL at 16:27

## 2023-07-04 RX ADMIN — OLANZAPINE 5 MG: 5 TABLET, ORALLY DISINTEGRATING ORAL at 16:28

## 2023-07-04 RX ADMIN — PROCHLORPERAZINE MALEATE 5 MG: 5 TABLET ORAL at 15:59

## 2023-07-04 RX ADMIN — VITAM B12 100 MCG: 100 TAB at 21:35

## 2023-07-04 RX ADMIN — OLANZAPINE 10 MG: 10 TABLET, ORALLY DISINTEGRATING ORAL at 21:33

## 2023-07-04 RX ADMIN — ONDANSETRON 4 MG: 4 TABLET ORAL at 06:51

## 2023-07-04 RX ADMIN — MIRTAZAPINE 15 MG: 15 TABLET, ORALLY DISINTEGRATING ORAL at 21:34

## 2023-07-04 RX ADMIN — Medication 2 TABLET: at 09:20

## 2023-07-04 RX ADMIN — Medication 25 MCG: at 16:27

## 2023-07-04 RX ADMIN — Medication 500 MG: at 21:34

## 2023-07-04 RX ADMIN — POLYETHYLENE GLYCOL 3350 17 G: 17 POWDER, FOR SOLUTION ORAL at 09:25

## 2023-07-04 RX ADMIN — PANTOPRAZOLE SODIUM 40 MG: 40 TABLET, DELAYED RELEASE ORAL at 06:51

## 2023-07-04 RX ADMIN — SIMVASTATIN 40 MG: 40 TABLET, FILM COATED ORAL at 21:33

## 2023-07-04 RX ADMIN — CALCIUM CARBONATE (ANTACID) CHEW TAB 500 MG 500 MG: 500 CHEW TAB at 15:59

## 2023-07-04 RX ADMIN — SENNOSIDES 8.6 MG: 8.6 TABLET ORAL at 09:25

## 2023-07-04 RX ADMIN — MEMANTINE 10 MG: 10 TABLET ORAL at 09:23

## 2023-07-04 ASSESSMENT — ACTIVITIES OF DAILY LIVING (ADL)
DRESS: WITH ASSISTANCE;SCRUBS (BEHAVIORAL HEALTH)
ADLS_ACUITY_SCORE: 88
ORAL_HYGIENE: PROMPTS
ADLS_ACUITY_SCORE: 88
HYGIENE/GROOMING: WITH ASSISTANCE
LAUNDRY: UNABLE TO COMPLETE
ADLS_ACUITY_SCORE: 88

## 2023-07-04 NOTE — PLAN OF CARE
Problem: Anxiety Signs/Symptoms  Goal: Optimized Energy Level (Anxiety Signs/Symptoms)  Outcome: Progressing     Problem: Anxiety Signs/Symptoms  Goal: Improved Mood Symptoms (Anxiety Signs/Symptoms)  Outcome: Progressing   Goal Outcome Evaluation:    Plan of Care Reviewed With: patient      The Pt had a fluctuating mood; she was calm, restless, and hostile towards staff, especially during pericare. Pt had one XL loose stool once, voided x1, and declined a shower when the team offered one. She shouted at staff, telling them to leave her alone and, at the same time, grabbing their hands roughly and pinching them. Pt remained isolated in her room but came out for supper. She ate supper in the lounge and stayed there for at least 30 minutes. She had no emesis. Pt denied SI/HI, AVHs, and pain but endorsed anxiety and depression. She ate 75% of her supper. She did not attend the evening group activity. Pt took her routine medications, and she did not report any adverse reactions.

## 2023-07-04 NOTE — PLAN OF CARE
Problem: Depressive Signs/Symptoms  Goal: Improved Sleep (Depressive Signs/Symptoms)  Outcome: Not Progressing   Goal Outcome Evaluation:  Pt withdrawn and Isolative all shift stayed in bed refusing to come out for her meals . Lunched was served in her room she ate and throw-up with food content. Pt also had a large loose stool . Pt admits she is depressed and Anxious but denies SI/SIB and hallucination . Pt continue on SIO .

## 2023-07-04 NOTE — PLAN OF CARE
Problem: Sleep Disturbance  Goal: Adequate Sleep/Rest  Outcome: Progressing   Goal Outcome Evaluation:       Patient is sleeping soundly at the start of the shift. She is on SIO r/t self-injury risk. She also is using a medical bed to facilitate her mobility. Patient is independent in repositioning. No complaints made. She had an uninterrupted sleep throughout the shift for 10 hours.

## 2023-07-05 ENCOUNTER — APPOINTMENT (OUTPATIENT)
Dept: MRI IMAGING | Facility: CLINIC | Age: 83
DRG: 885 | End: 2023-07-05
Payer: COMMERCIAL

## 2023-07-05 LAB
ATRIAL RATE - MUSE: 61 BPM
DIASTOLIC BLOOD PRESSURE - MUSE: NORMAL MMHG
INTERPRETATION ECG - MUSE: NORMAL
P AXIS - MUSE: 41 DEGREES
PR INTERVAL - MUSE: 166 MS
QRS DURATION - MUSE: 130 MS
QT - MUSE: 462 MS
QTC - MUSE: 465 MS
R AXIS - MUSE: -54 DEGREES
SYSTOLIC BLOOD PRESSURE - MUSE: NORMAL MMHG
T AXIS - MUSE: 48 DEGREES
VENTRICULAR RATE- MUSE: 61 BPM

## 2023-07-05 PROCEDURE — 250N000013 HC RX MED GY IP 250 OP 250 PS 637

## 2023-07-05 PROCEDURE — 99232 SBSQ HOSP IP/OBS MODERATE 35: CPT

## 2023-07-05 PROCEDURE — 250N000013 HC RX MED GY IP 250 OP 250 PS 637: Performed by: PHYSICIAN ASSISTANT

## 2023-07-05 PROCEDURE — 255N000002 HC RX 255 OP 636: Performed by: PSYCHIATRY & NEUROLOGY

## 2023-07-05 PROCEDURE — 250N000013 HC RX MED GY IP 250 OP 250 PS 637: Performed by: PSYCHIATRY & NEUROLOGY

## 2023-07-05 PROCEDURE — 74183 MRI ABD W/O CNTR FLWD CNTR: CPT | Mod: 26 | Performed by: STUDENT IN AN ORGANIZED HEALTH CARE EDUCATION/TRAINING PROGRAM

## 2023-07-05 PROCEDURE — A9585 GADOBUTROL INJECTION: HCPCS | Performed by: PSYCHIATRY & NEUROLOGY

## 2023-07-05 PROCEDURE — 124N000003 HC R&B MH SENIOR/ADOLESCENT

## 2023-07-05 PROCEDURE — 74183 MRI ABD W/O CNTR FLWD CNTR: CPT

## 2023-07-05 PROCEDURE — 250N000011 HC RX IP 250 OP 636

## 2023-07-05 RX ORDER — GABAPENTIN 100 MG/1
100 CAPSULE ORAL DAILY
Status: DISCONTINUED | OUTPATIENT
Start: 2023-07-05 | End: 2023-07-07

## 2023-07-05 RX ORDER — GADOBUTROL 604.72 MG/ML
5.06 INJECTION INTRAVENOUS ONCE
Status: COMPLETED | OUTPATIENT
Start: 2023-07-05 | End: 2023-07-05

## 2023-07-05 RX ORDER — FLUOXETINE 20 MG/5ML
40 SOLUTION ORAL DAILY
Status: DISCONTINUED | OUTPATIENT
Start: 2023-07-06 | End: 2023-07-11

## 2023-07-05 RX ADMIN — OLANZAPINE 2.5 MG: 5 TABLET, ORALLY DISINTEGRATING ORAL at 07:58

## 2023-07-05 RX ADMIN — SIMVASTATIN 40 MG: 40 TABLET, FILM COATED ORAL at 20:53

## 2023-07-05 RX ADMIN — PRAZOSIN HYDROCHLORIDE 1 MG: 1 CAPSULE ORAL at 20:52

## 2023-07-05 RX ADMIN — Medication 25 MCG: at 16:09

## 2023-07-05 RX ADMIN — Medication 2 TABLET: at 07:57

## 2023-07-05 RX ADMIN — ONDANSETRON 4 MG: 4 TABLET ORAL at 12:15

## 2023-07-05 RX ADMIN — GADOBUTROL 5.06 ML: 604.72 INJECTION INTRAVENOUS at 20:17

## 2023-07-05 RX ADMIN — ONDANSETRON 4 MG: 4 TABLET ORAL at 16:09

## 2023-07-05 RX ADMIN — Medication 12.5 MG: at 23:51

## 2023-07-05 RX ADMIN — GABAPENTIN 100 MG: 100 CAPSULE ORAL at 16:09

## 2023-07-05 RX ADMIN — PANTOPRAZOLE SODIUM 40 MG: 40 TABLET, DELAYED RELEASE ORAL at 06:47

## 2023-07-05 RX ADMIN — Medication 6 MG: at 20:54

## 2023-07-05 RX ADMIN — ONDANSETRON 4 MG: 4 TABLET ORAL at 06:47

## 2023-07-05 RX ADMIN — MEMANTINE 10 MG: 10 TABLET ORAL at 07:57

## 2023-07-05 RX ADMIN — SENNOSIDES 8.6 MG: 8.6 TABLET ORAL at 20:55

## 2023-07-05 RX ADMIN — FLUOXETINE 30 MG: 20 SOLUTION ORAL at 07:58

## 2023-07-05 RX ADMIN — Medication 500 MG: at 20:50

## 2023-07-05 RX ADMIN — MIRTAZAPINE 15 MG: 15 TABLET, ORALLY DISINTEGRATING ORAL at 20:54

## 2023-07-05 RX ADMIN — POLYETHYLENE GLYCOL 3350 17 G: 17 POWDER, FOR SOLUTION ORAL at 20:50

## 2023-07-05 RX ADMIN — SENNOSIDES 8.6 MG: 8.6 TABLET ORAL at 07:57

## 2023-07-05 RX ADMIN — VITAM B12 100 MCG: 100 TAB at 20:53

## 2023-07-05 RX ADMIN — OLANZAPINE 10 MG: 10 TABLET, ORALLY DISINTEGRATING ORAL at 20:52

## 2023-07-05 RX ADMIN — VALACYCLOVIR 500 MG: 500 TABLET, FILM COATED ORAL at 07:57

## 2023-07-05 RX ADMIN — POLYETHYLENE GLYCOL 3350 17 G: 17 POWDER, FOR SOLUTION ORAL at 07:57

## 2023-07-05 RX ADMIN — MEMANTINE 10 MG: 10 TABLET ORAL at 20:53

## 2023-07-05 ASSESSMENT — ACTIVITIES OF DAILY LIVING (ADL)
ADLS_ACUITY_SCORE: 88
DRESS: WITH ASSISTANCE
ORAL_HYGIENE: INDEPENDENT
HYGIENE/GROOMING: WITH ASSISTANCE
LAUNDRY: UNABLE TO COMPLETE
ADLS_ACUITY_SCORE: 88
ADLS_ACUITY_SCORE: 74
ADLS_ACUITY_SCORE: 88
ADLS_ACUITY_SCORE: 88
ADLS_ACUITY_SCORE: 74
ORAL_HYGIENE: INDEPENDENT;PROMPTS
LAUNDRY: UNABLE TO COMPLETE
DRESS: WITH ASSISTANCE
ADLS_ACUITY_SCORE: 88
ADLS_ACUITY_SCORE: 88
ADLS_ACUITY_SCORE: 74
HYGIENE/GROOMING: INDEPENDENT

## 2023-07-05 NOTE — PROGRESS NOTES
Brief Medicine Progress Note  July 5, 2023     Following peripherally for completion of MRI abdomen per radiology recommendation.     Spoke with MRI today, pt likely to get MRI sometime later this evening or tomorrow. If pt is to discharge before then, will contact MRI and cancel order. Will provide pt with outpatient follow up instructions to obtain MRI if needed.     Medicine will continue to follow along.  Recommendations relayed to primary team via this progress note.  Thank you for the opportunity to be involved in this patient's care.       Jeet Mcguire PA-C  [facility] Hospitalist Service  Page via [facility] or [employer]

## 2023-07-05 NOTE — PLAN OF CARE
"  Problem: Depressive Signs/Symptoms  Goal: Increased Participation and Engagement (Depressive Signs/Symptoms)  Outcome: Not Progressing  Goal: Improved Mood Symptoms (Depressive Signs/Symptoms)  Outcome: Not Progressing     Problem: Depressive Signs/Symptoms  Goal: Optimized Energy Level (Depressive Signs/Symptoms)  Outcome: Progressing  Intervention: Optimize Energy Level  Recent Flowsheet Documentation  Taken 7/5/2023 1014 by Negrito Cruz, RN  Activity (Behavioral Health): activity adjusted per tolerance   Goal Outcome Evaluation:    Plan of Care Reviewed With: patient      Patient is calm and medication compliant, affect is flat. Patient appears hopeless, wishes to be dead denies active plans. Endorses depression and anxiety, denies AVH. Patient observed by SIO staff scratching right arm in attempt to harm self per patient report. Finger nails trimmed SIO order updated to reflect SIB. Patient will have MRI of abdomen d/t \"incidental spleen and liver masses on CT\". Patient MRI checklist is completed, will need IV access prior to going down. Per MRI team, no scheduled time yet, will notify unit potentially this afternoon or tomorrow.    Appetite is fair ate 75% of breakfast, reports hardly eating and lunch. Meal ticket not documented on and saved.  Per report patient voided at least x3 unmeasured and no BM this shift    /85 this shift, rechecked later 142/75, pulse 69.  BP (!) 142/75   Pulse 69   Temp 98.5  F (36.9  C) (Temporal)   Resp 16   Ht 1.626 m (5' 4\")   Wt 50.6 kg (111 lb 8.8 oz)   LMP  (LMP Unknown)   SpO2 100%   BMI 19.15 kg/m      "

## 2023-07-05 NOTE — PLAN OF CARE
"  Problem: Sleep Disturbance  Goal: Adequate Sleep/Rest  Outcome: Progressing  Intervention: Promote Sleep/Rest  Recent Flowsheet Documentation  Taken 7/4/2023 1803 by Tiana Becerra RN  Sleep/Rest Enhancement:    regular sleep/rest pattern promoted    relaxation techniques promoted   Goal Outcome Evaluation:    Plan of Care Reviewed With: patient      Patient is sound asleep at the start of the shift. She is using a medical bed for mobility. She also on SIO r/t self-injury risk. Patient slept on and off. Patient kept checking the clock and then she said \"I am late. I could not go to work now\". Patient tried to dig her anal area with her left fingers and smelt them. She was redirected. Fingers on her left hand cleaned up. No other behaviors observed. She slept a total of 7.75 hours.                  "

## 2023-07-05 NOTE — PLAN OF CARE
Problem: Depressive Signs/Symptoms  Goal: Optimized Energy Level (Depressive Signs/Symptoms)  7/4/2023 2207 by Tiana Becerra RN  Outcome: Not Progressing  7/4/2023 2203 by Tiana Becerra RN  Outcome: Not Progressing  Intervention: Optimize Energy Level  Recent Flowsheet Documentation  Taken 7/4/2023 1803 by Tiana Becerra RN  Patient Performed Hygiene: dressed  Diversional Activity: (walking once in a while) other (see comments)  Activity (Behavioral Health):    activity adjusted per tolerance    up ad devan     Problem: Anxiety Signs/Symptoms  Goal: Optimized Energy Level (Anxiety Signs/Symptoms)  7/4/2023 2207 by Tiana Becerra RN  Outcome: Progressing  7/4/2023 2203 by Tiana Becerra RN  Outcome: Not Progressing  Intervention: Optimize Energy Level  Recent Flowsheet Documentation  Taken 7/4/2023 1803 by Tiana Becerra RN  Patient Performed Hygiene: dressed  Diversional Activity: (walking once in a while) other (see comments)  Activity (Behavioral Health):    activity adjusted per tolerance    up ad devan   Goal Outcome Evaluation:    Plan of Care Reviewed With: patient      Patient remains isolative and withdrawn. Her mood is labile with a flat affect.  She can be verbally hostile to staff and another time she is appreciative. She also tried to swing her hand towards this writer during drug adm. Her thoughts are disorganized and tangential. Her speech is rambling. She is also dismissive. Her eye contact is intermittent. She is forgetful. She alert and oriented to self and person only. She denied SI/SIB/HI nor hallucinations. She needs SBA for toileting and transfer. She ambulates using a WW. She declined to attend both groups this shift. She ate only 5-10% of her dinner. She is med complaint. She takes meds with apple sauce. She had a shower this evening and she felt good as claimed. She was given Zyprexa ODT 5 mgs at around 1628 r/t agitation. Patient had another episodes of loose stools x  2 this evening. Miralax and Senna were withheld. Patient went to bed after she took her bedtime meds. Her BP was 90/46; P-95. She denied lightheadedness nor dizziness. Fluids pushed.

## 2023-07-05 NOTE — PLAN OF CARE
Assessment/Intervention/Current Symptoms and Care Coordination  -Chart review and met with team, discussed pt progress, symptomology, and response to treatment.  Discussed the discharge plan and any potential impediments to discharge.  -Writer attempted to meet with pt, pt was dismissive and told writer she doesn't want to talk right now.    -Rema ([phone]) indicates has attempted to visit patient twice on the unit but due to patient not feeling well, has been unable to complete application for services that is needed, she plans to attempt a visit on Wednesdays and is considering 7/5 or 7/12.  She will confirm.       Discharge Plan or Goal  Pending stabilization & development of a safe discharge plan.     Considerations include:  [facility] has been holding patient's bed since 5/5, when patient first went into the hospital.  They will be unable to hold the bed for very much longer but does want patient to be able to return when stable if they can still meet her needs.  She will contact us to let us know once the bed has been let go.  We would then need to re-refer when patient is stable.      Barriers to Discharge   Patient requires further psychiatric stabilization due to current symptomology     Referral Status   None     Contacts:  MAYITO Hodgson with [facility] [phone].  [facility] [phone] ext.[phone] Josefina  : Lorenzo Whipple [phone]     Legal Status  Patient is under MI commitment in [facility]  Pt will need PD upon discharge

## 2023-07-05 NOTE — PROGRESS NOTES
"PSYCHIATRY  PROGRESS NOTE     DATE OF SERVICE   7/5/2023         CHIEF COMPLAINT   \"Terrible.\"       SUBJECTIVE   Nursing reports:     Patient remains isolative and withdrawn. Her mood is labile with a flat affect.  She can be verbally hostile to staff and another time she is appreciative. She also tried to swing her hand towards this writer during drug adm. Her thoughts are disorganized and tangential. Her speech is rambling. She is also dismissive. Her eye contact is intermittent. She is forgetful. She alert and oriented to self and person only. She denied SI/SIB/HI nor hallucinations. She needs SBA for toileting and transfer. She ambulates using a WW. She declined to attend both groups this shift. She ate only 5-10% of her dinner. She is med complaint. She takes meds with apple sauce. She had a shower this evening and she felt good as claimed. She was given Zyprexa ODT 5 mgs at around 1628 r/t agitation. Patient had another episodes of loose stools x 2 this evening. Miralax and Senna were withheld. Patient went to bed after she took her bedtime meds. Her BP was 90/46; P-95. She denied lightheadedness nor dizziness. Fluids pushed    Patient is sound asleep at the start of the shift. She is using a medical bed for mobility. She also on SIO r/t self-injury risk. Patient slept on and off. Patient kept checking the clock and then she said \"I am late. I could not go to work now\". Patient tried to dig her anal area with her left fingers and smelt them. She was redirected. Fingers on her left hand cleaned up. No other behaviors observed. She slept a total of 7.75 hours.     Social work reports:      Assessment/Intervention/Current Symptoms and Care Coordination  -Chart review and met with team, discussed pt progress, symptomology, and response to treatment.  Discussed the discharge plan and any potential impediments to discharge.  -Writer attempted to meet with pt, pt was dismissive and told writer she doesn't want to " "talk right now.    -Rema ([phone]) indicates has attempted to visit patient twice on the unit but due to patient not feeling well, has been unable to complete application for services that is needed, she plans to attempt a visit on Wednesdays and is considering 7/5 or 7/12.  She will confirm.       Discharge Plan or Goal  Pending stabilization & development of a safe discharge plan.     Considerations include:  [facility] ([facility] has been holding patient's bed since 5/5, when patient first went into the hospital.  They will be unable to hold the bed for very much longer but does want patient to be able to return when stable if they can still meet her needs.  She will contact us to let us know once the bed has been let go.  We would then need to re-refer when patient is stable.      Barriers to Discharge   Patient requires further psychiatric stabilization due to current symptomology     Referral Status   None     Contacts:  MAYITO Hodgson with [facility] [phone].  [facility] [phone] Josefina  : Lorenzo Whipple [phone]     Legal Status  Patient is under MI commitment in [facility]  Pt will need PD upon discharge     OBJECTIVE   Met with pt in hospital room on unit 3B. Pt affect appears flat and gaurded. Patient is oriented to self and patient does recognize writer today. Pt reports mood as, \"Terrible.\"  Patient also states, \"No one understands me.  I want to go home.  I don't have a home.\"  Pt's thoughts continue to be disorganized and patient continues to experience confusion intermittently.  Provider explained to patient that she previously was living in a nursing home and an option would be for her to return there; also explained that as patient is currently hospitalized and CTC will assist in setting up a safe place for her to discharge once she is psychiatrically and medically stabilized. Patient verbalized " "understanding.  Patient continues to endorse symptoms of severe depression with thoughts of SI and no plan. Pt states, \"I want to die.\" Patient also observed with scratches on right forearm which patient endorses as SIB. Patient does not provide further details on engaging in this self-harm behavior. Support staff assisting in trimming her finger nails as patient reports they are very long. Pt will also continue on SIO monitoring which is in place due to patient reporting SI and ongoing confusion/impulsive behaviors; today provider also updated SIO order to indicate to monitor for SIB. Patient has been observed by staff inducing vomiting as well as experiencing spontaneous regurgitation.  Today, discussed this behavior with patient and she endorses that she continues to engage in inducing vomiting however patient does not explain further regarding purpose of this behavior. Plan will be to initiate a behavioral plan in coordination with Caryn RYAN to address behavior of patient inducing vomiting. Per nursing report patient was also physically aggressive toward nursing staff during medication administration on evening shift; plan will be to initiate Gabapentin 100 mg at 4:00 PM to target agitation/anxiety in evening that appears to be related to dementia. Plan will also be to increase scheduled Prozac to 40 mg liquid daily to target depressed mood. Continue Namenda 10 mg PO BID, Prazosin 1 mg PO at bedtime, Olanzapine ODT 2.5 mg tablet every morning, Olanzapine 10 mg ODT tablet at bedtime, and Mirtazapine 15 mg PO disintegrating tablet at HS. Olanzapine drug level checked on 6/27/2023 and results within therapeutic range: 35 ng/mL (reference range 20-80 ng/mL).     Patient continues to be on a 2 L fluid restriction per IM to treat hyponatremia; sodium level 7/3/2023 was 139 mml/L.  CMP ordered for every 3 days to monitor. IM has been following pt to evaluate hyponatremia and IM was notified on 6/15/23 of altered level of " consciousness which presented as increased confusion compared to baseline since admission and increased agitation. Per IM provider pt's waxing and waning symptoms most likely secondary to a primary psychiatric disorder with high likelihood for underlying dementia; there is no clear reversible organic cause of her symptoms. Speech therapy was consulted 6/15/23 and Chest Xray was also ordered to evaluate due to concern for aspiration with frequent regurgitation /emesis; chest xray did not show acute concern for aspiration. Speech therapy was reconsulted to address ongoing spontaneous regurgitation of small amounts after eating; patient has also been observed inducing vomiting by sticking her fingers in her mouth. The XR video swallow study was completed on 6/30/2023: results indicate that patient's esophagus is mildly dilated without any other abnormality; no aspiration. Per documentation speech therapist is recommending regular diet with thin liquids as well as for nursing to administer pills in purée such as applesauce. GI followed pt to address frequent regurgitation /emesis, poor P.O intake, and constipation. GI started pt on bowel regimen and esophagram and upper GI studies were completed 6/19/23. Per IM provider documentation, GI provider reports that results indicate delayed emptying, but no concern for strictures; if unable to tolerate any oral intake a CT abdomen with contrast should be performed to rule out intra-abdominal pathology. Pt was not tolerating p.o. intake at that time and a CT abdomen/pelvis with contrast was completed 6/20/2023: report of results indicated a large volume predominantly low density colonic stool intermixed with hyperattenuating contrast (from 6/19/2023 esophagram) extending from the cecum through the entire length of the colon to the rectum.  Subsequently a therapeutic gastrograffin enema with IR was completed 6/21/23 which was effective for removing a large volume of stool. GI  luminal team signed off on 6/26/2023 with recommendations for ongoing bowel regimen as well as recommendations for discharge. Plan is to continue MiraLAX and stool softeners with goal of patient achieving 1-2 soft bowel movement daily per GI recommendation.  Today, patient is scheduled for an MRI of abdomen per radiology recommendation; plan will be for this to be completed this evening or tomorrow 7/6/2023. Patient updated of this and in agreement. Albumin level also noted to be decreased at 3.3 g/dL; will continue to monitor this. Provider also reviewed EKG from 7/4/23 and results indicated Sinus rhythm with Left axis deviation, Non-specific intra-ventricular conduction block. Placed IM consult to have medical team review.        Dietitian is following pt due to inadequate oral intake related to altered mentation, decreased appetite, and early satiety. Patient has been meeting her nutrition goals which include: patient to consume at least 25-50% meals, 100% nutrition supplements, and for patient to maintain/gain weight. To support pt's dietary goal of maintaining/gaining weight patient care order in place for staff to promote patient eating small meals and snacks throughout the day; order also includes that pt needs supervision while eating to encourage intake and monitor for reflux/regurgitation. Pt weights will be monitored every Tuesday, Thursday, and Saturday; also monitoring intake and output.  Plan will be to continue to monitor weights and p.o. intake. Pt recent weights below. Pt had notable weight increase from 6/25/23 to 7/1/23. Plan will be to check weight next on 7/6/23 to monitor.   Vitals:    06/22/23 0822 06/25/23 0820 07/01/23 0832   Weight: 47.1 kg (103 lb 13.4 oz) 48.3 kg (106 lb 7.7 oz) 50.6 kg (111 lb 8.8 oz)        MEDICATIONS   Medications:  Scheduled Meds:    calcium carbonate  500 mg Oral At Bedtime     childrens multivitamin with iron  2 tablet Oral Daily     cyanocobalamin  100 mcg Oral At  "Bedtime     FLUoxetine  30 mg Oral Daily     gabapentin  100 mg Oral Daily     melatonin  6 mg Oral QPM     memantine  10 mg Oral BID     mirtazapine  15 mg Orally disintegrating tablet At Bedtime     OLANZapine zydis  2.5 mg Oral QAM     OLANZapine zydis  10 mg Oral At Bedtime     ondansetron  4 mg Oral TID AC     pantoprazole  40 mg Oral QAM AC     polyethylene glycol  17 g Oral BID     prazosin  1 mg Oral At Bedtime     sennosides  8.6 mg Oral BID     simvastatin  40 mg Oral At Bedtime     valACYclovir  500 mg Oral Daily     cholecalciferol  25 mcg Oral Daily with supper     Continuous Infusions:  PRN Meds:.acetaminophen, alum & mag hydroxide-simethicone, bisacodyl, budesonide-formoterol, calcium carbonate, doxylamine, OLANZapine zydis, prochlorperazine, senna-docusate    Medication adherence issues: MS Med Adherence Y/N: No  Medication side effects: MEDICATION SIDE EFFECTS: no side effects reported   Benefit: Yes / No: Yes       ROS   Pertinent items are noted in HPI.       MENTAL STATUS EXAM   Vitals:BP (!) 160/85   Pulse 72   Temp 98.5  F (36.9  C) (Temporal)   Resp 16   Ht 1.626 m (5' 4\")   Wt 50.6 kg (111 lb 8.8 oz)   LMP  (LMP Unknown)   SpO2 100%   BMI 19.15 kg/m    Appearance:  In hospital scrubs, Disheveled.   Mood: \"Terrible.\"  Affect: flat  And gaurded was congruent to speech.  Suicidal Ideation: absent  Homicidal Ideation: PRESENT / ABSENT: absent   Thought process: difficult to follow and disorganized however improving   Thought content: endorses preoccupations  Fund of Knowledge: average  Attention/Concentration: Poor  Language ability:  Intact  Memory:  Immediate recall impaired however improving, Short-term memory impaired and Long-term memory impaired  Insight:  limited.  Judgement: limited  Orientation: Oriented to self  Psychomotor Behavior: normal or unremarkable    Muscle Strength and Tone: MuscleStrength: Normal  Gait and Station: slightly stiff however steady with walker       LABS "   Personally reviewed.  Albumin level noted to be decreased at 3.3 g/dL. Also, provider reviewed EKG from 7/4/23 and results indicated Sinus rhythm with Left axis deviation, Non-specific intra-ventricular conduction block. QT interval 462. Placed IM consult to have medical team review.    Latest Reference Range & Units 06/23/23 07:57 06/26/23 07:15 06/27/23 09:55 06/27/23 11:43 06/29/23 10:22 06/30/23 08:00 06/30/23 14:17 07/02/23 09:04 07/03/23 07:11 07/04/23 09:34   Sodium 136 - 145 mmol/L 137 138  137 137 139   139    Potassium 3.4 - 5.3 mmol/L  4.0  3.6 3.9 4.2   4.0    Chloride 98 - 107 mmol/L  105  104 105 107   103    Carbon Dioxide (CO2) 22 - 29 mmol/L  26  24 21 (L) 27   27    Urea Nitrogen 8.0 - 23.0 mg/dL  13.6  10.1 11.1 9.8   6.0 (L)    Creatinine 0.51 - 0.95 mg/dL  0.80  0.76 0.75 0.72   0.73    GFR Estimate >60 mL/min/1.73m2  74  78 80 84   82    Calcium 8.8 - 10.2 mg/dL  9.2  9.7 9.1 9.1   9.2    Anion Gap 7 - 15 mmol/L  7  9 11 5 (L)   9    Magnesium 1.7 - 2.3 mg/dL  2.1  2.3  2.1   2.1    Phosphorus 2.5 - 4.5 mg/dL  3.3  3.6  3.5   3.7    Albumin 3.5 - 5.2 g/dL    3.6 3.5 3.1 (L)   3.3 (L)    Protein Total 6.4 - 8.3 g/dL    6.0 (L) 5.8 (L) 5.2 (L)   5.5 (L)    Alkaline Phosphatase 35 - 104 U/L    46 47 38   45    ALT 0 - 50 U/L    11 15 12   12    AST 0 - 45 U/L    16 16 14   15    Bilirubin Total <=1.2 mg/dL    0.3 0.3 0.4   0.4    Glucose 70 - 99 mg/dL  92  87 252 (H) 89   92    Hemoglobin A1C <5.7 %     5.1        WBC 4.0 - 11.0 10e3/uL    8.9 8.3 5.8   8.2    Hemoglobin 11.7 - 15.7 g/dL    12.7 12.4 11.1 (L)   11.6 (L)    Hematocrit 35.0 - 47.0 %    39.0 38.2 34.1 (L)   35.3    Platelet Count 150 - 450 10e3/uL    375 308 248   211    RBC Count 3.80 - 5.20 10e6/uL    4.16 3.99 3.66 (L)   3.73 (L)    MCV 78 - 100 fL    94 96 93   95    MCH 26.5 - 33.0 pg    30.5 31.1 30.3   31.1    MCHC 31.5 - 36.5 g/dL    32.6 32.5 32.6   32.9    RDW 10.0 - 15.0 %    13.3 13.4 13.5   13.7    % Neutrophils %     74 78 69   73    % Lymphocytes %    20 16 24   18    % Monocytes %    6 5 5   8    % Eosinophils %    0 1 1   1    % Basophils %    0 0 1   0    Absolute Basophils 0.0 - 0.2 10e3/uL    0.0 0.0 0.0   0.0    Absolute Eosinophils 0.0 - 0.7 10e3/uL    0.0 0.1 0.1   0.1    Absolute Immature Granulocytes <=0.4 10e3/uL    0.0 0.0 0.0   0.0    Absolute Lymphocytes 0.8 - 5.3 10e3/uL    1.8 1.3 1.4   1.4    Absolute Monocytes 0.0 - 1.3 10e3/uL    0.5 0.4 0.3   0.7    % Immature Granulocytes %    0 0 0   0    Absolute Neutrophils 1.6 - 8.3 10e3/uL    6.5 6.4 4.1   6.0    Absolute NRBCs 10e3/uL    0.0 0.0 0.0   0.0    NRBCs per 100 WBC <1 /100    0 0 0   0    Color Urine Colorless, Straw, Light Yellow, Yellow         Straw     Appearance Urine Clear         Clear     Glucose Urine Negative mg/dL        Negative     Bilirubin Urine Negative         Negative     Ketones Urine Negative mg/dL        Negative     Specific Gravity Urine 1.003 - 1.035         1.002 (L)     pH Urine 5.0 - 7.0         7.5 (H)     Protein Albumin Urine Negative mg/dL        Negative     Urobilinogen mg/dL Normal, 2.0 mg/dL        Normal     Nitrite Urine Negative         Negative     Blood Urine Negative         Negative     Leukocyte Esterase Urine Negative         Negative     WBC Urine <=5 /HPF        <1     RBC Urine <=2 /HPF        0     Squamous Epithelial /HPF Urine <=1 /HPF        <1     Olanzapine Level 20 - 80 ng/mL    35         XR VIDEO SWALLOW WITH SLP OR OT        Rpt      EKG 12-LEAD, TRACING ONLY    Rpt       Rpt   (L): Data is abnormally low  (H): Data is abnormally high  Rpt: View report in Results Review for more information       DIAGNOSIS   Principal Problem:    Suicidal ideation  MDD, severe, recurrent episode, with psychotic features  R/O Bipolar Disorder Type 1    Active Problem List:  Patient Active Problem List   Diagnosis     Arthritis     Depressive disorder     Borderline personality disorder (H)     GERD (gastroesophageal  reflux disease)     Holosystolic murmur     Asthma     History of gastric bypass     Hyperlipidemia LDL goal <130     Other insomnia     Mild mitral regurgitation     Mild aortic stenosis     Weight loss     Bilateral low back pain without sciatica     Adhesive capsulitis of shoulder, right     Mild intermittent asthma, unspecified whether complicated     Bipolar affective disorder, remission status unspecified (H)     Constipation, unspecified constipation type     Age-related osteoporosis without current pathological fracture     Plantar warts     Hypoglycemia     Failure to thrive in adult     Hypotension, unspecified hypotension type     Suicidal ideation          PLAN   1. Education given regarding diagnostic and treatment options with risks, benefits and alternatives and adequate verbalization of understanding.  2.  Medications:  Hospital  -Initiate Gabapentin 100 mg at 1600 to target evening agitation/anxiety.  -Increase Prozac to 40 mg PO liquid daily to target depressed mood.  -Continue Prazosin 1 mg PO at bedtime to target anxiety/agitation at nighttime.  -Continue Namenda 10 mg tablet PO BID to treat dementia symptoms observed.  -Continue Melatonin 6 mg scheduled every evening to promote sleep.  -Continue Olanzapine ODT 2.5 mg tablet every morning to treat symptoms of psychosis  -Continue Olanzapine 10 mg ODT tablet at bedtime to treat symptoms of psychosis  -Continue Mirtazapine 15 mg PO disintegrating tablet at HS to target depression symptoms.  -Continue Olanzapine ODT 5 mg PO tablet 3 times daily as needed for severe agitation.  -Continue Unisom 12.5 mg 3 times daily as needed for insomnia or nausea.   3. Consultations:  IM consulted on 7/5/23 to review abnormal EKG from 7/4/23: results indicated Sinus rhythm with Left axis deviation, Non-specific intra-ventricular conduction block.   GI consulted and signed off on 6/26/2023, recommendations include:  -- Consider palliative consult to help further  assist with GOC if without improvement in course/sx.  -- Continue with scheduled bowel med regimen and titrate to ensure patient having at least x1 soft BM daily given chronic hx of constipation. (This provider updated Miralax order to include goal of 1 soft BM daily with the instruction to hold one dose if pt has met this goal.)  -- Continue detailed documentation of stool appearance, including color, consistency, frequency and amount.   -- Continue PPI at discharge (this provider Dc'd scheduled pepcid and continue Protonix scheduled per GI recommendatio  ---- Discontinue iron supplementation and schedule repeat iron studies in OP setting.  If develops iron deficiency off supplementation, consider IV iron infusions > PO iron supplementation  Speech therapy reconsulted completed 6/26/23 for Clinical Swallow Evaluation  -XR Video swallow study completed 6/30/2023:  -Recommendations include taking pills in puree, avoiding mixed textures, and to take small sips with thin liquids.  -Speech therapy recommending regular diet with thin liquids-provider updated patient's diet order.  -Continue one-to-one supervision as needed with eating  -Patient should use a slow rate of intake and follow reflux precautions.  Dietician consulted and following pt during admission  -Weights will be monitored every Tuesday, Thursday, and Saturday  -Intake and output monitoring.  -Plan is for pt to eat small meals/snack throughout the day d/t history of gastric bypass.   -Supplements ordered for between meal  - Zofran 4mg PO PRN scheduled 30 minutes before meals TID to improve PO tolerance.   4. Labs/Tests   Labs: CBC, CMP, magnesium, and phosphorus levels every 3 days   EKG ordered weekly  Olanzapine drug level collected 6/27/2023 and results: Within therapeutic range: 35 ng/mL (reference range 20-80 ng/mL).  Abdominal MRI scheduled for 7/5/23 or 7/6/23.  5. Structure and Supervision  Unit 3B.  Precautions in place.  Fall  precautions.  Continue on SIO monitoring due to reporting active SI with plan, engaging in SIB including scratching her arm, and confused impulsive behavior.  6.   is following in regards to collecting and reviewing collateral information, referrals and disposition planning.  Legal: civil commitment.   Referrals:  Per CTC  Care Coordination:  Per CTC  Placement:  TBD  Anticipated Discharge:  TBD     Further treatment programming to be determined throughout the hospital course.      Risk Assessment: [facility] RISK ASSESSMENT: Patient on precautions    Coordination of Care:   Treatment Plan reviewed and physician signed, Care discussed with Care/Treatment Team Members, Chart reviewed and Patient seen      Re-Certification I certify that the inpatient psychiatric facility services furnished since the previous certification were, and continue to be, medically necessary for, either, treatment which could reasonably be expected to improve the patient s condition or diagnostic study and that the hospital records indicate that the services furnished were, either, intensive treatment services, admission and related services necessary for diagnostic study, or equivalent services.     I certify that the patient continues to need, on a daily basis, active treatment furnished directly by or requiring the supervision of inpatient psychiatric facility personnel.   I estimate 7-14 days of hospitalization is necessary for proper treatment of the patient. My plans for post-hospital care for this patient are  TBD     DANIEL Bennett CNP    -     7/5/2023     -     10:15 AM  Total time  35 minutes with > 50%spent on coordination of cares and psycho-education.    This note was created with help of Dragon dictation system. Grammatical / typing errors are not intentional.    DANIEL Bennett CNP

## 2023-07-05 NOTE — PROVIDER NOTIFICATION
07/05/23 1244   Individualization/Patient Specific Goals   Patient Personal Strengths community support;family/social support;stable living environment   Patient Vulnerabilities lacks insight into illness   Anxieties, Fears or Concerns Pt has anxiety issues, fears of being hospitalized   Individualized Care Needs Becomes agitated with staff at times.   Patient/Family-Specific Goals (Include Timeframe) return to [facility]   Interprofessional Rounds   Summary She presented from her care facility with SI, depression, decreassed oral intake, low glucose level, and low blood pressure.   Participants CTC;psychiatrist;nursing;OT   Behavioral Team Discussion   Participants Mayra Ann LICSW,  Caryn Stack OT,Galilea Olguin, APRN, CNP, Negr[place], RN   Progress Little improvement   Anticipated length of stay Pending stabilization   Continued Stay Criteria/Rationale SI   Medical/Physical See provider   Precautions per unit protocol   Plan Stabilization with medication and therapy groups   Rationale for change in precautions or plan see below   Safety Plan per unit protocol   Anticipated Discharge Disposition assisted living   PRECAUTIONS AND SAFETY    Behavioral Orders   Procedures     Code 1 - Restrict to Unit     Code 2     6/30/23 X-ray     Fall precautions     Routine Programming     As clinically indicated     Status 15     Every 15 minutes.     Status Individual Observation     Patient SIO status reviewed with team/RN.  Please also refer to RN/team documentation for add'l detail.    Monitor for SIB including scratching self. Thanks.    -SIO staff to monitor following which have contributed to patient being on SIO:  Unsafe behavior/self inducing vomiting and statements of wanting to die  -Possible interventions SIO staff could use to support patient's treatment progress: monitor for unsafe behaviors and redirect if she attempts to induce vomiting  -When following observed, team will review discontinuation of SIO:  Pt  no longer at risk for self harm     Order Specific Question:   CONTINUOUS 24 hours / day     Answer:   5 feet     Order Specific Question:   Indications for SIO     Answer:   Suicide risk     Order Specific Question:   Indications for SIO     Answer:   Self-injury risk       Safety  Safety WDL: WDL  Patient Location: patient room, own, hallway, lounge  Observed Behavior: angry/hostile, calm, sitting, walking  Observed Behavior (Comment): laying down in bed; intermittently verbally hostile to staff.  Safety Measures: safety rounds completed, 1:1 observation maintained, clinical history reviewed, suicide assessment completed  Diversional Activity: other (see comments) (walking once in a while)  Suicidality: Status 15  Additional Documentation:  (fall precautions in place.)

## 2023-07-06 LAB
ALBUMIN SERPL BCG-MCNC: 3.4 G/DL (ref 3.5–5.2)
ALP SERPL-CCNC: 46 U/L (ref 35–104)
ALT SERPL W P-5'-P-CCNC: 11 U/L (ref 0–50)
ANION GAP SERPL CALCULATED.3IONS-SCNC: 10 MMOL/L (ref 7–15)
AST SERPL W P-5'-P-CCNC: 13 U/L (ref 0–45)
BASOPHILS # BLD AUTO: 0 10E3/UL (ref 0–0.2)
BASOPHILS NFR BLD AUTO: 0 %
BILIRUB SERPL-MCNC: 0.3 MG/DL
BUN SERPL-MCNC: 6.4 MG/DL (ref 8–23)
CALCIUM SERPL-MCNC: 9.2 MG/DL (ref 8.8–10.2)
CHLORIDE SERPL-SCNC: 106 MMOL/L (ref 98–107)
CREAT SERPL-MCNC: 0.91 MG/DL (ref 0.51–0.95)
DEPRECATED HCO3 PLAS-SCNC: 22 MMOL/L (ref 22–29)
EOSINOPHIL # BLD AUTO: 0 10E3/UL (ref 0–0.7)
EOSINOPHIL NFR BLD AUTO: 1 %
ERYTHROCYTE [DISTWIDTH] IN BLOOD BY AUTOMATED COUNT: 13.8 % (ref 10–15)
GFR SERPL CREATININE-BSD FRML MDRD: 63 ML/MIN/1.73M2
GLUCOSE SERPL-MCNC: 168 MG/DL (ref 70–99)
HCT VFR BLD AUTO: 36.9 % (ref 35–47)
HGB BLD-MCNC: 12 G/DL (ref 11.7–15.7)
IMM GRANULOCYTES # BLD: 0 10E3/UL
IMM GRANULOCYTES NFR BLD: 0 %
LYMPHOCYTES # BLD AUTO: 1.2 10E3/UL (ref 0.8–5.3)
LYMPHOCYTES NFR BLD AUTO: 17 %
MAGNESIUM SERPL-MCNC: 1.9 MG/DL (ref 1.7–2.3)
MCH RBC QN AUTO: 31.2 PG (ref 26.5–33)
MCHC RBC AUTO-ENTMCNC: 32.5 G/DL (ref 31.5–36.5)
MCV RBC AUTO: 96 FL (ref 78–100)
MONOCYTES # BLD AUTO: 0.4 10E3/UL (ref 0–1.3)
MONOCYTES NFR BLD AUTO: 5 %
NEUTROPHILS # BLD AUTO: 5.7 10E3/UL (ref 1.6–8.3)
NEUTROPHILS NFR BLD AUTO: 77 %
NRBC # BLD AUTO: 0 10E3/UL
NRBC BLD AUTO-RTO: 0 /100
PHOSPHATE SERPL-MCNC: 3.6 MG/DL (ref 2.5–4.5)
PLATELET # BLD AUTO: 215 10E3/UL (ref 150–450)
POTASSIUM SERPL-SCNC: 3.6 MMOL/L (ref 3.4–5.3)
PROT SERPL-MCNC: 5.6 G/DL (ref 6.4–8.3)
RBC # BLD AUTO: 3.85 10E6/UL (ref 3.8–5.2)
SODIUM SERPL-SCNC: 138 MMOL/L (ref 136–145)
WBC # BLD AUTO: 7.4 10E3/UL (ref 4–11)

## 2023-07-06 PROCEDURE — 83735 ASSAY OF MAGNESIUM: CPT

## 2023-07-06 PROCEDURE — 85025 COMPLETE CBC W/AUTO DIFF WBC: CPT

## 2023-07-06 PROCEDURE — 250N000013 HC RX MED GY IP 250 OP 250 PS 637: Performed by: PHYSICIAN ASSISTANT

## 2023-07-06 PROCEDURE — 250N000013 HC RX MED GY IP 250 OP 250 PS 637

## 2023-07-06 PROCEDURE — 250N000013 HC RX MED GY IP 250 OP 250 PS 637: Performed by: PSYCHIATRY & NEUROLOGY

## 2023-07-06 PROCEDURE — 84100 ASSAY OF PHOSPHORUS: CPT

## 2023-07-06 PROCEDURE — 36415 COLL VENOUS BLD VENIPUNCTURE: CPT

## 2023-07-06 PROCEDURE — 99232 SBSQ HOSP IP/OBS MODERATE 35: CPT

## 2023-07-06 PROCEDURE — 124N000003 HC R&B MH SENIOR/ADOLESCENT

## 2023-07-06 PROCEDURE — 80053 COMPREHEN METABOLIC PANEL: CPT

## 2023-07-06 PROCEDURE — 250N000011 HC RX IP 250 OP 636

## 2023-07-06 RX ADMIN — ONDANSETRON 4 MG: 4 TABLET ORAL at 16:54

## 2023-07-06 RX ADMIN — POLYETHYLENE GLYCOL 3350 17 G: 17 POWDER, FOR SOLUTION ORAL at 07:48

## 2023-07-06 RX ADMIN — FLUOXETINE 40 MG: 20 SOLUTION ORAL at 07:48

## 2023-07-06 RX ADMIN — ONDANSETRON 4 MG: 4 TABLET ORAL at 12:31

## 2023-07-06 RX ADMIN — Medication 25 MCG: at 16:54

## 2023-07-06 RX ADMIN — PRAZOSIN HYDROCHLORIDE 1 MG: 1 CAPSULE ORAL at 21:03

## 2023-07-06 RX ADMIN — Medication 2 TABLET: at 07:49

## 2023-07-06 RX ADMIN — SENNOSIDES 8.6 MG: 8.6 TABLET ORAL at 21:03

## 2023-07-06 RX ADMIN — OLANZAPINE 10 MG: 10 TABLET, ORALLY DISINTEGRATING ORAL at 21:03

## 2023-07-06 RX ADMIN — SIMVASTATIN 40 MG: 40 TABLET, FILM COATED ORAL at 21:03

## 2023-07-06 RX ADMIN — POLYETHYLENE GLYCOL 3350 17 G: 17 POWDER, FOR SOLUTION ORAL at 21:04

## 2023-07-06 RX ADMIN — GABAPENTIN 100 MG: 100 CAPSULE ORAL at 16:54

## 2023-07-06 RX ADMIN — MIRTAZAPINE 15 MG: 15 TABLET, ORALLY DISINTEGRATING ORAL at 21:04

## 2023-07-06 RX ADMIN — OLANZAPINE 2.5 MG: 5 TABLET, ORALLY DISINTEGRATING ORAL at 07:49

## 2023-07-06 RX ADMIN — PANTOPRAZOLE SODIUM 40 MG: 40 TABLET, DELAYED RELEASE ORAL at 05:49

## 2023-07-06 RX ADMIN — Medication 500 MG: at 21:03

## 2023-07-06 RX ADMIN — VITAM B12 100 MCG: 100 TAB at 21:04

## 2023-07-06 RX ADMIN — MEMANTINE 10 MG: 10 TABLET ORAL at 07:49

## 2023-07-06 RX ADMIN — Medication 6 MG: at 21:03

## 2023-07-06 RX ADMIN — MEMANTINE 10 MG: 10 TABLET ORAL at 21:04

## 2023-07-06 RX ADMIN — SENNOSIDES 8.6 MG: 8.6 TABLET ORAL at 07:49

## 2023-07-06 RX ADMIN — VALACYCLOVIR 500 MG: 500 TABLET, FILM COATED ORAL at 07:49

## 2023-07-06 RX ADMIN — ONDANSETRON 4 MG: 4 TABLET ORAL at 05:49

## 2023-07-06 ASSESSMENT — ACTIVITIES OF DAILY LIVING (ADL)
LAUNDRY: UNABLE TO COMPLETE
ADLS_ACUITY_SCORE: 88
ADLS_ACUITY_SCORE: 88
DRESS: WITH ASSISTANCE
ADLS_ACUITY_SCORE: 74
ADLS_ACUITY_SCORE: 74
ADLS_ACUITY_SCORE: 88
HYGIENE/GROOMING: WITH ASSISTANCE
ADLS_ACUITY_SCORE: 88
HYGIENE/GROOMING: WITH ASSISTANCE
ADLS_ACUITY_SCORE: 74
DRESS: WITH ASSISTANCE
ADLS_ACUITY_SCORE: 74
ADLS_ACUITY_SCORE: 88
ADLS_ACUITY_SCORE: 74
ORAL_HYGIENE: PROMPTS
ORAL_HYGIENE: PROMPTS

## 2023-07-06 NOTE — PLAN OF CARE
Goal Outcome Evaluation:      Plan of Care Reviewed With: patient    Overall Patient Progress: no change    Outcome Evaluation: Patient consented to small meals and snacks, orders placed. Patient to tolerating at least 75% of meals and snacks, patient to tolerate 100% of oral nutrition supplements.  Patient to maintain/gain weight.    Moira Bella, MPH, RD, LD  Pediatric & Adult Behavioral Health Dietitian   Pager: [phone]  Weekend/holiday pager: [phone]

## 2023-07-06 NOTE — PLAN OF CARE
Assessment/Intervention/Current Symptoms and Care Coordination  -Chart review and met with team, discussed pt progress, symptomology, and response to treatment.  Discussed the discharge plan and any potential impediments to discharge. Pt continues to endorse anxiety/depression/wishes to be dead.   -CYNTHIA Hodgson ([phone]) plans to visit pt on 7/12 at 1pm.      Discharge Plan or Goal  Pending stabilization & development of a safe discharge plan.     Considerations include:  [facility] ([facility] has been holding patient's bed since 5/5, when patient first went into the hospital.  They will be unable to hold the bed for very much longer but does want patient to be able to return when stable if they can still meet her needs.  She will contact us to let us know once the bed has been let go.  We would then need to re-refer when patient is stable.      Barriers to Discharge   Patient requires further psychiatric stabilization due to current symptomology     Referral Status   None     Contacts:  MAYITO Hodgson with [facility] [phone].  [facility] [phone] ext.[phone] Josefina  : Lorenzo Whipple [phone]     Legal Status  Patient is under MI commitment in [facility]  Pt will need PD upon discharge

## 2023-07-06 NOTE — PROGRESS NOTES
"PSYCHIATRY  PROGRESS NOTE     DATE OF SERVICE   7/6/2023         CHIEF COMPLAINT   \"Better.\"       SUBJECTIVE   Nursing reports:     Pt alert pleasant and med compliant, ate> 50% supper,  which included;  A bowl of  Tomato soup, mashed potatoes,  1 mitchell pudding,  and 1 Vanila pudding, diced peaches and an Ensure.   She was able to comply with sitting up in lounge for greater than an hour after meals and socialize with table mates, she denies pain, no SI/SIB behaviors noted this shift, she is erich stable, escorted by SIO attendant and security presence to MRI 2 to assess her abdomen she had IV access placed w/o difficulty.   No purging or emesis, tolerating meals, Rt forearm clear of infection, voided x2, output 400ml, fluid intake 360ml, remains on SIO for SI/SIB, observed talking  and yelling out loudly to self in her sleep, pt hallucinating. Utilizing a medical bed for mobility.     Pt returned to unit, she tolerated procedure, IV saline lock removed, pt remains med compliant, MRI results pending, pt resting comfortably in bed with SIO attendant in place.    Received in bed awake, calm, pleasant and conversant to SIO staff. Pt is on a medical bed to aid with mobility and HOB elevated due to episodes of vomiting.  Remained on SIO for self injury risk.  Walked the buchanan for a couple of minutes and able to make needs known.  Offered prn Unisom and pt agreed to take it.  2351 Unisom 12. 5 mg given po, made comfortable in bed. Fell asleep at 0030.  Denied pain, nausea or vomiting.  Voided x 2. Unmeasured and 400 ml, yellow, cloudy  Intake 270 ml  Slept for 8 hours    Social work reports:    Assessment/Intervention/Current Symptoms and Care Coordination  -Chart review and met with team, discussed pt progress, symptomology, and response to treatment.  Discussed the discharge plan and any potential impediments to discharge.  -Writer attempted to meet with pt, pt was dismissive and told writer she doesn't want to talk " "right now.    -Rema ([phone]) indicates has attempted to visit patient twice on the unit but due to patient not feeling well, has been unable to complete application for services that is needed, she plans to attempt a visit on Wednesdays and is considering 7/5 or 7/12.  She will confirm.       Discharge Plan or Goal  Pending stabilization & development of a safe discharge plan.     Considerations include:  [facility] ([facility] has been holding patient's bed since 5/5, when patient first went into the hospital.  They will be unable to hold the bed for very much longer but does want patient to be able to return when stable if they can still meet her needs.  She will contact us to let us know once the bed has been let go.  We would then need to re-refer when patient is stable.      Barriers to Discharge   Patient requires further psychiatric stabilization due to current symptomology     Referral Status   None     Contacts:  MAYITO Hodgson with [facility] [phone].  [facility] [phone] [phone] Josefina  : Lorenzo Whipple [phone]     Legal Status  Patient is under MI commitment in [facility]  Pt will need PD upon discharge        OBJECTIVE   Met with pt in hospital room on unit 3B. Pt affect appears flat however improving and brighter today. Patient is oriented to self, date, and aware she is in the hospital.  Pt reports mood as, \"Better.\"  Patient also states, \"I still vomit, and I poop a lot.\"  Pt's thoughts continue to be disorganized and patient continues to experience confusion intermittently however presents more clear today compared to yesterday. Pt also asks regarding cost of hospitalization and provider explained that her health insurance will cover the cost. Pt then asks where she will discharge to and provider explained to patient that she previously was living in a nursing home and an option would be for her to return " "there; also explained that as patient is currently hospitalized and CTC will assist in setting up a safe place for her to discharge once she is psychiatrically and medically stabilized. Patient verbalized understanding. Pt reports today that she does not want to return to the nursing home she was living in and states, \"They are not kind to me.\"  Pt also reports needing her prescription glasses; explained to patient that this provider will look into determining if patient came in with glasses or options for bringing her glasses in from the nursing home she was living at. Patient verbalized understanding and in agreement with this; patient also verbally consents for this provider to contact her  if needed to assist with locating her glasses. Patient continues to endorse symptoms of severe depression with thoughts of SI and no plan. Pt states, \"I want to hurt myself, not other people.\" Patient was observed yesterday with scratches on right forearm which patient endorsed at the time as SIB. Pt will continue on SIO monitoring which is in place due to patient reporting SI, engaging in SIB, and ongoing confusion/impulsive behaviors. Patient has also been observed by staff inducing vomiting as well as experiencing spontaneous regurgitation.  Today, discussed this behavior with patient and she endorses that she continues to engage in inducing vomiting to \"feel better.\" Pt does not elaborate further regarding this behavior, however denies inducing vomiting as a way to control her weight.  Today, plan will be to continue gabapentin 100 mg at 4:00 PM to target agitation/anxiety in evening that appears to be related to dementia. Also, continue scheduled Prozac 40 mg liquid daily to target depressed mood, Namenda 10 mg PO BID, Prazosin 1 mg PO at bedtime, Olanzapine ODT 2.5 mg tablet every morning, Olanzapine 10 mg ODT tablet at bedtime, and Mirtazapine 15 mg PO disintegrating tablet at HS. Olanzapine drug level " checked on 6/27/2023 and results within therapeutic range: 35 ng/mL (reference range 20-80 ng/mL).     Patient continues to be on a 2 L fluid restriction per IM to treat hyponatremia; sodium level 7/6/2023 was 138 mml/L.  CMP ordered for every 3 days to monitor. IM has been following pt to evaluate hyponatremia and IM was notified on 6/15/23 of altered level of consciousness which presented as increased confusion compared to baseline since admission and increased agitation. Per IM provider- pt's waxing and waning symptoms most likely secondary to a primary psychiatric disorder with high likelihood for underlying dementia; there is no clear reversible organic cause of her symptoms. Speech therapy was consulted 6/15/23 and Chest Xray was also ordered to evaluate due to concern for aspiration with frequent regurgitation /emesis; chest xray did not show acute concern for aspiration. Speech therapy was reconsulted to address ongoing spontaneous regurgitation of small amounts after eating; patient has also been observed inducing vomiting by sticking her fingers in her mouth. The XR video swallow study was completed on 6/30/2023: results indicate that patient's esophagus is mildly dilated without any other abnormality; no aspiration. Per documentation speech therapist is recommending regular diet with thin liquids as well as for nursing to administer pills in purée such as applesauce. GI followed pt to address frequent regurgitation /emesis, poor P.O intake, and severe constipation. GI started pt on bowel regimen and esophagram and upper GI studies were completed 6/19/23. Per IM provider documentation, GI provider reports that results indicate delayed emptying, but no concern for strictures; if unable to tolerate any oral intake a CT abdomen with contrast should be performed to rule out intra-abdominal pathology. Pt was not tolerating p.o. intake at that time and a CT abdomen/pelvis with contrast was completed 6/20/2023:  report of results indicated a large volume predominantly low density colonic stool intermixed with hyperattenuating contrast (from 6/19/2023 esophagram) extending from the cecum through the entire length of the colon to the rectum.  Subsequently a therapeutic gastrograffin enema with IR was completed 6/21/23 which was effective for removing a large volume of stool. GI luminal team signed off on 6/26/2023 with recommendations for ongoing bowel regimen as well as recommendations for discharge. Plan is to continue MiraLAX and stool softeners with goal of patient achieving 1-2 soft bowel movement daily per GI recommendation. Pt has been having frequent loose stools ongoing per nursing.     Patient had an MRI of abdomen completed per radiology recommendation on 7/5/23; results indicate:  1.  Innumerable enhancing splenic lesions are indeterminate, possibly  benign hemangiomas. Follow-up MRI may be obtained in 6-12 months to  assess for change.  2.  Previously described hepatic segment 2 lesion is now well  evaluated, though may represent a hemangioma as suggested on prior CT  scan.  3.  Persistent large volume stool throughout the colon may indicate  constipation.     This provider notified IM provider following peripherally to have medical team review as pt is currently on bowel regimen recommended by GI and after gastrograffin enema on 6/21/23 continues to have large volume of stool.         Dietitian is following pt due to inadequate oral intake related to altered mentation, decreased appetite, and early satiety. Patient has been meeting her nutrition goals which include: patient to consume at least 25-50% meals, 100% nutrition supplements, and for patient to maintain/gain weight. To support pt's dietary goal of maintaining/gaining weight patient care order in place for staff to promote patient eating small meals and snacks throughout the day; order also includes that pt needs supervision while eating to encourage  intake and monitor for reflux/regurgitation. Pt weights will be monitored every Tuesday, Thursday, and Saturday; also monitoring intake and output.  Plan will be to continue to monitor weights and p.o. intake. Pt recent weights below.    Vitals:    06/25/23 0820 07/01/23 0832 07/06/23 0810   Weight: 48.3 kg (106 lb 7.7 oz) 50.6 kg (111 lb 8.8 oz) 50.3 kg (110 lb 14.3 oz)     Provider reviewed patient's chart and no documentation of patient bringing glasses and with her to the hospital. Subsequently provider contacted patient's [facility] : Rema with [facility] [phone].  Rema reports that she will contact nursing home facility that patient was living at prior to admission to coordinate picking up patient's glasses (if they are there) and bring them to the hospital on 7/14/23.  Patient's  also reports that she plans on coming to visit the patient to complete intake paperwork on 7/14/23 at 1 PM.  Patient's  wants to coordinate with inpatient CTC if possible as this  has attempted to complete this paperwork with patient two other times and the patient has refused.  Discussed that if possible, attending provider will participate in this meeting as well to assist. Provider will update team of this tomorrow in order to plan for this.      MEDICATIONS   Medications:  Scheduled Meds:    calcium carbonate  500 mg Oral At Bedtime     childrens multivitamin with iron  2 tablet Oral Daily     cyanocobalamin  100 mcg Oral At Bedtime     FLUoxetine  40 mg Oral Daily     gabapentin  100 mg Oral Daily     melatonin  6 mg Oral QPM     memantine  10 mg Oral BID     mirtazapine  15 mg Orally disintegrating tablet At Bedtime     OLANZapine zydis  2.5 mg Oral QAM     OLANZapine zydis  10 mg Oral At Bedtime     ondansetron  4 mg Oral TID AC     pantoprazole  40 mg Oral QAM AC     polyethylene glycol  17 g Oral BID     prazosin  1 mg Oral At Bedtime     sennosides  8.6 mg  "Oral BID     simvastatin  40 mg Oral At Bedtime     valACYclovir  500 mg Oral Daily     cholecalciferol  25 mcg Oral Daily with supper     Continuous Infusions:  PRN Meds:.acetaminophen, alum & mag hydroxide-simethicone, bisacodyl, budesonide-formoterol, calcium carbonate, doxylamine, OLANZapine zydis, prochlorperazine, senna-docusate    Medication adherence issues: MS Med Adherence Y/N: No  Medication side effects: MEDICATION SIDE EFFECTS: no side effects reported   Benefit: Yes / No: Yes       ROS   Pertinent items are noted in HPI.       MENTAL STATUS EXAM   Vitals:/73   Pulse 67   Temp 99  F (37.2  C) (Temporal)   Resp 16   Ht 1.626 m (5' 4\")   Wt 50.3 kg (110 lb 14.3 oz)   LMP  (LMP Unknown)   SpO2 97%   BMI 19.03 kg/m    Appearance:  In hospital scrubs, Disheveled.   Mood: \"Better.\"  Affect: flat  However brighter today was congruent to speech.  Suicidal Ideation: absent  Homicidal Ideation: PRESENT / ABSENT: absent   Thought process: difficult to follow and disorganized however improving   Thought content: endorses preoccupations  Fund of Knowledge: average  Attention/Concentration: Poor  Language ability:  Intact  Memory:  Immediate recall impaired however improving, Short-term memory impaired and Long-term memory impaired  Insight:  limited.  Judgement: limited  Orientation: Oriented to self, place, and date.   Psychomotor Behavior: normal or unremarkable    Muscle Strength and Tone: MuscleStrength: Normal  Gait and Station: slightly stiff however steady with walker       LABS   Personally reviewed.  Albumin level noted to be low: 3.4. Will continue to monitor this. Also, reviewed Abdominal MRI and results indicate:  1.  Innumerable enhancing splenic lesions are indeterminate, possibly  benign hemangiomas. Follow-up MRI may be obtained in 6-12 months to  assess for change.  2.  Previously described hepatic segment 2 lesion is now well  evaluated, though may represent a hemangioma as suggested " on prior CT  scan.  3.  Persistent large volume stool throughout the colon may indicate  Constipation. Provided notified IM provider following regarding this.      Latest Reference Range & Units 06/21/23 15:05 06/22/23 08:41 06/22/23 09:38 06/23/23 07:57 06/26/23 07:15 06/27/23 09:55 06/27/23 11:43 06/29/23 10:22 06/30/23 08:00 06/30/23 14:17 07/02/23 09:04 07/03/23 07:11 07/04/23 09:34 07/05/23 20:17 07/06/23 08:50   Sodium 136 - 145 mmol/L  137 134 (L) 137 138  137 137 139   139   138   Potassium 3.4 - 5.3 mmol/L  4.2 4.2  4.0  3.6 3.9 4.2   4.0   3.6   Chloride 98 - 107 mmol/L  103 101  105  104 105 107   103   106   Carbon Dioxide (CO2) 22 - 29 mmol/L  23 23  26  24 21 (L) 27   27   22   Urea Nitrogen 8.0 - 23.0 mg/dL  6.0 (L) 6.1 (L)  13.6  10.1 11.1 9.8   6.0 (L)   6.4 (L)   Creatinine 0.51 - 0.95 mg/dL  0.78 0.73  0.80  0.76 0.75 0.72   0.73   0.91   GFR Estimate >60 mL/min/1.73m2  76 82  74  78 80 84   82   63   Calcium 8.8 - 10.2 mg/dL  9.4 9.0  9.2  9.7 9.1 9.1   9.2   9.2   Anion Gap 7 - 15 mmol/L  11 10  7  9 11 5 (L)   9   10   Magnesium 1.7 - 2.3 mg/dL   2.2  2.1  2.3  2.1   2.1   1.9   Phosphorus 2.5 - 4.5 mg/dL   3.1  3.3  3.6  3.5   3.7   3.6   Albumin 3.5 - 5.2 g/dL   3.3 (L)    3.6 3.5 3.1 (L)   3.3 (L)   3.4 (L)   Protein Total 6.4 - 8.3 g/dL   5.5 (L)    6.0 (L) 5.8 (L) 5.2 (L)   5.5 (L)   5.6 (L)   Alkaline Phosphatase 35 - 104 U/L   42    46 47 38   45   46   ALT 0 - 50 U/L   10    11 15 12   12   11   AST 0 - 45 U/L   12    16 16 14   15   13   Bilirubin Total <=1.2 mg/dL   0.3    0.3 0.3 0.4   0.4   0.3   Glucose 70 - 99 mg/dL  141 (H) 162 (H)  92  87 252 (H) 89   92   168 (H)   Hemoglobin A1C <5.7 %        5.1          WBC 4.0 - 11.0 10e3/uL   8.2    8.9 8.3 5.8   8.2   7.4   Hemoglobin 11.7 - 15.7 g/dL   12.1    12.7 12.4 11.1 (L)   11.6 (L)   12.0   Hematocrit 35.0 - 47.0 %   37.5    39.0 38.2 34.1 (L)   35.3   36.9   Platelet Count 150 - 450 10e3/uL   445    375 308 248   211   215   RBC  Count 3.80 - 5.20 10e6/uL   3.97    4.16 3.99 3.66 (L)   3.73 (L)   3.85   MCV 78 - 100 fL   95    94 96 93   95   96   MCH 26.5 - 33.0 pg   30.5    30.5 31.1 30.3   31.1   31.2   MCHC 31.5 - 36.5 g/dL   32.3    32.6 32.5 32.6   32.9   32.5   RDW 10.0 - 15.0 %   13.1    13.3 13.4 13.5   13.7   13.8   % Neutrophils %   74    74 78 69   73   77   % Lymphocytes %   19    20 16 24   18   17   % Monocytes %   6    6 5 5   8   5   % Eosinophils %   1    0 1 1   1   1   % Basophils %   0    0 0 1   0   0   Absolute Basophils 0.0 - 0.2 10e3/uL   0.0    0.0 0.0 0.0   0.0   0.0   Absolute Eosinophils 0.0 - 0.7 10e3/uL   0.1    0.0 0.1 0.1   0.1   0.0   Absolute Immature Granulocytes <=0.4 10e3/uL   0.0    0.0 0.0 0.0   0.0   0.0   Absolute Lymphocytes 0.8 - 5.3 10e3/uL   1.5    1.8 1.3 1.4   1.4   1.2   Absolute Monocytes 0.0 - 1.3 10e3/uL   0.5    0.5 0.4 0.3   0.7   0.4   % Immature Granulocytes %   0    0 0 0   0   0   Absolute Neutrophils 1.6 - 8.3 10e3/uL   6.1    6.5 6.4 4.1   6.0   5.7   Absolute NRBCs 10e3/uL   0.0    0.0 0.0 0.0   0.0   0.0   NRBCs per 100 WBC <1 /100   0    0 0 0   0   0   Color Urine Colorless, Straw, Light Yellow, Yellow            Straw       Appearance Urine Clear            Clear       Glucose Urine Negative mg/dL           Negative       Bilirubin Urine Negative            Negative       Ketones Urine Negative mg/dL           Negative       Specific Gravity Urine 1.003 - 1.035            1.002 (L)       pH Urine 5.0 - 7.0            7.5 (H)       Protein Albumin Urine Negative mg/dL           Negative       Urobilinogen mg/dL Normal, 2.0 mg/dL           Normal       Nitrite Urine Negative            Negative       Blood Urine Negative            Negative       Leukocyte Esterase Urine Negative            Negative       WBC Urine <=5 /HPF           <1       RBC Urine <=2 /HPF           0       Squamous Epithelial /HPF Urine <=1 /HPF           <1       Olanzapine Level 20 - 80 ng/mL       35            XR VIDEO SWALLOW WITH SLP OR OT           Rpt        XR COLON WATER SOLUBLE DIAGNOSTIC  Rpt                 MR ABDOMEN W/O & W CONTRAST               Rpt    EKG 12-LEAD, TRACING ONLY       Rpt       Rpt     (L): Data is abnormally low  (H): Data is abnormally high  Rpt: View report in Results Review for more information       DIAGNOSIS   Principal Problem:    Suicidal ideation  MDD, severe, recurrent episode, with psychotic features  R/O Bipolar Disorder Type 1    Active Problem List:  Patient Active Problem List   Diagnosis     Arthritis     Depressive disorder     Borderline personality disorder (H)     GERD (gastroesophageal reflux disease)     Holosystolic murmur     Asthma     History of gastric bypass     Hyperlipidemia LDL goal <130     Other insomnia     Mild mitral regurgitation     Mild aortic stenosis     Weight loss     Bilateral low back pain without sciatica     Adhesive capsulitis of shoulder, right     Mild intermittent asthma, unspecified whether complicated     Bipolar affective disorder, remission status unspecified (H)     Constipation, unspecified constipation type     Age-related osteoporosis without current pathological fracture     Plantar warts     Hypoglycemia     Failure to thrive in adult     Hypotension, unspecified hypotension type     Suicidal ideation          PLAN   1. Education given regarding diagnostic and treatment options with risks, benefits and alternatives and adequate verbalization of understanding.  2.  Medications:  Hospital  -Continue Gabapentin 100 mg at 1600 to target evening agitation/anxiety.  -Continue Prozac to 40 mg PO liquid daily to target depressed mood.  -Continue Prazosin 1 mg PO at bedtime to target anxiety/agitation at nighttime.  -Continue Namenda 10 mg tablet PO BID to treat dementia symptoms observed.  -Continue Melatonin 6 mg scheduled every evening to promote sleep.  -Continue Olanzapine ODT 2.5 mg tablet every morning to treat symptoms of  psychosis  -Continue Olanzapine 10 mg ODT tablet at bedtime to treat symptoms of psychosis  -Continue Mirtazapine 15 mg PO disintegrating tablet at HS to target depression symptoms.  -Continue Olanzapine ODT 5 mg PO tablet 3 times daily as needed for severe agitation.  -Continue Unisom 12.5 mg 3 times daily as needed for insomnia or nausea.   3. Consultations:  IM consulted on 7/5/23 to review abnormal EKG from 7/4/23: results indicated Sinus rhythm with Left axis deviation, Non-specific intra-ventricular conduction block.   GI consulted and signed off on 6/26/2023, recommendations include:  -- Consider palliative consult to help further assist with GOC if without improvement in course/sx.  -- Continue with scheduled bowel med regimen and titrate to ensure patient having at least x1 soft BM daily given chronic hx of constipation. (This provider updated Miralax order to include goal of 1 soft BM daily with the instruction to hold one dose if pt has met this goal.)  -- Continue detailed documentation of stool appearance, including color, consistency, frequency and amount.   -- Continue PPI at discharge (this provider Dc'd scheduled pepcid and continue Protonix scheduled per GI recommendatio  ---- Discontinue iron supplementation and schedule repeat iron studies in OP setting.  If develops iron deficiency off supplementation, consider IV iron infusions > PO iron supplementation  Speech therapy reconsulted completed 6/26/23 for Clinical Swallow Evaluation  -XR Video swallow study completed 6/30/2023:  -Recommendations include taking pills in puree, avoiding mixed textures, and to take small sips with thin liquids.  -Speech therapy recommending regular diet with thin liquids-provider updated patient's diet order.  -Continue one-to-one supervision as needed with eating  -Patient should use a slow rate of intake and follow reflux precautions.  Dietician consulted and following pt during admission  -Weights will be  monitored every Tuesday, Thursday, and Saturday  -Intake and output monitoring.  -Plan is for pt to eat small meals/snack throughout the day d/t history of gastric bypass.   -Supplements ordered for between meal  - Zofran 4mg PO PRN scheduled 30 minutes before meals TID to improve PO tolerance.   4. Labs/Tests   Labs: CBC, CMP, magnesium, and phosphorus levels every 3 days   EKG ordered weekly  Olanzapine drug level collected 6/27/2023 and results: Within therapeutic range: 35 ng/mL (reference range 20-80 ng/mL).  Abdominal MRI completed 7/5/23- IM updated of this.  5. Structure and Supervision  Unit 3B.  Precautions in place.  Fall precautions.  Continue on SIO monitoring due to reporting active SI with plan, engaging in SIB including scratching her arm, and confused impulsive behavior.  6.   is following in regards to collecting and reviewing collateral information, referrals and disposition planning.  Legal: civil commitment.   Referrals:  Per CTC  Care Coordination:  Per CTC  Placement:  TBD  Anticipated Discharge:  TBD     Further treatment programming to be determined throughout the hospital course.      Risk Assessment: [facility] RISK ASSESSMENT: Patient on precautions    Coordination of Care:   Treatment Plan reviewed and physician signed, Care discussed with Care/Treatment Team Members, Chart reviewed and Patient seen      Re-Certification I certify that the inpatient psychiatric facility services furnished since the previous certification were, and continue to be, medically necessary for, either, treatment which could reasonably be expected to improve the patient s condition or diagnostic study and that the hospital records indicate that the services furnished were, either, intensive treatment services, admission and related services necessary for diagnostic study, or equivalent services.     I certify that the patient continues to need, on a daily basis, active treatment furnished directly by  or requiring the supervision of inpatient psychiatric facility personnel.   I estimate 7-14 days of hospitalization is necessary for proper treatment of the patient. My plans for post-hospital care for this patient are  TBD     DANIEL Bennett CNP    -     7/6/2023     -     11:15 AM  Total time  35 minutes with > 50%spent on coordination of cares and psycho-education.    This note was created with help of Dragon dictation system. Grammatical / typing errors are not intentional.    DANIEL Bennett CNP

## 2023-07-06 NOTE — PLAN OF CARE
"  Problem: Depressive Signs/Symptoms  Goal: Improved Mood Symptoms (Depressive Signs/Symptoms)  Outcome: Not Progressing     Problem: Depressive Signs/Symptoms  Goal: Optimized Energy Level (Depressive Signs/Symptoms)  Outcome: Progressing  Intervention: Optimize Energy Level  Recent Flowsheet Documentation  Taken 7/6/2023 1020 by Negrito Cruz, RN  Activity (Behavioral Health): activity adjusted per tolerance  Goal: Increased Participation and Engagement (Depressive Signs/Symptoms)  Outcome: Progressing   Goal Outcome Evaluation:    Plan of Care Reviewed With: patient      Patient is calm, affect is flat. Limited engagement with staff during MH check-in, becomes frustrated stating \"I don't want to talk anymore\". Patient Endorses anxiety/depression/wishes to be dead. No SIB witnessed this shift, patient right forearm red and scabbing. Patient showered this shift per request. Right hip has blanchable redness, mepilex applied and q2h repositioning while awake recommended. Patient was visible for meals, ate 100% of breakfast and 25% of lunch. Minimally social with peers, patient stayed out of room for 30 mins after each meal. No emesis this shift, large loose stool noted. Patient MRI result of note showed large stool burden, provider is aware and will contact IM. See results review for more details.  Patient diet order changed, six small feedings adult ordered. Review active orders for details on new orders.    /73   Pulse 67   Temp 99  F (37.2  C) (Temporal)   Resp 16   Ht 1.626 m (5' 4\")   Wt 50.3 kg (110 lb 14.3 oz)   LMP  (LMP Unknown)   SpO2 97%   BMI 19.03 kg/m      "

## 2023-07-06 NOTE — CONSULTS
Brief Medicine Consult Note  July 6, 2023     Addendum @ 1000 on 7/6/23: Spoke w/ Galilea, COLLEEN w/ psychiatry via phone and discussed pt EKG and slightly prolonged QTc. Recommended considering switching zofran to compazine if pt still wants it scheduled. Also reviewed other findings and recommendations outlined below.     ----------------------------------------------------------------------------------------------------------------------    Medicine re-consulted on 7/6 for: abnormal EKG indicated by EKG report on 7/4/23.    Pt recently being followed for obtaining MRI inpatient to better visualize splenic and liver masses incidentally found on CT on 6/20. Medicine signed off on 7/5 as MRI was estimated to be completed by EOD 7/6.     EKG 7/4 personally reviewed by this writer-- normal sinus rhythm, rate 61; normal OH interval; prolonged QRS duration of 130ms, V1 lead low voltage so difficult to determine if RBBB present (RSR' present in 6/27/23 EKG in V1); biphasic p waves in V1, possible atrial enlargement; no ST elevation or consistent T wave changes; high amplitude T waves in V2 (consistent w/ EKG 6/27/23); Q waves present in V2, lead I (consistent w/ EKG 6/27/23); QTc 465 (previous EKG w/ QTc of 455).    When compared to previous EKG on 6/27/23, no significant change noted. No evidence of acute ischemia. Slight increase in QTc interval when compared to 6/27/23 EKG (455 --> 465).    No known hx of MI identified in chart. Pt is taking scheduled zofran before each meal. Several episodes of self-induced vomiting reported in previous days, which may explain the scheduled zofran, none since 7/3. Also on multiple psych meds that can lengthen QTc.     Additionally, pt vitals, recent labs, and recent notes reviewed. VSS, unremarkable labs (notable normal electrolytes on most recent draw 7/3). No mention of chest pain, shortness of breath, dyspnea on exertion.     Plan:  - reached out to psychiatrist to discuss possible  decreased frequency of or alternative to zofran (pt does have compazine prn which has less effect on QTc)  - in general, recommend caution when adding additional QTc prolonging medications & decreasing or finding alternatives to current QTc prolonging meds if appropriate for pt care  - no acute cardiac intervention warranted at this time as no notable EKG changes appreciated  - no need to monitor EKG from medical standpoint, okay to monitor per psych purview to monitor QTc  - please contact medicine if pt develops sob, chest pain, other new/concerning symptoms    Thank you for allowing us to participate in the care of this patient. Medicine will sign off, effective after this writer is able to discuss QTc prolonging meds with psychiatrist via phone or in person. Please do not hesitate to reach out with further questions and concerns.        Jeet Mcguire PA-C  [facility] Hospitalist Service  Page via [employer] or [employer]

## 2023-07-06 NOTE — PLAN OF CARE
Problem: Adult Behavioral Health Plan of Care  Goal: Plan of Care Review  Outcome: Progressing  Flowsheets (Taken 7/5/2023 1727)  Patient Agreement with Plan of Care: agrees   Goal Outcome Evaluation:    Plan of Care Reviewed With: patient        Pt alert pleasant and med compliant, ate> 50% supper,  which included;  A bowl of  Tomato soup, mashed potatoes,  1 mitchell pudding,  and 1 Vanila pudding, diced peaches and an Ensure.   She was able to comply with sitting up in lounge for greater than an hour after meals and socialize with table mates, she denies pain, no SI/SIB behaviors noted this shift, she is erich stable, escorted by SIO attendant and security presence to MRI 2 to assess her abdomen she had IV access placed w/o difficulty.   No purging or emesis, tolerating meals, Rt forearm clear of infection, voided x2, output 400ml, fluid intake 360ml, remains on SIO for SI/SIB, observed talking  and yelling out loudly to self in her sleep, pt hallucinating. Utilizing a medical bed for mobility.    Pt returned to unit, she tolerated procedure, IV saline lock removed, pt remains med compliant, MRI results pending, pt resting comfortably in bed with SIO attendant in place.

## 2023-07-06 NOTE — PLAN OF CARE
Problem: Anxiety Signs/Symptoms  Goal: Improved Sleep (Anxiety Signs/Symptoms)  Outcome: Not Progressing   Goal Outcome Evaluation:             Received in bed awake, calm, pleasant and conversant to SIO staff. Pt is on a medical bed to aid with mobility and HOB elevated due to episodes of vomiting.  Remained on SIO for self injury risk.  Walked the buchanan for a couple of minutes and able to make needs known.  Offered prn Unisom and pt agreed to take it.  2351 Unisom 12. 5 mg given po, made comfortable in bed. Fell asleep at 0030.  Denied pain, nausea or vomiting.  Voided x 2. Unmeasured and 400 ml, yellow, cloudy  Intake 270 ml  Slept for 8 hours

## 2023-07-06 NOTE — PROGRESS NOTES
"CLINICAL NUTRITION SERVICES - REASSESSMENT NOTE     Nutrition Prescription    RECOMMENDATIONS FOR MDs/PROVIDERS TO ORDER:  None today.     Malnutrition Status:    Severe malnutrition in the context of starvation related to social and environmental circumstances v chronic disease.     Recommendations already ordered by Registered Dietitian (RD):  -Please send at meal times entree, 1 side, 1 drink, and supplement   -10AM snack: cottage cheese + fruit cup, 2PM snack: pudding + fruit cup  -HS: magic cup (chocolate or vanilla)   -Patient care order: Please help patient retrieve TID snacks and encourage intake, please document tolerance as able so RD can ensure patient tolerating adequate intakes.     Future/Additional Recommendations:  Monitor tolerance to supplements, weights.      EVALUATION OF THE PROGRESS TOWARD GOALS   Diet: Regular  Supplement: Apple Ensure clear TID with meals  Fluid restriction: 2000 mL   Intake: Variable     NEW FINDINGS   Per chart review, patients oral intakes remain variable but overall low, she continues to self-induce emesis and spontaneously regurgitate food particles. SLP eval was unremarkable, recommended upgrade to Regular diet. In conversation with provider, they suspect that emesis is likely behavioral and plan to work with OT on creating a behavioral plan to try and address this behavior. Per 7/5 RN note, patient was able to tolerate \"> 50% supper,  which included; A bowl of  Tomato soup, mashed potatoes,  1 mitchell pudding,  and 1 Vanila pudding, diced peaches and an Ensure\" without any documented emesis.     Visited with patient at bedside. She was initially lying in bed, but then asked what time it was and if lunch had been delivered, so RD walked with patient and 1:1 to the dining room. When asked about her emesis, patient reports that she feels relief when she throws up, she endorses it is both mental and physical but could no explain any further. RD and patient sat at dining " table, RD asked if there was anything that could be done that would help with eating, and patient asked for smaller meals. RD and patient talked about setting up snacks, then patient asked if she could eat in peace.      Weights:  07/06/23 0810 50.3 kg (110 lb 14.3 oz) --   07/01/23 0832 50.6 kg (111 lb 8.8 oz) Standing scale   06/25/23 0820 48.3 kg (106 lb 7.7 oz) --   06/22/23 0822 47.1 kg (103 lb 13.4 oz) Standing scale   06/17/23 1646 48.7 kg (107 lb 5.8 oz) Standing scale   06/13/23 0811 48.9 kg (107 lb 12.9 oz) --   06/11/23 0817 48.9 kg (107 lb 12.9 oz) --   06/08/23 0827 50.7 kg (111 lb 12.4 oz) --   06/07/23 2048 51.1 kg (112 lb 10.5 oz)    Weights have been variable since admission. Weight gain in the last week if recent weights are accurate.     MALNUTRITION  % Intake: </= 50% for >/= 5 days (severe)  % Weight Loss: Weight loss does not meet criteria  Subcutaneous Fat Loss: global severe  Muscle Loss: global severe   Fluid Accumulation/Edema: None noted  Malnutrition Diagnosis: Severe malnutrition in the context of starvation related to social and environmental circumstances v chronic disease.     Previous Goals   Patient to consume at least 25-50% meals, 100% nutrition supplements.  Patient to maintain/gain weight.   Evaluation: Unable to evaluate, difficult to determine given emesis     Previous Nutrition Diagnosis  Inadequate oral intake related to altered mentation, decreased appetite, early satiety, nausea/vomiting as evidenced by 4.7% wt loss in 10 days.   Evaluation: No change    CURRENT NUTRITION DIAGNOSIS  Inadequate oral intake related to altered mentation, decreased appetite, early satiety, nausea/vomiting as evidenced by 4.7% wt loss in 10 days.     INTERVENTIONS  Implementation  Modify composition of meals/snacks    Goals  Patient to tolerating at least 75% of meals and snacks, patient to tolerate 100% of oral nutrition supplements.  Patient to maintain/gain weight.      Monitoring/Evaluation  Progress toward goals will be monitored and evaluated per protocol.    Moira Bella, MPH, RD, LD  Pediatric & Adult Behavioral Health Dietitian   Pager: [phone]  Weekend/holiday pager: [phone]

## 2023-07-07 PROCEDURE — 124N000003 HC R&B MH SENIOR/ADOLESCENT

## 2023-07-07 PROCEDURE — 250N000013 HC RX MED GY IP 250 OP 250 PS 637

## 2023-07-07 PROCEDURE — 250N000011 HC RX IP 250 OP 636

## 2023-07-07 PROCEDURE — 250N000013 HC RX MED GY IP 250 OP 250 PS 637: Performed by: PHYSICIAN ASSISTANT

## 2023-07-07 PROCEDURE — 250N000013 HC RX MED GY IP 250 OP 250 PS 637: Performed by: PSYCHIATRY & NEUROLOGY

## 2023-07-07 PROCEDURE — 99232 SBSQ HOSP IP/OBS MODERATE 35: CPT

## 2023-07-07 RX ORDER — GABAPENTIN 100 MG/1
100 CAPSULE ORAL 3 TIMES DAILY
Status: DISCONTINUED | OUTPATIENT
Start: 2023-07-07 | End: 2023-07-07

## 2023-07-07 RX ORDER — GABAPENTIN 250 MG/5ML
100 SOLUTION ORAL 3 TIMES DAILY
Status: DISCONTINUED | OUTPATIENT
Start: 2023-07-07 | End: 2023-07-10

## 2023-07-07 RX ADMIN — VALACYCLOVIR 500 MG: 500 TABLET, FILM COATED ORAL at 08:38

## 2023-07-07 RX ADMIN — ONDANSETRON 4 MG: 4 TABLET ORAL at 16:51

## 2023-07-07 RX ADMIN — FLUOXETINE 40 MG: 20 SOLUTION ORAL at 08:42

## 2023-07-07 RX ADMIN — Medication 6 MG: at 21:45

## 2023-07-07 RX ADMIN — GABAPENTIN 100 MG: 250 SUSPENSION ORAL at 16:51

## 2023-07-07 RX ADMIN — ONDANSETRON 4 MG: 4 TABLET ORAL at 05:56

## 2023-07-07 RX ADMIN — OLANZAPINE 2.5 MG: 5 TABLET, ORALLY DISINTEGRATING ORAL at 08:36

## 2023-07-07 RX ADMIN — Medication 500 MG: at 21:46

## 2023-07-07 RX ADMIN — ONDANSETRON 4 MG: 4 TABLET ORAL at 11:50

## 2023-07-07 RX ADMIN — VITAM B12 100 MCG: 100 TAB at 21:46

## 2023-07-07 RX ADMIN — Medication 2 TABLET: at 08:37

## 2023-07-07 RX ADMIN — MIRTAZAPINE 15 MG: 15 TABLET, ORALLY DISINTEGRATING ORAL at 21:46

## 2023-07-07 RX ADMIN — MEMANTINE 10 MG: 10 TABLET ORAL at 08:38

## 2023-07-07 RX ADMIN — SIMVASTATIN 40 MG: 40 TABLET, FILM COATED ORAL at 21:46

## 2023-07-07 RX ADMIN — PANTOPRAZOLE SODIUM 40 MG: 40 TABLET, DELAYED RELEASE ORAL at 05:56

## 2023-07-07 RX ADMIN — Medication 25 MCG: at 16:51

## 2023-07-07 RX ADMIN — GABAPENTIN 100 MG: 250 SUSPENSION ORAL at 21:54

## 2023-07-07 RX ADMIN — OLANZAPINE 10 MG: 10 TABLET, ORALLY DISINTEGRATING ORAL at 21:45

## 2023-07-07 RX ADMIN — PRAZOSIN HYDROCHLORIDE 1 MG: 1 CAPSULE ORAL at 21:46

## 2023-07-07 RX ADMIN — MEMANTINE 10 MG: 10 TABLET ORAL at 21:46

## 2023-07-07 ASSESSMENT — ACTIVITIES OF DAILY LIVING (ADL)
DRESS: WITH ASSISTANCE
ADLS_ACUITY_SCORE: 88
ORAL_HYGIENE: PROMPTS
LAUNDRY: UNABLE TO COMPLETE
HYGIENE/GROOMING: WITH ASSISTANCE
DRESS: SCRUBS (BEHAVIORAL HEALTH);WITH ASSISTANCE
ADLS_ACUITY_SCORE: 88
HYGIENE/GROOMING: WITH ASSISTANCE
ADLS_ACUITY_SCORE: 88
LAUNDRY: UNABLE TO COMPLETE
ADLS_ACUITY_SCORE: 88
ORAL_HYGIENE: PROMPTS

## 2023-07-07 NOTE — PLAN OF CARE
"  Problem: Anxiety Signs/Symptoms  Goal: Optimized Energy Level (Anxiety Signs/Symptoms)  Outcome: Not Progressing     Problem: Depressive Signs/Symptoms  Goal: Optimized Energy Level (Depressive Signs/Symptoms)  Outcome: Not Progressing   Goal Outcome Evaluation:    Plan of Care Reviewed With: patient      Patient remains isolative and withdrawn. She spent most of the shift in her room. Her mood is labile with a flat and blunt affect. Whenever she is asked some mental health related questions, she would say \"Leave me alone\". Patient ate 75% of her breakfast. Encouraged to drink fluids but within 2000 FR. Her Sodium level is now normal. Writer paged the IM whether to continue or not the FR. She said to just continue with the FR.     Writer re-approached the patient right before lunch for the mental health assessment. Although patient only provided a very limited answers but she said that she is not depressed but anxious being in the hospital. She said \"I could  Not get out from here\". Patient also denied hurting other people but to hurt herself. Asked what her plans are but she was just quiet. She denied hallucinations of any type. Patient's judgment and insights are poor. Her thoughts are disorganized and illogical. She had a large loose to pasty stools this morning x 1. She was cleaned up. She declined to have a shower but said maybe later. She ate 75% of her breakfast and 25% of lunch. Patient is using a medical bed to facilitate her mobility. She is on SIO r/t self-injury risk. Her Gabapentin is increased to 100 mgs 3x/day from daily. Per Galilea, to continue giving the bowel meds like Miralax and Senokot even if the patient is having loose stools r/t result of the MRI (Ps see results) and the plan of the IM (Pls read IM progress notes).                 "

## 2023-07-07 NOTE — PROGRESS NOTES
Brief Medicine Progress Note  July 7, 2023     Contacted by psychiatry NP regarding pt MRI revealing high stool burden. She is concerned about continued stool burden after gastrografin enema on 6/21/23 by GI who then signed off.     Additionally, pt completed MRI on 7/5/23. Per radiologist impression, there are many indeterminate splenic lesions (possibly hemangiomas), possible liver hemangioma, and large volume stool. Radiology recommends follow up MRI in 6-12 months.     Pt vitals stable, WBC normal, no new symptoms reported.    Plan:    #Constipation  - recommend trial suppository this afternoon (ordered PRN)  - enema once daily prn ordered if suppository unsuccessful  - continue miralax BID scheduled  - continue senokot once daily scheduled  - continue senokot and dulcolax prn  - ordered milk of magnesia prn     #Splenic lesions  #Possible liver hemangioma  - no acute intervention indicated at this time  - follow up with PCP outpatient for discussion of timing of next MRI and other follow up or work up if needed    Thank you for allowing us to participate in the care of this patient. Medicine will sign off at this point. Please do not hesitate to reach out with further questions and concerns.       Jeet Mcguire PA-C  [facility] Hospitalist Service  Page via [employer] or [employer]

## 2023-07-07 NOTE — PLAN OF CARE
Problem: Anxiety Signs/Symptoms  Goal: Improved Sleep (Anxiety Signs/Symptoms)  Outcome: Progressing   Goal Outcome Evaluation:         Received in bed asleep, medical bed is being used to aid with mobility due to Osteoporosis and HOB elevated at 30 degrees to prevent reflux and on SIO  for self  injury risk. No comments made about wanting to die. No episodes of diarrhea/nausea/vomiting.Medication compliant.  Intake : 60 ml  Output: x 1      Slept for 8 hours

## 2023-07-07 NOTE — PLAN OF CARE
Assessment/Intervention/Current Symptoms and Care Coordination  -Chart review and met with team, discussed pt progress, symptomology, and response to treatment.  Discussed the discharge plan and any potential impediments to discharge .Pt will now receive smaller meals to help with stomach pain /induced vomiting. Discussed possible nursing home placement due to medical needs   -CYNTHIA Hodgson ([phone]) plans to visit pt on 7/12 at 1pm.   -Writer spoke to pt who reported feeling horrible but would not explain why. Pt informed writer that she would not like to return back to [facility], when writer asked why, pt asked writer to leave. Writer did inform pt that her CM plans to visit her next week, pt continued to ask writer to leave.    Discharge Plan or Goal  Pending stabilization & development of a safe discharge plan.     Considerations include:  [facility] (formerly [facility]) has been holding patient's bed since 5/5, when patient first went into the hospital.  They will be unable to hold the bed for very much longer but does want patient to be able to return when stable if they can still meet her needs.  She will contact us to let us know once the bed has been let go.  We would then need to re-refer when patient is stable.   May need a new nursing home referral.     Barriers to Discharge   Patient requires further psychiatric stabilization due to current symptomology     Referral Status   None     Contacts:  MAYITO Hodgson with [facility] [phone].  [facility] [phone] ext.[phone] Josefina  : Lorenzo Whipple [phone]     Legal Status  Patient is under MI commitment in [facility]  Pt will need PD upon discharge

## 2023-07-07 NOTE — PLAN OF CARE
"  Problem: Anxiety Signs/Symptoms  Goal: Optimized Energy Level (Anxiety Signs/Symptoms)  Outcome: Not Progressing  Intervention: Optimize Energy Level  Recent Flowsheet Documentation  Taken 7/6/2023 1747 by Tiana Becerra RN  Patient Performed Hygiene: dressed  Diversional Activity: (CHELSEA) other (see comments)  Activity (Behavioral Health): activity adjusted per tolerance     Problem: Depressive Signs/Symptoms  Goal: Optimized Energy Level (Depressive Signs/Symptoms)  Outcome: Not Progressing  Intervention: Optimize Energy Level  Recent Flowsheet Documentation  Taken 7/6/2023 1747 by Tiana Becerra RN  Patient Performed Hygiene: dressed  Diversional Activity: (CHELSEA) other (see comments)  Activity (Behavioral Health): activity adjusted per tolerance   Goal Outcome Evaluation:    Plan of Care Reviewed With: patient      Patient is isolative and withdrawn. She avoids social contact with peers and staff. She is dismissive. She drove away staff and this writer whenever, she is asked questions. She said \"Leave me alone\". She only answer a question r/t pain. Writer CHELSEA patient's mental status. Her mood is irritable with a flat and blunt affect. Staff doing the SIO (1:1) with her, patient refused to talk or say anything. She also refused to eat her dinner. Patient was encouraged to drink fluids. She took all her bedtime meds with apple sauce. She is using a medical bed for mobility. She remains on SIO r/t self-injury risk. No nausea nor vomiting noted this shift. /64; P62.                 "

## 2023-07-08 ENCOUNTER — APPOINTMENT (OUTPATIENT)
Dept: GENERAL RADIOLOGY | Facility: CLINIC | Age: 83
DRG: 885 | End: 2023-07-08
Payer: COMMERCIAL

## 2023-07-08 PROCEDURE — 74018 RADEX ABDOMEN 1 VIEW: CPT

## 2023-07-08 PROCEDURE — 250N000013 HC RX MED GY IP 250 OP 250 PS 637: Performed by: PSYCHIATRY & NEUROLOGY

## 2023-07-08 PROCEDURE — 250N000013 HC RX MED GY IP 250 OP 250 PS 637

## 2023-07-08 PROCEDURE — 74018 RADEX ABDOMEN 1 VIEW: CPT | Mod: 26 | Performed by: RADIOLOGY

## 2023-07-08 PROCEDURE — 124N000003 HC R&B MH SENIOR/ADOLESCENT

## 2023-07-08 PROCEDURE — 250N000013 HC RX MED GY IP 250 OP 250 PS 637: Performed by: PHYSICIAN ASSISTANT

## 2023-07-08 PROCEDURE — 250N000011 HC RX IP 250 OP 636

## 2023-07-08 RX ADMIN — ONDANSETRON 4 MG: 4 TABLET ORAL at 16:32

## 2023-07-08 RX ADMIN — VITAM B12 100 MCG: 100 TAB at 21:08

## 2023-07-08 RX ADMIN — SIMVASTATIN 40 MG: 40 TABLET, FILM COATED ORAL at 21:10

## 2023-07-08 RX ADMIN — VALACYCLOVIR 500 MG: 500 TABLET, FILM COATED ORAL at 08:30

## 2023-07-08 RX ADMIN — Medication 25 MCG: at 16:32

## 2023-07-08 RX ADMIN — Medication 500 MG: at 21:11

## 2023-07-08 RX ADMIN — FLUOXETINE 40 MG: 20 SOLUTION ORAL at 08:31

## 2023-07-08 RX ADMIN — OLANZAPINE 2.5 MG: 5 TABLET, ORALLY DISINTEGRATING ORAL at 08:30

## 2023-07-08 RX ADMIN — GABAPENTIN 100 MG: 250 SUSPENSION ORAL at 16:32

## 2023-07-08 RX ADMIN — MEMANTINE 10 MG: 10 TABLET ORAL at 21:10

## 2023-07-08 RX ADMIN — MIRTAZAPINE 15 MG: 15 TABLET, ORALLY DISINTEGRATING ORAL at 21:11

## 2023-07-08 RX ADMIN — Medication 6 MG: at 21:08

## 2023-07-08 RX ADMIN — PANTOPRAZOLE SODIUM 40 MG: 40 TABLET, DELAYED RELEASE ORAL at 06:42

## 2023-07-08 RX ADMIN — GABAPENTIN 100 MG: 250 SUSPENSION ORAL at 21:08

## 2023-07-08 RX ADMIN — GABAPENTIN 100 MG: 250 SUSPENSION ORAL at 08:32

## 2023-07-08 RX ADMIN — MEMANTINE 10 MG: 10 TABLET ORAL at 08:30

## 2023-07-08 RX ADMIN — ONDANSETRON 4 MG: 4 TABLET ORAL at 12:21

## 2023-07-08 RX ADMIN — ONDANSETRON 4 MG: 4 TABLET ORAL at 06:42

## 2023-07-08 RX ADMIN — OLANZAPINE 10 MG: 10 TABLET, ORALLY DISINTEGRATING ORAL at 21:08

## 2023-07-08 ASSESSMENT — ACTIVITIES OF DAILY LIVING (ADL)
DRESS: WITH ASSISTANCE
ORAL_HYGIENE: WITH ASSISTANCE
HYGIENE/GROOMING: WITH ASSISTANCE
ADLS_ACUITY_SCORE: 88
HYGIENE/GROOMING: WITH ASSISTANCE
ORAL_HYGIENE: WITH ASSISTANCE
ADLS_ACUITY_SCORE: 88
LAUNDRY: UNABLE TO COMPLETE
ADLS_ACUITY_SCORE: 88
DRESS: WITH ASSISTANCE

## 2023-07-08 NOTE — PLAN OF CARE
"  Problem: Depressive Signs/Symptoms  Goal: Enhanced Self-Esteem and Confidence (Depressive Signs/Symptoms)  Flowsheets (Taken 7/8/2023 4790)  Mutually Determined Action Steps (Enhanced Self-Esteem and Confidence):   identifies unrealistic self-expectation   maintains daily journal/log   verbalizes successes   Goal Outcome Evaluation:       Nursing Assessment    Suicidal ideation [R45.851]    Admit Date: 6/7/2023    Length of Stay: 31    Patient evaluation continues. Assessed mood,anxiety,thoughts and behavior. Patient is  progressing towards goals. Patient is encouraged to participate in groups and assisted to develop healthy coping skills.  Patient denies auditory or visual hallucinations. BP (!) 76/40 (BP Location: Right arm, Patient Position: Sitting, Cuff Size: Adult Small)   Pulse 68   Temp 98.8  F (37.1  C) (Temporal)   Resp 16   Ht 1.626 m (5' 4\")   Wt 50.3 kg (110 lb 14.3 oz)   LMP  (LMP Unknown)   SpO2 100%   BMI 19.03 kg/m      B/P low this am 76/40 fluid intake good and encouraged. Drank 240 ml juice and 200 ml clear ensure. Pt went down for a repeat X ray of abd.     Mood: good    Patient reports depression none and reports anxiety none    Affect:bright, full range    Sleep: good 9 hours at night    Appetite: good 75%    SI: denies    HI: denies    SIB: denies      Medication Compliance yes    Group participation: yes    ADL's: independent    Fall risk interventions: proper foot wear, orthostatic B/P    Dennys Score Interventions:none    Discharge planning in process    Refer to daily team meeting notes for individualized plan of care. Nursing will continue to assess.    *Scale is 1-10 and 10 is the worst.                        "

## 2023-07-08 NOTE — PLAN OF CARE
Problem: Anxiety Signs/Symptoms  Goal: Optimized Energy Level (Anxiety Signs/Symptoms)  7/7/2023 2044 by Tiana Becerra RN  Outcome: Not Progressing  7/7/2023 1055 by Tiana Becerra RN  Outcome: Not Progressing  Intervention: Optimize Energy Level  Recent Flowsheet Documentation  Taken 7/7/2023 1811 by Tiana Becerra RN  Patient Performed Hygiene: dressed  Diversional Activity: (CHELSEA) other (see comments)  Activity (Behavioral Health): activity adjusted per tolerance  Taken 7/7/2023 1203 by Tiana Becerra RN  Patient Performed Hygiene: dressed  Diversional Activity: (CHELSEA) other (see comments)  Activity (Behavioral Health): activity adjusted per tolerance     Problem: Depressive Signs/Symptoms  Goal: Optimized Energy Level (Depressive Signs/Symptoms)  7/7/2023 2044 by Tiana Becerra RN  Outcome: Not Progressing  7/7/2023 1055 by Tiana Becerra RN  Outcome: Not Progressing  Intervention: Optimize Energy Level  Recent Flowsheet Documentation  Taken 7/7/2023 1811 by Tiana Becerra RN  Patient Performed Hygiene: dressed  Diversional Activity: (CHELSEA) other (see comments)  Activity (Behavioral Health): activity adjusted per tolerance  Taken 7/7/2023 1203 by Tiana Becerra RN  Patient Performed Hygiene: dressed  Diversional Activity: (CHELSEA) other (see comments)  Activity (Behavioral Health): activity adjusted per tolerance   Goal Outcome Evaluation:    Plan of Care Reviewed With: patient      Patient came to the dining area for dinner and stayed there for about an hour. She ate approximately 25% of her dinner but she drank her Ensure and a glass of cranberry juice. She refused to attend both the community meeting and psychotherapy session. Her mood is anxious with a flat affect. She went back to her room and took a nap. Per staff, patient had bouts of loose stools in small amount x 10. She was thoroughly cleaned up. T- 98.8; BP 94/57; P-64. No other complaints made. Denied pain. Writer paged  "the Hospitalist on-call Lizeth Venegas regarding patient's frequency of loose stools with an order to hold bowel meds and re-assess tomorrow 7/8/23.     Patient is dismissive. She only provides very limited answers to questions asked. She mostly tells the staff and this writer \"leave me alone\". Though, she denied hallucinations SI/SIB when asked. Her judgement and insights are poor. Patients thoughts are disorganized. She is med compliant. Her Gabapentin now is 100 mgs solution 3x/day instead of daily. Patient slept after taking her bedtime meds. She is using a medical bed for mobility and on SIO r/t self-injury risk.               "

## 2023-07-08 NOTE — CARE PLAN
07/07/23 2002   Group Therapy Session   Group Attendance refused to attend group session   Time Session Began 1900   Time Session Ended 1945   Total Time (minutes) 0   Total # Attendees 6   Group Type psychotherapeutic   Group Topic Covered coping skills/lifestyle management;emotions/expression   Group Session Detail Group members discussed/completed a gratitude journal listing things they were grateful for and the reasons why.     FERNANDO Bingham, [facility]  Clinical Treatment Coordinator  [facility]

## 2023-07-08 NOTE — PROGRESS NOTES
Brief Medicine Progress Note  July 8, 2023     -------------------------------------------------------------------------------------------------------------    Addendum @ 1200 on 7/8/23:   Saw patient on floor, she was sleeping soundly w/ 1:1 present. Did not wake patient. Appears comfortable, breathing comfortably on room air. No acute distress. Pt appears thin.    Spoke with pt bedside nurse, other nursing staff, and current 1:1 observer. Biggest concern is pt's stool burden and occasional vomiting. Vomiting is both self-induced and spontaneous. Not projectile. Usually digested foods, no coffee ground appearance, no gross blood. Per discussion with nursing and chart review, pt does not complain of abdominal pain or nausea.  Most recent labs performed on 7/6/2023 without any electrolyte abnormalities appreciated.    AXR completed-- moderate stool burden, non-obstructive gas pattern.     Suspect pt emesis is multifactorial, both behavioral and spontaneous. Given pt high stool burden, possible that the colon is occupying significant space in the abdomen, creating discomfort w/ increased gastric volume associated w/ eating or drinking.     Addended A&P:     #Constipation  - trial tap water enema this afternoon  - continue to hold scheduled bowel regimen today 2/2 hypotension and desire to avoid further dehydration  - will unhold prn bowel meds    #Acute Hypotension  - continue oral fluid resuscitation  - blood pressure rechecks until SBP > 95    -------------------------------------------------------------------------------------------------------------    Medicine has been following peripherally for constipation, increase stool burden has been noted on multiple imaging modalities, most recently MRI on 7/5/2023.  Of note, patient was seen by GI team while inpatient.  They did perform a Gastrografin enema on 6/21/2023, and have signed off since then.  Their recommendation was to continue anticonstipation  medications.    Patient current bowel regimen includes MiraLAX scheduled twice a day and Senokot scheduled twice a day.  She has senna-docusate, milk of magnesia, Dulcolax suppository, and enema ordered as needed.  Cross cover was paged last evening (7/7/2023) regarding greater than 10 loose stools throughout the day.  No reports of large bowel movements, only small loose bowel movements.  For this reason, cross cover provider held the patient's evening bowel meds to prevent dehydration.    Spoke with nursing this morning, there is concern that current bowel regimen has not been sufficient.  In addition, patient was hypotensive this morning with BP of 74/38, with repeat blood pressure shortly thereafter of similar value.  Patient has been sipping on fluids and drinking her Ensure.  Patient is asymptomatic per nursing report.  Patient does not appear to be uncomfortable, fluid depleted.    A&P:    #Constipation  Based on imaging, most suspicious for constipation secondary to very high stool burden.  Reassured by the fact that patient denies nausea, vomiting, abdominal pain, fevers.  Until this morning, patient is remained hemodynamically stable.  However, patient blood pressure this morning revealed BP of 74/38,  Which may be indicative of hypovolemia.   Given the fact that patient has been receiving stool softeners consistently, and continues to have small loose stools, there is some concern for possible distal obstruction and/or passing stool around a more firm mass of stool.  At this point, MAR reflects that patient is yet to try suppository or enema.  -Repeat abdominal x-ray ordered to evaluate stool burden and rule out obstruction  -In absence of obstruction or other concerning acute process, recommend trialing suppository or enema this morning  -If suppository or enema are not successful in producing more significant stool output, would be beneficial to consider a manual disimpaction  -Please hold  scheduled  and as needed bowel medications this morning until abdominal x-ray is complete    #Acute hypotension  Patient vitals revealed blood pressure of 74/38 this morning.  Previously patient has been hemodynamically stable.  Concern for hypovolemia secondary to consistent bowel regimen resulting in loose stools without resolution of stool burden.  Per patient nurse, patient has had hypotensive episodes before, and has been amenable to increased fluid intake to help normalize her pressures.  According to nurse, patient does not appear hypovolemic or overly dry.  Patient is asymptomatic.  -Encourage p.o. fluid intake  -Repeat blood pressure in 1 hour, then reassess need for fluid bolus  -Hold bowel regimen for now    Medicine will continue to follow along.  Recommendations relayed to primary team via this progress note.  Thank you for the opportunity to be involved in this patient's care.      Jeet Mcguire PA-C  [facility]  Page via [facility] or [facility]

## 2023-07-08 NOTE — PLAN OF CARE
"  Problem: Suicidal Behavior  Goal: Suicidal Behavior is Absent or Managed  Outcome: Progressing   Goal Outcome Evaluation:    Plan of Care Reviewed With: patient       Patient appeared unkempt. She was dismissive and argumentative initially during assessment. She denied pain abdominal pain, SI/HI. She did not respond to other questions. When the writer introduced herself, patient said, \"I don't care who you are or what you do, just leave me alone.\" She calmed down later in the afternoon. She passed soft brown stool 2x this shift. She was isolative and withdrawn. Patient was medication compliant. No PRN given.     Patient has a wound on right hip covered with Mepilex 4 x 4 for protection.    She ate 50% of her dinner and 75% of her snacks.    Total fluid intake: 800 ml    Total fluid output: CHELSEA - patient passed urine in pull-ups    No emesis this shift.     HS Prazosin held d/t low diastolic blood pressure           "

## 2023-07-08 NOTE — PLAN OF CARE
Problem: Anxiety Signs/Symptoms  Goal: Improved Sleep (Anxiety Signs/Symptoms)  Outcome: Progressing   Goal Outcome Evaluation:             Slept well for 9.25 hours  A Medical bed is being used to aid with mobility due to Osteoporosis and HOB elevated at 30 degrees to prevent reflux and on SIO  for self  injury risk. No episodes of loose stools or vomiting.  Intake : 60  Output : 0

## 2023-07-08 NOTE — PROGRESS NOTES
"PSYCHIATRY  PROGRESS NOTE     DATE OF SERVICE   7/7/2023         CHIEF COMPLAINT   \"Terrible.\"       SUBJECTIVE   Nursing reports:     Patient is isolative and withdrawn. She avoids social contact with peers and staff. She is dismissive. She drove away staff and this writer whenever, she is asked questions. She said \"Leave me alone\". She only answer a question r/t pain. Writer CHELSEA patient's mental status. Her mood is irritable with a flat and blunt affect. Staff doing the SIO (1:1) with her, patient refused to talk or say anything. She also refused to eat her dinner. Patient was encouraged to drink fluids. She took all her bedtime meds with apple sauce. She is using a medical bed for mobility. She remains on SIO r/t self-injury risk. No nausea nor vomiting noted this shift. /64; P62.    Received in bed asleep, medical bed is being used to aid with mobility due to Osteoporosis and HOB elevated at 30 degrees to prevent reflux and on SIO  for self  injury risk. No comments made about wanting to die. No episodes of diarrhea/nausea/vomiting.Medication compliant.  Intake : 60 ml  Output: x 1  Slept for 8 hours    Social work reports:     Assessment/Intervention/Current Symptoms and Care Coordination  -Chart review and met with team, discussed pt progress, symptomology, and response to treatment.  Discussed the discharge plan and any potential impediments to discharge .Pt will now receive smaller meals to help with stomach pain /induced vomiting. Discussed possible nursing home placement due to medical needs   -CYNTHIA Hodgson ([phone]) plans to visit pt on 7/12 at 1pm.   -Writer spoke to pt who reported feeling horrible but would not explain why. Pt informed writer that she would not like to return back to [facility], when writer asked why, pt asked writer to leave. Writer did inform pt that her CM plans to visit her next week, pt continued to ask writer to leave.     Discharge Plan or Goal  Pending stabilization " "& development of a safe discharge plan.     Considerations include:  [facility] ([facility] has been holding patient's bed since 5/5, when patient first went into the hospital.  They will be unable to hold the bed for very much longer but does want patient to be able to return when stable if they can still meet her needs.  She will contact us to let us know once the bed has been let go.  We would then need to re-refer when patient is stable.   May need a new nursing home referral.     Barriers to Discharge   Patient requires further psychiatric stabilization due to current symptomology     Referral Status   None     Contacts:  MAYITO Hodgson with [facility] [phone].  [facility] [phone] ext.[phone] Josefina  : Lorenzo Whipple [phone]     Legal Status  Patient is under MI commitment in [facility]  Pt will need PD upon discharge     OBJECTIVE   Met with pt in hospital room on unit 3B. Pt affect appears flat and gaurded today. Pt reports mood as, \"Terrible.\"  Patient also states, \"I do not want to talk.\"  Pt's thoughts continue to be disorganized and patient continues to experience confusion intermittently. Patient continues to endorse symptoms of severe depression with thoughts of SI and no plan. Pt will continue on SIO monitoring which is in place due to patient reporting SI, engaging in SIB, and ongoing confusion/impulsive behaviors.  Patient does report feeling increased anxiety today however does not provide further detail regarding this reported symptom.  Discussed with patient that plan to address will be to increase gabapentin 100 mg PO liquid to 3 times a day to target agitation/anxiety.  Patient verbalizes understanding and in agreement with this. Also, continue scheduled Prozac 40 mg liquid daily to target depressed mood, Namenda 10 mg PO BID, Prazosin 1 mg PO at bedtime, Olanzapine ODT 2.5 mg tablet every morning, Olanzapine 10 " mg ODT tablet at bedtime, and Mirtazapine 15 mg PO disintegrating tablet at HS. Olanzapine drug level checked on 6/27/2023 and results within therapeutic range: 35 ng/mL (reference range 20-80 ng/mL).     Patient continues to be on a 2 L fluid restriction per IM to treat hyponatremia; sodium level 7/6/2023 was 138 mml/L.  CMP ordered for every 3 days to monitor. IM has been following pt to evaluate hyponatremia and IM was notified on 6/15/23 of altered level of consciousness which presented as increased confusion compared to baseline since admission and increased agitation. Per IM provider- pt's waxing and waning symptoms most likely secondary to a primary psychiatric disorder with high likelihood for underlying dementia; there is no clear reversible organic cause of her symptoms. Speech therapy was consulted 6/15/23 and Chest Xray was also ordered to evaluate due to concern for aspiration with frequent regurgitation /emesis; chest xray did not show acute concern for aspiration. Speech therapy was reconsulted to address ongoing spontaneous regurgitation of small amounts after eating; patient has also been observed inducing vomiting by sticking her fingers in her mouth. The XR video swallow study was completed on 6/30/2023: results indicate that patient's esophagus is mildly dilated without any other abnormality; no aspiration. Per documentation speech therapist is recommending regular diet with thin liquids as well as for nursing to administer pills in purée such as applesauce. GI followed pt to address frequent regurgitation /emesis, poor P.O intake, and severe constipation. GI started pt on bowel regimen and esophagram and upper GI studies were completed 6/19/23. Per IM provider documentation, GI provider reports that results indicate delayed emptying, but no concern for strictures; if unable to tolerate any oral intake a CT abdomen with contrast should be performed to rule out intra-abdominal pathology. Pt was  not tolerating p.o. intake at that time and a CT abdomen/pelvis with contrast was completed 6/20/2023: report of results indicated a large volume predominantly low density colonic stool intermixed with hyperattenuating contrast (from 6/19/2023 esophagram) extending from the cecum through the entire length of the colon to the rectum.  Subsequently a therapeutic gastrograffin enema with IR was completed 6/21/23 which was effective for removing a large volume of stool. GI luminal team signed off on 6/26/2023 with recommendations for ongoing bowel regimen as well as recommendations for discharge. Plan is to continue MiraLAX and stool softeners with goal of patient achieving 1-2 soft bowel movement daily per GI recommendation. Pt has been having frequent loose/liquid stools ongoing per nursing.     Patient then had an MRI of abdomen completed per radiology recommendation on 7/5/23; results indicate:  1.  Innumerable enhancing splenic lesions are indeterminate, possibly  benign hemangiomas. Follow-up MRI may be obtained in 6-12 months to  assess for change.  2.  Previously described hepatic segment 2 lesion is now well  evaluated, though may represent a hemangioma as suggested on prior CT  scan.  3.  Persistent large volume stool throughout the colon may indicate  constipation.     7/7/23: IM provider following peripherally reviewed results of abdominal MRI showing constipation despite pt on bowel regimen recommended by GI and continuing to have ongoing frequent loose/liquid stools.  IM provider recommends:  - recommend trial suppository this afternoon (ordered PRN)  - enema once daily prn ordered if suppository unsuccessful  - continue miralax BID scheduled  - continue senokot once daily scheduled  - continue senokot and dulcolax prn  - ordered milk of magnesia prn      Dietitian is following pt due to inadequate oral intake related to altered mentation, decreased appetite, and early satiety. Patient has been meeting her  nutrition goals which include: patient to consume at least 25-50% meals, 100% nutrition supplements, and for patient to maintain/gain weight. To support pt's dietary goal of maintaining/gaining weight patient care order in place for staff to promote patient eating small meals and snacks throughout the day; order also includes that pt needs supervision while eating to encourage intake and monitor for reflux/regurgitation. Pt weights will be monitored every Tuesday, Thursday, and Saturday; also monitoring intake and output.  Plan will be to continue to monitor weights and p.o. intake. Pt recent weights below.    Vitals:    06/25/23 0820 07/01/23 0832 07/06/23 0810   Weight: 48.3 kg (106 lb 7.7 oz) 50.6 kg (111 lb 8.8 oz) 50.3 kg (110 lb 14.3 oz)     Provider spoke with  Rema with [facility] [phone] on 7/6/2023.  Rema reports that she will contact nursing home facility that patient was living at prior to admission to coordinate picking up patient's glasses (if they are there)  which patient is requesting and will bring them to the hospital on 7/14/23. Patient's  also reports that she plans on coming to visit the patient to complete intake paperwork on 7/14/23 at 1 PM and requesting that unit CTC be present for this as well as provider if possible as patient has refused to complete paperwork twice.  Today, provider updated team regarding this and requested unit CTC contact patient's  to coordinate further as needed regarding this upcoming meeting as well as discharge planning.     MEDICATIONS   Medications:  Scheduled Meds:    calcium carbonate  500 mg Oral At Bedtime     childrens multivitamin with iron  2 tablet Oral Daily     cyanocobalamin  100 mcg Oral At Bedtime     FLUoxetine  40 mg Oral Daily     gabapentin  100 mg Oral TID     melatonin  6 mg Oral QPM     memantine  10 mg Oral BID     mirtazapine  15 mg Orally disintegrating tablet At Bedtime     OLANZapine zydis  2.5  "mg Oral QAM     OLANZapine zydis  10 mg Oral At Bedtime     ondansetron  4 mg Oral TID AC     pantoprazole  40 mg Oral QAM AC     polyethylene glycol  17 g Oral BID     prazosin  1 mg Oral At Bedtime     sennosides  8.6 mg Oral BID     simvastatin  40 mg Oral At Bedtime     valACYclovir  500 mg Oral Daily     cholecalciferol  25 mcg Oral Daily with supper     Continuous Infusions:  PRN Meds:.acetaminophen, alum & mag hydroxide-simethicone, bisacodyl, budesonide-formoterol, calcium carbonate, doxylamine, magnesium hydroxide, OLANZapine zydis, prochlorperazine, senna-docusate, sodium phosphate    Medication adherence issues: MS Med Adherence Y/N: No  Medication side effects: MEDICATION SIDE EFFECTS: no side effects reported   Benefit: Yes / No: Yes       ROS   Pertinent items are noted in HPI.       MENTAL STATUS EXAM   Vitals:BP 94/57 (BP Location: Right arm, Patient Position: Sitting, Cuff Size: Adult Small)   Pulse 64   Temp 98.8  F (37.1  C) (Temporal)   Resp 16   Ht 1.626 m (5' 4\")   Wt 50.3 kg (110 lb 14.3 oz)   LMP  (LMP Unknown)   SpO2 97%   BMI 19.03 kg/m    Appearance:  In hospital scrubs, Disheveled.   Mood: \"Terrible.\"  Affect: flat and guarded was congruent to speech.  Suicidal Ideation: absent  Homicidal Ideation: PRESENT / ABSENT: absent   Thought process: difficult to follow and disorganized however improving   Thought content: endorses preoccupations  Fund of Knowledge: average  Attention/Concentration: Poor  Language ability:  Intact  Memory:  Immediate recall impaired however improving, Short-term memory impaired and Long-term memory impaired  Insight:  limited.  Judgement: limited  Orientation: Oriented to self   Psychomotor Behavior: normal or unremarkable    Muscle Strength and Tone: MuscleStrength: Normal  Gait and Station: slightly stiff however steady with walker       LABS   Personally reviewed.  Albumin level noted to be low: 3.4. Will continue to monitor this. Also, reviewed " Abdominal MRI and results indicate:  1.  Innumerable enhancing splenic lesions are indeterminate, possibly  benign hemangiomas. Follow-up MRI may be obtained in 6-12 months to  assess for change.  2.  Previously described hepatic segment 2 lesion is now well  evaluated, though may represent a hemangioma as suggested on prior CT  scan.  3.  Persistent large volume stool throughout the colon may indicate  Constipation. Provided notified IM provider following regarding this.      Latest Reference Range & Units 06/21/23 15:05 06/22/23 08:41 06/22/23 09:38 06/23/23 07:57 06/26/23 07:15 06/27/23 09:55 06/27/23 11:43 06/29/23 10:22 06/30/23 08:00 06/30/23 14:17 07/02/23 09:04 07/03/23 07:11 07/04/23 09:34 07/05/23 20:17 07/06/23 08:50   Sodium 136 - 145 mmol/L  137 134 (L) 137 138  137 137 139   139   138   Potassium 3.4 - 5.3 mmol/L  4.2 4.2  4.0  3.6 3.9 4.2   4.0   3.6   Chloride 98 - 107 mmol/L  103 101  105  104 105 107   103   106   Carbon Dioxide (CO2) 22 - 29 mmol/L  23 23  26  24 21 (L) 27   27   22   Urea Nitrogen 8.0 - 23.0 mg/dL  6.0 (L) 6.1 (L)  13.6  10.1 11.1 9.8   6.0 (L)   6.4 (L)   Creatinine 0.51 - 0.95 mg/dL  0.78 0.73  0.80  0.76 0.75 0.72   0.73   0.91   GFR Estimate >60 mL/min/1.73m2  76 82  74  78 80 84   82   63   Calcium 8.8 - 10.2 mg/dL  9.4 9.0  9.2  9.7 9.1 9.1   9.2   9.2   Anion Gap 7 - 15 mmol/L  11 10  7  9 11 5 (L)   9   10   Magnesium 1.7 - 2.3 mg/dL   2.2  2.1  2.3  2.1   2.1   1.9   Phosphorus 2.5 - 4.5 mg/dL   3.1  3.3  3.6  3.5   3.7   3.6   Albumin 3.5 - 5.2 g/dL   3.3 (L)    3.6 3.5 3.1 (L)   3.3 (L)   3.4 (L)   Protein Total 6.4 - 8.3 g/dL   5.5 (L)    6.0 (L) 5.8 (L) 5.2 (L)   5.5 (L)   5.6 (L)   Alkaline Phosphatase 35 - 104 U/L   42    46 47 38   45   46   ALT 0 - 50 U/L   10    11 15 12   12   11   AST 0 - 45 U/L   12    16 16 14   15   13   Bilirubin Total <=1.2 mg/dL   0.3    0.3 0.3 0.4   0.4   0.3   Glucose 70 - 99 mg/dL  141 (H) 162 (H)  92  87 252 (H) 89   92   168 (H)    Hemoglobin A1C <5.7 %        5.1          WBC 4.0 - 11.0 10e3/uL   8.2    8.9 8.3 5.8   8.2   7.4   Hemoglobin 11.7 - 15.7 g/dL   12.1    12.7 12.4 11.1 (L)   11.6 (L)   12.0   Hematocrit 35.0 - 47.0 %   37.5    39.0 38.2 34.1 (L)   35.3   36.9   Platelet Count 150 - 450 10e3/uL   445    375 308 248   211   215   RBC Count 3.80 - 5.20 10e6/uL   3.97    4.16 3.99 3.66 (L)   3.73 (L)   3.85   MCV 78 - 100 fL   95    94 96 93   95   96   MCH 26.5 - 33.0 pg   30.5    30.5 31.1 30.3   31.1   31.2   MCHC 31.5 - 36.5 g/dL   32.3    32.6 32.5 32.6   32.9   32.5   RDW 10.0 - 15.0 %   13.1    13.3 13.4 13.5   13.7   13.8   % Neutrophils %   74    74 78 69   73   77   % Lymphocytes %   19    20 16 24   18   17   % Monocytes %   6    6 5 5   8   5   % Eosinophils %   1    0 1 1   1   1   % Basophils %   0    0 0 1   0   0   Absolute Basophils 0.0 - 0.2 10e3/uL   0.0    0.0 0.0 0.0   0.0   0.0   Absolute Eosinophils 0.0 - 0.7 10e3/uL   0.1    0.0 0.1 0.1   0.1   0.0   Absolute Immature Granulocytes <=0.4 10e3/uL   0.0    0.0 0.0 0.0   0.0   0.0   Absolute Lymphocytes 0.8 - 5.3 10e3/uL   1.5    1.8 1.3 1.4   1.4   1.2   Absolute Monocytes 0.0 - 1.3 10e3/uL   0.5    0.5 0.4 0.3   0.7   0.4   % Immature Granulocytes %   0    0 0 0   0   0   Absolute Neutrophils 1.6 - 8.3 10e3/uL   6.1    6.5 6.4 4.1   6.0   5.7   Absolute NRBCs 10e3/uL   0.0    0.0 0.0 0.0   0.0   0.0   NRBCs per 100 WBC <1 /100   0    0 0 0   0   0   Color Urine Colorless, Straw, Light Yellow, Yellow            Straw       Appearance Urine Clear            Clear       Glucose Urine Negative mg/dL           Negative       Bilirubin Urine Negative            Negative       Ketones Urine Negative mg/dL           Negative       Specific Gravity Urine 1.003 - 1.035            1.002 (L)       pH Urine 5.0 - 7.0            7.5 (H)       Protein Albumin Urine Negative mg/dL           Negative       Urobilinogen mg/dL Normal, 2.0 mg/dL           Normal       Nitrite  Urine Negative            Negative       Blood Urine Negative            Negative       Leukocyte Esterase Urine Negative            Negative       WBC Urine <=5 /HPF           <1       RBC Urine <=2 /HPF           0       Squamous Epithelial /HPF Urine <=1 /HPF           <1       Olanzapine Level 20 - 80 ng/mL       35           XR VIDEO SWALLOW WITH SLP OR OT           Rpt        XR COLON WATER SOLUBLE DIAGNOSTIC  Rpt                 MR ABDOMEN W/O & W CONTRAST               Rpt    EKG 12-LEAD, TRACING ONLY       Rpt       Rpt     (L): Data is abnormally low  (H): Data is abnormally high  Rpt: View report in Results Review for more information       DIAGNOSIS   Principal Problem:    Suicidal ideation  MDD, severe, recurrent episode, with psychotic features  R/O Bipolar Disorder Type 1    Active Problem List:  Patient Active Problem List   Diagnosis     Arthritis     Depressive disorder     Borderline personality disorder (H)     GERD (gastroesophageal reflux disease)     Holosystolic murmur     Asthma     History of gastric bypass     Hyperlipidemia LDL goal <130     Other insomnia     Mild mitral regurgitation     Mild aortic stenosis     Weight loss     Bilateral low back pain without sciatica     Adhesive capsulitis of shoulder, right     Mild intermittent asthma, unspecified whether complicated     Bipolar affective disorder, remission status unspecified (H)     Constipation, unspecified constipation type     Age-related osteoporosis without current pathological fracture     Plantar warts     Hypoglycemia     Failure to thrive in adult     Hypotension, unspecified hypotension type     Suicidal ideation          PLAN   1. Education given regarding diagnostic and treatment options with risks, benefits and alternatives and adequate verbalization of understanding.  2.  Medications:  Hospital  -Increase Gabapentin to 100 mg PO liquid TID to target agitation/anxiety.  -Continue Prozac to 40 mg PO liquid daily to  target depressed mood.  -Continue Prazosin 1 mg PO at bedtime to target anxiety/agitation at nighttime.  -Continue Namenda 10 mg tablet PO BID to treat dementia symptoms observed.  -Continue Melatonin 6 mg scheduled every evening to promote sleep.  -Continue Olanzapine ODT 2.5 mg tablet every morning to treat symptoms of psychosis  -Continue Olanzapine 10 mg ODT tablet at bedtime to treat symptoms of psychosis  -Continue Mirtazapine 15 mg PO disintegrating tablet at HS to target depression symptoms.  -Continue Olanzapine ODT 5 mg PO tablet 3 times daily as needed for severe agitation.  -Continue Unisom 12.5 mg 3 times daily as needed for insomnia or nausea.   3. Consultations:  Internal medicine consulted regarding results of patient's abdominal MRI which show large stool burden.  IM recommendations from today 7/7/2023 include:  - recommend trial suppository this afternoon (ordered PRN)  - enema once daily prn ordered if suppository unsuccessful  - continue miralax BID scheduled  - continue senokot once daily scheduled  - continue senokot and dulcolax prn  - ordered milk of magnesia prn   GI consulted and signed off on 6/26/2023, GI recommendations include:  -- Consider palliative consult to help further assist with GOC if without improvement in course/sx.  -- Continue with scheduled bowel med regimen and titrate to ensure patient having at least x1 soft BM daily given chronic hx of constipation.  -- Continue detailed documentation of stool appearance, including color, consistency, frequency and amount.   -- Continue PPI at discharge (Protonix)  ---- Discontinue iron supplementation and schedule repeat iron studies in OP setting.  If develops iron deficiency off supplementation, consider IV iron infusions > PO iron supplementation  Speech therapy reconsulted completed 6/26/23 for Clinical Swallow Evaluation  -XR Video swallow study completed 6/30/2023:  -Recommendations include taking pills in puree, avoiding mixed  textures, and to take small sips with thin liquids.  -Speech therapy recommending regular diet with thin liquids-provider updated patient's diet order.  -Continue one-to-one supervision as needed with eating  -Patient should use a slow rate of intake and follow reflux precautions.  Dietician consulted and following pt during admission  -Weights will be monitored every Tuesday, Thursday, and Saturday  -Intake and output monitoring.  -Plan is for pt to eat small meals/snack throughout the day d/t history of gastric bypass.   -Supplements ordered for between meal  - Zofran 4mg PO PRN scheduled 30 minutes before meals TID to improve PO tolerance.   4. Labs/Tests   Labs: CBC, CMP, magnesium, and phosphorus levels every 3 days   EKG ordered weekly  Olanzapine drug level collected 6/27/2023 and results: Within therapeutic range: 35 ng/mL (reference range 20-80 ng/mL).  5. Structure and Supervision  Unit 3B.  Precautions in place.  Fall precautions.  Continue on SIO monitoring due to reporting active SI with plan, engaging in SIB including scratching her arm, and confused impulsive behavior.  6.   is following in regards to collecting and reviewing collateral information, referrals and disposition planning.  Legal: civil commitment.   Referrals:  Per CTC  Care Coordination:  Per CTC  Placement:  TBD  Anticipated Discharge:  TBD     Further treatment programming to be determined throughout the hospital course.      [facility]K ASSESS[facility]: Patient on precautions    Coordination of Care:   Treatment Plan reviewed and physician signed, Care discussed with Care/Treatment Team Members, Chart reviewed and Patient seen      Re-Certification I certify that the inpatient psychiatric facility services furnished since the previous certification were, and continue to be, medically necessary for, either, treatment which could reasonably be expected to improve the patient s condition or diagnostic study  and that the hospital records indicate that the services furnished were, either, intensive treatment services, admission and related services necessary for diagnostic study, or equivalent services.     I certify that the patient continues to need, on a daily basis, active treatment furnished directly by or requiring the supervision of inpatient psychiatric facility personnel.   I estimate 7-14 days of hospitalization is necessary for proper treatment of the patient. My plans for post-hospital care for this patient are  TBD     DANIEL Bennett CNP    -     7/7/2023     -     10:00 AM  Total time  35 minutes with > 50%spent on coordination of cares and psycho-education.    This note was created with help of Dragon dictation system. Grammatical / typing errors are not intentional.    DANIEL Bennett CNP

## 2023-07-08 NOTE — PLAN OF CARE
"  Problem: Depressive Signs/Symptoms  Goal: Increased Participation and Engagement (Depressive Signs/Nursing Assessment    Suicidal ideation [R45.851]    Admit Date: 6/7/2023    Length of Stay: 31    Patient evaluation continues. Assessed mood,anxiety,thoughts and behavior. Patient is progressing towards goals. Patient is encouraged to participate in groups and assisted to develop healthy coping skills.  Patient denies auditory or visual hallucinations. /66 (BP Location: Right arm, Patient Position: Sitting, Cuff Size: Adult Small)   Pulse 83   Temp 98.8  F (37.1  C) (Temporal)   Resp 16   Ht 1.626 m (5' 4\")   Wt 50.3 kg (110 lb 14.3 oz)   LMP  (LMP Unknown)   SpO2 94%   BMI 19.03 kg/m       Pt received Tap water enema see notes. Bowel medications held until after enema see orders. Pt stated that she felt nauseated at times. Ate 50% of lunch. Mood continues down and depressed. Pt had 1 soft med BM this shift before enema. B/P running soft 90/54 repeated and fluids increased 116/70    Mood: sad, depressed    Patient reports depression 8/10 and reports anxiety 7/10    Affect:flat blunted    Sleep: good 9 hours last night    Appetite: 50%    SI: denies    HI: denies    SIB: denies      Medication Compliance yes    Group participation:none    ADL's: assisted by staff    Fall risk interventions: proper foot wear, orthostatic B/p increased fluids    Dennys Score Interventions: none    Discharge planning in process    Refer to daily team meeting notes for individualized plan of care. Nursing will continue to assess.    *Scale is 1-10 and 10 is the worst.       Symptoms)  Flowsheets (Taken 7/8/2023 1416)  Mutually Determined Action Steps (Increased Participation and Engagement): initiates interaction with others  Intervention: Facilitate Participation and Engagement  Recent Flowsheet Documentation  Taken 7/8/2023 1300 by Radha Munguia, RN  Diversional Activity: music   Goal Outcome Evaluation:    Plan of Care " Reviewed With: patient

## 2023-07-08 NOTE — PLAN OF CARE
Problem: Bowel Elimination Management  Goal: Effective Bowel Elimination/Continence  Outcome: Progressing   Goal Outcome Evaluation:    Tap water enema given per order. Pt did have discomfort in her abdomen, but no pain. BS + 4 Q, abdomen soft, non tender. Pt compliant and cooperative during procedure. Pt was incontinent of large soft stool before procedure.   Procedure was explained to pt and pt agreed to have the enema. Pt was given approximately 800 ml of warm water. During the nema administration, the pt was unable to hold the fluid in her colon. She expelled 1100 ml of light brown liquid stool with few small soft pieces. Pt sat on commode and has another 100 ml of liquid and soft stool. Pt cleaned up and lied back in bed. Pt was incontinent of large soft light brown stool. Cleaned and sat on commode. Pt stood up to go back to bed and was cleaned up. Pt started to leak stool on floor. Pt has been encouraged to remain on commode until she is finished having uncontrolled bowel movements. Pt's skin remains pink and intact. Cori spray is used for cleansing. Pt is monitored by her SIO staff.     Skin assessment completed. Bilateral heels are dry, peeling skin. Non blanchable red area on right hip, old wound site, covered with Mepilex 4 x 4 for protection.

## 2023-07-08 NOTE — PROGRESS NOTES
Brief Medicine Cross Cover Note     Notified by nursing that patient was having >10 loose small BM today, asking about scheduled bowel meds. Patient without any abdominal pain or N/V per nursing. With constipation, patient could have overflow diarrhea, but with significant diarrhea I also don't want to cause dehydration or electrolyte abnormalities.     Recommend holding evening bowel meds this evening and reassessing by IM in the AM. Please notify medicine if patient develops any black or bloody stools, abdominal pain, fevers, N/V, or any other concerning symptoms.     Lizeth Venegas [facility] Medicine

## 2023-07-09 LAB
ALBUMIN SERPL BCG-MCNC: 3.2 G/DL (ref 3.5–5.2)
ALP SERPL-CCNC: 43 U/L (ref 35–104)
ALT SERPL W P-5'-P-CCNC: 9 U/L (ref 0–50)
ANION GAP SERPL CALCULATED.3IONS-SCNC: 8 MMOL/L (ref 7–15)
AST SERPL W P-5'-P-CCNC: 12 U/L (ref 0–45)
BASOPHILS # BLD AUTO: 0 10E3/UL (ref 0–0.2)
BASOPHILS NFR BLD AUTO: 0 %
BILIRUB SERPL-MCNC: 0.2 MG/DL
BUN SERPL-MCNC: 12.6 MG/DL (ref 8–23)
CALCIUM SERPL-MCNC: 9.1 MG/DL (ref 8.8–10.2)
CHLORIDE SERPL-SCNC: 107 MMOL/L (ref 98–107)
CREAT SERPL-MCNC: 0.83 MG/DL (ref 0.51–0.95)
DEPRECATED HCO3 PLAS-SCNC: 23 MMOL/L (ref 22–29)
EOSINOPHIL # BLD AUTO: 0.1 10E3/UL (ref 0–0.7)
EOSINOPHIL NFR BLD AUTO: 1 %
ERYTHROCYTE [DISTWIDTH] IN BLOOD BY AUTOMATED COUNT: 13.7 % (ref 10–15)
GFR SERPL CREATININE-BSD FRML MDRD: 70 ML/MIN/1.73M2
GLUCOSE SERPL-MCNC: 98 MG/DL (ref 70–99)
HCT VFR BLD AUTO: 35.8 % (ref 35–47)
HGB BLD-MCNC: 11.8 G/DL (ref 11.7–15.7)
IMM GRANULOCYTES # BLD: 0 10E3/UL
IMM GRANULOCYTES NFR BLD: 0 %
LYMPHOCYTES # BLD AUTO: 1.7 10E3/UL (ref 0.8–5.3)
LYMPHOCYTES NFR BLD AUTO: 24 %
MAGNESIUM SERPL-MCNC: 2.2 MG/DL (ref 1.7–2.3)
MCH RBC QN AUTO: 31.1 PG (ref 26.5–33)
MCHC RBC AUTO-ENTMCNC: 33 G/DL (ref 31.5–36.5)
MCV RBC AUTO: 95 FL (ref 78–100)
MONOCYTES # BLD AUTO: 0.4 10E3/UL (ref 0–1.3)
MONOCYTES NFR BLD AUTO: 6 %
NEUTROPHILS # BLD AUTO: 5 10E3/UL (ref 1.6–8.3)
NEUTROPHILS NFR BLD AUTO: 69 %
NRBC # BLD AUTO: 0 10E3/UL
NRBC BLD AUTO-RTO: 0 /100
PHOSPHATE SERPL-MCNC: 3.6 MG/DL (ref 2.5–4.5)
PLATELET # BLD AUTO: 192 10E3/UL (ref 150–450)
POTASSIUM SERPL-SCNC: 4.2 MMOL/L (ref 3.4–5.3)
PROT SERPL-MCNC: 5.4 G/DL (ref 6.4–8.3)
RBC # BLD AUTO: 3.79 10E6/UL (ref 3.8–5.2)
SODIUM SERPL-SCNC: 138 MMOL/L (ref 136–145)
WBC # BLD AUTO: 7.3 10E3/UL (ref 4–11)

## 2023-07-09 PROCEDURE — 80053 COMPREHEN METABOLIC PANEL: CPT

## 2023-07-09 PROCEDURE — 250N000011 HC RX IP 250 OP 636

## 2023-07-09 PROCEDURE — 250N000013 HC RX MED GY IP 250 OP 250 PS 637: Performed by: PSYCHIATRY & NEUROLOGY

## 2023-07-09 PROCEDURE — 250N000013 HC RX MED GY IP 250 OP 250 PS 637

## 2023-07-09 PROCEDURE — 250N000013 HC RX MED GY IP 250 OP 250 PS 637: Performed by: PHYSICIAN ASSISTANT

## 2023-07-09 PROCEDURE — 85025 COMPLETE CBC W/AUTO DIFF WBC: CPT

## 2023-07-09 PROCEDURE — 124N000003 HC R&B MH SENIOR/ADOLESCENT

## 2023-07-09 PROCEDURE — 83735 ASSAY OF MAGNESIUM: CPT

## 2023-07-09 PROCEDURE — 84100 ASSAY OF PHOSPHORUS: CPT

## 2023-07-09 PROCEDURE — 36415 COLL VENOUS BLD VENIPUNCTURE: CPT

## 2023-07-09 RX ORDER — POLYETHYLENE GLYCOL 3350 17 G/17G
17 POWDER, FOR SOLUTION ORAL 2 TIMES DAILY PRN
Status: DISCONTINUED | OUTPATIENT
Start: 2023-07-09 | End: 2023-07-13

## 2023-07-09 RX ADMIN — Medication 25 MCG: at 17:43

## 2023-07-09 RX ADMIN — Medication 2 TABLET: at 08:37

## 2023-07-09 RX ADMIN — VITAM B12 100 MCG: 100 TAB at 21:36

## 2023-07-09 RX ADMIN — ONDANSETRON 4 MG: 4 TABLET ORAL at 06:50

## 2023-07-09 RX ADMIN — FLUOXETINE 40 MG: 20 SOLUTION ORAL at 08:41

## 2023-07-09 RX ADMIN — MEMANTINE 10 MG: 10 TABLET ORAL at 08:38

## 2023-07-09 RX ADMIN — Medication 500 MG: at 21:36

## 2023-07-09 RX ADMIN — MIRTAZAPINE 15 MG: 15 TABLET, ORALLY DISINTEGRATING ORAL at 21:36

## 2023-07-09 RX ADMIN — PANTOPRAZOLE SODIUM 40 MG: 40 TABLET, DELAYED RELEASE ORAL at 06:50

## 2023-07-09 RX ADMIN — ONDANSETRON 4 MG: 4 TABLET ORAL at 17:43

## 2023-07-09 RX ADMIN — PRAZOSIN HYDROCHLORIDE 1 MG: 1 CAPSULE ORAL at 21:36

## 2023-07-09 RX ADMIN — OLANZAPINE 2.5 MG: 5 TABLET, ORALLY DISINTEGRATING ORAL at 08:38

## 2023-07-09 RX ADMIN — VALACYCLOVIR 500 MG: 500 TABLET, FILM COATED ORAL at 08:39

## 2023-07-09 RX ADMIN — MEMANTINE 10 MG: 10 TABLET ORAL at 20:23

## 2023-07-09 RX ADMIN — Medication 6 MG: at 20:22

## 2023-07-09 RX ADMIN — SIMVASTATIN 40 MG: 40 TABLET, FILM COATED ORAL at 21:36

## 2023-07-09 RX ADMIN — OLANZAPINE 10 MG: 10 TABLET, ORALLY DISINTEGRATING ORAL at 21:36

## 2023-07-09 RX ADMIN — GABAPENTIN 100 MG: 250 SUSPENSION ORAL at 08:41

## 2023-07-09 RX ADMIN — GABAPENTIN 100 MG: 250 SUSPENSION ORAL at 16:46

## 2023-07-09 RX ADMIN — GABAPENTIN 100 MG: 250 SUSPENSION ORAL at 21:36

## 2023-07-09 RX ADMIN — ONDANSETRON 4 MG: 4 TABLET ORAL at 12:05

## 2023-07-09 RX ADMIN — SENNOSIDES 8.6 MG: 8.6 TABLET ORAL at 20:24

## 2023-07-09 ASSESSMENT — ACTIVITIES OF DAILY LIVING (ADL)
ADLS_ACUITY_SCORE: 88

## 2023-07-09 NOTE — PLAN OF CARE
"  Problem: Depressive Signs/Symptoms  Goal: Improved Mood Symptoms (Depressive Signs/Symptoms)  Outcome: Not Progressing  Intervention: Promote Mood Improvement  Recent Flowsheet Documentation  Taken 7/9/2023 1119 by Radha Munguia RN  Diversional Activity: music   Goal Outcome Evaluation:    Plan of Care Reviewed With: patient        Nursing Assessment    Suicidal ideation [R45.851]    Admit Date: 6/7/2023    Length of Stay: 32    Patient evaluation continues. Assessed mood,anxiety,thoughts and behavior. Patient is not  progressing towards goals, mentally discouraged and frequently states \"I don't want to live like this\". Pt has been able to come out to lounge and stay longer and is less dismissive. Pt has had large amount of gas expelled but no diarrhea or BM this shift. Appetite 30% with a good fluid intake of 500 ml. B/P better today 132/81. Small emesis after lunch. Patient is encouraged to participate in groups and assisted to develop healthy coping skills.  Patient denies auditory or visual hallucinations. /81 (Patient Position: Sitting)   Pulse 65   Temp 97.3  F (36.3  C) (Temporal)   Resp 17   Ht 1.626 m (5' 4\")   Wt 51.4 kg (113 lb 5.1 oz)   LMP  (LMP Unknown)   SpO2 97%   BMI 19.45 kg/m       Medical bed for mobility      Mood: labile, happy and smiling and then does not want to talk at others.    Patient reports depression 9/10 and reports anxiety 8/10    Affect:flat, blunted    Sleep: good 6 hours on night shift 2 on evening shift    Appetite: better 30% plus some snacks    SI: denies    HI: denies    SIB: denies      Medication Compliance yes. IM updated about yesterdays enema results and restarting bowel medications see orders.    Group participation:none    ADL's: assisted by 1-2 staff    Fall risk interventions: proper foot wear, orthostatic B/p, walker with standby assist    Dennys Score Interventions: none    Discharge planning in process    Refer to daily team meeting notes for " individualized plan of care. Nursing will continue to assess.    *Scale is 1-10 and 10 is the worst.

## 2023-07-09 NOTE — PLAN OF CARE
Problem: Anxiety Signs/Symptoms  Goal: Improved Sleep (Anxiety Signs/Symptoms)  Outcome: Progressing   Goal Outcome Evaluation:             Received in bed sleeping, A Medical bed is being used to aid with mobility due to Osteoporosis and HOB elevated at 30 degrees to prevent reflux and on SIO  for self  injury risk.  Voided twice, no BM this shift. No comments on wanting to die. Medication compliant,  Intake:60 ml  Slept for 5.5 hours

## 2023-07-09 NOTE — PROGRESS NOTES
Brief Medicine Progress Note  July 9, 2023     Spoke with patient nurse, Radha, today regarding patient bowel regimen.    Per Radha and the nurses working with her this afternoon, the patient did receive a tap water enema yesterday, 7/8, which was very successful.  She reports 1100 mL of output plus patient soaking 3 incontinence pads.  She did not have any bowel movements last night, but today is passing a lot of flatus.  Previously if patient passed flatus, she would also leak stool, so nursing is encouraged by this development.  Additionally, patient has had a slightly increased appetite today compared to previous days.  She has presented to the [facility] several times to request snacks, which is not something she has previously done. Also consuming more fluids. Nursing is wondering if we need to continue to hold her daily constipation medications.    Based on large stool output during or soon after the enema yesterday, passing of flatus without stool leakage, and patient increased appetite/interest in eating, suspect that much of the patient's stool burden has now been relieved.  At this point, do not think patient requires as  intensive of a scheduled bowel regimen.  It is appropriate to continue a more mild daily anticonstipation regimen to ensure that 1.  Patient bowel movements remain of normal to soft consistency to prevent further constipation and 2.  Continue to soften remaining stool burden.    A&P:    #Constipation, improving  Based on nursing reports of successful enema and subsequent bowel movements and flatus without stool leakage, encouraged that patient constipation is resolving.  However given such large stool burden on multiple imaging modalities, would still like to exercise caution with regard to a daily scheduled bowel regimen.  Patient has labs, including a BMP, drawn every 3 days.  Most recent BMP was drawn on 7/9/2023 and was unremarkable, which is reassuring that the patient has not  suffered significant GI losses leading to metabolic or electrolyte derangements.   - MiraLAX changed to as needed  - Continue Senokot daily x5 days, hold for loose stools  - As needed bowel regimen updated: MiraLAX and senna-docusate available as needed; also has suppository if needed in the future; milk of magnesia and Dulcolax discontinued  - No indication for repeat abdominal x-ray to evaluate stool burden at this time as patient is having bowel movements    Please contact medicine if:  -- patient has a return of constipation (infrequent bowel movements, significant straining to void, patient reports of constipation/discomfort)  --Any other new/concerning symptoms that arise related to patient's bowel movements or any other medical condition    Thank you for allowing us to participate in the care of this patient. Medicine will sign off at this point. Please do not hesitate to reach out with further questions and concerns.     Jeet Mcguire PA-C  [facility]  Page via [employer] or [employer]

## 2023-07-09 NOTE — PROGRESS NOTES
"While on SIO with patient, pt started accusing writer of forcing her to go downstairs. Pt became more and more agitated even after reassurance she would not need to go downstairs. Pt told writer \"you are a lousy son of a bitch. A worthless bitch. I hate you. You're the boss, so everyone will believe you over me. Leave me alone!\". Verbal reassurance did not seem to be therapeutic so this writer sat silently with pt. RN notified.  "

## 2023-07-10 PROCEDURE — 124N000003 HC R&B MH SENIOR/ADOLESCENT

## 2023-07-10 PROCEDURE — 99232 SBSQ HOSP IP/OBS MODERATE 35: CPT

## 2023-07-10 PROCEDURE — 250N000013 HC RX MED GY IP 250 OP 250 PS 637: Performed by: PSYCHIATRY & NEUROLOGY

## 2023-07-10 PROCEDURE — 250N000013 HC RX MED GY IP 250 OP 250 PS 637

## 2023-07-10 PROCEDURE — 250N000013 HC RX MED GY IP 250 OP 250 PS 637: Performed by: PHYSICIAN ASSISTANT

## 2023-07-10 PROCEDURE — 250N000011 HC RX IP 250 OP 636

## 2023-07-10 RX ORDER — PRAZOSIN HYDROCHLORIDE 1 MG/1
1 CAPSULE ORAL 2 TIMES DAILY
Status: CANCELLED | OUTPATIENT
Start: 2023-07-10

## 2023-07-10 RX ORDER — PRAZOSIN HYDROCHLORIDE 1 MG/1
1 CAPSULE ORAL
Status: DISCONTINUED | OUTPATIENT
Start: 2023-07-10 | End: 2023-07-16 | Stop reason: HOSPADM

## 2023-07-10 RX ORDER — GABAPENTIN 250 MG/5ML
100 SOLUTION ORAL 3 TIMES DAILY
Status: DISCONTINUED | OUTPATIENT
Start: 2023-07-10 | End: 2023-07-11

## 2023-07-10 RX ORDER — GABAPENTIN 250 MG/5ML
200 SOLUTION ORAL 3 TIMES DAILY
Status: DISCONTINUED | OUTPATIENT
Start: 2023-07-10 | End: 2023-07-10

## 2023-07-10 RX ADMIN — Medication 2 TABLET: at 08:17

## 2023-07-10 RX ADMIN — VITAM B12 100 MCG: 100 TAB at 21:08

## 2023-07-10 RX ADMIN — Medication 25 MCG: at 16:39

## 2023-07-10 RX ADMIN — OLANZAPINE 2.5 MG: 5 TABLET, ORALLY DISINTEGRATING ORAL at 08:17

## 2023-07-10 RX ADMIN — SENNOSIDES 8.6 MG: 8.6 TABLET ORAL at 08:17

## 2023-07-10 RX ADMIN — Medication 6 MG: at 21:08

## 2023-07-10 RX ADMIN — OLANZAPINE 10 MG: 10 TABLET, ORALLY DISINTEGRATING ORAL at 21:08

## 2023-07-10 RX ADMIN — GABAPENTIN 100 MG: 250 SOLUTION ORAL at 16:38

## 2023-07-10 RX ADMIN — Medication 500 MG: at 21:08

## 2023-07-10 RX ADMIN — FLUOXETINE 40 MG: 20 SOLUTION ORAL at 08:17

## 2023-07-10 RX ADMIN — GABAPENTIN 100 MG: 250 SUSPENSION ORAL at 08:18

## 2023-07-10 RX ADMIN — ONDANSETRON 4 MG: 4 TABLET ORAL at 06:38

## 2023-07-10 RX ADMIN — SIMVASTATIN 40 MG: 40 TABLET, FILM COATED ORAL at 21:08

## 2023-07-10 RX ADMIN — ONDANSETRON 4 MG: 4 TABLET ORAL at 12:09

## 2023-07-10 RX ADMIN — GABAPENTIN 100 MG: 250 SOLUTION ORAL at 21:18

## 2023-07-10 RX ADMIN — PANTOPRAZOLE SODIUM 40 MG: 40 TABLET, DELAYED RELEASE ORAL at 06:38

## 2023-07-10 RX ADMIN — ONDANSETRON 4 MG: 4 TABLET ORAL at 16:39

## 2023-07-10 RX ADMIN — SENNOSIDES 8.6 MG: 8.6 TABLET ORAL at 21:08

## 2023-07-10 RX ADMIN — MEMANTINE 10 MG: 10 TABLET ORAL at 08:17

## 2023-07-10 RX ADMIN — MEMANTINE 10 MG: 10 TABLET ORAL at 21:08

## 2023-07-10 RX ADMIN — PRAZOSIN HYDROCHLORIDE 1 MG: 1 CAPSULE ORAL at 17:48

## 2023-07-10 RX ADMIN — MIRTAZAPINE 15 MG: 15 TABLET, ORALLY DISINTEGRATING ORAL at 21:08

## 2023-07-10 RX ADMIN — VALACYCLOVIR 500 MG: 500 TABLET, FILM COATED ORAL at 08:17

## 2023-07-10 ASSESSMENT — ACTIVITIES OF DAILY LIVING (ADL)
DRESS: WITH ASSISTANCE
DRESS: WITH ASSISTANCE
LAUNDRY: UNABLE TO COMPLETE
ADLS_ACUITY_SCORE: 76
ADLS_ACUITY_SCORE: 88
ADLS_ACUITY_SCORE: 76
ADLS_ACUITY_SCORE: 88
HYGIENE/GROOMING: WITH ASSISTANCE
ADLS_ACUITY_SCORE: 76
ADLS_ACUITY_SCORE: 88
ADLS_ACUITY_SCORE: 88
ADLS_ACUITY_SCORE: 76
ORAL_HYGIENE: WITH ASSISTANCE
ADLS_ACUITY_SCORE: 88
ORAL_HYGIENE: WITH ASSISTANCE
ADLS_ACUITY_SCORE: 76
HYGIENE/GROOMING: WITH ASSISTANCE

## 2023-07-10 NOTE — PLAN OF CARE
Problem: Depressive Signs/Symptoms  Goal: Improved Sleep (Depressive Signs/Symptoms)  Outcome: Progressing   Goal Outcome Evaluation:               Received in bed sleeping, Pt has a medical bed to aid with  mobility, HOB kept at 30 degrees angle due to hx of nausea and vomiting. Remains on SIO for self injury risk.. No cooments made about wanting to die.  Intake: 50  Output:0  No BM this shift     Slept for 8.5 hours

## 2023-07-10 NOTE — PLAN OF CARE
"Problem: Adult Behavioral Health Plan of Care  Goal: Plan of Care Review  Flowsheets  Taken 7/10/2023 1708  Plan of Care Reviewed With: patient  Overall Patient Progress: no change  Patient Agreement with Plan of Care: agrees with comment (describe)  Taken 7/10/2023 1650  Patient Agreement with Plan of Care: agrees     Patient was on SIO 5 feet for suicide risk and self-injury risk. She was using a medical bed for bed mobility. She's ambulating steadily with the use of a walker. She vomited 6x per SIO staff. Mixture of mucus, undigested food and maybe some of her meds? Pt said, \" I can't help it,\" repeatedly when trying to redirect her from vomiting. Staff accompanied her walking in and out of her bedroom to distract her but pt still vomited. She declined mental health assessment.  Requested writer to hold her hands and did not answer mental health questions. Pt did not attend groups this evening. Intake=25% of food, 360 ml fluids. Urine output was not measured, staff was unsure how many times pt voided. Estimated amount of vomitus was 200 ml. Will continue to monitor and assess pt.  "

## 2023-07-10 NOTE — PLAN OF CARE
Assessment/Intervention/Current Symptoms and Care Coordination  -Chart review and met with team, discussed pt progress, symptomology, and response to treatment.  Discussed the discharge plan and any potential impediments to discharge .Pt will now receive smaller meals to help with stomach pain /induced vomiting. Discussed possible nursing home placement due to medical needs   -CYNTHIA Hodgson ([phone]) plans to visit pt on 7/12 at 1pm.   -Patient continues to experience abdominal discomfort and exhibits irritability.      Discharge Plan or Goal  Pending stabilization & development of a safe discharge plan.     Considerations include:  [facility] ([facility] has been holding patient's bed since 5/5, when patient first went into the hospital.  They will be unable to hold the bed for very much longer but does want patient to be able to return when stable if they can still meet her needs.  She will contact us to let us know once the bed has been let go.  We would then need to re-refer when patient is stable.   May need a new nursing home referral.     Barriers to Discharge   Patient requires further psychiatric stabilization due to current symptomology     Referral Status   None     Contacts:  MAYITO Hodgson with [facility] [phone].  [facility] [phone] ext.[phone] Josefina  : Lorenzo Whipple [phone]     Legal Status  Patient is under MI commitment in [facility]  Pt will need PD upon discharge

## 2023-07-10 NOTE — PLAN OF CARE
"Goal Outcome Evaluation:    Plan of Care Reviewed With: patient        Nursing Assessment    Suicidal ideation [R45.851]    Admit Date: 6/7/2023    Length of Stay: 33    Patient evaluation continues. Assessed mood,anxiety,thoughts and behavior. Patient is  progressing towards goals. Pt is more cooperative and has had no inappropriate behaviors. Patient is encouraged to participate in groups and assisted to develop healthy coping skills.  Patient denies auditory or visual hallucinations. /62 (BP Location: Right arm, Patient Position: Sitting, Cuff Size: Adult Small)   Pulse 82   Temp 98.6  F (37  C) (Temporal)   Resp 18   Ht 1.626 m (5' 4\")   Wt 51.4 kg (113 lb 5.1 oz)   LMP  (LMP Unknown)   SpO2 96%   BMI 19.45 kg/m      Pt continues to have a blister on her right thumb that is from pt sucking her thumb at times. Less reddened and and smaller in size, intact.    Mood: good    Patient reports depression 5/10 and reports anxiety 5/10    Affect: flat blunted but brightens upon approach    Sleep: 9 hours last night    Appetite: fair    SI: denies    HI: denies    SIB: denies      Medication Compliance : yes    Group participation:none    ADL's: assisted by staff    Fall risk interventions: proper foot wear, orthostatic B/p, walker with stand by assist    Dennys Score Interventions:none    Discharge planning in process    Refer to daily team meeting notes for individualized plan of care. Nursing will continue to assess.    *Scale is 1-10 and 10 is the worst.                  Problem: Depressive Signs/Symptoms  Goal: Increased Participation and Engagement (Depressive Signs/Symptoms)  Flowsheets (Taken 7/10/2023 6516)  Mutually Determined Action Steps (Increased Participation and Engagement): participates in one or more activity  Intervention: Facilitate Participation and Engagement  Recent Flowsheet Documentation  Taken 7/10/2023 0840 by Radha Munguia, RN  Diversional Activity: music     "

## 2023-07-11 PROCEDURE — 93005 ELECTROCARDIOGRAM TRACING: CPT

## 2023-07-11 PROCEDURE — 250N000013 HC RX MED GY IP 250 OP 250 PS 637: Performed by: PHYSICIAN ASSISTANT

## 2023-07-11 PROCEDURE — 250N000011 HC RX IP 250 OP 636

## 2023-07-11 PROCEDURE — 250N000013 HC RX MED GY IP 250 OP 250 PS 637

## 2023-07-11 PROCEDURE — 93010 ELECTROCARDIOGRAM REPORT: CPT | Performed by: INTERNAL MEDICINE

## 2023-07-11 PROCEDURE — 250N000013 HC RX MED GY IP 250 OP 250 PS 637: Performed by: PSYCHIATRY & NEUROLOGY

## 2023-07-11 PROCEDURE — 124N000003 HC R&B MH SENIOR/ADOLESCENT

## 2023-07-11 PROCEDURE — 99232 SBSQ HOSP IP/OBS MODERATE 35: CPT

## 2023-07-11 RX ORDER — GABAPENTIN 250 MG/5ML
200 SOLUTION ORAL 3 TIMES DAILY
Status: DISCONTINUED | OUTPATIENT
Start: 2023-07-11 | End: 2023-07-16 | Stop reason: HOSPADM

## 2023-07-11 RX ORDER — FLUOXETINE 20 MG/5ML
10 SOLUTION ORAL ONCE
Status: COMPLETED | OUTPATIENT
Start: 2023-07-11 | End: 2023-07-11

## 2023-07-11 RX ORDER — FLUOXETINE 20 MG/5ML
50 SOLUTION ORAL DAILY
Status: DISCONTINUED | OUTPATIENT
Start: 2023-07-12 | End: 2023-07-13

## 2023-07-11 RX ADMIN — OLANZAPINE 2.5 MG: 5 TABLET, ORALLY DISINTEGRATING ORAL at 08:26

## 2023-07-11 RX ADMIN — GABAPENTIN 200 MG: 250 SUSPENSION ORAL at 20:33

## 2023-07-11 RX ADMIN — Medication 25 MCG: at 17:38

## 2023-07-11 RX ADMIN — PANTOPRAZOLE SODIUM 40 MG: 40 TABLET, DELAYED RELEASE ORAL at 06:19

## 2023-07-11 RX ADMIN — FLUOXETINE 40 MG: 20 SOLUTION ORAL at 08:26

## 2023-07-11 RX ADMIN — VALACYCLOVIR 500 MG: 500 TABLET, FILM COATED ORAL at 08:26

## 2023-07-11 RX ADMIN — GABAPENTIN 200 MG: 250 SUSPENSION ORAL at 17:42

## 2023-07-11 RX ADMIN — ONDANSETRON 4 MG: 4 TABLET ORAL at 06:19

## 2023-07-11 RX ADMIN — Medication 6 MG: at 20:24

## 2023-07-11 RX ADMIN — PRAZOSIN HYDROCHLORIDE 1 MG: 1 CAPSULE ORAL at 17:39

## 2023-07-11 RX ADMIN — Medication 2 TABLET: at 08:26

## 2023-07-11 RX ADMIN — OLANZAPINE 10 MG: 10 TABLET, ORALLY DISINTEGRATING ORAL at 20:24

## 2023-07-11 RX ADMIN — MIRTAZAPINE 15 MG: 15 TABLET, ORALLY DISINTEGRATING ORAL at 20:24

## 2023-07-11 RX ADMIN — PROCHLORPERAZINE MALEATE 5 MG: 5 TABLET ORAL at 13:13

## 2023-07-11 RX ADMIN — FLUOXETINE 10 MG: 20 SOLUTION ORAL at 13:13

## 2023-07-11 RX ADMIN — MEMANTINE 10 MG: 10 TABLET ORAL at 20:25

## 2023-07-11 RX ADMIN — Medication 12.5 MG: at 01:57

## 2023-07-11 RX ADMIN — ONDANSETRON 4 MG: 4 TABLET ORAL at 18:11

## 2023-07-11 RX ADMIN — SENNOSIDES 8.6 MG: 8.6 TABLET ORAL at 20:25

## 2023-07-11 RX ADMIN — ONDANSETRON 4 MG: 4 TABLET ORAL at 11:45

## 2023-07-11 RX ADMIN — Medication 500 MG: at 20:24

## 2023-07-11 RX ADMIN — SIMVASTATIN 40 MG: 40 TABLET, FILM COATED ORAL at 20:24

## 2023-07-11 RX ADMIN — MEMANTINE 10 MG: 10 TABLET ORAL at 08:26

## 2023-07-11 RX ADMIN — GABAPENTIN 100 MG: 250 SOLUTION ORAL at 08:26

## 2023-07-11 RX ADMIN — VITAM B12 100 MCG: 100 TAB at 20:25

## 2023-07-11 RX ADMIN — SENNOSIDES 8.6 MG: 8.6 TABLET ORAL at 08:26

## 2023-07-11 ASSESSMENT — ACTIVITIES OF DAILY LIVING (ADL)
ORAL_HYGIENE: WITH ASSISTANCE
DRESS: WITH ASSISTANCE
ADLS_ACUITY_SCORE: 76
DRESS: WITH ASSISTANCE
HYGIENE/GROOMING: WITH ASSISTANCE
ADLS_ACUITY_SCORE: 76
HYGIENE/GROOMING: WITH ASSISTANCE
ADLS_ACUITY_SCORE: 76
ADLS_ACUITY_SCORE: 76
LAUNDRY: UNABLE TO COMPLETE
ADLS_ACUITY_SCORE: 76
ADLS_ACUITY_SCORE: 76
ORAL_HYGIENE: WITH ASSISTANCE

## 2023-07-11 NOTE — PROGRESS NOTES
Pt did not stay for group, she said she was feeling terrible and mixed up. She was very restless, in and out of group, she did not return, she was there, maybe 5-10 minutes.

## 2023-07-11 NOTE — PROGRESS NOTES
This OT author provided the RN a chewy toy for pt if she desires to use, for need for sucking and/or chewing. This will hopefully provide the same input needs as her thumb sucking she has been exhibiting.   Also recommend trying the weighted blanket or shoulder wrap and sound machine.

## 2023-07-11 NOTE — PLAN OF CARE
Problem: Sleep Disturbance  Goal: Adequate Sleep/Rest  Outcome: Progressing   Goal Outcome Evaluation:       Patient has been sleeping at the start of the shift. She repositioned herself side to side while in bed. No complaints made. She is using a medical bed to facilitate her mobility while in bed.  She also is on SIO r/t self-injury risk. She has gone to the bathroom and voided freely x 2. Patient slept the whole night for 5.5 hours only.

## 2023-07-11 NOTE — PLAN OF CARE
"  Problem: Anxiety Signs/Symptoms  Goal: Improved Mood Symptoms (Anxiety Signs/Symptoms)  Outcome: Not Progressing     Problem: Depressive Signs/Symptoms  Goal: Improved Mood Symptoms (Depressive Signs/Symptoms)  Outcome: Not Progressing   Goal Outcome Evaluation:    Plan of Care Reviewed With: patient      Patient is calm and cooperative, medication compliant. Reports anxiety and depression 10/10, denies SI/SIB/AVH, although wishes to be dead. Patient is isolative and withdrawn, visible for meals. Did not attend group this shift. Prozac and gabapentin increased. Patient has blanchable redness and some inflammation of the right thumb, per report a blister was present but appears to have burst as writer did not observe blister d/t sucking on thumb. Patient acknowledged sucking on thumb for comfort. Writer offered candy/mint which patient accepted and reported was a good distraction technique. Patient is eating the candy often, alternative distraction dicussed with Caryn RYAN. Psyche associates also made aware to monitor and redirect thumb sucking. Appetite is good, ate 100% of meals.    Pain: denies  Nausea: yes, prn compazine and scheduled Zofran  Emesis: x1 100 ml    BP elevated this morning, 152/74. Current bp /69   Pulse 71   Temp 98.7  F (37.1  C) (Oral)   Resp 14   Ht 1.626 m (5' 4\")   Wt 50 kg (110 lb 3.7 oz)   LMP  (LMP Unknown)   SpO2 96%   BMI 18.92 kg/m      "

## 2023-07-11 NOTE — PLAN OF CARE
Assessment/Intervention/Current Symptoms and Care Coordination  -Chart review and met with team, discussed pt progress, symptomology, and response to treatment.  Discussed the discharge plan and any potential impediments to discharge .Pt will now receive smaller meals to help with stomach pain /induced vomiting.   -Today patient is reporting depression and anxiety as being 10 out of 10. Plan to increase medication dose to address.  [facility] has left a message with MATT Hodgson to clarify date/time of her visit.  Various staff have been told either 7/12 or 7/14.  Waiting on call back.      Discharge Plan or Goal  Pending stabilization & development of a safe discharge plan.     Considerations include:  [facility] ([facility] has been holding patient's bed since 5/5, when patient first went into the hospital.  They will be unable to hold the bed for very much longer but does want patient to be able to return when stable if they can still meet her needs.  She will contact us to let us know once the bed has been let go.  We would then need to re-refer when patient is stable.   May need a new nursing home referral.     Barriers to Discharge   Patient requires further psychiatric stabilization due to current symptomology     Referral Status   None-[facility] remains committed to having patient return to them when stable.      Contacts:  MAYITO Hodgson with [facility] [phone].  [facility] [phone] ext.[phone] Josefina  : Lorenzo Whipple [phone]     Legal Status  Patient is under MI commitment in [facility]  Pt will need PD upon discharge

## 2023-07-12 LAB
ALBUMIN SERPL BCG-MCNC: 3.2 G/DL (ref 3.5–5.2)
ALP SERPL-CCNC: 41 U/L (ref 35–104)
ALT SERPL W P-5'-P-CCNC: 8 U/L (ref 0–50)
ANION GAP SERPL CALCULATED.3IONS-SCNC: 7 MMOL/L (ref 7–15)
AST SERPL W P-5'-P-CCNC: 10 U/L (ref 0–45)
ATRIAL RATE - MUSE: 68 BPM
BASOPHILS # BLD AUTO: 0 10E3/UL (ref 0–0.2)
BASOPHILS NFR BLD AUTO: 0 %
BILIRUB SERPL-MCNC: 0.7 MG/DL
BUN SERPL-MCNC: 11.6 MG/DL (ref 8–23)
CALCIUM SERPL-MCNC: 9.4 MG/DL (ref 8.8–10.2)
CHLORIDE SERPL-SCNC: 107 MMOL/L (ref 98–107)
CREAT SERPL-MCNC: 0.69 MG/DL (ref 0.51–0.95)
DEPRECATED HCO3 PLAS-SCNC: 26 MMOL/L (ref 22–29)
DIASTOLIC BLOOD PRESSURE - MUSE: NORMAL MMHG
EOSINOPHIL # BLD AUTO: 0.1 10E3/UL (ref 0–0.7)
EOSINOPHIL NFR BLD AUTO: 2 %
ERYTHROCYTE [DISTWIDTH] IN BLOOD BY AUTOMATED COUNT: 14 % (ref 10–15)
GFR SERPL CREATININE-BSD FRML MDRD: 87 ML/MIN/1.73M2
GLUCOSE SERPL-MCNC: 87 MG/DL (ref 70–99)
HCT VFR BLD AUTO: 35.4 % (ref 35–47)
HGB BLD-MCNC: 11.5 G/DL (ref 11.7–15.7)
IMM GRANULOCYTES # BLD: 0 10E3/UL
IMM GRANULOCYTES NFR BLD: 0 %
INTERPRETATION ECG - MUSE: NORMAL
LYMPHOCYTES # BLD AUTO: 1.8 10E3/UL (ref 0.8–5.3)
LYMPHOCYTES NFR BLD AUTO: 19 %
MAGNESIUM SERPL-MCNC: 2.1 MG/DL (ref 1.7–2.3)
MCH RBC QN AUTO: 31.1 PG (ref 26.5–33)
MCHC RBC AUTO-ENTMCNC: 32.5 G/DL (ref 31.5–36.5)
MCV RBC AUTO: 96 FL (ref 78–100)
MONOCYTES # BLD AUTO: 0.6 10E3/UL (ref 0–1.3)
MONOCYTES NFR BLD AUTO: 6 %
NEUTROPHILS # BLD AUTO: 7.1 10E3/UL (ref 1.6–8.3)
NEUTROPHILS NFR BLD AUTO: 73 %
NRBC # BLD AUTO: 0 10E3/UL
NRBC BLD AUTO-RTO: 0 /100
P AXIS - MUSE: 52 DEGREES
PHOSPHATE SERPL-MCNC: 3.6 MG/DL (ref 2.5–4.5)
PLATELET # BLD AUTO: 195 10E3/UL (ref 150–450)
POTASSIUM SERPL-SCNC: 4 MMOL/L (ref 3.4–5.3)
PR INTERVAL - MUSE: 168 MS
PROT SERPL-MCNC: 5.2 G/DL (ref 6.4–8.3)
QRS DURATION - MUSE: 134 MS
QT - MUSE: 426 MS
QTC - MUSE: 452 MS
R AXIS - MUSE: -49 DEGREES
RBC # BLD AUTO: 3.7 10E6/UL (ref 3.8–5.2)
SODIUM SERPL-SCNC: 140 MMOL/L (ref 136–145)
SYSTOLIC BLOOD PRESSURE - MUSE: NORMAL MMHG
T AXIS - MUSE: 43 DEGREES
VENTRICULAR RATE- MUSE: 68 BPM
WBC # BLD AUTO: 9.6 10E3/UL (ref 4–11)

## 2023-07-12 PROCEDURE — 250N000013 HC RX MED GY IP 250 OP 250 PS 637: Performed by: PSYCHIATRY & NEUROLOGY

## 2023-07-12 PROCEDURE — 124N000003 HC R&B MH SENIOR/ADOLESCENT

## 2023-07-12 PROCEDURE — 250N000013 HC RX MED GY IP 250 OP 250 PS 637

## 2023-07-12 PROCEDURE — 250N000013 HC RX MED GY IP 250 OP 250 PS 637: Performed by: PHYSICIAN ASSISTANT

## 2023-07-12 PROCEDURE — 80053 COMPREHEN METABOLIC PANEL: CPT

## 2023-07-12 PROCEDURE — 36415 COLL VENOUS BLD VENIPUNCTURE: CPT

## 2023-07-12 PROCEDURE — 99232 SBSQ HOSP IP/OBS MODERATE 35: CPT

## 2023-07-12 PROCEDURE — 258N000003 HC RX IP 258 OP 636

## 2023-07-12 PROCEDURE — 85025 COMPLETE CBC W/AUTO DIFF WBC: CPT

## 2023-07-12 PROCEDURE — 84100 ASSAY OF PHOSPHORUS: CPT

## 2023-07-12 PROCEDURE — 83735 ASSAY OF MAGNESIUM: CPT

## 2023-07-12 PROCEDURE — 250N000011 HC RX IP 250 OP 636

## 2023-07-12 RX ORDER — SENNOSIDES 8.6 MG
8.6 TABLET ORAL 2 TIMES DAILY PRN
Status: DISCONTINUED | OUTPATIENT
Start: 2023-07-12 | End: 2023-07-16 | Stop reason: HOSPADM

## 2023-07-12 RX ADMIN — ONDANSETRON 4 MG: 4 TABLET ORAL at 06:30

## 2023-07-12 RX ADMIN — GABAPENTIN 200 MG: 250 SUSPENSION ORAL at 21:11

## 2023-07-12 RX ADMIN — OLANZAPINE 10 MG: 10 TABLET, ORALLY DISINTEGRATING ORAL at 21:10

## 2023-07-12 RX ADMIN — MIRTAZAPINE 15 MG: 15 TABLET, ORALLY DISINTEGRATING ORAL at 21:10

## 2023-07-12 RX ADMIN — MEMANTINE 10 MG: 10 TABLET ORAL at 20:18

## 2023-07-12 RX ADMIN — SENNOSIDES AND DOCUSATE SODIUM 1 TABLET: 50; 8.6 TABLET ORAL at 21:23

## 2023-07-12 RX ADMIN — SIMVASTATIN 40 MG: 40 TABLET, FILM COATED ORAL at 21:10

## 2023-07-12 RX ADMIN — VALACYCLOVIR 500 MG: 500 TABLET, FILM COATED ORAL at 07:31

## 2023-07-12 RX ADMIN — OLANZAPINE 2.5 MG: 5 TABLET, ORALLY DISINTEGRATING ORAL at 07:31

## 2023-07-12 RX ADMIN — Medication 12.5 MG: at 02:06

## 2023-07-12 RX ADMIN — GABAPENTIN 200 MG: 250 SUSPENSION ORAL at 07:31

## 2023-07-12 RX ADMIN — Medication 25 MCG: at 16:24

## 2023-07-12 RX ADMIN — SENNOSIDES 8.6 MG: 8.6 TABLET ORAL at 07:31

## 2023-07-12 RX ADMIN — GABAPENTIN 200 MG: 250 SUSPENSION ORAL at 16:27

## 2023-07-12 RX ADMIN — Medication 6 MG: at 20:18

## 2023-07-12 RX ADMIN — SODIUM CHLORIDE, POTASSIUM CHLORIDE, SODIUM LACTATE AND CALCIUM CHLORIDE 1000 ML: 600; 310; 30; 20 INJECTION, SOLUTION INTRAVENOUS at 10:23

## 2023-07-12 RX ADMIN — PANTOPRAZOLE SODIUM 40 MG: 40 TABLET, DELAYED RELEASE ORAL at 06:30

## 2023-07-12 RX ADMIN — ONDANSETRON 4 MG: 4 TABLET ORAL at 16:24

## 2023-07-12 RX ADMIN — MEMANTINE 10 MG: 10 TABLET ORAL at 07:31

## 2023-07-12 RX ADMIN — Medication 500 MG: at 21:10

## 2023-07-12 RX ADMIN — VITAM B12 100 MCG: 100 TAB at 21:10

## 2023-07-12 RX ADMIN — FLUOXETINE 50 MG: 20 SOLUTION ORAL at 07:31

## 2023-07-12 RX ADMIN — PRAZOSIN HYDROCHLORIDE 1 MG: 1 CAPSULE ORAL at 16:24

## 2023-07-12 RX ADMIN — Medication 2 TABLET: at 07:31

## 2023-07-12 RX ADMIN — ONDANSETRON 4 MG: 4 TABLET ORAL at 12:14

## 2023-07-12 ASSESSMENT — ACTIVITIES OF DAILY LIVING (ADL)
ADLS_ACUITY_SCORE: 76

## 2023-07-12 NOTE — PROGRESS NOTES
"PSYCHIATRY  PROGRESS NOTE     DATE OF SERVICE   7/12/2023     CHIEF COMPLAINT   \"Good.\"       SUBJECTIVE   Nursing reports:     Patient was on SIO 5 feet for self-injury and suicide risk. She has a medical bed for mobility. Pt was better today than yesterday though she endorsed anxiety and depression but didn't rate them. She has chronic SI thoughts of wanting to die but no plans to act on them. She denied AVH. Writer gave her the fidget toy to help pt redirect her from thumb sucking. Per SIO staff, no thumb sucking nor emesis noted this shift. Pt did not attend groups this shift as she preferred to rest in bed. She ate 75% at supper. Estimated total fluid intake was 720 ml. Staff was not able track the number of voids this shift. No reported BM. Pt took all her medications without issue. No nausea nor emesis noted after taking her meds. She was compliant with sitting upright after eating her food/snacks and taking her meds. Pt has been pleasant and cooperative this evening. She has been social with staff. Will continue to monitor and assess pt.     Received in bed asleep, pt is on a medical bed to aid with mobility and HOD kept elevated for episodes of nausea and vomiting. Remained on SIO for self injury risk.  Woke up at 0130, restless in bed, tossing and turning,stated she has trouble falling asleep, walked the buchanan for a few minutes twice  0206 Unisom 12.5 mg given  Intake :150 ml  Output : vided x 2  Calm , pleasant and conversant with staff  Slept for 6.5 hours    Social work reports:    Assessment/Intervention/Current Symptoms and Care Coordination  -Chart review and met with team, discussed pt progress, symptomology, and response to treatment.  Discussed the discharge plan and any potential impediments to discharge .Pt will now receive smaller meals to help with stomach pain /induced vomiting.   - [facility] has confirmed with CYNTHIA Hodgson that she will visit on the unit on Friday 7/14 to complete intake paperwork.  " "  -[facility] spoke with Josefina at [facility] to update her regarding timing of discharge/return to their facility.  Josefina would like us to send some recent progress notes about 4-5 days prior to our expected discharge date and continue to be in touch with her.       Discharge Plan or Goal  Pending stabilization & development of a safe discharge plan.      Barriers to Discharge   Patient requires further psychiatric stabilization due to current symptomology     Referral Status   None-[facility] remains committed to having patient return to them when stable.      Contacts:  MAYITO Hodgson with [facility] [phone].  [facility] [phone] ext.[phone] Josefina Fax ([phone])  : Lorenzo LANERAJAT Dual - [place] [phone]     Legal Status  Patient is under MI commitment in [facility]  Pt will need PD upon discharge        OBJECTIVE   Met with pt in hospital room on unit 3B. Pt affect appears flat however improving. Pt reports mood as, \"Good.\"  Patient also states, \" I feel dizzy.\"  Patient was noted to have hypotension this morning and internal medicine was notified; patient received IV fluids to treat.  Discussed with patient that this symptom of dizziness is likely related to having low blood pressure and educated patient regarding purpose of receiving IV fluids.  Patient verbalizes understanding and in agreement with this. Pt's thoughts continue to be disorganized however improving. Patient continues to experience confusion intermittently however this is also improving; patient is oriented to self, is able to state the correct date today, and is oriented to being in the hospital. Patient reports depression and associated feelings of worthlessness. Patient also endorses passive SI, however able to contract for safety.  Denies anxiety symptoms today.  Discussed with patient that as the nursing home she was living in prior to admission still has her bed available that this continues to be " the best option for a safe discharge plan for patient; also discussed with patient that if she is unsatisfied with this nursing home facility and wants to move she may coordinate this with her  outpatient.  Patient verbalized understanding and is in agreement to return to [facility] at this time.  Provider updated unit CTC of this.  Patient does report that she has issues with the food at this nursing home and would like this provider to discuss this with the director of nursing; patient specifically reports that she will need all meat ground up and is requesting small meals with snacks throughout the day.  This provider will contact her nursing home to update them of diet recommendations.     Plan for today is to discontinue SIO monitoring as patient is able to contract for safety and has not been observed engaging in self-harm behaviors, reporting active SI, or engaging in impulsive/unsafe behaviors.  Patient verbalizes understanding and is in agreement with this.  Plan also is to continue Prozac 50 mg PO daily to target depressed mood, gabapentin 200 mg 3 times daily to target anxiety symptoms, Namenda 10 mg PO BID, Prazosin 1 mg PO at bedtime, Olanzapine ODT 2.5 mg tablet every morning, Olanzapine 10 mg ODT tablet at bedtime, and Mirtazapine 15 mg PO disintegrating tablet at HS. Olanzapine drug level checked on 6/27/2023 and results within therapeutic range: 35 ng/mL (reference range 20-80 ng/mL).     Patient continues to be on a 2 L fluid restriction per IM to treat hyponatremia; sodium level 7/12/2023 is 140 mml/L.  CMP ordered for every 3 days to monitor. IM has been following pt to evaluate hyponatremia and IM was notified on 6/15/23 of altered level of consciousness which presented as increased confusion compared to baseline since admission and increased agitation. Per IM provider- pt's waxing and waning symptoms most likely secondary to a primary psychiatric disorder  with high likelihood for underlying dementia; there is no clear reversible organic cause of her symptoms. Speech therapy was consulted 6/15/23 and Chest Xray was also ordered to evaluate due to concern for aspiration with frequent regurgitation /emesis; chest xray did not show acute concern for aspiration. Speech therapy was reconsulted to address ongoing spontaneous regurgitation of small amounts after eating; patient has also been observed inducing vomiting by sticking her fingers in her mouth. The XR video swallow study was completed on 6/30/2023: results indicate that patient's esophagus is mildly dilated without any other abnormality; no aspiration. Per documentation speech therapist is recommending regular diet with thin liquids as well as for nursing to administer pills in purée such as applesauce. GI followed pt to address frequent regurgitation /emesis, poor P.O intake, and severe constipation. GI started pt on bowel regimen and esophagram and upper GI studies were completed 6/19/23. Per IM provider documentation, GI provider reports that results indicate delayed emptying, but no concern for strictures; if unable to tolerate any oral intake a CT abdomen with contrast should be performed to rule out intra-abdominal pathology. Pt was not tolerating p.o. intake at that time and a CT abdomen/pelvis with contrast was completed 6/20/2023: report of results indicated a large volume predominantly low density colonic stool intermixed with hyperattenuating contrast (from 6/19/2023 esophagram) extending from the cecum through the entire length of the colon to the rectum.  Subsequently a therapeutic gastrograffin enema with IR was completed 6/21/23 which was effective for removing a large volume of stool. GI luminal team signed off on 6/26/2023 with recommendations for ongoing bowel regimen as well as recommendations for discharge. Plan is to continue MiraLAX and stool softeners with goal of patient achieving 1-2 soft  bowel movement daily per GI recommendation. Patient then had an MRI of abdomen completed per radiology recommendation on 7/5/23; results indicate: hemangiomas; Follow-up MRI may be obtained in 6-12 months to assess for change. Also, persistent large volume stool throughout the colon indicates constipation.  Patient has been receiving bowel regimen interventions per IM to treat constipation.  Per  [employer] documentation patient had 1 bowel movement yesterday.  Provider will continue to monitor this.    Dietitian is following pt due to inadequate oral intake related to altered mentation, decreased appetite, and early satiety. Patient has been meeting her nutrition goals which include: patient to consume at least 25-50% meals, 100% nutrition supplements, and for patient to maintain/gain weight. To support pt's dietary goal of maintaining/gaining weight patient care order in place for staff to promote patient eating small meals and snacks throughout the day; order also includes that pt needs supervision while eating to encourage intake and monitor for reflux/regurgitation. Pt weights will be monitored every Tuesday, Thursday, and Saturday; also monitoring intake and output.  Plan will be to continue to monitor weights and p.o. intake. Pt recent weights below.    Vitals:    07/06/23 0810 07/09/23 0835 07/11/23 0739   Weight: 50.3 kg (110 lb 14.3 oz) 51.4 kg (113 lb 5.1 oz) 50 kg (110 lb 3.7 oz)          MEDICATIONS   Medications:  Scheduled Meds:    calcium carbonate  500 mg Oral At Bedtime     childrens multivitamin with iron  2 tablet Oral Daily     cyanocobalamin  100 mcg Oral At Bedtime     FLUoxetine  50 mg Oral Daily     gabapentin  200 mg Oral TID     melatonin  6 mg Oral QPM     memantine  10 mg Oral BID     mirtazapine  15 mg Orally disintegrating tablet At Bedtime     OLANZapine zydis  2.5 mg Oral QAM     OLANZapine zydis  10 mg Oral At Bedtime     ondansetron  4 mg Oral TID AC     pantoprazole  40 mg Oral  "QAM AC     prazosin  1 mg Oral Daily with supper     simvastatin  40 mg Oral At Bedtime     valACYclovir  500 mg Oral Daily     cholecalciferol  25 mcg Oral Daily with supper     Continuous Infusions:  PRN Meds:.acetaminophen, alum & mag hydroxide-simethicone, budesonide-formoterol, calcium carbonate, doxylamine, OLANZapine zydis, polyethylene glycol, prochlorperazine, senna-docusate, sennosides    Medication adherence issues: MS Med Adherence Y/N: No  Medication side effects: MEDICATION SIDE EFFECTS: no side effects reported   Benefit: Yes / No: Yes       ROS   Pertinent items are noted in HPI.       MENTAL STATUS EXAM   Vitals:BP (!) 155/76 (BP Location: Left arm)   Pulse 71   Temp 97.4  F (36.3  C)   Resp 18   Ht 1.626 m (5' 4\")   Wt 50 kg (110 lb 3.7 oz)   LMP  (LMP Unknown)   SpO2 97%   BMI 18.92 kg/m    Appearance:  In hospital scrubs, Disheveled.   Mood: \"Good.\"  Affect: flat  was congruent to speech.  Suicidal Ideation: absent  Homicidal Ideation: PRESENT / ABSENT: absent   Thought process: difficult to follow and disorganized however improving   Thought content: endorses preoccupations  Fund of Knowledge: average  Attention/Concentration: Poor  Language ability:  Intact  Memory:  Immediate recall impaired however improving, Short-term memory impaired and Long-term memory impaired  Insight:  limited.  Judgement: limited  Orientation: Oriented to self   Psychomotor Behavior: normal or unremarkable    Muscle Strength and Tone: MuscleStrength: Normal  Gait and Station:  slightly stiff however steady with walker       LABS   Personally reviewed.  Reviewed EKG from 7/11/2023: Sinus rhythm, Right bundle branch block , Left anterior fascicular block.      Latest Reference Range & Units 06/30/23 14:17 07/02/23 09:04 07/03/23 07:11 07/04/23 09:34 07/05/23 20:17 07/06/23 08:50 07/08/23 09:10 07/09/23 12:22 07/11/23 09:29 07/12/23 07:00   Sodium 136 - 145 mmol/L   139   138  138  140   Potassium 3.4 - 5.3 " mmol/L   4.0   3.6  4.2  4.0   Chloride 98 - 107 mmol/L   103   106  107  107   Carbon Dioxide (CO2) 22 - 29 mmol/L   27   22  23  26   Urea Nitrogen 8.0 - 23.0 mg/dL   6.0 (L)   6.4 (L)  12.6  11.6   Creatinine 0.51 - 0.95 mg/dL   0.73   0.91  0.83  0.69   GFR Estimate >60 mL/min/1.73m2   82   63  70  87   Calcium 8.8 - 10.2 mg/dL   9.2   9.2  9.1  9.4   Anion Gap 7 - 15 mmol/L   9   10  8  7   Magnesium 1.7 - 2.3 mg/dL   2.1   1.9  2.2  2.1   Phosphorus 2.5 - 4.5 mg/dL   3.7   3.6  3.6  3.6   Albumin 3.5 - 5.2 g/dL   3.3 (L)   3.4 (L)  3.2 (L)  3.2 (L)   Protein Total 6.4 - 8.3 g/dL   5.5 (L)   5.6 (L)  5.4 (L)  5.2 (L)   Alkaline Phosphatase 35 - 104 U/L   45   46  43  41   ALT 0 - 50 U/L   12   11  9  8   AST 0 - 45 U/L   15   13  12  10   Bilirubin Total <=1.2 mg/dL   0.4   0.3  0.2  0.7   Glucose 70 - 99 mg/dL   92   168 (H)  98  87   WBC 4.0 - 11.0 10e3/uL   8.2   7.4  7.3  9.6   Hemoglobin 11.7 - 15.7 g/dL   11.6 (L)   12.0  11.8  11.5 (L)   Hematocrit 35.0 - 47.0 %   35.3   36.9  35.8  35.4   Platelet Count 150 - 450 10e3/uL   211   215  192  195   RBC Count 3.80 - 5.20 10e6/uL   3.73 (L)   3.85  3.79 (L)  3.70 (L)   MCV 78 - 100 fL   95   96  95  96   MCH 26.5 - 33.0 pg   31.1   31.2  31.1  31.1   MCHC 31.5 - 36.5 g/dL   32.9   32.5  33.0  32.5   RDW 10.0 - 15.0 %   13.7   13.8  13.7  14.0   % Neutrophils %   73   77  69  73   % Lymphocytes %   18   17  24  19   % Monocytes %   8   5  6  6   % Eosinophils %   1   1  1  2   % Basophils %   0   0  0  0   Absolute Basophils 0.0 - 0.2 10e3/uL   0.0   0.0  0.0  0.0   Absolute Eosinophils 0.0 - 0.7 10e3/uL   0.1   0.0  0.1  0.1   Absolute Immature Granulocytes <=0.4 10e3/uL   0.0   0.0  0.0  0.0   Absolute Lymphocytes 0.8 - 5.3 10e3/uL   1.4   1.2  1.7  1.8   Absolute Monocytes 0.0 - 1.3 10e3/uL   0.7   0.4  0.4  0.6   % Immature Granulocytes %   0   0  0  0   Absolute Neutrophils 1.6 - 8.3 10e3/uL   6.0   5.7  5.0  7.1   Absolute NRBCs 10e3/uL   0.0   0.0  0.0   0.0   NRBCs per 100 WBC <1 /100   0   0  0  0   Color Urine Colorless, Straw, Light Yellow, Yellow   Straw           Appearance Urine Clear   Clear           Glucose Urine Negative mg/dL  Negative           Bilirubin Urine Negative   Negative           Ketones Urine Negative mg/dL  Negative           Specific Gravity Urine 1.003 - 1.035   1.002 (L)           pH Urine 5.0 - 7.0   7.5 (H)           Protein Albumin Urine Negative mg/dL  Negative           Urobilinogen mg/dL Normal, 2.0 mg/dL  Normal           Nitrite Urine Negative   Negative           Blood Urine Negative   Negative           Leukocyte Esterase Urine Negative   Negative           WBC Urine <=5 /HPF  <1           RBC Urine <=2 /HPF  0           Squamous Epithelial /HPF Urine <=1 /HPF  <1           XR ABDOMEN 1 VIEW        Rpt      XR VIDEO SWALLOW WITH SLP OR OT  Rpt            MR ABDOMEN W/O & W CONTRAST      Rpt        EKG 12-LEAD, TRACING ONLY     Rpt     Rpt    (L): Data is abnormally low  (H): Data is abnormally high  Rpt: View report in Results Review for more information       DIAGNOSIS   Principal Problem:    Suicidal ideation  MDD, severe, recurrent episode, with psychotic features  R/O Bipolar Disorder Type 1    Active Problem List:  Patient Active Problem List   Diagnosis     Arthritis     Depressive disorder     Borderline personality disorder (H)     GERD (gastroesophageal reflux disease)     Holosystolic murmur     Asthma     History of gastric bypass     Hyperlipidemia LDL goal <130     Other insomnia     Mild mitral regurgitation     Mild aortic stenosis     Weight loss     Bilateral low back pain without sciatica     Adhesive capsulitis of shoulder, right     Mild intermittent asthma, unspecified whether complicated     Bipolar affective disorder, remission status unspecified (H)     Constipation, unspecified constipation type     Age-related osteoporosis without current pathological fracture     Plantar warts     Hypoglycemia      Failure to thrive in adult     Hypotension, unspecified hypotension type     Suicidal ideation          PLAN   1. Education given regarding diagnostic and treatment options with risks, benefits and alternatives and adequate verbalization of understanding.  2.  Medications:  Hospital  -Continue Gabapentin to 200 mg PO liquid TID to target agitation/anxiety.  -Continue Prozac 50 mg PO liquid daily to target depressed mood.  -Continue Prazosin 1 mg PO to with dinner to target anxiety/agitation in evening.  -Continue Namenda 10 mg tablet PO BID to treat dementia symptoms observed.  -Continue Melatonin 6 mg scheduled every evening to promote sleep.  -Continue Olanzapine ODT 2.5 mg tablet every morning to treat symptoms of psychosis  -Continue Olanzapine 10 mg ODT tablet at bedtime to treat symptoms of psychosis  -Continue Mirtazapine 15 mg PO disintegrating tablet at HS to target depression symptoms.  -Continue Olanzapine ODT 5 mg PO tablet 3 times daily as needed for severe agitation.  -Continue Unisom 12.5 mg 3 times daily as needed for insomnia or nausea.   3. Consultations:  Internal medicine consulted regarding results of patient's abdominal MRI which show large stool burden.  GI consulted and signed off on 6/26/2023, GI recommendations include:  -- Consider palliative consult to help further assist with GOC if without improvement in course/sx.  -- Continue with scheduled bowel med regimen and titrate to ensure patient having at least x1 soft BM daily given chronic hx of constipation.  -- Continue detailed documentation of stool appearance, including color, consistency, frequency and amount.   -- Continue PPI at discharge (Protonix)  ---- Discontinue iron supplementation and schedule repeat iron studies in OP setting.  If develops iron deficiency off supplementation, consider IV iron infusions > PO iron supplementation  Speech therapy reconsulted completed 6/26/23 for Clinical Swallow Evaluation  -XR Video swallow  study completed 6/30/2023:  -Recommendations include taking pills in puree, avoiding mixed textures, and to take small sips with thin liquids.  -Speech therapy recommending regular diet with thin liquids-provider updated patient's diet order.  -Continue one-to-one supervision as needed with eating  -Patient should use a slow rate of intake and follow reflux precautions.  Dietician consulted and following pt during admission  -Weights will be monitored every Tuesday, Thursday, and Saturday  -Intake and output monitoring.  -Plan is for pt to eat small meals/snack throughout the day d/t history of gastric bypass.   -Supplements ordered for between meal  - Zofran 4mg PO PRN scheduled 30 minutes before meals TID to improve PO tolerance.   4. Labs/Tests   Labs: CBC, CMP, magnesium, and phosphorus levels every 3 days   EKG ordered weekly  Olanzapine drug level collected 6/27/2023 and results: Within therapeutic range: 35 ng/mL (reference range 20-80 ng/mL).  5. Structure and Supervision  Unit 3B.  Precautions in place.  Fall precautions.  Continue on SIO monitoring due to reporting active SI with plan, engaging in SIB including scratching her arm, and confused impulsive behavior.  6.   is following in regards to collecting and reviewing collateral information, referrals and disposition planning.  Legal: civil commitment.   Referrals:  Per CTC  Care Coordination:  Per CTC  Placement:  TBD  Anticipated Discharge:  TBD     Further treatment programming to be determined throughout the hospital course.      Risk Assessment: [facility] RISK ASSESSMENT: Patient on precautions    Coordination of Care:   Treatment Plan reviewed and physician signed, Care discussed with Care/Treatment Team Members, Chart reviewed and Patient seen      Re-Certification I certify that the inpatient psychiatric facility services furnished since the previous certification were, and continue to be, medically necessary for, either, treatment  which could reasonably be expected to improve the patient s condition or diagnostic study and that the hospital records indicate that the services furnished were, either, intensive treatment services, admission and related services necessary for diagnostic study, or equivalent services.     I certify that the patient continues to need, on a daily basis, active treatment furnished directly by or requiring the supervision of inpatient psychiatric facility personnel.   I estimate 5-7 days of hospitalization is necessary for proper treatment of the patient. My plans for post-hospital care for this patient are   TBD       DANIEL Bennett CNP    -     7/12/2023     -     11:15 AM  Total time  35 minutes with > 50%spent on coordination of cares and psycho-education.    This note was created with help of Dragon dictation system. Grammatical / typing errors are not intentional.    DANIEL Bennett CNP

## 2023-07-12 NOTE — PLAN OF CARE
Assessment/Intervention/Current Symptoms and Care Coordination  -Chart review and met with team, discussed pt progress, symptomology, and response to treatment.  Discussed the discharge plan and any potential impediments to discharge .Pt will now receive smaller meals to help with stomach pain /induced vomiting.   - [facility] has confirmed with CYNTHIA Hodgson that she will visit on the unit on Friday 7/14 to complete intake paperwork.    -[facility] spoke with Josefina at [facility] to update her regarding timing of discharge/return to their facility.  Josefina would like us to send some recent progress notes about 4-5 days prior to our expected discharge date and continue to be in touch with her.       Discharge Plan or Goal  Pending stabilization & development of a safe discharge plan.      Barriers to Discharge   Patient requires further psychiatric stabilization due to current symptomology     Referral Status   None-[facility] remains committed to having patient return to them when stable.      Contacts:  MAYITO Hodgson with [facility] [phone].  [facility] [phone] Josefina Fax ([phone])  : Lorenzo Whipple [phone]     Legal Status  Patient is under MI commitment in [facility]  Pt will need PD upon discharge

## 2023-07-12 NOTE — PROVIDER NOTIFICATION
07/12/23 1139   Interprofessional Rounds   Summary Patient showing some improvements.   to visit today to conduct intake.   Participants advanced practice nurse;nursing;CTC   Behavioral Team Discussion   Participants Cyn ESTRADA,  Dr. Ramos; Galilea Olguin, APRN, CNP, Negrito Cruz, RN   Progress some improvment   Anticipated length of stay 1-2 weeks   Continued Stay Criteria/Rationale depression and SI continues to be present   Plan Stabilization with medication and therapy groups   Rationale for change in precautions or plan No change   Anticipated Discharge Disposition nursing home     PRECAUTIONS AND SAFETY    Behavioral Orders   Procedures    Code 1 - Restrict to Unit    Code 2     6/30/23 X-ray    Code 2     Pt. for MRI.    Fall precautions    Routine Programming     As clinically indicated    Status 15     Every 15 minutes.    Status Individual Observation     Patient SIO status reviewed with team/RN.  Please also refer to RN/team documentation for add'l detail.    Monitor for thumb sucking and redirect; pt is having skin break down on right thumb from this behavior.    Monitor for SIB including scratching self. Thanks.    -SIO staff to monitor following which have contributed to patient being on SIO:  Unsafe behavior/self inducing vomiting and statements of wanting to die  -Possible interventions SIO staff could use to support patient's treatment progress: monitor for unsafe behaviors and redirect if she attempts to induce vomiting  -When following observed, team will review discontinuation of SIO:  Pt no longer at risk for self harm     Order Specific Question:   CONTINUOUS 24 hours / day     Answer:   5 feet     Order Specific Question:   Indications for SIO     Answer:   Suicide risk     Order Specific Question:   Indications for SIO     Answer:   Self-injury risk       Safety  Safety WDL: WDL  [place] [facility] [place]  Observed Behavior: calm  Observed Behavior (Comment): talking  with SIO staff  Safety Measures: safety rounds completed  Diversional Activity: other (see comments) (socializing with staff)  Suicidality: Status 15, SIO (Status Individual Observation)  (NOTE - order will specify distance  Assault: status 15  Elopement Interventions: status continuous sight  Additional Documentation:  (fall precautions in place.)

## 2023-07-12 NOTE — PLAN OF CARE
Problem: Anxiety Signs/Symptoms  Goal: Improved Mood Symptoms (Anxiety Signs/Symptoms)  Outcome: Progressing     Problem: Depressive Signs/Symptoms  Goal: Improved Mood Symptoms (Depressive Signs/Symptoms)  Outcome: Progressing   Goal Outcome Evaluation:    Plan of Care Reviewed With: patient      Patient is calm and cooperative, social with staff. Does not attend groups, isolates to room after meals. Reports anxiety/depression/wish to be dead. Denies active plans to harm self. Appetite is good, 760 ml fluids this shift, output unmeasured urine. No reported or observed BM this shift. No emesis or reports of nausea.    Patient reported dizziness this morning, BP 70/42 manual cuff, dipped into 60s systolic with monitor. IM paged, 1L LR ordered and given, patient reports dizziness not completely gone but much better than this morning. /76 pulse 71 after fluids, rechecked 20 minutes later 104/57 P 77, initial reading could have be erroneous, will continue to monitor vitals.  SIO discontinued  Pain: denies  LBM: 7/11  SIB: no thumb sucking or scratching arms noted   Routing to [facility].   Please enter labs/cpe labs per pcp and call pt to schedule her CPE per pcp recommended time.

## 2023-07-12 NOTE — PLAN OF CARE
Problem: Adult Behavioral Health Plan of Care  Goal: Plan of Care Review  Flowsheets  Taken 7/11/2023 2107  Plan of Care Reviewed With: patient  Overall Patient Progress: no change  Patient Agreement with Plan of Care: agrees  Taken 7/11/2023 2054  Patient Agreement with Plan of Care: agrees     Patient was on SIO 5 feet for self-injury and suicide risk. She has a medical bed for mobility. Pt was better today than yesterday though she endorsed anxiety and depression but didn't rate them. She has chronic SI thoughts of wanting to die but no plans to act on them. She denied AVH. Writer gave her the fidget toy to help pt redirect her from thumb sucking. Per SIO staff, no thumb sucking nor emesis noted this shift. Pt did not attend groups this shift as she preferred to rest in bed. She ate 75% at supper. Estimated total fluid intake was 720 ml. Staff was not able track the number of voids this shift. No reported BM. Pt took all her medications without issue. No nausea nor emesis noted after taking her meds. She was compliant with sitting upright after eating her food/snacks and taking her meds. Pt has been pleasant and cooperative this evening. She has been social with staff. Will continue to monitor and assess pt.

## 2023-07-12 NOTE — PLAN OF CARE
Problem: Sleep Disturbance  Goal: Adequate Sleep/Rest  Outcome: Not Progressing   Goal Outcome Evaluation:       Received in bed asleep, pt is on a medical bed to aid with mobility and HOD kept elevated for episodes of nausea and vomiting. Remained on SIO for self injury risk.  Woke up at 0130, restless in bed, tossing and turning,stated she has trouble falling asleep, walked the [place]anan for a few minutes twice  0206 Unisom 12.5 mg given  Intake :150 ml  Output : vided x 2  Calm , pleasant and conversant with staff  Slept for 6.5 hours

## 2023-07-12 NOTE — PROGRESS NOTES
"PSYCHIATRY  PROGRESS NOTE     DATE OF SERVICE   7/11/2023     CHIEF COMPLAINT   \"Terrible.\"       SUBJECTIVE   Nursing reports:     Patient was on SIO 5 feet for suicide risk and self-injury risk. She was using a medical bed for bed mobility. She's ambulating steadily with the use of a walker. She vomited 6x per SIO staff. Mixture of mucus, undigested food and maybe some of her meds? Pt said, \" I can't help it,\" repeatedly when trying to redirect her from vomiting. Staff accompanied her walking in and out of her bedroom to distract her but pt still vomited. She declined mental health assessment.  Requested writer to hold her hands and did not answer mental health questions. Pt did not attend groups this evening. Intake=25% of food, 360 ml fluids. Urine output was not measured, staff was unsure how many times pt voided. Estimated amount of vomitus was 200 ml. Will continue to monitor and assess pt.    Patient has been sleeping at the start of the shift. She repositioned herself side to side while in bed. No complaints made. She is using a medical bed to facilitate her mobility while in bed.  She also is on SIO r/t self-injury risk. She has gone to the bathroom and voided freely x 2. Patient slept the whole night for 5.5 hours only.    Social work reports:    Assessment/Intervention/Current Symptoms and Care Coordination  -Chart review and met with team, discussed pt progress, symptomology, and response to treatment.  Discussed the discharge plan and any potential impediments to discharge .Pt will now receive smaller meals to help with stomach pain /induced vomiting.   -Today patient is reporting depression and anxiety as being 10 out of 10. Plan to increase medication dose to address.  [facility] has left a message with [facility] to clarify date/time of her visit.  Various staff have been told either 7/12 or 7/14.  Waiting on call back.      Discharge Plan or Goal  Pending stabilization & development of a safe discharge " "plan.     Considerations include:  [facility] (formerly [facility]) has been holding patient's bed since 5/5, when patient first went into the hospital.  They will be unable to hold the bed for very much longer but does want patient to be able to return when stable if they can still meet her needs.  She will contact us to let us know once the bed has been let go.  We would then need to re-refer when patient is stable.   May need a new nursing home referral.     Barriers to Discharge   Patient requires further psychiatric stabilization due to current symptomology     Referral Status   None-[facility] remains committed to having patient return to them when stable.      Contacts:  MAYITO Hodgson with [facility] [phone].  [facility] [phone] ext.[phone] Josefina  : Lorenzo Pereze [phone]     Legal Status  Patient is under MI commitment in [facility]  Pt will need PD upon discharge       OBJECTIVE   Met with pt in hospital room on unit 3B. Pt affect appears flat and gaurded today. Pt reports mood as, \"Terrible.\"  Patient also states, \"No one wants to help me, and no one believes me.\"  Provided emotional support for patient. Provider discussed with patient that she has staff with her at all times which are available to help her. Patient verbalized understanding. Pt's thoughts continue to be disorganized and patient continues to experience confusion intermittently.  Provider discussed with patient having a conversation yesterday with director of nursing for the nursing home she previously lived at; provider also relayed the message that if patient is interested in returning they have her bed available still. Patient continues to report that she does not want to return to this nursing home.  Patient states, \"They just want my social security.\" Patient continues to endorse symptoms of severe depression with thoughts of SI and no plan. Plan for today will " be to increase patient's Prozac dose to 50 mg daily to target ongoing depressed mood. Pt will continue on SIO monitoring which is in place due to patient reporting SI, engaging in SIB, and ongoing confusion/impulsive behaviors.  Patient also reports increased anxiety and patient has been observed by staff sucking her right thumb excessively which has caused a blister to develop.  Provider discussed this behavior with patient and she reports that this is a comfort behavior she has engaged in since childhood.  Discussed options for alternative behaviors due to current skin breakdown such as utilizing hard candies or a washcloth. Patient verbalizes understanding and in agreement to try hard candies. Provider also discussed with OT, and plan will be to continue to determine alternatives to thumbsucking for patient. Plan for today also is to increase patient's Gabapentin to 200 mg 3 times daily to target reported anxiety symptoms. Patient in agreement with this.  Also, continue Namenda 10 mg PO BID, Prazosin 1 mg PO at bedtime, Olanzapine ODT 2.5 mg tablet every morning, Olanzapine 10 mg ODT tablet at bedtime, and Mirtazapine 15 mg PO disintegrating tablet at HS. Olanzapine drug level checked on 6/27/2023 and results within therapeutic range: 35 ng/mL (reference range 20-80 ng/mL).     Patient continues to be on a 2 L fluid restriction per IM to treat hyponatremia; sodium level 7/9/2023 was 138 mml/L.  CMP ordered for every 3 days to monitor. IM has been following pt to evaluate hyponatremia and IM was notified on 6/15/23 of altered level of consciousness which presented as increased confusion compared to baseline since admission and increased agitation. Per IM provider- pt's waxing and waning symptoms most likely secondary to a primary psychiatric disorder with high likelihood for underlying dementia; there is no clear reversible organic cause of her symptoms. Speech therapy was consulted 6/15/23 and Chest Xray was also  ordered to evaluate due to concern for aspiration with frequent regurgitation /emesis; chest xray did not show acute concern for aspiration. Speech therapy was reconsulted to address ongoing spontaneous regurgitation of small amounts after eating; patient has also been observed inducing vomiting by sticking her fingers in her mouth. The XR video swallow study was completed on 6/30/2023: results indicate that patient's esophagus is mildly dilated without any other abnormality; no aspiration. Per documentation speech therapist is recommending regular diet with thin liquids as well as for nursing to administer pills in purée such as applesauce. GI followed pt to address frequent regurgitation /emesis, poor P.O intake, and severe constipation. GI started pt on bowel regimen and esophagram and upper GI studies were completed 6/19/23. Per IM provider documentation, GI provider reports that results indicate delayed emptying, but no concern for strictures; if unable to tolerate any oral intake a CT abdomen with contrast should be performed to rule out intra-abdominal pathology. Pt was not tolerating p.o. intake at that time and a CT abdomen/pelvis with contrast was completed 6/20/2023: report of results indicated a large volume predominantly low density colonic stool intermixed with hyperattenuating contrast (from 6/19/2023 esophagram) extending from the cecum through the entire length of the colon to the rectum.  Subsequently a therapeutic gastrograffin enema with IR was completed 6/21/23 which was effective for removing a large volume of stool. GI luminal team signed off on 6/26/2023 with recommendations for ongoing bowel regimen as well as recommendations for discharge. Plan is to continue MiraLAX and stool softeners with goal of patient achieving 1-2 soft bowel movement daily per GI recommendation. Pt has been having frequent loose/liquid stools ongoing per nursing. Patient then had an MRI of abdomen completed per  radiology recommendation on 7/5/23; results indicate: hemangiomas; Follow-up MRI may be obtained in 6-12 months to  assess for change. Also, persistent large volume stool throughout the colon indicates constipation.  Patient has been receiving bowel regimen interventions per IM to treat constipation.  Per  Flowsheet documentation patient had 1 bowel movement today.  Provider will continue to monitor this.    Dietitian is following pt due to inadequate oral intake related to altered mentation, decreased appetite, and early satiety. Patient has been meeting her nutrition goals which include: patient to consume at least 25-50% meals, 100% nutrition supplements, and for patient to maintain/gain weight. To support pt's dietary goal of maintaining/gaining weight patient care order in place for staff to promote patient eating small meals and snacks throughout the day; order also includes that pt needs supervision while eating to encourage intake and monitor for reflux/regurgitation. Pt weights will be monitored every Tuesday, Thursday, and Saturday; also monitoring intake and output.  Plan will be to continue to monitor weights and p.o. intake. Pt recent weights below.    Vitals:    07/06/23 0810 07/09/23 0835 07/11/23 0739   Weight: 50.3 kg (110 lb 14.3 oz) 51.4 kg (113 lb 5.1 oz) 50 kg (110 lb 3.7 oz)          MEDICATIONS   Medications:  Scheduled Meds:    calcium carbonate  500 mg Oral At Bedtime     childrens multivitamin with iron  2 tablet Oral Daily     cyanocobalamin  100 mcg Oral At Bedtime     [START ON 7/12/2023] FLUoxetine  50 mg Oral Daily     gabapentin  200 mg Oral TID     melatonin  6 mg Oral QPM     memantine  10 mg Oral BID     mirtazapine  15 mg Orally disintegrating tablet At Bedtime     OLANZapine zydis  2.5 mg Oral QAM     OLANZapine zydis  10 mg Oral At Bedtime     ondansetron  4 mg Oral TID AC     pantoprazole  40 mg Oral QAM AC     prazosin  1 mg Oral Daily with supper     sennosides  8.6 mg  "Oral BID     simvastatin  40 mg Oral At Bedtime     valACYclovir  500 mg Oral Daily     cholecalciferol  25 mcg Oral Daily with supper     Continuous Infusions:  PRN Meds:.acetaminophen, alum & mag hydroxide-simethicone, budesonide-formoterol, calcium carbonate, doxylamine, OLANZapine zydis, polyethylene glycol, prochlorperazine, senna-docusate    Medication adherence issues: MS Med Adherence Y/N: No  Medication side effects: MEDICATION SIDE EFFECTS: no side effects reported   Benefit: Yes / No: Yes       ROS   Pertinent items are noted in HPI.       MENTAL STATUS EXAM   Vitals:/61 (BP Location: Right arm, Patient Position: Supine, Cuff Size: Adult Small)   Pulse 65   Temp 98.8  F (37.1  C) (Temporal)   Resp 18   Ht 1.626 m (5' 4\")   Wt 50 kg (110 lb 3.7 oz)   LMP  (LMP Unknown)   SpO2 97%   BMI 18.92 kg/m    Appearance:  In hospital scrubs, Disheveled.   Mood: \"Terrible.\"  Affect: flat and guarded was congruent to speech.  Suicidal Ideation: absent  Homicidal Ideation: PRESENT / ABSENT: absent   Thought process: difficult to follow and disorganized however improving   Thought content: endorses preoccupations  Fund of Knowledge: average  Attention/Concentration: Poor  Language ability:  Intact  Memory:  Immediate recall impaired however improving, Short-term memory impaired and Long-term memory impaired  Insight:  limited.  Judgement: limited  Orientation: Oriented to self   Psychomotor Behavior: normal or unremarkable    Muscle Strength and Tone: MuscleStrength: Normal  Gait and Station: slightly stiff however steady with walker       LABS   Personally reviewed.       Latest Reference Range & Units 06/29/23 10:22 06/30/23 08:00 06/30/23 14:17 07/02/23 09:04 07/03/23 07:11 07/04/23 09:34 07/05/23 20:17 07/06/23 08:50 07/08/23 09:10 07/09/23 12:22   Sodium 136 - 145 mmol/L 137 139   139   138  138   Potassium 3.4 - 5.3 mmol/L 3.9 4.2   4.0   3.6  4.2   Chloride 98 - 107 mmol/L 105 107   103   106  107 "   Carbon Dioxide (CO2) 22 - 29 mmol/L 21 (L) 27   27   22  23   Urea Nitrogen 8.0 - 23.0 mg/dL 11.1 9.8   6.0 (L)   6.4 (L)  12.6   Creatinine 0.51 - 0.95 mg/dL 0.75 0.72   0.73   0.91  0.83   GFR Estimate >60 mL/min/1.73m2 80 84   82   63  70   Calcium 8.8 - 10.2 mg/dL 9.1 9.1   9.2   9.2  9.1   Anion Gap 7 - 15 mmol/L 11 5 (L)   9   10  8   Magnesium 1.7 - 2.3 mg/dL  2.1   2.1   1.9  2.2   Phosphorus 2.5 - 4.5 mg/dL  3.5   3.7   3.6  3.6   Albumin 3.5 - 5.2 g/dL 3.5 3.1 (L)   3.3 (L)   3.4 (L)  3.2 (L)   Protein Total 6.4 - 8.3 g/dL 5.8 (L) 5.2 (L)   5.5 (L)   5.6 (L)  5.4 (L)   Alkaline Phosphatase 35 - 104 U/L 47 38   45   46  43   ALT 0 - 50 U/L 15 12   12   11  9   AST 0 - 45 U/L 16 14   15   13  12   Bilirubin Total <=1.2 mg/dL 0.3 0.4   0.4   0.3  0.2   Glucose 70 - 99 mg/dL 252 (H) 89   92   168 (H)  98   Hemoglobin A1C <5.7 % 5.1            WBC 4.0 - 11.0 10e3/uL 8.3 5.8   8.2   7.4  7.3   Hemoglobin 11.7 - 15.7 g/dL 12.4 11.1 (L)   11.6 (L)   12.0  11.8   Hematocrit 35.0 - 47.0 % 38.2 34.1 (L)   35.3   36.9  35.8   Platelet Count 150 - 450 10e3/uL 308 248   211   215  192   RBC Count 3.80 - 5.20 10e6/uL 3.99 3.66 (L)   3.73 (L)   3.85  3.79 (L)   MCV 78 - 100 fL 96 93   95   96  95   MCH 26.5 - 33.0 pg 31.1 30.3   31.1   31.2  31.1   MCHC 31.5 - 36.5 g/dL 32.5 32.6   32.9   32.5  33.0   RDW 10.0 - 15.0 % 13.4 13.5   13.7   13.8  13.7   % Neutrophils % 78 69   73   77  69   % Lymphocytes % 16 24   18   17  24   % Monocytes % 5 5   8   5  6   % Eosinophils % 1 1   1   1  1   % Basophils % 0 1   0   0  0   Absolute Basophils 0.0 - 0.2 10e3/uL 0.0 0.0   0.0   0.0  0.0   Absolute Eosinophils 0.0 - 0.7 10e3/uL 0.1 0.1   0.1   0.0  0.1   Absolute Immature Granulocytes <=0.4 10e3/uL 0.0 0.0   0.0   0.0  0.0   Absolute Lymphocytes 0.8 - 5.3 10e3/uL 1.3 1.4   1.4   1.2  1.7   Absolute Monocytes 0.0 - 1.3 10e3/uL 0.4 0.3   0.7   0.4  0.4   % Immature Granulocytes % 0 0   0   0  0   Absolute Neutrophils 1.6 - 8.3  10e3/uL 6.4 4.1   6.0   5.7  5.0   Absolute NRBCs 10e3/uL 0.0 0.0   0.0   0.0  0.0   NRBCs per 100 WBC <1 /100 0 0   0   0  0   Color Urine Colorless, Straw, Light Yellow, Yellow     Straw         Appearance Urine Clear     Clear         Glucose Urine Negative mg/dL    Negative         Bilirubin Urine Negative     Negative         Ketones Urine Negative mg/dL    Negative         Specific Gravity Urine 1.003 - 1.035     1.002 (L)         pH Urine 5.0 - 7.0     7.5 (H)         Protein Albumin Urine Negative mg/dL    Negative         Urobilinogen mg/dL Normal, 2.0 mg/dL    Normal         Nitrite Urine Negative     Negative         Blood Urine Negative     Negative         Leukocyte Esterase Urine Negative     Negative         WBC Urine <=5 /HPF    <1         RBC Urine <=2 /HPF    0         Squamous Epithelial /HPF Urine <=1 /HPF    <1         XR ABDOMEN 1 VIEW          Rpt    XR VIDEO SWALLOW WITH SLP OR OT    Rpt          MR ABDOMEN W/O & W CONTRAST        Rpt      EKG 12-LEAD, TRACING ONLY       Rpt       (L): Data is abnormally low  (H): Data is abnormally high  Rpt: View report in Results Review for more information         DIAGNOSIS   Principal Problem:    Suicidal ideation  MDD, severe, recurrent episode, with psychotic features  R/O Bipolar Disorder Type 1    Active Problem List:  Patient Active Problem List   Diagnosis     Arthritis     Depressive disorder     Borderline personality disorder (H)     GERD (gastroesophageal reflux disease)     Holosystolic murmur     Asthma     History of gastric bypass     Hyperlipidemia LDL goal <130     Other insomnia     Mild mitral regurgitation     Mild aortic stenosis     Weight loss     Bilateral low back pain without sciatica     Adhesive capsulitis of shoulder, right     Mild intermittent asthma, unspecified whether complicated     Bipolar affective disorder, remission status unspecified (H)     Constipation, unspecified constipation type     Age-related osteoporosis  without current pathological fracture     Plantar warts     Hypoglycemia     Failure to thrive in adult     Hypotension, unspecified hypotension type     Suicidal ideation          PLAN   1. Education given regarding diagnostic and treatment options with risks, benefits and alternatives and adequate verbalization of understanding.  2.  Medications:  Hospital  -Increase Gabapentin to 200 mg PO liquid TID to target agitation/anxiety.  -Increase Prozac to 50 mg PO liquid daily to target depressed mood.  -Continue Prazosin 1 mg PO to with dinner to target anxiety/agitation in evening.  -Continue Namenda 10 mg tablet PO BID to treat dementia symptoms observed.  -Continue Melatonin 6 mg scheduled every evening to promote sleep.  -Continue Olanzapine ODT 2.5 mg tablet every morning to treat symptoms of psychosis  -Continue Olanzapine 10 mg ODT tablet at bedtime to treat symptoms of psychosis  -Continue Mirtazapine 15 mg PO disintegrating tablet at HS to target depression symptoms.  -Continue Olanzapine ODT 5 mg PO tablet 3 times daily as needed for severe agitation.  -Continue Unisom 12.5 mg 3 times daily as needed for insomnia or nausea.   3. Consultations:  Internal medicine consulted regarding results of patient's abdominal MRI which show large stool burden.  GI consulted and signed off on 6/26/2023, GI recommendations include:  -- Consider palliative consult to help further assist with GOC if without improvement in course/sx.  -- Continue with scheduled bowel med regimen and titrate to ensure patient having at least x1 soft BM daily given chronic hx of constipation.  -- Continue detailed documentation of stool appearance, including color, consistency, frequency and amount.   -- Continue PPI at discharge (Protonix)  ---- Discontinue iron supplementation and schedule repeat iron studies in OP setting.  If develops iron deficiency off supplementation, consider IV iron infusions > PO iron supplementation  Speech therapy  reconsulted completed 6/26/23 for Clinical Swallow Evaluation  -XR Video swallow study completed 6/30/2023:  -Recommendations include taking pills in puree, avoiding mixed textures, and to take small sips with thin liquids.  -Speech therapy recommending regular diet with thin liquids-provider updated patient's diet order.  -Continue one-to-one supervision as needed with eating  -Patient should use a slow rate of intake and follow reflux precautions.  Dietician consulted and following pt during admission  -Weights will be monitored every Tuesday, Thursday, and Saturday  -Intake and output monitoring.  -Plan is for pt to eat small meals/snack throughout the day d/t history of gastric bypass.   -Supplements ordered for between meal  - Zofran 4mg PO PRN scheduled 30 minutes before meals TID to improve PO tolerance.   4. Labs/Tests   Labs: CBC, CMP, magnesium, and phosphorus levels every 3 days   EKG ordered weekly  Olanzapine drug level collected 6/27/2023 and results: Within therapeutic range: 35 ng/mL (reference range 20-80 ng/mL).  5. Structure and Supervision  Unit 3B.  Precautions in place.  Fall precautions.  Continue on SIO monitoring due to reporting active SI with plan, engaging in SIB including scratching her arm, and confused impulsive behavior.  6.   is following in regards to collecting and reviewing collateral information, referrals and disposition planning.  Legal: civil commitment.   Referrals:  Per CTC  Care Coordination:  Per CTC  Placement:  TBD  Anticipated Discharge:  TBD     Further treatment programming to be determined throughout the hospital course.      Risk Assessment: IP AC RISK ASSESSMENT: Patient on precautions    Coordination of Care:   Treatment Plan reviewed and physician signed, Care discussed with Care/Treatment Team Members, Chart reviewed and Patient seen      Re-Certification I certify that the inpatient psychiatric facility services furnished since the previous  certification were, and continue to be, medically necessary for, either, treatment which could reasonably be expected to improve the patient s condition or diagnostic study and that the hospital records indicate that the services furnished were, either, intensive treatment services, admission and related services necessary for diagnostic study, or equivalent services.     I certify that the patient continues to need, on a daily basis, active treatment furnished directly by or requiring the supervision of inpatient psychiatric facility personnel.   I estimate 7-14 days of hospitalization is necessary for proper treatment of the patient. My plans for post-hospital care for this patient are  TBD      DANIEL Bennett CNP    -     7/11/2023     -     11:40 AM  Total time  35 minutes with > 50%spent on coordination of cares and psycho-education.    This note was created with help of Dragon dictation system. Grammatical / typing errors are not intentional.    DANIEL Bennett CNP

## 2023-07-12 NOTE — PROGRESS NOTES
"Brief Medicine Follow Up Note    Following up regarding hypotension.     Today's vital signs, medications, and nursing notes were reviewed. Labs reviewed most recent serum and urine laboratories.     BP (!) 70/42 (BP Location: Right arm)   Pulse 83   Temp 97.4  F (36.3  C)   Resp 18   Ht 1.626 m (5' 4\")   Wt 50 kg (110 lb 3.7 oz)   LMP  (LMP Unknown)   SpO2 97%   BMI 18.92 kg/m      A/P:  Hypotension  Medicine notified this AM of symptomatic hypotension (dizziness). Review of BP shows significant drop (unclear if truly orthostatic as both BPs were documented as standing); 151/79 --> 67/34 w/ recheck 70/42. Review of prior BPs reveal somewhat labile BPs, w/ episodes of hypotension every few days. Not on any designated antihypertensives. She is on Prazosin, though low dose (1mg nightly), which can cause orthostasis/low BP. Additionally, has had GI losses in the setting of loose stools from constipation treatement w/ Sennosides BID. RN also notes intermittent nausea, vomiting, but no abd pain, and pt otherwise afebrile, eating \"ok\". Unclear as to etiology of n/v, but could be r/t constipation, which is likely being overtreated, causing loose stools/diarrhea, contributing to GI losses and likely hypovolemia and hypotension.  - Giving 1L LR bolus   - Measure BP 1 hour following bolus  - Continue to obtain orthostatics w/ sitting and standing  - Notify Medicine if SBP <90 or DBP <60  - Changed Sennosides from scheduled BID to BID PRN   - Please ensure RN is asking her each shift if she is constipated or not, can offer Sennosides if so  ___________________________________________________________    ADDENDUM 7/12/2023 2:25 PM:  Review of BP show improvement of BP to 155/76 following administration of IVF as above. Dizziness not totally resolved, but improved compared to prior to IVF. Thus, suspect that sx are likely d/t hypovolemia causing symptomatic hypotension.   - Continue to monitor and encourage PO intake  - " I/Os  - Notify Medicine as above, or if worsening dizziness/lightheadedness    Medicine will sign off. No further recommendations at this time.  Please feel free to reconsult if any new medical issues or concerns.  Thank you for the opportunity to care for this patient.     Meng Arcos PA-C  [facility]  Contact information available via [facility] Paging/Directory

## 2023-07-13 PROCEDURE — 250N000013 HC RX MED GY IP 250 OP 250 PS 637

## 2023-07-13 PROCEDURE — 250N000013 HC RX MED GY IP 250 OP 250 PS 637: Performed by: PSYCHIATRY & NEUROLOGY

## 2023-07-13 PROCEDURE — 124N000003 HC R&B MH SENIOR/ADOLESCENT

## 2023-07-13 PROCEDURE — 250N000011 HC RX IP 250 OP 636

## 2023-07-13 PROCEDURE — 99232 SBSQ HOSP IP/OBS MODERATE 35: CPT

## 2023-07-13 PROCEDURE — 250N000013 HC RX MED GY IP 250 OP 250 PS 637: Performed by: PHYSICIAN ASSISTANT

## 2023-07-13 RX ORDER — AMOXICILLIN 250 MG
1 CAPSULE ORAL 2 TIMES DAILY
Status: DISCONTINUED | OUTPATIENT
Start: 2023-07-13 | End: 2023-07-16 | Stop reason: HOSPADM

## 2023-07-13 RX ORDER — POLYETHYLENE GLYCOL 3350 17 G/17G
17 POWDER, FOR SOLUTION ORAL DAILY
Status: DISCONTINUED | OUTPATIENT
Start: 2023-07-13 | End: 2023-07-16 | Stop reason: HOSPADM

## 2023-07-13 RX ORDER — POLYETHYLENE GLYCOL 3350 17 G/17G
17 POWDER, FOR SOLUTION ORAL DAILY PRN
Status: DISCONTINUED | OUTPATIENT
Start: 2023-07-13 | End: 2023-07-16 | Stop reason: HOSPADM

## 2023-07-13 RX ORDER — FLUOXETINE 20 MG/5ML
60 SOLUTION ORAL DAILY
Status: DISCONTINUED | OUTPATIENT
Start: 2023-07-14 | End: 2023-07-16 | Stop reason: HOSPADM

## 2023-07-13 RX ADMIN — POLYETHYLENE GLYCOL 3350 17 G: 17 POWDER, FOR SOLUTION ORAL at 12:13

## 2023-07-13 RX ADMIN — MIRTAZAPINE 15 MG: 15 TABLET, ORALLY DISINTEGRATING ORAL at 21:11

## 2023-07-13 RX ADMIN — Medication 2 TABLET: at 08:09

## 2023-07-13 RX ADMIN — ONDANSETRON 4 MG: 4 TABLET ORAL at 06:25

## 2023-07-13 RX ADMIN — OLANZAPINE 2.5 MG: 5 TABLET, ORALLY DISINTEGRATING ORAL at 08:09

## 2023-07-13 RX ADMIN — PRAZOSIN HYDROCHLORIDE 1 MG: 1 CAPSULE ORAL at 16:53

## 2023-07-13 RX ADMIN — Medication 25 MCG: at 16:53

## 2023-07-13 RX ADMIN — VALACYCLOVIR 500 MG: 500 TABLET, FILM COATED ORAL at 08:09

## 2023-07-13 RX ADMIN — ONDANSETRON 4 MG: 4 TABLET ORAL at 16:53

## 2023-07-13 RX ADMIN — GABAPENTIN 200 MG: 250 SUSPENSION ORAL at 08:09

## 2023-07-13 RX ADMIN — MEMANTINE 10 MG: 10 TABLET ORAL at 19:59

## 2023-07-13 RX ADMIN — MEMANTINE 10 MG: 10 TABLET ORAL at 08:09

## 2023-07-13 RX ADMIN — OLANZAPINE 10 MG: 10 TABLET, ORALLY DISINTEGRATING ORAL at 21:10

## 2023-07-13 RX ADMIN — PANTOPRAZOLE SODIUM 40 MG: 40 TABLET, DELAYED RELEASE ORAL at 06:25

## 2023-07-13 RX ADMIN — GABAPENTIN 200 MG: 250 SUSPENSION ORAL at 21:10

## 2023-07-13 RX ADMIN — FLUOXETINE 50 MG: 20 SOLUTION ORAL at 08:09

## 2023-07-13 RX ADMIN — Medication 6 MG: at 19:59

## 2023-07-13 RX ADMIN — SIMVASTATIN 40 MG: 40 TABLET, FILM COATED ORAL at 21:11

## 2023-07-13 RX ADMIN — ONDANSETRON 4 MG: 4 TABLET ORAL at 12:13

## 2023-07-13 RX ADMIN — SENNOSIDES AND DOCUSATE SODIUM 1 TABLET: 50; 8.6 TABLET ORAL at 19:59

## 2023-07-13 RX ADMIN — Medication 500 MG: at 21:11

## 2023-07-13 RX ADMIN — GABAPENTIN 200 MG: 250 SUSPENSION ORAL at 16:52

## 2023-07-13 RX ADMIN — VITAM B12 100 MCG: 100 TAB at 21:11

## 2023-07-13 ASSESSMENT — ACTIVITIES OF DAILY LIVING (ADL)
ADLS_ACUITY_SCORE: 76

## 2023-07-13 NOTE — PLAN OF CARE
Problem: Depressive Signs/Symptoms  Goal: Optimized Nutrition Intake  Outcome: Progressing   Goal Outcome Evaluation:       Continuing nutrition interventions as ordered. See RD note for full assessment and recommendations.

## 2023-07-13 NOTE — PROGRESS NOTES
"CLINICAL NUTRITION SERVICES - REASSESSMENT NOTE     Nutrition Prescription    RECOMMENDATIONS FOR MDs/PROVIDERS TO ORDER:  Continue to medically manage nausea/emesis as able to hopefully promote adequate oral intake.    Malnutrition Status:    Severe malnutrition in the context of starvation related to social and environmental circumstances v chronic disease.     Recommendations already ordered by Registered Dietitian (RD):  Continue nutrition interventions as ordered:  -Please send at meal times entree, 1 side, 1 drink, and supplement   -10AM snack: cottage cheese + fruit cup, 2PM snack: pudding + fruit cup  -HS: magic cup (chocolate or vanilla)   -Patient care order: Please help patient retrieve TID snacks and encourage intake, please document tolerance as able so RD can ensure patient tolerating adequate intakes.     Future/Additional Recommendations:  Monitor intake, weight, snack and supplement preferences.  Monitor GI symptoms.     EVALUATION OF THE PROGRESS TOWARD GOALS   Diet: Six Small Feedings Adult and 2000 mL Fluid Restriction  - Ensure Clear TID  - Magic Cup daily  - Snacks: cottage cheese w/ fruit, pudding w/ fruit  Intake: Pt consuming % of meals per flowsheet. Per 7/13 RN note \"She ate over 100% of her breakfast, and ate about 50% of her lunch, all of her tuna fish and orange juice.\"     NEW FINDINGS   - Pt still having emesis after meals sometimes per chart.    Met with pt. She was lying in bed. When asked how her appetite has been, she said \"I don't know\". She's been liking the smaller meals. Writer was unable to ask any further questions because pt requested to end the visit.    Weight:  07/13/23 0816 51.1 kg (112 lb 10.5 oz) Standing scale   07/11/23 0739 50 kg (110 lb 3.7 oz) --   07/09/23 0835 51.4 kg (113 lb 5.1 oz) Standing scale   07/06/23 0810 50.3 kg (110 lb 14.3 oz) --   07/01/23 0832 50.6 kg (111 lb 8.8 oz) Standing scale   06/25/23 0820 48.3 kg (106 lb 7.7 oz) --   06/22/23 0822 " 47.1 kg (103 lb 13.4 oz) Standing scale   06/17/23 1646 48.7 kg (107 lb 5.8 oz) Standing scale   06/13/23 0811 48.9 kg (107 lb 12.9 oz) --   06/11/23 0817 48.9 kg (107 lb 12.9 oz) --   06/08/23 0827 50.7 kg (111 lb 12.4 oz) --   06/07/23 2048 51.1 kg (112 lb 10.5 oz) --   Wt loss previously noted during admit. Wt now stable compared to admission wt.    Labs: reviewed.    Medications:  Calcium carbonate  Flintstones complete MVI  Vitamin B12  Vitamin D3  Remeron  Zofran  Protonix  PRN maalox, tums, miralax, senna, compazine    GI:  Emesis noted on 7/10 and 7/11 per I/O.  BMs noted 7/7, 7/8, 7/10, 7/11 per I/O.    MALNUTRITION  % Intake: Decreased intake does not meet criteria  % Weight Loss: Weight loss does not meet criteria  Subcutaneous Fat Loss: global severe  Muscle Loss: global severe  Fluid Accumulation/Edema: None noted  Malnutrition Diagnosis: Severe malnutrition in the context of starvation related to social and environmental circumstances v chronic disease.     Previous Goals   Patient to consume at least 25-50% meals, 100% nutrition supplements. - Progressing  Patient to maintain/gain weight. - Met    Previous Nutrition Diagnosis  Inadequate oral intake related to altered mentation, decreased appetite, early satiety, nausea/vomiting as evidenced by 4.7% wt loss in 10 days.   Evaluation: No change    CURRENT NUTRITION DIAGNOSIS  Inadequate oral intake related to altered mentation, decreased appetite, early satiety, nausea/vomiting as evidenced by previous 4.7% wt loss in 10 days.     INTERVENTIONS  Implementation  Medical food supplement therapy  Modify composition of meals/snacks    Goals  Patient to consume at least 25-50% meals, 100% nutrition supplements.  Patient to maintain/gain weight.    Monitoring/Evaluation  Progress toward goals will be monitored and evaluated per protocol.    Fabrice Roberts RD  Behavioral Health RD Pager: [phone]  Weekend/holiday pager: [phone]

## 2023-07-13 NOTE — PLAN OF CARE
"Bhavana was in and out of her room today. If not prompted to come out of her room, she likely would have stayed in her room all shift. Once she was in the lounge or group, she would always ask how long she was required to stay out there. She would typically try and leave to go back to her room once staff walked away, but if asked would stay in lounge or group longer at times.     When asked how she was doing would say \"fine\", then asked if she is being released today. She also frequently asked various staff to hold her hand.  She only had one brief outburst yelling a staff to \"go away\" when they were tryhing to assist her     She ate over 100% of her breakfast, and ate about 50% of her lunch, all of her tuna fish and orange juice.     Only one documented BM yesterday on night shift and bowel meds restarted this shift. She did have one episode of incontinence of stool but it was mostly smearing, as well as one episode of small emesis, estimated to be less than 50cc. Full bed changed completed.     She was medication compliant.       Problem: Bowel Elimination Management  Goal: Effective Bowel Elimination/Continence  Outcome: Not Progressing   Goal Outcome Evaluation:                        "

## 2023-07-13 NOTE — CARE PLAN
07/12/23 1919   Group Therapy Session   Group Attendance refused to attend group session   Time Session Began 1615   Time Session Ended 1705   Total Time (minutes) 0   Total # Attendees 5   Group Type psychotherapeutic   Group Topic Covered coping skills/lifestyle management;emotions/expression   Group Session Detail CTC-Group members discuss/completed worksheet on positive character traits including when you feel your best and why, proudest accomplishments, self-affirmation, who inspires me, habits I practice to be my best self, what I value most about myself, and who believes in me.     FERNANDO Bingham, [facility]  Clinical Treatment Coordinator  [facility]

## 2023-07-13 NOTE — PLAN OF CARE
Assessment/Intervention/Current Symptoms and Care Coordination  -Chart review and met with team, discussed pt progress, symptomology, and response to treatment.  Discussed the discharge plan and any potential impediments to discharge .Pt will now receive smaller meals to help with stomach pain /induced vomiting.   - [facility] has confirmed with CYNTHIA Hodgson that she will visit on the unit on Friday 7/14 to complete intake paperwork.    -[facility] spoke with Josefina at [facility] to see if she received recent progress notes, she did not so writer faxed the information.  Josefina would like us to send some recent progress notes about 4-5 days prior to our expected discharge date and continue to be in touch with her.       Discharge Plan or Goal  Pending stabilization & development of a safe discharge plan.      Barriers to Discharge   Patient requires further psychiatric stabilization due to current symptomology     Referral Status   None-[facility] remains committed to having patient return to them when stable.      Contacts:  MAYITO Hodgson with [facility] [phone].  [facility] [phone] ext.[phone] Josefina Fax ([phone])  : Lorenzo Whipple [phone]     Legal Status  Patient is under MI commitment in [facility]  Pt will need PD upon discharge

## 2023-07-13 NOTE — PROGRESS NOTES
"PSYCHIATRY  PROGRESS NOTE     DATE OF SERVICE   7/13/2023     CHIEF COMPLAINT   \"Terrible.\"       SUBJECTIVE   Nursing reports:     Pt has been isolative and withdrawn all shift .  Sleeping most of the shift . Pt refused to come out for   dinner . Food tray was taken in to her room . She took one bite and spit out the food . Pt later ate 3 cups of ice cream and drank 440 ml of fluid . Pt states she is very tired this evening  did not want to talk . She had no BM . Pt was given 1 Senna for constipation . Pt refused to answer  assessment question . \" Get out  am tired\"  pt 1:1 was discontinue . Will continue POC       Patient slept approximately 6.75 hours, safety checks and precautions in place, no PRN given or requested, no C/O pain. Patient was incontinent of  BM X 1. Writer will continue to monitor patient as needed for the rest of th shift.    Social work reports:  Assessment/Intervention/Current Symptoms and Care Coordination  -Chart review and met with team, discussed pt progress, symptomology, and response to treatment.  Discussed the discharge plan and any potential impediments to discharge .Pt will now receive smaller meals to help with stomach pain /induced vomiting.   - [facility] has confirmed with CYNTHIA Hodgson that she will visit on the unit on Friday 7/14 to complete intake paperwork.    -[facility] spoke with Josefina at [facility] to see if she received recent progress notes, she did not so writer faxed the information.  Josefina would like us to send some recent progress notes about 4-5 days prior to our expected discharge date and continue to be in touch with her.       Discharge Plan or Goal  Pending stabilization & development of a safe discharge plan.      Barriers to Discharge   Patient requires further psychiatric stabilization due to current symptomology     Referral Status   None-[facility] remains committed to having patient return to them when stable.      Contacts:  MAYITO Hodgson with [facility] [phone] " "[phone].  [facility] [phone] ext.[phone] Josefina Fax ([phone])  : Lorenzo Whipple [phone]     Legal Status  Patient is under MI commitment in [facility]  Pt will need PD upon discharge          OBJECTIVE   Met with pt in conference room on unit 3B. Pt affect appears flat however improving. Pt reports mood as, \"Terrible.\"  Pt's thoughts continue to be disorganized however improving. Patient continues to experience confusion intermittently however this is also improving. Patient continues to report severe depression. Patient also endorses passive SI, however able to contract for safety. With further discussion of this patient does endorse that if she were to discharge from the hospital her plan is to stop eating because she wishes to die. Despite patient reporting severe depression with passive SI she appears to be brighter today and is observed participating in group this morning as well as sitting out in the milieu area and engaging with peers. Also, SIO monitoring has been discontinued, and patient is exhibiting safe behavior only isolating more in room when not encouraged by unit staff to leave her room. Discussed with patient the plan will be to increase Prozac to 60 mg p.o. daily starting tomorrow to target treating her depression symptoms and thoughts of SI.  Patient verbalizes understanding and in agreement. Also, discussed plan for patient to discharge back to nursing home when she is stable. Patient verbalizes understanding, however continues to report that the food is not good at the nursing home and she is upset with nursing director Josefina because she made her wear a safety arm band. Provider has obtained collateral information from director of nursing Josefina and patient was required to wear the safety band on her wrist to monitor if she attempted to leave the nursing home facility due to patient developing SI and reporting she wanted to run into " traffic. Provider discussed the purpose of the safety armband which was in place to keep patient safe due to her exhibiting unsafe behavior. Patient does not verbalize understanding, however continues to be in agreement to return to [facility] facility when she is ready. Pt denies anxiety symptoms today. Denies AH or VH. This provider contacted her nursing home to update director of nursing Josefina regarding patient.  Provider updated Josefina of patient's request for ground meat at mealtimes as well as diet recommendation of small meals with snacks throughout the day; Josefina reports that the nursing home can accommodate these requests. Per Josefina, plan will be for this provider to call back with another update on Monday and as long as patient continues to improve, a time will be set for the  of [facility] Larry Tian to come visit patient prior to her being approved for transfer back to their nursing home facility.    Plan today is to increase Prozac to 60 mg PO daily to target depressed mood. Continue gabapentin 200 mg 3 times daily to target anxiety symptoms, Namenda 10 mg PO BID, Prazosin 1 mg PO at bedtime, Olanzapine ODT 2.5 mg tablet every morning, Olanzapine 10 mg ODT tablet at bedtime, and Mirtazapine 15 mg PO disintegrating tablet at HS. Olanzapine drug level checked on 6/27/2023 and results within therapeutic range: 35 ng/mL (reference range 20-80 ng/mL).     Provider contacted internal medicine and per their recommendation discontinued 2 L fluid restriction (which was ordered to manage resolved hyponatremia); sodium level 7/12/2023 is 140 mml/L.  CMP still ordered for every 3 days to monitor. IM has been following pt to evaluate hyponatremia and IM was notified on 6/15/23 of altered level of consciousness which presented as increased confusion compared to baseline since admission and increased agitation. Per IM provider- pt's waxing and waning symptoms most  likely secondary to a primary psychiatric disorder with high likelihood for underlying dementia; there is no clear reversible organic cause of her symptoms. Speech therapy was consulted 6/15/23 and Chest Xray was also ordered to evaluate due to concern for aspiration with frequent regurgitation /emesis; chest xray did not show acute concern for aspiration. Speech therapy was reconsulted to address ongoing spontaneous regurgitation of small amounts after eating; patient has also been observed inducing vomiting by sticking her fingers in her mouth. The XR video swallow study was completed on 6/30/2023: results indicate that patient's esophagus is mildly dilated without any other abnormality; no aspiration. Per documentation speech therapist is recommending regular diet with thin liquids as well as for nursing to administer pills in purée such as applesauce. GI followed pt to address frequent regurgitation /emesis, poor P.O intake, and severe constipation. GI started pt on bowel regimen and esophagram and upper GI studies were completed 6/19/23. Per IM provider documentation, GI provider reports that results indicate delayed emptying, but no concern for strictures; if unable to tolerate any oral intake a CT abdomen with contrast should be performed to rule out intra-abdominal pathology. Pt was not tolerating p.o. intake at that time and a CT abdomen/pelvis with contrast was completed 6/20/2023: report of results indicated a large volume predominantly low density colonic stool intermixed with hyperattenuating contrast (from 6/19/2023 esophagram) extending from the cecum through the entire length of the colon to the rectum.  Subsequently a therapeutic gastrograffin enema with IR was completed 6/21/23 which was effective for removing a large volume of stool. GI luminal team signed off on 6/26/2023 with recommendations for ongoing bowel regimen as well as recommendations for discharge. Plan is to continue MiraLAX and stool  softeners with goal of patient achieving 1-2 soft bowel movement daily per GI recommendation. Patient then had an MRI of abdomen completed per radiology recommendation on 7/5/23; results indicate: hemangiomas; Follow-up MRI may be obtained in 6-12 months to assess for change. Also, persistent large volume stool throughout the colon indicates constipation.  Patient has been receiving bowel regimen interventions per IM to treat constipation.  Per  [employer] documentation patient had 1 bowel movement yesterday.  Provider will continue to monitor this.  Provider adjusted order to schedule senna 1 tab PO BID as well as MiraLAX 17 g PO daily, which had both been changed to PRN by internal medicine provider, due to patient having only very small bowel movement so far today and patient having notable stool burden which has been ongoing since admission.     Dietitian is following pt due to inadequate oral intake related to altered mentation, decreased appetite, and early satiety. Patient has been meeting her nutrition goals which include: patient to consume at least 25-50% meals, 100% nutrition supplements, and for patient to maintain/gain weight. To support pt's dietary goal of maintaining/gaining weight patient care order in place for staff to promote patient eating small meals and snacks throughout the day; order also includes that pt needs supervision while eating to encourage intake and monitor for reflux/regurgitation. Pt weights will be monitored every Tuesday, Thursday, and Saturday; also monitoring intake and output.  Plan will be to continue to monitor weights and p.o. intake. Pt recent weights below.    Vitals:    07/09/23 0835 07/11/23 0739 07/13/23 0816   Weight: 51.4 kg (113 lb 5.1 oz) 50 kg (110 lb 3.7 oz) 51.1 kg (112 lb 10.5 oz)          MEDICATIONS   Medications:  Scheduled Meds:    calcium carbonate  500 mg Oral At Bedtime     childrens multivitamin with iron  2 tablet Oral Daily     cyanocobalamin  100  "mcg Oral At Bedtime     FLUoxetine  50 mg Oral Daily     gabapentin  200 mg Oral TID     melatonin  6 mg Oral QPM     memantine  10 mg Oral BID     mirtazapine  15 mg Orally disintegrating tablet At Bedtime     OLANZapine zydis  2.5 mg Oral QAM     OLANZapine zydis  10 mg Oral At Bedtime     ondansetron  4 mg Oral TID AC     pantoprazole  40 mg Oral QAM AC     polyethylene glycol  17 g Oral Daily     prazosin  1 mg Oral Daily with supper     senna-docusate  1 tablet Oral BID     simvastatin  40 mg Oral At Bedtime     valACYclovir  500 mg Oral Daily     cholecalciferol  25 mcg Oral Daily with supper     Continuous Infusions:  PRN Meds:.acetaminophen, alum & mag hydroxide-simethicone, budesonide-formoterol, calcium carbonate, doxylamine, OLANZapine zydis, polyethylene glycol, prochlorperazine, sennosides    Medication adherence issues: MS Med Adherence Y/N: No  Medication side effects: MEDICATION SIDE EFFECTS: no side effects reported   Benefit: Yes / No: Yes       ROS   Pertinent items are noted in HPI.       MENTAL STATUS EXAM   Vitals:/72   Pulse 102   Temp 98  F (36.7  C) (Temporal)   Resp 18   Ht 1.626 m (5' 4\")   Wt 51.1 kg (112 lb 10.5 oz)   LMP  (LMP Unknown)   SpO2 96%   BMI 19.34 kg/m    Appearance:  In hospital scrubs. Casually groomed.  Mood: \"Terrible.\"  Affect: flat however improving was congruent to speech.  Suicidal Ideation: absent  Homicidal Ideation: PRESENT / ABSENT: absent   Thought process: difficult to follow and disorganized however improving   Thought content: endorses preoccupations  Fund of Knowledge: average  Attention/Concentration: Poor, improving  Language ability:  Intact  Memory:  Immediate recall impaired however improving, Short-term memory impaired and Long-term memory impaired  Insight:  limited.  Judgement: limited  Orientation: Oriented to self   Psychomotor Behavior: normal or unremarkable    Muscle Strength and Tone: MuscleStrength: Normal  Gait and Station:  " slightly stiff however steady with walker       LABS   Personally reviewed.       Latest Reference Range & Units 06/30/23 14:17 07/02/23 09:04 07/03/23 07:11 07/04/23 09:34 07/05/23 20:17 07/06/23 08:50 07/08/23 09:10 07/09/23 12:22 07/11/23 09:29 07/12/23 07:00   Sodium 136 - 145 mmol/L   139   138  138  140   Potassium 3.4 - 5.3 mmol/L   4.0   3.6  4.2  4.0   Chloride 98 - 107 mmol/L   103   106  107  107   Carbon Dioxide (CO2) 22 - 29 mmol/L   27 22  23  26   Urea Nitrogen 8.0 - 23.0 mg/dL   6.0 (L)   6.4 (L)  12.6  11.6   Creatinine 0.51 - 0.95 mg/dL   0.73   0.91  0.83  0.69   GFR Estimate >60 mL/min/1.73m2   82   63  70  87   Calcium 8.8 - 10.2 mg/dL   9.2   9.2  9.1  9.4   Anion Gap 7 - 15 mmol/L   9   10  8  7   Magnesium 1.7 - 2.3 mg/dL   2.1   1.9  2.2  2.1   Phosphorus 2.5 - 4.5 mg/dL   3.7   3.6  3.6  3.6   Albumin 3.5 - 5.2 g/dL   3.3 (L)   3.4 (L)  3.2 (L)  3.2 (L)   Protein Total 6.4 - 8.3 g/dL   5.5 (L)   5.6 (L)  5.4 (L)  5.2 (L)   Alkaline Phosphatase 35 - 104 U/L   45   46  43  41   ALT 0 - 50 U/L   12   11  9  8   AST 0 - 45 U/L   15   13  12  10   Bilirubin Total <=1.2 mg/dL   0.4   0.3  0.2  0.7   Glucose 70 - 99 mg/dL   92   168 (H)  98  87   WBC 4.0 - 11.0 10e3/uL   8.2   7.4  7.3  9.6   Hemoglobin 11.7 - 15.7 g/dL   11.6 (L)   12.0  11.8  11.5 (L)   Hematocrit 35.0 - 47.0 %   35.3   36.9  35.8  35.4   Platelet Count 150 - 450 10e3/uL   211   215  192  195   RBC Count 3.80 - 5.20 10e6/uL   3.73 (L)   3.85  3.79 (L)  3.70 (L)   MCV 78 - 100 fL   95   96  95  96   MCH 26.5 - 33.0 pg   31.1   31.2  31.1  31.1   MCHC 31.5 - 36.5 g/dL   32.9   32.5  33.0  32.5   RDW 10.0 - 15.0 %   13.7   13.8  13.7  14.0   % Neutrophils %   73   77  69  73   % Lymphocytes %   18   17  24  19   % Monocytes %   8   5  6  6   % Eosinophils %   1   1  1  2   % Basophils %   0   0  0  0   Absolute Basophils 0.0 - 0.2 10e3/uL   0.0   0.0  0.0  0.0   Absolute Eosinophils 0.0 - 0.7 10e3/uL   0.1   0.0  0.1  0.1    Absolute Immature Granulocytes <=0.4 10e3/uL   0.0   0.0  0.0  0.0   Absolute Lymphocytes 0.8 - 5.3 10e3/uL   1.4   1.2  1.7  1.8   Absolute Monocytes 0.0 - 1.3 10e3/uL   0.7   0.4  0.4  0.6   % Immature Granulocytes %   0   0  0  0   Absolute Neutrophils 1.6 - 8.3 10e3/uL   6.0   5.7  5.0  7.1   Absolute NRBCs 10e3/uL   0.0   0.0  0.0  0.0   NRBCs per 100 WBC <1 /100   0   0  0  0   Color Urine Colorless, Straw, Light Yellow, Yellow   Straw           Appearance Urine Clear   Clear           Glucose Urine Negative mg/dL  Negative           Bilirubin Urine Negative   Negative           Ketones Urine Negative mg/dL  Negative           Specific Gravity Urine 1.003 - 1.035   1.002 (L)           pH Urine 5.0 - 7.0   7.5 (H)           Protein Albumin Urine Negative mg/dL  Negative           Urobilinogen mg/dL Normal, 2.0 mg/dL  Normal           Nitrite Urine Negative   Negative           Blood Urine Negative   Negative           Leukocyte Esterase Urine Negative   Negative           WBC Urine <=5 /HPF  <1           RBC Urine <=2 /HPF  0           Squamous Epithelial /HPF Urine <=1 /HPF  <1           XR ABDOMEN 1 VIEW        Rpt      XR VIDEO SWALLOW WITH SLP OR OT  Rpt            MR ABDOMEN W/O & W CONTRAST      Rpt        EKG 12-LEAD, TRACING ONLY     Rpt     Rpt    (L): Data is abnormally low  (H): Data is abnormally high  Rpt: View report in Results Review for more information       DIAGNOSIS   Principal Problem:    Suicidal ideation  MDD, severe, recurrent episode, with psychotic features  R/O Bipolar Disorder Type 1    Active Problem List:  Patient Active Problem List   Diagnosis     Arthritis     Depressive disorder     Borderline personality disorder (H)     GERD (gastroesophageal reflux disease)     Holosystolic murmur     Asthma     History of gastric bypass     Hyperlipidemia LDL goal <130     Other insomnia     Mild mitral regurgitation     Mild aortic stenosis     Weight loss     Bilateral low back pain  without sciatica     Adhesive capsulitis of shoulder, right     Mild intermittent asthma, unspecified whether complicated     Bipolar affective disorder, remission status unspecified (H)     Constipation, unspecified constipation type     Age-related osteoporosis without current pathological fracture     Plantar warts     Hypoglycemia     Failure to thrive in adult     Hypotension, unspecified hypotension type     Suicidal ideation          PLAN   1. Education given regarding diagnostic and treatment options with risks, benefits and alternatives and adequate verbalization of understanding.  2.  Medications:  Hospital  -Increase Prozac to 60 mg PO liquid daily to target depressed mood.  -Continue Gabapentin to 200 mg PO liquid TID to target agitation/anxiety.  -Continue Prazosin 1 mg PO to with dinner to target anxiety/agitation in evening.  -Continue Namenda 10 mg tablet PO BID to treat dementia symptoms observed.  -Continue Melatonin 6 mg scheduled every evening to promote sleep.  -Continue Olanzapine ODT 2.5 mg tablet every morning to treat symptoms of psychosis  -Continue Olanzapine 10 mg ODT tablet at bedtime to treat symptoms of psychosis  -Continue Mirtazapine 15 mg PO disintegrating tablet at HS to target depression symptoms.  -Continue Olanzapine ODT 5 mg PO tablet 3 times daily as needed for severe agitation.  -Continue Unisom 12.5 mg 3 times daily as needed for insomnia or nausea.   3. Consultations:  Internal medicine consulted regarding results of patient's abdominal MRI which show large stool burden.  GI consulted and signed off on 6/26/2023, GI recommendations include:  -- Consider palliative consult to help further assist with GOC if without improvement in course/sx.  -- Continue with scheduled bowel med regimen and titrate to ensure patient having at least x1 soft BM daily given chronic hx of constipation.  -- Continue detailed documentation of stool appearance, including color, consistency,  frequency and amount.   -- Continue PPI at discharge (Protonix)  ---- Discontinue iron supplementation and schedule repeat iron studies in OP setting.  If develops iron deficiency off supplementation, consider IV iron infusions > PO iron supplementation  Speech therapy reconsulted completed 6/26/23 for Clinical Swallow Evaluation  -XR Video swallow study completed 6/30/2023:  -Recommendations include taking pills in puree, avoiding mixed textures, and to take small sips with thin liquids.  -Speech therapy recommending regular diet with thin liquids-provider updated patient's diet order.  -Continue one-to-one supervision as needed with eating  -Patient should use a slow rate of intake and follow reflux precautions.  Dietician consulted and following pt during admission  -Weights will be monitored every Tuesday, Thursday, and Saturday  -Intake and output monitoring.  -Plan is for pt to eat small meals/snack throughout the day d/t history of gastric bypass.   -Supplements ordered for between meal  - Zofran 4mg PO PRN scheduled 30 minutes before meals TID to improve PO tolerance.   4. Labs/Tests   Labs: CBC, CMP, magnesium, and phosphorus levels every 3 days   EKG ordered weekly  Olanzapine drug level collected 6/27/2023 and results: Within therapeutic range: 35 ng/mL (reference range 20-80 ng/mL).  5. Structure and Supervision  Unit 3B.  Precautions in place.  Fall precautions.  Continue on SIO monitoring due to reporting active SI with plan, engaging in SIB including scratching her arm, and confused impulsive behavior.  6.   is following in regards to collecting and reviewing collateral information, referrals and disposition planning.  Legal: civil commitment.   Referrals:  Per CTC  Care Coordination:  Per CTC  Placement:  TBD  Anticipated Discharge:  TBD     Further treatment programming to be determined throughout the hospital course.      Risk Assessment: [facility] RISK ASSESSMENT: Patient on  precautions    Coordination of Care:   Treatment Plan reviewed and physician signed, Care discussed with Care/Treatment Team Members, Chart reviewed and Patient seen      Re-Certification I certify that the inpatient psychiatric facility services furnished since the previous certification were, and continue to be, medically necessary for, either, treatment which could reasonably be expected to improve the patient s condition or diagnostic study and that the hospital records indicate that the services furnished were, either, intensive treatment services, admission and related services necessary for diagnostic study, or equivalent services.     I certify that the patient continues to need, on a daily basis, active treatment furnished directly by or requiring the supervision of inpatient psychiatric facility personnel.   I estimate 5-7 days of hospitalization is necessary for proper treatment of the patient. My plans for post-hospital care for this patient are   TBD       DANIEL Bennett CNP    -     7/13/2023     -     01:15 PM  Total time  35 minutes with > 50%spent on coordination of cares and psycho-education.    This note was created with help of Dragon dictation system. Grammatical / typing errors are not intentional.    DANIEL Bennett CNP

## 2023-07-13 NOTE — CARE PLAN
07/13/23 1148   Group Therapy Session   Group Attendance attended group session   Time Session Began 1015   Time Session Ended 1115   Total Time (minutes) 20  (no charge)   Total # Attendees 10   Group Type expressive therapy;psychotherapeutic;task skill   Group Topic Covered coping skills/lifestyle management;problem-solving;cognitive therapy techniques;cognitive activities;balanced lifestyle;structured socialization   Group Session Detail [facility] group to facilitate coping skill exploration, use of cognitive skills and problem solving, creative expression, clinical observation and facilitation of social, cognitive, and kinesthetic performance skills.    Patient Response/Contribution cooperative with task;other (see comments)  (with frequent encouragement)   Patient Participation Detail On attendance, accepted task requiring using visuospatial problem solving. Needed cues, directions repeated and demonstrated as well as task steps to be structured to be less complex and more concrete. When wanting to leave the first time, she stated the frustration of the task though stayed longer to complete the steps with additional cuing and step break down to less complexity.

## 2023-07-13 NOTE — PLAN OF CARE
Problem: Sleep Disturbance  Goal: Adequate Sleep/Rest  Outcome: Progressing     Patient slept approximately 6.75 hours, safety checks and precautions in place, no PRN given or requested, no C/O pain. Patient was incontinent of  BM X 1. Writer will continue to monitor patient as needed for the rest of th shift.

## 2023-07-13 NOTE — PLAN OF CARE
"  Problem: Adult Behavioral Health Plan of Care  Goal: Adheres to Safety Considerations for Self and Others  Outcome: Progressing   Goal Outcome Evaluation:  Pt has been isolative and withdrawn all shift .  Sleeping most of the shift . Pt refused to come out for   dinner . Food tray was taken in to her room . She took one bite and spit out the food . Pt later ate 3 cups of ice cream and drank 440 ml of fluid . Pt states she is very tired this evening  did not want to talk . She had no BM . Pt was given 1 Senna for constipation . Pt refused to answer  assessment question . \" Get out  am tired\"  pt 1:1 was discontinue . Will continue POC                           "

## 2023-07-14 PROCEDURE — 250N000013 HC RX MED GY IP 250 OP 250 PS 637: Performed by: PHYSICIAN ASSISTANT

## 2023-07-14 PROCEDURE — G0177 OPPS/PHP; TRAIN & EDUC SERV: HCPCS

## 2023-07-14 PROCEDURE — 250N000013 HC RX MED GY IP 250 OP 250 PS 637

## 2023-07-14 PROCEDURE — 99232 SBSQ HOSP IP/OBS MODERATE 35: CPT

## 2023-07-14 PROCEDURE — 250N000013 HC RX MED GY IP 250 OP 250 PS 637: Performed by: PSYCHIATRY & NEUROLOGY

## 2023-07-14 PROCEDURE — 124N000003 HC R&B MH SENIOR/ADOLESCENT

## 2023-07-14 PROCEDURE — 250N000011 HC RX IP 250 OP 636

## 2023-07-14 RX ADMIN — OLANZAPINE 2.5 MG: 5 TABLET, ORALLY DISINTEGRATING ORAL at 08:11

## 2023-07-14 RX ADMIN — Medication 25 MCG: at 16:27

## 2023-07-14 RX ADMIN — PANTOPRAZOLE SODIUM 40 MG: 40 TABLET, DELAYED RELEASE ORAL at 05:44

## 2023-07-14 RX ADMIN — ONDANSETRON 4 MG: 4 TABLET ORAL at 12:14

## 2023-07-14 RX ADMIN — Medication 2 TABLET: at 08:11

## 2023-07-14 RX ADMIN — POLYETHYLENE GLYCOL 3350 17 G: 17 POWDER, FOR SOLUTION ORAL at 08:11

## 2023-07-14 RX ADMIN — GABAPENTIN 200 MG: 250 SUSPENSION ORAL at 20:11

## 2023-07-14 RX ADMIN — PROCHLORPERAZINE MALEATE 5 MG: 5 TABLET ORAL at 08:18

## 2023-07-14 RX ADMIN — PRAZOSIN HYDROCHLORIDE 1 MG: 1 CAPSULE ORAL at 16:27

## 2023-07-14 RX ADMIN — SENNOSIDES AND DOCUSATE SODIUM 1 TABLET: 50; 8.6 TABLET ORAL at 08:11

## 2023-07-14 RX ADMIN — GABAPENTIN 200 MG: 250 SUSPENSION ORAL at 16:27

## 2023-07-14 RX ADMIN — VITAM B12 100 MCG: 100 TAB at 20:11

## 2023-07-14 RX ADMIN — FLUOXETINE HYDROCHLORIDE 60 MG: 20 SOLUTION ORAL at 08:12

## 2023-07-14 RX ADMIN — ONDANSETRON 4 MG: 4 TABLET ORAL at 16:27

## 2023-07-14 RX ADMIN — ONDANSETRON 4 MG: 4 TABLET ORAL at 05:44

## 2023-07-14 RX ADMIN — VALACYCLOVIR 500 MG: 500 TABLET, FILM COATED ORAL at 08:11

## 2023-07-14 RX ADMIN — OLANZAPINE 10 MG: 10 TABLET, ORALLY DISINTEGRATING ORAL at 20:10

## 2023-07-14 RX ADMIN — SIMVASTATIN 40 MG: 40 TABLET, FILM COATED ORAL at 20:17

## 2023-07-14 RX ADMIN — MEMANTINE 10 MG: 10 TABLET ORAL at 20:09

## 2023-07-14 RX ADMIN — SENNOSIDES AND DOCUSATE SODIUM 1 TABLET: 50; 8.6 TABLET ORAL at 20:09

## 2023-07-14 RX ADMIN — Medication 500 MG: at 20:09

## 2023-07-14 RX ADMIN — GABAPENTIN 200 MG: 250 SUSPENSION ORAL at 08:12

## 2023-07-14 RX ADMIN — MIRTAZAPINE 15 MG: 15 TABLET, ORALLY DISINTEGRATING ORAL at 20:10

## 2023-07-14 RX ADMIN — Medication 6 MG: at 20:10

## 2023-07-14 RX ADMIN — MEMANTINE 10 MG: 10 TABLET ORAL at 08:11

## 2023-07-14 ASSESSMENT — ACTIVITIES OF DAILY LIVING (ADL)
DRESS: WITH ASSISTANCE
HYGIENE/GROOMING: PROMPTS
ADLS_ACUITY_SCORE: 76
DRESS: WITH ASSISTANCE
ADLS_ACUITY_SCORE: 76
ORAL_HYGIENE: PROMPTS
LAUNDRY: UNABLE TO COMPLETE
ADLS_ACUITY_SCORE: 76
HYGIENE/GROOMING: PROMPTS
ADLS_ACUITY_SCORE: 76
ADLS_ACUITY_SCORE: 76
ORAL_HYGIENE: PROMPTS
ADLS_ACUITY_SCORE: 76

## 2023-07-14 NOTE — CARE PLAN
Occupational Therapy Group Note:     07/14/23 1552   Group Therapy Session   Group Attendance attended group session   Time Session Began 1315   Time Session Ended 1400   Total Time (minutes) 5 (no charge)   Total # Attendees 8   Group Type recreation   Group Topic Covered leisure exploration/use of leisure time;structured socialization   Group Session Detail OT Leisure Group   Patient Response/Contribution confronted peers appropriately   Patient Participation Detail Patient engaged briefly in group leisure activity with the focus being: building interpersonal skills, encouraging team collaboration, and promoting overall prosocial engagement and interaction. Patient entered group late. Right when patient sat down in group and appeared settled; patient was called out to meet with her . Therefore, writer was not able to fully observe or interact with patient in this group. Plan: continue to invite and encourage patient to attend groups.

## 2023-07-14 NOTE — PLAN OF CARE
Problem: Sleep Disturbance  Goal: Adequate Sleep/Rest  Outcome: Progressing     Patient slept approximately 7hours, safety checks and precautions in place . No C/O pain, no prn given or requested. Medication complaint.  Will continue to monitor and offer therapeutic support as needed for the rest of the shift.

## 2023-07-14 NOTE — CARE PLAN
07/14/23 1407   Group Therapy Session   Group Attendance attended group session   Time Session Began 1130   Time Session Ended 1220   Total Time (minutes) 50   Total # Attendees 8   Group Type task skill;expressive therapy   Group Topic Covered coping skills/lifestyle management;problem-solving;cognitive activities;balanced lifestyle;structured socialization   Group Session Detail Occupational Therapy Clinic group to facilitate coping skill exploration, use of cognitive skills and problem solving, creative expression, clinical observation and facilitation of social, cognitive, and kinesthetic performance skills.    Patient Response/Contribution cooperative with task;listened actively   Patient Participation Detail Pleasant and social with initiating conversation.  Affect brightened with interactions. She began an unfamiliar task of highly detailed steps requiring fine motor coordination. Appeared more attentive to surroundings, engaged and willing to stay the full session. Appeared comfortable.

## 2023-07-14 NOTE — PROGRESS NOTES
"PSYCHIATRY  PROGRESS NOTE     DATE OF SERVICE   7/14/2023     CHIEF COMPLAINT   \"Better.\"       SUBJECTIVE   Nursing reports:     Pt majority of the shift in her room, kept to herself when out in the milieu. Pt came out for dinner, ate 100% of her dinner, with a total of 480 Ml of fluid. Pt appeared stronger/alert this shift. Had emesis x1, had two med soft bowel movement. Refused assessment question. Medication complaint.  Pt is committed and has no CPR, and do not intubate order.     Patient slept approximately 7hours, safety checks and precautions in place . No C/O pain, no prn given or requested. Medication complaint.  Will continue to monitor and offer therapeutic support as needed for the rest of the shift.    Social work reports:    Assessment/Intervention/Current Symptoms and Care Coordination  -Chart review and met with team, discussed pt progress, symptomology, and response to treatment.  Discussed the discharge plan and any potential impediments to discharge .Pt will now receive smaller meals to help with stomach pain /induced vomiting. Pt also reported experiencing some dizziness. Pt continues to have SI.  - Josefina at [facility] reports they may want to have their  Larry Overton visit pt on the unit sometime to conduct an assessment of pt's current state.  Josefina would like us to send some recent progress notes about 4-5 days prior to our expected discharge date and continue to be in touch with her. Most recent progress notes were sent 7/13.  -Pt's CYNTHIA Hodgson came to visit and do CM intake paperwork with pt. Pt was anxious about the visit but did very well with answering questions and was pleasant.      Discharge Plan or Goal  Pending stabilization & development of a safe discharge plan.      Barriers to Discharge   Patient requires further psychiatric stabilization due to current symptomology     Referral Status   None-[facility] remains committed to having patient return to them when " "stable.      Contacts:  MAYITO Hodgson with [facility] [phone].  [facility] [phone] ext.[phone] Josefina Fax ([phone])  : Lorenzo Whipple [phone]     Legal Status  Patient is under MI commitment in [facility]  Pt will need PD upon discharge     OBJECTIVE   Met with pt in conference room on unit 3B. Pt affect appears flat however improving. Pt reports mood as, \"Better.\"  Pt's thoughts continue to be disorganized however improving. Patient continues to experience confusion intermittently however this is also improving. Patient continues to report severe depression and endorses passive SI, however able to contract for safety. Patient also tends to isolate in room when not encouraged by unit staff to leave her room. Today, patient does report that if she were to discharge from the hospital and return to nursing home she was previously living at that she does not plan to stop eating. Despite patient reporting severe depression with passive SI patient has a brighter affect compared to previous day and is observed participating in group this morning as well as sitting out in the milieu area and engaging with peers. Today, patient's mental health  Rema also came to see patient in order to complete intake paperwork; this provider as well as unit CTC were present for this. Patient was calm, cooperative, and able to participate appropriately during this meeting.  Pt reports mild anxiety symptoms today. Denies AH or VH. Plan today is to continue Prozac 60 mg PO daily to target depressed mood, gabapentin 200 mg 3 times daily to target anxiety symptoms, Namenda 10 mg PO BID, Prazosin 1 mg PO at bedtime, Olanzapine ODT 2.5 mg tablet every morning, Olanzapine 10 mg ODT tablet at bedtime, and Mirtazapine 15 mg PO disintegrating tablet at HS. Olanzapine drug level checked on 6/27/2023 and results within therapeutic range: 35 ng/mL (reference range 20-80 ng/mL). "     Provider has experienced intermittent hyponatremia during current hospitalization which likely was induced by medication sertraline. Sodium level last checked on 7/12/2023 is WNL: 140 mml/L. CMP still ordered for every 3 days to monitor. IM has been following pt to evaluate hyponatremia and IM was notified on 6/15/23 of altered level of consciousness which presented as increased confusion compared to baseline since admission and increased agitation. Per IM provider- pt's waxing and waning symptoms most likely secondary to a primary psychiatric disorder with high likelihood for underlying dementia; there is no clear reversible organic cause of her symptoms. Speech therapy was consulted 6/15/23 and Chest Xray was also ordered to evaluate due to concern for aspiration with frequent regurgitation /emesis; chest xray did not show acute concern for aspiration. Speech therapy was reconsulted to address ongoing spontaneous regurgitation of small amounts after eating; patient has also been observed inducing vomiting by sticking her fingers in her mouth. The XR video swallow study was completed on 6/30/2023: results indicate that patient's esophagus is mildly dilated without any other abnormality; no aspiration. Per documentation speech therapist is recommending regular diet with thin liquids as well as for nursing to administer pills in purée such as applesauce. GI followed pt to address frequent regurgitation /emesis, poor P.O intake, and severe constipation. GI started pt on bowel regimen and esophagram and upper GI studies were completed 6/19/23. Per IM provider documentation, GI provider reports that results indicate delayed emptying, but no concern for strictures; if unable to tolerate any oral intake a CT abdomen with contrast should be performed to rule out intra-abdominal pathology. Pt was not tolerating p.o. intake at that time and a CT abdomen/pelvis with contrast was completed 6/20/2023: report of results  indicated a large volume predominantly low density colonic stool intermixed with hyperattenuating contrast (from 6/19/2023 esophagram) extending from the cecum through the entire length of the colon to the rectum.  Subsequently a therapeutic gastrograffin enema with IR was completed 6/21/23 which was effective for removing a large volume of stool. GI luminal team signed off on 6/26/2023 with recommendations for ongoing bowel regimen as well as recommendations for discharge. Plan is to continue MiraLAX and stool softeners with goal of patient achieving 1-2 soft bowel movements daily per GI recommendation. Patient then had an MRI of abdomen completed per radiology recommendation on 7/5/23; results indicate: hemangiomas (follow-up MRI may be obtained in 6-12 months to assess for change) and persistent large volume stool throughout the colon indicating constipation. Patient has been receiving bowel regimen interventions to treat constipation.  Today, patient has also had episodes of hypotension; nursing pushing oral fluids and IM notified.    Dietitian is following pt due to inadequate oral intake related to altered mentation, decreased appetite, and early satiety. Patient has been meeting her nutrition goals which include: patient to consume at least 25-50% meals, 100% nutrition supplements, and for patient to maintain/gain weight. To support pt's dietary goal of maintaining/gaining weight patient care order in place for staff to promote patient eating small meals and snacks throughout the day. Pt weights will be monitored every Tuesday, Thursday, and Saturday; also monitoring intake and output.  Pt recent weights below.    Vitals:    07/09/23 0835 07/11/23 0739 07/13/23 0816   Weight: 51.4 kg (113 lb 5.1 oz) 50 kg (110 lb 3.7 oz) 51.1 kg (112 lb 10.5 oz)     Per Josefina, Director of Nursing at [facility], plan will be for this provider to call with update on Monday 7/17/2023 and as long as patient  "continues to improve, a time will be set for the  of [facility] Larry Overton to come visit patient as a requirement prior to her being approved for transfer back to their nursing home facility.     MEDICATIONS   Medications:  Scheduled Meds:    calcium carbonate  500 mg Oral At Bedtime     childrens multivitamin with iron  2 tablet Oral Daily     cyanocobalamin  100 mcg Oral At Bedtime     FLUoxetine  60 mg Oral Daily     gabapentin  200 mg Oral TID     melatonin  6 mg Oral QPM     memantine  10 mg Oral BID     mirtazapine  15 mg Orally disintegrating tablet At Bedtime     OLANZapine zydis  2.5 mg Oral QAM     OLANZapine zydis  10 mg Oral At Bedtime     ondansetron  4 mg Oral TID AC     pantoprazole  40 mg Oral QAM AC     polyethylene glycol  17 g Oral Daily     prazosin  1 mg Oral Daily with supper     senna-docusate  1 tablet Oral BID     simvastatin  40 mg Oral At Bedtime     valACYclovir  500 mg Oral Daily     cholecalciferol  25 mcg Oral Daily with supper     Continuous Infusions:  PRN Meds:.acetaminophen, alum & mag hydroxide-simethicone, budesonide-formoterol, calcium carbonate, doxylamine, OLANZapine zydis, polyethylene glycol, prochlorperazine, sennosides    Medication adherence issues: MS Med Adherence Y/N: No  Medication side effects: MEDICATION SIDE EFFECTS: no side effects reported   Benefit: Yes / No: Yes       ROS   Pertinent items are noted in HPI.       MENTAL STATUS EXAM   Vitals:BP 93/53   Pulse 71   Temp 98.2  F (36.8  C) (Temporal)   Resp 16   Ht 1.626 m (5' 4\")   Wt 51.1 kg (112 lb 10.5 oz)   LMP  (LMP Unknown)   SpO2 98%   BMI 19.34 kg/m    Appearance:  In hospital scrubs. Casually groomed.  Mood: \"Better.\"  Affect: flat however brighter and improving was congruent to speech.  Suicidal Ideation: absent  Homicidal Ideation: PRESENT / ABSENT: absent   Thought process: difficult to follow and disorganized however improving   Thought content: endorses " preoccupations  Fund of Knowledge: average  Attention/Concentration: Poor, improving  Language ability:  Intact  Memory:  Immediate recall impaired however improving, Short-term memory impaired and Long-term memory impaired however improving  Insight:  limited.  Judgement: limited  Orientation: Oriented to self   Psychomotor Behavior: normal or unremarkable    Muscle Strength and Tone: MuscleStrength: Normal  Gait and Station:  slightly stiff however steady with walker       LABS   Personally reviewed.       Latest Reference Range & Units 06/30/23 14:17 07/02/23 09:04 07/03/23 07:11 07/04/23 09:34 07/05/23 20:17 07/06/23 08:50 07/08/23 09:10 07/09/23 12:22 07/11/23 09:29 07/12/23 07:00   Sodium 136 - 145 mmol/L   139   138  138  140   Potassium 3.4 - 5.3 mmol/L   4.0   3.6  4.2  4.0   Chloride 98 - 107 mmol/L   103   106  107  107   Carbon Dioxide (CO2) 22 - 29 mmol/L   27   22  23  26   Urea Nitrogen 8.0 - 23.0 mg/dL   6.0 (L)   6.4 (L)  12.6  11.6   Creatinine 0.51 - 0.95 mg/dL   0.73   0.91  0.83  0.69   GFR Estimate >60 mL/min/1.73m2   82   63  70  87   Calcium 8.8 - 10.2 mg/dL   9.2   9.2  9.1  9.4   Anion Gap 7 - 15 mmol/L   9   10  8  7   Magnesium 1.7 - 2.3 mg/dL   2.1   1.9  2.2  2.1   Phosphorus 2.5 - 4.5 mg/dL   3.7   3.6  3.6  3.6   Albumin 3.5 - 5.2 g/dL   3.3 (L)   3.4 (L)  3.2 (L)  3.2 (L)   Protein Total 6.4 - 8.3 g/dL   5.5 (L)   5.6 (L)  5.4 (L)  5.2 (L)   Alkaline Phosphatase 35 - 104 U/L   45   46  43  41   ALT 0 - 50 U/L   12   11  9  8   AST 0 - 45 U/L   15   13  12  10   Bilirubin Total <=1.2 mg/dL   0.4   0.3  0.2  0.7   Glucose 70 - 99 mg/dL   92   168 (H)  98  87   WBC 4.0 - 11.0 10e3/uL   8.2   7.4  7.3  9.6   Hemoglobin 11.7 - 15.7 g/dL   11.6 (L)   12.0  11.8  11.5 (L)   Hematocrit 35.0 - 47.0 %   35.3   36.9  35.8  35.4   Platelet Count 150 - 450 10e3/uL   211   215  192  195   RBC Count 3.80 - 5.20 10e6/uL   3.73 (L)   3.85  3.79 (L)  3.70 (L)   MCV 78 - 100 fL   95   96  95  96   MCH  26.5 - 33.0 pg   31.1   31.2  31.1  31.1   MCHC 31.5 - 36.5 g/dL   32.9   32.5  33.0  32.5   RDW 10.0 - 15.0 %   13.7   13.8  13.7  14.0   % Neutrophils %   73   77  69  73   % Lymphocytes %   18   17  24  19   % Monocytes %   8   5  6  6   % Eosinophils %   1   1  1  2   % Basophils %   0   0  0  0   Absolute Basophils 0.0 - 0.2 10e3/uL   0.0   0.0  0.0  0.0   Absolute Eosinophils 0.0 - 0.7 10e3/uL   0.1   0.0  0.1  0.1   Absolute Immature Granulocytes <=0.4 10e3/uL   0.0   0.0  0.0  0.0   Absolute Lymphocytes 0.8 - 5.3 10e3/uL   1.4   1.2  1.7  1.8   Absolute Monocytes 0.0 - 1.3 10e3/uL   0.7   0.4  0.4  0.6   % Immature Granulocytes %   0   0  0  0   Absolute Neutrophils 1.6 - 8.3 10e3/uL   6.0   5.7  5.0  7.1   Absolute NRBCs 10e3/uL   0.0   0.0  0.0  0.0   NRBCs per 100 WBC <1 /100   0   0  0  0   Color Urine Colorless, Straw, Light Yellow, Yellow   Straw           Appearance Urine Clear   Clear           Glucose Urine Negative mg/dL  Negative           Bilirubin Urine Negative   Negative           Ketones Urine Negative mg/dL  Negative           Specific Gravity Urine 1.003 - 1.035   1.002 (L)           pH Urine 5.0 - 7.0   7.5 (H)           Protein Albumin Urine Negative mg/dL  Negative           Urobilinogen mg/dL Normal, 2.0 mg/dL  Normal           Nitrite Urine Negative   Negative           Blood Urine Negative   Negative           Leukocyte Esterase Urine Negative   Negative           WBC Urine <=5 /HPF  <1           RBC Urine <=2 /HPF  0           Squamous Epithelial /HPF Urine <=1 /HPF  <1           XR ABDOMEN 1 VIEW        Rpt      XR VIDEO SWALLOW WITH SLP OR OT  Rpt            MR ABDOMEN W/O & W CONTRAST      Rpt        EKG 12-LEAD, TRACING ONLY     Rpt     Rpt    (L): Data is abnormally low  (H): Data is abnormally high  Rpt: View report in Results Review for more information       DIAGNOSIS   Principal Problem:    Suicidal ideation  MDD, severe, recurrent episode, with psychotic features  R/O  Bipolar Disorder Type 1    Active Problem List:  Patient Active Problem List   Diagnosis     Arthritis     Depressive disorder     Borderline personality disorder (H)     GERD (gastroesophageal reflux disease)     Holosystolic murmur     Asthma     History of gastric bypass     Hyperlipidemia LDL goal <130     Other insomnia     Mild mitral regurgitation     Mild aortic stenosis     Weight loss     Bilateral low back pain without sciatica     Adhesive capsulitis of shoulder, right     Mild intermittent asthma, unspecified whether complicated     Bipolar affective disorder, remission status unspecified (H)     Constipation, unspecified constipation type     Age-related osteoporosis without current pathological fracture     Plantar warts     Hypoglycemia     Failure to thrive in adult     Hypotension, unspecified hypotension type     Suicidal ideation          PLAN   1. Education given regarding diagnostic and treatment options with risks, benefits and alternatives and adequate verbalization of understanding.  2.  Medications:  Hospital  -Continue Prozac 60 mg PO liquid daily to target depressed mood.  -Continue Gabapentin 200 mg PO liquid TID to target agitation/anxiety.  -Continue Prazosin 1 mg PO to with dinner to target anxiety/agitation in evening.  -Continue Namenda 10 mg tablet PO BID to treat dementia symptoms observed.  -Continue Melatonin 6 mg scheduled every evening to promote sleep.  -Continue Olanzapine ODT 2.5 mg tablet every morning to treat symptoms of psychosis  -Continue Olanzapine 10 mg ODT tablet at bedtime to treat symptoms of psychosis  -Continue Mirtazapine 15 mg PO disintegrating tablet at HS to target depression symptoms.  -Continue Olanzapine ODT 5 mg PO tablet 3 times daily as needed for severe agitation.  -Continue Unisom 12.5 mg 3 times daily as needed for insomnia or nausea.   3. Consultations:  Internal medicine consulted regarding results of patient's abdominal MRI which show large  stool burden.  GI consulted and signed off on 6/26/2023, GI recommendations include:  -- Consider palliative consult to help further assist with GOC if without improvement in course/sx.  -- Continue with scheduled bowel med regimen and titrate to ensure patient having at least x1 soft BM daily given chronic hx of constipation.  -- Continue detailed documentation of stool appearance, including color, consistency, frequency and amount.   -- Continue PPI at discharge (Protonix)  ---- Discontinue iron supplementation and schedule repeat iron studies in OP setting.  If develops iron deficiency off supplementation, consider IV iron infusions > PO iron supplementation  Speech therapy reconsulted completed 6/26/23 for Clinical Swallow Evaluation  -XR Video swallow study completed 6/30/2023:  -Recommendations include taking pills in puree, avoiding mixed textures, and to take small sips with thin liquids.  -Speech therapy recommending regular diet with thin liquids-provider updated patient's diet order.  -Continue one-to-one supervision as needed with eating  -Patient should use a slow rate of intake and follow reflux precautions.  Dietician consulted and following pt during admission  -Weights will be monitored every Tuesday, Thursday, and Saturday  -Intake and output monitoring.  -Plan is for pt to eat small meals/snack throughout the day d/t history of gastric bypass.   -Supplements ordered for between meal  - Zofran 4mg PO PRN scheduled 30 minutes before meals TID to improve PO tolerance.   4. Labs/Tests   Labs: CBC, CMP, magnesium, and phosphorus levels every 3 days   EKG ordered weekly  Olanzapine drug level collected 6/27/2023 and results: Within therapeutic range: 35 ng/mL (reference range 20-80 ng/mL).  5. Structure and Supervision  Unit 3B.  Precautions in place.  Fall precautions.  Continue on SIO monitoring due to reporting active SI with plan, engaging in SIB including scratching her arm, and confused impulsive  behavior.  6.   is following in regards to collecting and reviewing collateral information, referrals and disposition planning.  Legal: civil commitment.   Referrals:  Per CTC  Care Coordination:  Per CTC  Placement:  TBD  Anticipated Discharge:  TBD     Further treatment programming to be determined throughout the hospital course.      Risk Assessment: [facility] RISK ASSESSMENT: Patient on precautions    Coordination of Care:   Treatment Plan reviewed and physician signed, Care discussed with Care/Treatment Team Members, Chart reviewed and Patient seen      Re-Certification I certify that the inpatient psychiatric facility services furnished since the previous certification were, and continue to be, medically necessary for, either, treatment which could reasonably be expected to improve the patient s condition or diagnostic study and that the hospital records indicate that the services furnished were, either, intensive treatment services, admission and related services necessary for diagnostic study, or equivalent services.     I certify that the patient continues to need, on a daily basis, active treatment furnished directly by or requiring the supervision of inpatient psychiatric facility personnel.   I estimate 5-7 days of hospitalization is necessary for proper treatment of the patient. My plans for post-hospital care for this patient are   TBD       DANIEL Bennett CNP    -     7/14/2023     -     11:00 AM  Total time  35 minutes with > 50%spent on coordination of cares and psycho-education.    This note was created with help of Dragon dictation system. Grammatical / typing errors are not intentional.    DANIEL Bennett CNP

## 2023-07-14 NOTE — PLAN OF CARE
Problem: Anxiety Signs/Symptoms  Goal: Improved Mood Symptoms (Anxiety Signs/Symptoms)  Outcome: Progressing     Problem: Depressive Signs/Symptoms  Goal: Improved Mood Symptoms (Depressive Signs/Symptoms)  Outcome: Progressing   Goal Outcome Evaluation:    Plan of Care Reviewed With: patient      Patient is calm and cooperative, medication compliant. Affect is flat, brightens on approach. Patient appears hopeless and depressed. Reports anxiety and depression 9/10. Continues to have passive SI, with no active plan. Visible for meal, does not attend group. Encouraged to stay out of room after meals for at least 30 minutes.    Patient c/o dizziness BP 99/65 sitting and 80/60 standing. Patient reported dizziness went away later this morning, but this afternoon patient is reporting some dizziness again. Fluids pushed throughout shift, total intake so far of 905 ml. IM paged twice with updates on patient status, no new orders placed.    No BM this shift  250 ml emesis  PRN compazine given for nausea

## 2023-07-14 NOTE — PLAN OF CARE
Goal Outcome Evaluation:    Problem: Adult Behavioral Health Plan of Care  Goal: Plan of Care Review  Outcome: Not Progressing     Problem: Anxiety Signs/Symptoms  Goal: Optimized Energy Level (Anxiety Signs/Symptoms)  Outcome: Not Progressing     Problem: Depressive Signs/Symptoms  Goal: Optimized Energy Level (Depressive Signs/Symptoms)  Outcome: Not Progressing     Pt majority of the shift in her room, kept to herself when out in the milieu. Pt came out for dinner, ate 100% of her dinner, with a total of 480 Ml of fluid. Pt appeared stronger/alert this shift. Had emesis x1, had two med soft bowel movement. Refused assessment question. Medication complaint.  Pt is committed and has no CPR, and do not intubate order.

## 2023-07-14 NOTE — PLAN OF CARE
Assessment/Intervention/Current Symptoms and Care Coordination  -Chart review and met with team, discussed pt progress, symptomology, and response to treatment.  Discussed the discharge plan and any potential impediments to discharge .Pt will now receive smaller meals to help with stomach pain /induced vomiting. Pt also reported experiencing some dizziness. Pt continues to have SI.  - Josefina at [facility] reports they may want to have their  Larry Overton visit pt on the unit sometime to conduct an assessment of pt's current state.  Josefina would like us to send some recent progress notes about 4-5 days prior to our expected discharge date and continue to be in touch with her. Most recent progress notes were sent 7/13.  -Pt's CYNTHIA Hodgson came to visit and do CM intake paperwork with pt. Pt was anxious about the visit but did very well with answering questions and was pleasant.      Discharge Plan or Goal  Pending stabilization & development of a safe discharge plan.      Barriers to Discharge   Patient requires further psychiatric stabilization due to current symptomology     Referral Status   None-[facility] remains committed to having patient return to them when stable.      Contacts:  MAYITO Hodgson with [facility] [phone].  [facility] [phone] ext.[phone] Josefina Fax ([phone])  : Lorenzo Whipple [phone]     Legal Status  Patient is under MI commitment in [facility]  Pt will need PD upon discharge

## 2023-07-15 ENCOUNTER — APPOINTMENT (OUTPATIENT)
Dept: GENERAL RADIOLOGY | Facility: CLINIC | Age: 83
DRG: 885 | End: 2023-07-15
Attending: NURSE PRACTITIONER
Payer: COMMERCIAL

## 2023-07-15 LAB
ALBUMIN SERPL BCG-MCNC: 3.1 G/DL (ref 3.5–5.2)
ALBUMIN SERPL BCG-MCNC: 3.1 G/DL (ref 3.5–5.2)
ALP SERPL-CCNC: 39 U/L (ref 35–104)
ALP SERPL-CCNC: 43 U/L (ref 35–104)
ALT SERPL W P-5'-P-CCNC: 8 U/L (ref 0–50)
ALT SERPL W P-5'-P-CCNC: 9 U/L (ref 0–50)
ANION GAP SERPL CALCULATED.3IONS-SCNC: 6 MMOL/L (ref 7–15)
ANION GAP SERPL CALCULATED.3IONS-SCNC: 8 MMOL/L (ref 7–15)
AST SERPL W P-5'-P-CCNC: 11 U/L (ref 0–45)
AST SERPL W P-5'-P-CCNC: 13 U/L (ref 0–45)
BASOPHILS # BLD AUTO: 0 10E3/UL (ref 0–0.2)
BASOPHILS # BLD AUTO: 0 10E3/UL (ref 0–0.2)
BASOPHILS NFR BLD AUTO: 0 %
BASOPHILS NFR BLD AUTO: 0 %
BILIRUB SERPL-MCNC: 0.3 MG/DL
BILIRUB SERPL-MCNC: 0.4 MG/DL
BUN SERPL-MCNC: 16.7 MG/DL (ref 8–23)
BUN SERPL-MCNC: 21.3 MG/DL (ref 8–23)
CALCIUM SERPL-MCNC: 8.7 MG/DL (ref 8.8–10.2)
CALCIUM SERPL-MCNC: 9.1 MG/DL (ref 8.8–10.2)
CHLORIDE SERPL-SCNC: 101 MMOL/L (ref 98–107)
CHLORIDE SERPL-SCNC: 104 MMOL/L (ref 98–107)
CREAT SERPL-MCNC: 0.72 MG/DL (ref 0.51–0.95)
CREAT SERPL-MCNC: 1.09 MG/DL (ref 0.51–0.95)
DEPRECATED HCO3 PLAS-SCNC: 25 MMOL/L (ref 22–29)
DEPRECATED HCO3 PLAS-SCNC: 26 MMOL/L (ref 22–29)
EOSINOPHIL # BLD AUTO: 0 10E3/UL (ref 0–0.7)
EOSINOPHIL # BLD AUTO: 0.1 10E3/UL (ref 0–0.7)
EOSINOPHIL NFR BLD AUTO: 0 %
EOSINOPHIL NFR BLD AUTO: 2 %
ERYTHROCYTE [DISTWIDTH] IN BLOOD BY AUTOMATED COUNT: 14.1 % (ref 10–15)
ERYTHROCYTE [DISTWIDTH] IN BLOOD BY AUTOMATED COUNT: 14.1 % (ref 10–15)
GFR SERPL CREATININE-BSD FRML MDRD: 51 ML/MIN/1.73M2
GFR SERPL CREATININE-BSD FRML MDRD: 84 ML/MIN/1.73M2
GLUCOSE SERPL-MCNC: 123 MG/DL (ref 70–99)
GLUCOSE SERPL-MCNC: 86 MG/DL (ref 70–99)
HCT VFR BLD AUTO: 32.2 % (ref 35–47)
HCT VFR BLD AUTO: 35.7 % (ref 35–47)
HGB BLD-MCNC: 10.6 G/DL (ref 11.7–15.7)
HGB BLD-MCNC: 11.6 G/DL (ref 11.7–15.7)
IMM GRANULOCYTES # BLD: 0 10E3/UL
IMM GRANULOCYTES # BLD: 0.1 10E3/UL
IMM GRANULOCYTES NFR BLD: 0 %
IMM GRANULOCYTES NFR BLD: 0 %
LACTATE SERPL-SCNC: 1 MMOL/L (ref 0.7–2)
LYMPHOCYTES # BLD AUTO: 1.2 10E3/UL (ref 0.8–5.3)
LYMPHOCYTES # BLD AUTO: 2 10E3/UL (ref 0.8–5.3)
LYMPHOCYTES NFR BLD AUTO: 27 %
LYMPHOCYTES NFR BLD AUTO: 7 %
MAGNESIUM SERPL-MCNC: 1.9 MG/DL (ref 1.7–2.3)
MAGNESIUM SERPL-MCNC: 2.2 MG/DL (ref 1.7–2.3)
MCH RBC QN AUTO: 31.4 PG (ref 26.5–33)
MCH RBC QN AUTO: 31.5 PG (ref 26.5–33)
MCHC RBC AUTO-ENTMCNC: 32.5 G/DL (ref 31.5–36.5)
MCHC RBC AUTO-ENTMCNC: 32.9 G/DL (ref 31.5–36.5)
MCV RBC AUTO: 96 FL (ref 78–100)
MCV RBC AUTO: 97 FL (ref 78–100)
MONOCYTES # BLD AUTO: 0.5 10E3/UL (ref 0–1.3)
MONOCYTES # BLD AUTO: 1.1 10E3/UL (ref 0–1.3)
MONOCYTES NFR BLD AUTO: 7 %
MONOCYTES NFR BLD AUTO: 7 %
NEUTROPHILS # BLD AUTO: 14.6 10E3/UL (ref 1.6–8.3)
NEUTROPHILS # BLD AUTO: 4.6 10E3/UL (ref 1.6–8.3)
NEUTROPHILS NFR BLD AUTO: 64 %
NEUTROPHILS NFR BLD AUTO: 86 %
NRBC # BLD AUTO: 0 10E3/UL
NRBC # BLD AUTO: 0 10E3/UL
NRBC BLD AUTO-RTO: 0 /100
NRBC BLD AUTO-RTO: 0 /100
NT-PROBNP SERPL-MCNC: 1636 PG/ML (ref 0–1800)
PHOSPHATE SERPL-MCNC: 3.3 MG/DL (ref 2.5–4.5)
PLATELET # BLD AUTO: 206 10E3/UL (ref 150–450)
PLATELET # BLD AUTO: 220 10E3/UL (ref 150–450)
POTASSIUM SERPL-SCNC: 4.1 MMOL/L (ref 3.4–5.3)
POTASSIUM SERPL-SCNC: 4.1 MMOL/L (ref 3.4–5.3)
PROCALCITONIN SERPL IA-MCNC: 0.19 NG/ML
PROT SERPL-MCNC: 5.2 G/DL (ref 6.4–8.3)
PROT SERPL-MCNC: 5.3 G/DL (ref 6.4–8.3)
RBC # BLD AUTO: 3.36 10E6/UL (ref 3.8–5.2)
RBC # BLD AUTO: 3.69 10E6/UL (ref 3.8–5.2)
SODIUM SERPL-SCNC: 132 MMOL/L (ref 136–145)
SODIUM SERPL-SCNC: 138 MMOL/L (ref 136–145)
TROPONIN T SERPL HS-MCNC: 19 NG/L
TSH SERPL DL<=0.005 MIU/L-ACNC: 3.04 UIU/ML (ref 0.3–4.2)
WBC # BLD AUTO: 16.9 10E3/UL (ref 4–11)
WBC # BLD AUTO: 7.1 10E3/UL (ref 4–11)

## 2023-07-15 PROCEDURE — 71045 X-RAY EXAM CHEST 1 VIEW: CPT

## 2023-07-15 PROCEDURE — 83880 ASSAY OF NATRIURETIC PEPTIDE: CPT | Performed by: NURSE PRACTITIONER

## 2023-07-15 PROCEDURE — 85004 AUTOMATED DIFF WBC COUNT: CPT

## 2023-07-15 PROCEDURE — 83605 ASSAY OF LACTIC ACID: CPT | Performed by: NURSE PRACTITIONER

## 2023-07-15 PROCEDURE — 83735 ASSAY OF MAGNESIUM: CPT

## 2023-07-15 PROCEDURE — 250N000011 HC RX IP 250 OP 636: Performed by: NURSE PRACTITIONER

## 2023-07-15 PROCEDURE — 84155 ASSAY OF PROTEIN SERUM: CPT

## 2023-07-15 PROCEDURE — 99232 SBSQ HOSP IP/OBS MODERATE 35: CPT | Performed by: NURSE PRACTITIONER

## 2023-07-15 PROCEDURE — 36415 COLL VENOUS BLD VENIPUNCTURE: CPT

## 2023-07-15 PROCEDURE — 250N000011 HC RX IP 250 OP 636

## 2023-07-15 PROCEDURE — 84443 ASSAY THYROID STIM HORMONE: CPT | Performed by: NURSE PRACTITIONER

## 2023-07-15 PROCEDURE — 84145 PROCALCITONIN (PCT): CPT | Performed by: NURSE PRACTITIONER

## 2023-07-15 PROCEDURE — 84155 ASSAY OF PROTEIN SERUM: CPT | Performed by: NURSE PRACTITIONER

## 2023-07-15 PROCEDURE — 84484 ASSAY OF TROPONIN QUANT: CPT | Performed by: NURSE PRACTITIONER

## 2023-07-15 PROCEDURE — 85025 COMPLETE CBC W/AUTO DIFF WBC: CPT | Performed by: NURSE PRACTITIONER

## 2023-07-15 PROCEDURE — H2032 ACTIVITY THERAPY, PER 15 MIN: HCPCS

## 2023-07-15 PROCEDURE — 124N000003 HC R&B MH SENIOR/ADOLESCENT

## 2023-07-15 PROCEDURE — 250N000013 HC RX MED GY IP 250 OP 250 PS 637: Performed by: PHYSICIAN ASSISTANT

## 2023-07-15 PROCEDURE — 250N000013 HC RX MED GY IP 250 OP 250 PS 637: Performed by: PSYCHIATRY & NEUROLOGY

## 2023-07-15 PROCEDURE — 83690 ASSAY OF LIPASE: CPT | Performed by: NURSE PRACTITIONER

## 2023-07-15 PROCEDURE — 84450 TRANSFERASE (AST) (SGOT): CPT | Performed by: NURSE PRACTITIONER

## 2023-07-15 PROCEDURE — 84100 ASSAY OF PHOSPHORUS: CPT

## 2023-07-15 PROCEDURE — 258N000003 HC RX IP 258 OP 636: Performed by: NURSE PRACTITIONER

## 2023-07-15 PROCEDURE — 250N000013 HC RX MED GY IP 250 OP 250 PS 637

## 2023-07-15 PROCEDURE — 71045 X-RAY EXAM CHEST 1 VIEW: CPT | Mod: 26 | Performed by: RADIOLOGY

## 2023-07-15 PROCEDURE — 83735 ASSAY OF MAGNESIUM: CPT | Performed by: NURSE PRACTITIONER

## 2023-07-15 RX ORDER — PANTOPRAZOLE SODIUM 40 MG/1
40 TABLET, DELAYED RELEASE ORAL
Status: CANCELLED | OUTPATIENT
Start: 2023-07-16

## 2023-07-15 RX ORDER — SIMVASTATIN 40 MG
40 TABLET ORAL AT BEDTIME
Status: CANCELLED | OUTPATIENT
Start: 2023-07-16

## 2023-07-15 RX ORDER — ACETAMINOPHEN 325 MG/1
650 TABLET ORAL EVERY 4 HOURS PRN
Status: CANCELLED | OUTPATIENT
Start: 2023-07-15

## 2023-07-15 RX ORDER — PRAZOSIN HYDROCHLORIDE 1 MG/1
1 CAPSULE ORAL
Status: CANCELLED | OUTPATIENT
Start: 2023-07-16

## 2023-07-15 RX ORDER — MAGNESIUM HYDROXIDE/ALUMINUM HYDROXICE/SIMETHICONE 120; 1200; 1200 MG/30ML; MG/30ML; MG/30ML
30 SUSPENSION ORAL EVERY 4 HOURS PRN
Status: CANCELLED | OUTPATIENT
Start: 2023-07-15

## 2023-07-15 RX ORDER — AMOXICILLIN 250 MG
1 CAPSULE ORAL 2 TIMES DAILY
Status: CANCELLED | OUTPATIENT
Start: 2023-07-16

## 2023-07-15 RX ORDER — VALACYCLOVIR HYDROCHLORIDE 500 MG/1
500 TABLET, FILM COATED ORAL DAILY
Status: CANCELLED | OUTPATIENT
Start: 2023-07-16

## 2023-07-15 RX ORDER — GABAPENTIN 250 MG/5ML
200 SOLUTION ORAL 3 TIMES DAILY
Status: CANCELLED | OUTPATIENT
Start: 2023-07-16

## 2023-07-15 RX ORDER — OLANZAPINE 10 MG/1
10 TABLET, ORALLY DISINTEGRATING ORAL AT BEDTIME
Status: CANCELLED | OUTPATIENT
Start: 2023-07-16

## 2023-07-15 RX ORDER — AMPICILLIN AND SULBACTAM 2; 1 G/1; G/1
3 INJECTION, POWDER, FOR SOLUTION INTRAMUSCULAR; INTRAVENOUS EVERY 6 HOURS
Status: CANCELLED | OUTPATIENT
Start: 2023-07-16

## 2023-07-15 RX ORDER — PROCHLORPERAZINE MALEATE 5 MG
5 TABLET ORAL EVERY 6 HOURS PRN
Status: CANCELLED | OUTPATIENT
Start: 2023-07-15

## 2023-07-15 RX ORDER — POLYETHYLENE GLYCOL 3350 17 G/17G
17 POWDER, FOR SOLUTION ORAL DAILY
Status: CANCELLED | OUTPATIENT
Start: 2023-07-16

## 2023-07-15 RX ORDER — MIRTAZAPINE 15 MG/1
15 TABLET, ORALLY DISINTEGRATING ORAL AT BEDTIME
Status: CANCELLED | OUTPATIENT
Start: 2023-07-16

## 2023-07-15 RX ORDER — POLYETHYLENE GLYCOL 3350 17 G/17G
17 POWDER, FOR SOLUTION ORAL DAILY PRN
Status: CANCELLED | OUTPATIENT
Start: 2023-07-15

## 2023-07-15 RX ORDER — CALCIUM CARBONATE 500 MG/1
500 TABLET, CHEWABLE ORAL 3 TIMES DAILY PRN
Status: CANCELLED | OUTPATIENT
Start: 2023-07-15

## 2023-07-15 RX ORDER — MEMANTINE HYDROCHLORIDE 10 MG/1
10 TABLET ORAL 2 TIMES DAILY
Status: CANCELLED | OUTPATIENT
Start: 2023-07-16

## 2023-07-15 RX ORDER — SODIUM CHLORIDE 9 MG/ML
INJECTION, SOLUTION INTRAVENOUS CONTINUOUS
Status: CANCELLED | OUTPATIENT
Start: 2023-07-15

## 2023-07-15 RX ORDER — BUDESONIDE AND FORMOTEROL FUMARATE DIHYDRATE 80; 4.5 UG/1; UG/1
2 AEROSOL RESPIRATORY (INHALATION) 2 TIMES DAILY PRN
Status: CANCELLED | OUTPATIENT
Start: 2023-07-15

## 2023-07-15 RX ORDER — FLUOXETINE 20 MG/5ML
60 SOLUTION ORAL DAILY
Status: CANCELLED | OUTPATIENT
Start: 2023-07-16

## 2023-07-15 RX ORDER — SODIUM CHLORIDE 9 MG/ML
INJECTION, SOLUTION INTRAVENOUS CONTINUOUS
Status: DISCONTINUED | OUTPATIENT
Start: 2023-07-15 | End: 2023-07-16 | Stop reason: HOSPADM

## 2023-07-15 RX ORDER — SENNOSIDES 8.6 MG
8.6 TABLET ORAL 2 TIMES DAILY PRN
Status: CANCELLED | OUTPATIENT
Start: 2023-07-15

## 2023-07-15 RX ORDER — CALCIUM CARBONATE 500(1250)
500 TABLET ORAL AT BEDTIME
Status: CANCELLED | OUTPATIENT
Start: 2023-07-16

## 2023-07-15 RX ORDER — UBIDECARENONE 75 MG
100 CAPSULE ORAL AT BEDTIME
Status: CANCELLED | OUTPATIENT
Start: 2023-07-16

## 2023-07-15 RX ORDER — VITAMIN B COMPLEX
25 TABLET ORAL
Status: CANCELLED | OUTPATIENT
Start: 2023-07-16

## 2023-07-15 RX ORDER — LANOLIN ALCOHOL/MO/W.PET/CERES
6 CREAM (GRAM) TOPICAL EVERY EVENING
Status: CANCELLED | OUTPATIENT
Start: 2023-07-16

## 2023-07-15 RX ORDER — OLANZAPINE 5 MG/1
5 TABLET, ORALLY DISINTEGRATING ORAL 3 TIMES DAILY PRN
Status: CANCELLED | OUTPATIENT
Start: 2023-07-15

## 2023-07-15 RX ORDER — AMPICILLIN AND SULBACTAM 2; 1 G/1; G/1
3 INJECTION, POWDER, FOR SOLUTION INTRAMUSCULAR; INTRAVENOUS EVERY 6 HOURS
Status: DISCONTINUED | OUTPATIENT
Start: 2023-07-15 | End: 2023-07-16 | Stop reason: HOSPADM

## 2023-07-15 RX ORDER — ONDANSETRON 4 MG/1
4 TABLET, FILM COATED ORAL
Status: CANCELLED | OUTPATIENT
Start: 2023-07-16

## 2023-07-15 RX ORDER — SODIUM CHLORIDE, SODIUM LACTATE, POTASSIUM CHLORIDE, CALCIUM CHLORIDE 600; 310; 30; 20 MG/100ML; MG/100ML; MG/100ML; MG/100ML
INJECTION, SOLUTION INTRAVENOUS CONTINUOUS
Status: DISCONTINUED | OUTPATIENT
Start: 2023-07-15 | End: 2023-07-15

## 2023-07-15 RX ADMIN — GABAPENTIN 200 MG: 250 SUSPENSION ORAL at 16:41

## 2023-07-15 RX ADMIN — VITAM B12 100 MCG: 100 TAB at 20:20

## 2023-07-15 RX ADMIN — SIMVASTATIN 40 MG: 40 TABLET, FILM COATED ORAL at 20:20

## 2023-07-15 RX ADMIN — AMPICILLIN SODIUM AND SULBACTAM SODIUM 3 G: 2; 1 INJECTION, POWDER, FOR SOLUTION INTRAMUSCULAR; INTRAVENOUS at 23:11

## 2023-07-15 RX ADMIN — SODIUM CHLORIDE: 9 INJECTION, SOLUTION INTRAVENOUS at 23:11

## 2023-07-15 RX ADMIN — Medication 2 TABLET: at 08:14

## 2023-07-15 RX ADMIN — OLANZAPINE 10 MG: 10 TABLET, ORALLY DISINTEGRATING ORAL at 20:20

## 2023-07-15 RX ADMIN — VALACYCLOVIR 500 MG: 500 TABLET, FILM COATED ORAL at 08:13

## 2023-07-15 RX ADMIN — Medication 500 MG: at 20:20

## 2023-07-15 RX ADMIN — POLYETHYLENE GLYCOL 3350 17 G: 17 POWDER, FOR SOLUTION ORAL at 08:13

## 2023-07-15 RX ADMIN — ONDANSETRON 4 MG: 4 TABLET ORAL at 11:49

## 2023-07-15 RX ADMIN — SENNOSIDES AND DOCUSATE SODIUM 1 TABLET: 50; 8.6 TABLET ORAL at 08:14

## 2023-07-15 RX ADMIN — GABAPENTIN 200 MG: 250 SUSPENSION ORAL at 08:15

## 2023-07-15 RX ADMIN — PRAZOSIN HYDROCHLORIDE 1 MG: 1 CAPSULE ORAL at 16:40

## 2023-07-15 RX ADMIN — Medication 25 MCG: at 16:40

## 2023-07-15 RX ADMIN — Medication 6 MG: at 20:20

## 2023-07-15 RX ADMIN — FLUOXETINE HYDROCHLORIDE 60 MG: 20 SOLUTION ORAL at 08:15

## 2023-07-15 RX ADMIN — ONDANSETRON 4 MG: 4 TABLET ORAL at 06:30

## 2023-07-15 RX ADMIN — SENNOSIDES AND DOCUSATE SODIUM 1 TABLET: 50; 8.6 TABLET ORAL at 20:20

## 2023-07-15 RX ADMIN — MIRTAZAPINE 15 MG: 15 TABLET, ORALLY DISINTEGRATING ORAL at 20:20

## 2023-07-15 RX ADMIN — PANTOPRAZOLE SODIUM 40 MG: 40 TABLET, DELAYED RELEASE ORAL at 06:30

## 2023-07-15 RX ADMIN — SODIUM CHLORIDE, POTASSIUM CHLORIDE, SODIUM LACTATE AND CALCIUM CHLORIDE 1000 ML: 600; 310; 30; 20 INJECTION, SOLUTION INTRAVENOUS at 21:53

## 2023-07-15 RX ADMIN — MEMANTINE 10 MG: 10 TABLET ORAL at 08:14

## 2023-07-15 RX ADMIN — ONDANSETRON 4 MG: 4 TABLET ORAL at 16:40

## 2023-07-15 RX ADMIN — GABAPENTIN 200 MG: 250 SUSPENSION ORAL at 20:21

## 2023-07-15 RX ADMIN — OLANZAPINE 2.5 MG: 5 TABLET, ORALLY DISINTEGRATING ORAL at 08:14

## 2023-07-15 RX ADMIN — MEMANTINE 10 MG: 10 TABLET ORAL at 20:20

## 2023-07-15 ASSESSMENT — ACTIVITIES OF DAILY LIVING (ADL)
ADLS_ACUITY_SCORE: 88
ADLS_ACUITY_SCORE: 88
ADLS_ACUITY_SCORE: 76
ADLS_ACUITY_SCORE: 76
ADLS_ACUITY_SCORE: 88
ADLS_ACUITY_SCORE: 76
ADLS_ACUITY_SCORE: 76
ADLS_ACUITY_SCORE: 88
ADLS_ACUITY_SCORE: 76

## 2023-07-15 NOTE — PLAN OF CARE
"Problem: Suicidal Behavior  Goal: Suicidal Behavior is Absent or Managed  Outcome: Progressing   Goal Outcome Evaluation:  Plan of Care Reviewed With: patient      Patient was visible in the milieu during meal times. Pt ambulates with a walker. Independent with some ADLs; However, pt needs assistant with elvia cares. Pt is social and interacts with peers. Pt attended group this evening. Pt endorsed minimal depression and anxiety. Pt continues to endorse SI thought. Pt denied SIB and hallucination. Patient described mood as \"anxious\" and affect was flat. Patient is cooperative with cares; Patient is med-compliant, ate 25% of her dinner. Pt had oral fluid intake ~600 ml. Pt had 150 ml emesis. Pt voided twice unmeasured. Reports \"sleeps through the night\". Patient evaluation continues.     ~1630 Paged IM about Pt's /84 HR 84 temp 100.3; Rechecked /84 HR 90 temp 99.7; pt was asymptomatic at this time. Provider recommended to keep monitoring the pt. Pt had all of her scheduled medications including the minipress.   ~2100 Pt reported feeling dizziness; Checked pt's VS-Pt was hypotensive. BP 73/54 HR 76. Pt continued to be hypotensive. Called IM provider; Provider recommended to call RRS. 1L LR given over an hour. X-ray, EKG, and labs done. Pt's WBC are elevated-see provider note. Pt will receive one doze antibiotics. Pt may be transferring to medicine unit.     VS reviewed: BP (!) 174/84 (BP Location: Right arm, Patient Position: Sitting, Cuff Size: Adult Small)   Pulse 90   Temp 99.7  F (37.6  C) (Temporal)   Resp 16   Ht 1.626 m (5' 4\")   Wt 51.1 kg (112 lb 10.5 oz)   LMP  (LMP Unknown)   SpO2 98%   BMI 19.34 kg/m   .     BP 92/53 (BP Location: Left arm, Patient Position: Supine, Cuff Size: Adult Small)   Pulse 69   Temp 99  F (37.2  C) (Oral)   Resp 16   Ht 1.626 m (5' 4\")   Wt 51.1 kg (112 lb 10.5 oz)   LMP  (LMP Unknown)   SpO2 94%   BMI 19.34 kg/m       BP 99/57 (BP Location: Left arm, " "Patient Position: Supine, Cuff Size: Adult Small)   Pulse 71   Temp 99  F (37.2  C) (Temporal)   Resp 16   Ht 1.626 m (5' 4\")   Wt 51.1 kg (112 lb 10.5 oz)   LMP  (LMP Unknown)   SpO2 92%   BMI 19.34 kg/m          Length of stay: 38    "

## 2023-07-15 NOTE — PLAN OF CARE
"Problem: Anxiety Signs/Symptoms  Goal: Optimized Energy Level (Anxiety Signs/Symptoms)  Outcome: Progressing  Intervention: Optimize Energy Level  Recent Flowsheet Documentation  Taken 7/14/2023 2200 by Remedios Norton RN  Patient Performed Hygiene: dressed  Activity (Behavioral Health): activity adjusted per tolerance   Goal Outcome Evaluation:  Plan of Care Reviewed With: patient      Patient is alert and oriented. VSS. Pt denied pain and discomfort. Pt ambulates with a walker and pt was visible in the miliue most of the shift. Pt socializes and interacts with peers. Pt attended and participated group this evening. Pt reported anxiety and depression. Pt denied other mental health symptoms. Patient described mood as \"anxious\" and affect was congruent with mood. Patient is cooperative with cares; appearing appropriate to situation. Patient is med-compliant, and ate 25% of her dinner. Pt had some snacks. Pt had oral fluid intake ~600 ml; pt voided twice this shift unmeasured. No emesis this shift. Reports \"sleeps through the night\". Pt evaluation continues.     Medical bed due to hx of osteopetrosis.     VS reviewed: /71 (BP Location: Right arm, Patient Position: Sitting, Cuff Size: Adult Regular)   Pulse 69   Temp 97.9  F (36.6  C) (Temporal)   Resp 16   Ht 1.626 m (5' 4\")   Wt 51.1 kg (112 lb 10.5 oz)   LMP  (LMP Unknown)   SpO2 97%   BMI 19.34 kg/m   .     Length of stay: 37    "

## 2023-07-15 NOTE — PLAN OF CARE
Problem: Sleep Disturbance  Goal: Adequate Sleep/Rest  Outcome: Progressing   Goal Outcome Evaluation:          Patient slept approximately 8.0 hours. All precautions in place and maintained. No complain of pain, denies N/V. Pt was incontinent of BM and voided X 2 this shift. Pt flushed. No output measured. Up at 0230 and slept back at 0345. Pt was observed taking water in the bathroom to drink. Writer reminded pt about I & O.   Medication complaint.  Will continue to monitor per plan of care. Medical bed r/t osteoporosis. Walker in use.

## 2023-07-15 NOTE — PLAN OF CARE
"  Problem: Anxiety Signs/Symptoms  Goal: Improved Mood Symptoms (Anxiety Signs/Symptoms)  Outcome: Progressing     Problem: Depressive Signs/Symptoms  Goal: Increased Participation and Engagement (Depressive Signs/Symptoms)  Outcome: Progressing   Goal Outcome Evaluation:    Plan of Care Reviewed With: patient      Patient is calm, affect is flat. Patient is medication compliant, denies pain. Endorses anxiety and depression. States they wish they could be dead they are 82, appears hopeless. Patient is visible for meals with encouragement, stays in lounge for 30 minutes after meals otherwise isolates to room. Came out for community meeting and stayed for only a few minutes before going back to room. Patient is incontinent of bowel, does not call for assistance after incontinence. Patient reports being fearful no one will help with pericares. Writer changed incontinent BM x2 this shift, encouraged patient to go to the toilet every couple hours and call for assistance, education not effective. Patient is fearful no one will help with pericares.    No emesis this shift  2 soft BM    /70   Pulse 80   Temp 97.2  F (36.2  C) (Temporal)   Resp 16   Ht 1.626 m (5' 4\")   Wt 51.1 kg (112 lb 10.5 oz)   LMP  (LMP Unknown)   SpO2 96%   BMI 19.34 kg/m      "

## 2023-07-16 ENCOUNTER — APPOINTMENT (OUTPATIENT)
Dept: SPEECH THERAPY | Facility: CLINIC | Age: 83
DRG: 871 | End: 2023-07-16
Attending: INTERNAL MEDICINE
Payer: COMMERCIAL

## 2023-07-16 ENCOUNTER — HOSPITAL ENCOUNTER (INPATIENT)
Facility: CLINIC | Age: 83
LOS: 7 days | Discharge: NURSING, REHAB OR LONG-TERM CARE | DRG: 871 | End: 2023-07-24
Attending: INTERNAL MEDICINE | Admitting: INTERNAL MEDICINE
Payer: COMMERCIAL

## 2023-07-16 VITALS
SYSTOLIC BLOOD PRESSURE: 95 MMHG | RESPIRATION RATE: 16 BRPM | OXYGEN SATURATION: 96 % | BODY MASS INDEX: 19.23 KG/M2 | HEART RATE: 66 BPM | DIASTOLIC BLOOD PRESSURE: 55 MMHG | HEIGHT: 64 IN | WEIGHT: 112.66 LBS | TEMPERATURE: 99.1 F

## 2023-07-16 DIAGNOSIS — F60.3 BORDERLINE PERSONALITY DISORDER (H): ICD-10-CM

## 2023-07-16 DIAGNOSIS — R45.851 SUICIDAL IDEATION: Primary | ICD-10-CM

## 2023-07-16 DIAGNOSIS — R62.7 FAILURE TO THRIVE IN ADULT: ICD-10-CM

## 2023-07-16 DIAGNOSIS — F31.9 BIPOLAR AFFECTIVE DISORDER, REMISSION STATUS UNSPECIFIED (H): ICD-10-CM

## 2023-07-16 DIAGNOSIS — F32.A DEPRESSIVE DISORDER: ICD-10-CM

## 2023-07-16 DIAGNOSIS — M81.0 AGE-RELATED OSTEOPOROSIS WITHOUT CURRENT PATHOLOGICAL FRACTURE: ICD-10-CM

## 2023-07-16 LAB
ALBUMIN SERPL BCG-MCNC: 2.8 G/DL (ref 3.5–5.2)
ALBUMIN UR-MCNC: NEGATIVE MG/DL
ALP SERPL-CCNC: 35 U/L (ref 35–104)
ALT SERPL W P-5'-P-CCNC: 7 U/L (ref 0–50)
ANION GAP SERPL CALCULATED.3IONS-SCNC: 5 MMOL/L (ref 7–15)
APPEARANCE UR: CLEAR
AST SERPL W P-5'-P-CCNC: 11 U/L (ref 0–45)
BACTERIA #/AREA URNS HPF: ABNORMAL /HPF
BILIRUB SERPL-MCNC: 0.3 MG/DL
BILIRUB UR QL STRIP: NEGATIVE
BUN SERPL-MCNC: 17.4 MG/DL (ref 8–23)
C PNEUM DNA SPEC QL NAA+PROBE: NOT DETECTED
CALCIUM SERPL-MCNC: 8.5 MG/DL (ref 8.8–10.2)
CHLORIDE SERPL-SCNC: 102 MMOL/L (ref 98–107)
COLOR UR AUTO: ABNORMAL
CREAT SERPL-MCNC: 0.85 MG/DL (ref 0.51–0.95)
DEPRECATED HCO3 PLAS-SCNC: 27 MMOL/L (ref 22–29)
ERYTHROCYTE [DISTWIDTH] IN BLOOD BY AUTOMATED COUNT: 14.3 % (ref 10–15)
FLUAV H1 2009 PAND RNA SPEC QL NAA+PROBE: NOT DETECTED
FLUAV H1 RNA SPEC QL NAA+PROBE: NOT DETECTED
FLUAV H3 RNA SPEC QL NAA+PROBE: NOT DETECTED
FLUAV RNA SPEC QL NAA+PROBE: NOT DETECTED
FLUBV RNA SPEC QL NAA+PROBE: NOT DETECTED
GFR SERPL CREATININE-BSD FRML MDRD: 68 ML/MIN/1.73M2
GLUCOSE SERPL-MCNC: 85 MG/DL (ref 70–99)
GLUCOSE UR STRIP-MCNC: NEGATIVE MG/DL
HADV DNA SPEC QL NAA+PROBE: NOT DETECTED
HCOV PNL SPEC NAA+PROBE: NOT DETECTED
HCT VFR BLD AUTO: 30.5 % (ref 35–47)
HGB BLD-MCNC: 9.8 G/DL (ref 11.7–15.7)
HGB UR QL STRIP: NEGATIVE
HMPV RNA SPEC QL NAA+PROBE: NOT DETECTED
HPIV1 RNA SPEC QL NAA+PROBE: NOT DETECTED
HPIV2 RNA SPEC QL NAA+PROBE: NOT DETECTED
HPIV3 RNA SPEC QL NAA+PROBE: NOT DETECTED
HPIV4 RNA SPEC QL NAA+PROBE: NOT DETECTED
KETONES UR STRIP-MCNC: NEGATIVE MG/DL
LEUKOCYTE ESTERASE UR QL STRIP: ABNORMAL
LIPASE SERPL-CCNC: 20 U/L (ref 13–60)
LIPASE SERPL-CCNC: 23 U/L (ref 13–60)
M PNEUMO DNA SPEC QL NAA+PROBE: NOT DETECTED
MAGNESIUM SERPL-MCNC: 2 MG/DL (ref 1.7–2.3)
MCH RBC QN AUTO: 31.2 PG (ref 26.5–33)
MCHC RBC AUTO-ENTMCNC: 32.1 G/DL (ref 31.5–36.5)
MCV RBC AUTO: 97 FL (ref 78–100)
MRSA DNA SPEC QL NAA+PROBE: NEGATIVE
NITRATE UR QL: NEGATIVE
PH UR STRIP: 5 [PH] (ref 5–7)
PLATELET # BLD AUTO: 195 10E3/UL (ref 150–450)
POTASSIUM SERPL-SCNC: 3.9 MMOL/L (ref 3.4–5.3)
PROT SERPL-MCNC: 4.8 G/DL (ref 6.4–8.3)
RBC # BLD AUTO: 3.14 10E6/UL (ref 3.8–5.2)
RBC URINE: 1 /HPF
RSV RNA SPEC QL NAA+PROBE: NOT DETECTED
RSV RNA SPEC QL NAA+PROBE: NOT DETECTED
RV+EV RNA SPEC QL NAA+PROBE: NOT DETECTED
SA TARGET DNA: POSITIVE
SARS-COV-2 RNA RESP QL NAA+PROBE: NEGATIVE
SODIUM SERPL-SCNC: 134 MMOL/L (ref 136–145)
SP GR UR STRIP: 1.01 (ref 1–1.03)
SQUAMOUS EPITHELIAL: <1 /HPF
TROPONIN T SERPL HS-MCNC: 18 NG/L
UROBILINOGEN UR STRIP-MCNC: NORMAL MG/DL
WBC # BLD AUTO: 14.3 10E3/UL (ref 4–11)
WBC URINE: 1 /HPF

## 2023-07-16 PROCEDURE — 250N000013 HC RX MED GY IP 250 OP 250 PS 637: Performed by: INTERNAL MEDICINE

## 2023-07-16 PROCEDURE — 80053 COMPREHEN METABOLIC PANEL: CPT | Performed by: INTERNAL MEDICINE

## 2023-07-16 PROCEDURE — 87633 RESP VIRUS 12-25 TARGETS: CPT | Performed by: INTERNAL MEDICINE

## 2023-07-16 PROCEDURE — 99239 HOSP IP/OBS DSCHRG MGMT >30: CPT

## 2023-07-16 PROCEDURE — 85027 COMPLETE CBC AUTOMATED: CPT | Performed by: INTERNAL MEDICINE

## 2023-07-16 PROCEDURE — 99223 1ST HOSP IP/OBS HIGH 75: CPT | Mod: AI | Performed by: INTERNAL MEDICINE

## 2023-07-16 PROCEDURE — 250N000011 HC RX IP 250 OP 636: Mod: JZ | Performed by: INTERNAL MEDICINE

## 2023-07-16 PROCEDURE — 81001 URINALYSIS AUTO W/SCOPE: CPT | Performed by: INTERNAL MEDICINE

## 2023-07-16 PROCEDURE — 87635 SARS-COV-2 COVID-19 AMP PRB: CPT | Performed by: INTERNAL MEDICINE

## 2023-07-16 PROCEDURE — 83690 ASSAY OF LIPASE: CPT | Performed by: INTERNAL MEDICINE

## 2023-07-16 PROCEDURE — 83735 ASSAY OF MAGNESIUM: CPT | Performed by: INTERNAL MEDICINE

## 2023-07-16 PROCEDURE — 87641 MR-STAPH DNA AMP PROBE: CPT | Performed by: INTERNAL MEDICINE

## 2023-07-16 PROCEDURE — 92610 EVALUATE SWALLOWING FUNCTION: CPT | Mod: GN

## 2023-07-16 PROCEDURE — 36415 COLL VENOUS BLD VENIPUNCTURE: CPT | Performed by: INTERNAL MEDICINE

## 2023-07-16 PROCEDURE — 258N000003 HC RX IP 258 OP 636: Performed by: INTERNAL MEDICINE

## 2023-07-16 PROCEDURE — 84484 ASSAY OF TROPONIN QUANT: CPT | Performed by: INTERNAL MEDICINE

## 2023-07-16 PROCEDURE — 93005 ELECTROCARDIOGRAM TRACING: CPT

## 2023-07-16 RX ORDER — ONDANSETRON 4 MG/1
4 TABLET, FILM COATED ORAL
Status: DISCONTINUED | OUTPATIENT
Start: 2023-07-16 | End: 2023-07-24 | Stop reason: HOSPADM

## 2023-07-16 RX ORDER — LANOLIN ALCOHOL/MO/W.PET/CERES
6 CREAM (GRAM) TOPICAL EVERY EVENING
Status: DISCONTINUED | OUTPATIENT
Start: 2023-07-16 | End: 2023-07-24 | Stop reason: HOSPADM

## 2023-07-16 RX ORDER — AMPICILLIN AND SULBACTAM 2; 1 G/1; G/1
3 INJECTION, POWDER, FOR SOLUTION INTRAMUSCULAR; INTRAVENOUS EVERY 6 HOURS
Status: COMPLETED | OUTPATIENT
Start: 2023-07-16 | End: 2023-07-22

## 2023-07-16 RX ORDER — ACETAMINOPHEN 325 MG/1
650 TABLET ORAL EVERY 4 HOURS PRN
Status: DISCONTINUED | OUTPATIENT
Start: 2023-07-16 | End: 2023-07-24 | Stop reason: HOSPADM

## 2023-07-16 RX ORDER — SENNOSIDES 8.6 MG
8.6 TABLET ORAL 2 TIMES DAILY PRN
Status: DISCONTINUED | OUTPATIENT
Start: 2023-07-16 | End: 2023-07-24 | Stop reason: HOSPADM

## 2023-07-16 RX ORDER — SIMVASTATIN 40 MG
40 TABLET ORAL AT BEDTIME
Status: DISCONTINUED | OUTPATIENT
Start: 2023-07-16 | End: 2023-07-24 | Stop reason: HOSPADM

## 2023-07-16 RX ORDER — CALCIUM CARBONATE 500 MG/1
500 TABLET, CHEWABLE ORAL 3 TIMES DAILY PRN
Status: DISCONTINUED | OUTPATIENT
Start: 2023-07-16 | End: 2023-07-24 | Stop reason: HOSPADM

## 2023-07-16 RX ORDER — POLYETHYLENE GLYCOL 3350 17 G/17G
17 POWDER, FOR SOLUTION ORAL DAILY PRN
Status: DISCONTINUED | OUTPATIENT
Start: 2023-07-16 | End: 2023-07-24 | Stop reason: HOSPADM

## 2023-07-16 RX ORDER — POLYETHYLENE GLYCOL 3350 17 G/17G
17 POWDER, FOR SOLUTION ORAL DAILY
Status: DISCONTINUED | OUTPATIENT
Start: 2023-07-16 | End: 2023-07-24 | Stop reason: HOSPADM

## 2023-07-16 RX ORDER — CALCIUM CARBONATE 500(1250)
500 TABLET ORAL AT BEDTIME
Status: DISCONTINUED | OUTPATIENT
Start: 2023-07-16 | End: 2023-07-24 | Stop reason: HOSPADM

## 2023-07-16 RX ORDER — OLANZAPINE 5 MG/1
10 TABLET, ORALLY DISINTEGRATING ORAL AT BEDTIME
Status: DISCONTINUED | OUTPATIENT
Start: 2023-07-16 | End: 2023-07-24 | Stop reason: HOSPADM

## 2023-07-16 RX ORDER — FLUOXETINE 20 MG/5ML
60 SOLUTION ORAL DAILY
Status: DISCONTINUED | OUTPATIENT
Start: 2023-07-16 | End: 2023-07-17

## 2023-07-16 RX ORDER — PRAZOSIN HYDROCHLORIDE 1 MG/1
1 CAPSULE ORAL
Status: DISCONTINUED | OUTPATIENT
Start: 2023-07-16 | End: 2023-07-24 | Stop reason: HOSPADM

## 2023-07-16 RX ORDER — MIRTAZAPINE 15 MG/1
15 TABLET, ORALLY DISINTEGRATING ORAL AT BEDTIME
Status: DISCONTINUED | OUTPATIENT
Start: 2023-07-16 | End: 2023-07-24 | Stop reason: HOSPADM

## 2023-07-16 RX ORDER — BUDESONIDE AND FORMOTEROL FUMARATE DIHYDRATE 80; 4.5 UG/1; UG/1
2 AEROSOL RESPIRATORY (INHALATION) 2 TIMES DAILY PRN
Status: DISCONTINUED | OUTPATIENT
Start: 2023-07-16 | End: 2023-07-24 | Stop reason: HOSPADM

## 2023-07-16 RX ORDER — PANTOPRAZOLE SODIUM 40 MG/1
40 TABLET, DELAYED RELEASE ORAL
Status: DISCONTINUED | OUTPATIENT
Start: 2023-07-16 | End: 2023-07-24 | Stop reason: HOSPADM

## 2023-07-16 RX ORDER — MEMANTINE HYDROCHLORIDE 10 MG/1
10 TABLET ORAL 2 TIMES DAILY
Status: DISCONTINUED | OUTPATIENT
Start: 2023-07-16 | End: 2023-07-24 | Stop reason: HOSPADM

## 2023-07-16 RX ORDER — MAGNESIUM HYDROXIDE/ALUMINUM HYDROXICE/SIMETHICONE 120; 1200; 1200 MG/30ML; MG/30ML; MG/30ML
30 SUSPENSION ORAL EVERY 4 HOURS PRN
Status: DISCONTINUED | OUTPATIENT
Start: 2023-07-16 | End: 2023-07-24 | Stop reason: HOSPADM

## 2023-07-16 RX ORDER — SODIUM CHLORIDE 9 MG/ML
INJECTION, SOLUTION INTRAVENOUS CONTINUOUS
Status: DISCONTINUED | OUTPATIENT
Start: 2023-07-16 | End: 2023-07-23

## 2023-07-16 RX ORDER — UBIDECARENONE 75 MG
100 CAPSULE ORAL AT BEDTIME
Status: DISCONTINUED | OUTPATIENT
Start: 2023-07-16 | End: 2023-07-24 | Stop reason: HOSPADM

## 2023-07-16 RX ORDER — GABAPENTIN 250 MG/5ML
200 SOLUTION ORAL 3 TIMES DAILY
Status: DISCONTINUED | OUTPATIENT
Start: 2023-07-16 | End: 2023-07-24 | Stop reason: HOSPADM

## 2023-07-16 RX ORDER — VITAMIN B COMPLEX
25 TABLET ORAL
Status: DISCONTINUED | OUTPATIENT
Start: 2023-07-16 | End: 2023-07-24 | Stop reason: HOSPADM

## 2023-07-16 RX ORDER — VALACYCLOVIR HYDROCHLORIDE 500 MG/1
500 TABLET, FILM COATED ORAL DAILY
Status: DISCONTINUED | OUTPATIENT
Start: 2023-07-16 | End: 2023-07-24 | Stop reason: HOSPADM

## 2023-07-16 RX ORDER — OLANZAPINE 5 MG/1
5 TABLET, ORALLY DISINTEGRATING ORAL 3 TIMES DAILY PRN
Status: DISCONTINUED | OUTPATIENT
Start: 2023-07-16 | End: 2023-07-24 | Stop reason: HOSPADM

## 2023-07-16 RX ORDER — AMOXICILLIN 250 MG
1 CAPSULE ORAL 2 TIMES DAILY
Status: DISCONTINUED | OUTPATIENT
Start: 2023-07-16 | End: 2023-07-24 | Stop reason: HOSPADM

## 2023-07-16 RX ORDER — PROCHLORPERAZINE MALEATE 5 MG
5 TABLET ORAL EVERY 6 HOURS PRN
Status: DISCONTINUED | OUTPATIENT
Start: 2023-07-16 | End: 2023-07-22

## 2023-07-16 RX ADMIN — AMPICILLIN SODIUM AND SULBACTAM SODIUM 3 G: 2; 1 INJECTION, POWDER, FOR SOLUTION INTRAMUSCULAR; INTRAVENOUS at 16:56

## 2023-07-16 RX ADMIN — SENNOSIDES AND DOCUSATE SODIUM 1 TABLET: 50; 8.6 TABLET ORAL at 21:15

## 2023-07-16 RX ADMIN — SODIUM CHLORIDE: 9 INJECTION, SOLUTION INTRAVENOUS at 03:43

## 2023-07-16 RX ADMIN — AMPICILLIN SODIUM AND SULBACTAM SODIUM 3 G: 2; 1 INJECTION, POWDER, FOR SOLUTION INTRAMUSCULAR; INTRAVENOUS at 10:37

## 2023-07-16 RX ADMIN — Medication 25 MCG: at 16:58

## 2023-07-16 RX ADMIN — OLANZAPINE 10 MG: 5 TABLET, ORALLY DISINTEGRATING ORAL at 21:14

## 2023-07-16 RX ADMIN — SENNOSIDES AND DOCUSATE SODIUM 1 TABLET: 50; 8.6 TABLET ORAL at 09:06

## 2023-07-16 RX ADMIN — ONDANSETRON 4 MG: 4 TABLET ORAL at 12:51

## 2023-07-16 RX ADMIN — ONDANSETRON 4 MG: 4 TABLET ORAL at 16:58

## 2023-07-16 RX ADMIN — AMPICILLIN SODIUM AND SULBACTAM SODIUM 3 G: 2; 1 INJECTION, POWDER, FOR SOLUTION INTRAMUSCULAR; INTRAVENOUS at 05:19

## 2023-07-16 RX ADMIN — POLYETHYLENE GLYCOL 3350 17 G: 17 POWDER, FOR SOLUTION ORAL at 09:05

## 2023-07-16 RX ADMIN — Medication 100 MCG: at 21:14

## 2023-07-16 RX ADMIN — OLANZAPINE 2.5 MG: 5 TABLET, ORALLY DISINTEGRATING ORAL at 09:06

## 2023-07-16 RX ADMIN — GABAPENTIN 200 MG: 250 SOLUTION ORAL at 16:58

## 2023-07-16 RX ADMIN — CALCIUM 500 MG: 500 TABLET ORAL at 21:14

## 2023-07-16 RX ADMIN — AMPICILLIN SODIUM AND SULBACTAM SODIUM 3 G: 2; 1 INJECTION, POWDER, FOR SOLUTION INTRAMUSCULAR; INTRAVENOUS at 23:16

## 2023-07-16 RX ADMIN — GABAPENTIN 200 MG: 250 SOLUTION ORAL at 22:00

## 2023-07-16 RX ADMIN — FLUOXETINE HYDROCHLORIDE 60 MG: 20 SOLUTION ORAL at 09:11

## 2023-07-16 RX ADMIN — PANTOPRAZOLE SODIUM 40 MG: 40 TABLET, DELAYED RELEASE ORAL at 09:06

## 2023-07-16 RX ADMIN — VALACYCLOVIR 500 MG: 500 TABLET, FILM COATED ORAL at 09:06

## 2023-07-16 RX ADMIN — Medication 6 MG: at 21:16

## 2023-07-16 RX ADMIN — ONDANSETRON 4 MG: 4 TABLET ORAL at 09:05

## 2023-07-16 RX ADMIN — Medication 2 TABLET: at 09:06

## 2023-07-16 RX ADMIN — MEMANTINE 10 MG: 10 TABLET ORAL at 21:15

## 2023-07-16 RX ADMIN — GABAPENTIN 200 MG: 250 SOLUTION ORAL at 09:11

## 2023-07-16 RX ADMIN — SIMVASTATIN 40 MG: 40 TABLET, FILM COATED ORAL at 21:14

## 2023-07-16 RX ADMIN — MIRTAZAPINE 15 MG: 15 TABLET, ORALLY DISINTEGRATING ORAL at 21:15

## 2023-07-16 RX ADMIN — MEMANTINE 10 MG: 10 TABLET ORAL at 09:06

## 2023-07-16 ASSESSMENT — ACTIVITIES OF DAILY LIVING (ADL)
ADLS_ACUITY_SCORE: 43
ADLS_ACUITY_SCORE: 47
ADLS_ACUITY_SCORE: 47
ADLS_ACUITY_SCORE: 43
ADLS_ACUITY_SCORE: 41
ADLS_ACUITY_SCORE: 88
ADLS_ACUITY_SCORE: 39
ADLS_ACUITY_SCORE: 47
ADLS_ACUITY_SCORE: 41
ADLS_ACUITY_SCORE: 49
ADLS_ACUITY_SCORE: 49
ADLS_ACUITY_SCORE: 88

## 2023-07-16 NOTE — PROVIDER NOTIFICATION
07/15/23 2125   Call Information   Date of Call 07/15/23   Time of Call 2125   Name of person requesting the team RAUL Whittaker   Title of person requesting team RN   RRT Arrival time 2130   Time RRT ended 2200   Reason for call   Type of RRT Adult   Primary reason for call Cardiovascular   Cardiovascular SBP less than 90   Was patient transferred from the ED, ICU, or PACU within last 24 hours prior to RRT call? No   SBAR   Situation patient was found to be dizzy and RN checked vitals. BP was 70's systolic. RRT was called.   Background patient had high BP earlier in the day, Mini press was given.   Assessment Patient was sitting calmy on the bed, feeling slightly dizzy. Vitals obtained to both arms. 70's systolic sitting and 80's supine.   Interventions CXR;IV fluids;Labs;Fluid bolus;ECG   Adjustments to Recommend re evaluate at 500 cc and recheck BP   Patient Outcome   Patient Outcome Stabilized on unit   RRT Team   Attending/Primary/Covering Physician Marlene Prescott NP   Physician(s) HEATHER Geiger   Other staff NST flyer, Xray tech

## 2023-07-16 NOTE — PROGRESS NOTES
07/15/23 2100   Group Therapy Session   Time Session Began 1900   Time Session Ended 2000   Total Time (minutes) 55   Total # Attendees 7   Group Type expressive therapy   Group Topic Covered cognitive activities;balanced lifestyle;emotions/expression;coping skills/lifestyle management;community integration   Group Session Detail Rhythm Band   Patient Response/Contribution cooperative with task   Patient Participation Detail During a group music improvisation/rhythm band, pts had the opportunity to engage in socialization, communication and expression of thoughts.  Session also provided for internal/external organization and sensory experiences.       Pt appeared to participate to the best of her ability.  Presents as [place].  Tends to blurt things out loudly during session (questions and comments) that are loosely on topic at times.  Expressed hesitation to try the musical keyboard, but was able to engage with it for a period of time during group improvisation.  At other moments, she laid her head down on the table as if tired.

## 2023-07-16 NOTE — PHARMACY-ADMISSION MEDICATION HISTORY
Admission medication history completed at [facility]. Please see Pharmacy - Admission Medication History note from 5/5/2023. Of note, patient has been in hospital since this time ([facility] for one month followed by [facility] inpatient psychiatry unit for one month). Medications ordered for admission reviewed to previous MAR.

## 2023-07-16 NOTE — PROGRESS NOTES
Temp: 99  F (37.2  C) Temp src: Oral BP: (!) 89/50 Pulse: 62   Resp: 16 SpO2: 98 % O2 Device: Nasal cannula      patient arrived at 0200, alert and oriented X3, able to make needs known. Diminished LS, on supplemental O2, SI, sitter at bedside, NS infusing at 75mL /hr, hypovolemic. On R/O Isolation for Covid, awaiting results from swabs. [place],ambulates with assist of 1, walker GB. Continue with POC

## 2023-07-16 NOTE — LETTER
Recipient:  [facility]        Sender:  Luba Rhodes [facility]  Ph: [phone]        Date: July 24, 2023  Patient Name:  Bhavana Marr  Patient YOB: 1940  Routing Message:    Discharge orders attached.       The documents accompanying this notice contain confidential information belonging to the sender.  This information is intended only for the use of the individual or entity named above.  The authorized recipient of this information is prohibited from disclosing this information to any other party and is required to destroy the information after its stated need has been fulfilled, unless otherwise required by state law.    If you are not the intended recipient, you are hereby notified that any disclosure, copy, distribution or action taken in reliance on the contents of these documents is strictly prohibited.  If you have received this document in error, please return it by fax to [phone] with a note on the cover sheet explaining why you are returning it (e.g. not your patient, not your provider, etc.).  If you need further assistance, please call .  Documents may also be returned by mail to [employer], , [facility] [address], [place], [place] [address].

## 2023-07-16 NOTE — PROGRESS NOTES
Brief Medicine Cross-Cover Note:    Was present during RRT and I agree with assessment/plan as documented by Marlene Prescott NP (I greatly appreciate her assistance). Discussed with on-call psychiatry who expressed agreement with plan to transfer Bhavana Marr to inpatient medicine, and patient was accepted by Dr. Alyssa Elizondo (medicine MD).    Aaron Collado PA-C on 7/15/2023 at 11:34 PM

## 2023-07-16 NOTE — CODE/RAPID RESPONSE
Rapid Response Team Note    Assessment   In assessment a rapid response was called on Bhavana Marr due to hypotension. This presentation is likely due to aspiration PNA worsened by medication side effect.    Plan   -  Work-up:    EKG: NSR with bifascicular block, ST-segs in leads V2     CXR: diffuse bilateral hazy opacities favoring infection/inflammation    CBC, CMP, LA, procal, TSH, troponin, NT-pro BNP, lipase --> repeat trop at 0400    Infectious: UA with reflex, sputum strep, legionella. MRSA nares     Orthostatic vital signs     Consider KUB to eval for significant obstruction  - Therapeutics:    1L LR bolus + NS MIV @ 75 ml/hr x24 hours     Start unasyn targeting aspiration PNA     Held prazosin     NPO with meds and ice chips    SLP consult to evaluate for aspiration     1:1 sitter while IV in place   -  The Internal Medicine primary team was able to be reached and they are in agreement with the above plan.  -  Disposition: The patient will be transferred to hospital internal medicine   -  Reassessment and plan follow-up will be performed by the accepting medicine team    Marlene Prescott NP  [facility] RRT [facility] Job Code Contact #[phone]  [facility] Paging/Directory    Hospital Course   Brief Summary of events leading to rapid response:     #Multifactorial hypotension - PNA, exacerbated by medication side effect  #Aspiration PNA  #Sepsis   #Pre-renal DESEAN  #Mild hyponatremia  #Troponemia  Bhavana Marr is an 82 year old F with a PMH notable for dementia, asthma, depression, bipolar d/o, borderline personality d/o, mitral valve regurgitation, mild aortic stenosis, and gastric bypass. Admitted 6/7/23 with suicidal ideation, poor p.o. intake associated with hypoglycemia and hypotension.    A rapid response was called this evening d/t hypotension. Earlier in the evening BP checked at was 188/84, she was given minipress. At the end of group therapy she felt dizzy and her BP was 89/47 with HR 74 in the sitting  position. Rechecked in the supine position once in her room and SBP remained in the 80s with a HR in the 70s. Other 24h VS notable for Tmax 100.3F, HR 60-90. ROS positive for dizziness. Denies chills, rigors, SOB, cough, chest pain, abdo pain, dysuria, diarrhea, or changes in sensation. Notes indicate she has had poor oral intake and has been intermittently vomiting. Exam notable for 2/6 systolic murmur (ECHO from 10/2/18 showed mild MR and AS). CXR showed increased bilateral hazy opacities including all lobes, favored infectious. Lab new leukocytosis 16.9 w/L shift, Na+ 132, Cr 1.09, pcal 0.19, trop 19. LA normal, Hgb stable, BNP unremarkable. EKG unchanged compared to EKG obtained 23. Sputum, MRSA swab, and UA ordered.     She was given 1L IV fluid bolus, unasyn started targeting aspiration PNA, and minipress held. Post-bolus, VS improved HR 65, BP 99/57 (71), and SpO2 98% on RA. While in her room checking her VS post-bolus I witnessed pt softly retching in her sleep and then sat up and had a small bilious emesis. She is likely aspirating in her sleep. Will make NPO with meds and chips, consult speech, and transfer to internal medicine for treatment of sepsis 2/2 aspiration PNA.       Admission Diagnosis:   Suicidal ideation [R45.851]    Physical Exam   Temp: 99  F (37.2  C) Temp  Min: 97.2  F (36.2  C)  Max: 100.3  F (37.9  C)  Resp: 16 Resp  Min: 16  Max: 16  SpO2: 97 % SpO2  Min: 94 %  Max: 98 %  Pulse: 78 Pulse  Min: 74  Max: 90    No data recorded  BP: (!) 88/50 Systolic (24hrs), Av , Min:73 , Max:188   Diastolic (24hrs), Av, Min:41, Max:88     I/Os: I/O last 3 completed shifts:  In: 1320 [P.O.:1320]  Out: -      Exam:   General: Hypoacitve, in no acute distress  Mental Status: baseline mental status.  Resp: CTA bilaterally and throughout, non labored WOB  CV: grade 2/6 systolic murmur  GI: abd benign  Extremities: pale but warm, well perfused, 1-2+ pulses in extremities     Significant Results  and Procedures   Lactic Acid: No results for input(s): LACT, LACTS in the last 38795 hours.  CBC:   Recent Labs   Lab Test 07/15/23  0657 07/12/23  0700 07/09/23  1222   WBC 7.1 9.6 7.3   HGB 11.6* 11.5* 11.8   HCT 35.7 35.4 35.8    195 192        Sepsis Evaluation   The patient is not known to have an infection.  Bhavana Marr meets SIRS criteria but does NOT have a lactate >2 or other evidence of acute organ damage.  These vital sign, lab and physical exam findings constitute a diagnosis of SEPSIS.         Anti-infectives (From now, onward)    Start     Dose/Rate Route Frequency Ordered Stop    07/15/23 2300  ampicillin-sulbactam (UNASYN) 3 g vial to attach to  mL bag         3 g  over 15-30 Minutes Intravenous EVERY 6 HOURS 07/15/23 2228      06/08/23 0800  valACYclovir (VALTREX) tablet 500 mg        Note to Pharmacy: PTA Sig:Give 500 mg by mouth one time a day related to other herpesviral infection for outbreak prevention      500 mg Oral DAILY 06/07/23 2048          Current antibiotic coverage is appropriate for source of infection.

## 2023-07-16 NOTE — PROGRESS NOTES
"                                                                           [facility]      OUTPATIENT SPEECH LANGUAGE PATHOLOGY EVALUATION  PLAN OF TREATMENT FOR OUTPATIENT REHABILITATION  (COMPLETE FOR INITIAL CLAIMS ONLY)  Patient's Last Name, First Name, M.I.  YOB: 1940  Bhavana Marr                        Provider's Name  [facility] Medical Record No.  [id number]                               Onset Date:  07/16/23  Start of Care Date:      Type:     ___PT   ___OT   _X_SLP Medical Diagnosis:            SLP Diagnosis:  Mild pharyngeal dysphagia & esophageal dysphagia  Visits from SOC:  1   ________________________________________________________________  Plan of Treatment/Functional Goals    Planned Interventions:                    Goals: See Speech Language Pathology Goals on Care Plan in [facility] electronic health record.    Therapy Frequency:    Predicted Duration of Therapy Intervention:    ________________________________________________________________________________    I CERTIFY THE NEED FOR THESE SERVICES FURNISHED UNDER        THIS PLAN OF TREATMENT AND WHILE UNDER MY CARE .             Physician Signature               Date    X_____________________________________________________                         Referring Physician: Alyssa Elizondo MD           Initial Assessment        See Speech Language Pathology documentation in Epic electronic health record, evaluation dated     Speech-Language Pathology: Clinical Swallow Evaluation     07/16/23 0900   Appointment Info   Signing Clinician's Name / Credentials (SLP) Shawna Heredia MS, CCC-SLP   General Information   Onset of Illness/Injury or Date of Surgery 07/16/23   Referring Physician Alyssa Elizondo MD   Pertinent History of Current Problem Per H&P note, \"82 year old female with history of HLD, asthma, depression, anxiety, borderline personality, bipolar disorder, GERD, " "osteoporosis, and gastric bypass who was transferred from [facility] medicine to station 3B with worsening depression. She was initially admitted to [facility] 5/5/2023 with hypoglycemia, hypotension, and possible SI in the setting of failure to thrive, initially admitted on 6/7/23 to psychiatry/behavioral health unit. Patient developed HoTN and concern for sepsis 2/2 aspiration pneumonia on 7/15, transferred to medicine service at that time.\"   General Observations Alert and needing reinforcement to participate, but cooperative with education   Type of Evaluation   Type of Evaluation Swallow Evaluation   Oral Motor   Oral Musculature generally intact   Structural Abnormalities none present   Mucosal Quality good   Dentition (Oral Motor)   Dentition (Oral Motor) natural dentition;significant number of missing teeth;dentition impacts chewing ability   Facial Symmetry (Oral Motor)   Facial Symmetry (Oral Motor) WNL   Lip Function (Oral Motor)   Lip Range of Motion (Oral Motor) WNL   Lip Strength (Oral Motor) WNL   Lip Coordination (Oral Motor) bilateral   Tongue Function (Oral Motor)   Tongue Coordination/Speed (Oral Motor) reduced rate;slows down progressively   Tongue ROM (Oral Motor) WNL   Cough/Swallow/Gag Reflex (Oral Motor)   Volitional Throat Clear/Cough (Oral Motor) reduced strength   Volitional Swallow (Oral Motor) unable/difficult to assess  (pt not agreeable to complete task)   Vocal Quality/Secretion Management (Oral Motor)   Vocal Quality (Oral Motor) WFL;hoarse   Secretion Management (Oral Motor) WNL   Comment, Vocal Quality/Secretion Management (Oral Motor) Per MD note, \"witnessed retching in her sleep\".   General Swallowing Observations   Current Diet/Method of Nutritional Intake (General Swallowing Observations, NIS) NPO   Respiratory Support (General Swallowing Observations) nasal cannula   Past History of Dysphagia VFSS completed in 2018 & 6/30/23 revealing: \"Patient oral phase c/b adequate bolus acceptance " "and closure, mildly extended mastication phase with hard solids, but adequate A-P movement, with good oral clearance. Pharyngeal phase c/b delayed initiation of the swallow with thin liquids to the pyriforms and with puree textures to the valleculae, delayed epiglottic inversion, and reduced vestibule closure resulting in flash penetration of thin liquids during the swallow, but spontaneously cleared. No aspiration observed under fluoroscopy. No pharyngeal residue with all intake. No reflux observed under fluoroscopy, refer to Radiologist's report for comments on esophageal phase. At this time, recommend advance patient to regular textures and continue thin liquids diet. Patient should use a slow rate of intake, and take small bites/sips. Follow reflux precautions. Suspect oropharyngeal phase is baseline for patient and does not impact swallow safety, and emesis is self-induced per patient report, staff observation, and Psychiatry provider note (6/29). No further SLP services indicated at this time. SLP will s/o.\"   Swallowing Evaluation Clinical swallow evaluation   Comment, General Swallowing Observations Per EMR review, reports with \"poor oral intake\", \"intermittant vomiting\", \"witnessed retching in her sleep\", and \"CXR 7/15 with diffcuse bilateral hazy opacities\".   Clinical Swallow Evaluation   Feeding Assistance set up only required   Clinical Swallow Evaluation Textures Trialed thin liquids;pureed;solid foods   Clinical Swallow Eval: Thin Liquid Texture Trial   Mode of Presentation, Thin Liquids cup;straw;self-fed   Volume of Liquid or Food Presented 8 oz   Oral Phase of Swallow WFL   Pharyngeal Phase of Swallow intact   Diagnostic Statement No overt clinical s/s of aspiration observed. Previous recommendations included small sips & no straws from videos with hx of flash penetration with thin liquids.   Clinical Swallow Evaluation: Puree Solid Texture Trial   Mode of Presentation, Puree spoon;self-fed "   Volume of Puree Presented 4 oz   Oral Phase, Puree WFL   Pharyngeal Phase, Puree intact   Diagnostic Statement No overt clinical s/s of aspiration observed.   Clinical Swallow Evaluation: Solid Food Texture Trial   Mode of Presentation self-fed   Volume Presented 2 desitny crackers   Oral Phase WFL;other (see comments)  (prolonged, but functional mastication)   Pharyngeal Phase intact   Diagnostic Statement No overt clinical s/s of aspiration observed. Prolonged, but functional mastication. Patient reports not chewing hard things, such as raw vegetables, nuts, or meats, but is able to choose from the options what she can chew.   Esophageal Phase of Swallow   Patient reports or presents with symptoms of esophageal dysphagia Yes   Esophageal comments Patient with hx of GERD & esophagram completed on 6/19 revealed decreased esophageal motility when in supine position. Patient reported that she was told to eat slow and have 6 meals a day instead of 3.   Swallowing Recommendations   Diet Consistency Recommendations regular diet;thin liquids (level 0)   Supervision Level for Intake 1:1 supervision needed   Mode of Delivery Recommendations bolus size, small;slow rate of intake   Recommended Feeding/Eating Techniques (Swallow Eval) maintain upright sitting position for eating;maintain upright posture during/after eating for 30 minutes;provide 6 smaller meals throughout day;set-up and prepare tray   Medication Administration Recommendations, Swallowing (SLP) As tolerated   Comment, Swallowing Recommendations Recommend regular solids and thin liquids with small sips, no straws, and sitting upright at 90* for all PO. No further ST warrented at this time as patient at baseline diet.   Clinical Impression   Criteria for Skilled Therapeutic Interventions Met (SLP Eval) Evaluation only   SLP Diagnosis Mild pharyngeal dysphagia & esophageal dysphagia   Risks & Benefits of therapy have been explained evaluation/treatment results  reviewed;participants voiced agreement with care plan;participants included;patient   Clinical Impression Comments Clinical Swallow Evaluation completed. Patient had no s/s aspiration and no signs of dysphagia. Oral motor function was WFL with notable significant number of teeth missing and no dentures. Patient endorses eating softer foods and avoiding nuts, raw vegetables, meats and other difficult to chew foods. Mastication was slowed, but functional. Hyolaryngeal elevation appears intact. Recommend diet of regular solids and thin liquids with no straws, small sips/bites, and sitting upright for all PO intake. Patient endorsed that she will choose the softer foods and things that she knows she can eat from the menu secondary to dentition. No further ST is warranted at this time. Contact SLP if any questions or concerns.   SLP Total Evaluation Time   Eval: oral/pharyngeal swallow function, clinical swallow Minutes (10251) 15   SLP Discharge Planning   SLP Plan complete orders   SLP Discharge Recommendation   (defer to medical team)   SLP Rationale for DC Rec Baseline swallow function & no ST warrented at this time   SLP Brief overview of current status  Recommend regular solids and thin liquids with small sips, no straws, and sitting upright at 90* for all PO. No further ST warrented at this time as patient at baseline diet.   Total Session Time   Total Session Time (sum of timed and untimed services) 15

## 2023-07-16 NOTE — PLAN OF CARE
"  Problem: Anxiety Signs/Symptoms  Goal: Optimized Energy Level (Anxiety Signs/Symptoms)  Outcome: Progressing     Problem: Depressive Signs/Symptoms  Goal: Optimized Energy Level (Depressive Signs/Symptoms)  Outcome: Progressing   Goal Outcome Evaluation:       Pt was received at 2300 for possible transfer to medical unit for further evaluation. Pt was running on NS at 75 ml/hr, on 1L of O2 with 95%. Pt was transfer 5A at 0230 with her belongings.     BP 95/55 (BP Location: Right arm, Patient Position: Supine, Cuff Size: Adult Small)   Pulse 66   Temp 99.1  F (37.3  C) (Oral)   Resp 16   Ht 1.626 m (5' 4\")   Wt 51.1 kg (112 lb 10.5 oz)   LMP  (LMP Unknown)   SpO2 96%   BMI 19.34 kg/m                    "

## 2023-07-16 NOTE — PROGRESS NOTES
Progress note    Patient was admitted earlier today for evaluation of aspiration pneumonia.  I reviewed the chart and evaluated the patient this morning.  Speech pathology evaluated the patient and gave the following recommendations: Recommend regular solids and thin liquids with small sips, no straws, and sitting upright at 90* for all PO. No further ST warrented at this time as patient at baseline diet.    Plan is to continue with current medical management.  Continue monitoring the patient.  I did not bill for this note.

## 2023-07-16 NOTE — H&P
[facility]    History and Physical - Hospitalist Service, GOLD TEAM        Date of Admission:  7/16/2023     Assessment & Plan      Bhavana Marr is a 82 year old female with history of HLD, asthma, depression, anxiety, borderline personality, bipolar disorder, GERD, osteoporosis, and gastric bypass who was transferred from [facility] medicine to station  with worsening depression. She was initially admitted to [facility] 5/5/2023 with hypoglycemia, hypotension, and possible SI in the setting of failure to thrive, initially admitted on 6/7/23 to psychiatry/behavioral health unit. Patient developed HoTN and concern for sepsis 2/2 aspiration pneumonia on 7/15, transferred to medicine service at that time.      HoTN, concern for septic shock (7/15)  Sepsis, suspect 2/2 aspiration pneumonia (7/15)  CXR 7/15 concerning for diffuse bilateral hazy opacities favoring infection/inflammation  - s/p 1L fluid bolus, on mIVF 75ml/hr  - hold prazosin for HoTN  - NPO, speech consulted  - on RA, continuous pulse ox, goal sats >90%  - abx: Unasyn (7/15-###)  - COVID/Viral panel pending, MRSA swab pending, sputum culture ordered  - Pulm Toilet: IS, encourage OOB, prn symbicort     Depression, anxiety, bipolar disorder, borderline personality, dementia w/ behavioral disturbance  Admitted to [facility] with worsening depression. Transferred to  6/7 for ongoing care  - psych consult in AM              - continue medications per psych team: Prozac, Gabapentin, Mirtazapine, Olanzapine, melatonin, memantine  - 1:1 sitter while on medicine service     Chronic hyponatremia:   BL Na around 130. Last Na 129 6/7 admission w/ Urine osm 506, urine Na 53. 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 Na on 7/15 132  - trend chem  - 2L fluid restriction   - strict I/Os     Failure to thrive  severe protein calorie malnutrition  h/o gastric bypass  (~27yr ago, unclear procedure)  Reflux/regurgitation vs N/V vs eating disorder  Acute on chronic constipation with hx of overflow diarrhea   GERD  Vit B12 def, Hx of Iron def Anemeia  GI consulted on 6/16-6/26 (signed off)  - Nutrition consult  - speech consulted for concern for aspiration  - continue supplements: Vit D3, MV, Oscal, B12  - continue PPI qday  - continue scheduled Zofran TID w/ meals  - continue GI recommended Bowel regimen     Asthma: Stable, no scheduled meds. Continue prn Symbicort.  HLD: Continue statin  Osteoporosis: Encourage good oral intake. Continue close OP follow up with PCP for chronic management   Hx of HSV: continue PTA Valtrex qday            Diet: NPO for Medical/Clinical Reasons Except for: Meds, Ice ChipsNPO for speech eval  DVT Prophylaxis: Low Risk/Ambulatory with no VTE prophylaxis indicated  Cortez Catheter: Not present  Lines: None     Cardiac Monitoring: None  Code Status: No CPR- Do NOT Intubate No CPR- Do NOT Intubate      Clinically Significant Risk Factors Present on Admission              # Hypoalbuminemia: Lowest albumin = 3.1 g/dL at 7/15/2023  9:50 PM, will monitor as appropriate                        Alyssa Elizondo MD  Hospitalist Service, [facility]  Securely message with [employer] (more info)  Text page via [employer] Paging/Directory   See signed in provider for up to date coverage information    ______________________________________________________________________    Chief Complaint   Transfer from Inpatient Psych/Behavioral Health to medicine service on 7/15 for acute HoTN w/ concern for sepsis 2/2 aspiration pneumonia.     History of Present Illness   Bhavana Marr is a 82 year old female with history of HLD, asthma, depression, anxiety, borderline personality, bipolar disorder, GERD, osteoporosis, and gastric bypass who was transferred from [facility] medicine to station 3B with worsening depression. She was initially  admitted to [facility] 5/5/2023 with hypoglycemia, hypotension, and possible SI in the setting of failure to thrive, initially admitted on 6/7/23 to psychiatry/behavioral health unit. Patient developed HoTN and concern for sepsis 2/2 aspiration pneumonia on 7/15, transferred to medicine service at that time.      Patient had rapid response called on 7/15 for hypotension. Earlier in the evening BP checked at was 188/84, she was given minipress. At the end of group therapy she felt dizzy and her BP was 89/47 with HR 74 in the sitting position. Rechecked in the supine position once in her room and SBP remained in the 80s with a HR in the 70s. Other 24h VS notable for Tmax 100.3F, HR 60-90. ROS positive for dizziness. Denies chills, rigors, SOB, cough, chest pain, abdo pain, dysuria, diarrhea, or changes in sensation. Notes indicate she has had poor oral intake and has been intermittently vomiting. Exam notable for 2/6 systolic murmur (ECHO from 10/2/18 showed mild MR and AS). CXR showed increased bilateral hazy opacities including all lobes, favored infectious. Lab new leukocytosis 16.9 w/L shift, Na+ 132, Cr 1.09, pcal 0.19, trop 19. LA normal, Hgb stable, BNP unremarkable. EKG unchanged compared to EKG obtained 7/11/23. Sputum, MRSA swab, and UA ordered. She was given 1L IV fluid bolus, unasyn started targeting aspiration PNA, and minipress held. Post-bolus, VS improved HR 65, BP 99/57 (71), and SpO2 98% on RA. While in her room checking her VS post-bolus, witnessed retching in sleep, concern for aspirating while sleeping. Made NPO and speech consulted in AM. Transfer to internal medicine for treatment of sepsis 2/2 aspiration PNA.      On assessment, patient resting comfortably in bed. At bedside, MAP 63. Patient reports chronic dizziness but no changes in this over the past few days. Reports feeling tired as its the middle of the night. Denies any acute concerns, no F/chills/N/V/abd pain/CP/SOB. Patient breathing  comfortably on RA. Bedsider sitter present. RN reports no concerns. Continue current blood pressure monitoring, low threshold to repeat fluid bolus.       Past Medical History    Past Medical History:   Diagnosis Date     Arthritis 1998     Asthma      Asthma 6/8/2018     Blood transfusion 2000     Borderline personality disorder (H)      Chronic sinus infection      Depressive disorder      GERD (gastroesophageal reflux disease)      History of blood transfusion      Hyperlipidemia      MDD (major depressive disorder)        Past Surgical History   Past Surgical History:   Procedure Laterality Date     ABDOMEN SURGERY      2 fatty tumors removed     APPENDECTOMY       BIOPSY       COLONOSCOPY       GASTRIC BYPASS       GI SURGERY  2000    gastric bypass     GYN SURGERY      complete hysterectomy     [facility] CORRECT BUNION,DOUBLE OSTEOTOMY      Description: Hallux Valgus (Bunion) Correction;  Recorded: 05/07/2012;     ORTHOPEDIC SURGERY      both hip,two foot surgies on each foot     ORTHOPEDIC SURGERY      right elbow     [facility] APPENDECTOMY      Description: Appendectomy;  Recorded: 05/07/2012;     [facility] TOTAL ABDOM HYSTERECTOMY      Description: Total Abdominal Hysterectomy;  Recorded: 05/07/2012;       Prior to Admission Medications   Prior to Admission Medications   Prescriptions Last Dose Informant Patient Reported? Taking?   Multiple Vitamins-Minerals (CEROVITE SENIOR) TABS  Nursing Home Yes No   Sig: Take 1 tablet by mouth daily   Nutritional Supplements (ENSURE ORIGINAL) LIQD  Nursing Home Yes No   Sig: Take 1 Container by mouth daily   OLANZapine (ZYPREXA) 2.5 MG tablet   No No   Sig: Take 1 tablet (2.5 mg) by mouth every morning   OLANZapine (ZYPREXA) 5 MG tablet   No No   Sig: Take 2 tablets (10 mg) by mouth At Bedtime   SENNA-docusate sodium (SENNA S) 8.6-50 MG tablet  Nursing Home Yes No   Sig: Take 1 tablet by mouth once as needed (constipation)   acetaminophen (TYLENOL) 325 MG tablet   No No   Sig: Take 2  tablets (650 mg) by mouth every 6 hours as needed for mild pain or other (and adjunct with moderate or severe pain or per patient request)   budesonide-formoterol (SYMBICORT) 80-4.5 MCG/ACT Inhaler  Nursing Home No No   Sig: Inhale 2 puffs into the lungs 2 times daily as needed (Shortness of breath, wheezing)   calcium carbonate (TUMS) 500 MG chewable tablet  Nursing Home Yes No   Sig: Chew 2 tabs every 3 hours as needed   cholecalciferol (VITAMIN D3) 1000 UNIT tablet  Nursing Home No No   Sig: Take 1 tablet (1,000 Units) by mouth daily   divalproex sodium delayed-release (DEPAKOTE SPRINKLE) 125 MG DR capsule   No No   Sig: Take 250 mg by mouth 2 times daily   divalproex sodium delayed-release (DEPAKOTE SPRINKLE) 125 MG DR capsule   No No   Sig: Take 375 mg by mouth At Bedtime   famotidine (PEPCID) 20 MG tablet   No No   Sig: Take 1 tablet (20 mg) by mouth daily   fluticasone (FLONASE) 50 MCG/ACT nasal spray  Nursing Home Yes No   Sig: Spray 2 sprays into both nostrils daily as needed for rhinitis or allergies   hydrOXYzine (ATARAX) 25 MG tablet   No No   Sig: Take 1 tablet (25 mg) by mouth every 6 hours as needed for anxiety   hypromellose (ARTIFICIAL TEARS) 0.5 % SOLN ophthalmic solution  Nursing Home Yes No   Sig: Place 2 drops into both eyes 4 times daily as needed   mirtazapine (REMERON) 15 MG tablet   No No   Sig: Take 1 tablet (15 mg) by mouth At Bedtime   polyethylene glycol (MIRALAX) 17 g packet  [facility] Yes No   Sig: Take 17 g by mouth Every Mon, Wed, Fri Morning   polyethylene glycol (MIRALAX) 17 g packet  [facility] Yes No   Sig: Take 17 g by mouth daily as needed for constipation   sertraline (ZOLOFT) 100 MG tablet   No No   Sig: Take 1 tablet (100 mg) by mouth daily   sertraline (ZOLOFT) 50 MG tablet   No No   Sig: Take 1 tablet (50 mg) by mouth daily   simvastatin (ZOCOR) 40 MG tablet  Nursing Home No No   Sig: Take 1 tablet (40 mg) by mouth At Bedtime   valACYclovir (VALTREX) 500 MG tablet   Nursing Home Yes No   Sig: Give 500 mg by mouth one time a day related to other herpesviral infection for outbreak prevention      Facility-Administered Medications: None           Physical Exam   Vital Signs: Temp: 99  F (37.2  C) Temp src: Oral BP: (!) 89/50 Pulse: 62   Resp: 16 SpO2: 98 % O2 Device: Nasal cannula    Weight: 0 lbs 0 oz    General: alert & oriented, slightly dry MM, EOMI w/o scleral icterus, neck FROM, pleasant, bedside sitter present  Resp: CTAB w/ some scattered crackles but no wheezes, no increased WOB, on RA  Cards: RRR w/o murmurs/rubs/gallops,no LE edema  GI: soft, nontender, nondistended, +BS  Skin: warm and well perfused  Neuro/MSK: moving all 4 extremities equally w/o apparent focal neuro defects.       Medical Decision Making       75 MINUTES SPENT BY ME on the date of service doing chart review, history, exam, documentation & further activities per the note.      Data   ------------------------- PAST 24 HR DATA REVIEWED -----------------------------------------------    I have personally reviewed the following data over the past 24 hrs:    16.9 (H)  \   10.6 (L)   / 220     132 (L) 101 21.3 /  123 (H)   4.1 25 1.09 (H) \       ALT: 8 AST: 11 AP: 39 TBILI: 0.3   ALB: 3.1 (L) TOT PROTEIN: 5.2 (L) LIPASE: 23       Trop: 19 (H) BNP: 1,636       TSH: 3.04 T4: N/A A1C: N/A       Procal: 0.19 (H) CRP: N/A Lactic Acid: 1.0         Imaging results reviewed over the past 24 hrs:   Recent Results (from the past 24 hour(s))   XR Chest Port 1 View    Impression    RESIDENT PRELIMINARY INTERPRETATION  IMPRESSION: Bilateral diffuse mixed interstitial and airspace  opacities. Favor to be infectious/inflammatory process with or without  superimposed atelectasis.

## 2023-07-16 NOTE — PLAN OF CARE
"Nursing Assessment:  VT: BP 98/57 (BP Location: Left arm)   Pulse 61   Temp 97.8  F (36.6  C) (Oral)   Resp 16   LMP  (LMP Unknown)   SpO2 98%      Diet: patient now on regular diet and thin liquids. Per Speech therapy, recommend 1:1 when eating and no straws. She ate most of her lunch and dinner today    GI/: patient had a few BMs during shift. She has some incontinence before she can void in the toilet    SI: patient denied SI in AM but patient later said, \"I just want to die. I'm 83 years old.\"    Pain Management:  Denied pain    Nursing Plan:  Continue POC, monitor BP    Discharge Disposition:  pending  "

## 2023-07-17 PROBLEM — J69.0 ASPIRATION PNEUMONIA (H): Status: ACTIVE | Noted: 2023-07-17

## 2023-07-17 LAB
ANION GAP SERPL CALCULATED.3IONS-SCNC: 7 MMOL/L (ref 7–15)
ATRIAL RATE - MUSE: 71 BPM
BUN SERPL-MCNC: 10.8 MG/DL (ref 8–23)
CALCIUM SERPL-MCNC: 8.8 MG/DL (ref 8.8–10.2)
CHLORIDE SERPL-SCNC: 108 MMOL/L (ref 98–107)
CREAT SERPL-MCNC: 0.73 MG/DL (ref 0.51–0.95)
DEPRECATED HCO3 PLAS-SCNC: 26 MMOL/L (ref 22–29)
DIASTOLIC BLOOD PRESSURE - MUSE: NORMAL MMHG
ERYTHROCYTE [DISTWIDTH] IN BLOOD BY AUTOMATED COUNT: 14.2 % (ref 10–15)
GFR SERPL CREATININE-BSD FRML MDRD: 82 ML/MIN/1.73M2
GLUCOSE SERPL-MCNC: 101 MG/DL (ref 70–99)
HCT VFR BLD AUTO: 36.8 % (ref 35–47)
HGB BLD-MCNC: 11.7 G/DL (ref 11.7–15.7)
INTERPRETATION ECG - MUSE: NORMAL
MCH RBC QN AUTO: 30.9 PG (ref 26.5–33)
MCHC RBC AUTO-ENTMCNC: 31.8 G/DL (ref 31.5–36.5)
MCV RBC AUTO: 97 FL (ref 78–100)
P AXIS - MUSE: 27 DEGREES
PLATELET # BLD AUTO: 200 10E3/UL (ref 150–450)
POTASSIUM SERPL-SCNC: 4.3 MMOL/L (ref 3.4–5.3)
PR INTERVAL - MUSE: 168 MS
QRS DURATION - MUSE: 126 MS
QT - MUSE: 426 MS
QTC - MUSE: 462 MS
R AXIS - MUSE: -47 DEGREES
RBC # BLD AUTO: 3.79 10E6/UL (ref 3.8–5.2)
SODIUM SERPL-SCNC: 141 MMOL/L (ref 136–145)
SYSTOLIC BLOOD PRESSURE - MUSE: NORMAL MMHG
T AXIS - MUSE: 38 DEGREES
VENTRICULAR RATE- MUSE: 71 BPM
WBC # BLD AUTO: 7.3 10E3/UL (ref 4–11)

## 2023-07-17 PROCEDURE — 999N000127 HC STATISTIC PERIPHERAL IV START W US GUIDANCE

## 2023-07-17 PROCEDURE — 258N000003 HC RX IP 258 OP 636: Performed by: INTERNAL MEDICINE

## 2023-07-17 PROCEDURE — 80048 BASIC METABOLIC PNL TOTAL CA: CPT | Performed by: INTERNAL MEDICINE

## 2023-07-17 PROCEDURE — 250N000013 HC RX MED GY IP 250 OP 250 PS 637: Performed by: INTERNAL MEDICINE

## 2023-07-17 PROCEDURE — 999N000040 HC STATISTIC CONSULT NO CHARGE VASC ACCESS

## 2023-07-17 PROCEDURE — 85027 COMPLETE CBC AUTOMATED: CPT | Performed by: INTERNAL MEDICINE

## 2023-07-17 PROCEDURE — 250N000013 HC RX MED GY IP 250 OP 250 PS 637: Performed by: PSYCHIATRY & NEUROLOGY

## 2023-07-17 PROCEDURE — 120N000002 HC R&B MED SURG/OB UMMC

## 2023-07-17 PROCEDURE — 99222 1ST HOSP IP/OBS MODERATE 55: CPT | Performed by: PSYCHIATRY & NEUROLOGY

## 2023-07-17 PROCEDURE — 250N000011 HC RX IP 250 OP 636: Performed by: INTERNAL MEDICINE

## 2023-07-17 PROCEDURE — 36415 COLL VENOUS BLD VENIPUNCTURE: CPT | Performed by: INTERNAL MEDICINE

## 2023-07-17 PROCEDURE — 99232 SBSQ HOSP IP/OBS MODERATE 35: CPT | Performed by: INTERNAL MEDICINE

## 2023-07-17 RX ORDER — LAMOTRIGINE 25 MG/1
25 TABLET ORAL 2 TIMES DAILY
Status: DISCONTINUED | OUTPATIENT
Start: 2023-07-17 | End: 2023-07-18

## 2023-07-17 RX ORDER — OLANZAPINE 5 MG/1
5 TABLET, ORALLY DISINTEGRATING ORAL EVERY MORNING
Status: DISCONTINUED | OUTPATIENT
Start: 2023-07-18 | End: 2023-07-18

## 2023-07-17 RX ADMIN — GABAPENTIN 200 MG: 250 SOLUTION ORAL at 22:10

## 2023-07-17 RX ADMIN — AMPICILLIN SODIUM AND SULBACTAM SODIUM 3 G: 2; 1 INJECTION, POWDER, FOR SOLUTION INTRAMUSCULAR; INTRAVENOUS at 18:46

## 2023-07-17 RX ADMIN — LAMOTRIGINE 25 MG: 25 TABLET ORAL at 22:09

## 2023-07-17 RX ADMIN — ONDANSETRON 4 MG: 4 TABLET ORAL at 17:06

## 2023-07-17 RX ADMIN — VALACYCLOVIR 500 MG: 500 TABLET, FILM COATED ORAL at 09:33

## 2023-07-17 RX ADMIN — GABAPENTIN 200 MG: 250 SOLUTION ORAL at 09:36

## 2023-07-17 RX ADMIN — AMPICILLIN SODIUM AND SULBACTAM SODIUM 3 G: 2; 1 INJECTION, POWDER, FOR SOLUTION INTRAMUSCULAR; INTRAVENOUS at 06:09

## 2023-07-17 RX ADMIN — OLANZAPINE 2.5 MG: 5 TABLET, ORALLY DISINTEGRATING ORAL at 09:34

## 2023-07-17 RX ADMIN — AMPICILLIN SODIUM AND SULBACTAM SODIUM 3 G: 2; 1 INJECTION, POWDER, FOR SOLUTION INTRAMUSCULAR; INTRAVENOUS at 12:31

## 2023-07-17 RX ADMIN — Medication 6 MG: at 22:04

## 2023-07-17 RX ADMIN — CALCIUM 500 MG: 500 TABLET ORAL at 22:03

## 2023-07-17 RX ADMIN — ONDANSETRON 4 MG: 4 TABLET ORAL at 12:29

## 2023-07-17 RX ADMIN — SODIUM CHLORIDE: 9 INJECTION, SOLUTION INTRAVENOUS at 13:27

## 2023-07-17 RX ADMIN — SENNOSIDES AND DOCUSATE SODIUM 1 TABLET: 50; 8.6 TABLET ORAL at 22:04

## 2023-07-17 RX ADMIN — MIRTAZAPINE 15 MG: 15 TABLET, ORALLY DISINTEGRATING ORAL at 22:03

## 2023-07-17 RX ADMIN — Medication 25 MCG: at 17:06

## 2023-07-17 RX ADMIN — OLANZAPINE 10 MG: 5 TABLET, ORALLY DISINTEGRATING ORAL at 22:04

## 2023-07-17 RX ADMIN — PANTOPRAZOLE SODIUM 40 MG: 40 TABLET, DELAYED RELEASE ORAL at 06:19

## 2023-07-17 RX ADMIN — MEMANTINE 10 MG: 10 TABLET ORAL at 22:03

## 2023-07-17 RX ADMIN — SIMVASTATIN 40 MG: 40 TABLET, FILM COATED ORAL at 22:02

## 2023-07-17 RX ADMIN — GABAPENTIN 200 MG: 250 SOLUTION ORAL at 17:07

## 2023-07-17 RX ADMIN — MEMANTINE 10 MG: 10 TABLET ORAL at 09:34

## 2023-07-17 RX ADMIN — FLUOXETINE HYDROCHLORIDE 60 MG: 20 SOLUTION ORAL at 09:36

## 2023-07-17 RX ADMIN — SENNOSIDES AND DOCUSATE SODIUM 1 TABLET: 50; 8.6 TABLET ORAL at 09:33

## 2023-07-17 RX ADMIN — Medication 2 TABLET: at 09:34

## 2023-07-17 RX ADMIN — Medication 100 MCG: at 22:03

## 2023-07-17 RX ADMIN — ONDANSETRON 4 MG: 4 TABLET ORAL at 06:19

## 2023-07-17 ASSESSMENT — ACTIVITIES OF DAILY LIVING (ADL)
ADLS_ACUITY_SCORE: 47
ADLS_ACUITY_SCORE: 49
ADLS_ACUITY_SCORE: 44
ADLS_ACUITY_SCORE: 47
ADLS_ACUITY_SCORE: 49

## 2023-07-17 NOTE — CONSULTS
Triage and Transition - Consult and Liaison     Bhavana Marr  July 17, 2023    Psychiatry consult acknowledged. Consult unable to be completed due to Ipad unavailable. No device is assigned to patient. At 11:50 AM, this writer contacted RN regarding meeting with patient via Ipad. RN told writer to check back as there's no assigned device at the time. At 1:08 PM, this writer still have not seen any device assigned to patient. Spoke with an RN on the unit, reported they are unsure and will speak with others to see what can be done. At this time, Will attempt consult again Dr. Wang is in-person at [facility] and will have to see patient in-person.     FERNANDO Lawler, [facility]  Triage and Transition - Consult and Liaison   [phone]

## 2023-07-17 NOTE — PROGRESS NOTES
[facility] [place]    Medicine Progress Note - Hospitalist Service, GOLD TEAM 16    Date of Admission:  7/16/2023    Assessment & Plan   82 year old female with history of HLD, asthma, depression, anxiety, borderline personality, bipolar disorder, GERD, osteoporosis, and gastric bypass who was transferred from [facility] medicine to station  with worsening depression. She was initially admitted to [facility] 5/5/2023 with hypoglycemia, hypotension, and possible SI in the setting of failure to thrive, initially admitted on 6/7/23 to psychiatry/behavioral health unit. Patient developed hypotension and concern for sepsis 2/2 aspiration pneumonia on 7/15, transferred to medicine service at that time.   SLP was consulted, She also was started on antibiotics.      Hypotension, concern for septic shock (7/15)  Sepsis, suspect 2/2 aspiration pneumonia (7/15)  CXR 7/15 concerning for diffuse bilateral hazy opacities favoring infection/inflammation  - Continue on gentle hydration   - hold prazosin for HoTN  - on RA, continuous pulse ox, goal sats >90%  - abx: Continue on Unasyn   - COVID/Viral panel negative, MRSA swab negative, sputum culture ordered  - Pulm Toilet: IS, encourage OOB, prn symbicort     Mild pharyngeal dysphagia & esophageal dysphagia  - SLP following the patient.   - Diet: Regular solids and thin liquids with small sips, no straws, and sitting upright at 90* for all PO. No further ST warrented at this time as patient at baseline diet.    Depression, anxiety, bipolar disorder, borderline personality, dementia w/ behavioral disturbance  Admitted to [facility] with worsening depression. Transferred to  6/7 for ongoing care  - psych consult              - continue medications per psych team: Prozac, Gabapentin, Mirtazapine, Olanzapine, melatonin, memantine  - 1:1 sitter while on medicine service     Chronic hyponatremia:   BL Na around 130. Last Na 129 6/7 admission w/ Urine osm 506,  "urine Na 53. 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 Na on 7/15 132  - trend chem  - 2L fluid restriction   - strict I/Os     Failure to thrive  severe protein calorie malnutrition  h/o gastric bypass (~27yr ago, unclear procedure)  Reflux/regurgitation vs N/V vs eating disorder  Acute on chronic constipation with hx of overflow diarrhea   GERD  Vit B12 def, Hx of Iron def Anemeia  GI consulted on 6/16-6/26 (signed off)  - Nutrition consult  - speech consulted for concern for aspiration  - continue supplements: Vit D3, MV, Oscal, B12  - continue PPI qday  - continue scheduled Zofran TID w/ meals  - continue GI recommended Bowel regimen     Asthma: Stable, no scheduled meds. Continue prn Symbicort.  HLD: Continue statin  Osteoporosis: Encourage good oral intake. Continue close OP follow up with PCP for chronic management   Hx of HSV: continue PTA Valtrex qday           Diet: Regular Diet Adult Thin Liquids (level 0)    DVT Prophylaxis: Pneumatic Compression Devices and Anti-embolisim stockings (TEDs)  Cortez Catheter: Not present  Lines: None     Cardiac Monitoring: None  Code Status: No CPR- Do NOT Intubate      Clinically Significant Risk Factors Present on Admission              # Hypoalbuminemia: Lowest albumin = 2.8 g/dL at 7/16/2023  4:41 AM, will monitor as appropriate                   Disposition Plan      Expected Discharge Date: 07/19/2023                  Errol Chavez MD  Hospitalist Service, [address]  Securely message with [employer] (more info)  Text page via [facility] Paging/Directory   See signed in provider for up to date coverage information  ______________________________________________________________________    Interval History     No acute events overnight   Pt refused to give ROS and physical examination. She told me \"just leave alone\".     Physical Exam   Vital " Signs: Temp: 99.2  F (37.3  C) Temp src: Oral BP: (!) 165/97 Pulse: 62   Resp: 19 SpO2: 99 % O2 Device: Oxymizer cannula Oxygen Delivery: 1 LPM  Weight: 0 lbs 0 oz    Refused physical examination     Medical Decision Making       45 MINUTES SPENT BY ME on the date of service doing chart review, history, exam, documentation & further activities per the note.      Data     I have personally reviewed the following data over the past 24 hrs:    7.3  \   11.7   / 200     141 108 (H) 10.8 /  101 (H)   4.3 26 0.73 \       Imaging results reviewed over the past 24 hrs:   No results found for this or any previous visit (from the past 24 hour(s)).

## 2023-07-17 NOTE — UTILIZATION REVIEW
"    Admission Status; Secondary Review Determination         Under the authority of the Utilization Management Committee, the utilization review process indicated a secondary review on the above patient.  The review outcome is based on review of the medical records, discussions with staff, and applying clinical experience noted on the date of the review.        (X)      Inpatient Status Appropriate - This patient's medical care is consistent with medical management for inpatient care and reasonable inpatient medical practice.      () Observation Status Appropriate - This patient does not meet hospital inpatient criteria and is placed in observation status. If this patient's primary payer is Medicare and was admitted as an inpatient, Condition Code 44 should be used and patient status changed to \"observation\".   () Admission Status NOT Appropriate - This patient's medical care is not consistent with medical management for Inpatient or Observation Status.          RATIONALE FOR DETERMINATION     \"82 year old female with history of HLD, asthma, depression, anxiety, borderline personality, bipolar disorder, GERD, osteoporosis, and gastric bypass who was transferred from [facility] medicine to station 3B with worsening depression. She was initially admitted to [facility] 5/5/2023 with hypoglycemia, hypotension, and possible SI in the setting of failure to thrive, initially admitted on 6/7/23 to psychiatry/behavioral health unit. Patient developed hypotension and concern for sepsis 2/2 aspiration pneumonia on 7/15, transferred to medicine service at that time.   SLP was consulted, She also was started on antibiotics. \"    On her chest x-ray obtained, it showed increasing diffuse mixed interstitial and airspace opacities.  Favored to be infectious or inflammatory process.  The patient is on IV Unasyn and is likely to stay for greater than 2 midnights for IV antibiotics.  Hence, inpatient status is appropriate.    The severity of " illness, intensity of service provided, expected LOS and risk for adverse outcome make the care complex, high risk and appropriate for hospital admission.        The information on this document is developed by the utilization review team in order for the business office to ensure compliance.  This only denotes the appropriateness of proper admission status and does not reflect the quality of care rendered.         The definitions of Inpatient Status and Observation Status used in making the determination above are those provided in the CMS Coverage Manual, Chapter 1 and Chapter 6, section 70.4.      Sincerely,     LC CASTLE MD    Physician Advisor  Utilization Review/ Case Management  [facility].

## 2023-07-17 NOTE — PROGRESS NOTES
"Temp: 97.8  F (36.6  C) Temp src: Oral BP: 98/57 Pulse: 64   Resp: 16 SpO2: 96 % O2 Device: None (Room air)     Patient is alert and oriented X4, able to verbalize needs appropriately, wet cough present, denies SOB, denies pain, on room air, BP is improving, on continuous IV hydration, 2 episodes of loose stool,Incontinent of B/B, MD notified. Patient keeps uttering \" I want to die\" patient has no plans, sitter at bedside. Continue with POC  "

## 2023-07-17 NOTE — PLAN OF CARE
Goal Outcome Evaluation:    Sitter continued.   Oriented 2/4. Pt had continued flat affect but cooperated with cares.   Refused miralax AM due to loose stools yesterday.  Pt desating late AM so started on 2L NC. Changed out pulse ox and no more issues.   3 small emesis. Scheduled antiemetic given, refused additional PRN.  Let provider know via secure chat.   She was able to eat a small amount of breakfast but no lunch due to emesis.

## 2023-07-18 LAB
HOLD SPECIMEN: NORMAL
MAGNESIUM SERPL-MCNC: 2.1 MG/DL (ref 1.7–2.3)
PHOSPHATE SERPL-MCNC: 2.8 MG/DL (ref 2.5–4.5)

## 2023-07-18 PROCEDURE — 93010 ELECTROCARDIOGRAM REPORT: CPT | Performed by: INTERNAL MEDICINE

## 2023-07-18 PROCEDURE — 93005 ELECTROCARDIOGRAM TRACING: CPT

## 2023-07-18 PROCEDURE — 36415 COLL VENOUS BLD VENIPUNCTURE: CPT | Performed by: INTERNAL MEDICINE

## 2023-07-18 PROCEDURE — 250N000013 HC RX MED GY IP 250 OP 250 PS 637: Performed by: INTERNAL MEDICINE

## 2023-07-18 PROCEDURE — 250N000011 HC RX IP 250 OP 636: Performed by: INTERNAL MEDICINE

## 2023-07-18 PROCEDURE — 250N000013 HC RX MED GY IP 250 OP 250 PS 637: Performed by: PSYCHIATRY & NEUROLOGY

## 2023-07-18 PROCEDURE — 84100 ASSAY OF PHOSPHORUS: CPT | Performed by: INTERNAL MEDICINE

## 2023-07-18 PROCEDURE — 83735 ASSAY OF MAGNESIUM: CPT | Performed by: INTERNAL MEDICINE

## 2023-07-18 PROCEDURE — 99232 SBSQ HOSP IP/OBS MODERATE 35: CPT | Performed by: INTERNAL MEDICINE

## 2023-07-18 PROCEDURE — 258N000003 HC RX IP 258 OP 636: Performed by: INTERNAL MEDICINE

## 2023-07-18 PROCEDURE — 120N000002 HC R&B MED SURG/OB UMMC

## 2023-07-18 RX ORDER — LAMOTRIGINE 25 MG/1
25 TABLET ORAL DAILY
Status: DISCONTINUED | OUTPATIENT
Start: 2023-07-19 | End: 2023-07-24 | Stop reason: HOSPADM

## 2023-07-18 RX ORDER — FLUOXETINE 20 MG/5ML
60 SOLUTION ORAL DAILY
Status: DISCONTINUED | OUTPATIENT
Start: 2023-07-19 | End: 2023-07-24 | Stop reason: HOSPADM

## 2023-07-18 RX ADMIN — SODIUM CHLORIDE: 9 INJECTION, SOLUTION INTRAVENOUS at 10:23

## 2023-07-18 RX ADMIN — VALACYCLOVIR 500 MG: 500 TABLET, FILM COATED ORAL at 09:13

## 2023-07-18 RX ADMIN — PROCHLORPERAZINE MALEATE 5 MG: 5 TABLET ORAL at 18:47

## 2023-07-18 RX ADMIN — PANTOPRAZOLE SODIUM 40 MG: 40 TABLET, DELAYED RELEASE ORAL at 05:35

## 2023-07-18 RX ADMIN — MIRTAZAPINE 15 MG: 15 TABLET, ORALLY DISINTEGRATING ORAL at 21:09

## 2023-07-18 RX ADMIN — Medication 2 TABLET: at 09:13

## 2023-07-18 RX ADMIN — Medication 25 MCG: at 17:00

## 2023-07-18 RX ADMIN — Medication 100 MCG: at 21:09

## 2023-07-18 RX ADMIN — ONDANSETRON 4 MG: 4 TABLET ORAL at 06:42

## 2023-07-18 RX ADMIN — AMPICILLIN SODIUM AND SULBACTAM SODIUM 3 G: 2; 1 INJECTION, POWDER, FOR SOLUTION INTRAMUSCULAR; INTRAVENOUS at 17:05

## 2023-07-18 RX ADMIN — AMPICILLIN SODIUM AND SULBACTAM SODIUM 3 G: 2; 1 INJECTION, POWDER, FOR SOLUTION INTRAMUSCULAR; INTRAVENOUS at 00:26

## 2023-07-18 RX ADMIN — Medication 6 MG: at 21:08

## 2023-07-18 RX ADMIN — MEMANTINE 10 MG: 10 TABLET ORAL at 09:13

## 2023-07-18 RX ADMIN — MEMANTINE 10 MG: 10 TABLET ORAL at 21:09

## 2023-07-18 RX ADMIN — AMPICILLIN SODIUM AND SULBACTAM SODIUM 3 G: 2; 1 INJECTION, POWDER, FOR SOLUTION INTRAMUSCULAR; INTRAVENOUS at 22:09

## 2023-07-18 RX ADMIN — GABAPENTIN 200 MG: 250 SOLUTION ORAL at 17:03

## 2023-07-18 RX ADMIN — LAMOTRIGINE 25 MG: 25 TABLET ORAL at 09:13

## 2023-07-18 RX ADMIN — OLANZAPINE 5 MG: 5 TABLET, ORALLY DISINTEGRATING ORAL at 09:13

## 2023-07-18 RX ADMIN — ONDANSETRON 4 MG: 4 TABLET ORAL at 12:04

## 2023-07-18 RX ADMIN — GABAPENTIN 200 MG: 250 SOLUTION ORAL at 22:25

## 2023-07-18 RX ADMIN — ONDANSETRON 4 MG: 4 TABLET ORAL at 13:52

## 2023-07-18 RX ADMIN — SIMVASTATIN 40 MG: 40 TABLET, FILM COATED ORAL at 21:09

## 2023-07-18 RX ADMIN — CALCIUM 500 MG: 500 TABLET ORAL at 21:09

## 2023-07-18 RX ADMIN — SENNOSIDES AND DOCUSATE SODIUM 1 TABLET: 50; 8.6 TABLET ORAL at 21:08

## 2023-07-18 RX ADMIN — AMPICILLIN SODIUM AND SULBACTAM SODIUM 3 G: 2; 1 INJECTION, POWDER, FOR SOLUTION INTRAMUSCULAR; INTRAVENOUS at 10:51

## 2023-07-18 RX ADMIN — GABAPENTIN 200 MG: 250 SOLUTION ORAL at 09:13

## 2023-07-18 RX ADMIN — OLANZAPINE 10 MG: 5 TABLET, ORALLY DISINTEGRATING ORAL at 21:09

## 2023-07-18 RX ADMIN — AMPICILLIN SODIUM AND SULBACTAM SODIUM 3 G: 2; 1 INJECTION, POWDER, FOR SOLUTION INTRAMUSCULAR; INTRAVENOUS at 05:31

## 2023-07-18 ASSESSMENT — ACTIVITIES OF DAILY LIVING (ADL)
ADLS_ACUITY_SCORE: 40
ADLS_ACUITY_SCORE: 40
ADLS_ACUITY_SCORE: 44
ADLS_ACUITY_SCORE: 40
ADLS_ACUITY_SCORE: 38
ADLS_ACUITY_SCORE: 40
ADLS_ACUITY_SCORE: 39
ADLS_ACUITY_SCORE: 40
ADLS_ACUITY_SCORE: 44
ADLS_ACUITY_SCORE: 40
ADLS_ACUITY_SCORE: 40
ADLS_ACUITY_SCORE: 37

## 2023-07-18 NOTE — CONSULTS
Consult Date: 07/17/2023    PSYCHIATRIC CONSULTATION    REQUESTING PHYSICIAN:  Dr. rErol Chavez.    The patient is an 82-year-old, , white female with a long history of mood disorder, borderline personality disorder, and history of gastric bypass surgery, who was initially admitted to the geriatric unit here when she developed hypotension and possible sepsis secondary to aspiration pneumonia, and for that reason was transferred to the medical floor.  Psychiatric consultation was ordered to assess her current status and advise on further management.    HISTORY OF PRESENT ILLNESS:  I had reviewed the patient's chart and attempted to interview the patient in her room.  She did not respond to any questions, made brief eye contact, and told me to go away.  She then closed her eyes and did not respond to any questions.  I did talk to her 1:1 sitter, who told me that she continued to express suicidal ideation and has been only partially compliant with cares, so the history was borrowed primarily from her past medical records.   It appears that the patient was recently admitted to [facility] because of the decreased oral intake, hypoglycemia, hypertension, depression, and suicidal ideation.  She was also hospitalized at [facility] in 04/2023 with similar complaints.  She has a long history of mood disorder and was previously diagnosed as having major depression and bipolar disorder and was treated successfully with Depakote and Zyprexa.  However, her Depakote was discontinued when she was admitted to  because of ongoing hyponatremia. For the following week, the patient did not show any signs of improvement and continued to be profoundly depressed and irritable at times.    She does have an outpatient psychiatrist, Dr. Clemente Clay, and had a  through [facility].  She had no history of commitments and never had ECT.  There is a history of suicide attempt when she jumped out of a  second-floor window about 5 years ago.    FAMILY HISTORY:  Remarkable for depression in her maternal aunt, maternal uncle, paternal aunt, and paternal uncle.    PAST MEDICAL HISTORY:  Remarkable for malnutrition, failure to thrive, hyponatremia, GERD, vitamin D deficiency, and iron deficiency anemia.  The patient did have gastric bypass surgery 27 years ago.    CHEMICAL USE HISTORY:  I see no mentioning of any history of alcohol abuse, illicit drug abuse, or prescription medication abuse in her medical records.    BRIEF SOCIAL HISTORY:  The patient was born and raised in [place] by her maternal aunt.  She had graduated from high school and had some college.  She was  twice, and 1 of the marriages ended in divorce and another one ended after her  passed away.  She had 3 children.  She has been living in [facility] living [facility] for the last 5 years.    MENTAL STATUS EXAMINATION:  Revealed a normally built and normally developed, very unpleasant, irritable, 82-year-old white female, appearing older than her stated age.  She was alert, but not communicative other than saying that she does not want to be seen and asking this examiner to walk away.    DIAGNOSTIC IMPRESSION:    1.  Bipolar disorder, depressed.  2.  Borderline personality disorder.    PLAN:  I will discontinue Prozac as ineffective and start her on Lamictal 25 mg b.i.d. with a gradual dose increase to the goal dose of 100 mg b.i.d.  I will increase Zyprexa Zydis to 5 mg q.a.m. and 10 mg at bedtime and continue Remeron, gabapentin, Namenda, and melatonin in the same doses.    I thank you very much for letting me participate in the care of this patient.    Negrito Wang MD        D: 2023   T: 2023   MT: santi    Name:     ALEXIS BYNUM  MRN:      [id number]        Account:      [id number]   :      1940           Consult Date: 2023     Document: [id number]    cc:  Errol Chavez MD

## 2023-07-18 NOTE — PROGRESS NOTES
"PSYCHIATRY  PROGRESS NOTE     DATE OF SERVICE   7/18/2023     CHIEF COMPLAINT   \"Terrible.\"       SUBJECTIVE   Nursing reports:     Oriented 2/4. Pt had continued flat affect but cooperated with cares.   Refused miralax AM due to loose stools yesterday.  Pt desating late AM so started on 2L NC. Changed out pulse ox and no more issues.   3 small emesis. Scheduled antiemetic given, refused additional PRN.  Let provider know via secure chat.   She was able to eat a small amount of breakfast but no lunch due to emesis    Pt  A & O X 4. Up with assist of 1 with a gait-belt and a walker. Denies pain/discomfort. Pt anxious and makes statements such as \"I'm afraid:, \"I don't have a home\", I don;t have a bed\", \"don't leave me\". Pt is pleasant and easily directable. 1:1 sitter at bedside. No N/V at this time. No SI at this time. Taking medications without any issues. Nursing monitoring and following POC    Social work reports:    Assessment/Intervention/Current Symptoms and Care Coordination  -Chart review and met with team, discussed pt progress, symptomology, and response to treatment.  Discussed the discharge plan and any potential impediments to discharge .Pt will now receive smaller meals to help with stomach pain /induced vomiting. Pt also reported experiencing some dizziness. Pt continues to have SI.  - Josefina at [facility] reports they may want to have their  Larry Overton visit pt on the unit sometime to conduct an assessment of pt's current state.  Josefina would like us to send some recent progress notes about 4-5 days prior to our expected discharge date and continue to be in touch with her. Most recent progress notes were sent 7/13.  -Pt's CYNTHIA Hodgson came to visit and do CM intake paperwork with pt. Pt was anxious about the visit but did very well with answering questions and was pleasant.      Discharge Plan or Goal  Pending stabilization & development of a safe discharge plan.      Barriers to " "Discharge   Patient requires further psychiatric stabilization due to current symptomology     Referral Status   None-[facility] remains committed to having patient return to them when stable.      Contacts:  MAYITO Hodgson with [facility] [phone].  [facility] [phone] ext.[phone] Josefina Fax ([phone])  : Lorenzo Whipple [phone]     Legal Status  Patient is under MI commitment in [facility]  Pt will need PD upon discharge           OBJECTIVE   Met with pt in hospital room on medical unit [facility] 5 Med Surg with Dr. Ramos.  Patient recognizes writer. Pt affect appears flat and gaurded. Pt reports mood as, \"Terrible.\"  Pt's thoughts continue to be slightly disorganized however improving. Pt experiences confusion intermittently which has been ongoing since admission to inpatient psychiatry unit 3B on 6/7/23. Patient continues to report severe depression and per nursing documentation has been endorsing thoughts of passive SI. Patient continues on 1:1 monitoring on medical unit today for safety. Plan today is to restart Prozac 60 mg PO liquid daily to target depressed mood as pt affect was brighter and improving since initiating. Also, decrease Lamotrigine to 25 mg PO daily for two weeks with plan to titrate up slowly to target mood disorder symptoms, continue Gabapentin 200 mg 3 times daily to target anxiety symptoms, continue Namenda 10 mg PO BID to treat dementia symptoms observed, decrease Olanzapine ODT to 2.5 mg tablet every morning to target agitation/psychosis, continue Olanzapine 10 mg ODT tablet at bedtime to target agitation/psychosis, and continue Mirtazapine 15 mg PO disintegrating tablet at HS to target depression symptoms and promote sleep. Olanzapine drug level checked on 6/27/2023 and results within therapeutic range: 35 ng/mL (reference range 20-80 ng/mL).  Continue to hold prazosin 1 mg PO at bedtime due to hypotension.     Pt was admitted to [facility] " 5 Med Surg for treatment of hypotension and concern for sepsis 2/2 aspiration pneumonia on 7/15.  Patient continues on IV antibiotic Unasyn 3 g every 6 hours. Reviewed patient vital signs and noted to be stable. Pt was placed on 1 L O2 NC with SpO2 level 96% on 7/17/23. Today, pt was noted to be on room air with no shortness of breath observed. Provider reviewed EKG from 7/18/2023, results: Sinus bradycardia, Left axis deviation, Incomplete right bundle branch block. Pt has been experienced intermittent hyponatremia. Sodium level last checked on 7/17/2023 is WNL: 141 mml/L.  Per medical team documentation plan to address is: 2 L fluid restriction as well as strict I&O monitoring.  Patient has ongoing GI issues including: acute on chronic constipation with history of overflow diarrhea, severe protein calorie malnutrition, reflux/regurgitation, GERD, has been observed inducing vomiting, and has history of gastric bypass. Plan per internal medicine: nutrition consult ordered, speech consulted for concern of aspiration, continuing's vitamin supplements, continue PPI daily, continue scheduled Zofran 3 times daily with meals, and continue GI recommended bowel regimen.  Patient continues on regular diet with thin liquids.  Due to history of gastric bypass patient requires small meals with snacks throughout the day which is being facilitated on the medical floor.    Provider called [facility] (phone number: [phone]) to provide an update to Josefina, Director of Nursing.  Plan will be for this provider to update Josefina later in the week regarding patient's progress and as long as she continues to improve a time will be set for the  of [facility] Larry Overton to come visit patient as this is a requirement prior to her being approved for transfer back to their nursing home facility. Discussed patient's mental health diagnosis and per Josefina's report patient was  "exhibiting symptoms of cherise including frequent pacing all day long and exhibiting impulsive unsafe behaviors for approximately a week; then patient developed SI.  Provider will update patient's diagnosis list with Bipolar Disorder Type 1.     MEDICATIONS   Medications:  Scheduled Meds:    ampicillin-sulbactam  3 g Intravenous Q6H     calcium carbonate  500 mg Oral At Bedtime     childrens multivitamin with iron  2 tablet Oral Daily     cyanocobalamin  100 mcg Oral At Bedtime     gabapentin  200 mg Oral TID     lamoTRIgine  25 mg Oral BID     melatonin  6 mg Oral QPM     memantine  10 mg Oral BID     mirtazapine  15 mg Orally disintegrating tablet At Bedtime     OLANZapine zydis  10 mg Oral At Bedtime     OLANZapine zydis  5 mg Oral QAM     ondansetron  4 mg Oral TID AC     pantoprazole  40 mg Oral QAM AC     polyethylene glycol  17 g Oral Daily     [Held by provider] prazosin  1 mg Oral Daily with supper     senna-docusate  1 tablet Oral BID     simvastatin  40 mg Oral At Bedtime     valACYclovir  500 mg Oral Daily     cholecalciferol  25 mcg Oral Daily with supper     Continuous Infusions:    sodium chloride 75 mL/hr at 07/18/23 1023     PRN Meds:.acetaminophen, alum & mag hydroxide-simethicone, budesonide-formoterol, calcium carbonate, doxylamine, OLANZapine zydis, polyethylene glycol, prochlorperazine, sennosides  Medication adherence issues: MS Med Adherence Y/N: No  Medication side effects: MEDICATION SIDE EFFECTS: no side effects reported   Benefit: Yes / No: Yes       ROS   Pertinent items are noted in HPI.       MENTAL STATUS EXAM   Vitals:/71 (BP Location: Right arm)   Pulse 64   Temp 98.1  F (36.7  C) (Oral)   Resp 16   LMP  (LMP Unknown)   SpO2 96%   Appearance:  In hospital gown, disheveled.  Mood: \"Terrible.\"  Affect: flat and guarded  was congruent to speech.  Suicidal Ideation: absent  Homicidal Ideation: PRESENT / ABSENT: absent   Thought process: difficult to follow and disorganized " however improving   Thought content: endorses preoccupations  Fund of Knowledge: average  Attention/Concentration: Poor, improving  Language ability:  Intact  Memory:  Immediate recall impaired however improving, Short-term memory impaired and Long-term memory impaired however improving  Insight:  limited.  Judgement: limited  Orientation: Oriented to self   Psychomotor Behavior: normal or unremarkable    Muscle Strength and Tone: MuscleStrength: Normal  Gait and Station:  slightly stiff however steady with walker       LABS   Personally reviewed.  Provider reviewed EKG from 7/18/2023: Sinus bradycardia, Left axis deviation   Incomplete right bundle branch block.      Latest Reference Range & Units 07/06/23 08:50 07/08/23 09:10 07/09/23 12:22 07/11/23 09:29 07/12/23 07:00 07/15/23 06:57 07/15/23 21:48 07/15/23 21:50 07/15/23 22:04 07/16/23 03:32 07/16/23 04:41 07/16/23 10:44 07/17/23 09:47 07/18/23 07:09 07/18/23 09:15   Sodium 136 - 145 mmol/L 138  138  140 138  132 (L)   134 (L)  141     Potassium 3.4 - 5.3 mmol/L 3.6  4.2  4.0 4.1  4.1   3.9  4.3     Chloride 98 - 107 mmol/L 106  107  107 104  101   102  108 (H)     Carbon Dioxide (CO2) 22 - 29 mmol/L 22  23  26 26  25   27  26     Urea Nitrogen 8.0 - 23.0 mg/dL 6.4 (L)  12.6  11.6 16.7  21.3   17.4  10.8     Creatinine 0.51 - 0.95 mg/dL 0.91  0.83  0.69 0.72  1.09 (H)   0.85  0.73     GFR Estimate >60 mL/min/1.73m2 63  70  87 84  51 (L)   68  82     Calcium 8.8 - 10.2 mg/dL 9.2  9.1  9.4 9.1  8.7 (L)   8.5 (L)  8.8     Anion Gap 7 - 15 mmol/L 10  8  7 8  6 (L)   5 (L)  7     Magnesium 1.7 - 2.3 mg/dL 1.9  2.2  2.1 2.2  1.9   2.0   2.1    Phosphorus 2.5 - 4.5 mg/dL 3.6  3.6  3.6 3.3        2.8    Albumin 3.5 - 5.2 g/dL 3.4 (L)  3.2 (L)  3.2 (L) 3.1 (L)  3.1 (L)   2.8 (L)       Protein Total 6.4 - 8.3 g/dL 5.6 (L)  5.4 (L)  5.2 (L) 5.3 (L)  5.2 (L)   4.8 (L)       Alkaline Phosphatase 35 - 104 U/L 46  43  41 43  39   35       ALT 0 - 50 U/L 11  9  8 9  8   7        AST 0 - 45 U/L 13  12  10 13  11   11       Bilirubin Total <=1.2 mg/dL 0.3  0.2  0.7 0.4  0.3   0.3       Glucose 70 - 99 mg/dL 168 (H)  98  87 86  123 (H)   85  101 (H)     Lactic Acid 0.7 - 2.0 mmol/L        1.0          Lipase 13 - 60 U/L        23   20       N-Terminal Pro BNP Inpatient 0 - 1,800 pg/mL        1,636          Procalcitonin <0.05 ng/mL        0.19 (H)          Troponin T, High Sensitivity <=14 ng/L        19 (H)   18 (H)       TSH 0.30 - 4.20 uIU/mL        3.04          WBC 4.0 - 11.0 10e3/uL 7.4  7.3  9.6 7.1  16.9 (H)   14.3 (H)  7.3     Hemoglobin 11.7 - 15.7 g/dL 12.0  11.8  11.5 (L) 11.6 (L)  10.6 (L)   9.8 (L)  11.7     Hematocrit 35.0 - 47.0 % 36.9  35.8  35.4 35.7  32.2 (L)   30.5 (L)  36.8     Platelet Count 150 - 450 10e3/uL 215  192  195 206  220   195  200     RBC Count 3.80 - 5.20 10e6/uL 3.85  3.79 (L)  3.70 (L) 3.69 (L)  3.36 (L)   3.14 (L)  3.79 (L)     MCV 78 - 100 fL 96  95  96 97  96   97  97     MCH 26.5 - 33.0 pg 31.2  31.1  31.1 31.4  31.5   31.2  30.9     MCHC 31.5 - 36.5 g/dL 32.5  33.0  32.5 32.5  32.9   32.1  31.8     RDW 10.0 - 15.0 % 13.8  13.7  14.0 14.1  14.1   14.3  14.2     % Neutrophils % 77  69  73 64  86          % Lymphocytes % 17  24  19 27  7          % Monocytes % 5  6  6 7  7          % Eosinophils % 1  1  2 2  0          % Basophils % 0  0  0 0  0          Absolute Basophils 0.0 - 0.2 10e3/uL 0.0  0.0  0.0 0.0  0.0          Absolute Eosinophils 0.0 - 0.7 10e3/uL 0.0  0.1  0.1 0.1  0.0          Absolute Immature Granulocytes <=0.4 10e3/uL 0.0  0.0  0.0 0.0  0.1          Absolute Lymphocytes 0.8 - 5.3 10e3/uL 1.2  1.7  1.8 2.0  1.2          Absolute Monocytes 0.0 - 1.3 10e3/uL 0.4  0.4  0.6 0.5  1.1          % Immature Granulocytes % 0  0  0 0  0          Absolute Neutrophils 1.6 - 8.3 10e3/uL 5.7  5.0  7.1 4.6  14.6 (H)          Absolute NRBCs 10e3/uL 0.0  0.0  0.0 0.0  0.0          NRBCs per 100 WBC <1 /100 0  0  0 0  0          Color Urine Colorless,  Straw, Light Yellow, Yellow             Light Yellow      Appearance Urine Clear             Clear      Glucose Urine Negative mg/dL            Negative      Bilirubin Urine Negative             Negative      Ketones Urine Negative mg/dL            Negative      Specific Gravity Urine 1.003 - 1.035             1.009      pH Urine 5.0 - 7.0             5.0      Protein Albumin Urine Negative mg/dL            Negative      Urobilinogen mg/dL Normal, 2.0 mg/dL            Normal      Nitrite Urine Negative             Negative      Blood Urine Negative             Negative      Leukocyte Esterase Urine Negative             Trace !      WBC Urine <=5 /HPF            1      RBC Urine <=2 /HPF            1      Bacteria Urine None Seen /HPF            Few !      Squamous Epithelial /HPF Urine <=1 /HPF            <1      MRSA MSSA PCR, NASAL SWAB           Rpt        Coronavirus Not Detected           Not Detected        SARS CoV2 PCR Negative           Negative        Influenza A Not Detected           Not Detected        Influenza B Not Detected           Not Detected        RESPIRATORY PANEL PCR           Rpt        XR ABDOMEN 1 VIEW   Rpt                XR CHEST PORT 1 VIEW          Rpt         EKG 12-LEAD, TRACING ONLY     Rpt   Rpt        Rpt   Diastolic Blood Pressure mmHg               See Comment   Systolic Blood Pressure mmHg               See Comment   Adenovirus Not Detected           Not Detected        Chlamydia pneumoniae Not Detected           Not Detected        Human Metapneumovirus Not Detected           Not Detected        Human Rhin/Enterovirus Not Detected           Not Detected        Influenza A 2009 H1N1 Not Detected           Not Detected        Influenza A, H1 Not Detected           Not Detected        Influenza A, H3 Not Detected           Not Detected        MRSA Target DNA Negative           Negative        Mycoplasma pneumoniae Not Detected           Not Detected        Parainfluenza Virus 1  Not Detected           Not Detected        Parainfluenza Virus 2 Not Detected           Not Detected        Parainfluenza Virus 3 Not Detected           Not Detected        Parainfluenza Virus 4 Not Detected           Not Detected        Respiratory Syncytial Virus A Not Detected           Not Detected        Respiratory Syncytial Virus B Not Detected           Not Detected        SA Target DNA           Positive        (L): Data is abnormally low  (H): Data is abnormally high  !: Data is abnormal  Rpt: View report in Results Review for more information         DIAGNOSIS   Principal Problem:    Aspiration pneumonia (H)   Major Depressive Disorder, Recurrent, Severe with psychotic features.   Borderline Personality Disorder  Bipolar Disorder Type 1      Active Problem List:  Patient Active Problem List   Diagnosis     Arthritis     Depressive disorder     Borderline personality disorder (H)     GERD (gastroesophageal reflux disease)     Holosystolic murmur     Asthma     History of gastric bypass     Hyperlipidemia LDL goal <130     Other insomnia     Mild mitral regurgitation     Mild aortic stenosis     Weight loss     Bilateral low back pain without sciatica     Adhesive capsulitis of shoulder, right     Mild intermittent asthma, unspecified whether complicated     Bipolar affective disorder, remission status unspecified (H)     Constipation, unspecified constipation type     Age-related osteoporosis without current pathological fracture     Plantar warts     Hypoglycemia     Failure to thrive in adult     Hypotension, unspecified hypotension type     Suicidal ideation     Aspiration pneumonia (H)          PLAN   1. Education given regarding diagnostic and treatment options with risks, benefits and alternatives and adequate verbalization of understanding.  2.  Medications:  Hospital  -Restart Prozac 60 mg PO liquid daily to target depressed mood.  -Decrease dose of Lamotrigine to 25 mg PO daily for 2 weeks with  plan to titrate up slowly to target mood disorder symptoms  -Continue Gabapentin 200 mg PO liquid TID to target agitation/anxiety.  -Continue Namenda 10 mg tablet PO BID to treat dementia symptoms observed.  -Continue Melatonin 6 mg scheduled every evening to promote sleep.  -Decrease Olanzapine ODT to 2.5 mg tablet every morning to target symptoms of agitation/psychosis   -Continue Olanzapine 10 mg ODT tablet at bedtime to target symptoms of agitation/psychosis  -Continue Mirtazapine 15 mg PO disintegrating tablet at HS to target depression symptoms.  -Continue Olanzapine ODT 5 mg PO tablet 3 times daily as needed for severe agitation.  -Continue Unisom 12.5 mg 3 times daily as needed for insomnia or nausea.  -Hold Prazosin 1 mg PO due to hypotension    3. Medical Team Plan:  -Continue patient on IV antibiotic Unasyn 3g IV every 6 hours.   4. Consults    Nutrition consulted and following pt during admission  -Weights will be monitored every Tuesday, Thursday, and Saturday  -Intake and output monitoring.  -Plan is for pt to eat small meals/snack throughout the day d/t history of gastric bypass.   -Supplements ordered for between meal  - Zofran 4mg PO PRN scheduled 30 minutes before meals TID to improve PO tolerance.   4. Labs/Tests     Labs: magnesium, and phosphorus levels every 3 days    EKG ordered weekly    Olanzapine drug level collected 6/27/2023 and results: Within therapeutic range: 35 ng/mL (reference range 20-80 ng/mL).  5. Structure and Supervision    Unit 5 MedSurg.    Precautions in place.    Fall precautions.    Continue on 1:1 monitoring due to reporting SI and confused/ impulsive behavior.  6.   is following in regards to collecting and reviewing collateral information, referrals and disposition planning.    Legal: civil commitment.     Referrals:  Per [facility]    Care Coordination:  [facility]    Placement: [facility] (phone number: [phone])     Anticipated  Discharge:  TBD     Further treatment programming to be determined throughout the hospital course.      Risk Assessment: [facility] RISK ASSESSMENT: Patient on precautions    Coordination of Care:   Treatment Plan reviewed and physician signed, Care discussed with Care/Treatment Team Members, Chart reviewed and Patient seen      Re-Certification I certify that the inpatient psychiatric facility services furnished since the previous certification were, and continue to be, medically necessary for, either, treatment which could reasonably be expected to improve the patient s condition or diagnostic study and that the hospital records indicate that the services furnished were, either, intensive treatment services, admission and related services necessary for diagnostic study, or equivalent services.     I certify that the patient continues to need, on a daily basis, active treatment furnished directly by or requiring the supervision of inpatient psychiatric facility personnel.   I estimate 5-7 days of hospitalization is necessary for proper treatment of the patient. My plans for post-hospital care for this patient are   TBD       DANIEL Bennett CNP    -     7/18/2023     -     10:15 AM  Total time  35 minutes with > 50%spent on coordination of cares and psycho-education.    This note was created with help of Dragon dictation system. Grammatical / typing errors are not intentional.    DANIEL Bennett CNP

## 2023-07-18 NOTE — PLAN OF CARE
"          Pt is a 82 yr old female, here for SI and aspiration pneumonia. Pt is alert and oriented x4 but can be confused to time. VSS are stable. Able to make needs known. Denies  pain, SOB, chest pain, headache, dizziness, N/T. A1 with walker and gb. Sitter at bedside. Pt stated \"she wanted to die\" was asked why she felt that way but she didn't respond back. Pt was reassured and redirected by RN. No other SI noted. Visitor came to visit to patient and patient same calm and engaging. No skin issue. Tolerated meds well and ate breakfast without any issue. Pt vomited 200ml after lunch zofran given to help with nausea. Pt reported feeling better after. Psych came to visit patient this shift.    Plan: IV abx until discharge. Will continue with plan of care.                "

## 2023-07-18 NOTE — PROGRESS NOTES
[facility]    Medicine Progress Note - Hospitalist Service, GOLD TEAM 16    Date of Admission:  7/16/2023    Assessment & Plan   82 year old female with history of HLD, asthma, depression, anxiety, borderline personality, bipolar disorder, GERD, osteoporosis, and gastric bypass who was transferred from [facility] medicine to station 3B with worsening depression. She was initially admitted to [facility] 5/5/2023 with hypoglycemia, hypotension, and possible SI in the setting of failure to thrive, initially admitted on 6/7/23 to psychiatry/behavioral health unit. Patient developed hypotension and concern for sepsis 2/2 aspiration pneumonia on 7/15, transferred to medicine service at that time.   SLP was consulted, She also was started on antibiotics.      Hypotension, concern for septic shock (7/15)  Sepsis, suspect 2/2 aspiration pneumonia (7/15)  CXR 7/15 concerning for diffuse bilateral hazy opacities favoring infection/inflammation  - Continue on gentle hydration  - Respiratory status stable today on room air.   - Continue on Unasyn   - COVID/Viral panel negative, MRSA swab negative, sputum culture ordered  - Pulm Toilet: IS, encourage OOB, prn symbicort     Mild pharyngeal dysphagia & esophageal dysphagia  - SLP following the patient.   - Diet: Regular solids and thin liquids with small sips, no straws, and sitting upright at 90* for all PO. No further ST warrented at this time as patient at baseline diet.    Depression, anxiety, bipolar disorder, borderline personality, dementia w/ behavioral disturbance    - Psychiatry following the patient.   Recommendations:  I will discontinue Prozac as ineffective and start her on Lamictal 25 mg b.i.d. with a gradual dose increase to the goal dose of 100 mg b.i.d.  I will increase Zyprexa Zydis to 5 mg q.a.m. and 10 mg at bedtime and continue Remeron, gabapentin, Namenda, and melatonin in the same doses.    - Disposition TBD. Return to  inpatient psych?      Chronic hyponatremia:   BL Na around 130. Last Na 129 6/7 admission w/ Urine osm 506, urine Na 53. 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 Na on 7/15 132  - Na 140   - 2L fluid restriction   - strict I/Os     Failure to thrive  severe protein calorie malnutrition  h/o gastric bypass (~27yr ago, unclear procedure)  Reflux/regurgitation vs N/V vs eating disorder  Acute on chronic constipation with hx of overflow diarrhea   GERD  Vit B12 def, Hx of Iron def Anemeia  GI consulted on 6/16-6/26 (signed off)  - Nutrition consult  - speech consulted for concern for aspiration  - continue supplements: Vit D3, MV, Oscal, B12  - continue PPI qday  - continue scheduled Zofran TID w/ meals  - continue GI recommended Bowel regimen     Asthma: Stable, no scheduled meds. Continue prn Symbicort.  HLD: Continue statin  Osteoporosis: Encourage good oral intake. Continue close OP follow up with PCP for chronic management   Hx of HSV: continue PTA Valtrex qday           Diet: Regular Diet Adult Thin Liquids (level 0)  Snacks/Supplements Adult: Other; Food snacks TID - 10AM: cottage cheese + fruit cup, 2PM: pudding + fruit cup, 8 PM: Magic Cup (mitchell or van); Between Meals    DVT Prophylaxis: Pneumatic Compression Devices and Anti-embolisim stockings (TEDs)  Cortez Catheter: Not present  Lines: None     Cardiac Monitoring: None  Code Status: No CPR- Do NOT Intubate      Clinically Significant Risk Factors Present on Admission              # Hypoalbuminemia: Lowest albumin = 2.8 g/dL at 7/16/2023  4:41 AM, will monitor as appropriate                   Disposition Plan     Expected Discharge Date: 07/19/2023                  Errol Chavez MD  Hospitalist Service, GOLD TEAM [address] [place] [address]  Securely message with [employer] (more info)  Text page via [employer] Paging/Directory   See signed in provider for up  to date coverage information  ______________________________________________________________________    Interval History     No acute events overnight.   Mood was better today.   No fever, chills, diaphoresis, abdominal pain, chest pain or palpitations.   States that breathing is stable. No productive cough.     Physical Exam   Vital Signs: Temp: 98.1  F (36.7  C) Temp src: Oral BP: 107/71 Pulse: 64   Resp: 16 SpO2: 96 % O2 Device: Nasal cannula Oxygen Delivery: 1 LPM  Weight: 0 lbs 0 oz    General: Alert, elderly, no acute distress, on O 2 NC.   Resp: Normal respiratory effort, decreased breath sounds at bases, no crackles or wheezing.   Cards: RRR w/o murmurs/rubs/gallops,no LE edema  GI: soft, nontender, nondistended, +BS  Skin: warm and well perfused  Neuro/MSK: moving all 4 extremities equally w/o apparent focal neuro defects.     Medical Decision Making       45 MINUTES SPENT BY ME on the date of service doing chart review, history, exam, documentation & further activities per the note.      Data         Imaging results reviewed over the past 24 hrs:   No results found for this or any previous visit (from the past 24 hour(s)).

## 2023-07-18 NOTE — CONSULTS
PSYCHIATRIC CONSULTATION    Requesting Physician: Errol Chavez MD    Admission  Date: 07/16/2023  Date of Service: 07/17/2023    The patient was seen, her chart reviewed, her case discussed with staff, report to follow.    DX: Bipolar Disorder, depressed         Borderline Personality Disorder    Plan: I will discontinue Prozac as ineffective and start Lamictal 25 mg BID, increase Zyprexa Zydis to 5 mg QAM and 10 mg at bedtime, continue Remeron, Gabapentin, Namenda and Melatonin in the same doses.    Thanks,    Negrito Wang MD  [phone] pager

## 2023-07-18 NOTE — PLAN OF CARE
"Goal Outcome Evaluation:       Overall Patient Progress: no changeOverall Patient Progress: no change     Pt  A & O X 4. Up with assist of 1 with a gait-belt and a walker. Denies pain/discomfort. Pt anxious and makes statements such as \"I'm afraid:, \"I don't have a home\", I don;t have a bed\", \"don't leave me\". Pt is pleasant and easily directable. 1:1 sitter at bedside. No N/V at this time. No SI at this time. Taking medications without any issues. Nursing monitoring and following POC    Tricia Mauricio RN        "

## 2023-07-18 NOTE — PROGRESS NOTES
"CLINICAL NUTRITION SERVICES - ASSESSMENT NOTE     Nutrition Prescription    RECOMMENDATIONS FOR MDs/PROVIDERS TO ORDER:  None today     Malnutrition Status:    Patient does not meet two of the established criteria necessary for diagnosing malnutrition but is at risk for malnutrition    Recommendations already ordered by Registered Dietitian (RD):  Food snacks TID - 10 AM: cottage cheese + fruit cup, 2 PM: pudding + fruit cup, 8 PM: Magic Cup (mitchell or van)    RD updated weight order requesting nursing obtain height and weight for chart     Future/Additional Recommendations:  Monitor meal/snack intakes, wt/lab trends      REASON FOR ASSESSMENT  Bhavana Marr is a/an 82 year old female assessed by the dietitian for Provider Order - failure to thrive    NUTRITION/MEDICAL HISTORY  Per chart review: Pt admits to the hospital in the setting of hypoglycemia, hypotension, and possible SI, failure to thrive, initially admitted on 6/7/23 to psychiatry/behavioral health unit, pt developed hypotension and concern for sepsis 2/2 aspiration pneumonia on 7/15 and was transferred to the hospital. Other past medical history noted for HLD, asthma, depression, anxiety, borderline personality, bipolar disorder, GERD, osteoporosis, and gastric bypass (2000).    Per pt visit: RD visited pt at bedside, pt notes taking meals fairly well, noted prior nutrition interventions due to past issues with nausea/vomiting when pt was on [facility], pt agreeing to continue as above. RD encouraged po intakes for improved health status, pt verbalizes understanding.      CURRENT NUTRITION ORDERS  Diet: Regular per SLP   Intake/Tolerance: % per flow sheets     LABS  Labs reviewed    MEDICATIONS  Remeron, Zofran, Zocor, Zyprexa, Valtrex, Vit B12, Miralax, Senna-docusate, Oscal, IVF, Namenda, children's MVI, IV Unasyn, Protonix, Vit D    ANTHROPOMETRICS  Ht Readings from Last 1 Encounters:   06/07/23 1.626 m (5' 4\")     Wt Readings from Last 1 Encounters: "   07/13/23 51.1 kg (112 lb 10.5 oz)     BMI Readings from Last 1 Encounters:   07/13/23 19.34 kg/m      IBW: 54.5 kg  BMI: Normal BMI  Weight History:   07/11/23 0739 50 kg (110 lb 3.7 oz) --   07/09/23 0835 51.4 kg (113 lb 5.1 oz) Standing scale   07/06/23 0810 50.3 kg (110 lb 14.3 oz) --   07/01/23 0832 50.6 kg (111 lb 8.8 oz) Standing scale   06/25/23 0820 48.3 kg (106 lb 7.7 oz) --   06/22/23 0822 47.1 kg (103 lb 13.4 oz) Standing scale   06/17/23 1646 48.7 kg (107 lb 5.8 oz) Standing scale   06/13/23 0811 48.9 kg (107 lb 12.9 oz) --   06/11/23 0817 48.9 kg (107 lb 12.9 oz) --   06/08/23 0827 50.7 kg (111 lb 12.4 oz) --   06/07/23 2048 51.1 kg (112 lb 10.5 oz) --     05/30/23 50.4 kg (111 lb 3.2 oz)     54.2 kg (119 lb 6.4 oz) 04/24/2023 - per CE     Weight assessment: No current weight to assess, prior weight from [facility] indicates pt's weight was improved/stable for 1 week, stable x1 month/since prior admission, non-significant 5.8% weight loss in not quite 3 months.     Dosing Weight: 51.1 kg    ASSESSED NUTRITION NEEDS  Estimated Energy Needs: 1535 kcals/day (30 kcals/kg)  Justification: Maintenance  Estimated Protein Needs: 50 grams protein/day (1 grams of pro/kg)  Justification: Maintenance  Estimated Fluid Needs: 1 mL/kcal  Justification: Maintenance    MALNUTRITION  % Intake: Decreased intake does not meet criteria  % Weight Loss: Weight loss does not meet criteria  Subcutaneous Fat Loss: Globally mild vs age related   Muscle Loss: Globally mild vs age related   Fluid Accumulation/Edema: None noted  Malnutrition Diagnosis: Patient does not meet two of the established criteria necessary for diagnosing malnutrition but is at risk for malnutrition    NUTRITION DIAGNOSIS  Predicted inadequate nutrient intake related to appetite vs altered mental status    INTERVENTIONS  Implementation  Nutrition Education: RD encouraged adequate meal/snack intakes at bedside    Medical food supplement therapy/snacks - ordered  as above     Goals  Patient to consume % of nutritionally adequate meal trays TID, or the equivalent with supplements/snacks.     Monitoring/Evaluation  Progress toward goals will be monitored and evaluated per protocol.    Kari Schwartz RD, CNSC, LD  [place] [id number] Med/Surg RD pager: [phone]

## 2023-07-18 NOTE — DISCHARGE SUMMARY
PSYCHIATRY  DISCHARGE SUMMARY     DATE OF DISCHARGE   7/16/2023       DISCHARGE DIAGNOSIS   Suicidal ideation    Patient Active Problem List   Diagnosis     Arthritis     Depressive disorder     Borderline personality disorder (H)     GERD (gastroesophageal reflux disease)     Holosystolic murmur     Asthma     History of gastric bypass     Hyperlipidemia LDL goal <130     Other insomnia     Mild mitral regurgitation     Mild aortic stenosis     Weight loss     Bilateral low back pain without sciatica     Adhesive capsulitis of shoulder, right     Mild intermittent asthma, unspecified whether complicated     Bipolar affective disorder, remission status unspecified (H)     Constipation, unspecified constipation type     Age-related osteoporosis without current pathological fracture     Plantar warts     Hypoglycemia     Failure to thrive in adult     Hypotension, unspecified hypotension type     Suicidal ideation     Aspiration pneumonia (H)          REASON FOR ADMISSION   This is a 82 year old female with history of Suicidal ideation,borderline personality disorder, major depressive disorder. Patient medical history includes history of gastric bypass in 2000, arthritis, GERD, hyponatremia, hyperlipidemia, osteoporosis, asthma, hx of bilateral hip replacements, and mitral valve regurgitation. Pt current legal status is committed with [facility]. Pt was admitted to [facility] from 5/5/2023 - 6/7/2023. She presented from her care facility with decreased oral intake, low glucose level, low blood pressure, depression, and suicidal ideations. Pt was living at [facility] for the past 5 years and her mental health had been stable during this time period. Pt was psychiatrically stable on medications: sertraline, hydroxyzine, olanzapine, and mirtazapine. Patient was moved to a different building in her nursing home on March 9, 2023; subsequently patient's mental health decompensated and  she developed suicidal ideation with behavioral disturbance.        HOSPITAL COURSE   Admitted due to aforementioned presentation.  Education regarding diagnostic and treatment options with risks, benefits and alternatives and adequate verbalization of understanding.  Discussed reviewed in further detail, stressors and events leading to presentation.    Admitted on a court hold. Pt is under civil commitment.    During patient's hospitalization severe depression symptoms with SI were initially treated with Sertraline, however patient developed hyponatremia. Subsequently, Prozac was initiated and titrated to therapeutic dose.  Patient continues to report thoughts of passive SI intermittently however overall patient's mood has improved and patient affect is brighter.  Patient also was more engaged with peers and attending groups. Hyponatremia resolved and sodium level last checked on 7/12/2023 is WNL: 140 mml/L.  IM has been following throughout hospitalization to evaluate hyponatremia and IM was notified on 6/15/23 of altered level of consciousness which presented as increased confusion compared to baseline since admission and increased agitation. Per IM provider- pt's waxing and waning symptoms most likely secondary to a primary psychiatric disorder with high likelihood for underlying dementia; there is no clear reversible organic cause of her symptoms.  Patient was started on Namenda to treat dementia symptoms.  Patient was initially requiring one-to-one supervision due to SI, confusion, and impulsive behaviors however SIO monitoring was discontinued as patient impulsive behavior improved she was able to contract for safety.  Patient continues to exhibit baseline confusion as well as the need for assistance with ADLs.    Since admission, patient also has been experiencing spontaneous regurgitation as well as observed induced vomiting. Speech therapy was consulted 6/15/23 and Chest Xray was also ordered to evaluate  due to concern for aspiration with frequent regurgitation /emesis; chest xray did not show acute concern for aspiration. Speech therapy was reconsulted to address ongoing spontaneous regurgitation of small amounts after eating. The XR video swallow study was completed on 6/30/2023: results indicated that patient's esophagus is mildly dilated without any other abnormality; no aspiration. Per documentation speech therapist is recommending regular diet with thin liquids as well as for nursing to administer pills in purée such as applesauce. GI followed pt to address frequent regurgitation /emesis, poor P.O intake, and severe constipation. GI started pt on bowel regimen and esophagram and upper GI studies were completed 6/19/23. Per IM provider documentation, GI provider reports that results indicate delayed emptying, but no concern for strictures; if unable to tolerate any oral intake a CT abdomen with contrast should be performed to rule out intra-abdominal pathology. Pt was not tolerating p.o. intake at that time and a CT abdomen/pelvis with contrast was completed 6/20/2023: report of results indicated a large volume predominantly low density colonic stool intermixed with hyperattenuating contrast (from 6/19/2023 esophagram) extending from the cecum through the entire length of the colon to the rectum.  Subsequently a therapeutic gastrograffin enema with IR was completed 6/21/23 which was effective for removing a large volume of stool. GI luminal team signed off on 6/26/2023 with recommendations for ongoing bowel regimen as well as recommendations for discharge. Plan is to continue MiraLAX and stool softeners with goal of patient achieving 1-2 soft bowel movements daily per GI recommendation. Patient had an MRI of abdomen completed per radiology recommendation on 7/5/23; results indicate: hemangiomas (follow-up MRI may be obtained in 6-12 months to assess for change) and persistent large volume stool throughout the  "colon indicating constipation. Patient has been receiving bowel regimen interventions to treat ongoing constipation.      Dietitian was following pt during hospitalization due to inadequate oral intake related to altered mentation, decreased appetite, and early satiety. Patient has been meeting her nutrition goals which include: patient to consume at least 25-50% meals, 100% nutrition supplements, and for patient to maintain/gain weight. To support pt's dietary goal of maintaining/gaining weight patient care order in place for staff to promote patient eating small meals and snacks throughout the day.     On 7/14/23 Patient had episodes of hypotension; nursing pushing oral fluids and IM notified. On 7/15/23 concern for sepsis 2/2 aspiration pneumonia on 7/15, pt transferred to medicine service.     Per Josefina, Director of Nursing at [facility] phone number: [phone] when pt is ready for discharge a time will need to be set for the  of [facility] Larry Overton to come visit patient as a requirement prior to her being approved for transfer back to their nursing home facility. Currently this facility still has patient's bed available.       MENTAL STATUS EXAM   Vitals: BP 95/55 (BP Location: Right arm, Patient Position: Supine, Cuff Size: Adult Small)   Pulse 66   Temp 99.1  F (37.3  C) (Oral)   Resp 16   Ht 1.626 m (5' 4\")   Wt 51.1 kg (112 lb 10.5 oz)   LMP  (LMP Unknown)   SpO2 96%   BMI 19.34 kg/m      Appearance:  In hospital scrubs. Casually groomed.  Mood: Depressed  Affect: flat however brighter and improving was congruent to speech.  Suicidal Ideation: absent  Homicidal Ideation: PRESENT / ABSENT: absent   Thought process: difficult to follow and disorganized however improving   Thought content: endorses preoccupations  Fund of Knowledge: average  Attention/Concentration: Poor, improving  Language ability:  Intact  Memory:  Immediate recall impaired " however improving, Short-term memory impaired and Long-term memory impaired however improving  Insight:  limited.  Judgement: limited  Orientation: Oriented to self   Psychomotor Behavior: normal or unremarkable    Muscle Strength and Tone: MuscleStrength: Normal  Gait and Station:  slightly stiff however steady with walker       DISCHARGE MEDICATIONS   Discharge Medication Options: No medications were prescribed on discharge.  Patient transferred to medical unit.    Medication adherence issues: MS Med Adherence Y/N: No  Medication side effects: MEDICATION SIDE EFFECTS: no side effects reported         DISCHARGE PLAN   1.  Education given regarding diagnostic and treatment options with risks, benefits and alternatives with adequate verbalization of understanding.  2.  Discharge to: Medical unit.  Upon detailed review of risk factors, patient amenable for release.   3.  Continue aforementioned medications and associated medication changes with follow-up by outpatient mental health provider.  4.  Plan for patient will be to discharge directly to [facility] when medically stable. Contact Josefina director of nursing to facilitate this: [phone].     Nursing and  to review further discharge recommendations.     TOTAL TIME:  Greater than 30 minutes for discharge planning.    This note was created with help of Dragon dictation system. Grammatical / typing errors are not intentional.    Galilea Olguin, APRN CNP

## 2023-07-19 LAB
ATRIAL RATE - MUSE: 56 BPM
DIASTOLIC BLOOD PRESSURE - MUSE: NORMAL MMHG
INTERPRETATION ECG - MUSE: NORMAL
P AXIS - MUSE: 42 DEGREES
PR INTERVAL - MUSE: 164 MS
QRS DURATION - MUSE: 118 MS
QT - MUSE: 478 MS
QTC - MUSE: 461 MS
R AXIS - MUSE: -43 DEGREES
SYSTOLIC BLOOD PRESSURE - MUSE: NORMAL MMHG
T AXIS - MUSE: 46 DEGREES
VENTRICULAR RATE- MUSE: 56 BPM

## 2023-07-19 PROCEDURE — 120N000002 HC R&B MED SURG/OB UMMC

## 2023-07-19 PROCEDURE — 250N000013 HC RX MED GY IP 250 OP 250 PS 637

## 2023-07-19 PROCEDURE — 99232 SBSQ HOSP IP/OBS MODERATE 35: CPT | Performed by: INTERNAL MEDICINE

## 2023-07-19 PROCEDURE — 258N000003 HC RX IP 258 OP 636: Performed by: INTERNAL MEDICINE

## 2023-07-19 PROCEDURE — 250N000011 HC RX IP 250 OP 636: Performed by: INTERNAL MEDICINE

## 2023-07-19 PROCEDURE — 250N000013 HC RX MED GY IP 250 OP 250 PS 637: Performed by: INTERNAL MEDICINE

## 2023-07-19 RX ADMIN — SENNOSIDES AND DOCUSATE SODIUM 1 TABLET: 50; 8.6 TABLET ORAL at 20:12

## 2023-07-19 RX ADMIN — MIRTAZAPINE 15 MG: 15 TABLET, ORALLY DISINTEGRATING ORAL at 21:44

## 2023-07-19 RX ADMIN — SODIUM CHLORIDE: 9 INJECTION, SOLUTION INTRAVENOUS at 16:19

## 2023-07-19 RX ADMIN — VALACYCLOVIR 500 MG: 500 TABLET, FILM COATED ORAL at 08:42

## 2023-07-19 RX ADMIN — Medication 2 TABLET: at 08:42

## 2023-07-19 RX ADMIN — ONDANSETRON 4 MG: 4 TABLET ORAL at 06:44

## 2023-07-19 RX ADMIN — OLANZAPINE 2.5 MG: 5 TABLET, ORALLY DISINTEGRATING ORAL at 08:43

## 2023-07-19 RX ADMIN — Medication 100 MCG: at 21:44

## 2023-07-19 RX ADMIN — LAMOTRIGINE 25 MG: 25 TABLET ORAL at 08:42

## 2023-07-19 RX ADMIN — ONDANSETRON 4 MG: 4 TABLET ORAL at 16:20

## 2023-07-19 RX ADMIN — OLANZAPINE 10 MG: 5 TABLET, ORALLY DISINTEGRATING ORAL at 21:44

## 2023-07-19 RX ADMIN — OLANZAPINE 5 MG: 5 TABLET, ORALLY DISINTEGRATING ORAL at 01:05

## 2023-07-19 RX ADMIN — PANTOPRAZOLE SODIUM 40 MG: 40 TABLET, DELAYED RELEASE ORAL at 06:44

## 2023-07-19 RX ADMIN — AMPICILLIN SODIUM AND SULBACTAM SODIUM 3 G: 2; 1 INJECTION, POWDER, FOR SOLUTION INTRAMUSCULAR; INTRAVENOUS at 16:19

## 2023-07-19 RX ADMIN — AMPICILLIN SODIUM AND SULBACTAM SODIUM 3 G: 2; 1 INJECTION, POWDER, FOR SOLUTION INTRAMUSCULAR; INTRAVENOUS at 06:44

## 2023-07-19 RX ADMIN — ONDANSETRON 4 MG: 4 TABLET ORAL at 11:39

## 2023-07-19 RX ADMIN — POLYETHYLENE GLYCOL 3350 17 G: 17 POWDER, FOR SOLUTION ORAL at 08:54

## 2023-07-19 RX ADMIN — FLUOXETINE 60 MG: 20 SOLUTION ORAL at 08:50

## 2023-07-19 RX ADMIN — SENNOSIDES AND DOCUSATE SODIUM 1 TABLET: 50; 8.6 TABLET ORAL at 08:43

## 2023-07-19 RX ADMIN — GABAPENTIN 200 MG: 250 SOLUTION ORAL at 08:50

## 2023-07-19 RX ADMIN — MEMANTINE 10 MG: 10 TABLET ORAL at 08:42

## 2023-07-19 RX ADMIN — Medication 6 MG: at 20:12

## 2023-07-19 RX ADMIN — Medication 25 MCG: at 16:20

## 2023-07-19 RX ADMIN — SIMVASTATIN 40 MG: 40 TABLET, FILM COATED ORAL at 21:44

## 2023-07-19 RX ADMIN — CALCIUM 500 MG: 500 TABLET ORAL at 21:44

## 2023-07-19 RX ADMIN — AMPICILLIN SODIUM AND SULBACTAM SODIUM 3 G: 2; 1 INJECTION, POWDER, FOR SOLUTION INTRAMUSCULAR; INTRAVENOUS at 11:38

## 2023-07-19 RX ADMIN — GABAPENTIN 200 MG: 250 SOLUTION ORAL at 16:24

## 2023-07-19 RX ADMIN — MEMANTINE 10 MG: 10 TABLET ORAL at 20:12

## 2023-07-19 ASSESSMENT — ACTIVITIES OF DAILY LIVING (ADL)
ADLS_ACUITY_SCORE: 40
ADLS_ACUITY_SCORE: 36
ADLS_ACUITY_SCORE: 36
ADLS_ACUITY_SCORE: 40
ADLS_ACUITY_SCORE: 36
ADLS_ACUITY_SCORE: 40
ADLS_ACUITY_SCORE: 36
ADLS_ACUITY_SCORE: 42
ADLS_ACUITY_SCORE: 36
ADLS_ACUITY_SCORE: 36
DEPENDENT_IADLS:: CLEANING;COOKING;LAUNDRY;SHOPPING;MEAL PREPARATION;MEDICATION MANAGEMENT;TRANSPORTATION
ADLS_ACUITY_SCORE: 36
ADLS_ACUITY_SCORE: 40

## 2023-07-19 NOTE — PROGRESS NOTES
[facility]    Medicine Progress Note - Hospitalist Service, GOLD TEAM 16    Date of Admission:  7/16/2023    Assessment & Plan   82 year old female with history of HLD, asthma, depression, anxiety, borderline personality, bipolar disorder, GERD, osteoporosis, and gastric bypass who was transferred from [facility] medicine to station 3B with worsening depression. She was initially admitted to [facility] 5/5/2023 with hypoglycemia, hypotension, and possible SI in the setting of failure to thrive, initially admitted on 6/7/23 to psychiatry/behavioral health unit. Patient developed hypotension and concern for sepsis 2/2 aspiration pneumonia on 7/15, transferred to medicine service at that time.   SLP was consulted. She also was started on antibiotics.      Hypotension, concern for septic shock (7/15)  Sepsis, suspect 2/2 aspiration pneumonia (7/15)  CXR 7/15 concerning for diffuse bilateral hazy opacities favoring infection/inflammation  - Stop IVF's.   - Respiratory status stable today on room air.   - Continue on Unasyn day 4/7.  - COVID/Viral panel negative, MRSA swab negative, sputum culture ordered  - Pulm Toilet: IS, encourage OOB, prn symbicort     Mild pharyngeal dysphagia & esophageal dysphagia  - I'll ask SLP to evaluate the patient again.      - Diet: Regular solids and thin liquids with small sips, no straws, and sitting upright at 90* for all PO. No further ST warrented at this time as patient at baseline diet.    Depression, anxiety, bipolar disorder, borderline personality, dementia w/ behavioral disturbance    - Psychiatry following the patient.   Recommendations:  I will discontinue Prozac as ineffective and start her on Lamictal 25 mg b.i.d. with a gradual dose increase to the goal dose of 100 mg b.i.d.  I will increase Zyprexa Zydis to 5 mg q.a.m. and 10 mg at bedtime and continue Remeron, gabapentin, Namenda, and melatonin in the same doses.    - Disposition TBD.  Return to inpatient psych?      Chronic hyponatremia:   BL Na around 130. Last Na 129 6/7 admission w/ Urine osm 506, urine Na 53. 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 Na on 7/15 132  - Na 140   - 2L fluid restriction   - strict I/Os     Failure to thrive  severe protein calorie malnutrition  h/o gastric bypass (~27yr ago, unclear procedure)  Reflux/regurgitation vs N/V vs eating disorder  Acute on chronic constipation with hx of overflow diarrhea   GERD  Vit B12 def, Hx of Iron def Anemeia  GI consulted on 6/16-6/26 (signed off)  - Nutrition consult  - speech consulted for concern for aspiration  - continue supplements: Vit D3, MV, Oscal, B12  - continue PPI qday  - continue scheduled Zofran TID w/ meals  - continue GI recommended Bowel regimen     Asthma: Stable, no scheduled meds. Continue prn Symbicort.  HLD: Continue statin  Osteoporosis: Encourage good oral intake. Continue close OP follow up with PCP for chronic management   Hx of HSV: continue PTA Valtrex qday           Diet: Regular Diet Adult Thin Liquids (level 0)  Snacks/Supplements Adult: Other; Food snacks TID - 10AM: cottage cheese + fruit cup, 2PM: pudding + fruit cup, 8 PM: Magic Cup (mitchell or van); Between Meals    DVT Prophylaxis: Pneumatic Compression Devices and Anti-embolisim stockings (TEDs)  Cortez Catheter: Not present  Lines: None     Cardiac Monitoring: None  Code Status: No CPR- Do NOT Intubate      Clinically Significant Risk Factors              # Hypoalbuminemia: Lowest albumin = 2.8 g/dL at 7/16/2023  4:41 AM, will monitor as appropriate                     Disposition Plan           Errol Chavez MD  Hospitalist Service, GOLD TEAM [address] [place] [address]  Securely message with [employer] (more info)  Text page via [employer] Paging/Directory   See signed in provider for up to date coverage  information  ______________________________________________________________________    Interval History     No acute events overnight.   Mood stable.   No fever, chills, diaphoresis, abdominal pain, chest pain or palpitations.   States that breathing is stable. No productive cough.     Physical Exam   Vital Signs: Temp: 98.4  F (36.9  C) Temp src: Oral BP: (!) 156/96 Pulse: 70   Resp: 18 SpO2: 96 % O2 Device: None (Room air)    Weight: 0 lbs 0 oz    General: Alert, elderly, no acute distress, on room air.   Resp: Normal respiratory effort, decreased breath sounds at bases, no crackles or wheezing.   Cards: RRR w/o murmurs/rubs/gallops,no LE edema  GI: soft, nontender, nondistended, + BS  Skin: warm and well perfused  Neuro/MSK: moving all 4 extremities equally w/o apparent focal neuro defects.     Medical Decision Making       45 MINUTES SPENT BY ME on the date of service doing chart review, history, exam, documentation & further activities per the note.      Data         Imaging results reviewed over the past 24 hrs:   No results found for this or any previous visit (from the past 24 hour(s)).

## 2023-07-19 NOTE — PLAN OF CARE
Alert and oriented times four. [place] trough the shift she developed a cough. Provider notified. Their plan is to encourage cough because its good for the pneumonia. Denied pain. Stable VS. Sitter still in place for safety.

## 2023-07-19 NOTE — CONSULTS
"      Psychiatry Consultation; Follow up              Reason for Consult, requesting source:      Requesting source: Alyssa Elizondo MD    Labs and imaging reviewed.    Total time spent in chart review, patient interview and coordination of care; 35 minutes               Interim history:      Met with pt in hospital room on medical unit UR 5 Med Surg Patient recognizes writer. Pt affect appears slightly flat however brighter today. Pt observed to smile and laugh during interaction with writer.  Pt expresses feeling, \"Terrible,\" overall however, endorses her mood is, \"Better.\"  Pt's thoughts continue to be slightly disorganized however notably improved today. Pt experiences confusion intermittently which has been ongoing since admission to inpatient psychiatry unit 3B on 6/7/23. Patient is oriented to self and able to state today's date, location, and situation. Patient continues to report severe depression with passive SI. Pt able to contract for safety. Pt also reports that she plans to eat when she returns to her nursing home; pt also reports she will not attempt to harm herself after discharge. Patient does express concern that when she returns to the nursing home they will not be able to provide the care she needs; provider reassured patient that her nursing home director Josefina has been updated regarding patient's need for more assistance and that they have expressed they will be able to provide the cares patient requires including assistance with ADLs.  Patient verbalizes understanding.  Also discussed with patient if she is open to speaking with Josefina the director of nursing from her nursing home facility; patient is agreeable to speaking with her over the phone or in person.  Plan will be for this provider to facilitate this. Patient continues on 1:1 monitoring on medical unit today for safety. Plan today is to continue Prozac 60 mg PO liquid daily to target depressed mood as pt affect is brighter and improving " since initiating. Continue Lamotrigine 25 mg PO daily for two weeks with plan to titrate up slowly to target mood disorder symptoms, continue Gabapentin 200 mg 3 times daily to target anxiety symptoms, continue Namenda 10 mg PO BID to treat dementia symptoms observed, continue Olanzapine ODT 2.5 mg tablet every morning to target agitation/psychosis, continue Olanzapine 10 mg ODT tablet at bedtime to target agitation/psychosis, and continue Mirtazapine 15 mg PO disintegrating tablet at HS to target depression symptoms and promote sleep. Olanzapine drug level checked on 6/27/2023 and results within therapeutic range: 35 ng/mL (reference range 20-80 ng/mL).  Continue to hold prazosin 1 mg PO at bedtime due to hypotension.      Pt was admitted to [address] Surg for treatment of hypotension and concern for sepsis 2/2 aspiration pneumonia on 7/16/23.  Patient continues on IV antibiotic Unasyn 3 g every 6 hours. Reviewed patient vital signs and pt noted to have elevated blood pressure today of 156/96. Pt SpO2 level today is 96% on room air. Pt observed to have a mild nonproductive cough however no signs of shortness of breath observed. Provider reviewed EKG from 7/18/2023, results: Sinus bradycardia, Left axis deviation, Incomplete right bundle branch block. Pt has been experienced intermittent hyponatremia. Sodium level last checked on 7/17/2023 is WNL: 141 mml/L.  Per medical team documentation plan to address is: 2 L fluid restriction as well as strict I&O monitoring.  Patient has ongoing GI issues including: acute on chronic constipation with history of overflow diarrhea, severe protein calorie malnutrition, reflux/regurgitation, GERD, has been observed inducing vomiting, and has history of gastric bypass. Plan per internal medicine: nutrition consult ordered, speech reconsulted for concern of aspiration, continuing's vitamin supplements, continue PPI daily, continue scheduled Zofran 3 times daily with meals, and  continue GI recommended bowel regimen.  Patient continues on regular diet with thin liquids.  Due to history of gastric bypass patient requires small meals with snacks throughout the day which is being facilitated on the medical floor.     Provider called [facility] (phone number: [phone]) to provide an update to Josefina, Director of Nursing.  Plan will be for this provider to continue to update Josefina later in the week regarding patient's progress and as long as she continues to improve a time will be set for the  of [facility] Larry Padillajabari to come visit patient as this is a requirement prior to her being approved for transfer back to their nursing home facility.  Today, provided Josefina with phone number of inpatient medical unit 5 MedSurg #[phone] to contact patient for a phone call as well as to obtain updates directly from nursing team as needed.      Provider connected with 5 MedSurg unit CTC and provided the number to reach patient's nursing home and Josefina director of nursing in order to coordinate for discharge planning.  This provider also connected with medical team to coordinate discharge plan for patient directly back to her nursing home when medically cleared as she is psychiatrically stable.    When patient is ready for discharge her discharge pharmacy: [facility] in [place], [place]. Address: [address] [place] [address]  [address], [place] [address]. Phone: [phone]        Current Medications:       ampicillin-sulbactam  3 g Intravenous Q6H     calcium carbonate  500 mg Oral At Bedtime     childrens multivitamin with iron  2 tablet Oral Daily     cyanocobalamin  100 mcg Oral At Bedtime     FLUoxetine  60 mg Oral Daily     gabapentin  200 mg Oral TID     lamoTRIgine  25 mg Oral Daily     melatonin  6 mg Oral QPM     memantine  10 mg Oral BID     mirtazapine  15 mg Orally disintegrating tablet At Bedtime      "OLANZapine zydis  2.5 mg Oral QAM     OLANZapine zydis  10 mg Oral At Bedtime     ondansetron  4 mg Oral TID AC     pantoprazole  40 mg Oral QAM AC     polyethylene glycol  17 g Oral Daily     [Held by provider] prazosin  1 mg Oral Daily with supper     senna-docusate  1 tablet Oral BID     simvastatin  40 mg Oral At Bedtime     valACYclovir  500 mg Oral Daily     cholecalciferol  25 mcg Oral Daily with supper     PRN Meds:.acetaminophen, alum & mag hydroxide-simethicone, budesonide-formoterol, calcium carbonate, doxylamine, OLANZapine zydis, polyethylene glycol, prochlorperazine, sennosides  Medication adherence issues: MS Med Adherence Y/N: No  Medication side effects: MEDICATION SIDE EFFECTS: no side effects reported   Benefit: Yes / No: Yes         MSE:   Appearance: slightly unkempt  Attitude:  cooperative  Eye Contact:  good  Mood:   \"Better.\"  Affect:  : mood congruent and slightly restricted  Speech:  clear, coherent  Psychomotor Behavior:  no evidence of tardive dyskinesia, dystonia, or tics  Muscle strength and tone: normal, steady gait with walker  Thought Process:  linear and goal oriented  Associations:  no loose associations  Thought Content:  active suicidal ideation present, passive suicidal ideation present, no auditory hallucinations present and no visual hallucinations present  Insight:  fair  Judgement:  fair  Oriented to:  time, person, and place  Attention Span and Concentration:  intact  Recent and Remote Memory:  fair    Vital signs:  Temp: 98.4  F (36.9  C) Temp src: Oral BP: (!) 156/96 Pulse: 70   Resp: 18 SpO2: 96 % O2 Device: None (Room air) Oxygen Delivery: 1 LPM      Estimated body mass index is 19.34 kg/m  as calculated from the following:    Height as of 6/7/23: 1.626 m (5' 4\").    Weight as of 7/13/23: 51.1 kg (112 lb 10.5 oz).    Qtc: 452. EKG from 7/15/23 reviewed: results Sinus rhythm   Right bundle branch block   Left anterior fascicular block Bifascicular block   Septal infarct " (cited on or before 27-JUN-2023)     Labs were personally reviewed.     Latest Reference Range & Units 07/06/23 08:50 07/08/23 09:10 07/09/23 12:22 07/11/23 09:29 07/12/23 07:00 07/15/23 06:57 07/15/23 21:48 07/15/23 21:50 07/15/23 22:04   Sodium 136 - 145 mmol/L 138  138  140 138  132 (L)    Potassium 3.4 - 5.3 mmol/L 3.6  4.2  4.0 4.1  4.1    Chloride 98 - 107 mmol/L 106  107  107 104  101    Carbon Dioxide (CO2) 22 - 29 mmol/L 22  23  26 26  25    Urea Nitrogen 8.0 - 23.0 mg/dL 6.4 (L)  12.6  11.6 16.7  21.3    Creatinine 0.51 - 0.95 mg/dL 0.91  0.83  0.69 0.72  1.09 (H)    GFR Estimate >60 mL/min/1.73m2 63  70  87 84  51 (L)    Calcium 8.8 - 10.2 mg/dL 9.2  9.1  9.4 9.1  8.7 (L)    Anion Gap 7 - 15 mmol/L 10  8  7 8  6 (L)    Magnesium 1.7 - 2.3 mg/dL 1.9  2.2  2.1 2.2  1.9    Phosphorus 2.5 - 4.5 mg/dL 3.6  3.6  3.6 3.3      Albumin 3.5 - 5.2 g/dL 3.4 (L)  3.2 (L)  3.2 (L) 3.1 (L)  3.1 (L)    Protein Total 6.4 - 8.3 g/dL 5.6 (L)  5.4 (L)  5.2 (L) 5.3 (L)  5.2 (L)    Alkaline Phosphatase 35 - 104 U/L 46  43  41 43  39    ALT 0 - 50 U/L 11  9  8 9  8    AST 0 - 45 U/L 13  12  10 13  11    Bilirubin Total <=1.2 mg/dL 0.3  0.2  0.7 0.4  0.3    Glucose 70 - 99 mg/dL 168 (H)  98  87 86  123 (H)    Lactic Acid 0.7 - 2.0 mmol/L        1.0    Lipase 13 - 60 U/L        23    N-Terminal Pro BNP Inpatient 0 - 1,800 pg/mL        1,636    Procalcitonin <0.05 ng/mL        0.19 (H)    Troponin T, High Sensitivity <=14 ng/L        19 (H)    TSH 0.30 - 4.20 uIU/mL        3.04    WBC 4.0 - 11.0 10e3/uL 7.4  7.3  9.6 7.1  16.9 (H)    Hemoglobin 11.7 - 15.7 g/dL 12.0  11.8  11.5 (L) 11.6 (L)  10.6 (L)    Hematocrit 35.0 - 47.0 % 36.9  35.8  35.4 35.7  32.2 (L)    Platelet Count 150 - 450 10e3/uL 215  192  195 206  220    RBC Count 3.80 - 5.20 10e6/uL 3.85  3.79 (L)  3.70 (L) 3.69 (L)  3.36 (L)    MCV 78 - 100 fL 96  95  96 97  96    MCH 26.5 - 33.0 pg 31.2  31.1  31.1 31.4  31.5    MCHC 31.5 - 36.5 g/dL 32.5  33.0  32.5 32.5  32.9     RDW 10.0 - 15.0 % 13.8  13.7  14.0 14.1  14.1    % Neutrophils % 77  69  73 64  86    % Lymphocytes % 17  24  19 27  7    % Monocytes % 5  6  6 7  7    % Eosinophils % 1  1  2 2  0    % Basophils % 0  0  0 0  0    Absolute Basophils 0.0 - 0.2 10e3/uL 0.0  0.0  0.0 0.0  0.0    Absolute Eosinophils 0.0 - 0.7 10e3/uL 0.0  0.1  0.1 0.1  0.0    Absolute Immature Granulocytes <=0.4 10e3/uL 0.0  0.0  0.0 0.0  0.1    Absolute Lymphocytes 0.8 - 5.3 10e3/uL 1.2  1.7  1.8 2.0  1.2    Absolute Monocytes 0.0 - 1.3 10e3/uL 0.4  0.4  0.6 0.5  1.1    % Immature Granulocytes % 0  0  0 0  0    Absolute Neutrophils 1.6 - 8.3 10e3/uL 5.7  5.0  7.1 4.6  14.6 (H)    Absolute NRBCs 10e3/uL 0.0  0.0  0.0 0.0  0.0    NRBCs per 100 WBC <1 /100 0  0  0 0  0    XR ABDOMEN 1 VIEW   Rpt          XR CHEST PORT 1 VIEW          Rpt   EKG 12-LEAD, TRACING ONLY     Rpt   Rpt     (L): Data is abnormally low  (H): Data is abnormally high  Rpt: View report in Results Review for more information         DSM-5 Diagnosis:   Aspiration pneumonia (H)   Major Depressive Disorder, Recurrent, Severe with psychotic features.   Borderline Personality Disorder  Bipolar Disorder Type 1          Assessment:   Bhavana Marr is an 82-year-old female with history of suicidal ideation, borderline personality disorder, major depressive disorder, severe, recurrent with psychotic features, and bipolar disorder type 1.  Patient medical history includes history of gastric bypass in 2000, arthritis, GERD, hyponatremia, hyperlipidemia, osteoporosis, asthma, hx of bilateral hip replacements, and mitral valve regurgitation. Pt current legal status is under civil committment with [facility] [place].  Pt was transferred to [address] for treatment of hypotension and concern for sepsis 2/2 aspiration pneumonia on 7/16/2023.  Patient previously admitted to inpatient psychiatry unit 3B from 6/7/2023 to 7/16/2023 where she was admitted due to decreased oral intake, low glucose  level, low blood pressure, depression, and suicidal ideations. Patient has been living in a long-term care facility with relatively stable mental health for the past 5 years.  In the past few months, patient has had multiple psychiatric hospitalizations due to suicidal ideation, depression, and symptoms of psychosis.  During current hospitalizations it has been noted that patient has been experiencing ongoing hyponatremia which could be caused by volume depletion and/or induced by medications including Depakote and Sertraline which were discontinued. Patient also has been experiencing regurgitation/reflux as well as induced vomiting with concern for aspiration; chest xray and XR video Swallow Study with Speech Pathology was completed while patient was admitted to behavioral unit from 6/7/2023 to 7/16/2023. Testing showed no signs of aspiration.  Patient also experienced severe constipation which was identified by abdominal x-ray as well as CT of abdomen and pelvis; GI was following patient and adjusted bowel regimen orders to address. During admission to inpatient psychiatric unit patient was psychiatrically stabilized on Prozac and Olanzapine. Lamotrigine has also been initiated with plan to titrate up slowly which can be completed outpatient. Plan will be for patient to return to long-term care facility once medically stabilized.            Summary of Recommendations:     1. Education given regarding diagnostic and treatment options with risks, benefits and alternatives and adequate verbalization of understanding.  2.  Medications:  Hospital  -Continue Prozac 60 mg PO liquid daily to target depressed mood.  -Continue Lamotrigine tablet 25 mg PO daily for 2 weeks with plan to titrate up slowly to target mood disorder symptoms  -Continue Gabapentin 200 mg PO liquid TID to target agitation/anxiety.  -Continue Namenda 10 mg tablet PO BID to treat dementia symptoms observed.  -Continue Melatonin 6 mg scheduled every  "evening to promote sleep.  -Continue Olanzapine ODT 2.5 mg tablet every morning to target symptoms of agitation/psychosis   -Continue Olanzapine 10 mg ODT tablet at bedtime to target symptoms of agitation/psychosis  -Continue Mirtazapine 15 mg PO disintegrating tablet at HS to target depression symptoms.  -Continue Olanzapine ODT 5 mg PO tablet 3 times daily as needed for severe agitation.  -Continue Unisom 12.5 mg 3 times daily as needed for insomnia or nausea.  -Hold Prazosin 1 mg PO due to hypotension    3. Medical Team Plan:  -Continue patient on IV antibiotic Unasyn 3g IV every 6 hours.   4. Consults    Nutrition consulted and following pt during admission  -Weights will be monitored every Tuesday, Thursday, and Saturday  -Intake and output monitoring.  -Plan is for pt to eat small meals/snack throughout the day d/t history of gastric bypass.   -Supplements ordered for between meal  - Zofran 4mg PO PRN scheduled 30 minutes before meals TID to improve PO tolerance.   4. Labs/Tests     Labs: magnesium, and phosphorus levels every 3 days    CMP and CBC ordered for 7/20/2023 at 0700.    EKG ordered weekly    Olanzapine drug level collected 6/27/2023 and results: Within therapeutic range: 35 ng/mL (reference range 20-80 ng/mL).  5. Structure and Supervision    Unit 5 MedSurg.    Precautions in place.    Fall precautions.    Continue on 1:1 monitoring for safety.  6.   is following in regards to collecting and reviewing collateral information, referrals and disposition planning.    Legal: civil commitment.     Referrals: neuropsychology with recommendation for neuropsych testing.     Care Coordination:  Per [facility]    Placement: [facility] (phone number: [phone])     Anticipated Discharge:  TBD     Further treatment programming to be determined throughout the hospital course.        Risk Assessment: [facility] RISK ASSESSMENT: Patient on precautions  \"Much or all of the text in " "this note was generated through the use of Dragon Dictate voice to text software. Errors in spelling or words which appear to be out of contact are unintentional, may be present due having escaped editing\"           "

## 2023-07-19 NOTE — CONSULTS
Care Management Initial Consult    General Information  Assessment completed with: Patient  Type of CM/SW Visit: Initial Assessment  Primary Care Provider verified and updated as needed: Yes   Readmission within the last 30 days: other (see comments) (Discharged from [facility] to [facility])  Reason for Consult: discharge planning, mental health concerns  Advance Care Planning: present on chart       Communication Assessment  Patient's communication style: spoken language (English or Bilingual)    Hearing Difficulty or Deaf: yes     Cognitive  Cognitive/Neuro/Behavioral: .WDL except, mood/behavior  Level of Consciousness: alert    Arousal Level: opens eyes spontaneously    Orientation: situation    Mood/Behavior: hypoactive (quiet, withdrawn), sad    Best Language: 0 - No aphasia    Speech: clear, spontaneous    Living Environment  People in home: facility resident     Current living Arrangements: residential facility      Able to return to prior arrangements: yes    Family/Social Support  Care provided by: self  Provides care for: no one, unable/limited ability to care for self  Marital Status:   Support System: Other (specify) (Patient denied having a support system)          Description of Support System: Uninvolved    Support Assessment: Limited social contact and support    Current Resources  Patient receiving home care services: No  Community Resources: Skilled Nursing Facility  Equipment currently used at home: Walker  Supplies currently used at home: Incontinence Supplies    Employment/Financial  Employment Status: retired     Financial Concerns: No concerns identified   Referral to Financial Worker: No  Does the patient's insurance plan have a 3 day qualifying hospital stay waiver?  No - not applicable     Lifestyle & Psychosocial Needs  Social Determinants of Health     Tobacco Use: Low Risk  (4/6/2023)    Patient History      Smoking Tobacco Use: Never      Smokeless Tobacco Use: Never      Passive  "Exposure: Not on file   Alcohol Use: Not At Risk (6/7/2023)    AUDIT-C      Frequency of Alcohol Consumption: Never      Average Number of Drinks: Patient does not drink      Frequency of Binge Drinking: Never   Financial Resource Strain: Not on file   Food Insecurity: Not on file   Transportation Needs: Not on file   Physical Activity: Not on file   Stress: Not on file   Social Connections: Not on file   Intimate Partner Violence: Not on file   Depression: Not on file   Housing Stability: Not on file     Functional Status  Prior to admission patient needed assistance: Yes  Dependent ADLs: Ambulation-walker  Dependent IADLs: Cleaning, Cooking, Laundry, Shopping, Meal Preparation, Medication Management, Transportation  Assesssment of Functional Status: At functional baseline    Mental Health Status  Mental Health Status: Current Concern    Mental Health Management: Medication, Psychiatrist    Chemical Dependency Status  Chemical Dependency Status: No Current Concerns    Values/Beliefs  Spiritual, Cultural Beliefs, Evangelical Practices, Values that affect care: no    Additional Information  Bhavana Marr is a 82 year old female with history of HLD, asthma, depression, anxiety, borderline personality, bipolar disorder, GERD, osteoporosis, and gastric bypass who was transferred from [facility] medicine to station 3B with worsening depression. She was initially admitted to [facility] 5/5/2023 with hypoglycemia, hypotension, and possible SI in the setting of failure to thrive, initially admitted on 6/7/23 to psychiatry/behavioral health unit. Patient developed HoTN and concern for sepsis 2/2 aspiration pneumonia on 7/15, transferred to medicine service at that time.     Social work met with patient to complete initial assessment.  Patient admitted to [facility] after an [facility] stay where she was admitted on 6/7. Upon SW entering the room, patient appeared anxious stating that she was \"scared\" and asked SW to hold her hand. Patient was " aware of the plan to discharge back to her LTC on Monday as she had spoken with the psychiatrist earlier in the day. Facility cannot take patient back until Monday due to staffing. Social work reached out to Josefina at [facility] who confirmed the plan for patient to return on Monday. Josefina stated the facility will not provide transport. It appears patient has [employer] ride benefits that we could use if that is a safe option for patient. Josefina stated the facility prefers if patient admits earlier in the day and that she would like signed orders as so as they are completed. Will plan to set up a ride prior to Monday. Care management will continue to follow.     [facility]  [address]   [place] [address]  Phone: [phone] (Josefina)    FERNANDO Becker, LGSW  5 Med Surg and 10 ICU   [facility]  Phone: [phone]  Pager: [phone]

## 2023-07-20 LAB
ALBUMIN SERPL BCG-MCNC: 3 G/DL (ref 3.5–5.2)
ALP SERPL-CCNC: 42 U/L (ref 35–104)
ALT SERPL W P-5'-P-CCNC: 8 U/L (ref 0–50)
ANION GAP SERPL CALCULATED.3IONS-SCNC: 7 MMOL/L (ref 7–15)
AST SERPL W P-5'-P-CCNC: 13 U/L (ref 0–45)
BASOPHILS # BLD AUTO: 0 10E3/UL (ref 0–0.2)
BASOPHILS NFR BLD AUTO: 1 %
BILIRUB SERPL-MCNC: 0.3 MG/DL
BUN SERPL-MCNC: 6.7 MG/DL (ref 8–23)
CALCIUM SERPL-MCNC: 9 MG/DL (ref 8.8–10.2)
CHLORIDE SERPL-SCNC: 106 MMOL/L (ref 98–107)
CREAT SERPL-MCNC: 0.69 MG/DL (ref 0.51–0.95)
DEPRECATED HCO3 PLAS-SCNC: 29 MMOL/L (ref 22–29)
EOSINOPHIL # BLD AUTO: 0.1 10E3/UL (ref 0–0.7)
EOSINOPHIL NFR BLD AUTO: 2 %
ERYTHROCYTE [DISTWIDTH] IN BLOOD BY AUTOMATED COUNT: 13.9 % (ref 10–15)
GFR SERPL CREATININE-BSD FRML MDRD: 87 ML/MIN/1.73M2
GLUCOSE SERPL-MCNC: 90 MG/DL (ref 70–99)
HCT VFR BLD AUTO: 35.5 % (ref 35–47)
HGB BLD-MCNC: 11.4 G/DL (ref 11.7–15.7)
IMM GRANULOCYTES # BLD: 0 10E3/UL
IMM GRANULOCYTES NFR BLD: 0 %
LYMPHOCYTES # BLD AUTO: 1.7 10E3/UL (ref 0.8–5.3)
LYMPHOCYTES NFR BLD AUTO: 21 %
MCH RBC QN AUTO: 31 PG (ref 26.5–33)
MCHC RBC AUTO-ENTMCNC: 32.1 G/DL (ref 31.5–36.5)
MCV RBC AUTO: 97 FL (ref 78–100)
MONOCYTES # BLD AUTO: 0.5 10E3/UL (ref 0–1.3)
MONOCYTES NFR BLD AUTO: 6 %
NEUTROPHILS # BLD AUTO: 5.5 10E3/UL (ref 1.6–8.3)
NEUTROPHILS NFR BLD AUTO: 70 %
NRBC # BLD AUTO: 0 10E3/UL
NRBC BLD AUTO-RTO: 0 /100
PLATELET # BLD AUTO: 262 10E3/UL (ref 150–450)
POTASSIUM SERPL-SCNC: 3.7 MMOL/L (ref 3.4–5.3)
PROT SERPL-MCNC: 5.3 G/DL (ref 6.4–8.3)
RBC # BLD AUTO: 3.68 10E6/UL (ref 3.8–5.2)
SODIUM SERPL-SCNC: 142 MMOL/L (ref 136–145)
WBC # BLD AUTO: 7.9 10E3/UL (ref 4–11)

## 2023-07-20 PROCEDURE — 250N000013 HC RX MED GY IP 250 OP 250 PS 637: Performed by: INTERNAL MEDICINE

## 2023-07-20 PROCEDURE — 258N000003 HC RX IP 258 OP 636: Performed by: INTERNAL MEDICINE

## 2023-07-20 PROCEDURE — 36415 COLL VENOUS BLD VENIPUNCTURE: CPT

## 2023-07-20 PROCEDURE — 250N000011 HC RX IP 250 OP 636: Performed by: INTERNAL MEDICINE

## 2023-07-20 PROCEDURE — 999N000226 HC STATISTIC SLP IP EVAL DEFER

## 2023-07-20 PROCEDURE — 120N000002 HC R&B MED SURG/OB UMMC

## 2023-07-20 PROCEDURE — 250N000013 HC RX MED GY IP 250 OP 250 PS 637

## 2023-07-20 PROCEDURE — 85025 COMPLETE CBC W/AUTO DIFF WBC: CPT

## 2023-07-20 PROCEDURE — 99232 SBSQ HOSP IP/OBS MODERATE 35: CPT | Performed by: INTERNAL MEDICINE

## 2023-07-20 PROCEDURE — 80053 COMPREHEN METABOLIC PANEL: CPT

## 2023-07-20 PROCEDURE — 99232 SBSQ HOSP IP/OBS MODERATE 35: CPT

## 2023-07-20 RX ADMIN — GABAPENTIN 200 MG: 250 SOLUTION ORAL at 22:28

## 2023-07-20 RX ADMIN — SIMVASTATIN 40 MG: 40 TABLET, FILM COATED ORAL at 22:28

## 2023-07-20 RX ADMIN — LAMOTRIGINE 25 MG: 25 TABLET ORAL at 11:32

## 2023-07-20 RX ADMIN — FLUOXETINE 60 MG: 20 SOLUTION ORAL at 11:30

## 2023-07-20 RX ADMIN — OLANZAPINE 10 MG: 5 TABLET, ORALLY DISINTEGRATING ORAL at 22:27

## 2023-07-20 RX ADMIN — CALCIUM 500 MG: 500 TABLET ORAL at 22:28

## 2023-07-20 RX ADMIN — Medication 6 MG: at 20:22

## 2023-07-20 RX ADMIN — MEMANTINE 10 MG: 10 TABLET ORAL at 20:22

## 2023-07-20 RX ADMIN — AMPICILLIN SODIUM AND SULBACTAM SODIUM 3 G: 2; 1 INJECTION, POWDER, FOR SOLUTION INTRAMUSCULAR; INTRAVENOUS at 18:29

## 2023-07-20 RX ADMIN — GABAPENTIN 200 MG: 250 SOLUTION ORAL at 11:31

## 2023-07-20 RX ADMIN — GABAPENTIN 200 MG: 250 SOLUTION ORAL at 16:59

## 2023-07-20 RX ADMIN — ONDANSETRON 4 MG: 4 TABLET ORAL at 18:27

## 2023-07-20 RX ADMIN — SENNOSIDES AND DOCUSATE SODIUM 1 TABLET: 50; 8.6 TABLET ORAL at 20:22

## 2023-07-20 RX ADMIN — AMPICILLIN SODIUM AND SULBACTAM SODIUM 3 G: 2; 1 INJECTION, POWDER, FOR SOLUTION INTRAMUSCULAR; INTRAVENOUS at 00:04

## 2023-07-20 RX ADMIN — Medication 100 MCG: at 22:28

## 2023-07-20 RX ADMIN — SENNOSIDES AND DOCUSATE SODIUM 1 TABLET: 50; 8.6 TABLET ORAL at 11:32

## 2023-07-20 RX ADMIN — AMPICILLIN SODIUM AND SULBACTAM SODIUM 3 G: 2; 1 INJECTION, POWDER, FOR SOLUTION INTRAMUSCULAR; INTRAVENOUS at 04:57

## 2023-07-20 RX ADMIN — AMPICILLIN SODIUM AND SULBACTAM SODIUM 3 G: 2; 1 INJECTION, POWDER, FOR SOLUTION INTRAMUSCULAR; INTRAVENOUS at 13:43

## 2023-07-20 RX ADMIN — POLYETHYLENE GLYCOL 3350 17 G: 17 POWDER, FOR SOLUTION ORAL at 11:31

## 2023-07-20 RX ADMIN — MIRTAZAPINE 15 MG: 15 TABLET, ORALLY DISINTEGRATING ORAL at 22:28

## 2023-07-20 RX ADMIN — OLANZAPINE 2.5 MG: 5 TABLET, ORALLY DISINTEGRATING ORAL at 11:32

## 2023-07-20 RX ADMIN — ONDANSETRON 4 MG: 4 TABLET ORAL at 13:43

## 2023-07-20 RX ADMIN — SODIUM CHLORIDE: 9 INJECTION, SOLUTION INTRAVENOUS at 11:36

## 2023-07-20 RX ADMIN — AMPICILLIN SODIUM AND SULBACTAM SODIUM 3 G: 2; 1 INJECTION, POWDER, FOR SOLUTION INTRAMUSCULAR; INTRAVENOUS at 22:28

## 2023-07-20 RX ADMIN — VALACYCLOVIR 500 MG: 500 TABLET, FILM COATED ORAL at 11:32

## 2023-07-20 RX ADMIN — MEMANTINE 10 MG: 10 TABLET ORAL at 11:32

## 2023-07-20 RX ADMIN — PANTOPRAZOLE SODIUM 40 MG: 40 TABLET, DELAYED RELEASE ORAL at 06:04

## 2023-07-20 RX ADMIN — Medication 2 TABLET: at 11:32

## 2023-07-20 RX ADMIN — GABAPENTIN 200 MG: 250 SOLUTION ORAL at 00:11

## 2023-07-20 RX ADMIN — Medication 25 MCG: at 18:28

## 2023-07-20 RX ADMIN — ONDANSETRON 4 MG: 4 TABLET ORAL at 06:04

## 2023-07-20 ASSESSMENT — ACTIVITIES OF DAILY LIVING (ADL)
ADLS_ACUITY_SCORE: 42

## 2023-07-20 NOTE — CARE PLAN
VS: BP (!) 146/72 (BP Location: Right arm)   Pulse 60   Temp 97.6  F (36.4  C) (Oral)   Resp 18   LMP  (LMP Unknown)   SpO2 96%    O2: Sating >90% on RA. Lung sounds clear. Denies chest pain and SOB.   Output: Voids spontaneously and adequately without difficulty.   Last BM: Bowel sounds active x4. Passing flatus.  LBM 7/17/23   Activity: Up with 1 assist, FWW, and gait belt.    Skin: Intact   Pain: Denies pain   CMS: A&Ox4. Denies N/T.    Dressing: None   Diet: Regular. Appetite was good.  Denies N/V.    LDA: PIV to R is S/L.    Equipment: IV pole, FWW, and personal belongings. Call light within reach and uses appropriately.   Plan:  [facility]   Additional Info: Slept most of the night, up to use the restroom multiple times. Sitter at the bed side all the times. Continue POC.

## 2023-07-20 NOTE — CONSULTS
"      Psychiatry Consultation; Follow up              Reason for Consult, requesting source:      Requesting source: Alyssa Elizondo    Labs and imaging reviewed.    Total time spent in chart review, patient interview and coordination of care; 35 minutes                Interim history:    Met with pt in hospital room on medical unit UR 5 Med Surg Patient recognizes writer. Pt affect appears slightly flat however brighter today and improving. Pt observed to smile and offer her hand to hold during interaction with writer.  Pt expresses feeling, \"Better\" overall and endorses her mood is, \"Pretty good.\"  Pt's thoughts continue to be slightly disorganized however notably improved today. Pt experiences confusion intermittently which has been ongoing since admission to inpatient psychiatry unit 3B on 6/7/23. Patient is oriented to self and able to state today's date, location, and situation. Patient continues to report severe depression however does not endorse SI today.  Pt able to contract for safety. Pt also reports that she will not attempt to harm herself after discharge. Pt does endorse increased anxiety symptoms today with no identified trigger. Plan will be to continue to monitor this. Pt endorses hearing AH of voices which she has a hx of hearing; pt reports voices say, \"That I am doing better.\" Denies distress from hearing these AH. Denies VH. Patient continues on 1:1 monitoring on medical unit today for safety. Plan today is to continue Prozac 60 mg PO liquid daily to target depressed mood as pt affect is brighter and improving since initiating. Continue Lamotrigine 25 mg PO daily for two weeks with plan to titrate up slowly to target mood disorder symptoms, continue Gabapentin 200 mg 3 times daily to target anxiety symptoms, continue Namenda 10 mg PO BID to treat dementia symptoms observed, continue Olanzapine ODT 2.5 mg tablet every morning to target agitation/psychosis, continue Olanzapine 10 mg ODT tablet at " bedtime to target agitation/psychosis, and continue Mirtazapine 15 mg PO disintegrating tablet at HS to target depression symptoms and promote sleep. Olanzapine drug level checked on 6/27/2023 and results within therapeutic range: 35 ng/mL (reference range 20-80 ng/mL).  Continue to hold prazosin 1 mg PO at bedtime due to hypotension.      Pt was admitted to [address] Surg for treatment of hypotension and concern for sepsis 2/2 aspiration pneumonia on 7/16/23.  Patient continues on IV antibiotic Unasyn 3 g every 6 hours. Reviewed patient vital signs and pt noted to have elevated blood pressure today of 156/96. Pt SpO2 level today is 96% on room air. Pt observed to have a mild nonproductive cough however no signs of shortness of breath observed. Provider reviewed EKG from 7/18/2023, results: Sinus bradycardia, Left axis deviation, Incomplete right bundle branch block. Pt has been experienced intermittent hyponatremia. Sodium level last checked on 7/17/2023 is WNL: 141 mml/L.  Per medical team documentation plan to address is: 2 L fluid restriction as well as strict I&O monitoring.  Patient has ongoing GI issues including: acute on chronic constipation with history of overflow diarrhea, severe protein calorie malnutrition, reflux/regurgitation, GERD, has been observed inducing vomiting, and has history of gastric bypass. Plan per internal medicine: nutrition consult ordered, speech reconsulted for concern of aspiration, continuing's vitamin supplements, continue PPI daily, continue scheduled Zofran 3 times daily with meals, and continue GI recommended bowel regimen.  Patient continues on regular diet with thin liquids.  Due to history of gastric bypass patient requires small meals with snacks throughout the day which is being facilitated on the medical floor. Pt is requesting a walker for after discharge; this provider notified medical team of this request as pt has been utilizing a walker during inpatient  hospitalization.     Yesterday provider called [facility] (phone number: [phone]) to provide an update to Josefina, Director of Nursing.  Plan will be for this provider to continue to update Josefina later in the week regarding patient's progress and as long as she continues to improve a time will be set for the  of [facility] to come visit patient as this is a requirement prior to her being approved for transfer back to their nursing home facility.       Provider connected with 5 Medr unit [facility] and provided the number to reach patient's nursing home and Josefina director of nursing in order to coordinate for discharge planning.  This provider also connected with medical team to coordinate discharge plan for patient directly back to her nursing home when medically cleared as she is psychiatrically stable.      When patient is ready for discharge [facility] [place] [place]. Address: [address] [place] [address], [place] [address]. Phone: [phone]           Current Medications:       ampicillin-sulbactam  3 g Intravenous Q6H     calcium carbonate  500 mg Oral At Bedtime     childrens multivitamin with iron  2 tablet Oral Daily     cyanocobalamin  100 mcg Oral At Bedtime     FLUoxetine  60 mg Oral Daily     gabapentin  200 mg Oral TID     lamoTRIgine  25 mg Oral Daily     melatonin  6 mg Oral QPM     memantine  10 mg Oral BID     mirtazapine  15 mg Orally disintegrating tablet At Bedtime     OLANZapine zydis  2.5 mg Oral QAM     OLANZapine zydis  10 mg Oral At Bedtime     ondansetron  4 mg Oral TID AC     pantoprazole  40 mg Oral QAM AC     polyethylene glycol  17 g Oral Daily     [Held by provider] prazosin  1 mg Oral Daily with supper     senna-docusate  1 tablet Oral BID     simvastatin  40 mg Oral At Bedtime     valACYclovir  500 mg Oral Daily     cholecalciferol  25 mcg Oral  "Daily with supper     PRN Meds:.acetaminophen, alum & mag hydroxide-simethicone, budesonide-formoterol, calcium carbonate, doxylamine, OLANZapine zydis, polyethylene glycol, prochlorperazine, sennosides  Medication adherence issues: MS Med Adherence Y/N: No  Medication side effects: MEDICATION SIDE EFFECTS: no side effects reported   Benefit: Yes / No: Yes         MSE:     Appearance: slightly unkempt  Attitude:  cooperative  Eye Contact:  good  Mood:   \"Better.\"  Affect:  : mood congruent and slightly restricted  Speech:  clear, coherent  Psychomotor Behavior:  no evidence of tardive dyskinesia, dystonia, or tics  Muscle strength and tone: normal, steady gait with walker  Thought Process:  linear and goal oriented  Associations:  no loose associations  Thought Content:  active suicidal ideation present, passive suicidal ideation present, no auditory hallucinations present and no visual hallucinations present  Insight:  fair  Judgement:  fair  Oriented to:  time, person, and place  Attention Span and Concentration:  intact  Recent and Remote Memory:  fair      Vital signs:  Temp: 97.6  F (36.4  C) Temp src: Oral BP: (!) 146/72 Pulse: 60   Resp: 18 SpO2: 96 % O2 Device: None (Room air) Oxygen Delivery: 1 LPM      Estimated body mass index is 19.34 kg/m  as calculated from the following:    Height as of 6/7/23: 1.626 m (5' 4\").    Weight as of 7/13/23: 51.1 kg (112 lb 10.5 oz).    Qtc: 452. EKG from 7/15/23 reviewed: results Sinus rhythm   Right bundle branch block   Left anterior fascicular block Bifascicular block   Septal infarct (cited on or before 27-JUN-2023)     Labs were personally reviewed.     Latest Reference Range & Units 07/16/23 04:41 07/16/23 10:44 07/17/23 09:47 07/18/23 07:09 07/18/23 09:15 07/20/23 07:08 07/20/23 07:09   Sodium 136 - 145 mmol/L 134 (L)  141    142   Potassium 3.4 - 5.3 mmol/L 3.9  4.3    3.7   Chloride 98 - 107 mmol/L 102  108 (H)    106   Carbon Dioxide (CO2) 22 - 29 mmol/L 27  26   "  29   Urea Nitrogen 8.0 - 23.0 mg/dL 17.4  10.8    6.7 (L)   Creatinine 0.51 - 0.95 mg/dL 0.85  0.73    0.69   GFR Estimate >60 mL/min/1.73m2 68  82    87   Calcium 8.8 - 10.2 mg/dL 8.5 (L)  8.8    9.0   Anion Gap 7 - 15 mmol/L 5 (L)  7    7   Magnesium 1.7 - 2.3 mg/dL 2.0   2.1      Phosphorus 2.5 - 4.5 mg/dL    2.8      Albumin 3.5 - 5.2 g/dL 2.8 (L)      3.0 (L)   Protein Total 6.4 - 8.3 g/dL 4.8 (L)      5.3 (L)   Alkaline Phosphatase 35 - 104 U/L 35      42   ALT 0 - 50 U/L 7      8   AST 0 - 45 U/L 11      13   Bilirubin Total <=1.2 mg/dL 0.3      0.3   Glucose 70 - 99 mg/dL 85  101 (H)    90   Lipase 13 - 60 U/L 20         Troponin T, High Sensitivity <=14 ng/L 18 (H)         WBC 4.0 - 11.0 10e3/uL 14.3 (H)  7.3   7.9    Hemoglobin 11.7 - 15.7 g/dL 9.8 (L)  11.7   11.4 (L)    Hematocrit 35.0 - 47.0 % 30.5 (L)  36.8   35.5    Platelet Count 150 - 450 10e3/uL 195  200   262    RBC Count 3.80 - 5.20 10e6/uL 3.14 (L)  3.79 (L)   3.68 (L)    MCV 78 - 100 fL 97  97   97    MCH 26.5 - 33.0 pg 31.2  30.9   31.0    MCHC 31.5 - 36.5 g/dL 32.1  31.8   32.1    RDW 10.0 - 15.0 % 14.3  14.2   13.9    % Neutrophils %      70    % Lymphocytes %      21    % Monocytes %      6    % Eosinophils %      2    % Basophils %      1    Absolute Basophils 0.0 - 0.2 10e3/uL      0.0    Absolute Eosinophils 0.0 - 0.7 10e3/uL      0.1    Absolute Immature Granulocytes <=0.4 10e3/uL      0.0    Absolute Lymphocytes 0.8 - 5.3 10e3/uL      1.7    Absolute Monocytes 0.0 - 1.3 10e3/uL      0.5    % Immature Granulocytes %      0    Absolute Neutrophils 1.6 - 8.3 10e3/uL      5.5    Absolute NRBCs 10e3/uL      0.0    NRBCs per 100 WBC <1 /100      0    Color Urine Colorless, Straw, Light Yellow, Yellow   Light Yellow        Appearance Urine Clear   Clear        Glucose Urine Negative mg/dL  Negative        Bilirubin Urine Negative   Negative        Ketones Urine Negative mg/dL  Negative        Specific Gravity Urine 1.003 - 1.035   1.009         pH Urine 5.0 - 7.0   5.0        Protein Albumin Urine Negative mg/dL  Negative        Urobilinogen mg/dL Normal, 2.0 mg/dL  Normal        Nitrite Urine Negative   Negative        Blood Urine Negative   Negative        Leukocyte Esterase Urine Negative   Trace !        WBC Urine <=5 /HPF  1        RBC Urine <=2 /HPF  1        Bacteria Urine None Seen /HPF  Few !        Squamous Epithelial /HPF Urine <=1 /HPF  <1        EKG 12-LEAD, TRACING ONLY      Rpt     (L): Data is abnormally low  (H): Data is abnormally high  !: Data is abnormal  Rpt: View report in Results Review for more information            DSM-5 Diagnosis:   Aspiration pneumonia (H)   Major Depressive Disorder, Recurrent, Severe with psychotic features.   Borderline Personality Disorder  Bipolar Disorder Type 1          Assessment:   Bhavana Marr is an 82-year-old female with history of suicidal ideation, borderline personality disorder, major depressive disorder, severe, recurrent with psychotic features, and bipolar disorder type 1.  Patient medical history includes history of gastric bypass in 2000, arthritis, GERD, hyponatremia, hyperlipidemia, osteoporosis, asthma, hx of bilateral hip replacements, and mitral valve regurgitation. Pt current legal status is under civil committment with [facility].  Pt was transferred to [address] Surg for treatment of hypotension and concern for sepsis 2/2 aspiration pneumonia on 7/16/2023.  Patient previously admitted to inpatient psychiatry unit 3B from 6/7/2023 to 7/16/2023 where she was admitted due to decreased oral intake, low glucose level, low blood pressure, depression, and suicidal ideations. Patient has been living in a long-term care facility with relatively stable mental health for the past 5 years.  In the past few months, patient has had multiple psychiatric hospitalizations due to suicidal ideation, depression, and symptoms of psychosis.  During current hospitalizations it has been noted that  patient has been experiencing ongoing hyponatremia which could be caused by volume depletion and/or induced by medications including Depakote and Sertraline which were discontinued. Patient also has been experiencing regurgitation/reflux as well as induced vomiting with concern for aspiration; chest xray and XR video Swallow Study with Speech Pathology was completed while patient was admitted to behavioral unit from 6/7/2023 to 7/16/2023. Testing showed no signs of aspiration.  Patient also experienced severe constipation which was identified by abdominal x-ray as well as CT of abdomen and pelvis; GI was following patient and adjusted bowel regimen orders to address. During admission to inpatient psychiatric unit patient was psychiatrically stabilized on Prozac and Olanzapine. Lamotrigine has also been initiated with plan to titrate up slowly which can be completed outpatient. Plan will be for patient to return to long-term care facility once medically stabilized.          Summary of Recommendations:     1. Education given regarding diagnostic and treatment options with risks, benefits and alternatives and adequate verbalization of understanding.  2.  Medications:  Hospital  -Continue Prozac 60 mg PO liquid daily to target depressed mood.  -Continue Lamotrigine tablet 25 mg PO daily for 2 weeks with plan to titrate up slowly to target mood disorder symptoms  -Continue Gabapentin 200 mg PO liquid TID to target agitation/anxiety.  -Continue Namenda 10 mg tablet PO BID to treat dementia symptoms observed.  -Continue Melatonin 6 mg scheduled every evening to promote sleep.  -Continue Olanzapine ODT 2.5 mg tablet every morning to target symptoms of agitation/psychosis   -Continue Olanzapine 10 mg ODT tablet at bedtime to target symptoms of agitation/psychosis  -Continue Mirtazapine 15 mg PO disintegrating tablet at HS to target depression symptoms.  -Continue Olanzapine ODT 5 mg PO tablet 3 times daily as needed for  "severe agitation.  -Continue Unisom 12.5 mg 3 times daily as needed for insomnia or nausea.  -Hold Prazosin 1 mg PO due to hypotension    3. Medical Team Plan:  -Continue patient on IV antibiotic Unasyn 3g IV every 6 hours.   4. Consults    Nutrition consulted and following pt during admission  -Weights will be monitored every Tuesday, Thursday, and Saturday  -Intake and output monitoring.  -Plan is for pt to eat small meals/snack throughout the day d/t history of gastric bypass.   -Supplements ordered for between meal  - Zofran 4mg PO PRN scheduled 30 minutes before meals TID to improve PO tolerance.   4. Labs/Tests     Labs: magnesium, and phosphorus levels every 3 days    CMP and CBC ordered for 7/20/2023 at 0700.    EKG ordered weekly    Olanzapine drug level collected 6/27/2023 and results: Within therapeutic range: 35 ng/mL (reference range 20-80 ng/mL).  5. Structure and Supervision    Unit 5 MedSurg.    Precautions in place.    Fall precautions.    Continue on 1:1 monitoring for safety.  6.   is following in regards to collecting and reviewing collateral information, referrals and disposition planning.    Legal: civil commitment.     Referrals: neuropsychology with recommendation for neuropsych testing.     Care Coordination:  Per [facility]    Placement: [facility] (phone number: [phone])     Anticipated Discharge:  TBD     Further treatment programming to be determined throughout the hospital course.        Risk Assessment: [facility] RISK ASSESSMENT: Patient on precautions  \"Much or all of the text in this note was generated through the use of Dragon Dictate voice to text software. Errors in spelling or words which appear to be out of contact are unintentional, may be present due having escaped editing\"                               "

## 2023-07-20 NOTE — PLAN OF CARE
Patient is alert, is sleeping between cares.  Sitter remains at bedside.  PIV is patent.  Continues to spit up as needed.  Does have a cry, intermittent cough.  Call light is within reach.  Is able to make needs known.  Will continue with plan of care.

## 2023-07-20 NOTE — PROGRESS NOTES
[facility]    Medicine Progress Note - Hospitalist Service, GOLD TEAM 16    Date of Admission:  7/16/2023    Assessment & Plan   82 year old female with history of HLD, asthma, depression, anxiety, borderline personality, bipolar disorder, GERD, osteoporosis, and gastric bypass who was transferred from [facility] medicine to station 3B with worsening depression. She was initially admitted to [facility] 5/5/2023 with hypoglycemia, hypotension, and possible SI in the setting of failure to thrive, initially admitted on 6/7/23 to psychiatry/behavioral health unit. Patient developed hypotension and concern for sepsis 2/2 aspiration pneumonia on 7/15, transferred to medicine service at that time.   SLP was consulted. She also was started on antibiotics.      Hypotension, concern for septic shock (7/15) - Resolved  Sepsis, suspect 2/2 aspiration pneumonia (7/15) - Resolved   CXR 7/15 concerning for diffuse bilateral hazy opacities favoring infection/inflammation   - Respiratory status stable today on room air.   - Continue on Unasyn day 5/7.  - COVID/Viral panel negative, MRSA swab negative, sputum culture ordered  - Pulm Toilet: IS, encourage OOB, prn symbicort     Mild pharyngeal dysphagia & esophageal dysphagia  - New SLP consult placed. Diet per SLP.     Depression, anxiety, bipolar disorder, borderline personality, dementia w/ behavioral disturbance  - Psychiatry following the patient, input appreciated. Mood appears to be stable. Patient doesn't need to return to inpatient psych. She will go back to her nursing home on 7/24.      Chronic hyponatremia:   - Na 142 stable. Continue monitoring.      Failure to thrive  severe protein calorie malnutrition  h/o gastric bypass (~27yr ago, unclear procedure)  Reflux/regurgitation vs N/V vs eating disorder  Acute on chronic constipation with hx of overflow diarrhea   GERD  Vit B12 def, Hx of Iron def Anemeia  GI consulted on 6/16-6/26  (signed off)  - Nutrition consult  - speech consulted for concern for aspiration  - continue supplements: Vit D3, MV, Oscal, B12  - continue PPI qday  - continue scheduled Zofran TID w/ meals  - continue GI recommended Bowel regimen     Asthma: Stable, no scheduled meds. Continue prn Symbicort.  HLD: Continue statin  Osteoporosis: Encourage good oral intake. Continue close OP follow up with PCP for chronic management   Hx of HSV: continue PTA Valtrex qday           Diet: Regular Diet Adult Thin Liquids (level 0)  Snacks/Supplements Adult: Other; Food snacks TID - 10AM: cottage cheese + fruit cup, 2PM: pudding + fruit cup, 8 PM: Magic Cup (mitchell or van); Between Meals    DVT Prophylaxis: Pneumatic Compression Devices and Anti-embolisim stockings (TEDs)  Cortez Catheter: Not present  Lines: None     Cardiac Monitoring: None  Code Status: No CPR- Do NOT Intubate      Clinically Significant Risk Factors              # Hypoalbuminemia: Lowest albumin = 2.8 g/dL at 7/16/2023  4:41 AM, will monitor as appropriate                     Disposition Plan           Errol Chavez MD  Hospitalist Service, GOLD TEAM 16  [facility]  Securely message with [employer] (more info)  Text page via [facility] Paging/Directory   See signed in provider for up to date coverage information  ______________________________________________________________________    Interval History     No acute events overnight.   Mood stable.   Breathing stable. She is afebrile. No new issues.     Physical Exam   Vital Signs: Temp: 97.6  F (36.4  C) Temp src: Oral BP: (!) 146/72 Pulse: 60   Resp: 18 SpO2: 96 % O2 Device: None (Room air)    Weight: 0 lbs 0 oz    General: Alert, elderly, no acute distress, on room air.   Resp: Normal respiratory effort, aeration stable today, no crackles or wheezing.   Cards: RRR w/o murmurs/rubs/gallops,no LE edema  GI: soft, nontender, nondistended, + BS     Medical Decision Making        45 MINUTES SPENT BY ME on the date of service doing chart review, history, exam, documentation & further activities per the note.      Data     I have personally reviewed the following data over the past 24 hrs:    7.9  \   11.4 (L)   / 262     142 106 6.7 (L) /  90   3.7 29 0.69 \       ALT: 8 AST: 13 AP: 42 TBILI: 0.3   ALB: 3.0 (L) TOT PROTEIN: 5.3 (L) LIPASE: N/A       Imaging results reviewed over the past 24 hrs:   No results found for this or any previous visit (from the past 24 hour(s)).

## 2023-07-21 LAB
MAGNESIUM SERPL-MCNC: 2.2 MG/DL (ref 1.7–2.3)
PHOSPHATE SERPL-MCNC: 3.2 MG/DL (ref 2.5–4.5)

## 2023-07-21 PROCEDURE — 83735 ASSAY OF MAGNESIUM: CPT | Performed by: INTERNAL MEDICINE

## 2023-07-21 PROCEDURE — 120N000002 HC R&B MED SURG/OB UMMC

## 2023-07-21 PROCEDURE — 250N000013 HC RX MED GY IP 250 OP 250 PS 637

## 2023-07-21 PROCEDURE — 36415 COLL VENOUS BLD VENIPUNCTURE: CPT | Performed by: INTERNAL MEDICINE

## 2023-07-21 PROCEDURE — 250N000013 HC RX MED GY IP 250 OP 250 PS 637: Performed by: INTERNAL MEDICINE

## 2023-07-21 PROCEDURE — 84100 ASSAY OF PHOSPHORUS: CPT | Performed by: INTERNAL MEDICINE

## 2023-07-21 PROCEDURE — 99232 SBSQ HOSP IP/OBS MODERATE 35: CPT | Performed by: INTERNAL MEDICINE

## 2023-07-21 PROCEDURE — 250N000011 HC RX IP 250 OP 636: Performed by: INTERNAL MEDICINE

## 2023-07-21 PROCEDURE — 258N000003 HC RX IP 258 OP 636: Performed by: INTERNAL MEDICINE

## 2023-07-21 RX ORDER — BISACODYL 10 MG
10 SUPPOSITORY, RECTAL RECTAL DAILY PRN
Status: DISCONTINUED | OUTPATIENT
Start: 2023-07-21 | End: 2023-07-24 | Stop reason: HOSPADM

## 2023-07-21 RX ADMIN — VALACYCLOVIR 500 MG: 500 TABLET, FILM COATED ORAL at 09:40

## 2023-07-21 RX ADMIN — OLANZAPINE 2.5 MG: 5 TABLET, ORALLY DISINTEGRATING ORAL at 09:39

## 2023-07-21 RX ADMIN — ONDANSETRON 4 MG: 4 TABLET ORAL at 06:08

## 2023-07-21 RX ADMIN — LAMOTRIGINE 25 MG: 25 TABLET ORAL at 09:39

## 2023-07-21 RX ADMIN — SENNOSIDES AND DOCUSATE SODIUM 1 TABLET: 50; 8.6 TABLET ORAL at 20:55

## 2023-07-21 RX ADMIN — AMPICILLIN SODIUM AND SULBACTAM SODIUM 3 G: 2; 1 INJECTION, POWDER, FOR SOLUTION INTRAMUSCULAR; INTRAVENOUS at 06:04

## 2023-07-21 RX ADMIN — FLUOXETINE 60 MG: 20 SOLUTION ORAL at 09:39

## 2023-07-21 RX ADMIN — GABAPENTIN 200 MG: 250 SOLUTION ORAL at 09:39

## 2023-07-21 RX ADMIN — GABAPENTIN 200 MG: 250 SOLUTION ORAL at 22:25

## 2023-07-21 RX ADMIN — Medication 2 TABLET: at 09:39

## 2023-07-21 RX ADMIN — SODIUM CHLORIDE: 9 INJECTION, SOLUTION INTRAVENOUS at 03:54

## 2023-07-21 RX ADMIN — ALUMINUM HYDROXIDE, MAGNESIUM HYDROXIDE, AND SIMETHICONE 30 ML: 200; 200; 20 SUSPENSION ORAL at 12:43

## 2023-07-21 RX ADMIN — MEMANTINE 10 MG: 10 TABLET ORAL at 20:55

## 2023-07-21 RX ADMIN — AMPICILLIN SODIUM AND SULBACTAM SODIUM 3 G: 2; 1 INJECTION, POWDER, FOR SOLUTION INTRAMUSCULAR; INTRAVENOUS at 10:52

## 2023-07-21 RX ADMIN — PANTOPRAZOLE SODIUM 40 MG: 40 TABLET, DELAYED RELEASE ORAL at 06:08

## 2023-07-21 RX ADMIN — GABAPENTIN 200 MG: 250 SOLUTION ORAL at 16:51

## 2023-07-21 RX ADMIN — Medication 6 MG: at 20:54

## 2023-07-21 RX ADMIN — ONDANSETRON 4 MG: 4 TABLET ORAL at 12:42

## 2023-07-21 RX ADMIN — SODIUM CHLORIDE: 9 INJECTION, SOLUTION INTRAVENOUS at 17:56

## 2023-07-21 RX ADMIN — AMPICILLIN SODIUM AND SULBACTAM SODIUM 3 G: 2; 1 INJECTION, POWDER, FOR SOLUTION INTRAMUSCULAR; INTRAVENOUS at 16:45

## 2023-07-21 RX ADMIN — OLANZAPINE 10 MG: 5 TABLET, ORALLY DISINTEGRATING ORAL at 21:05

## 2023-07-21 RX ADMIN — ONDANSETRON 4 MG: 4 TABLET ORAL at 16:45

## 2023-07-21 RX ADMIN — MEMANTINE 10 MG: 10 TABLET ORAL at 09:39

## 2023-07-21 RX ADMIN — SIMVASTATIN 40 MG: 40 TABLET, FILM COATED ORAL at 21:05

## 2023-07-21 RX ADMIN — CALCIUM 500 MG: 500 TABLET ORAL at 21:04

## 2023-07-21 RX ADMIN — AMPICILLIN SODIUM AND SULBACTAM SODIUM 3 G: 2; 1 INJECTION, POWDER, FOR SOLUTION INTRAMUSCULAR; INTRAVENOUS at 22:26

## 2023-07-21 RX ADMIN — SENNOSIDES AND DOCUSATE SODIUM 1 TABLET: 50; 8.6 TABLET ORAL at 09:40

## 2023-07-21 RX ADMIN — Medication 25 MCG: at 16:45

## 2023-07-21 RX ADMIN — Medication 100 MCG: at 21:05

## 2023-07-21 RX ADMIN — POLYETHYLENE GLYCOL 3350 17 G: 17 POWDER, FOR SOLUTION ORAL at 16:46

## 2023-07-21 RX ADMIN — MIRTAZAPINE 15 MG: 15 TABLET, ORALLY DISINTEGRATING ORAL at 21:05

## 2023-07-21 ASSESSMENT — ACTIVITIES OF DAILY LIVING (ADL)
ADLS_ACUITY_SCORE: 42
ADLS_ACUITY_SCORE: 46
ADLS_ACUITY_SCORE: 42
ADLS_ACUITY_SCORE: 42

## 2023-07-21 NOTE — PROGRESS NOTES
Care Management Follow Up    Length of Stay (days): 4    Expected Discharge Date: 07/24/2023     Concerns to be Addressed: Discharge planning, mental health     Patient plan of care discussed at interdisciplinary rounds: Yes    Anticipated Discharge Disposition: Long Term Care     Anticipated Discharge Services: None  Anticipated Discharge DME: None    Patient/family educated on Medicare website which has current facility and service quality ratings: No  Education Provided on the Discharge Plan: Yes, patient aware of plans to discharge on Monday 7/24  Patient/Family in Agreement with the Plan: Yes    Referrals Placed by CM/SW: External Care Coordination  Private pay costs discussed: Not applicable    Additional Information:  Plan for patient to discharge back to LTC on Monday 7/24. Patient resides at [facility]. Josefina from [facility] has been the point of contact for discharge. Facility on board with patient returning Monday when they have more staff. Reached out to bedside RN to discuss what type of ride would be most appropriate for patient. Awaiting follow up from RAUL Torres. Anticipate patient will be able to transport via wheelchair.     Addendum 12:16 PM:  Spoke with RAUL Torres who stated patient was taken off her 1:1 today. It appears patient is stable at this time and doing well. Brian believes patient should be okay to transport back to LTC via w/c through [facility]. Bedside RN and SW determined that we should see how patient does without her 1:1 over the next 24 hours and then have weekend SW set up an appropriate ride after seeing how patient does. Patient previously expressed concerns about not having anything to wear back to her LTC on Monday. Informed CHW who stated she would meet with patient and help with finding an outfit to wear home. Patient will be added to the weekend list for a ride to be set up. Will continue to follow.     [facility]  [address]    [place] [address]  Phone: [phone]  **Point of contact is FERNANDO Underwood, LGSW  5 Med Surg and 10 ICU   [facility]  Phone: [phone]  Pager: [phone]

## 2023-07-21 NOTE — CARE PLAN
VS: /70 (BP Location: Left arm)   Pulse 62   Temp 97.6  F (36.4  C) (Oral)   Resp 16   LMP  (LMP Unknown)   SpO2 95%    O2: Sating >90% on RA. Lung sounds clear. Denies chest pain and SOB.   Output: Voids spontaneously and adequately in the toilet without difficulty    Last BM: Bowel sounds active x4. Passing flatus. NO Bm this shift.   Activity: Up with 1 assist, FWW.   Skin: Intact   Pain: Pain was managed with Tylenol   CMS: A&Ox4. Denies N/T.    Dressing: None   Diet: Regular. Appetite was good. Denies N/V.    LDA: PIV to R is infusing   Equipment: IV pole, FWW,and personal belongings. Call light within reach and uses appropriately.   Plan: Discharge on 7/24/23 [facility]   Additional Info: Slept most of the night, sitter at the bedside all time Continue POC.

## 2023-07-21 NOTE — PROGRESS NOTES
[facility]    Medicine Progress Note - Hospitalist Service, GOLD TEAM 16    Date of Admission:  7/16/2023    Assessment & Plan   82 year old female with history of HLD, asthma, depression, anxiety, borderline personality, bipolar disorder, GERD, osteoporosis, and gastric bypass who was transferred from [facility] medicine to station 3B with worsening depression. She was initially admitted to [facility] 5/5/2023 with hypoglycemia, hypotension, and possible SI in the setting of failure to thrive, initially admitted on 6/7/23 to psychiatry/behavioral health unit. Patient developed hypotension and concern for sepsis 2/2 aspiration pneumonia on 7/15, transferred to medicine service at that time.   SLP was consulted. She also was started on antibiotics.      Hypotension, concern for septic shock (7/15) - Resolved  Sepsis, suspect 2/2 aspiration pneumonia (7/15) - Resolved   CXR 7/15 concerning for diffuse bilateral hazy opacities favoring infection/inflammation   - Respiratory status stable today on room air.   - Continue on Unasyn day 5/7.  - COVID/Viral panel negative, MRSA swab negative, sputum culture ordered  - Pulm Toilet: IS, encourage OOB, prn symbicort     Mild pharyngeal dysphagia & esophageal dysphagia  - I had an extensive conversation with SLP, pt doesn't need further evaluation for dysphagia at this moment. Continue with regular diet with thin liquids.     Depression, anxiety, bipolar disorder, borderline personality, dementia w/ behavioral disturbance  - Psychiatry following the patient, input appreciated. Mood appears to be stable. Patient doesn't need to return to inpatient psych. She will go back to her nursing home on 7/24.      Chronic hyponatremia:   - Na 142 stable. Continue monitoring.      Failure to thrive  severe protein calorie malnutrition  h/o gastric bypass (~27yr ago, unclear procedure)  Reflux/regurgitation vs N/V vs eating disorder  Acute on chronic  constipation with hx of overflow diarrhea   GERD  Vit B12 def, Hx of Iron def Anemeia  GI consulted on 6/16-6/26 (signed off)  - Nutrition consult  - speech consulted for concern for aspiration  - continue supplements: Vit D3, MV, Oscal, B12  - continue PPI qday  - continue scheduled Zofran TID w/ meals  - continue GI recommended Bowel regimen     Asthma: Stable, no scheduled meds. Continue prn Symbicort.  HLD: Continue statin  Osteoporosis: Encourage good oral intake. Continue close OP follow up with PCP for chronic management   Hx of HSV: continue PTA Valtrex qday           Diet: Regular Diet Adult Thin Liquids (level 0)  Snacks/Supplements Adult: Other; Food snacks TID - 10AM: cottage cheese + fruit cup, 2PM: pudding + fruit cup, 8 PM: Magic Cup (mitchell or van); Between Meals    DVT Prophylaxis: Pneumatic Compression Devices and Anti-embolisim stockings (TEDs)  Cortez Catheter: Not present  Lines: None     Cardiac Monitoring: None  Code Status: No CPR- Do NOT Intubate      Clinically Significant Risk Factors              # Hypoalbuminemia: Lowest albumin = 2.8 g/dL at 7/16/2023  4:41 AM, will monitor as appropriate                     Disposition Plan      Expected Discharge Date: 07/24/2023        Discharge Comments: back to facility        Errol Chavez MD  Hospitalist Service, [address]  Securely message with [employer] (more info)  Text page via [facility] Paging/Directory   See signed in provider for up to date coverage information  ______________________________________________________________________    Interval History     No acute events overnight.   Mood stable.   Breathing stable. She is afebrile. No new issues. Respiratory status stable.      Physical Exam   Vital Signs: Temp: 97.6  F (36.4  C) Temp src: Oral BP: 110/70 Pulse: 62   Resp: 16 SpO2: 95 % O2 Device: None (Room air)    Weight: 0 lbs 0 oz    General: Alert, elderly, no acute distress,  on room air.   Resp: Normal respiratory effort, aeration stable, no crackles or wheezing.   Cards: RRR w/o murmurs/rubs/gallops,no LE edema  GI: soft, nontender, nondistended, + BS     Medical Decision Making       45 MINUTES SPENT BY ME on the date of service doing chart review, history, exam, documentation & further activities per the note.      Data         Imaging results reviewed over the past 24 hrs:   No results found for this or any previous visit (from the past 24 hour(s)).

## 2023-07-21 NOTE — PLAN OF CARE
Pt remains A&Ox4, with some intermittent forgetfulness noted. Able to make need known, call light within reach.On  RA. VSS.Reports no SOB, chest pain, N/T. Pt is A+1 with walker and gait belt. Continent of B&B. LBM documented on 7/16; Pt offered PRN miralax today; no results yet. Paged provider to see if we can have more bowel regimen. Pt has had fair appetite; reported some nausea few minutes after eating lunch today with 1 episode of  emesis reported. 1:1  bedside attendant discontinued today. Pt on BA for safety. Denies active SI s/s. Right PIV infusing NS @75ml/hr.    Plan: Continue with the current POC; Awaiting LTC placement possibly on Monday per GREGG.

## 2023-07-22 PROCEDURE — 250N000013 HC RX MED GY IP 250 OP 250 PS 637: Performed by: INTERNAL MEDICINE

## 2023-07-22 PROCEDURE — 250N000011 HC RX IP 250 OP 636: Performed by: INTERNAL MEDICINE

## 2023-07-22 PROCEDURE — 258N000003 HC RX IP 258 OP 636: Performed by: INTERNAL MEDICINE

## 2023-07-22 PROCEDURE — 120N000002 HC R&B MED SURG/OB UMMC

## 2023-07-22 PROCEDURE — 250N000013 HC RX MED GY IP 250 OP 250 PS 637

## 2023-07-22 PROCEDURE — 250N000013 HC RX MED GY IP 250 OP 250 PS 637: Performed by: STUDENT IN AN ORGANIZED HEALTH CARE EDUCATION/TRAINING PROGRAM

## 2023-07-22 PROCEDURE — 99232 SBSQ HOSP IP/OBS MODERATE 35: CPT | Performed by: INTERNAL MEDICINE

## 2023-07-22 RX ORDER — PROCHLORPERAZINE 25 MG
12.5 SUPPOSITORY, RECTAL RECTAL EVERY 12 HOURS PRN
Status: DISCONTINUED | OUTPATIENT
Start: 2023-07-22 | End: 2023-07-24 | Stop reason: HOSPADM

## 2023-07-22 RX ORDER — PROCHLORPERAZINE MALEATE 5 MG
5 TABLET ORAL EVERY 6 HOURS PRN
Status: DISCONTINUED | OUTPATIENT
Start: 2023-07-22 | End: 2023-07-24 | Stop reason: HOSPADM

## 2023-07-22 RX ADMIN — FLUOXETINE 60 MG: 20 SOLUTION ORAL at 08:49

## 2023-07-22 RX ADMIN — ONDANSETRON 4 MG: 4 TABLET ORAL at 12:51

## 2023-07-22 RX ADMIN — Medication 25 MCG: at 16:49

## 2023-07-22 RX ADMIN — ONDANSETRON 4 MG: 4 TABLET ORAL at 06:15

## 2023-07-22 RX ADMIN — GABAPENTIN 200 MG: 250 SOLUTION ORAL at 16:49

## 2023-07-22 RX ADMIN — SODIUM CHLORIDE: 9 INJECTION, SOLUTION INTRAVENOUS at 10:25

## 2023-07-22 RX ADMIN — VALACYCLOVIR 500 MG: 500 TABLET, FILM COATED ORAL at 08:50

## 2023-07-22 RX ADMIN — Medication 100 MCG: at 21:04

## 2023-07-22 RX ADMIN — OLANZAPINE 2.5 MG: 5 TABLET, ORALLY DISINTEGRATING ORAL at 08:50

## 2023-07-22 RX ADMIN — MEMANTINE 10 MG: 10 TABLET ORAL at 19:59

## 2023-07-22 RX ADMIN — SENNOSIDES AND DOCUSATE SODIUM 1 TABLET: 50; 8.6 TABLET ORAL at 19:59

## 2023-07-22 RX ADMIN — Medication 2 TABLET: at 08:50

## 2023-07-22 RX ADMIN — SIMVASTATIN 40 MG: 40 TABLET, FILM COATED ORAL at 21:04

## 2023-07-22 RX ADMIN — AMPICILLIN SODIUM AND SULBACTAM SODIUM 3 G: 2; 1 INJECTION, POWDER, FOR SOLUTION INTRAMUSCULAR; INTRAVENOUS at 04:31

## 2023-07-22 RX ADMIN — CALCIUM 500 MG: 500 TABLET ORAL at 21:03

## 2023-07-22 RX ADMIN — Medication 6 MG: at 19:58

## 2023-07-22 RX ADMIN — MIRTAZAPINE 15 MG: 15 TABLET, ORALLY DISINTEGRATING ORAL at 21:03

## 2023-07-22 RX ADMIN — BISACODYL 10 MG: 10 SUPPOSITORY RECTAL at 06:16

## 2023-07-22 RX ADMIN — GABAPENTIN 200 MG: 250 SOLUTION ORAL at 08:49

## 2023-07-22 RX ADMIN — LAMOTRIGINE 25 MG: 25 TABLET ORAL at 08:50

## 2023-07-22 RX ADMIN — OLANZAPINE 10 MG: 5 TABLET, ORALLY DISINTEGRATING ORAL at 21:03

## 2023-07-22 RX ADMIN — GABAPENTIN 200 MG: 250 SOLUTION ORAL at 21:03

## 2023-07-22 RX ADMIN — PANTOPRAZOLE SODIUM 40 MG: 40 TABLET, DELAYED RELEASE ORAL at 06:15

## 2023-07-22 RX ADMIN — MEMANTINE 10 MG: 10 TABLET ORAL at 08:50

## 2023-07-22 RX ADMIN — AMPICILLIN SODIUM AND SULBACTAM SODIUM 3 G: 2; 1 INJECTION, POWDER, FOR SOLUTION INTRAMUSCULAR; INTRAVENOUS at 10:38

## 2023-07-22 RX ADMIN — ONDANSETRON 4 MG: 4 TABLET ORAL at 16:49

## 2023-07-22 RX ADMIN — AMPICILLIN SODIUM AND SULBACTAM SODIUM 3 G: 2; 1 INJECTION, POWDER, FOR SOLUTION INTRAMUSCULAR; INTRAVENOUS at 16:49

## 2023-07-22 ASSESSMENT — ACTIVITIES OF DAILY LIVING (ADL)
DIFFICULTY_COMMUNICATING: NO
ADLS_ACUITY_SCORE: 52
TRANSFERRING: 1-->ASSISTANCE (EQUIPMENT/PERSON) NEEDED
ADLS_ACUITY_SCORE: 44
ADLS_ACUITY_SCORE: 52
ADLS_ACUITY_SCORE: 54
DESCRIBE_HEARING_LOSS: HEARING LOSS ON RIGHT SIDE;HEARING LOSS ON LEFT SIDE
CONCENTRATING,_REMEMBERING_OR_MAKING_DECISIONS_DIFFICULTY: YES
DRESSING/BATHING: BATHING DIFFICULTY, ASSISTANCE 1 PERSON
ADLS_ACUITY_SCORE: 44
BATHING: 1-->ASSISTANCE NEEDED
HEARING_DIFFICULTY_OR_DEAF: YES
TOILETING: 1-->ASSISTANCE (EQUIPMENT/PERSON) NEEDED
DRESS: 1-->ASSISTANCE (EQUIPMENT/PERSON) NEEDED
DIFFICULTY_EATING/SWALLOWING: NO
DRESSING/BATHING_DIFFICULTY: YES
TOILETING: 1-->ASSISTANCE (EQUIPMENT/PERSON) NEEDED (NOT DEVELOPMENTALLY APPROPRIATE)
ADLS_ACUITY_SCORE: 44
EQUIPMENT_CURRENTLY_USED_AT_HOME: WALKER, ROLLING
ADLS_ACUITY_SCORE: 44
WEAR_GLASSES_OR_BLIND: NO
ADLS_ACUITY_SCORE: 54
ADLS_ACUITY_SCORE: 46
WALKING_OR_CLIMBING_STAIRS_DIFFICULTY: YES
WALKING_OR_CLIMBING_STAIRS: AMBULATION DIFFICULTY, REQUIRES EQUIPMENT
DRESS: 1-->ASSISTANCE (EQUIPMENT/PERSON) NEEDED (NOT DEVELOPMENTALLY APPROPRIATE)
ADLS_ACUITY_SCORE: 44
TOILETING_ASSISTANCE: TOILETING DIFFICULTY, ASSISTANCE 1 PERSON
PATIENT'S_PREFERRED_MEANS_OF_COMMUNICATION: VERBAL
PATIENT'S_PREFERRED_MEANS_OF_COMMUNICATION: VERBAL
TRANSFERRING: 0-->ASSISTANCE NEEDED (DEVELOPMENTALLY APPROPRIATE)
DOING_ERRANDS_INDEPENDENTLY_DIFFICULTY: NO
ADLS_ACUITY_SCORE: 46
TOILETING_ISSUES: YES
ADLS_ACUITY_SCORE: 48
HEARING_DIFFICULTY_OR_DEAF: OTHER (SEE COMMENTS)
CHANGE_IN_FUNCTIONAL_STATUS_SINCE_ONSET_OF_CURRENT_ILLNESS/INJURY: NO

## 2023-07-22 NOTE — PROGRESS NOTES
[facility]    Medicine Progress Note - Hospitalist Service, GOLD TEAM 16    Date of Admission:  7/16/2023    Assessment & Plan   82 year old female with history of HLD, asthma, depression, anxiety, borderline personality, bipolar disorder, GERD, osteoporosis, and gastric bypass who was transferred from [facility] medicine to station 3B with worsening depression. She was initially admitted to [facility] 5/5/2023 with hypoglycemia, hypotension, and possible SI in the setting of failure to thrive, initially admitted on 6/7/23 to psychiatry/behavioral health unit. Patient developed hypotension and concern for sepsis 2/2 aspiration pneumonia on 7/15, transferred to medicine service at that time.   SLP was consulted. She also was started on antibiotics.      Hypotension, concern for septic shock (7/15) - Resolved  Sepsis, suspect 2/2 aspiration pneumonia (7/15) - Resolved   CXR 7/15 concerning for diffuse bilateral hazy opacities favoring infection/inflammation.    - Breathing stable.    - Last day of Unasyn today. Clinically stable.   - Mild pharyngeal dysphagia & esophageal dysphagia - I had an extensive conversation with SLP, pt doesn't need further evaluation for dysphagia at this moment. Continue with regular diet with thin liquids as tolerated.     Depression, anxiety, bipolar disorder, borderline personality, dementia w/ behavioral disturbance  - Psychiatry following the patient, input appreciated. Mood appears to be stable. Patient doesn't need to return to inpatient psych. She will go back to her nursing home on 7/24.      Chronic hyponatremia:   - Na stable. Continue monitoring.      Failure to thrive  Severe protein calorie malnutrition  h/o gastric bypass (~27yr ago, unclear procedure)  Reflux/regurgitation vs N/V vs eating disorder  Acute on chronic constipation with hx of overflow diarrhea   GERD  Vit B12 def, Hx of Iron def Anemeia  GI consulted on 6/16-6/26 (signed  off)    GI recommendations:  Recommendations:  -- No indication for endoscopic GI intervention at this time given age, history of dementia/underlying psychiatric illnesses and based on esophagram/upper GI and CT imaging obtained this admission.  -- Continue with psych therapies to treat depression and evaluate for eating disorder given ongoing observed self-induced vomiting.  -- Consider palliative consult to help further assist with GOC if without improvement in course/sx.  -- Continue with scheduled bowel med regimen and titrate to ensure patient having at least x1 soft BM daily given chronic hx of constipation.  -- Continue detailed documentation of stool appearance, including color, consistency, frequency and amount.   -- Continue PPI at discharge.        Asthma: Stable, no scheduled meds. Continue prn Symbicort.  HLD: Continue statin  Osteoporosis: Encourage good oral intake. Continue close OP follow up with PCP for chronic management   Hx of HSV: continue PTA Valtrex qday         Diet: Regular Diet Adult Thin Liquids (level 0)  Snacks/Supplements Adult: Other; Food snacks TID - 10AM: cottage cheese + fruit cup, 2PM: pudding + fruit cup, 8 PM: Magic Cup (mitchell or van); Between Meals    DVT Prophylaxis: Pneumatic Compression Devices and Anti-embolisim stockings (TEDs)  Cortez Catheter: Not present  Lines: None     Cardiac Monitoring: None  Code Status: No CPR- Do NOT Intubate      Clinically Significant Risk Factors              # Hypoalbuminemia: Lowest albumin = 2.8 g/dL at 7/16/2023  4:41 AM, will monitor as appropriate                     Disposition Plan      Expected Discharge Date: 07/24/2023      Destination: long-term care facility  Discharge Comments: back to facility        Errol Chavez MD  Hospitalist Service, [address]  Securely message with [employer] (more info)  Text page via [facility] Paging/Directory   See signed in provider for up to  date coverage information  ______________________________________________________________________    Interval History     No acute events overnight.   Mood stable.   Breathing stable. She is afebrile. No new issues. Respiratory status stable.    Sitter discontinued. No behavior issues so far.     Physical Exam   Vital Signs: Temp: 98.4  F (36.9  C) Temp src: Oral BP: 130/84 Pulse: 55   Resp: 16 SpO2: 99 % O2 Device: None (Room air)    Weight: 0 lbs 0 oz    General: Alert, elderly, no acute distress, on room air.   Resp: Normal respiratory effort, aeration stable, no crackles or wheezing.   Cards: RRR w/o murmurs/rubs/gallops,no LE edema  GI: soft, nontender, nondistended, + BS     Medical Decision Making       45 MINUTES SPENT BY ME on the date of service doing chart review, history, exam, documentation & further activities per the note.      Data         Imaging results reviewed over the past 24 hrs:   No results found for this or any previous visit (from the past 24 hour(s)).

## 2023-07-22 NOTE — PLAN OF CARE
Pt remains A&Ox4, with intermittent forgetfulness. On  RA. VSS.Reports no SOB, chest pain, N/T. Pt is SBA with walker and gait belt. Continent of B&B. Pt was given Dulcolax suppository this morning and has had 3 episodes of BM this shift.  Pt has had poor appetite; still reports  Nausea with each meal with some emesis realized even with the scheduled Zofran before meals.Pt's BP was elevated this evening as well. Provider paged and advised to continue to monitor BP and added new PRN order for IV compazine. Denies active SI s/s. Left PIV infusing NS @75ml/hr.Pt is able to make needs known, call light provided and within reach.     Plan: Continue with the current POC; Awaiting LTC placement possibly on Monday per GREGG.

## 2023-07-23 ENCOUNTER — APPOINTMENT (OUTPATIENT)
Dept: GENERAL RADIOLOGY | Facility: CLINIC | Age: 83
DRG: 871 | End: 2023-07-23
Attending: INTERNAL MEDICINE
Payer: COMMERCIAL

## 2023-07-23 LAB
ALBUMIN UR-MCNC: NEGATIVE MG/DL
APPEARANCE UR: CLEAR
BILIRUB UR QL STRIP: NEGATIVE
COLOR UR AUTO: NORMAL
GLUCOSE UR STRIP-MCNC: NEGATIVE MG/DL
HGB UR QL STRIP: NEGATIVE
HOLD SPECIMEN: NORMAL
HOLD SPECIMEN: NORMAL
KETONES UR STRIP-MCNC: NEGATIVE MG/DL
LEUKOCYTE ESTERASE UR QL STRIP: NEGATIVE
NITRATE UR QL: NEGATIVE
PH UR STRIP: 7 [PH] (ref 5–7)
RBC URINE: <1 /HPF
SP GR UR STRIP: 1.01 (ref 1–1.03)
SQUAMOUS EPITHELIAL: <1 /HPF
UROBILINOGEN UR STRIP-MCNC: NORMAL MG/DL
WBC URINE: 1 /HPF

## 2023-07-23 PROCEDURE — 250N000013 HC RX MED GY IP 250 OP 250 PS 637

## 2023-07-23 PROCEDURE — 250N000011 HC RX IP 250 OP 636: Performed by: INTERNAL MEDICINE

## 2023-07-23 PROCEDURE — 71045 X-RAY EXAM CHEST 1 VIEW: CPT | Mod: 26 | Performed by: RADIOLOGY

## 2023-07-23 PROCEDURE — 87040 BLOOD CULTURE FOR BACTERIA: CPT | Performed by: INTERNAL MEDICINE

## 2023-07-23 PROCEDURE — 120N000002 HC R&B MED SURG/OB UMMC

## 2023-07-23 PROCEDURE — 36415 COLL VENOUS BLD VENIPUNCTURE: CPT | Performed by: INTERNAL MEDICINE

## 2023-07-23 PROCEDURE — 250N000013 HC RX MED GY IP 250 OP 250 PS 637: Performed by: INTERNAL MEDICINE

## 2023-07-23 PROCEDURE — 99232 SBSQ HOSP IP/OBS MODERATE 35: CPT | Performed by: INTERNAL MEDICINE

## 2023-07-23 PROCEDURE — 81001 URINALYSIS AUTO W/SCOPE: CPT | Performed by: INTERNAL MEDICINE

## 2023-07-23 PROCEDURE — 258N000003 HC RX IP 258 OP 636: Performed by: INTERNAL MEDICINE

## 2023-07-23 PROCEDURE — 71045 X-RAY EXAM CHEST 1 VIEW: CPT

## 2023-07-23 RX ORDER — OLANZAPINE 10 MG/1
10 TABLET, ORALLY DISINTEGRATING ORAL AT BEDTIME
Qty: 30 TABLET | Refills: 0 | Status: SHIPPED | OUTPATIENT
Start: 2023-07-23 | End: 2023-07-23

## 2023-07-23 RX ORDER — LANOLIN ALCOHOL/MO/W.PET/CERES
6 CREAM (GRAM) TOPICAL EVERY EVENING
Qty: 60 TABLET | Refills: 0 | Status: SHIPPED | OUTPATIENT
Start: 2023-07-23

## 2023-07-23 RX ORDER — GABAPENTIN 250 MG/5ML
200 SOLUTION ORAL 3 TIMES DAILY
Qty: 470 ML | Refills: 0 | Status: SHIPPED | OUTPATIENT
Start: 2023-07-23

## 2023-07-23 RX ORDER — OLANZAPINE 5 MG/1
5 TABLET, ORALLY DISINTEGRATING ORAL 3 TIMES DAILY PRN
Qty: 30 TABLET | Refills: 0 | Status: SHIPPED | OUTPATIENT
Start: 2023-07-23 | End: 2023-07-23

## 2023-07-23 RX ORDER — OLANZAPINE 5 MG/1
5 TABLET, ORALLY DISINTEGRATING ORAL 3 TIMES DAILY PRN
Qty: 30 TABLET | Refills: 0 | Status: SHIPPED | OUTPATIENT
Start: 2023-07-23 | End: 2023-08-10

## 2023-07-23 RX ORDER — CALCIUM CARBONATE 500(1250)
500 TABLET ORAL AT BEDTIME
Qty: 30 TABLET | Refills: 0 | Status: SHIPPED | OUTPATIENT
Start: 2023-07-23 | End: 2023-07-23

## 2023-07-23 RX ORDER — FLUOXETINE 20 MG/5ML
60 SOLUTION ORAL DAILY
Qty: 120 ML | Refills: 1 | Status: SHIPPED | OUTPATIENT
Start: 2023-07-24 | End: 2023-07-23

## 2023-07-23 RX ORDER — LAMOTRIGINE 25 MG/1
25 TABLET ORAL DAILY
Qty: 30 TABLET | Refills: 0 | Status: SHIPPED | OUTPATIENT
Start: 2023-07-24 | End: 2023-07-23

## 2023-07-23 RX ORDER — LAMOTRIGINE 25 MG/1
25 TABLET ORAL DAILY
Qty: 30 TABLET | Refills: 0 | Status: SHIPPED | OUTPATIENT
Start: 2023-07-24

## 2023-07-23 RX ORDER — MEMANTINE HYDROCHLORIDE 10 MG/1
10 TABLET ORAL 2 TIMES DAILY
Qty: 60 TABLET | Refills: 0 | Status: SHIPPED | OUTPATIENT
Start: 2023-07-23 | End: 2023-07-23

## 2023-07-23 RX ORDER — OLANZAPINE 5 MG/1
5 TABLET, ORALLY DISINTEGRATING ORAL EVERY MORNING
Qty: 30 TABLET | Refills: 0 | Status: SHIPPED | OUTPATIENT
Start: 2023-07-24 | End: 2023-08-10

## 2023-07-23 RX ORDER — LANOLIN ALCOHOL/MO/W.PET/CERES
6 CREAM (GRAM) TOPICAL EVERY EVENING
Qty: 60 TABLET | Refills: 0 | Status: SHIPPED | OUTPATIENT
Start: 2023-07-23 | End: 2023-07-23

## 2023-07-23 RX ORDER — FLUOXETINE 20 MG/5ML
60 SOLUTION ORAL DAILY
Qty: 120 ML | Refills: 1 | Status: SHIPPED | OUTPATIENT
Start: 2023-07-24

## 2023-07-23 RX ORDER — OLANZAPINE 10 MG/1
10 TABLET, ORALLY DISINTEGRATING ORAL AT BEDTIME
Qty: 30 TABLET | Refills: 0 | Status: SHIPPED | OUTPATIENT
Start: 2023-07-23

## 2023-07-23 RX ORDER — CALCIUM CARBONATE 500(1250)
500 TABLET ORAL AT BEDTIME
Qty: 30 TABLET | Refills: 0 | Status: SHIPPED | OUTPATIENT
Start: 2023-07-23

## 2023-07-23 RX ORDER — GABAPENTIN 250 MG/5ML
200 SOLUTION ORAL 3 TIMES DAILY
Qty: 470 ML | Refills: 0 | Status: SHIPPED | OUTPATIENT
Start: 2023-07-23 | End: 2023-07-23

## 2023-07-23 RX ORDER — PANTOPRAZOLE SODIUM 40 MG/1
40 TABLET, DELAYED RELEASE ORAL
Qty: 30 TABLET | Refills: 0 | Status: SHIPPED | OUTPATIENT
Start: 2023-07-24 | End: 2023-07-23

## 2023-07-23 RX ORDER — OLANZAPINE 5 MG/1
5 TABLET, ORALLY DISINTEGRATING ORAL EVERY MORNING
Qty: 30 TABLET | Refills: 0 | Status: SHIPPED | OUTPATIENT
Start: 2023-07-24 | End: 2023-07-23

## 2023-07-23 RX ORDER — FAMOTIDINE 20 MG/1
20 TABLET, FILM COATED ORAL DAILY
Qty: 30 TABLET | Refills: 0 | Status: SHIPPED | OUTPATIENT
Start: 2023-07-23

## 2023-07-23 RX ORDER — PANTOPRAZOLE SODIUM 40 MG/1
40 TABLET, DELAYED RELEASE ORAL
Qty: 30 TABLET | Refills: 0 | Status: SHIPPED | OUTPATIENT
Start: 2023-07-24

## 2023-07-23 RX ORDER — MEMANTINE HYDROCHLORIDE 10 MG/1
10 TABLET ORAL 2 TIMES DAILY
Qty: 60 TABLET | Refills: 0 | Status: SHIPPED | OUTPATIENT
Start: 2023-07-23

## 2023-07-23 RX ADMIN — VALACYCLOVIR 500 MG: 500 TABLET, FILM COATED ORAL at 07:58

## 2023-07-23 RX ADMIN — MIRTAZAPINE 15 MG: 15 TABLET, ORALLY DISINTEGRATING ORAL at 21:06

## 2023-07-23 RX ADMIN — OLANZAPINE 10 MG: 5 TABLET, ORALLY DISINTEGRATING ORAL at 21:06

## 2023-07-23 RX ADMIN — MEMANTINE 10 MG: 10 TABLET ORAL at 07:57

## 2023-07-23 RX ADMIN — PROCHLORPERAZINE EDISYLATE 5 MG: 5 INJECTION INTRAMUSCULAR; INTRAVENOUS at 08:10

## 2023-07-23 RX ADMIN — SIMVASTATIN 40 MG: 40 TABLET, FILM COATED ORAL at 21:06

## 2023-07-23 RX ADMIN — SENNOSIDES AND DOCUSATE SODIUM 1 TABLET: 50; 8.6 TABLET ORAL at 07:58

## 2023-07-23 RX ADMIN — Medication 6 MG: at 19:52

## 2023-07-23 RX ADMIN — FLUOXETINE 60 MG: 20 SOLUTION ORAL at 07:58

## 2023-07-23 RX ADMIN — MEMANTINE 10 MG: 10 TABLET ORAL at 19:52

## 2023-07-23 RX ADMIN — ONDANSETRON 4 MG: 4 TABLET ORAL at 06:23

## 2023-07-23 RX ADMIN — PANTOPRAZOLE SODIUM 40 MG: 40 TABLET, DELAYED RELEASE ORAL at 06:23

## 2023-07-23 RX ADMIN — ONDANSETRON 4 MG: 4 TABLET ORAL at 16:30

## 2023-07-23 RX ADMIN — LAMOTRIGINE 25 MG: 25 TABLET ORAL at 07:57

## 2023-07-23 RX ADMIN — GABAPENTIN 200 MG: 250 SOLUTION ORAL at 16:31

## 2023-07-23 RX ADMIN — Medication 100 MCG: at 21:06

## 2023-07-23 RX ADMIN — GABAPENTIN 200 MG: 250 SOLUTION ORAL at 21:29

## 2023-07-23 RX ADMIN — CALCIUM 500 MG: 500 TABLET ORAL at 21:06

## 2023-07-23 RX ADMIN — Medication 2 TABLET: at 07:57

## 2023-07-23 RX ADMIN — ONDANSETRON 4 MG: 4 TABLET ORAL at 12:19

## 2023-07-23 RX ADMIN — OLANZAPINE 2.5 MG: 5 TABLET, ORALLY DISINTEGRATING ORAL at 07:57

## 2023-07-23 RX ADMIN — SODIUM CHLORIDE: 9 INJECTION, SOLUTION INTRAVENOUS at 01:39

## 2023-07-23 RX ADMIN — SENNOSIDES AND DOCUSATE SODIUM 1 TABLET: 50; 8.6 TABLET ORAL at 19:52

## 2023-07-23 RX ADMIN — Medication 25 MCG: at 16:30

## 2023-07-23 RX ADMIN — GABAPENTIN 200 MG: 250 SOLUTION ORAL at 07:58

## 2023-07-23 ASSESSMENT — ACTIVITIES OF DAILY LIVING (ADL)
ADLS_ACUITY_SCORE: 44

## 2023-07-23 NOTE — PLAN OF CARE
Received lying on bed, alert and oriented 4x. In room air.   Patient has intermittent forgetful  No complaint of Pain, N&V, SOB  Regular diet/Thin liquids - no straw, perform sitting position while drinking/eating  No voiding issues  PIV line at left lower arm, with ongoing NS at 75ml/hr   Reminded to use call bel for assistance.  Expected to discharge on Monday to LTC  No acute changes on the entire shift

## 2023-07-23 NOTE — PROGRESS NOTES
[facility]    Medicine Progress Note - Hospitalist Service, GOLD TEAM 16    Date of Admission:  7/16/2023    Assessment & Plan   82 year old female with history of HLD, asthma, depression, anxiety, borderline personality, bipolar disorder, GERD, osteoporosis, and gastric bypass who was transferred from [facility] medicine to station 3B with worsening depression. She was initially admitted to [facility] 5/5/2023 with hypoglycemia, hypotension, and possible SI in the setting of failure to thrive, initially admitted on 6/7/23 to psychiatry/behavioral health unit. Patient developed hypotension and concern for sepsis 2/2 aspiration pneumonia on 7/15, transferred to medicine service at that time.   SLP was consulted. She also was started on antibiotics.      Hypotension, concern for septic shock (7/15) - Resolved  Sepsis, suspect 2/2 aspiration pneumonia (7/15) - Resolved     - Breathing stable.    - Completed antibiotic treatment with Unasyn. Respiratory status remained stable, she is afebrile.   - Mild pharyngeal dysphagia & esophageal dysphagia - I had an extensive conversation with SLP, pt doesn't need further evaluation for dysphagia at this moment. Continue with regular diet with thin liquids as tolerated.     Depression, anxiety, bipolar disorder, borderline personality, dementia w/ behavioral disturbance  - Psychiatry following the patient, input appreciated. Mood appears to be stable. Patient doesn't need to return to inpatient psych. She will go back to her nursing home on 7/24.      Chronic hyponatremia:   - Na stable. Continue monitoring.      Failure to thrive  Severe protein calorie malnutrition  h/o gastric bypass (~27yr ago, unclear procedure)  Reflux/regurgitation vs N/V vs eating disorder  Acute on chronic constipation with hx of overflow diarrhea   GERD  Vit B12 def, Hx of Iron def Anemeia  GI consulted on 6/16-6/26 (signed off)    GI  recommendations:  Recommendations:  -- No indication for endoscopic GI intervention at this time given age, history of dementia/underlying psychiatric illnesses and based on esophagram/upper GI and CT imaging obtained this admission.  -- Continue with psych therapies to treat depression and evaluate for eating disorder given ongoing observed self-induced vomiting.  -- Consider palliative consult to help further assist with GOC if without improvement in course/sx.  -- Continue with scheduled bowel med regimen and titrate to ensure patient having at least x1 soft BM daily given chronic hx of constipation.  -- Continue detailed documentation of stool appearance, including color, consistency, frequency and amount.   -- Continue PPI at discharge.        Asthma: Stable, no scheduled meds. Continue prn Symbicort.  HLD: Continue statin  Osteoporosis: Encourage good oral intake. Continue close OP follow up with PCP for chronic management   Hx of HSV: continue PTA Valtrex qday         Diet: Regular Diet Adult Thin Liquids (level 0)  Snacks/Supplements Adult: Other; Food snacks TID - 10AM: cottage cheese + fruit cup, 2PM: pudding + fruit cup, 8 PM: Magic Cup (mitchell or van); Between Meals    DVT Prophylaxis: Pneumatic Compression Devices and Anti-embolisim stockings (TEDs)  Cortez Catheter: Not present  Lines: None     Cardiac Monitoring: None  Code Status: No CPR- Do NOT Intubate      Clinically Significant Risk Factors              # Hypoalbuminemia: Lowest albumin = 2.8 g/dL at 7/16/2023  4:41 AM, will monitor as appropriate                     Disposition Plan      Expected Discharge Date: 07/24/2023      Destination: long-term care facility  Discharge Comments: back to facility        Errol Chavez MD  Hospitalist Service, GOLD TEAM [address] [place] [address]  Securely message with [employer] (more info)  Text page via [facility] Paging/Directory   See signed in provider for up to date  coverage information  ______________________________________________________________________    Interval History     No acute events overnight.   Sitter removed. No new issues.   Mood stable.   Breathing stable. She is afebrile. No new issues. Respiratory status stable.    No behavior issues so far.     Physical Exam   Vital Signs: Temp: 98.4  F (36.9  C) Temp src: Oral BP: 111/51 Pulse: 63   Resp: 18 SpO2: 99 % O2 Device: None (Room air)    Weight: 0 lbs 0 oz    General: Alert, elderly, no acute distress, on room air.   Resp: Normal respiratory effort, aeration stable, no crackles or wheezing.   Cards: RRR w/o murmurs/rubs/gallops,no LE edema  GI: soft, nontender, nondistended, + BS     Medical Decision Making       45 MINUTES SPENT BY ME on the date of service doing chart review, history, exam, documentation & further activities per the note.      Data         Imaging results reviewed over the past 24 hrs:   No results found for this or any previous visit (from the past 24 hour(s)).

## 2023-07-23 NOTE — PROGRESS NOTES
Care Management Follow Up    Length of Stay (days): 6    Expected Discharge Date: 07/24/2023     Concerns to be Addressed: discharge planning, mental health     Patient plan of care discussed at interdisciplinary rounds: Yes    Anticipated Discharge Disposition: Long Term Care     Anticipated Discharge Services: None  Anticipated Discharge DME: None    Patient/family educated on Medicare website which has current facility and service quality ratings: no  Education Provided on the Discharge Plan: Yes  Patient/Family in Agreement with the Plan: yes    Referrals Placed by CM/SW: External Care Coordination  Private pay costs discussed: Not applicable    Additional Information:    Writer left VM for Josefina at [facility] to check when ride can be set up.    Writer set up ride for tomorrow- 7/24 from 11:40AM-12:25PM to go to [facility] [place]. Writer spoke with her nurse and she has been walking around a lot and can use a wheelchair easily.    Spoke with Zara she is aware of discharge plan and time patient will arrive. The facility will fill the [facility].    Contacts:    [facility]  [address] [address] [address]  Phone: [phone]  **Point of contact is FERNANDO Ornelas, LGSW  6 Med Surg   [facility]  Phone: [phone]  Pager: [phone]

## 2023-07-23 NOTE — PLAN OF CARE
Pt remains A&Ox3; Disoriented to time with  intermittent forgetfulness. Able to make need known, call light within reach.On  RA. VSS.Reports no SOB, chest pain, N/T. Pt is SBA with walker and gait belt. Continent of B&B. LBM documented on 7/22;  Pt has had fair appetite; reported some nausea few minutes after b/fast with episode of  emesis reported. PRN IV compazine utilized with relief reported. Denies active SI s/s. Right PIV SL. Continuous NS infusion discontinued today.     1700: Pt endorses elevated BP and fever. Provider paged; New orders for blood work and chest x-ray. Urine sample sent to lab.     Plan: Continue with the current POC; Awaiting LTC placement tomorrow per SW; Ride set up for tomorrow- 7/24 from 11:40AM-12:25PM.

## 2023-07-24 VITALS
TEMPERATURE: 98.2 F | OXYGEN SATURATION: 96 % | DIASTOLIC BLOOD PRESSURE: 50 MMHG | HEART RATE: 60 BPM | SYSTOLIC BLOOD PRESSURE: 109 MMHG | RESPIRATION RATE: 16 BRPM

## 2023-07-24 LAB
HOLD SPECIMEN: NORMAL
MAGNESIUM SERPL-MCNC: 2.4 MG/DL (ref 1.7–2.3)
PHOSPHATE SERPL-MCNC: 2.8 MG/DL (ref 2.5–4.5)

## 2023-07-24 PROCEDURE — 99239 HOSP IP/OBS DSCHRG MGMT >30: CPT | Performed by: INTERNAL MEDICINE

## 2023-07-24 PROCEDURE — 250N000013 HC RX MED GY IP 250 OP 250 PS 637

## 2023-07-24 PROCEDURE — 36415 COLL VENOUS BLD VENIPUNCTURE: CPT | Performed by: INTERNAL MEDICINE

## 2023-07-24 PROCEDURE — 250N000011 HC RX IP 250 OP 636: Performed by: INTERNAL MEDICINE

## 2023-07-24 PROCEDURE — 83735 ASSAY OF MAGNESIUM: CPT | Performed by: INTERNAL MEDICINE

## 2023-07-24 PROCEDURE — 84100 ASSAY OF PHOSPHORUS: CPT | Performed by: INTERNAL MEDICINE

## 2023-07-24 PROCEDURE — 250N000013 HC RX MED GY IP 250 OP 250 PS 637: Performed by: INTERNAL MEDICINE

## 2023-07-24 RX ADMIN — SENNOSIDES AND DOCUSATE SODIUM 1 TABLET: 50; 8.6 TABLET ORAL at 08:40

## 2023-07-24 RX ADMIN — VALACYCLOVIR 500 MG: 500 TABLET, FILM COATED ORAL at 08:40

## 2023-07-24 RX ADMIN — OLANZAPINE 2.5 MG: 5 TABLET, ORALLY DISINTEGRATING ORAL at 08:40

## 2023-07-24 RX ADMIN — POLYETHYLENE GLYCOL 3350 17 G: 17 POWDER, FOR SOLUTION ORAL at 08:41

## 2023-07-24 RX ADMIN — MEMANTINE 10 MG: 10 TABLET ORAL at 08:40

## 2023-07-24 RX ADMIN — ONDANSETRON 4 MG: 4 TABLET ORAL at 05:42

## 2023-07-24 RX ADMIN — GABAPENTIN 200 MG: 250 SOLUTION ORAL at 08:49

## 2023-07-24 RX ADMIN — Medication 2 TABLET: at 08:40

## 2023-07-24 RX ADMIN — LAMOTRIGINE 25 MG: 25 TABLET ORAL at 08:40

## 2023-07-24 RX ADMIN — FLUOXETINE 60 MG: 20 SOLUTION ORAL at 08:48

## 2023-07-24 RX ADMIN — PANTOPRAZOLE SODIUM 40 MG: 40 TABLET, DELAYED RELEASE ORAL at 05:42

## 2023-07-24 ASSESSMENT — ACTIVITIES OF DAILY LIVING (ADL)
ADLS_ACUITY_SCORE: 44

## 2023-07-24 NOTE — PLAN OF CARE
Received lying on bed, alert and oriented 4x. With intermittent forgetful,  In room air.   No episode of N&V, SOB, pain  Regular diet/Thin liquids - no straw, sitting position while drinking/eating  No voiding issues  Use walker during ambulation  PIV line at left lower arm, saline locked  Instructed to use call bell for assistance.  No acute changes on this shift  For discharge today at around 11:40AM-12:25PM to [facility] [place]

## 2023-07-24 NOTE — DISCHARGE SUMMARY
[facility]  Hospitalist Discharge Summary      Date of Admission:  7/16/2023  Date of Discharge:  7/24/2023  Discharging Provider: Errol Chavez MD  Discharge Service: Hospitalist Service, GOLD TEAM 16    Discharge Diagnoses     Aspiration pneumonia   Hypotension   Fever   Depression     Clinically Significant Risk Factors          Follow-ups Needed After Discharge   Follow-up Appointments     Adult [facility] Follow-up and recommended labs and tests      Follow up with primary care provider, Inder Lindsey, within 7 days for   hospital follow- up.  No follow up labs or test are needed.      Appointments on [place] and/or [facility] (with [facility] or [facility]   provider or service). Call [phone] if you haven't heard regarding   these appointments within 7 days of discharge.            Unresulted Labs Ordered in the Past 30 Days of this Admission       Date and Time Order Name Status Description    7/23/2023  5:34 PM Blood Culture Arm, Right Preliminary     7/23/2023  5:34 PM Blood Culture Arm, Left Preliminary             Discharge Disposition   Discharged to nursing home  Condition at discharge: Stable    Hospital Course   82 year old female with history of HLD, asthma, depression, anxiety, borderline personality, bipolar disorder, GERD, osteoporosis, and gastric bypass who was transferred from [facility] medicine to station 3B with worsening depression. She was initially admitted to [facility] 5/5/2023 with hypoglycemia, hypotension, and possible SI in the setting of failure to thrive, initially admitted on 6/7/23 to psychiatry/behavioral health unit. Patient developed hypotension and concern for sepsis 2/2 aspiration pneumonia on 7/15, transferred to medicine service at that time.   SLP was consulted. Patient was transferred to the medical floor. She completed treatment with IV ABX, respiratory status remained stable. She was afebrile. Patient was hemodynamically stable  on discharge.       Hypotension, concern for septic shock (7/15) - Resolved  Sepsis, suspect 2/2 aspiration pneumonia (7/15) - Resolved     - Breathing stable.    - Completed antibiotic treatment with Unasyn. Respiratory status remained stable, she is afebrile.   - Mild pharyngeal dysphagia & esophageal dysphagia - I had an extensive conversation with SLP, pt doesn't need further evaluation for dysphagia at this moment. Continue with regular diet with thin liquids as tolerated.     Depression, anxiety, bipolar disorder, borderline personality, dementia w/ behavioral disturbance  - Psychiatry following the patient, input appreciated. Mood appears to be stable. Patient doesn't need to return to inpatient psych. She will go back to her nursing home on 7/24.      Chronic hyponatremia:   - Na stable. Continue monitoring.      Failure to thrive  Severe protein calorie malnutrition  h/o gastric bypass (~27yr ago, unclear procedure)  Reflux/regurgitation vs N/V vs eating disorder  Acute on chronic constipation with hx of overflow diarrhea   GERD  Vit B12 def, Hx of Iron def Anemeia  GI consulted on 6/16-6/26 (signed off)    GI recommendations:  Recommendations:  -- No indication for endoscopic GI intervention at this time given age, history of dementia/underlying psychiatric illnesses and based on esophagram/upper GI and CT imaging obtained this admission.  -- Continue with psych therapies to treat depression and evaluate for eating disorder given ongoing observed self-induced vomiting.  -- Consider palliative consult to help further assist with GOC if without improvement in course/sx.  -- Continue with scheduled bowel med regimen and titrate to ensure patient having at least x1 soft BM daily given chronic hx of constipation.  -- Continue detailed documentation of stool appearance, including color, consistency, frequency and amount.   -- Continue PPI at discharge.        Asthma: Stable, no scheduled meds. Continue prn  Symbicort.  HLD: Continue statin  Osteoporosis: Encourage good oral intake. Continue close OP follow up with PCP for chronic management   Hx of HSV: continue PTA Valtrex qday      Consultations This Hospital Stay   NUTRITION SERVICES ADULT IP CONSULT  SPEECH LANGUAGE PATH ADULT IP CONSULT  PSYCHIATRY IP CONSULT  PSYCHIATRY IP CONSULT  SPEECH LANGUAGE PATH ADULT IP CONSULT  CARE MANAGEMENT / SOCIAL WORK IP CONSULT    Code Status   No CPR- Do NOT Intubate    Time Spent on this Encounter   I, Errol Chavez MD, personally saw the patient today and spent greater than 30 minutes discharging this patient.       Errol Chavez MD  [facility] MED SURG  [address] [facility] [address]  Phone: [phone]  Fax: [phone]  ______________________________________________________________________    Physical Exam   Vital Signs: Temp: 98.2  F (36.8  C) Temp src: Oral BP: 109/50 Pulse: 60   Resp: 16 SpO2: 96 % O2 Device: None (Room air)    Weight: 0 lbs 0 oz  General: Alert, elderly, no acute distress, on room air.   Resp: Normal respiratory effort, aeration stable, no crackles or wheezing.   Cards: RRR w/o murmurs/rubs/gallops,no LE edema  GI: soft, nontender, nondistended, + BS         Primary Care Physician   Inder Lindsey    Discharge Orders      Reason for your hospital stay    82 year old female with history of HLD, asthma, depression, anxiety, borderline personality, bipolar disorder, GERD, osteoporosis, and gastric bypass who was transferred from [facility] medicine to station 3B with worsening depression. She was initially admitted to [facility] 5/5/2023 with hypoglycemia, hypotension, and possible SI in the setting of failure to thrive, initially admitted on 6/7/23 to psychiatry/behavioral health unit. Patient developed hypotension and concern for sepsis 2/2 aspiration pneumonia on 7/15, transferred to medicine service at that time.   SLP was consulted. She also was started on antibiotics.      Activity    Your activity upon discharge: activity as tolerated     Adult [email or web address] Follow-up and recommended labs and tests    Follow up with primary care provider, Inder Lindsey, within 7 days for hospital follow- up.  No follow up labs or test are needed.      Appointments on [place] and/or [facility] (with [facility] [[email or web address] or [facility] provider or service). Call [phone] if you haven't heard regarding these appointments within 7 days of discharge.     Diet    Follow this diet upon discharge: Orders Placed This Encounter      Snacks/Supplements Adult: Other; Food snacks TID - 10AM: cottage cheese + fruit cup, 2PM: pudding + fruit cup, 8 PM: Magic Cup (mitchell or van); Between Meals      Regular Diet Adult Thin Liquids (level 0)       Significant Results and Procedures   Results for orders placed or performed during the hospital encounter of 07/16/23   XR Chest Port 1 View    Narrative    EXAMINATION: XR CHEST PORT 1 VIEW, 7/23/2023 5:51 PM    COMPARISON: 7/15/2023.    HISTORY: Fever, recently managed for presumed aspiration PNA. Now  septic, r/u worsening airspace disease    FINDINGS: Mixed opacities predominately seen in the right lung base.  These have increased from 6/15/2023 and comparison to 7/15/2020  limited due to rotation on that previous film. Heart size is normal.  Left lung relatively clear. No pneumothorax.      Impression    IMPRESSION: Right basilar mixed pulmonary opacities suspicious for  pneumonia.     KENNY CARRINGTON MD         SYSTEM ID:  [id number]       Discharge Medications   Current Discharge Medication List        START taking these medications    Details   calcium carbonate 500 mg, elemental, (OSCAL) 500 MG tablet Take 1 tablet (500 mg) by mouth At Bedtime  Qty: 30 tablet, Refills: 0    Associated Diagnoses: Suicidal ideation      FLUoxetine (PROZAC) 20 MG/5ML solution Take 15 mLs (60 mg) by mouth daily  Qty: 120 mL, Refills: 1    Associated Diagnoses: Suicidal ideation      gabapentin  (NEURONTIN) 250 MG/5ML solution Take 4 mLs (200 mg) by mouth 3 times daily  Qty: 470 mL, Refills: 0    Associated Diagnoses: Suicidal ideation      lamoTRIgine (LAMICTAL) 25 MG tablet Take 1 tablet (25 mg) by mouth daily  Qty: 30 tablet, Refills: 0    Associated Diagnoses: Suicidal ideation      melatonin 3 MG tablet Take 2 tablets (6 mg) by mouth every evening  Qty: 60 tablet, Refills: 0    Associated Diagnoses: Suicidal ideation      memantine (NAMENDA) 10 MG tablet Take 1 tablet (10 mg) by mouth 2 times daily  Qty: 60 tablet, Refills: 0    Associated Diagnoses: Suicidal ideation      !! OLANZapine zydis (ZYPREXA) 10 MG ODT Take 1 tablet (10 mg) by mouth At Bedtime  Qty: 30 tablet, Refills: 0    Associated Diagnoses: Suicidal ideation      !! OLANZapine zydis (ZYPREXA) 5 MG ODT Take 1 tablet (5 mg) by mouth 3 times daily as needed for agitation  Qty: 30 tablet, Refills: 0    Associated Diagnoses: Suicidal ideation      !! OLANZapine zydis (ZYPREXA) 5 MG ODT Take 1 tablet (5 mg) by mouth every morning Take 2.5 mg every morning  Qty: 30 tablet, Refills: 0    Associated Diagnoses: Suicidal ideation      pantoprazole (PROTONIX) 40 MG EC tablet Take 1 tablet (40 mg) by mouth every morning (before breakfast)  Qty: 30 tablet, Refills: 0    Associated Diagnoses: Suicidal ideation       !! - Potential duplicate medications found. Please discuss with provider.        CONTINUE these medications which have CHANGED    Details   famotidine (PEPCID) 20 MG tablet Take 1 tablet (20 mg) by mouth daily  Qty: 30 tablet, Refills: 0    Associated Diagnoses: Borderline personality disorder (H); Depressive disorder; Failure to thrive in adult; Bipolar affective disorder, remission status unspecified (H)           CONTINUE these medications which have NOT CHANGED    Details   acetaminophen (TYLENOL) 325 MG tablet Take 2 tablets (650 mg) by mouth every 6 hours as needed for mild pain or other (and adjunct with moderate or severe pain or  per patient request)    Associated Diagnoses: Failure to thrive in adult; Depressive disorder; Borderline personality disorder (H); Bipolar affective disorder, remission status unspecified (H)      budesonide-formoterol (SYMBICORT) 80-4.5 MCG/ACT Inhaler Inhale 2 puffs into the lungs 2 times daily as needed (Shortness of breath, wheezing)  Qty: 10.2 g, Refills: 3    Associated Diagnoses: Mild intermittent asthma, unspecified whether complicated      calcium carbonate (TUMS) 500 MG chewable tablet Chew 2 tabs every 3 hours as needed      cholecalciferol (VITAMIN D3) 1000 UNIT tablet Take 1 tablet (1,000 Units) by mouth daily  Qty: 30 tablet, Refills: 11    Associated Diagnoses: Mood disorder (H)      fluticasone (FLONASE) 50 MCG/ACT nasal spray Spray 2 sprays into both nostrils daily as needed for rhinitis or allergies    Associated Diagnoses: Allergic rhinitis      hypromellose (ARTIFICIAL TEARS) 0.5 % SOLN ophthalmic solution Place 2 drops into both eyes 4 times daily as needed      mirtazapine (REMERON) 15 MG tablet Take 1 tablet (15 mg) by mouth At Bedtime    Associated Diagnoses: Failure to thrive in adult; Depressive disorder; Borderline personality disorder (H); Bipolar affective disorder, remission status unspecified (H)      Multiple Vitamins-Minerals (CEROVITE SENIOR) TABS Take 1 tablet by mouth daily  Qty: 30 tablet, Refills: 3    Associated Diagnoses: Vitamin deficiency      Nutritional Supplements (ENSURE ORIGINAL) LIQD Take 1 Container by mouth daily      !! polyethylene glycol (MIRALAX) 17 g packet Take 17 g by mouth Every Mon, Wed, Fri Morning      !! polyethylene glycol (MIRALAX) 17 g packet Take 17 g by mouth daily as needed for constipation      SENNA-docusate sodium (SENNA S) 8.6-50 MG tablet Take 1 tablet by mouth once as needed (constipation)      simvastatin (ZOCOR) 40 MG tablet Take 1 tablet (40 mg) by mouth At Bedtime  Qty: 30 tablet, Refills: 11    Associated Diagnoses: Mood disorder (H)       valACYclovir (VALTREX) 500 MG tablet Give 500 mg by mouth one time a day related to other herpesviral infection for outbreak prevention  Refills: 3    Associated Diagnoses: Hx of cold sores       !! - Potential duplicate medications found. Please discuss with provider.        STOP taking these medications       divalproex sodium delayed-release (DEPAKOTE SPRINKLE) 125 MG DR capsule Comments:   Reason for Stopping:         divalproex sodium delayed-release (DEPAKOTE SPRINKLE) 125 MG DR capsule Comments:   Reason for Stopping:         hydrOXYzine (ATARAX) 25 MG tablet Comments:   Reason for Stopping:         OLANZapine (ZYPREXA) 2.5 MG tablet Comments:   Reason for Stopping:         OLANZapine (ZYPREXA) 5 MG tablet Comments:   Reason for Stopping:         sertraline (ZOLOFT) 100 MG tablet Comments:   Reason for Stopping:         sertraline (ZOLOFT) 50 MG tablet Comments:   Reason for Stopping:             Allergies   Allergies   Allergen Reactions    Nsaids      Gastric bypass surgery

## 2023-07-24 NOTE — PROGRESS NOTES
Care Management Discharge Note    Discharge Date: 07/24/2023       Discharge Disposition: Long Term Care    [facility]  [address]   [place] [address]  Phone: [phone]  **Point of contact is Josefina    Discharge Services: None    Discharge DME: None    Discharge Transportation:  [employer] (ph: [phone]) scheduled w/c ride with  window of [phone]    Private pay costs discussed: transportation costs    Does the patient's insurance plan have a 3 day qualifying hospital stay waiver?  Yes   Will the waiver be used for post-acute placement? No    PAS Confirmation Code:  N/A - returning to LTC facility  Patient/family educated on Medicare website which has current facility and service quality ratings: N/A    Education Provided on the Discharge Plan: Yes  Persons Notified of Discharge Plans: Pt, Provider, Charge, Bedside RN, [facility]  Patient/Family in Agreement with the Plan: yes    Handoff Referral Completed: Yes    Additional Information:  GREGG faxed discharge orders around 1000 to [facility].     GREGG spoke with Josefina at [facility] and confirmed that GREGG faxed discharge paperwork. GREGG also relayed pt ride time and  from hospital. Facility planning to accept pt back today. Josefina to save lunch for pt, in case pt does not have time to eat prior to discharge.     RNCC (Angela) gave pt IMM.     Luba Rhodes, [facility]  Float   [facility]   5MS  Coverage  5MS  Ph: [phone]

## 2023-07-24 NOTE — CONSULTS
"      Psychiatry Consultation; Follow up              Reason for Consult, requesting source:      Requesting source: Alyssa Elizondo    Labs and imaging reviewed.    Total time spent in chart review, patient interview and coordination of care; 35 minutes                Interim history:    Met with pt in hospital room on medical unit UR 5 Med Surg Patient recognizes writer. Pt affect appears slightly flat however improving.  Pt reports her mood is, \"Good,\"  today.   Patient reports that she is needing assistance in her room with toileting and expresses concern that when she discharges to nursing home they will be able to help her.  Provider reassured her that her nursing home is aware of her needs and that this provider will update nursing home of this.  Patient verbalizes understanding. Pt experiences confusion intermittently which has been ongoing since admission to inpatient psychiatry unit 3B on 6/7/23. Patient continues to report depression symptoms however today reports they are decreasing. Patient does not endorse SI today.  Pt able to contract for safety. Pt also reports that she will not attempt to harm herself after discharge.  Patient endorses mild anxiety symptoms today related to scheduled discharge. Patient denies AH or VH.  Plan today is to continue Prozac 60 mg PO liquid daily to target depressed mood as pt affect is brighter and improving since initiating. Continue Lamotrigine 25 mg PO daily for two weeks with plan to titrate up slowly to target mood disorder symptoms, continue Gabapentin 200 mg 3 times daily to target anxiety symptoms, continue Namenda 10 mg PO BID to treat dementia symptoms observed, continue Olanzapine ODT 2.5 mg tablet every morning to target agitation/psychosis, continue Olanzapine 10 mg ODT tablet at bedtime to target agitation/psychosis, and continue Mirtazapine 15 mg PO disintegrating tablet at HS to target depression symptoms and promote sleep. Olanzapine drug level checked on " 6/27/2023 and results within therapeutic range: 35 ng/mL (reference range 20-80 ng/mL).  Medications ordered by internal medicine provider and sent to patient's pharmacy.    Pt was admitted to [address] for treatment of hypotension and concern for sepsis 2/2 aspiration pneumonia on 7/16/23.  Patient was treated with IV antibiotic Unasyn 3 g every 6 hours.  Patient has ongoing GI issues including: acute on chronic constipation with history of overflow diarrhea, severe protein calorie malnutrition, reflux/regurgitation, GERD, has been observed inducing vomiting, and has history of gastric bypass. Plan per internal medicine: nutrition consult ordered, speech reconsulted for concern of aspiration, continuing's vitamin supplements, continue PPI daily, continue scheduled Zofran 3 times daily with meals, and continue GI recommended bowel regimen.  Patient continues on regular diet with thin liquids.  Due to history of gastric bypass patient requires small meals with snacks throughout the day which is being facilitated on the medical floor. Pt is requesting a walker for after discharge; this provider notified medical team of this request as pt has been utilizing a walker during inpatient hospitalization.     Provider updated patient's nursing home as patient returning today and discussed patient's request for a walker with storage.  Josefina director of nursing reports that she will assist patient with this. Patient's nursing home: [facility] (phone number: [phone]). Plan is for patient to return to her nursing home today Monday, 7/24/2023.  Patient's ride is scheduled for 11:30 AM and patient is in agreement with this plan and aware of this.  Patient will follow-up with her outpatient psychiatrist as well as outpatient therapist.          Current Medications:      calcium carbonate  500 mg Oral At Bedtime    childrens multivitamin with iron  2 tablet Oral Daily    cyanocobalamin  100 mcg  "Oral At Bedtime    FLUoxetine  60 mg Oral Daily    gabapentin  200 mg Oral TID    lamoTRIgine  25 mg Oral Daily    melatonin  6 mg Oral QPM    memantine  10 mg Oral BID    mirtazapine  15 mg Orally disintegrating tablet At Bedtime    OLANZapine zydis  2.5 mg Oral QAM    OLANZapine zydis  10 mg Oral At Bedtime    ondansetron  4 mg Oral TID AC    pantoprazole  40 mg Oral QAM AC    polyethylene glycol  17 g Oral Daily    [Held by provider] prazosin  1 mg Oral Daily with supper    senna-docusate  1 tablet Oral BID    simvastatin  40 mg Oral At Bedtime    valACYclovir  500 mg Oral Daily    cholecalciferol  25 mcg Oral Daily with supper     PRN Meds:.acetaminophen, alum & mag hydroxide-simethicone, budesonide-formoterol, calcium carbonate, doxylamine, OLANZapine zydis, polyethylene glycol, prochlorperazine, sennosides  Medication adherence issues: MS Med Adherence Y/N: No  Medication side effects: MEDICATION SIDE EFFECTS: no side effects reported   Benefit: Yes / No: Yes         MSE:     Appearance: Disheveled  Attitude:  cooperative  Eye Contact:  good  Mood:   \"Good.\"  Affect:  : mood congruent and slightly restricted  Speech:  clear, coherent  Psychomotor Behavior:  no evidence of tardive dyskinesia, dystonia, or tics  Muscle strength and tone: normal, steady gait with walker  Thought Process:  linear and goal oriented  Associations:  no loose associations  Thought Content:  active suicidal ideation present, passive suicidal ideation present, no auditory hallucinations present and no visual hallucinations present  Insight:  fair  Judgement:  fair  Oriented to:  time, person, and place  Attention Span and Concentration:  intact  Recent and Remote Memory:  fair      Vital signs:  Temp: 98.2  F (36.8  C) Temp src: Oral BP: 109/50 Pulse: 60   Resp: 16 SpO2: 96 % O2 Device: None (Room air) Oxygen Delivery: 1 LPM      Estimated body mass index is 19.34 kg/m  as calculated from the following:    Height as of 6/7/23: 1.626 m " "(5' 4\").    Weight as of 7/13/23: 51.1 kg (112 lb 10.5 oz).    Qtc: 461. EKG from 7/18/23 reviewed: results:   Sinus bradycardia   Left axis deviation   Incomplete right bundle branch block   Cannot rule out Anteroseptal infarct (cited on or before 27-JUN-2023)     Labs were personally reviewed.     Latest Reference Range & Units 07/15/23 22:04 07/16/23 03:32 07/16/23 04:41 07/16/23 10:44 07/17/23 09:47 07/18/23 07:09 07/18/23 09:15 07/20/23 07:08 07/20/23 07:09 07/21/23 09:34 07/23/23 17:51 07/23/23 17:57 07/23/23 18:03 07/23/23 18:09 07/24/23 06:05   Sodium 136 - 145 mmol/L   134 (L)  141    142         Potassium 3.4 - 5.3 mmol/L   3.9  4.3    3.7         Chloride 98 - 107 mmol/L   102  108 (H)    106         Carbon Dioxide (CO2) 22 - 29 mmol/L   27  26    29         Urea Nitrogen 8.0 - 23.0 mg/dL   17.4  10.8    6.7 (L)         Creatinine 0.51 - 0.95 mg/dL   0.85  0.73    0.69         GFR Estimate >60 mL/min/1.73m2   68  82    87         Calcium 8.8 - 10.2 mg/dL   8.5 (L)  8.8    9.0         Anion Gap 7 - 15 mmol/L   5 (L)  7    7         Magnesium 1.7 - 2.3 mg/dL   2.0   2.1    2.2     2.4 (H)   Phosphorus 2.5 - 4.5 mg/dL      2.8    3.2     2.8   Albumin 3.5 - 5.2 g/dL   2.8 (L)      3.0 (L)         Protein Total 6.4 - 8.3 g/dL   4.8 (L)      5.3 (L)         Alkaline Phosphatase 35 - 104 U/L   35      42         ALT 0 - 50 U/L   7      8         AST 0 - 45 U/L   11      13         Bilirubin Total <=1.2 mg/dL   0.3      0.3         Glucose 70 - 99 mg/dL   85  101 (H)    90         Lipase 13 - 60 U/L   20               Troponin T, High Sensitivity <=14 ng/L   18 (H)               WBC 4.0 - 11.0 10e3/uL   14.3 (H)  7.3   7.9          Hemoglobin 11.7 - 15.7 g/dL   9.8 (L)  11.7   11.4 (L)          Hematocrit 35.0 - 47.0 %   30.5 (L)  36.8   35.5          Platelet Count 150 - 450 10e3/uL   195  200   262          RBC Count 3.80 - 5.20 10e6/uL   3.14 (L)  3.79 (L)   3.68 (L)          MCV 78 - 100 fL   97  97   97     "      MCH 26.5 - 33.0 pg   31.2  30.9   31.0          MCHC 31.5 - 36.5 g/dL   32.1  31.8   32.1          RDW 10.0 - 15.0 %   14.3  14.2   13.9          % Neutrophils %        70          % Lymphocytes %        21          % Monocytes %        6          % Eosinophils %        2          % Basophils %        1          Absolute Basophils 0.0 - 0.2 10e3/uL        0.0          Absolute Eosinophils 0.0 - 0.7 10e3/uL        0.1          Absolute Immature Granulocytes <=0.4 10e3/uL        0.0          Absolute Lymphocytes 0.8 - 5.3 10e3/uL        1.7          Absolute Monocytes 0.0 - 1.3 10e3/uL        0.5          % Immature Granulocytes %        0          Absolute Neutrophils 1.6 - 8.3 10e3/uL        5.5          Absolute NRBCs 10e3/uL        0.0          NRBCs per 100 WBC <1 /100        0          Color Urine Colorless, Straw, Light Yellow, Yellow     Light Yellow        Straw      Appearance Urine Clear     Clear        Clear      Glucose Urine Negative mg/dL    Negative        Negative      Bilirubin Urine Negative     Negative        Negative      Ketones Urine Negative mg/dL    Negative        Negative      Specific Gravity Urine 1.003 - 1.035     1.009        1.010      pH Urine 5.0 - 7.0     5.0        7.0      Protein Albumin Urine Negative mg/dL    Negative        Negative      Urobilinogen mg/dL Normal, 2.0 mg/dL    Normal        Normal      Nitrite Urine Negative     Negative        Negative      Blood Urine Negative     Negative        Negative      Leukocyte Esterase Urine Negative     Trace !        Negative      WBC Urine <=5 /HPF    1        1      RBC Urine <=2 /HPF    1        <1      Bacteria Urine None Seen /HPF    Few !              Squamous Epithelial /HPF Urine <=1 /HPF    <1        <1      BLOOD CULTURE              Rpt Rpt    MRSA MSSA PCR, NASAL SWAB   Rpt                Coronavirus Not Detected   Not Detected                SARS CoV2 PCR Negative   Negative                Influenza A Not  Detected   Not Detected                Influenza B Not Detected   Not Detected                RESPIRATORY PANEL PCR   Rpt                XR CHEST PORT 1 VIEW  Rpt          Rpt       EKG 12-LEAD, TRACING ONLY        Rpt           Adenovirus Not Detected   Not Detected                Chlamydia pneumoniae Not Detected   Not Detected                Human Metapneumovirus Not Detected   Not Detected                Human Rhin/Enterovirus Not Detected   Not Detected                Influenza A 2009 H1N1 Not Detected   Not Detected                Influenza A, H1 Not Detected   Not Detected                Influenza A, H3 Not Detected   Not Detected                MRSA Target DNA Negative   Negative                Mycoplasma pneumoniae Not Detected   Not Detected                Parainfluenza Virus 1 Not Detected   Not Detected                Parainfluenza Virus 2 Not Detected   Not Detected                Parainfluenza Virus 3 Not Detected   Not Detected                Parainfluenza Virus 4 Not Detected   Not Detected                Respiratory Syncytial Virus A Not Detected   Not Detected                Respiratory Syncytial Virus B Not Detected   Not Detected                SA Target DNA   Positive                (L): Data is abnormally low  (H): Data is abnormally high  !: Data is abnormal  Rpt: View report in Results Review for more information          DSM-5 Diagnosis:   Aspiration pneumonia (H)   Major Depressive Disorder, Recurrent, Severe with psychotic features.   Borderline Personality Disorder  Bipolar Disorder Type 1          Assessment:   Bhavana Marr is an 82-year-old female with history of suicidal ideation, borderline personality disorder, major depressive disorder, severe, recurrent with psychotic features, and bipolar disorder type 1.  Patient medical history includes history of gastric bypass in 2000, arthritis, GERD, hyponatremia, hyperlipidemia, osteoporosis, asthma, hx of bilateral hip replacements, and  mitral valve regurgitation. Pt current legal status is under civil committment with [facility].  Pt was transferred to  [facility] Surg for treatment of hypotension and concern for sepsis 2/2 aspiration pneumonia on 7/16/2023.  Patient previously admitted to inpatient psychiatry unit 3B from 6/7/2023 to 7/16/2023 where she was admitted due to decreased oral intake, low glucose level, low blood pressure, depression, and suicidal ideations. Patient has been living in a long-term care facility with relatively stable mental health for the past 5 years.  In the past few months, patient has had multiple psychiatric hospitalizations due to suicidal ideation, depression, and symptoms of psychosis.  During current hospitalizations it has been noted that patient has been experiencing ongoing hyponatremia which could be caused by volume depletion and/or induced by medications including Depakote and Sertraline which were discontinued. Patient also has been experiencing regurgitation/reflux as well as induced vomiting with concern for aspiration; chest xray and XR video Swallow Study with Speech Pathology was completed while patient was admitted to behavioral unit from 6/7/2023 to 7/16/2023. Testing showed no signs of aspiration.  Patient also experienced severe constipation which was identified by abdominal x-ray as well as CT of abdomen and pelvis; GI was following patient and adjusted bowel regimen orders to address. During admission to inpatient psychiatric unit patient was psychiatrically stabilized on Prozac and Olanzapine. Lamotrigine has also been initiated with plan to titrate up slowly which can be completed outpatient. Plan will be for patient to return to long-term care facility today as she is medically stable.          Summary of Recommendations:     1. Education given regarding diagnostic and treatment options with risks, benefits and alternatives and adequate verbalization of understanding.  2.  Medications:   Hospital  -Continue Prozac 60 mg PO liquid daily to target depressed mood.  -Continue Lamotrigine tablet 25 mg PO daily for 2 weeks with plan to titrate up slowly to target mood disorder symptoms  -Continue Gabapentin 200 mg PO liquid TID to target agitation/anxiety.  -Continue Namenda 10 mg tablet PO BID to treat dementia symptoms observed.  -Continue Melatonin 6 mg scheduled every evening to promote sleep.  -Continue Olanzapine ODT 2.5 mg tablet every morning to target symptoms of agitation/psychosis   -Continue Olanzapine 10 mg ODT tablet at bedtime to target symptoms of agitation/psychosis  -Continue Mirtazapine 15 mg PO disintegrating tablet at HS to target depression symptoms.  -Continue Olanzapine ODT 5 mg PO tablet 3 times daily as needed for severe agitation.  -Continue Unisom 12.5 mg 3 times daily as needed for insomnia or nausea.  -Discontinue prazosin 1 mg PO due to hypotension    3. Medical Team Plan:  -Discharge patient today to nursing home.   4. Consults  Nutrition consulted and following pt during admission  -Weights will be monitored every Tuesday, Thursday, and Saturday  -Intake and output monitoring.  -Plan is for pt to eat small meals/snack throughout the day d/t history of gastric bypass.   -Supplements ordered for between meal  - Zofran 4mg PO PRN scheduled 30 minutes before meals TID to improve PO tolerance.   4. Labs/Tests   Labs: magnesium, and phosphorus levels every 3 days  CMP and CBC ordered for 7/20/2023 at 0700.  EKG ordered weekly  Olanzapine drug level collected 6/27/2023 and results: Within therapeutic range: 35 ng/mL (reference range 20-80 ng/mL).  5. Structure and Supervision  Unit 5 MedSurg.  Precautions in place.  Fall precautions.  Continue on 1:1 monitoring for safety.  6.   is following in regards to collecting and reviewing collateral information, referrals and disposition planning.  Legal: civil commitment.   Referrals: neuropsychology with recommendation  "for neuropsych testing.   Care Coordination:  Per CTC  Placement: [facility] (phone number: [phone])   Anticipated Discharge: 7/25/2023.     Further treatment programming to be determined throughout the hospital course.        [facility] Patient on precautions  \"Much or all of the text in this note was generated through the use of Dragon Dictate voice to text software. Errors in spelling or words which appear to be out of contact are unintentional, may be present due having escaped editing\"                             "

## 2023-07-28 LAB
BACTERIA BLD CULT: NO GROWTH
BACTERIA BLD CULT: NO GROWTH

## 2023-08-04 NOTE — PLAN OF CARE
Goal Outcome Evaluation:    DATE & TIME: 5/22/23 5341-2737   Cognitive Concerns/ Orientation : A&Ox4, withdrawn but very pleasant when engaging in conversation   BEHAVIOR & AGGRESSION TOOL COLOR: Green   ABNL VS/O2: VSS on RA  MOBILITY: SBA with gait  belt. Pt was encouraged to ambulate. Walked a short distance and complained of dizziness.   PAIN MANAGMENT: Denied.   DIET: Regular  BOWEL/BLADDER: Incontinent of bowel and bladder at times - continent this shift  ABNL LAB/BG: NA  DRAIN/DEVICES: NA  TELEMETRY RHYTHM: NA  SKIN: Blanchable redness to right hip.  TESTS/PROCEDURES: NA  D/C DATE: TBD  Discharge Barriers: Patient had court examination and preliminary hearing 5/18/23, didn't participate, next hearing scheduled for 5/23/23.  OTHER IMPORTANT INFO:  Pt denies suicidal ideation, plan/intent this shift. Psych was here to evaluate pt and prescribed Depakote. Pt ambulated on the hallway with minimal assist.X2                         Patient scheduled for single tooth extraction 8/10. Gave patient instructions to continue current Coumadin regimen.

## 2023-08-08 ENCOUNTER — NURSING HOME VISIT (OUTPATIENT)
Dept: FAMILY MEDICINE | Facility: CLINIC | Age: 83
End: 2023-08-08
Payer: COMMERCIAL

## 2023-08-08 DIAGNOSIS — Z98.84 HISTORY OF GASTRIC BYPASS: Primary | ICD-10-CM

## 2023-08-08 DIAGNOSIS — K59.00 CONSTIPATION, UNSPECIFIED CONSTIPATION TYPE: ICD-10-CM

## 2023-08-08 PROCEDURE — 99308 SBSQ NF CARE LOW MDM 20: CPT | Mod: GC

## 2023-08-08 RX ORDER — BISACODYL 10 MG
10 SUPPOSITORY, RECTAL RECTAL
Qty: 100 SUPPOSITORY | Refills: 0 | Status: SHIPPED | OUTPATIENT
Start: 2023-08-10 | End: 2023-09-18

## 2023-08-08 RX ORDER — MULTIVITAMIN WITH IRON
50 TABLET ORAL DAILY
Qty: 30 TABLET | Refills: 11 | Status: SHIPPED | OUTPATIENT
Start: 2023-08-08

## 2023-08-08 NOTE — PROGRESS NOTES
Subjective: Bhavana Marr is a 82 year old who is seen at home for follow up visit today.  Seen at [facility] AT [place] [address]   [address]  [facility] [address] .    Acute concerns today include: Bowel problems    Chronic and Past Medical Problems include:  Patient Active Problem List   Diagnosis    Arthritis    Depressive disorder    Borderline personality disorder (H)    GERD (gastroesophageal reflux disease)    Holosystolic murmur    Asthma    History of gastric bypass    Hyperlipidemia LDL goal <130    Other insomnia    Mild mitral regurgitation    Mild aortic stenosis    Weight loss    Bilateral low back pain without sciatica    Adhesive capsulitis of shoulder, right    Mild intermittent asthma, unspecified whether complicated    Bipolar affective disorder, remission status unspecified (H)    Constipation, unspecified constipation type    Age-related osteoporosis without current pathological fracture    Plantar warts    Hypoglycemia    Failure to thrive in adult    Hypotension, unspecified hypotension type    Suicidal ideation    Aspiration pneumonia (H)     Family History       Problem (# of Occurrences) Relation (Name,Age of Onset)    Depression (4) Maternal Aunt (60), Maternal Uncle (85), Paternal Aunt, Paternal Uncle          Social History:   Current activities:  Coloring  PMHX/PSHX/MEDS/ALLERGIES/SHX/FHX reviewed and updated in Epic.   MEDICATION LIST:  Current Outpatient Medications   Medication    acetaminophen (TYLENOL) 325 MG tablet    budesonide-formoterol (SYMBICORT) 80-4.5 MCG/ACT Inhaler    calcium carbonate (TUMS) 500 MG chewable tablet    calcium carbonate 500 mg, elemental, (OSCAL) 500 MG tablet    cholecalciferol (VITAMIN D3) 1000 UNIT tablet    famotidine (PEPCID) 20 MG tablet    FLUoxetine (PROZAC) 20 MG/5ML solution    fluticasone (FLONASE) 50 MCG/ACT nasal spray    gabapentin (NEURONTIN) 250 MG/5ML solution    hypromellose (ARTIFICIAL TEARS) 0.5 % SOLN ophthalmic solution    lamoTRIgine  (LAMICTAL) 25 MG tablet    melatonin 3 MG tablet    memantine (NAMENDA) 10 MG tablet    mirtazapine (REMERON) 15 MG tablet    Multiple Vitamins-Minerals (CEROVITE SENIOR) TABS    Nutritional Supplements (ENSURE ORIGINAL) LIQD    OLANZapine zydis (ZYPREXA) 10 MG ODT    OLANZapine zydis (ZYPREXA) 5 MG ODT    OLANZapine zydis (ZYPREXA) 5 MG ODT    pantoprazole (PROTONIX) 40 MG EC tablet    polyethylene glycol (MIRALAX) 17 g packet    polyethylene glycol (MIRALAX) 17 g packet    SENNA-docusate sodium (SENNA S) 8.6-50 MG tablet    simvastatin (ZOCOR) 40 MG tablet    valACYclovir (VALTREX) 500 MG tablet     No current facility-administered medications for this visit.     Medications are managed by:  SELF, FAMILY, HOME NURSE  Patient uses a pill box to manage their medications:  No  Patient has questions, concerns, or potential side effects/interactions from their medications:  No  GERIATRIC ROS:    Do you have difficulty getting around, watching TV or reading because of poor eyesight? No   Can you hear normal conversational voice? Yes  Do you use hearing aides? No   Do you have trouble with control of your bowels? Yes     GENERAL ROS:   General: No unexplained weight gain or loss; adequate sleep pattern.  Resp: No worsening shortness of breath, cough or hemoptysis.   GI: No nausea or vomiting. Constipation  : Voiding independently with no significant incontinence   Skin: No areas of new skin breakdown or worrisome rashes  Extremities:  No pain, extremity weakness or balance troubles    Objective: LMP  (LMP Unknown)     Gen: Well nourished and in NAD   HEENT: Head is atraumatic, decent dentition  CV: RRR - no murmurs, rubs, or gallups,   Pulm: CTAB, no wheezes/rales/rhonchi, good air entry   ABD: soft, nontender, BS intact  Extrem: no cyanosis, edema or clubbing   Psych: Euthymic  Neuro: Pt is able to ambulate independently     Assessment/ Plan:  Hospital discharge follow-up  Admitted to inpatient psych on 6/7/23 for  suicidal ideation. Returned to nursing home on 7/24. Fluoxetine switched to liquid formulation.   - Psych to manage psychiatric medications  Constipation  Patient found to have large stool burden while in the hospital. Was on scheduled Miralax and Senna, but still with constipation and leakage of stool.  - Add 10 mg bisacodyl suppository 2 times per week to stool regimen  - Continue scheduled Miralax and Senna  Hx of gastric bypass  Anemia  - Restart Vit B 12 supplementation. 50 mcg daily  - Iron studies (Hemoglobin, folate, iron, iron binding capacity, ferritin)  Nursing home resident    RECOMMENDED FOLLOW UP:  2 months.      Clemente Mckeon DO    Precepted with Dr. Lewis

## 2023-08-09 ENCOUNTER — TELEPHONE (OUTPATIENT)
Dept: BEHAVIORAL HEALTH | Facility: CLINIC | Age: 83
End: 2023-08-09
Payer: COMMERCIAL

## 2023-08-09 NOTE — TELEPHONE ENCOUNTER
Writer spoke with Rema MARIE who reports not being communicated with regarding pt's discharge plans. Rema reports that per pt's commitment, she needs to know where pt is residing. Writer looked in pt's chart and called Rema back to provide information.

## 2023-08-10 RX ORDER — GUAIFENESIN/DEXTROMETHORPHAN 100-10MG/5
10 SYRUP ORAL EVERY 4 HOURS PRN
COMMUNITY
Start: 2023-08-10 | End: 2023-12-18

## 2023-08-10 RX ORDER — MAGNESIUM HYDROXIDE/ALUMINUM HYDROXICE/SIMETHICONE 120; 1200; 1200 MG/30ML; MG/30ML; MG/30ML
15 SUSPENSION ORAL
COMMUNITY
Start: 2023-08-10 | End: 2024-03-07

## 2023-08-10 RX ORDER — ALENDRONATE SODIUM 70 MG/1
70 TABLET ORAL
COMMUNITY
End: 2024-01-08

## 2023-08-10 RX ORDER — ALUMINA, MAGNESIA, AND SIMETHICONE 2400; 2400; 240 MG/30ML; MG/30ML; MG/30ML
15 SUSPENSION ORAL
COMMUNITY
End: 2023-08-10

## 2023-08-10 RX ORDER — POLYETHYLENE GLYCOL 3350 17 G/17G
POWDER, FOR SOLUTION ORAL
COMMUNITY
Start: 2023-08-10

## 2023-08-10 NOTE — PROGRESS NOTES
Medication list updated    Rosemarie Plasencia, Pharm.D., MPH  [facility] Pharmacist Resident   [facility]

## 2023-09-07 ENCOUNTER — NURSING HOME VISIT (OUTPATIENT)
Dept: FAMILY MEDICINE | Facility: CLINIC | Age: 83
End: 2023-09-07
Payer: COMMERCIAL

## 2023-09-07 DIAGNOSIS — R11.2 NAUSEA AND VOMITING, UNSPECIFIED VOMITING TYPE: ICD-10-CM

## 2023-09-07 DIAGNOSIS — Z78.9 NURSING HOME RESIDENT: Primary | ICD-10-CM

## 2023-09-07 DIAGNOSIS — K59.00 CONSTIPATION, UNSPECIFIED CONSTIPATION TYPE: ICD-10-CM

## 2023-09-07 DIAGNOSIS — Z09 HOSPITAL DISCHARGE FOLLOW-UP: ICD-10-CM

## 2023-09-07 DIAGNOSIS — Z98.84 HISTORY OF GASTRIC BYPASS: ICD-10-CM

## 2023-09-07 DIAGNOSIS — D64.9 ANEMIA, UNSPECIFIED TYPE: ICD-10-CM

## 2023-09-07 PROCEDURE — 99307 SBSQ NF CARE SF MDM 10: CPT | Mod: GC

## 2023-09-07 NOTE — PROGRESS NOTES
Subjective: Bhavana Marr is a 82 year old who is seen at home for follow up visit today.  Seen at [facility] AT [place]   [address] [facility] [address] .    Acute concerns today include: Ongoing bowel concerns, stable on current regimen.     Chronic and Past Medical Problems include:  Patient Active Problem List   Diagnosis    Arthritis    Depressive disorder    Borderline personality disorder (H)    GERD (gastroesophageal reflux disease)    Holosystolic murmur    Asthma    History of gastric bypass    Hyperlipidemia LDL goal <130    Other insomnia    Mild mitral regurgitation    Mild aortic stenosis    Weight loss    Bilateral low back pain without sciatica    Adhesive capsulitis of shoulder, right    Mild intermittent asthma, unspecified whether complicated    Bipolar affective disorder, remission status unspecified (H)    Constipation, unspecified constipation type    Age-related osteoporosis without current pathological fracture    Plantar warts    Hypoglycemia    Failure to thrive in adult    Hypotension, unspecified hypotension type    Suicidal ideation    Aspiration pneumonia (H)     Family History       Problem (# of Occurrences) Relation (Name,Age of Onset)    Depression (4) Maternal Aunt (60), Maternal Uncle (85), Paternal Aunt, Paternal Uncle          PMHX/PSHX/MEDS/ALLERGIES/SHX/FHX reviewed and updated in Epic.   MEDICATION LIST:  Current Outpatient Medications   Medication    acetaminophen (TYLENOL) 325 MG tablet    alendronate (FOSAMAX) 70 MG tablet    alum & mag hydroxide-simethicone (MAALOX) 200-200-20 MG/5ML SUSP suspension    bisacodyl (DULCOLAX) 10 MG suppository    budesonide-formoterol (SYMBICORT) 80-4.5 MCG/ACT Inhaler    calcium carbonate (TUMS) 500 MG chewable tablet    calcium carbonate 500 mg, elemental, (OSCAL) 500 MG tablet    cholecalciferol (VITAMIN D3) 1000 UNIT tablet    famotidine (PEPCID) 20 MG tablet    FLUoxetine (PROZAC) 20 MG/5ML solution    gabapentin (NEURONTIN) 250  MG/5ML solution    guaiFENesin-dextromethorphan (ROBITUSSIN DM) 100-10 MG/5ML syrup    hypromellose (ARTIFICIAL TEARS) 0.5 % SOLN ophthalmic solution    lamoTRIgine (LAMICTAL) 25 MG tablet    magnesium hydroxide (MILK OF MAGNESIA) 400 MG/5ML suspension    melatonin 3 MG tablet    memantine (NAMENDA) 10 MG tablet    mirtazapine (REMERON) 15 MG tablet    Multiple Vitamins-Minerals (CEROVITE SENIOR) TABS    Nutritional Supplements (ENSURE ORIGINAL) LIQD    OLANZapine (ZYPREXA) 2.5 mg half-tablet    OLANZapine zydis (ZYPREXA) 10 MG ODT    pantoprazole (PROTONIX) 40 MG EC tablet    polyethylene glycol (MIRALAX) 17 GM/Dose powder    SENNA-docusate sodium (SENNA S) 8.6-50 MG tablet    simvastatin (ZOCOR) 40 MG tablet    valACYclovir (VALTREX) 500 MG tablet    vitamin B-12 (CYANOCOBALAMIN) 50 MCG tablet     No current facility-administered medications for this visit.     Medications are managed by:  SELF, FAMILY, HOME NURSE  Patient uses a pill box to manage their medications: No  Patient has questions, concerns, or potential side effects/interactions from their medications:  No  GERIATRIC ROS:    Do you have difficulty getting around, watching TV or reading because of poor eyesight? No   Can you hear normal conversational voice? Yes   Do you use hearing aides? No   Do you have trouble with control of your bowels? Yes     GENERAL ROS:   General: No unexplained weight gain or loss; adequate sleep pattern.  Resp: No worsening shortness of breath, cough or hemoptysis.   GI: Constipation but no diarrhea, no nausea. Vomiting.  : Voiding independently with no significant incontinence   Skin: No areas of new skin breakdown or worrisome rashes  Extremities:  No pain, extremity weakness or balance troubles    Objective: LMP  (LMP Unknown)      Gen: Well nourished and in NAD   HEENT: Head is atraumatic, decent dentition  CV: RRR - no murmurs, rubs, or gallups,   Pulm: CTAB, no wheezes/rales/rhonchi, good air entry   ABD: firm  without guarding or rebound, nontender, BS intact  Extrem: no cyanosis, edema or clubbing   Psych: Euthymic  Neuro: Pt is able to ambulate independently    Assessment/ Plan:  Nursing home resident   Being seen for 60 day appointment. No other concerns today other than above.     Hospital discharge follow-up  Admitted to inpatient psych on 6/7/23 for suicidal ideation. Returned to nursing home on 7/24. Fluoxetine switched to liquid formulation. Denies symptoms of SI/HI today.   - Psych to manage psychiatric medications    Constipation  Ongoing problem, stable symptoms per patient. Current regimen includes twice weekly suppository. Patient this suppository this morning and has had 3 soft BM's since. No abdominal pain, unremarkable abdominal exam.     Nausea and vomiting   Hx of gastric bypass   Ongoing problem, likely PSH of bariatric surgery playing a role in symptomology. Has previously followed with [facility] who recommended an endoscopy which has not yet been performed. No changes to symptoms or meds at this time.   - EGD procedure pending     Anemia   Reviewed recent labs including normal iron, ferritin, folate. Hgb still mildly low at 11.8. Patient is asymptomatic, denies dizziness, light headedness, or recent falls. No changes to meds or plans based on lab results.     RECOMMENDED FOLLOW UP:  2 months.        Olivia Sauceda DO PGY2    I precepted with Dr. Lindsey.

## 2023-09-12 ENCOUNTER — DOCUMENTATION ONLY (OUTPATIENT)
Dept: PHARMACY | Facility: CLINIC | Age: 83
End: 2023-09-12
Payer: COMMERCIAL

## 2023-09-12 RX ORDER — OLANZAPINE 5 MG/1
TABLET, ORALLY DISINTEGRATING ORAL
COMMUNITY
Start: 2023-09-12

## 2023-09-12 RX ORDER — PETROLATUM,WHITE
OINTMENT IN PACKET (GRAM) TOPICAL
COMMUNITY
Start: 2023-09-12

## 2023-09-12 RX ORDER — GUAIFENESIN/DEXTROMETHORPHAN 100-10MG/5
10 SYRUP ORAL EVERY 4 HOURS PRN
COMMUNITY
Start: 2023-09-12

## 2023-09-12 NOTE — PROGRESS NOTES
Received MAR from [facility]. Completed medication reconciliation utilizing the MAR and reconciled and corrected our EHR med list. Our med list should be correct as of today.    Trinity Castro Pharm.D.

## 2023-09-14 ENCOUNTER — DOCUMENTATION ONLY (OUTPATIENT)
Dept: FAMILY MEDICINE | Facility: CLINIC | Age: 83
End: 2023-09-14
Payer: COMMERCIAL

## 2023-09-14 NOTE — PROGRESS NOTES
Pt is scheduled for a nursing home visit on 9/18/2023. Josefina Hansen, RN has faxed over information regarding the patient as follows-    H and P for EGD  WT: 113.5  BP: 114/62  P: 80  R: 16  T: 98  O2: 94% RA    Per nursing home RN- Has daily vomiting/regurgitation and is having an EGD done per [facility]. Slowly gaining weight back, mood has been more stable. Still intermittent watery stools.    Bush (Rudolph) RN

## 2023-09-18 ENCOUNTER — NURSING HOME VISIT (OUTPATIENT)
Dept: FAMILY MEDICINE | Facility: CLINIC | Age: 83
End: 2023-09-18
Payer: COMMERCIAL

## 2023-09-18 DIAGNOSIS — K21.9 GASTROESOPHAGEAL REFLUX DISEASE, UNSPECIFIED WHETHER ESOPHAGITIS PRESENT: Primary | ICD-10-CM

## 2023-09-18 DIAGNOSIS — R11.10 VOMITING, UNSPECIFIED VOMITING TYPE, UNSPECIFIED WHETHER NAUSEA PRESENT: ICD-10-CM

## 2023-09-18 DIAGNOSIS — K59.00 CONSTIPATION, UNSPECIFIED CONSTIPATION TYPE: ICD-10-CM

## 2023-09-18 DIAGNOSIS — Z12.31 ENCOUNTER FOR SCREENING MAMMOGRAM FOR BREAST CANCER: ICD-10-CM

## 2023-09-18 PROCEDURE — 99308 SBSQ NF CARE LOW MDM 20: CPT | Mod: GC

## 2023-09-18 RX ORDER — BISACODYL 10 MG
10 SUPPOSITORY, RECTAL RECTAL
Qty: 100 SUPPOSITORY | Refills: 0 | COMMUNITY
Start: 2023-09-18

## 2023-09-18 NOTE — PROGRESS NOTES
Subjective: Bhavana Marr is a 82 year old who is seen at home for follow up visit today.  Seen at [facility] AT [place] [address] [facility] [address] .      Acute concerns today include: None    Patient states she is in a good mood.  She has an EGD scheduled that she is not worried about.  Patient is frustrated that she is still incontinent of stool.  She states she is wearing 3 diapers.    Chronic and Past Medical Problems include:  Patient Active Problem List   Diagnosis    Arthritis    Depressive disorder    Borderline personality disorder (H)    GERD (gastroesophageal reflux disease)    Holosystolic murmur    Asthma    History of gastric bypass    Hyperlipidemia LDL goal <130    Other insomnia    Mild mitral regurgitation    Mild aortic stenosis    Weight loss    Bilateral low back pain without sciatica    Adhesive capsulitis of shoulder, right    Mild intermittent asthma, unspecified whether complicated    Bipolar affective disorder, remission status unspecified (H)    Constipation, unspecified constipation type    Age-related osteoporosis without current pathological fracture    Plantar warts    Hypoglycemia    Failure to thrive in adult    Hypotension, unspecified hypotension type    Suicidal ideation    Aspiration pneumonia (H)     Family History       Problem (# of Occurrences) Relation (Name,Age of Onset)    Depression (4) Maternal Aunt (60), Maternal Uncle (85), Paternal Aunt, Paternal Uncle            PMHX/PSHX/MEDS/ALLERGIES/SHX/FHX reviewed and updated in Epic.   MEDICATION LIST:  Current Outpatient Medications   Medication    bisacodyl (DULCOLAX) 10 MG suppository    acetaminophen (TYLENOL) 325 MG tablet    alendronate (FOSAMAX) 70 MG tablet    alum & mag hydroxide-simethicone (MAALOX) 200-200-20 MG/5ML SUSP suspension    budesonide-formoterol (SYMBICORT) 80-4.5 MCG/ACT Inhaler    calcium carbonate (TUMS) 500 MG chewable tablet    calcium carbonate 500 mg, elemental, (OSCAL) 500 MG tablet     cholecalciferol (VITAMIN D3) 1000 UNIT tablet    famotidine (PEPCID) 20 MG tablet    FLUoxetine (PROZAC) 20 MG/5ML solution    gabapentin (NEURONTIN) 250 MG/5ML solution    guaiFENesin-dextromethorphan (ROBITUSSIN DM) 100-10 MG/5ML syrup    guaiFENesin-dextromethorphan (ROBITUSSIN DM) 100-10 MG/5ML syrup    hypromellose (ARTIFICIAL TEARS) 0.5 % SOLN ophthalmic solution    lamoTRIgine (LAMICTAL) 25 MG tablet    magnesium hydroxide (MILK OF MAGNESIA) 400 MG/5ML suspension    melatonin 3 MG tablet    memantine (NAMENDA) 10 MG tablet    mirtazapine (REMERON) 15 MG tablet    Multiple Vitamins-Minerals (CEROVITE SENIOR) TABS    Nutritional Supplements (ENSURE ORIGINAL) LIQD    OLANZapine zydis (ZYPREXA) 10 MG ODT    OLANZapine zydis (ZYPREXA) 5 MG ODT    pantoprazole (PROTONIX) 40 MG EC tablet    polyethylene glycol (MIRALAX) 17 GM/Dose powder    SENNA-docusate sodium (SENNA S) 8.6-50 MG tablet    simvastatin (ZOCOR) 40 MG tablet    valACYclovir (VALTREX) 500 MG tablet    vitamin B-12 (CYANOCOBALAMIN) 50 MCG tablet    white petrolatum external gel     No current facility-administered medications for this visit.     Medications are managed by:  SELF, FAMILY, HOME NURSE  Patient uses a pill box to manage their medications:   No - nursing staff assisting  Patient has questions, concerns, or potential side effects/interactions from their medications:  No    GERIATRIC ROS:    Do you have difficulty getting around, watching TV or reading because of poor eyesight? No   Can you hear normal conversational voice? Yes, but hard of hearing. People have to speak loud.   Do you use hearing aides? Yes    Do you often feel sad or depressed?  Sometimes but improved  Have you unintentionally lost weight in the last 6 months? Yes, but slowly improving.  Do you have trouble with control of your bladder? Yes   Do you have trouble with control of your bowels? Yes     GENERAL ROS:   General: Slow weight gain-purposeful; adequate sleep  pattern.  Resp: No worsening shortness of breath, cough or hemoptysis.   GI: No worsening constipation. Incontinence with stool.  Nausea and vomiting with regurgitation.  : Voiding, postmenopausal  Skin: No areas of new skin breakdown or worrisome rashes  Extremities:  No pain, extremity weakness or balance troubles    Objective:   Wt 113 /62 P 73 R 16 T 97.1 O2 95% RA    Gen: Sitting at bedside, pleasant, in no distress  HEENT: Head is atraumatic  CV: 3/6 systolic murmur auscultated throughout the chest  Pulm: CTAB, no wheezes/rales/rhonchi, good air entry   ABD: soft, nontender, hyperactive bowel sounds  Extrem: no cyanosis or edema  Psych: Euthymic, conversational  Neuro: Pt is able to ambulate independently with walker    Preventative Screening:   Vaccinations reviewed and up to date: Recommend influenza vaccine, COVID and RSV vaccine once available    POA: Self phone number: [phone]  Code status: DNR  Healthcare Directive, Living Will, POLST has been completed: POLST on file from 6/27/2023     Assessment/ Plan:    Gastroesophageal reflux disease, unspecified whether esophagitis present  Vomiting, unspecified vomiting type, unspecified whether nausea present  Patient scheduled for EGD on 9/26 with [facility]. Patient is slowly gaining weight back.  She continues to have vomiting and regurgitation almost daily.  Patient's hemoglobin is stable on last check.  Last echo was completed in 2008 showing mild aortic stenosis and mitral regurgitation with a normal ejection fraction.  EKG completed on 7/16/2023 showing bradycardia and incomplete right bundle branch block.    Constipation, unspecified constipation type  Patient long time standing of incontinence.  Recent imaging shows large stool burden.  Increasing Dulcolax to 3 times weekly.  - bisacodyl (DULCOLAX) 10 MG suppository; Place 1 suppository (10 mg) rectally three times a week  Dispense: 100 suppository; Refill: 0    Encounter for screening mammogram  for breast cancer  Last completed in 2022 and negative.  - MA SCREENING DIGITAL BILAT; Future       RECOMMENDED FOLLOW UP:  2 months.    Patient was staffed with supervising physician, Dr. David Lewis .    Alicia Dumont MD PGY 3  [facility]

## 2023-10-04 ENCOUNTER — NURSING HOME VISIT (OUTPATIENT)
Dept: FAMILY MEDICINE | Facility: CLINIC | Age: 83
End: 2023-10-04
Payer: COMMERCIAL

## 2023-10-04 VITALS
HEART RATE: 84 BPM | OXYGEN SATURATION: 95 % | SYSTOLIC BLOOD PRESSURE: 132 MMHG | WEIGHT: 113 LBS | DIASTOLIC BLOOD PRESSURE: 61 MMHG | TEMPERATURE: 97.1 F | RESPIRATION RATE: 16 BRPM | BODY MASS INDEX: 19.4 KG/M2

## 2023-10-04 DIAGNOSIS — K21.9 GASTROESOPHAGEAL REFLUX DISEASE, UNSPECIFIED WHETHER ESOPHAGITIS PRESENT: Primary | ICD-10-CM

## 2023-10-04 DIAGNOSIS — R11.10 VOMITING, UNSPECIFIED VOMITING TYPE, UNSPECIFIED WHETHER NAUSEA PRESENT: ICD-10-CM

## 2023-10-04 DIAGNOSIS — K59.00 CONSTIPATION, UNSPECIFIED CONSTIPATION TYPE: ICD-10-CM

## 2023-10-04 DIAGNOSIS — Z12.31 ENCOUNTER FOR SCREENING MAMMOGRAM FOR BREAST CANCER: ICD-10-CM

## 2023-10-04 PROCEDURE — 99307 SBSQ NF CARE SF MDM 10: CPT | Mod: GC

## 2023-10-04 NOTE — PROGRESS NOTES
Subjective: Bhavana Marr is a 82 year old who is seen at [facility] at [facility] for follow up visit today.    Additional information obtained from Nursing Staff, and includes:  EGD has been rescheduled to later date.  Acute concerns today include: no acute concerns. Now has been getting 3 suppositories/week and feels explosive diarrhea has decreased somewhat. Vomiting about 3-4 times/week, no bile or blood in emesis. No nausea, abdominal pain, heartburn.  Chronic and Past Medical Problems include:  Patient Active Problem List   Diagnosis    Arthritis    Depressive disorder    Borderline personality disorder (H)    GERD (gastroesophageal reflux disease)    Holosystolic murmur    Asthma    History of gastric bypass    Hyperlipidemia LDL goal <130    Other insomnia    Mild mitral regurgitation    Mild aortic stenosis    Weight loss    Bilateral low back pain without sciatica    Adhesive capsulitis of shoulder, right    Mild intermittent asthma, unspecified whether complicated    Bipolar affective disorder, remission status unspecified (H)    Constipation, unspecified constipation type    Age-related osteoporosis without current pathological fracture    Plantar warts    Hypoglycemia    Failure to thrive in adult    Hypotension, unspecified hypotension type    Suicidal ideation    Aspiration pneumonia (H)     Family History       Problem (# of Occurrences) Relation (Name,Age of Onset)    Depression (4) Maternal Aunt (60), Maternal Uncle (85), Paternal Aunt, Paternal Uncle          Social History:   Current activities:  mostly staying in room and sleeping.  Support services:  medications managed by staff.  PMHX/PSHX/MEDS/ALLERGIES/SHX/FHX reviewed in Epic.   MEDICATION LIST:  Current Outpatient Medications   Medication    acetaminophen (TYLENOL) 325 MG tablet    alendronate (FOSAMAX) 70 MG tablet    alum & mag hydroxide-simethicone (MAALOX) 200-200-20 MG/5ML SUSP suspension    bisacodyl (DULCOLAX) 10  MG suppository    budesonide-formoterol (SYMBICORT) 80-4.5 MCG/ACT Inhaler    calcium carbonate (TUMS) 500 MG chewable tablet    calcium carbonate 500 mg, elemental, (OSCAL) 500 MG tablet    cholecalciferol (VITAMIN D3) 1000 UNIT tablet    famotidine (PEPCID) 20 MG tablet    FLUoxetine (PROZAC) 20 MG/5ML solution    gabapentin (NEURONTIN) 250 MG/5ML solution    guaiFENesin-dextromethorphan (ROBITUSSIN DM) 100-10 MG/5ML syrup    guaiFENesin-dextromethorphan (ROBITUSSIN DM) 100-10 MG/5ML syrup    hypromellose (ARTIFICIAL TEARS) 0.5 % SOLN ophthalmic solution    lamoTRIgine (LAMICTAL) 25 MG tablet    magnesium hydroxide (MILK OF MAGNESIA) 400 MG/5ML suspension    melatonin 3 MG tablet    memantine (NAMENDA) 10 MG tablet    mirtazapine (REMERON) 15 MG tablet    Multiple Vitamins-Minerals (CEROVITE SENIOR) TABS    Nutritional Supplements (ENSURE ORIGINAL) LIQD    OLANZapine zydis (ZYPREXA) 10 MG ODT    OLANZapine zydis (ZYPREXA) 5 MG ODT    pantoprazole (PROTONIX) 40 MG EC tablet    polyethylene glycol (MIRALAX) 17 GM/Dose powder    SENNA-docusate sodium (SENNA S) 8.6-50 MG tablet    simvastatin (ZOCOR) 40 MG tablet    valACYclovir (VALTREX) 500 MG tablet    vitamin B-12 (CYANOCOBALAMIN) 50 MCG tablet    white petrolatum external gel     No current facility-administered medications for this visit.   Medications will be updated in EMR; physical copy received.  GERIATRIC ROS:    Do you have difficulty getting around, watching TV or reading because of poor eyesight? No   Can you hear normal conversational voice? Yes  Do you use hearing aides? Yes   Have you unintentionally lost weight in the last 6 months? No  GENERAL ROS:   General: No unexplained weight gain or loss; adequate sleep pattern. No fever.  Resp: No worsening shortness of breath, cough.  GI: No worsening constipation, no nausea, no heartburn. +diarrhea, + vomiting.  Extremities:  No pain    Objective: /61   Pulse 84   Temp 97.1  F (36.2  C)   Resp 16    Wt 51.3 kg (113 lb)   LMP  (LMP Unknown)   SpO2 95%   BMI 19.40 kg/m     Gen: Well nourished and in NAD   HEENT: Head is atraumatic  CV: RRR, 2/6 known systolic murmur  Pulm: CTAB, no wheezes/rales/rhonchi, good air entry   ABD: soft, nontender, BS intact  Extrem: no cyanosis, edema or clubbing of BUE  Psych: Euthymic  Neuro: Pt is able to ambulate independently    Preventative Screening:   Vaccinations reviewed and up to date - flu/COVID vaccines when able  From prior visit on 9/18/23:  POA: Self        phone number: [phone]  Code status: DNR  Healthcare Directive, Living Will, POLST has been completed: POLST on file from 6/27/2023     Assessment/ Plan:  Diagnoses and all orders for this visit:    Gastroesophageal reflux disease, unspecified whether esophagitis present  Vomiting, unspecified vomiting type, unspecified whether nausea present  Patient's EGD was rescheduled for a later date. Vomiting three times weekly, nonbilious and nonbloody. No changes to previous notes or status. She continues to be cleared for EGD procedure without further need for workup at this time.    Constipation, unspecified constipation type  Diarrhea seems to be improved with increasing suppository frequency to 3 times/week. Continue to monitor.    Encounter for screening mammogram for breast cancer  Getting mammogram tomorrow. Last completed in 2022 and negative.     RECOMMENDED FOLLOW UP:  2 months.    Total of 25 minutes was spent in face to face contact with patient with > 50% in counseling and coordination of care.  Options for treatment and/or follow-up care were reviewed with the patient. Bhavana Marr was engaged and actively involved in the decision making process. She verbalized understanding of the options discussed and was satisfied with the final plan.    Patient precepted with Dr. Lindsey.    Yolanda Bradford MD PGY-2

## 2023-10-05 ENCOUNTER — HOSPITAL ENCOUNTER (OUTPATIENT)
Dept: MAMMOGRAPHY | Facility: CLINIC | Age: 83
Discharge: HOME | End: 2023-10-05
Attending: FAMILY MEDICINE | Admitting: FAMILY MEDICINE
Payer: COMMERCIAL

## 2023-10-05 DIAGNOSIS — Z12.31 ENCOUNTER FOR SCREENING MAMMOGRAM FOR BREAST CANCER: ICD-10-CM

## 2023-10-05 PROCEDURE — 77067 SCR MAMMO BI INCL CAD: CPT

## 2023-10-05 NOTE — PROGRESS NOTES
Preceptor Attestation:    I discussed the patient with the resident and evaluated the patient in person. I have verified the content of the note, which accurately reflects my assessment of the patient and the plan of care.    Medical optimized for upcoming procedure.   Supervising Physician:  Inder Lindsey MD.

## 2023-11-07 ENCOUNTER — DOCUMENTATION ONLY (OUTPATIENT)
Dept: FAMILY MEDICINE | Facility: CLINIC | Age: 83
End: 2023-11-07
Payer: COMMERCIAL

## 2023-11-07 VITALS
WEIGHT: 115 LBS | HEART RATE: 64 BPM | TEMPERATURE: 97.1 F | OXYGEN SATURATION: 94 % | BODY MASS INDEX: 19.74 KG/M2 | SYSTOLIC BLOOD PRESSURE: 107 MMHG | RESPIRATION RATE: 16 BRPM | DIASTOLIC BLOOD PRESSURE: 54 MMHG

## 2023-11-07 NOTE — PROGRESS NOTES
"Nursing home visit on 11/9  30 day appt.    \"The 3x weekly suppository has been helping decrease her incontinent diarrhea. Her EGD had to be rescheduled because she still had undigested food in her stomach when they attempted to do it so she will have to be on a clear liquid diet for 24 hours prior to her procedure next time.\"    Vitals updated from most recent record per Josefina Hansen RN.     RAUL Bush    "

## 2023-11-08 NOTE — PROGRESS NOTES
Subjective: Bhavana Marr is a 82 year old who is seen at home for follow up visit today.  Seen at [facility] AT [place]   [address]  [facility] [address] .    Acute concerns today include: None  She reports improvement in vomiting and diarrhea. She states that diarrhea has improved with suppository. She also believes that nausea and vomiting have decreased from previously. She is hopeful that with clear liquid diet before upcoming EGD, she will have more answers to her history of nausea/vomiting.  Reports occasional shortness of breath which has been helped by as needed Symbicort.  Chronic and Past Medical Problems include:  Patient Active Problem List   Diagnosis    Arthritis    Depressive disorder    Borderline personality disorder (H)    GERD (gastroesophageal reflux disease)    Holosystolic murmur    Asthma    History of gastric bypass    Hyperlipidemia LDL goal <130    Other insomnia    Mild mitral regurgitation    Mild aortic stenosis    Weight loss    Bilateral low back pain without sciatica    Adhesive capsulitis of shoulder, right    Mild intermittent asthma, unspecified whether complicated    Bipolar affective disorder, remission status unspecified (H)    Constipation, unspecified constipation type    Age-related osteoporosis without current pathological fracture    Plantar warts    Hypoglycemia    Failure to thrive in adult    Hypotension, unspecified hypotension type    Suicidal ideation    Aspiration pneumonia (H)     Family History       Problem (# of Occurrences) Relation (Name,Age of Onset)    Depression (4) Maternal Aunt (60), Maternal Uncle (85), Paternal Aunt, Paternal Uncle          Social History:  Support services:  medications managed by staff  PMHX/PSHX/MEDS/ALLERGIES/SHX/FHX reviewed and updated in Epic.   MEDICATION LIST:  Current Outpatient Medications   Medication    acetaminophen (TYLENOL) 325 MG tablet    alendronate (FOSAMAX) 70 MG tablet    alum & mag hydroxide-simethicone  (MAALOX) 200-200-20 MG/5ML SUSP suspension    bisacodyl (DULCOLAX) 10 MG suppository    budesonide-formoterol (SYMBICORT) 80-4.5 MCG/ACT Inhaler    calcium carbonate (TUMS) 500 MG chewable tablet    calcium carbonate 500 mg, elemental, (OSCAL) 500 MG tablet    cholecalciferol (VITAMIN D3) 1000 UNIT tablet    famotidine (PEPCID) 20 MG tablet    FLUoxetine (PROZAC) 20 MG/5ML solution    gabapentin (NEURONTIN) 250 MG/5ML solution    guaiFENesin-dextromethorphan (ROBITUSSIN DM) 100-10 MG/5ML syrup    guaiFENesin-dextromethorphan (ROBITUSSIN DM) 100-10 MG/5ML syrup    hypromellose (ARTIFICIAL TEARS) 0.5 % SOLN ophthalmic solution    lamoTRIgine (LAMICTAL) 25 MG tablet    magnesium hydroxide (MILK OF MAGNESIA) 400 MG/5ML suspension    melatonin 3 MG tablet    memantine (NAMENDA) 10 MG tablet    mirtazapine (REMERON) 15 MG tablet    Multiple Vitamins-Minerals (CEROVITE SENIOR) TABS    Nutritional Supplements (ENSURE ORIGINAL) LIQD    OLANZapine zydis (ZYPREXA) 10 MG ODT    OLANZapine zydis (ZYPREXA) 5 MG ODT    pantoprazole (PROTONIX) 40 MG EC tablet    polyethylene glycol (MIRALAX) 17 GM/Dose powder    SENNA-docusate sodium (SENNA S) 8.6-50 MG tablet    simvastatin (ZOCOR) 40 MG tablet    valACYclovir (VALTREX) 500 MG tablet    vitamin B-12 (CYANOCOBALAMIN) 50 MCG tablet    white petrolatum external gel     No current facility-administered medications for this visit.     Medications are managed by: staff  GERIATRIC ROS:    Bhavana reports that she has a good appetite. Diarrhea improved. Nausea and vomiting, less frequently.    GENERAL ROS:   General: No unexplained weight gain or loss; adequate sleep pattern.  Resp: No worsening shortness of breath, cough or hemoptysis.   GI: No worsening constipation, no diarrhea, no nausea or vomiting  Skin: No areas of new skin breakdown or worrisome rashes  Extremities:  No pain, extremity weakness or balance troubles    Objective:  Most recent weight:  /66, P62, RR18 T97.1 o2 96%  Ra Wt 115  Gen: Well nourished and in NAD   HEENT: Head is atraumatic, decent dentition  CV: RRR - no murmurs, rubs, or gallups,   Pulm: CTAB, no wheezes/rales/rhonchi, good air entry   ABD: soft, nontender, BS intact  Extrem: no cyanosis, edema or clubbing   Psych: Euthymic  Neuro: Pt is able to ambulate independently    Preventative Screening:   Vaccinations reviewed and up to date, RSV when able  Assessment/ Plan:  Diagnoses and all orders for this visit:    Gastroesophageal reflux disease, unspecified whether esophagitis present  Vomiting, unspecified vomiting type, unspecified whether nausea present  -EGD planned 2/1/2024. Patient will need pre-op for this at the date approaches.     Constipation, unspecified constipation type  -continue rectal bisacodyl once daily as this appears to have improved patient's constipation.    Muscle weakness (generalized)  -Pt assessment given weakness and assessment for potential walking assistive devices.   -     Physical Therapy Referral; Future    Asthma  -Symbicort requires a prior Auth for patient's insurance, at this time we will trial Breo Ellipta with as needed albuterol.  -adding breo-elipta fluticosone 100mcg/vilanterol 25 mcg daily and discontinue Symbicort due to formulary issues  -We will send in as needed albuterol sulfate for shortness of breath    RECOMMENDED FOLLOW UP:  2 months.      Bony Jules DO    Precepted with Dr. Inder Lindsey MD

## 2023-11-09 ENCOUNTER — NURSING HOME VISIT (OUTPATIENT)
Dept: FAMILY MEDICINE | Facility: CLINIC | Age: 83
End: 2023-11-09
Payer: COMMERCIAL

## 2023-11-09 DIAGNOSIS — R11.10 VOMITING, UNSPECIFIED VOMITING TYPE, UNSPECIFIED WHETHER NAUSEA PRESENT: ICD-10-CM

## 2023-11-09 DIAGNOSIS — K59.00 CONSTIPATION, UNSPECIFIED CONSTIPATION TYPE: ICD-10-CM

## 2023-11-09 DIAGNOSIS — M62.81 MUSCLE WEAKNESS (GENERALIZED): ICD-10-CM

## 2023-11-09 DIAGNOSIS — K21.9 GASTROESOPHAGEAL REFLUX DISEASE, UNSPECIFIED WHETHER ESOPHAGITIS PRESENT: Primary | ICD-10-CM

## 2023-11-09 DIAGNOSIS — J45.909 UNCOMPLICATED ASTHMA, UNSPECIFIED ASTHMA SEVERITY, UNSPECIFIED WHETHER PERSISTENT: ICD-10-CM

## 2023-11-09 PROCEDURE — 99308 SBSQ NF CARE LOW MDM 20: CPT | Mod: GC

## 2023-11-09 RX ORDER — ALBUTEROL SULFATE 90 UG/1
2 AEROSOL, METERED RESPIRATORY (INHALATION) EVERY 6 HOURS PRN
Qty: 18 G | Refills: 3 | Status: SHIPPED | OUTPATIENT
Start: 2023-11-09

## 2023-11-09 RX ORDER — FLUTICASONE FUROATE AND VILANTEROL 100; 25 UG/1; UG/1
1 POWDER RESPIRATORY (INHALATION) DAILY
COMMUNITY

## 2023-12-11 ENCOUNTER — TELEPHONE (OUTPATIENT)
Dept: FAMILY MEDICINE | Facility: CLINIC | Age: 83
End: 2023-12-11
Payer: COMMERCIAL

## 2023-12-12 ENCOUNTER — NURSING HOME VISIT (OUTPATIENT)
Dept: FAMILY MEDICINE | Facility: CLINIC | Age: 83
End: 2023-12-12
Payer: COMMERCIAL

## 2023-12-12 DIAGNOSIS — Z78.9 NURSING HOME RESIDENT: ICD-10-CM

## 2023-12-12 DIAGNOSIS — J45.20 MILD INTERMITTENT ASTHMA, UNSPECIFIED WHETHER COMPLICATED: Primary | ICD-10-CM

## 2023-12-12 PROCEDURE — 99308 SBSQ NF CARE LOW MDM 20: CPT | Mod: GC

## 2023-12-12 NOTE — PROGRESS NOTES
Subjective: Bhavana Marr is a 82 year old who is seen at home for follow up visit today.  Seen at [facility] AT [place]   [address]  [facility] [address] .    Acute concerns today include: Patient has been using her rescue inhaler 0-2 times per day, in addition to her daily Breo-Ellipta. Had recent CBC with reported leukocytosis. On chart review, appears she had a CBC one month ago, WBC count 15.8 at that time. Unclear if any medication changes one month ago. Patient denies any infectious symptoms. No fevers, chills, chest pain, shortness of breath, cough, congestion, nausea, vomiting, changes to bowels, dysuria, frequency, abdominal pain, flank pain, AMS.   Chronic and Past Medical Problems include:  Patient Active Problem List   Diagnosis    Arthritis    Depressive disorder    Borderline personality disorder (H)    GERD (gastroesophageal reflux disease)    Holosystolic murmur    Asthma    History of gastric bypass    Hyperlipidemia LDL goal <130    Other insomnia    Mild mitral regurgitation    Mild aortic stenosis    Weight loss    Bilateral low back pain without sciatica    Adhesive capsulitis of shoulder, right    Mild intermittent asthma, unspecified whether complicated    Bipolar affective disorder, remission status unspecified (H)    Constipation, unspecified constipation type    Age-related osteoporosis without current pathological fracture    Plantar warts    Hypoglycemia    Failure to thrive in adult    Hypotension, unspecified hypotension type    Suicidal ideation    Aspiration pneumonia (H)     Family History       Problem (# of Occurrences) Relation (Name,Age of Onset)    Depression (4) Maternal Aunt (60), Maternal Uncle (85), Paternal Aunt, Paternal Uncle          Social History:   Support services:  Meds managed by staff  PMHX/PSHX/MEDS/ALLERGIES/SHX/FHX reviewed and updated in Epic.   MEDICATION LIST:  Current Outpatient Medications   Medication    acetaminophen (TYLENOL) 325 MG tablet     albuterol (PROAIR HFA/PROVENTIL HFA/VENTOLIN HFA) 108 (90 Base) MCG/ACT inhaler    alendronate (FOSAMAX) 70 MG tablet    alum & mag hydroxide-simethicone (MAALOX) 200-200-20 MG/5ML SUSP suspension    bisacodyl (DULCOLAX) 10 MG suppository    calcium carbonate (TUMS) 500 MG chewable tablet    calcium carbonate 500 mg, elemental, (OSCAL) 500 MG tablet    cholecalciferol (VITAMIN D3) 1000 UNIT tablet    famotidine (PEPCID) 20 MG tablet    FLUoxetine (PROZAC) 20 MG/5ML solution    fluticasone-vilanterol (BREO ELLIPTA) 100-25 MCG/ACT inhaler    gabapentin (NEURONTIN) 250 MG/5ML solution    guaiFENesin-dextromethorphan (ROBITUSSIN DM) 100-10 MG/5ML syrup    guaiFENesin-dextromethorphan (ROBITUSSIN DM) 100-10 MG/5ML syrup    hypromellose (ARTIFICIAL TEARS) 0.5 % SOLN ophthalmic solution    lamoTRIgine (LAMICTAL) 25 MG tablet    magnesium hydroxide (MILK OF MAGNESIA) 400 MG/5ML suspension    melatonin 3 MG tablet    memantine (NAMENDA) 10 MG tablet    mirtazapine (REMERON) 15 MG tablet    Multiple Vitamins-Minerals (CEROVITE SENIOR) TABS    Nutritional Supplements (ENSURE ORIGINAL) LIQD    OLANZapine zydis (ZYPREXA) 10 MG ODT    OLANZapine zydis (ZYPREXA) 5 MG ODT    pantoprazole (PROTONIX) 40 MG EC tablet    polyethylene glycol (MIRALAX) 17 GM/Dose powder    SENNA-docusate sodium (SENNA S) 8.6-50 MG tablet    simvastatin (ZOCOR) 40 MG tablet    valACYclovir (VALTREX) 500 MG tablet    vitamin B-12 (CYANOCOBALAMIN) 50 MCG tablet    white petrolatum external gel     No current facility-administered medications for this visit.     Medications are managed by:  SELF, FAMILY, HOME NURSE  Patient has questions, concerns, or potential side effects/interactions from their medications:  Yes - inhaler  GERIATRIC ROS:    Can you hear normal conversational voice? Yes    Do you have problems with your memory? No   Do you often feel sad or depressed? No   Have you unintentionally lost weight in the last 6 months? No   Do you have trouble  with control of your bladder? No   Do you have trouble with control of your bowels? No   GENERAL ROS:   General: No unexplained weight gain or loss; adequate sleep pattern.  Resp: No worsening shortness of breath, cough or hemoptysis. Although, having to use rescue inhaler more often   GI: No worsening constipation, no diarrhea, no nausea or vomiting  : Voiding independently with no significant incontinence   Skin: No areas of new skin breakdown or worrisome rashes  Extremities:  No pain, extremity weakness or balance troubles    Objective: LMP  (LMP Unknown)    Most recent weight:  115 lb  Gen: Well nourished and in NAD   HEENT: Head is atraumatic, decent dentition  CV: RRR - no murmurs, rubs, or gallups,   Pulm: Faint end expiratory wheezing, good air movement bilaterally   ABD: soft, nontender, BS intact  Extrem: no cyanosis, edema or clubbing   Psych: Euthymic  Neuro: Pt is able to ambulate independently    Preventative Screening:   Vaccinations reviewed- RSV vaccine when able  Additional Recommended Screening: Update asthma action plan, asthma control test   Assessment/ Plan:  1. Mild intermittent asthma, unspecified whether complicated  - Using Breo Ellipta, PCP working on prior auth for Symbicort    2. Nursing home resident      RECOMMENDED FOLLOW UP:  2 months.    Lisette Tsang DO

## 2023-12-18 ENCOUNTER — DOCUMENTATION ONLY (OUTPATIENT)
Dept: FAMILY MEDICINE | Facility: CLINIC | Age: 83
End: 2023-12-18
Payer: COMMERCIAL

## 2023-12-18 NOTE — PROGRESS NOTES
Med rec completed with [facility] medication list as of 11/9/23    Neva Okeefe Pharm.D.  Medication Therapy Management Pharmacy Resident

## 2024-01-03 ENCOUNTER — TELEPHONE (OUTPATIENT)
Dept: FAMILY MEDICINE | Facility: CLINIC | Age: 84
End: 2024-01-03
Payer: COMMERCIAL

## 2024-01-04 ENCOUNTER — NURSING HOME VISIT (OUTPATIENT)
Dept: FAMILY MEDICINE | Facility: CLINIC | Age: 84
End: 2024-01-04
Payer: COMMERCIAL

## 2024-01-04 VITALS
HEART RATE: 86 BPM | SYSTOLIC BLOOD PRESSURE: 110 MMHG | RESPIRATION RATE: 18 BRPM | BODY MASS INDEX: 19.91 KG/M2 | WEIGHT: 116 LBS | DIASTOLIC BLOOD PRESSURE: 54 MMHG | OXYGEN SATURATION: 96 % | TEMPERATURE: 97.1 F

## 2024-01-04 DIAGNOSIS — R11.11 VOMITING WITHOUT NAUSEA, UNSPECIFIED VOMITING TYPE: ICD-10-CM

## 2024-01-04 DIAGNOSIS — Z01.818 PREOP GENERAL PHYSICAL EXAM: Primary | ICD-10-CM

## 2024-01-04 DIAGNOSIS — Z98.84 HISTORY OF GASTRIC BYPASS: ICD-10-CM

## 2024-01-04 PROCEDURE — 99308 SBSQ NF CARE LOW MDM 20: CPT | Mod: GC

## 2024-01-04 NOTE — PATIENT INSTRUCTIONS
Preparing for Your Surgery  Getting started  A nurse will call you to review your health history and instructions. They will give you an arrival time based on your scheduled surgery time. Please be ready to share:  Your doctor's clinic name and phone number  Your medical, surgical, and anesthesia history  A list of allergies and sensitivities  A list of medicines, including herbal treatments and over-the-counter drugs  Whether the patient has a legal guardian (ask how to send us the papers in advance)  Please tell us if you're pregnant--or if there's any chance you might be pregnant. Some surgeries may injure a fetus (unborn baby), so they require a pregnancy test. Surgeries that are safe for a fetus don't always need a test, and you can choose whether to have one.   If you have a child who's having surgery, please ask for a copy of Preparing for Your Child's Surgery.    Preparing for surgery  Within 10 to 30 days of surgery: Have a pre-op exam (sometimes called an H&P, or History and Physical). This can be done at a clinic or pre-operative center.  If you're having a , you may not need this exam. Talk to your care team.  At your pre-op exam, talk to your care team about all medicines you take. If you need to stop any medicines before surgery, ask when to start taking them again.  We do this for your safety. Many medicines can make you bleed too much during surgery. Some change how well surgery (anesthesia) drugs work.  Call your insurance company to let them know you're having surgery. (If you don't have insurance, call [phone].)  Call your clinic if there's any change in your health. This includes signs of a cold or flu (sore throat, runny nose, cough, rash, fever). It also includes a scrape or scratch near the surgery site.  If you have questions on the day of surgery, call your hospital or surgery center.  Eating and drinking guidelines  For your safety: Unless your surgeon tells you otherwise,  follow the guidelines below.  Eat and drink as usual until 8 hours before you arrive for surgery. After that, no food or milk.  Drink clear liquids until 2 hours before you arrive. These are liquids you can see through, like water, Gatorade, and Propel Water. They also include plain black coffee and tea (no cream or milk), candy, and breath mints. You can spit out gum when you arrive.  If you drink alcohol: Stop drinking it the night before surgery.  If your care team tells you to take medicine on the morning of surgery, it's okay to take it with a sip of water.  Preventing infection  Shower or bathe the night before and morning of your surgery. Follow the instructions your clinic gave you. (If no instructions, use regular soap.)  Don't shave or clip hair near your surgery site. We'll remove the hair if needed.  Don't smoke or vape the morning of surgery. You may chew nicotine gum up to 2 hours before surgery. A nicotine patch is okay.  Note: Some surgeries require you to completely quit smoking and nicotine. Check with your surgeon.  Your care team will make every effort to keep you safe from infection. We will:  Clean our hands often with soap and water (or an alcohol-based hand rub).  Clean the skin at your surgery site with a special soap that kills germs.  Give you a special gown to keep you warm. (Cold raises the risk of infection.)  Wear special hair covers, masks, gowns and gloves during surgery.  Give antibiotic medicine, if prescribed. Not all surgeries need antibiotics.  What to bring on the day of surgery  Photo ID and insurance card  Copy of your health care directive, if you have one  Glasses and hearing aids (bring cases)  You can't wear contacts during surgery  Inhaler and eye drops, if you use them (tell us about these when you arrive)  CPAP machine or breathing device, if you use them  A few personal items, if spending the night  If you have . . .  A pacemaker, ICD (cardiac defibrillator) or other  implant: Bring the ID card.  An implanted stimulator: Bring the remote control.  A legal guardian: Bring a copy of the certified (court-stamped) guardianship papers.  Please remove any jewelry, including body piercings. Leave jewelry and other valuables at home.  If you're going home the day of surgery  You must have a responsible adult drive you home. They should stay with you overnight as well.  If you don't have someone to stay with you, and you aren't safe to go home alone, we may keep you overnight. Insurance often won't pay for this.  After surgery  If it's hard to control your pain or you need more pain medicine, please call your surgeon's office.  Questions?   If you have any questions for your care team, list them here: _________________________________________________________________________________________________________________________________________________________________________ ____________________________________ ____________________________________ ____________________________________  For informational purposes only. Not to replace the advice of your health care provider. Copyright   2003, 2019 [employer] [facility]. All rights reserved. Clinically reviewed by Esperanza Holden MD. [employer] 568301 - REV 12/22.    How to Take Your Medication Before Surgery  - Take all of your medications before surgery as usual

## 2024-01-04 NOTE — PROGRESS NOTES
[facility] [address] [place] [address]  Phone: [phone]  Fax: [phone]  Primary Provider: Inder Lindsey  Pre-op Performing Provider: BECK ACOSTA      PREOPERATIVE EVALUATION:  Today's date: 1/4/2024    Bhavana Munguia is a 83 year old, presenting for the following:  Pre-Op Exam (Upcoming EGD)         No data to display                Surgical Information:  Surgery/Procedure: EGD  Surgery Location: [facility]   Surgeon: Colten Rivera MD  Surgery Date: 2/1/24  Time of Surgery: 9:30am  Where patient plans to recover: At a nursing home  Fax number for surgical facility: Note does not need to be faxed, will be available electronically in Epic.    Assessment & Plan     The proposed surgical procedure is considered LOW risk.    Preop general physical exam  History of gastric bypass  Vomiting without nausea, unspecified vomiting type  Patient will be undergoing EGD to work up daily vomiting. Previous EGD inconclusive due to presence of undigested food material. Brings up concern for possible gastroparesis/slowed emptying. Hx of gastric bypass.   Patient cleared for surgery with no further work up required.   -Patient to eat full liquid diet the day before procedure, then NPO after midnight prior        Risks and Recommendations:  The patient has the following additional risks and recommendations for perioperative complications:  Cardiovascular:   - stable holosystolic murmur  Pulmonary:    - mild intermittent asthma, trigger of cold weather may need    Antiplatelet or Anticoagulation Medication Instructions:  None    Additional Medication Instructions:  Patient is to take all scheduled medications on the day of surgery    RECOMMENDATION:  APPROVAL GIVEN to proceed with proposed procedure, without further diagnostic evaluation.    Diagnosis or treatment significantly limited by social determinants of health - lives in nursing home  30 minutes spent by me on the date of  the encounter doing chart review, history and exam, documentation and further activities per the note      Subjective       HPI related to upcoming procedure: Patient evaluated at [facility] Daily vomiting, concern for gastroparesis. Patient hx of gastric bypass. Attempted EGD previously insufficient secondary to undigested food noted in stomach. Some concern for slowed gastric emptying. Patient aware of plan for a full liquid diet the day before to reduce likelihood.            No data to display              Health Care Directive:  Patient has a Health Care Directive on file      Preoperative Review of :   reviewed - no record of controlled substances prescribed.      Status of Chronic Conditions:  Mild intermittent asthma stable with some increase in rescue med with cold weather    Heart murmur stable    No significant weight loss    Review of Systems  Constitutional, neuro, ENT, endocrine, pulmonary, cardiac, gastrointestinal, genitourinary, musculoskeletal, integument and psychiatric systems are negative, except as otherwise noted.    Patient Active Problem List    Diagnosis Date Noted    Aspiration pneumonia (H) 07/17/2023     Priority: Medium    Suicidal ideation 06/07/2023     Priority: Medium    Hypoglycemia 05/05/2023     Priority: Medium    Failure to thrive in adult 05/05/2023     Priority: Medium    Hypotension, unspecified hypotension type 05/05/2023     Priority: Medium    Plantar warts 09/29/2022     Priority: Medium    Bipolar affective disorder, remission status unspecified (H) 08/09/2022     Priority: Medium    Constipation, unspecified constipation type 08/09/2022     Priority: Medium    Age-related osteoporosis without current pathological fracture 08/09/2022     Priority: Medium    Mild intermittent asthma, unspecified whether complicated 12/09/2021     Priority: Medium    Bilateral low back pain without sciatica 08/03/2021     Priority: Medium    Adhesive capsulitis of shoulder, right  08/03/2021     Priority: Medium    Weight loss 12/07/2020     Priority: Medium    Mild mitral regurgitation 10/17/2018     Priority: Medium    Mild aortic stenosis 10/17/2018     Priority: Medium    Hyperlipidemia LDL goal <130 10/02/2018     Priority: Medium    Other insomnia 10/02/2018     Priority: Medium    History of gastric bypass 07/30/2018     Priority: Medium    Asthma 06/08/2018     Priority: Medium    Holosystolic murmur 05/22/2018     Priority: Medium    Depressive disorder 03/28/2018     Priority: Medium    Borderline personality disorder (H) 03/28/2018     Priority: Medium    GERD (gastroesophageal reflux disease) 03/28/2018     Priority: Medium    Arthritis 01/01/1998     Priority: Medium      Past Medical History:   Diagnosis Date    Arthritis 1998    Asthma     Asthma 6/8/2018    Blood transfusion 2000    Borderline personality disorder (H)     Chronic sinus infection     Depressive disorder     GERD (gastroesophageal reflux disease)     History of blood transfusion     Hyperlipidemia     MDD (major depressive disorder)      Past Surgical History:   Procedure Laterality Date    ABDOMEN SURGERY      2 fatty tumors removed    APPENDECTOMY      BIOPSY      COLONOSCOPY      GASTRIC BYPASS      GI SURGERY  2000    gastric bypass    GYN SURGERY      complete hysterectomy    [facility] CORRECT BUNION,DOUBLE OSTEOTOMY      Description: Hallux Valgus (Bunion) Correction;  Recorded: 05/07/2012;    ORTHOPEDIC SURGERY      both hip,two foot surgies on each foot    ORTHOPEDIC SURGERY      right elbow    [facility] APPENDECTOMY      Description: Appendectomy;  Recorded: 05/07/2012;    [facility] TOTAL ABDOM HYSTERECTOMY      Description: Total Abdominal Hysterectomy;  Recorded: 05/07/2012;     Current Outpatient Medications   Medication Sig Dispense Refill    acetaminophen (TYLENOL) 325 MG tablet Take 2 tablets (650 mg) by mouth every 6 hours as needed for mild pain or other (and adjunct with moderate or severe pain or per patient  request)      albuterol (PROAIR HFA/PROVENTIL HFA/VENTOLIN HFA) 108 (90 Base) MCG/ACT inhaler Inhale 2 puffs into the lungs every 6 hours as needed for shortness of breath, wheezing or cough 18 g 3    alendronate (FOSAMAX) 70 MG tablet Take 70 mg by mouth every 7 days      alum & mag hydroxide-simethicone (MAALOX) 200-200-20 MG/5ML SUSP suspension Take 15 mLs by mouth every 3 hours as needed for indigestion      bisacodyl (DULCOLAX) 10 MG suppository Place 1 suppository (10 mg) rectally three times a week 100 suppository 0    calcium carbonate (TUMS) 500 MG chewable tablet Take 2 chew tab by mouth every 3 hours as needed Chew 2 tabs every 3 hours as needed      calcium carbonate 500 mg, elemental, (OSCAL) 500 MG tablet Take 1 tablet (500 mg) by mouth At Bedtime 30 tablet 0    cholecalciferol (VITAMIN D3) 1000 UNIT tablet Take 1 tablet (1,000 Units) by mouth daily 30 tablet 11    famotidine (PEPCID) 20 MG tablet Take 1 tablet (20 mg) by mouth daily 30 tablet 0    FLUoxetine (PROZAC) 20 MG/5ML solution Take 15 mLs (60 mg) by mouth daily 120 mL 1    fluticasone-vilanterol (BREO ELLIPTA) 100-25 MCG/ACT inhaler Inhale 1 puff into the lungs daily      gabapentin (NEURONTIN) 250 MG/5ML solution Take 4 mLs (200 mg) by mouth 3 times daily 470 mL 0    guaiFENesin-dextromethorphan (ROBITUSSIN DM) 100-10 MG/5ML syrup Take 10 mLs by mouth every 4 hours as needed for cough      hypromellose (ARTIFICIAL TEARS) 0.5 % SOLN ophthalmic solution Place 2 drops into both eyes 4 times daily as needed      lamoTRIgine (LAMICTAL) 25 MG tablet Take 1 tablet (25 mg) by mouth daily 30 tablet 0    magnesium hydroxide (MILK OF MAGNESIA) 400 MG/5ML suspension Take 30 mLs by mouth daily as needed for constipation or heartburn      melatonin 3 MG tablet Take 2 tablets (6 mg) by mouth every evening 60 tablet 0    memantine (NAMENDA) 10 MG tablet Take 1 tablet (10 mg) by mouth 2 times daily 60 tablet 0    mirtazapine (REMERON) 15 MG tablet Take 1  tablet (15 mg) by mouth At Bedtime      Multiple Vitamins-Minerals (CEROVITE SENIOR) TABS Take 1 tablet by mouth daily 30 tablet 3    Nutritional Supplements (ENSURE ORIGINAL) LIQD Take 1 Container by mouth 3 times daily      OLANZapine zydis (ZYPREXA) 10 MG ODT Take 1 tablet (10 mg) by mouth At Bedtime 30 tablet 0    OLANZapine zydis (ZYPREXA) 5 MG ODT Take a half tablet (2.5mg) once daily (also taking 10mg at bedtime)      pantoprazole (PROTONIX) 40 MG EC tablet Take 1 tablet (40 mg) by mouth every morning (before breakfast) 30 tablet 0    polyethylene glycol (MIRALAX) 17 GM/Dose powder Take 17 g (1 Capful) by mouth daily. May also take 17 g (1 Capful) daily as needed for constipation.      SENNA-docusate sodium (SENNA S) 8.6-50 MG tablet Take 1 tablet by mouth 2 times daily      simvastatin (ZOCOR) 40 MG tablet Take 1 tablet (40 mg) by mouth At Bedtime 30 tablet 11    valACYclovir (VALTREX) 500 MG tablet Give 500 mg by mouth one time a day related to other herpesviral infection for outbreak prevention  3    vitamin B-12 (CYANOCOBALAMIN) 50 MCG tablet Take 1 tablet (50 mcg) by mouth daily 30 tablet 11    white petrolatum external gel Use as needed for dry skin         Allergies   Allergen Reactions    Nsaids      Gastric bypass surgery          Social History     Tobacco Use    Smoking status: Never    Smokeless tobacco: Never   Substance Use Topics    Alcohol use: No     Family History   Problem Relation Age of Onset    Depression Maternal Aunt     Depression Maternal Uncle     Depression Paternal Aunt     Depression Paternal Uncle      History   Drug Use No         Objective     /54   Pulse 86   Temp 97.1  F (36.2  C)   Resp 18   Wt 52.6 kg (116 lb)   LMP  (LMP Unknown)   SpO2 96%   BMI 19.91 kg/m      Physical Exam    GENERAL APPEARANCE: healthy, alert and no distress     EYES: EOMI, PERRL     HENT: nose and mouth without ulcers or lesions     NECK: no adenopathy, no asymmetry, masses, or scars and  thyroid normal to palpation     RESP: lungs clear to auscultation - no rales, rhonchi or wheezes     CV: regular rates and rhythm, normal S1 S2, no S3 or S4 and no murmur, click or rub     ABDOMEN:  soft, nontender, no HSM or masses and bowel sounds normal     MS: extremities normal- no gross deformities noted, no evidence of inflammation in joints, FROM in all extremities.     SKIN: no suspicious lesions or rashes     NEURO: Normal strength and tone, sensory exam grossly normal, mentation intact and speech normal     PSYCH: mentation appears normal. and affect normal/bright     LYMPHATICS: No cervical adenopathy    Recent Labs   Lab Test 07/20/23  0709 07/20/23  0708 07/17/23  0947 06/30/23  0800 06/29/23  1022   HGB  --  11.4* 11.7   < > 12.4   PLT  --  262 200   < > 308     --  141   < > 137   POTASSIUM 3.7  --  4.3   < > 3.9   CR 0.69  --  0.73   < > 0.75   A1C  --   --   --   --  5.1    < > = values in this interval not displayed.        Diagnostics:  No labs were ordered during this visit.   No EKG required for low risk surgery (cataract, skin procedure, breast biopsy, etc).    Revised Cardiac Risk Index (RCRI):  The patient has the following serious cardiovascular risks for perioperative complications:   - No serious cardiac risks = 0 points     RCRI Interpretation: 0 points: Class I (very low risk - 0.4% complication rate)         Signed Electronically by: Scarlet Tovar MD  Copy of this evaluation report is provided to requesting physician.

## 2024-01-08 ENCOUNTER — TELEPHONE (OUTPATIENT)
Dept: FAMILY MEDICINE | Facility: CLINIC | Age: 84
End: 2024-01-08
Payer: COMMERCIAL

## 2024-01-08 DIAGNOSIS — M81.0 AGE-RELATED OSTEOPOROSIS WITHOUT CURRENT PATHOLOGICAL FRACTURE: Primary | ICD-10-CM

## 2024-01-08 NOTE — TELEPHONE ENCOUNTER
In reviewing facility order list, alendronate is no longer on [facility] orders. Called [facility] and confirmed with Angelica that alendronate was discontinued by GI due to potential for stomach upset.     Med rec completed with [facility] medication list as of 1/3/24.      Amy BookerD.  Medication Therapy Management Pharmacy Resident

## 2024-03-05 ENCOUNTER — TELEPHONE (OUTPATIENT)
Dept: FAMILY MEDICINE | Facility: CLINIC | Age: 84
End: 2024-03-05
Payer: COMMERCIAL

## 2024-03-05 NOTE — TELEPHONE ENCOUNTER
Per RN notes from Josefina Hansen RN at [facility] as follows:      Continues to have daily vomiting - [facility] had d/c'd Alendronate thinking it may contribute to vomiting which hasn't changed.       AMY Gottlieb

## 2024-03-07 ENCOUNTER — NURSING HOME VISIT (OUTPATIENT)
Dept: FAMILY MEDICINE | Facility: CLINIC | Age: 84
End: 2024-03-07
Payer: COMMERCIAL

## 2024-03-07 DIAGNOSIS — J45.20 MILD INTERMITTENT ASTHMA, UNSPECIFIED WHETHER COMPLICATED: ICD-10-CM

## 2024-03-07 DIAGNOSIS — R11.11 VOMITING WITHOUT NAUSEA, UNSPECIFIED VOMITING TYPE: Primary | ICD-10-CM

## 2024-03-07 DIAGNOSIS — Z78.9 NURSING HOME RESIDENT: ICD-10-CM

## 2024-03-07 DIAGNOSIS — M81.0 AGE-RELATED OSTEOPOROSIS WITHOUT CURRENT PATHOLOGICAL FRACTURE: ICD-10-CM

## 2024-03-07 PROCEDURE — 99308 SBSQ NF CARE LOW MDM 20: CPT | Mod: GC

## 2024-03-07 RX ORDER — MAGNESIUM HYDROXIDE/ALUMINUM HYDROXICE/SIMETHICONE 120; 1200; 1200 MG/30ML; MG/30ML; MG/30ML
15 SUSPENSION ORAL
COMMUNITY
Start: 2024-03-07

## 2024-03-07 RX ORDER — GUAIFENESIN 200 MG/10ML
200 LIQUID ORAL EVERY 4 HOURS PRN
COMMUNITY
Start: 2024-03-07 | End: 2024-05-15

## 2024-03-07 NOTE — PROGRESS NOTES
Subjective: Bhavana Marr is a 83 year old who is seen at home for follow up visit today.  Seen at [facility] [place]   [address]  [facility] [address] .      Acute concerns today include:   Continues to have vomiting daily. No blood in emesis. Still eating. Had EGD without significant findings but did show undigested food in stomach - see copied results below. They did note suspected eosinophilic esophagitis but biopsies were negative. No hx of diabetes. [facility] stopped alendronate in case this was causing vomiting.   Intermittent coughing. No sputum. No shortness of breath or fevers. Inhalers help with symptoms.   No other concerns today    EGD 2/2/24 results:  Findings:  - The examined jejunum was normal. Biopsies for histology were taken with a cold forceps for evaluation of celiac disease.  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 The gastrojejunal anastomosis was characterized by healthy appearing mucosa. This was traversed. The pouch-to-jejunum limb was characterized by healthy appearing mucosa.  Biopsies were taken with a cold forceps for Helicobacter pylori testing.  - Non-severe esophagitis with rings with no bleeding was found 24 to 39 cm from the incisors. Biopsies were obtained from the proximal and distal esophagus with cold forceps for histology of suspected eosinophilic esophagitis.  Chronic and Past Medical Problems include:  Patient Active Problem List   Diagnosis    Arthritis    Depressive disorder    Borderline personality disorder (H)    GERD (gastroesophageal reflux disease)    Holosystolic murmur    Asthma    History of gastric bypass    Hyperlipidemia LDL goal <130    Other insomnia    Mild mitral regurgitation    Mild aortic stenosis    Weight loss    Bilateral low back pain without sciatica    Adhesive capsulitis of shoulder, right    Mild intermittent asthma, unspecified whether complicated    Bipolar affective disorder, remission status unspecified (H)     Constipation, unspecified constipation type    Age-related osteoporosis without current pathological fracture    Plantar warts    Hypoglycemia    Failure to thrive in adult    Hypotension, unspecified hypotension type    Suicidal ideation    Aspiration pneumonia (H)     Family History       Problem (# of Occurrences) Relation (Name,Age of Onset)    Depression (4) Maternal Aunt (60), Maternal Uncle (85), Paternal Aunt, Paternal Uncle          Social History:   Current activities:  coloring  Support services:  meds managed by staff    PMHX/PSHX/MEDS/ALLERGIES/SHX/FHX reviewed and updated in Epic.   MEDICATION LIST:  Current Outpatient Medications   Medication    acetaminophen (TYLENOL) 325 MG tablet    albuterol (PROAIR HFA/PROVENTIL HFA/VENTOLIN HFA) 108 (90 Base) MCG/ACT inhaler    alum & mag hydroxide-simethicone (MAALOX) 200-200-20 MG/5ML SUSP suspension    bisacodyl (DULCOLAX) 10 MG suppository    calcium carbonate (TUMS) 500 MG chewable tablet    calcium carbonate 500 mg, elemental, (OSCAL) 500 MG tablet    cholecalciferol (VITAMIN D3) 1000 UNIT tablet    famotidine (PEPCID) 20 MG tablet    FLUoxetine (PROZAC) 20 MG/5ML solution    fluticasone-vilanterol (BREO ELLIPTA) 100-25 MCG/ACT inhaler    gabapentin (NEURONTIN) 250 MG/5ML solution    guaiFENesin-dextromethorphan (ROBITUSSIN DM) 100-10 MG/5ML syrup    hypromellose (ARTIFICIAL TEARS) 0.5 % SOLN ophthalmic solution    lamoTRIgine (LAMICTAL) 25 MG tablet    magnesium hydroxide (MILK OF MAGNESIA) 400 MG/5ML suspension    melatonin 3 MG tablet    memantine (NAMENDA) 10 MG tablet    mirtazapine (REMERON) 15 MG tablet    Multiple Vitamins-Minerals (CEROVITE SENIOR) TABS    Nutritional Supplements (ENSURE ORIGINAL) LIQD    OLANZapine zydis (ZYPREXA) 10 MG ODT    OLANZapine zydis (ZYPREXA) 5 MG ODT    pantoprazole (PROTONIX) 40 MG EC tablet    polyethylene glycol (MIRALAX) 17 GM/Dose powder    SENNA-docusate sodium (SENNA S) 8.6-50 MG tablet    simvastatin (ZOCOR)  40 MG tablet    valACYclovir (VALTREX) 500 MG tablet    vitamin B-12 (CYANOCOBALAMIN) 50 MCG tablet    white petrolatum external gel     No current facility-administered medications for this visit.     Medications are managed by:  SELF, FAMILY, HOME NURSE  Patient uses a pill box to manage their medications:  Yes / No  Patient has questions, concerns, or potential side effects/interactions from their medications:    [facility] discontinued aledronate due to concern it was contributing to vomiting  Memantine may be contributing to vomiting though this was started after vomiting started and patient prefers to stay on this medication   GERIATRIC ROS:    Can you hear normal conversational voice? Yes  Do you have problems with your memory? Yes - on Memantine  Do you often feel sad or depressed? No  Have you unintentionally lost weight in the last 6 months? No - weight stable   Do you have trouble with control of your bladder? No  Do you have trouble with control of your bowels? No       GENERAL ROS:   General: No unexplained weight gain or loss; adequate sleep pattern.  Resp: No worsening shortness of breath. +Intermittent cough    GI: No worsening constipation.  +Vomiting   : Voiding independently with no significant incontinence   Skin: No areas of new skin breakdown or worrisome rashes  Extremities:  No pain or new extremity weakness    Objective: LMP  (LMP Unknown)   Vitals: /54 P 74 RR 16 T 96.6 O2 94% RA  Most recent weight:  115 lbs  Gen: Well nourished and in NAD   HEENT: Head is atraumatic  CV: RRR - systolic murmur (chronic)  Pulm: CTAB, no wheezes/rales/rhonchi, good air entry   ABD: soft, nontender, BS intact  Extrem: no cyanosis, edema or clubbing   Psych: Euthymic  Neuro: Pt is able to ambulate independently    Preventative Screening:   Vaccinations reviewed and up to date - yes other than RSV when able       Assessment/ Plan:  Diagnoses and all orders for this visit:    Nursing home resident  Vomiting  without nausea, unspecified vomiting type  Continue to have daily vomiting, present for many years. Hx of gastric bypass. [facility] discontinued aledronate due to concern it was contributing to vomiting. Memantine may be contributing to vomiting though this was started after vomiting started and patient prefers to stay on this medication. EGD last month without significant findings other than food in stomach. They did note suspected eosinophilic esophagitis in report but biopsies were negative. No hx of diabetes though gastroparesis possible.   - NM gastric emptying study     Mild intermittent asthma, unspecified whether complicated  Intermittent cough. No shortness of breath. No signs of asthma exacerbation. Lungs clear. Using albuterol inhaler PRN and Breo Ellipta once daily  - continue with current inhalers     Age related osteoporosis without current pathological fracture  -zoledronic acid infusion ordered (therapy plan) for one dose   - will need yearly zoledronic acid, drug holiday after 3 years    RECOMMENDED FOLLOW UP:  2 months.    Carmela Vivar MD PGY3  [facility] Res[facility]  03/07/24    I precepted today with Dr. Lewis.

## 2024-03-27 NOTE — PROGRESS NOTES
Patient Name: Latasha Ruiz  : 1957    MRN: [id number]                              Today's Date: 3/27/2024       Admit Date: 3/27/2024    Visit Dx:     ICD-10-CM ICD-9-CM   1. Intractable back pain  M54.9 724.5     Patient Active Problem List   Diagnosis    Pheochromocytoma    Other specified hypothyroidism    Intractable back pain     Past Medical History:   Diagnosis Date    Cancer     Ovarian Tumors removed     Past Surgical History:   Procedure Laterality Date    ADRENAL GLAND SURGERY Right 2002    EYE SURGERY Right     THYROID SURGERY        General Information       [facility] Name [facility]          Physical Therapy Time and Intention    Document Type evaluation  -     Mode of Treatment physical therapy  -[facility] Name 24 1513          General Information    Patient Profile Reviewed yes  -     Prior Level of Function independent:;all household mobility;community mobility;driving;shopping  -     Barriers to Rehab none identified  -[facility] Name 24 1513          Living Environment    People in Home spouse  -[facility] Name 24 1513          Home Main Entrance    Number of Stairs, Main Entrance none  -[facility] Name 24 1513          Stairs Within Home, Primary    Number of Stairs, Within Home, Primary none  -[facility] Name 24 1513          Cognition    Orientation Status (Cognition) oriented x 4  -[facility] Name 24 1513          Safety Issues, Functional Mobility    Comment, Safety Issues/Impairments (Mobility) no safety issues noted.  -               User Key  (r) = Recorded By, (t) = Taken By, (c) = Cosigned By      Initials Name Provider Type     Luisa Major PT Physical Therapist                   Mobility       Row Name 24 1519          Bed Mobility    Bed Mobility bed mobility (all) activities  -     All Activities, [place] (Bed Mobility) minimum assist (75% patient effort)  -     Assistive Device (Bed Mobility) bed  Preceptor Attestation:   Patient seen, evaluated and discussed with the resident.   I have verified the content of the note, which accurately reflects my assessment of the patient and the plan of care.   Supervising Physician:  Inder Lindsey MD MD      rails;head of bed elevated  -     Comment, (Bed Mobility) Cues for sequencing supine to sit  -AH       Row Name 03/27/24 1519          Bed-Chair Transfer    Bed-Chair [place] (Transfers) minimum assist (75% patient effort)  -AH       Row Name 03/27/24 1519          Sit-Stand Transfer    Sit-Stand [place] (Transfers) contact guard  -     Comment, (Sit-Stand Transfer) with RW  -AH       Row Name 03/27/24 1519          Gait/Stairs (Locomotion)    [place] Level (Gait) supervision  -     Assistive Device (Gait) walker, front-wheeled  -     Patient was able to Ambulate yes  -     Distance in Feet (Gait) 20  -     Deviations/Abnormal Patterns (Gait) --  forward flexed posture, heavy reliance on BUE, very slow pepe.  -               User Key  (r) = Recorded By, (t) = Taken By, (c) = Cosigned By      Initials Name Provider Type     Luisa Major, PT Physical Therapist                   Obj/Interventions       Row Name 03/27/24 1525          Range of Motion Comprehensive    Comment, General Range of Motion impaired R knee extension and R hip flexion.  -AH       Row Name 03/27/24 1525          Strength Comprehensive (MMT)    General Manual Muscle Testing (MMT) Assessment lower extremity strength deficits identified  -     Comment, General Manual Muscle Testing (MMT) Assessment RLE grossly 3-/5.  -AH       Row Name 03/27/24 1525          Balance    Balance Assessment sitting static balance;sitting dynamic balance;standing static balance;standing dynamic balance  -     Static Sitting Balance independent  -     Dynamic Sitting Balance independent  -     Position, Sitting Balance supported;sitting edge of bed  -     Static Standing Balance standby assist  -     Dynamic Standing Balance standby assist  -     Position/Device Used, Standing Balance supported;walker, front-wheeled  -AH       Row Name 03/27/24 1525          Sensory Assessment (Somatosensory)    Sensory Assessment  (Somatosensory) right LE  radicular pain and impaired sensation.  -               User Key  (r) = Recorded By, (t) = Taken By, (c) = Cosigned By      Initials Name Provider Type     Luisa Major, SHELLIE Physical Therapist                   Goals/Plan    No documentation.                  Clinical Impression       Row Name 03/27/24 1526          Pain    Pretreatment Pain Rating 2/10  -     Pre/Posttreatment Pain Comment 10/10 back and RLE with mobility.  -       Row Name 03/27/24 1526          Plan of Care Review    Plan of Care Reviewed With patient  -     Outcome Evaluation Pt is a 66 y.o. male who presented with Back pain. X-ray of hip and pelvis shows mild degenerative changes without acute osseous abnormality.  CT of lumbar spine showed moderate lumbar spondylolysis with varying degrees of neuroforaminal and spinal canal narrowing most pronounced at L4-L5 with mild degenerative joint disease at the sacroiliac joint and colonic diverticulosis. Venous duplex ultrasound of right lower extremity was normal.   Neurosurgery was consulted and is not planning surgery at this time. Pt is independent at baseline without AD. She presents for PT eval with impaired RLE strength, back pain and RLE radicular pain, impaired transfers and gait and impaired posture. Without an AD pt requires modA for bed to BSC transfer. Trialed RW and pt demos improved tolerance and independence - able to ambulate and complete sit to/from from EOB with CGA. She has a supportive spouse who is able and willing to assist her as needed. She needs a RW for home. PT recommends DC home with outpatient PT.  -       Row Name 03/27/24 1526          Therapy Assessment/Plan (PT)    Patient/Family Therapy Goals Statement (PT) go home.  -     Criteria for Skilled Interventions Met (PT) no;other (see comments)  discharging home today  -     Therapy Frequency (PT) evaluation only  -       Row Name 03/27/24 1520          Positioning and  Restraints    Post Treatment Position bed  -     In Bed notified nsg;sitting EOB;call light within reach;encouraged to call for assist  -               User Key  (r) = Recorded By, (t) = Taken By, (c) = Cosigned By      Initials Name Provider Type    Luisa Pabon PT Physical Therapist                   Outcome Measures       Row Name 03/27/24 1533 03/27/24 1031       How much help from another person do you currently need...    Turning from your back to your side while in flat bed without using bedrails? 3  - 4  -EW    Moving from lying on back to sitting on the side of a flat bed without bedrails? 3  - 4  -EW    Moving to and from a bed to a chair (including a wheelchair)? 3  - 4  -EW    Standing up from a chair using your arms (e.g., wheelchair, bedside chair)? 3  - 4  -EW    Climbing 3-5 steps with a railing? 2  - 4  -EW    To walk in hospital room? 3  - 4  -EW    AM-PAC 6 Clicks Score (PT) 17  - 24  -EW    Highest Level of Mobility Goal 5 --> Static standing  - 8 --> Walked 250 feet or more  -      Row Name 03/27/24 1533          Functional Assessment    Outcome Measure Options AM-PAC 6 Clicks Basic Mobility (PT)  -               User Key  (r) = Recorded By, (t) = Taken By, (c) = Cosigned By      Initials Name Provider Type    Ruth Rothman, RN Registered Nurse    Luisa Pabon, SHELLIE Physical Therapist                                 Physical Therapy Education       Title: PT OT SLP Therapies (Done)       Topic: Physical Therapy (Done)       Point: Mobility training (Done)       Learning Progress Summary             Patient CASSANDRA Rodgers, VU by  at 3/27/2024 1533                         Point: Home exercise program (Done)       Learning Progress Summary             Patient Vishal E, VU by  at 3/27/2024 1533                         Point: Body mechanics (Done)       Learning Progress Summary             Patient Violetteer E, VU by  at 3/27/2024 1533                          Point: Precautions (Done)       Learning Progress Summary             Patient CASSANDRA Rodgers VU by  at 3/27/2024 1533                                         User Key       Initials Effective Dates Name Provider Type [facility] 12/04/23 -  Luisa Major PT Physical Therapist PT                  PT Recommendation and Plan     Plan of Care Reviewed With: patient  Outcome Evaluation: Pt is a 66 y.o. male who presented with Back pain. X-ray of hip and pelvis shows mild degenerative changes without acute osseous abnormality.  CT of lumbar spine showed moderate lumbar spondylolysis with varying degrees of neuroforaminal and spinal canal narrowing most pronounced at L4-L5 with mild degenerative joint disease at the sacroiliac joint and colonic diverticulosis. Venous duplex ultrasound of right lower extremity was normal.   Neurosurgery was consulted and is not planning surgery at this time. Pt is independent at baseline without AD. She presents for PT eval with impaired RLE strength, back pain and RLE radicular pain, impaired transfers and gait and impaired posture. Without an AD pt requires modA for bed to BSC transfer. Trialed RW and pt demos improved tolerance and independence - able to ambulate and complete sit to/from from EOB with CGA. She has a supportive spouse who is able and willing to assist her as needed. She needs a RW for home. PT recommends DC home with outpatient PT.     Time Calculation:         PT Charges       Row Name 03/27/24 1535             Time Calculation    Start Time 1430  -      Stop Time 1452  -      Time Calculation (min) 22 min  -      PT Non-Billable Time (min) 0 min  -      PT Received On 03/27/24  -         Time Calculation- PT    Total Timed Code Minutes- PT 0 minute(s)  -                User Key  (r) = Recorded By, (t) = Taken By, (c) = Cosigned By      Initials Name Provider Type     Luisa Major, PT Physical Therapist                  Therapy Charges for Today        Code Description Service Date Service Provider Modifiers Qty    55624402101 HC PT EVAL LOW COMPLEXITY 4 3/27/2024 Luisa Major, PT GP 1            PT G-Codes  Outcome Measure Options: AM-PAC 6 Clicks Basic Mobility (PT)  AM-PAC 6 Clicks Score (PT): 17  PT Discharge Summary  Anticipated Discharge Disposition (PT): home with outpatient therapy services    Luisa Major, PT  3/27/2024

## 2024-04-04 ENCOUNTER — HOSPITAL ENCOUNTER (OUTPATIENT)
Dept: NUCLEAR MEDICINE | Facility: CLINIC | Age: 84
Setting detail: NUCLEAR MEDICINE
Discharge: HOME | End: 2024-04-04
Payer: COMMERCIAL

## 2024-04-04 DIAGNOSIS — R11.11 VOMITING WITHOUT NAUSEA, UNSPECIFIED VOMITING TYPE: ICD-10-CM

## 2024-04-04 PROCEDURE — 343N000001 HC RX 343

## 2024-04-04 PROCEDURE — A9541 TC99M SULFUR COLLOID: HCPCS

## 2024-04-04 PROCEDURE — 78264 GASTRIC EMPTYING IMG STUDY: CPT

## 2024-04-04 RX ADMIN — Medication 1 MILLICURIE: at 07:42

## 2024-04-05 RX ORDER — ERYTHROMYCIN 250 MG/1
250 TABLET, COATED ORAL
COMMUNITY
Start: 2024-04-05

## 2024-05-13 ENCOUNTER — TELEPHONE (OUTPATIENT)
Dept: FAMILY MEDICINE | Facility: CLINIC | Age: 84
End: 2024-05-13
Payer: COMMERCIAL

## 2024-05-13 NOTE — TELEPHONE ENCOUNTER
Per RN notes from Josefina Hansen RN at [facility] as follows:       RA Continues to have daily vomiting although the new medication may help somewhat.     AMY Gottlieb

## 2024-05-15 ENCOUNTER — NURSING HOME VISIT (OUTPATIENT)
Dept: FAMILY MEDICINE | Facility: CLINIC | Age: 84
End: 2024-05-15
Payer: COMMERCIAL

## 2024-05-15 VITALS
RESPIRATION RATE: 16 BRPM | OXYGEN SATURATION: 96 % | DIASTOLIC BLOOD PRESSURE: 48 MMHG | SYSTOLIC BLOOD PRESSURE: 102 MMHG | BODY MASS INDEX: 21.2 KG/M2 | WEIGHT: 123.5 LBS | HEART RATE: 74 BPM | TEMPERATURE: 97.5 F

## 2024-05-15 DIAGNOSIS — Z78.9 NURSING HOME RESIDENT: Primary | ICD-10-CM

## 2024-05-15 DIAGNOSIS — R11.10 VOMITING, UNSPECIFIED VOMITING TYPE, UNSPECIFIED WHETHER NAUSEA PRESENT: ICD-10-CM

## 2024-05-15 PROCEDURE — 99308 SBSQ NF CARE LOW MDM 20: CPT | Mod: GC

## 2024-05-15 NOTE — PROGRESS NOTES
Subjective: Bhavana Marr is a 83 year old who is seen at nursing home for follow up visit today.   Seen at [facility] AT [place]   [address]  [facility] [address] .    Acute concerns today include: patient is concerned that she is gaining weight and would rather drink Ensure BID instead of TID with meals.  She reports her vomiting has improved since starting erythromycin. Previously would vomit 5-6 times each day. Now, she does not vomit 3 days per week and the other days, up to only 3 times/day.    Chronic and Past Medical Problems include:  Patient Active Problem List   Diagnosis    Arthritis    Depressive disorder    Borderline personality disorder (H)    GERD (gastroesophageal reflux disease)    Holosystolic murmur    Asthma    History of gastric bypass    Hyperlipidemia LDL goal <130    Other insomnia    Mild mitral regurgitation    Mild aortic stenosis    Weight loss    Bilateral low back pain without sciatica    Adhesive capsulitis of shoulder, right    Mild intermittent asthma, unspecified whether complicated    Bipolar affective disorder, remission status unspecified (H)    Constipation, unspecified constipation type    Age-related osteoporosis without current pathological fracture    Plantar warts    Hypoglycemia    Failure to thrive in adult    Hypotension, unspecified hypotension type    Suicidal ideation    Aspiration pneumonia (H)     Family History       Problem (# of Occurrences) Relation (Name,Age of Onset)    Depression (4) Maternal Aunt (60), Maternal Uncle (85), Paternal Aunt, Paternal Uncle          Social History:   Attends [employer] in the nursing home. No family nearby that she keeps in contact with; talks with other residents. Previously worked as a .  PMHX/PSHX/MEDS/ALLERGIES/SHX/FHX reviewed and updated in Epic.   MEDICATION LIST:  Current Outpatient Medications   Medication Sig Dispense Refill    acetaminophen (TYLENOL) 325 MG tablet Take 2 tablets (650 mg) by mouth every 6  hours as needed for mild pain or other (and adjunct with moderate or severe pain or per patient request)      albuterol (PROAIR HFA/PROVENTIL HFA/VENTOLIN HFA) 108 (90 Base) MCG/ACT inhaler Inhale 2 puffs into the lungs every 6 hours as needed for shortness of breath, wheezing or cough 18 g 3    alum & mag hydroxide-simethicone (MAALOX) 200-200-20 MG/5ML SUSP suspension Take 15 mLs by mouth every 3 hours as needed for indigestion      bisacodyl (DULCOLAX) 10 MG suppository Place 1 suppository (10 mg) rectally three times a week 100 suppository 0    calcium carbonate (TUMS) 500 MG chewable tablet Take 2 chew tab by mouth every 3 hours as needed Chew 2 tabs every 3 hours as needed      calcium carbonate 500 mg, elemental, (OSCAL) 500 MG tablet Take 1 tablet (500 mg) by mouth At Bedtime 30 tablet 0    cholecalciferol (VITAMIN D3) 1000 UNIT tablet Take 1 tablet (1,000 Units) by mouth daily 30 tablet 11    erythromycin (E-MYCIN) 250 MG tablet Take 1 tablet (250 mg) by mouth 3 times daily (before meals)      famotidine (PEPCID) 20 MG tablet Take 1 tablet (20 mg) by mouth daily 30 tablet 0    FLUoxetine (PROZAC) 20 MG/5ML solution Take 15 mLs (60 mg) by mouth daily 120 mL 1    fluticasone-vilanterol (BREO ELLIPTA) 100-25 MCG/ACT inhaler Inhale 1 puff into the lungs daily      gabapentin (NEURONTIN) 250 MG/5ML solution Take 4 mLs (200 mg) by mouth 3 times daily 470 mL 0    guaiFENesin-dextromethorphan (ROBITUSSIN DM) 100-10 MG/5ML syrup Take 10 mLs by mouth every 4 hours as needed for cough      hypromellose (ARTIFICIAL TEARS) 0.5 % SOLN ophthalmic solution Place 2 drops into both eyes 3 times daily      lamoTRIgine (LAMICTAL) 25 MG tablet Take 1 tablet (25 mg) by mouth daily 30 tablet 0    magnesium hydroxide (MILK OF MAGNESIA) 400 MG/5ML suspension Take 30 mLs by mouth daily as needed for constipation or heartburn      melatonin 3 MG tablet Take 2 tablets (6 mg) by mouth every evening 60 tablet 0    memantine (NAMENDA) 10  MG tablet Take 1 tablet (10 mg) by mouth 2 times daily 60 tablet 0    mirtazapine (REMERON) 15 MG tablet Take 1 tablet (15 mg) by mouth At Bedtime      Multiple Vitamins-Minerals (CEROVITE SENIOR) TABS Take 1 tablet by mouth daily 30 tablet 3    Nutritional Supplements (ENSURE ORIGINAL) LIQD Take 1 Container by mouth 3 times daily      OLANZapine zydis (ZYPREXA) 10 MG ODT Take 1 tablet (10 mg) by mouth At Bedtime 30 tablet 0    OLANZapine zydis (ZYPREXA) 5 MG ODT Take a half tablet (2.5mg) once daily (also taking 10mg at bedtime)      pantoprazole (PROTONIX) 40 MG EC tablet Take 1 tablet (40 mg) by mouth every morning (before breakfast) 30 tablet 0    polyethylene glycol (MIRALAX) 17 GM/Dose powder Take 17 g (1 Capful) by mouth daily. May also take 17 g (1 Capful) daily as needed for constipation.      SENNA-docusate sodium (SENNA S) 8.6-50 MG tablet Take 1 tablet by mouth 2 times daily      simvastatin (ZOCOR) 40 MG tablet Take 1 tablet (40 mg) by mouth At Bedtime 30 tablet 11    valACYclovir (VALTREX) 500 MG tablet Give 500 mg by mouth one time a day related to other herpesviral infection for outbreak prevention  3    vitamin B-12 (CYANOCOBALAMIN) 50 MCG tablet Take 1 tablet (50 mcg) by mouth daily 30 tablet 11    white petrolatum external gel Use as needed for dry skin       No current facility-administered medications for this visit.       GERIATRIC ROS:    Do you have difficulty getting around, watching TV or reading because of poor eyesight? No   Can you hear normal conversational voice? Yes  Do you use hearing aides? Yes  Do you have problems with your memory? No   Have you unintentionally lost weight in the last 6 months? No   Do you have trouble with control of your bladder? No   Do you have trouble with control of your bowels? No    GENERAL ROS:   General: No unexplained weight gain or loss; adequate sleep pattern.  Resp: No worsening shortness of breath, cough   GI: No worsening constipation, no diarrhea,  vomiting as above  : Voiding independently with no significant incontinence     Objective: /48   Pulse 74   Temp 97.5  F (36.4  C)   Resp 16   Wt 56 kg (123 lb 8 oz)   LMP  (LMP Unknown)   SpO2 96%   BMI 21.20 kg/m     Gen: Well nourished and in NAD, sitting up in bed comfortably  HEENT: Head is atraumatic  CV: RRR, +1/6 systolic murmur  Pulm: CTAB, no wheezes/rales/rhonchi, good air entry   ABD: soft, nontender, BS intact  Extrem: no cyanosis, edema or clubbing   Psych: Euthymic  Neuro: Pt is able to ambulate independently    Preventative Screening:   Vaccinations reviewed and up to date - can get COVID/flu/RSV vaccine this fall     Assessment/ Plan:  Diagnoses and all orders for this visit:    Nursing home resident  Doing well; vomiting improved. Has gained 7 lbs since 1/2024; BMI 21.1 which is considered healthy. Ok with reducing Ensure to BID with meals.    Vomiting, unspecified vomiting type, unspecified whether nausea present  Improved with erythromycin 250 mg TID with meals.     RECOMMENDED FOLLOW UP:  2 months.    Total of 30 minutes was spent in face to face contact with patient with > 50% in counseling and coordination of care.  Options for treatment and/or follow-up care were reviewed with the patient. Bhavana Marr was engaged and actively involved in the decision making process. She verbalized understanding of the options discussed and was satisfied with the final plan.      Yolanda Bradford MD PGY-2    Patient precepted with David Lewis MD.

## 2024-06-19 DIAGNOSIS — Z12.11 SPECIAL SCREENING FOR MALIGNANT NEOPLASMS, COLON: Primary | ICD-10-CM

## 2024-06-26 ENCOUNTER — ORDERS ONLY (AUTO-RELEASED) (OUTPATIENT)
Dept: FAMILY MEDICINE | Facility: CLINIC | Age: 84
End: 2024-06-26
Payer: COMMERCIAL

## 2024-06-26 DIAGNOSIS — Z12.11 SPECIAL SCREENING FOR MALIGNANT NEOPLASMS, COLON: ICD-10-CM

## 2024-07-10 ENCOUNTER — TELEPHONE (OUTPATIENT)
Dept: FAMILY MEDICINE | Facility: CLINIC | Age: 84
End: 2024-07-10
Payer: COMMERCIAL

## 2024-07-10 LAB — NONINV COLON CA DNA+OCC BLD SCRN STL QL: NEGATIVE

## 2024-07-10 NOTE — TELEPHONE ENCOUNTER
Per RN notes from Josefina Hansen RN at [facility] as follows:        COVID + on 7/3/24, remains asymptomatic.     AMY Gottlieb

## 2024-07-15 ENCOUNTER — NURSING HOME VISIT (OUTPATIENT)
Dept: FAMILY MEDICINE | Facility: CLINIC | Age: 84
End: 2024-07-15
Payer: COMMERCIAL

## 2024-07-15 DIAGNOSIS — J45.20 MILD INTERMITTENT ASTHMA, UNSPECIFIED WHETHER COMPLICATED: ICD-10-CM

## 2024-07-15 DIAGNOSIS — Z86.16 HISTORY OF COVID-19: ICD-10-CM

## 2024-07-15 DIAGNOSIS — H26.9 CATARACT, UNSPECIFIED CATARACT TYPE, UNSPECIFIED LATERALITY: ICD-10-CM

## 2024-07-15 DIAGNOSIS — Z78.9 NURSING HOME RESIDENT: Primary | ICD-10-CM

## 2024-07-15 PROCEDURE — 99309 SBSQ NF CARE MODERATE MDM 30: CPT | Mod: GC

## 2024-07-15 NOTE — PROGRESS NOTES
Subjective: Bhavana Marr is a 83 year old who is seen at in the nursing home for follow up visit today.  Seen at [facility]  Patient has no concerns today.  Tested positive for COVID 7/3/2024 and has been asymptomatic.  She states she has cataracts and was told to have surgery, she is uncertain if she has a scheduled yet.  She states sometimes she wakes up with a cough and uses her daily inhaler and if she uses her albuterol inhaler in addition it usually is sufficient.  Chronic and Past Medical Problems include:  Patient Active Problem List   Diagnosis    Arthritis    Depressive disorder    Borderline personality disorder (H)    GERD (gastroesophageal reflux disease)    Holosystolic murmur    Asthma    History of gastric bypass    Hyperlipidemia LDL goal <130    Other insomnia    Mild mitral regurgitation    Mild aortic stenosis    Weight loss    Bilateral low back pain without sciatica    Adhesive capsulitis of shoulder, right    Mild intermittent asthma, unspecified whether complicated    Bipolar affective disorder, remission status unspecified (H)    Constipation, unspecified constipation type    Age-related osteoporosis without current pathological fracture    Plantar warts    Hypoglycemia    Failure to thrive in adult    Hypotension, unspecified hypotension type    Suicidal ideation    Aspiration pneumonia (H)     Family History       Problem (# of Occurrences) Relation (Name,Age of Onset)    Depression (4) Maternal Aunt (60), Maternal Uncle (85), Paternal Aunt, Paternal Uncle          Social History:   Support services: Nursing home  PMHX/PSHX/MEDS/ALLERGIES/SHX/FHX reviewed and updated in Epic.   MEDICATION LIST:  Current Outpatient Medications   Medication Sig Dispense Refill    acetaminophen (TYLENOL) 325 MG tablet Take 2 tablets (650 mg) by mouth every 6 hours as needed for mild pain or other (and adjunct with moderate or severe pain or per patient request)      albuterol (PROAIR HFA/PROVENTIL  HFA/VENTOLIN HFA) 108 (90 Base) MCG/ACT inhaler Inhale 2 puffs into the lungs every 6 hours as needed for shortness of breath, wheezing or cough 18 g 3    alum & mag hydroxide-simethicone (MAALOX) 200-200-20 MG/5ML SUSP suspension Take 15 mLs by mouth every 3 hours as needed for indigestion      bisacodyl (DULCOLAX) 10 MG suppository Place 1 suppository (10 mg) rectally three times a week 100 suppository 0    calcium carbonate (TUMS) 500 MG chewable tablet Take 2 chew tab by mouth every 3 hours as needed Chew 2 tabs every 3 hours as needed      calcium carbonate 500 mg, elemental, (OSCAL) 500 MG tablet Take 1 tablet (500 mg) by mouth At Bedtime 30 tablet 0    cholecalciferol (VITAMIN D3) 1000 UNIT tablet Take 1 tablet (1,000 Units) by mouth daily 30 tablet 11    erythromycin (E-MYCIN) 250 MG tablet Take 1 tablet (250 mg) by mouth 3 times daily (before meals)      famotidine (PEPCID) 20 MG tablet Take 1 tablet (20 mg) by mouth daily 30 tablet 0    FLUoxetine (PROZAC) 20 MG/5ML solution Take 15 mLs (60 mg) by mouth daily 120 mL 1    fluticasone-vilanterol (BREO ELLIPTA) 100-25 MCG/ACT inhaler Inhale 1 puff into the lungs daily      gabapentin (NEURONTIN) 250 MG/5ML solution Take 4 mLs (200 mg) by mouth 3 times daily 470 mL 0    guaiFENesin-dextromethorphan (ROBITUSSIN DM) 100-10 MG/5ML syrup Take 10 mLs by mouth every 4 hours as needed for cough      hypromellose (ARTIFICIAL TEARS) 0.5 % SOLN ophthalmic solution Place 2 drops into both eyes 3 times daily      lamoTRIgine (LAMICTAL) 25 MG tablet Take 1 tablet (25 mg) by mouth daily 30 tablet 0    magnesium hydroxide (MILK OF MAGNESIA) 400 MG/5ML suspension Take 30 mLs by mouth daily as needed for constipation or heartburn      melatonin 3 MG tablet Take 2 tablets (6 mg) by mouth every evening 60 tablet 0    memantine (NAMENDA) 10 MG tablet Take 1 tablet (10 mg) by mouth 2 times daily 60 tablet 0    mirtazapine (REMERON) 15 MG tablet Take 1 tablet (15 mg) by mouth At  Bedtime      Multiple Vitamins-Minerals (CEROVITE SENIOR) TABS Take 1 tablet by mouth daily 30 tablet 3    Nutritional Supplements (ENSURE ORIGINAL) LIQD Take 1 Container by mouth 3 times daily      OLANZapine zydis (ZYPREXA) 10 MG ODT Take 1 tablet (10 mg) by mouth At Bedtime 30 tablet 0    OLANZapine zydis (ZYPREXA) 5 MG ODT Take a half tablet (2.5mg) once daily (also taking 10mg at bedtime)      pantoprazole (PROTONIX) 40 MG EC tablet Take 1 tablet (40 mg) by mouth every morning (before breakfast) 30 tablet 0    polyethylene glycol (MIRALAX) 17 GM/Dose powder Take 17 g (1 Capful) by mouth daily. May also take 17 g (1 Capful) daily as needed for constipation.      SENNA-docusate sodium (SENNA S) 8.6-50 MG tablet Take 1 tablet by mouth 2 times daily      simvastatin (ZOCOR) 40 MG tablet Take 1 tablet (40 mg) by mouth At Bedtime 30 tablet 11    valACYclovir (VALTREX) 500 MG tablet Give 500 mg by mouth one time a day related to other herpesviral infection for outbreak prevention  3    vitamin B-12 (CYANOCOBALAMIN) 50 MCG tablet Take 1 tablet (50 mcg) by mouth daily 30 tablet 11    white petrolatum external gel Use as needed for dry skin       No current facility-administered medications for this visit.     Medications are managed by: Nursing home  GERIATRIC ROS:    Do you have difficulty getting around, watching TV or reading because of poor eyesight? No   Can you hear normal conversational voice? Yes  Do you use hearing aides? Yes  Do you have problems with your memory? No   Do you often feel sad or depressed?No   Have you unintentionally lost weight in the last 6 months? No   Do you have trouble with control of your bladder?  No   Do you have trouble with control of your bowels? No   GENERAL ROS:   General: No unexplained weight gain or loss; adequate sleep pattern.  Resp: Recent cough when waking up in morning  GI: No worsening constipation, no diarrhea, no nausea or vomiting  : Voiding independently with no  significant incontinence     Objective Most recent weight: 123  /72 P68 RR 16 T97.1 O2 97%  Orthostatic blood pressures: 101/56 104/63 101/60  Gen: Well nourished and in NAD   HEENT: Head is atraumatic, decent dentition  CV: RRR -systolic murmur  Pulm: CTAB, no wheezes/rales/rhonchi, good air entry   ABD: soft, nontender, BS intact  Extrem: no cyanosis, edema or clubbing   Psych: Euthymic  Neuro: Pt is able to ambulate independently with walker     Assessment/ Plan:  Nursing home resident  Patient was seen for nursing home visit.  Doing well but had recent COVID-19 infection, however she was asymptomatic.  Cataracts  Patient stating she is recommended to have cataract surgery.  Recommend following up with eye doctor.  Mild intermittent asthma  History of recent asymptomatic COVID-19 infection  Positive COVID-19 test 7/3/2024.  Patient is asymptomatic.  However, she states she has been waking up occasionally with a cough that is relieved using her Breo Ellipta and albuterol.  Recommend continuing Breo Ellipta and albuterol.    RECOMMENDED FOLLOW UP:  2 months.    Ankita Abreu MD    The longitudinal plan of care for the diagnosis(es)/condition(s) as documented were addressed during this visit. Due to the added complexity in care, I will continue to support Bhavanajohn Munguia in the subsequent management and with ongoing continuity of care.

## 2024-09-18 ENCOUNTER — TELEPHONE (OUTPATIENT)
Dept: FAMILY MEDICINE | Facility: CLINIC | Age: 84
End: 2024-09-18
Payer: COMMERCIAL

## 2024-09-18 NOTE — TELEPHONE ENCOUNTER
Per RN notes from Josefina Hansen RN at [facility] as follows:    Resident would like a cortisone injection for her left knee pain, her last one was 4/11/24. She also started refusing ensure because she states that she is at her ideal weight so it was d/c'd by dietician.      AMY Gottlieb

## 2024-09-19 ENCOUNTER — NURSING HOME VISIT (OUTPATIENT)
Dept: FAMILY MEDICINE | Facility: CLINIC | Age: 84
End: 2024-09-19
Payer: COMMERCIAL

## 2024-09-19 VITALS
SYSTOLIC BLOOD PRESSURE: 104 MMHG | OXYGEN SATURATION: 100 % | RESPIRATION RATE: 18 BRPM | TEMPERATURE: 97.3 F | DIASTOLIC BLOOD PRESSURE: 62 MMHG | WEIGHT: 131 LBS | BODY MASS INDEX: 22.49 KG/M2 | HEART RATE: 65 BPM

## 2024-09-19 DIAGNOSIS — M25.562 CHRONIC PAIN OF LEFT KNEE: ICD-10-CM

## 2024-09-19 DIAGNOSIS — M17.12 PRIMARY LOCALIZED OSTEOARTHROSIS OF LEFT LOWER LEG: ICD-10-CM

## 2024-09-19 DIAGNOSIS — Z78.9 NURSING HOME RESIDENT: Primary | ICD-10-CM

## 2024-09-19 DIAGNOSIS — G89.29 CHRONIC PAIN OF LEFT KNEE: ICD-10-CM

## 2024-09-19 DIAGNOSIS — J45.40 MODERATE PERSISTENT ASTHMA, UNSPECIFIED WHETHER COMPLICATED: ICD-10-CM

## 2024-09-19 PROCEDURE — 99309 SBSQ NF CARE MODERATE MDM 30: CPT | Mod: GC

## 2024-09-19 PROCEDURE — 20610 DRAIN/INJ JOINT/BURSA W/O US: CPT | Mod: LT

## 2024-09-19 RX ORDER — BUDESONIDE AND FORMOTEROL FUMARATE DIHYDRATE 160; 4.5 UG/1; UG/1
AEROSOL RESPIRATORY (INHALATION)
Qty: 20.4 G | Refills: 11 | Status: SHIPPED | OUTPATIENT
Start: 2024-09-19

## 2024-09-19 RX ORDER — TRIAMCINOLONE ACETONIDE 40 MG/ML
80 INJECTION, SUSPENSION INTRA-ARTICULAR; INTRAMUSCULAR ONCE
Status: COMPLETED | OUTPATIENT
Start: 2024-09-19 | End: 2024-09-19

## 2024-09-19 RX ORDER — TRIAMCINOLONE ACETONIDE 40 MG/ML
80 INJECTION, SUSPENSION INTRA-ARTICULAR; INTRAMUSCULAR ONCE
Status: DISCONTINUED | OUTPATIENT
Start: 2024-09-19 | End: 2024-10-01

## 2024-09-19 RX ORDER — LIDOCAINE HYDROCHLORIDE 10 MG/ML
3 INJECTION, SOLUTION EPIDURAL; INFILTRATION; INTRACAUDAL; PERINEURAL ONCE
Status: DISCONTINUED | OUTPATIENT
Start: 2024-09-19 | End: 2024-10-01

## 2024-09-19 RX ADMIN — TRIAMCINOLONE ACETONIDE 80 MG: 40 INJECTION, SUSPENSION INTRA-ARTICULAR; INTRAMUSCULAR at 15:14

## 2024-09-19 NOTE — PROGRESS NOTES
Preceptor Attestation:  The longitudinal plan of care for the diagnosis(es)/condition(s) as documented were addressed during this visit. Due to the added complexity in care, I will continue to support Bhavana Munguia in the subsequent management and with ongoing continuity of care.  This patient is a regular patient of mine who I been following for years.      I discussed the patient with the resident and evaluated the patient in person. I was present for and supervised the entire procedure. I have verified the content of the note, which accurately reflects my assessment of the patient and the plan of care.   Supervising Physician:  David Lewis MD.

## 2024-09-19 NOTE — PROGRESS NOTES
Subjective: Bhavana Marr is a 83 year old who is seen at in the nursing home for follow up visit today.  Seen at [facility] [place]   [address]  [facility] [id number] .    Also present at visit include none  Acute concerns today include: Left knee pain ongoing. No buckling or falls. Requesting injection. Thinks it may be slightly swollen at times. Also endorses increased coughing overnight despite inhaler use including PRN albuterol. Also has some epigastric pain after eating. No BM in 1 week.   Chronic and Past Medical Problems include:  Patient Active Problem List   Diagnosis    Arthritis    Depressive disorder    Borderline personality disorder (H)    GERD (gastroesophageal reflux disease)    Holosystolic murmur    Asthma    History of gastric bypass    Hyperlipidemia LDL goal <130    Other insomnia    Mild mitral regurgitation    Mild aortic stenosis    Weight loss    Bilateral low back pain without sciatica    Adhesive capsulitis of shoulder, right    Mild intermittent asthma, unspecified whether complicated    Bipolar affective disorder, remission status unspecified (H)    Constipation, unspecified constipation type    Age-related osteoporosis without current pathological fracture    Plantar warts    Hypoglycemia    Failure to thrive in adult    Hypotension, unspecified hypotension type    Suicidal ideation    Aspiration pneumonia (H)     Family History       Problem (# of Occurrences) Relation (Name,Age of Onset)    Depression (4) Maternal Aunt (60), Maternal Uncle (85), Paternal Aunt, Paternal Uncle          Social History:   Current activities:  Reading  PMHX/PSHX/MEDS/ALLERGIES/SHX/FHX reviewed and updated in Epic.   MEDICATION LIST:  Current Outpatient Medications   Medication Sig Dispense Refill    acetaminophen (TYLENOL) 325 MG tablet Take 2 tablets (650 mg) by mouth every 6 hours as needed for mild pain or other (and adjunct with moderate or severe pain or per patient request)      albuterol  (PROAIR HFA/PROVENTIL HFA/VENTOLIN HFA) 108 (90 Base) MCG/ACT inhaler Inhale 2 puffs into the lungs every 6 hours as needed for shortness of breath, wheezing or cough 18 g 3    alum & mag hydroxide-simethicone (MAALOX) 200-200-20 MG/5ML SUSP suspension Take 15 mLs by mouth every 3 hours as needed for indigestion      bisacodyl (DULCOLAX) 10 MG suppository Place 1 suppository (10 mg) rectally three times a week 100 suppository 0    calcium carbonate (TUMS) 500 MG chewable tablet Take 2 chew tab by mouth every 3 hours as needed Chew 2 tabs every 3 hours as needed      calcium carbonate 500 mg, elemental, (OSCAL) 500 MG tablet Take 1 tablet (500 mg) by mouth At Bedtime 30 tablet 0    cholecalciferol (VITAMIN D3) 1000 UNIT tablet Take 1 tablet (1,000 Units) by mouth daily 30 tablet 11    erythromycin (E-MYCIN) 250 MG tablet Take 1 tablet (250 mg) by mouth 3 times daily (before meals)      famotidine (PEPCID) 20 MG tablet Take 1 tablet (20 mg) by mouth daily 30 tablet 0    FLUoxetine (PROZAC) 20 MG/5ML solution Take 15 mLs (60 mg) by mouth daily 120 mL 1    fluticasone-vilanterol (BREO ELLIPTA) 100-25 MCG/ACT inhaler Inhale 1 puff into the lungs daily      gabapentin (NEURONTIN) 250 MG/5ML solution Take 4 mLs (200 mg) by mouth 3 times daily 470 mL 0    guaiFENesin-dextromethorphan (ROBITUSSIN DM) 100-10 MG/5ML syrup Take 10 mLs by mouth every 4 hours as needed for cough      hypromellose (ARTIFICIAL TEARS) 0.5 % SOLN ophthalmic solution Place 2 drops into both eyes 3 times daily      lamoTRIgine (LAMICTAL) 25 MG tablet Take 1 tablet (25 mg) by mouth daily 30 tablet 0    magnesium hydroxide (MILK OF MAGNESIA) 400 MG/5ML suspension Take 30 mLs by mouth daily as needed for constipation or heartburn      melatonin 3 MG tablet Take 2 tablets (6 mg) by mouth every evening 60 tablet 0    memantine (NAMENDA) 10 MG tablet Take 1 tablet (10 mg) by mouth 2 times daily 60 tablet 0    mirtazapine (REMERON) 15 MG tablet Take 1 tablet  (15 mg) by mouth At Bedtime      Multiple Vitamins-Minerals (CEROVITE SENIOR) TABS Take 1 tablet by mouth daily 30 tablet 3    Nutritional Supplements (ENSURE ORIGINAL) LIQD Take 1 Container by mouth 3 times daily      OLANZapine zydis (ZYPREXA) 10 MG ODT Take 1 tablet (10 mg) by mouth At Bedtime 30 tablet 0    OLANZapine zydis (ZYPREXA) 5 MG ODT Take a half tablet (2.5mg) once daily (also taking 10mg at bedtime)      pantoprazole (PROTONIX) 40 MG EC tablet Take 1 tablet (40 mg) by mouth every morning (before breakfast) 30 tablet 0    polyethylene glycol (MIRALAX) 17 GM/Dose powder Take 17 g (1 Capful) by mouth daily. May also take 17 g (1 Capful) daily as needed for constipation.      SENNA-docusate sodium (SENNA S) 8.6-50 MG tablet Take 1 tablet by mouth 2 times daily      simvastatin (ZOCOR) 40 MG tablet Take 1 tablet (40 mg) by mouth At Bedtime 30 tablet 11    valACYclovir (VALTREX) 500 MG tablet Give 500 mg by mouth one time a day related to other herpesviral infection for outbreak prevention  3    vitamin B-12 (CYANOCOBALAMIN) 50 MCG tablet Take 1 tablet (50 mcg) by mouth daily 30 tablet 11    white petrolatum external gel Use as needed for dry skin       No current facility-administered medications for this visit.     Medications are managed by:  NURSE  GERIATRIC ROS:    Do you have difficulty getting around, watching TV or reading because of poor eyesight?  No   Can you hear normal conversational voice? Yes  Do you use hearing aides? Yes  Do you have problems with your memory? No   Do you often feel sad or depressed? No   Have you unintentionally lost weight in the last 6 months? No   Do you have trouble with control of your bladder? No   Do you have trouble with control of your bowels?  No   Have you had any falls in the past year? No     GENERAL ROS:   General: No unexplained weight gain or loss; adequate sleep pattern.  Resp: No worsening shortness of breath or hemoptysis. POSITIVE cough  GI: POSITIVE  constipation, no diarrhea, no nausea or vomiting  : Voiding independently with no significant incontinence   Skin: No areas of new skin breakdown or worrisome rashes  Extremities: POSITIVE left knee pain, no extremity weakness or balance troubles    Objective: /62   Pulse 65   Temp 97.3  F (36.3  C)   Resp 18   Wt 59.4 kg (131 lb)   LMP  (LMP Unknown)   SpO2 100%   BMI 22.49 kg/m     Most recent weight:  131 lbs  Gen: Well nourished and in NAD   HEENT: Head is atraumatic, decent dentition  CV: RRR - no murmurs, rubs, or gallups  Pulm: CTAB, no wheezes/rales/rhonchi, good air entry   ABD: soft, nontender, BS intact  Extrem: no cyanosis, edema or clubbing, mild swelling of left knee, no significant effusion, no erythema, full active and passive ROM left knee  Psych: Euthymic  Neuro: Pt is able to ambulate independently    Preventative Screening:   Vaccinations reviewed and update as planned     Assessment/ Plan:    Nursing home resident  Moderate persistent asthma, unspecified whether complicated  Persistent asthma symptoms including cough overnight while on Breo ellipta and albuterol. Will try symbicort SMART. Otherwise stable; continue pepcid, PPI, and bowel regimen.   -     budesonide-formoterol (SYMBICORT) 160-4.5 MCG/ACT Inhaler; Inhale 2 puffs once daily plus 1-2 puffs as needed. May use up to 12 puffs per day.    Chronic pain of left knee  Injected left knee in anterior lateral location for presumably osteoarthritis. Last injection 4/11/22. Experienced relief with prior.   -     triamcinolone (KENALOG-40) injection 80 mg  -     lidocaine (PF) (XYLOCAINE) 1 % injection 3 mL    RECOMMENDED FOLLOW UP:  2 months.    Natasha Berman MD

## 2024-09-19 NOTE — Clinical Note
Gregorio Berman,   Please check missing CAM documentation (Kenalog & Lido) in MAR tab. If administered please document as a progress note weather it was done or not. Adjust (ex. Remove) CAM order if appropriate.   Please let me know if you have any questions. Thanks.    KWAME Mina

## 2024-09-19 NOTE — PROGRESS NOTES
PROCEDURE:  JOINT ASPIRATION/INJECTION    After a discussion of risks, benefits and side effects of procedure, informed patient consent was obtained.  The left anterior lateral knee was prepped and draped in the usual clean fashion (sterile not required for this procedure).     INJECTION:  Using 3 cc of 1 % lidocaine mixed with 80 mg of kenalog, the left knee was successfully injected without complication.  Patient did experience some pain relief following injection.

## 2024-10-01 NOTE — ADDENDUM NOTE
Addended by: JEN GIL on: 10/1/2024 07:52 AM     Modules accepted: Orders     Rhomboid Transposition Flap Text: The defect edges were debeveled with a #15 scalpel blade.  Given the location of the defect and the proximity to free margins a rhomboid transposition flap was deemed most appropriate.  Using a sterile surgical marker, an appropriate rhomboid flap was drawn incorporating the defect.    The area thus outlined was incised deep to adipose tissue with a #15 scalpel blade.  The skin margins were undermined to an appropriate distance in all directions utilizing iris scissors.

## 2024-10-09 ENCOUNTER — TELEPHONE (OUTPATIENT)
Dept: FAMILY MEDICINE | Facility: CLINIC | Age: 84
End: 2024-10-09
Payer: COMMERCIAL

## 2024-10-09 NOTE — TELEPHONE ENCOUNTER
Per RN notes from Josefina Hansen RN at [facility] as follows:    Still has daily vomiting, can we increase Erythromycin? Also c/o continued constipation despite 3x weekly suppositories    AMY Gottlieb

## 2024-10-14 ENCOUNTER — NURSING HOME VISIT (OUTPATIENT)
Dept: FAMILY MEDICINE | Facility: CLINIC | Age: 84
End: 2024-10-14
Payer: COMMERCIAL

## 2024-10-14 DIAGNOSIS — G89.29 CHRONIC PAIN OF LEFT KNEE: ICD-10-CM

## 2024-10-14 DIAGNOSIS — J45.40 MODERATE PERSISTENT ASTHMA, UNSPECIFIED WHETHER COMPLICATED: ICD-10-CM

## 2024-10-14 DIAGNOSIS — Z78.9 NURSING HOME RESIDENT: Primary | ICD-10-CM

## 2024-10-14 DIAGNOSIS — K59.00 CONSTIPATION, UNSPECIFIED CONSTIPATION TYPE: ICD-10-CM

## 2024-10-14 DIAGNOSIS — M25.562 CHRONIC PAIN OF LEFT KNEE: ICD-10-CM

## 2024-10-14 DIAGNOSIS — H26.9 CATARACT, UNSPECIFIED CATARACT TYPE, UNSPECIFIED LATERALITY: ICD-10-CM

## 2024-10-14 PROCEDURE — 99309 SBSQ NF CARE MODERATE MDM 30: CPT | Mod: GC

## 2024-10-14 NOTE — PROGRESS NOTES
Subjective: Bhavana Marr is a 83 year old who is seen at in the nursing home for follow up visit today.  Seen at [facility] AT [place]   [address]  [facility] [address] .    Acute concerns today include: Upcoming cataract surgery.  Injection was helpful for knee pain, still some pain but improved.  Symbicort has improved patient's symptoms.  Patient also notes her bowel movements are spaced out to around once a week, but soft when she has them.    Chronic and Past Medical Problems include:  Patient Active Problem List   Diagnosis    Arthritis    Depressive disorder    Borderline personality disorder (H)    GERD (gastroesophageal reflux disease)    Holosystolic murmur    Asthma    History of gastric bypass    Hyperlipidemia LDL goal <130    Other insomnia    Mild mitral regurgitation    Mild aortic stenosis    Weight loss    Bilateral low back pain without sciatica    Adhesive capsulitis of shoulder, right    Mild intermittent asthma, unspecified whether complicated    Bipolar affective disorder, remission status unspecified (H)    Constipation, unspecified constipation type    Age-related osteoporosis without current pathological fracture    Plantar warts    Hypoglycemia    Failure to thrive in adult    Hypotension, unspecified hypotension type    Suicidal ideation    Aspiration pneumonia (H)    Chronic pain of left knee     Family History       Problem (# of Occurrences) Relation (Name,Age of Onset)    Depression (4) Maternal Aunt (60), Maternal Uncle (85), Paternal Aunt, Paternal Uncle          Social History:   Current activities: Reading, walking  PMHX/PSHX/MEDS/ALLERGIES/SHX/FHX reviewed and updated in Epic.   MEDICATION LIST:  Current Outpatient Medications   Medication Sig Dispense Refill    acetaminophen (TYLENOL) 325 MG tablet Take 2 tablets (650 mg) by mouth every 6 hours as needed for mild pain or other (and adjunct with moderate or severe pain or per patient request)      alum & mag  hydroxide-simethicone (MAALOX) 200-200-20 MG/5ML SUSP suspension Take 15 mLs by mouth every 3 hours as needed for indigestion      bisacodyl (DULCOLAX) 10 MG suppository Place 1 suppository (10 mg) rectally three times a week 100 suppository 0    budesonide-formoterol (SYMBICORT) 160-4.5 MCG/ACT Inhaler Inhale 2 puffs once daily plus 1-2 puffs as needed. May use up to 12 puffs per day. 20.4 g 11    calcium carbonate (TUMS) 500 MG chewable tablet Take 2 chew tab by mouth every 3 hours as needed Chew 2 tabs every 3 hours as needed      calcium carbonate 500 mg, elemental, (OSCAL) 500 MG tablet Take 1 tablet (500 mg) by mouth At Bedtime 30 tablet 0    cholecalciferol (VITAMIN D3) 1000 UNIT tablet Take 1 tablet (1,000 Units) by mouth daily 30 tablet 11    erythromycin (E-MYCIN) 250 MG tablet Take 1 tablet (250 mg) by mouth 3 times daily (before meals)      famotidine (PEPCID) 20 MG tablet Take 1 tablet (20 mg) by mouth daily 30 tablet 0    FLUoxetine (PROZAC) 20 MG/5ML solution Take 15 mLs (60 mg) by mouth daily 120 mL 1    gabapentin (NEURONTIN) 250 MG/5ML solution Take 4 mLs (200 mg) by mouth 3 times daily 470 mL 0    guaiFENesin-dextromethorphan (ROBITUSSIN DM) 100-10 MG/5ML syrup Take 10 mLs by mouth every 4 hours as needed for cough      hypromellose (ARTIFICIAL TEARS) 0.5 % SOLN ophthalmic solution Place 2 drops into both eyes 3 times daily      lamoTRIgine (LAMICTAL) 25 MG tablet Take 1 tablet (25 mg) by mouth daily 30 tablet 0    magnesium hydroxide (MILK OF MAGNESIA) 400 MG/5ML suspension Take 30 mLs by mouth daily as needed for constipation or heartburn      melatonin 3 MG tablet Take 2 tablets (6 mg) by mouth every evening 60 tablet 0    memantine (NAMENDA) 10 MG tablet Take 1 tablet (10 mg) by mouth 2 times daily 60 tablet 0    mirtazapine (REMERON) 15 MG tablet Take 1 tablet (15 mg) by mouth At Bedtime      Multiple Vitamins-Minerals (CEROVITE SENIOR) TABS Take 1 tablet by mouth daily 30 tablet 3     OLANZapine zydis (ZYPREXA) 10 MG ODT Take 1 tablet (10 mg) by mouth At Bedtime 30 tablet 0    OLANZapine zydis (ZYPREXA) 5 MG ODT Take a half tablet (2.5mg) once daily (also taking 10mg at bedtime)      pantoprazole (PROTONIX) 40 MG EC tablet Take 1 tablet (40 mg) by mouth every morning (before breakfast) 30 tablet 0    polyethylene glycol (MIRALAX) 17 GM/Dose powder Take 17 g (1 Capful) by mouth daily. May also take 17 g (1 Capful) daily as needed for constipation.      SENNA-docusate sodium (SENNA S) 8.6-50 MG tablet Take 1 tablet by mouth 2 times daily      simvastatin (ZOCOR) 40 MG tablet Take 1 tablet (40 mg) by mouth At Bedtime 30 tablet 11    valACYclovir (VALTREX) 500 MG tablet Give 500 mg by mouth one time a day related to other herpesviral infection for outbreak prevention  3    vitamin B-12 (CYANOCOBALAMIN) 50 MCG tablet Take 1 tablet (50 mcg) by mouth daily 30 tablet 11     No current facility-administered medications for this visit.     Medications are managed by:  HOME NURSE  GERIATRIC ROS:    Do you have difficulty getting around, watching TV or reading because of poor eyesight? No   Can you hear normal conversational voice? Yes  Do you use hearing aides? Yes  Do you have problems with your memory? No   Do you often feel sad or depressed? No   Have you unintentionally lost weight in the last 6 months? No   Do you have trouble with control of your bladder? No   Do you have trouble with control of your bowels? No   Have you had any falls in the past year? No     GENERAL ROS:   General: No unexplained weight gain or loss; adequate sleep pattern.  Resp: No worsening shortness of breath, cough or hemoptysis.   GI: Endorses constipation, no diarrhea, no nausea or vomiting  : Voiding independently with no significant incontinence   Skin: No areas of new skin breakdown or worrisome rashes  Extremities: Slight left knee pain, extremity weakness or balance troubles    Objective: /76 P71 RR 18 T97.1 O2  96% RA most recent weight: 126 LBS  Gen: Well nourished and in NAD   HEENT: Head is atraumatic, decent dentition  CV: RRR -systolic murmur, no rubs, or gallups,   Pulm: CTAB, no wheezes/rales/rhonchi, good air entry   ABD: soft, nontender, BS intact  Extrem: no cyanosis, edema or clubbing   Psych: Euthymic  Neuro: Pt is able to ambulate independently    Assessment/ Plan:  Nursing home resident  Moderate persistent asthma, unspecified whether complicated  Improved symptom control after switching to Symbicort.  Continue Pepcid, PPI and bowel regimen.  -Continue Symbicort  Chronic pain of left knee  Improved since injection, continue Voltaren gel as needed.  Constipation  Discussed that patient could request MiraLAX as it is ordered as needed.  Patient is having soft bowel movements.  Cataracts  Patient has upcoming surgery.  Patient is medically stable for cataract surgery with no additional labs.    RECOMMENDED FOLLOW UP:  2 months.    Precepted with Dr. David Lewis MD who agrees with assessment and plan as outlined above     Ankita Abreu MD

## 2024-10-17 ENCOUNTER — HOSPITAL ENCOUNTER (OUTPATIENT)
Dept: MAMMOGRAPHY | Facility: CLINIC | Age: 84
Discharge: HOME | End: 2024-10-17
Attending: FAMILY MEDICINE | Admitting: FAMILY MEDICINE
Payer: COMMERCIAL

## 2024-10-17 DIAGNOSIS — Z12.31 SCREENING MAMMOGRAM, ENCOUNTER FOR: ICD-10-CM

## 2024-10-17 PROCEDURE — 77063 BREAST TOMOSYNTHESIS BI: CPT

## 2024-12-23 ENCOUNTER — NURSING HOME VISIT (OUTPATIENT)
Dept: FAMILY MEDICINE | Facility: CLINIC | Age: 84
End: 2024-12-23
Payer: COMMERCIAL

## 2024-12-23 DIAGNOSIS — M25.562 CHRONIC PAIN OF LEFT KNEE: ICD-10-CM

## 2024-12-23 DIAGNOSIS — G89.29 CHRONIC PAIN OF LEFT KNEE: ICD-10-CM

## 2024-12-23 DIAGNOSIS — Z78.9 NURSING HOME RESIDENT: Primary | ICD-10-CM

## 2024-12-23 DIAGNOSIS — M25.512 CHRONIC LEFT SHOULDER PAIN: ICD-10-CM

## 2024-12-23 DIAGNOSIS — R11.2 NAUSEA AND VOMITING, UNSPECIFIED VOMITING TYPE: ICD-10-CM

## 2024-12-23 DIAGNOSIS — G89.29 CHRONIC LEFT SHOULDER PAIN: ICD-10-CM

## 2024-12-23 PROBLEM — J69.0 ASPIRATION PNEUMONIA (H): Status: RESOLVED | Noted: 2023-07-17 | Resolved: 2024-12-23

## 2024-12-23 RX ORDER — PREDNISOLONE ACETATE 10 MG/ML
1 SUSPENSION/ DROPS OPHTHALMIC 4 TIMES DAILY
COMMUNITY
Start: 2024-12-19

## 2024-12-23 RX ORDER — KETOROLAC TROMETHAMINE 5 MG/ML
1 SOLUTION OPHTHALMIC DAILY
COMMUNITY
Start: 2024-12-19

## 2024-12-23 NOTE — PROGRESS NOTES
Subjective: Bhavana Marr is a 84 year old who is seen at in the nursing home for follow up visit today.  Seen at [facility] AT [place]   [address]  [facility] [address] .    Acute concerns today include: States she has ongoing left knee pain and left shoulder pain.  She states she has had this for some time.  She states the injection of her left knee within the last few months improved her pain.  She does endorse doing mild gentle exercises on her own for left shoulder pain.  Endorses a dull ache in her shoulder.  No known weakness.  No radiating pain.  No tingling or numbness in her distal extremities.  States she is not receiving any topical medications to her knee or to her shoulder.  Amenable to topical Voltaren gel and home PT.  Report received by nursing that the patient has 1-2 medium to large volume emesis daily.  Unclear if this has been reviewed with psychiatrist for psychiatric medications.   Chronic and Past Medical Problems include:  Patient Active Problem List   Diagnosis    Arthritis    Depressive disorder    Borderline personality disorder (H)    GERD (gastroesophageal reflux disease)    Holosystolic murmur    Asthma    History of gastric bypass    Hyperlipidemia LDL goal <130    Other insomnia    Mild mitral regurgitation    Mild aortic stenosis    Weight loss    Bilateral low back pain without sciatica    Adhesive capsulitis of shoulder, right    Mild intermittent asthma, unspecified whether complicated    Bipolar affective disorder, remission status unspecified (H)    Constipation, unspecified constipation type    Age-related osteoporosis without current pathological fracture    Plantar warts    Hypoglycemia    Failure to thrive in adult    Hypotension, unspecified hypotension type    Suicidal ideation    Aspiration pneumonia (H)    Chronic pain of left knee     Family History       Problem (# of Occurrences) Relation (Name,Age of Onset)    Depression (4) Maternal Aunt (60), Maternal  Uncle (85), Paternal Aunt, Paternal Uncle          PMHX/PSHX/MEDS/ALLERGIES/SHX/FHX reviewed and updated in Epic.   MEDICATION LIST:  Current Outpatient Medications   Medication Sig Dispense Refill    diclofenac (VOLTAREN) 1 % topical gel Apply 4 g topically 4 times daily. 350 g 0    acetaminophen (TYLENOL) 325 MG tablet Take 2 tablets (650 mg) by mouth every 6 hours as needed for mild pain or other (and adjunct with moderate or severe pain or per patient request)      alum & mag hydroxide-simethicone (MAALOX) 200-200-20 MG/5ML SUSP suspension Take 15 mLs by mouth every 3 hours as needed for indigestion      bisacodyl (DULCOLAX) 10 MG suppository Place 1 suppository (10 mg) rectally three times a week 100 suppository 0    budesonide-formoterol (SYMBICORT) 160-4.5 MCG/ACT Inhaler Inhale 2 puffs once daily plus 1-2 puffs as needed. May use up to 12 puffs per day. 20.4 g 11    calcium carbonate (TUMS) 500 MG chewable tablet Take 2 chew tab by mouth every 3 hours as needed Chew 2 tabs every 3 hours as needed      calcium carbonate 500 mg, elemental, (OSCAL) 500 MG tablet Take 1 tablet (500 mg) by mouth At Bedtime 30 tablet 0    cholecalciferol (VITAMIN D3) 1000 UNIT tablet Take 1 tablet (1,000 Units) by mouth daily 30 tablet 11    erythromycin (E-MYCIN) 250 MG tablet Take 1 tablet (250 mg) by mouth 3 times daily (before meals)      famotidine (PEPCID) 20 MG tablet Take 1 tablet (20 mg) by mouth daily 30 tablet 0    FLUoxetine (PROZAC) 20 MG/5ML solution Take 15 mLs (60 mg) by mouth daily 120 mL 1    gabapentin (NEURONTIN) 250 MG/5ML solution Take 4 mLs (200 mg) by mouth 3 times daily 470 mL 0    guaiFENesin-dextromethorphan (ROBITUSSIN DM) 100-10 MG/5ML syrup Take 10 mLs by mouth every 4 hours as needed for cough      hypromellose (ARTIFICIAL TEARS) 0.5 % SOLN ophthalmic solution Place 2 drops into both eyes 3 times daily      lamoTRIgine (LAMICTAL) 25 MG tablet Take 1 tablet (25 mg) by mouth daily 30 tablet 0     magnesium hydroxide (MILK OF MAGNESIA) 400 MG/5ML suspension Take 30 mLs by mouth daily as needed for constipation or heartburn      melatonin 3 MG tablet Take 2 tablets (6 mg) by mouth every evening 60 tablet 0    memantine (NAMENDA) 10 MG tablet Take 1 tablet (10 mg) by mouth 2 times daily 60 tablet 0    mirtazapine (REMERON) 15 MG tablet Take 1 tablet (15 mg) by mouth At Bedtime      Multiple Vitamins-Minerals (CEROVITE SENIOR) TABS Take 1 tablet by mouth daily 30 tablet 3    OLANZapine zydis (ZYPREXA) 10 MG ODT Take 1 tablet (10 mg) by mouth At Bedtime 30 tablet 0    OLANZapine zydis (ZYPREXA) 5 MG ODT Take a half tablet (2.5mg) once daily (also taking 10mg at bedtime)      pantoprazole (PROTONIX) 40 MG EC tablet Take 1 tablet (40 mg) by mouth every morning (before breakfast) 30 tablet 0    polyethylene glycol (MIRALAX) 17 GM/Dose powder Take 17 g (1 Capful) by mouth daily. May also take 17 g (1 Capful) daily as needed for constipation.      SENNA-docusate sodium (SENNA S) 8.6-50 MG tablet Take 1 tablet by mouth 2 times daily      simvastatin (ZOCOR) 40 MG tablet Take 1 tablet (40 mg) by mouth At Bedtime 30 tablet 11    valACYclovir (VALTREX) 500 MG tablet Give 500 mg by mouth one time a day related to other herpesviral infection for outbreak prevention  3    vitamin B-12 (CYANOCOBALAMIN) 50 MCG tablet Take 1 tablet (50 mcg) by mouth daily 30 tablet 11     No current facility-administered medications for this visit.     Medications are managed by:  HOME NURSE    GERIATRIC ROS:    Do you have difficulty getting around, watching TV or reading because of poor eyesight? No   Can you hear normal conversational voice? Yes  Do you use hearing aides? Yes  Do you have problems with your memory? Yes   Do you often feel sad or depressed? No   Have you unintentionally lost weight in the last 6 months?  No   Do you have trouble with control of your bladder?  No   Do you have trouble with control of your bowels? No   Have you  had any falls in the past year? No      GENERAL ROS:   General: No unexplained weight gain or loss; adequate sleep pattern.  Resp: No worsening shortness of breath, cough or hemoptysis.   GI: No worsening constipation, no diarrhea, positive nausea and vomiting  Skin: No areas of new skin breakdown or worrisome rashes  Extremities:  Left knee and shoulder pain    Objective: LMP  (LMP Unknown)    Gen: Well nourished and in NAD   HEENT: Head is atraumatic, decent dentition  CV: RRR - systolic murmur, no rubs, and no gallups,   Pulm: CTAB, no wheezes/rales/rhonchi, good air entry   ABD: soft, nontender, BS intact  Extrem: no cyanosis, edema or clubbing, pain with left arm abduction, adduction, forward flexion and extension, PTP left GH region, no knee effusion, no joint line tenderness, crepitus with flexion and extension  Psych: Euthymic  Neuro: Pt is able to ambulate with walker    Preventative Screening:   Vaccinations reviewed and up to date  Additional Recommended Screening: None     Assessment/ Plan:    Nursing home resident  Chronic pain of left knee  Chronic left shoulder pain  Ongoing left knee and left shoulder pain likely related to arthritis versus impingement and left shoulder.  Recommend topical Voltaren gel to both locations and home PT for both.  Recommended repeat knee injection at subsequent visit as this has helped patient in the past.  Most recent knee injection 9/19/2024.  -     diclofenac (VOLTAREN) 1 % topical gel; Apply 4 g topically 4 times daily.  -     Home Care Referral    Nausea and vomiting, unspecified vomiting type  History of spontaneous nausea with vomiting without clear etiology.  BMP and CBC reassuring.  Patient to review medication list with psychiatrist..  Will also ask clinic Pharm.D. to review medication list for possible medications contributing to this.    RECOMMENDED FOLLOW UP:  2 months.    Natasha Berman MD

## 2025-01-14 ENCOUNTER — TELEPHONE (OUTPATIENT)
Dept: FAMILY MEDICINE | Facility: CLINIC | Age: 85
End: 2025-01-14
Payer: COMMERCIAL

## 2025-01-14 NOTE — TELEPHONE ENCOUNTER
Home Care is calling regarding an established patient with [facility].       Requesting orders from: Inder Lindsey  Provider is following patient: Yes  Is this a 60-day recertification request?  No    Orders Requested    Physical Therapy  Request for continuation of care with change in frequency   Goals have been met/progressing.  Frequency:  PT [id number] then every other week for another 4 weeks        Verbal orders given.  Home Care will send orders for provider to sign.  Confirmed ok to leave a detailed message with call back.  Contact information confirmed and updated as needed.    Sarbjit Paniagua RN

## 2025-01-14 NOTE — TELEPHONE ENCOUNTER
Order/Referral Request    Who is requesting: [facility] home care    Orders being requested: PT [id number] then every other week for another 4 weeks    Reason service is needed/diagnosis: L  knee and should pain    When are orders needed by: 2-3 days    Has this been discussed with Provider: No    Does patient have a preference on a Group/Provider/Facility? [facility]    Does patient have an appointment scheduled?: No    Where to send orders:  verbal  [phone]   Okay to leave a detailed message?: Yes [phone]     Does this patient currently have active insurance coverage?  Yes, Pt has active insurance coverage.     Does patient or caller know when to expect a call? Yes, Nurses will return call within 2-3 business hours.    Adin Dsouza on 1/14/2025 at 1:45 PM

## 2025-01-15 ENCOUNTER — TELEPHONE (OUTPATIENT)
Dept: FAMILY MEDICINE | Facility: CLINIC | Age: 85
End: 2025-01-15

## 2025-01-15 NOTE — TELEPHONE ENCOUNTER
FYI - Status Update    Who is Calling: physical therapistMicheline    Update: Micheline found a drug to drug interaction with simvastatin and erythromycin with patient during her visit with patient.    Does caller want a call/response back: No      Does this patient currently have active insurance coverage?  Yes, Pt has active insurance coverage.     Does patient or caller know when to expect a call? No, no need for return call    Cuca Gabbie on 1/15/2025 at 8:14 AM

## 2025-01-15 NOTE — PROGRESS NOTES
1pm phone call.  Bhavana is receiving both simvastatin and erythromycin (to improve GI motility and decrease vomiting episodes)    I discuss this with Josefina Rn at [facility]. Josefina will ask Bhavnaa if she wants to:  1) attempt trail of discontinuing the erythromycin, to determine it's benefits.  OR  2) Stop simvastatin and restart a different stain compatible with erythromycin    Josefina CARTER will call back with decision.    MB

## 2025-01-17 NOTE — TELEPHONE ENCOUNTER
RAUL Rohca @ [facility], calling back to report that Bhavana states the erythromycin is helping her feel better. Patient would like to go the route to find a stain compatible with erythromycin.      Routed to PCP.    Sarbjit Paniagua RN, MSN

## 2025-01-20 DIAGNOSIS — E78.5 HYPERLIPIDEMIA LDL GOAL <100: Primary | ICD-10-CM

## 2025-01-20 RX ORDER — PRAVASTATIN SODIUM 40 MG
40 TABLET ORAL DAILY
Qty: 90 TABLET | Refills: 3 | Status: SHIPPED | OUTPATIENT
Start: 2025-01-20

## 2025-02-04 ENCOUNTER — TELEPHONE (OUTPATIENT)
Dept: FAMILY MEDICINE | Facility: CLINIC | Age: 85
End: 2025-02-04
Payer: COMMERCIAL

## 2025-02-04 NOTE — TELEPHONE ENCOUNTER
Per RN notes from Josefina Hansen RN at [facility] as follows:  . Bhavana has been refusing her diclofenac stating that she isnt having any pain although she is asking about having another Cortisone injection because it has been since September. She states that her left shoulder hurts more than her left knee. She has PT for her pain but they are only coming once every other week for 8 weeks.     AMY Gottlieb

## 2025-02-06 ENCOUNTER — NURSING HOME VISIT (OUTPATIENT)
Dept: FAMILY MEDICINE | Facility: CLINIC | Age: 85
End: 2025-02-06
Payer: COMMERCIAL

## 2025-02-06 DIAGNOSIS — M25.562 CHRONIC PAIN OF LEFT KNEE: ICD-10-CM

## 2025-02-06 DIAGNOSIS — G89.29 CHRONIC PAIN OF LEFT KNEE: ICD-10-CM

## 2025-02-06 DIAGNOSIS — G89.29 CHRONIC LEFT SHOULDER PAIN: ICD-10-CM

## 2025-02-06 DIAGNOSIS — M25.512 CHRONIC LEFT SHOULDER PAIN: ICD-10-CM

## 2025-02-06 DIAGNOSIS — Z78.9 NURSING HOME RESIDENT: Primary | ICD-10-CM

## 2025-02-06 PROCEDURE — 99308 SBSQ NF CARE LOW MDM 20: CPT | Mod: GC

## 2025-02-07 NOTE — PROGRESS NOTES
Received MAR from [facility]. Completed med rec and updated our Epic med list to reflect the medication list from [facility].     Lisette Magaña, PharmD   Medication Therapy Management Pharmacy Resident

## 2025-02-10 VITALS
TEMPERATURE: 96.9 F | RESPIRATION RATE: 18 BRPM | WEIGHT: 123 LBS | SYSTOLIC BLOOD PRESSURE: 115 MMHG | DIASTOLIC BLOOD PRESSURE: 59 MMHG | OXYGEN SATURATION: 91 % | BODY MASS INDEX: 21.11 KG/M2

## 2025-02-10 NOTE — PROGRESS NOTES
Subjective: Bhavana Marr is a 84 year old who is seen at home for follow up visit today.  Seen at [facility] AT [place]   [address]  [facility] [address] .      Acute concerns today include: She has no acute concerns and is feeling well today.  She states that her left knee and left shoulder feel good today, denies any pain.  Declines injection today.  No new symptoms.  PT coming every 8 weeks, she endorses doing her exercises as directed.    Chronic and Past Medical Problems include:  Patient Active Problem List   Diagnosis    Arthritis    Depressive disorder    Borderline personality disorder (H)    GERD (gastroesophageal reflux disease)    Holosystolic murmur    Asthma    History of gastric bypass    Hyperlipidemia LDL goal <130    Other insomnia    Mild mitral regurgitation    Mild aortic stenosis    Weight loss    Bilateral low back pain without sciatica    Adhesive capsulitis of shoulder, right    Mild intermittent asthma, unspecified whether complicated    Bipolar affective disorder, remission status unspecified (H)    Constipation, unspecified constipation type    Age-related osteoporosis without current pathological fracture    Plantar warts    Hypoglycemia    Failure to thrive in adult    Hypotension, unspecified hypotension type    Suicidal ideation    Chronic pain of left knee     Family History       Problem (# of Occurrences) Relation (Name,Age of Onset)    Depression (4) Maternal Aunt (60), Maternal Uncle (85), Paternal Aunt, Paternal Uncle          PMHX/PSHX/MEDS/ALLERGIES/SHX/FHX reviewed and updated in Epic.   MEDICATION LIST:  Current Outpatient Medications   Medication Sig Dispense Refill    acetaminophen (TYLENOL) 325 MG tablet Take 2 tablets (650 mg) by mouth every 6 hours as needed for mild pain or other (and adjunct with moderate or severe pain or per patient request)      alum & mag hydroxide-simethicone (MAALOX) 200-200-20 MG/5ML SUSP suspension Take 15 mLs by mouth every 3 hours  as needed for indigestion      bisacodyl (DULCOLAX) 10 MG suppository Place 1 suppository (10 mg) rectally three times a week 100 suppository 0    budesonide-formoterol (SYMBICORT) 160-4.5 MCG/ACT Inhaler Inhale 2 puffs once daily plus 1-2 puffs as needed. May use up to 12 puffs per day. 20.4 g 11    calcium carbonate (TUMS) 500 MG chewable tablet Take 2 chew tab by mouth every 3 hours as needed Chew 2 tabs every 3 hours as needed      calcium carbonate 500 mg, elemental, (OSCAL) 500 MG tablet Take 1 tablet (500 mg) by mouth At Bedtime 30 tablet 0    cholecalciferol (VITAMIN D3) 1000 UNIT tablet Take 1 tablet (1,000 Units) by mouth daily 30 tablet 11    diclofenac (VOLTAREN) 1 % topical gel Apply 4 g topically 4 times daily. 350 g 0    erythromycin (E-MYCIN) 250 MG tablet Take 1 tablet (250 mg) by mouth 3 times daily (before meals)      famotidine (PEPCID) 20 MG tablet Take 1 tablet (20 mg) by mouth daily 30 tablet 0    FLUoxetine (PROZAC) 20 MG/5ML solution Take 15 mLs (60 mg) by mouth daily 120 mL 1    gabapentin (NEURONTIN) 250 MG/5ML solution Take 4 mLs (200 mg) by mouth 3 times daily 470 mL 0    guaiFENesin-dextromethorphan (ROBITUSSIN DM) 100-10 MG/5ML syrup Take 10 mLs by mouth every 4 hours as needed for cough      hypromellose (ARTIFICIAL TEARS) 0.5 % SOLN ophthalmic solution Place 2 drops into both eyes 3 times daily      ketorolac (ACULAR) 0.5 % ophthalmic solution Place 1 drop into the right eye daily.      lamoTRIgine (LAMICTAL) 25 MG tablet Take 1 tablet (25 mg) by mouth daily 30 tablet 0    magnesium hydroxide (MILK OF MAGNESIA) 400 MG/5ML suspension Take 30 mLs by mouth daily as needed for constipation or heartburn      melatonin 3 MG tablet Take 2 tablets (6 mg) by mouth every evening 60 tablet 0    memantine (NAMENDA) 10 MG tablet Take 1 tablet (10 mg) by mouth 2 times daily 60 tablet 0    mirtazapine (REMERON) 15 MG tablet Take 1 tablet (15 mg) by mouth At Bedtime      Multiple Vitamins-Minerals  (CEROVITE SENIOR) TABS Take 1 tablet by mouth daily 30 tablet 3    OLANZapine zydis (ZYPREXA) 10 MG ODT Take 1 tablet (10 mg) by mouth At Bedtime 30 tablet 0    OLANZapine zydis (ZYPREXA) 5 MG ODT Take a half tablet (2.5mg) once daily (also taking 10mg at bedtime)      pantoprazole (PROTONIX) 40 MG EC tablet Take 1 tablet (40 mg) by mouth every morning (before breakfast) 30 tablet 0    polyethylene glycol (MIRALAX) 17 GM/Dose powder Take 17 g (1 Capful) by mouth daily. May also take 17 g (1 Capful) daily as needed for constipation.      pravastatin (PRAVACHOL) 40 MG tablet Take 1 tablet (40 mg) by mouth daily. 90 tablet 3    prednisoLONE acetate (PRED FORTE) 1 % ophthalmic suspension Place 1 drop into both eyes 4 times daily.      SENNA-docusate sodium (SENNA S) 8.6-50 MG tablet Take 1 tablet by mouth 2 times daily      valACYclovir (VALTREX) 500 MG tablet Give 500 mg by mouth one time a day related to other herpesviral infection for outbreak prevention  3    vitamin B-12 (CYANOCOBALAMIN) 50 MCG tablet Take 1 tablet (50 mcg) by mouth daily 30 tablet 11     No current facility-administered medications for this visit.     Medications are managed by: HOME NURSE  Patient uses a pill box to manage their medications:  No  Patient has questions, concerns, or potential side effects/interactions from their medications:  Yes   GERIATRIC ROS:    Do you have difficulty getting around, watching TV or reading because of poor eyesight? Yes / No   Can you hear normal conversational voice? Yes  Do you use hearing aides? Yes  Do you have problems with your memory? Yes   Do you often feel sad or depressed? No   Have you unintentionally lost weight in the last 6 months?  No   Do you have trouble with control of your bladder?  No   Do you have trouble with control of your bowels? No   Have you had any falls in the past year? No      GENERAL ROS:   General: No unexplained weight gain or loss; adequate sleep pattern.  Resp: No worsening  shortness of breath, cough or hemoptysis.   GI: No worsening constipation, no diarrhea, no nausea or vomiting  : Voiding independently with no significant incontinence   Skin: No areas of new skin breakdown or worrisome rashes  Extremities:  No pain, extremity weakness or balance troubles    Objective: /59   Temp 96.9  F (36.1  C)   Resp 18   Wt 55.8 kg (123 lb)   LMP  (LMP Unknown)   SpO2 (!) 91%   BMI 21.11 kg/m    Gen: Well nourished and in NAD   HEENT: Head is atraumatic, decent dentition  CV: RRR - no murmurs, rubs, or gallups,   Pulm: CTAB, no wheezes/rales/rhonchi, good air entry   ABD: soft, nontender, BS intact  Extrem: no cyanosis, edema or clubbing   Psych: Euthymic  Neuro: Pt is able to ambulate independently    Preventative Screening:   Vaccinations reviewed and up to date  Additional Recommended Screening: None    Assessment/ Plan:  Nursing home resident  Chronic pain of left knee  Chronic left shoulder pain  Originally had planned to perform steroid injection of left shoulder or left knee however she declined pain at this time and stated Voltaren was working well, nursing staff states she occasionally declines this.  Recommend she continue Voltaren she is agreeable with this plan, nursing staff request that this be made 4 times daily as needed as she refuses occasionally and we will update this in her chart.  PT is coming every 8 weeks, she is doing that exercises directed.  No further complaints.    RECOMMENDED FOLLOW UP:  2 months.        Newton Carey DO

## 2025-02-11 NOTE — PROGRESS NOTES
Preceptor Attestation:  The longitudinal plan of care for the diagnosis(es)/condition(s) as documented were addressed during this visit. Due to the added complexity in care, I will continue to support Bhavana Munguia in the subsequent management and with ongoing continuity of care.    I discussed the patient with the resident and evaluated the patient in person. I have verified the content of the note, which accurately reflects my assessment of the patient and the plan of care.   Supervising Physician:  David Lewis MD.

## 2025-03-11 ENCOUNTER — TRANSFERRED RECORDS (OUTPATIENT)
Dept: HEALTH INFORMATION MANAGEMENT | Facility: CLINIC | Age: 85
End: 2025-03-11
Payer: COMMERCIAL

## 2025-03-18 DIAGNOSIS — J44.9 CHRONIC OBSTRUCTIVE PULMONARY DISEASE, UNSPECIFIED COPD TYPE (H): Primary | ICD-10-CM

## 2025-03-18 RX ORDER — FLUTICASONE FUROATE AND VILANTEROL TRIFENATATE 100; 25 UG/1; UG/1
1 POWDER RESPIRATORY (INHALATION) DAILY
Qty: 30 EACH | Refills: 11 | Status: SHIPPED | OUTPATIENT
Start: 2025-03-18

## 2025-03-18 NOTE — TELEPHONE ENCOUNTER
Name from pharmacy: Breo Ellipta 100-25 MCG/ACT Aerosol Powder, breath activated         Will file in chart as: BREO ELLIPTA 100-25 MCG/ACT inhaler    Sig: INHALE 1 PUFF ONCE DAILY    Disp: Not specified (Pharmacy requested: 60 Units)    Refills: PRN (Pharmacy requested: 99)    Start: 3/18/2025    Class: E-Prescribe    Non-formulary    To pharmacy: ATTN: DAVID CAMP    Last refill: 2/20/2025    Inhaled Steroids Protocol [id number]/2025 04:18 PM   Protocol Details Medication is active on med list and the sig matches. RN to manually verify dose and sig if red X/fail.    Recent (12 mo) or future (90 days) visit within the authorizing provider's specialty    Medication indicated for associated diagnosis             Sarbjit Paniagua RN, MSN

## 2025-04-07 ENCOUNTER — NURSING HOME VISIT (OUTPATIENT)
Dept: FAMILY MEDICINE | Facility: CLINIC | Age: 85
End: 2025-04-07
Payer: COMMERCIAL

## 2025-04-07 DIAGNOSIS — R63.4 WEIGHT LOSS: ICD-10-CM

## 2025-04-07 DIAGNOSIS — F03.918 DEMENTIA WITH BEHAVIORAL DISTURBANCE (H): ICD-10-CM

## 2025-04-07 DIAGNOSIS — Z78.9 NURSING HOME RESIDENT: Primary | ICD-10-CM

## 2025-04-07 DIAGNOSIS — K52.9 CHRONIC DIARRHEA: ICD-10-CM

## 2025-04-07 DIAGNOSIS — R01.1 HOLOSYSTOLIC MURMUR: ICD-10-CM

## 2025-04-07 DIAGNOSIS — K59.00 CONSTIPATION, UNSPECIFIED CONSTIPATION TYPE: ICD-10-CM

## 2025-04-07 PROBLEM — S72.8X2A OTHER FRACTURE OF LEFT FEMUR, INITIAL ENCOUNTER FOR CLOSED FRACTURE (H): Status: ACTIVE | Noted: 2017-12-02

## 2025-04-07 PROBLEM — F33.9 RECURRENT MAJOR DEPRESSIVE DISORDER: Status: ACTIVE | Noted: 2017-10-24

## 2025-04-07 PROBLEM — I10 ESSENTIAL HYPERTENSION: Status: ACTIVE | Noted: 2025-04-07

## 2025-04-07 PROBLEM — M51.379 DDD (DEGENERATIVE DISC DISEASE), LUMBOSACRAL: Chronic | Status: ACTIVE | Noted: 2017-10-27

## 2025-04-07 PROBLEM — A60.00 GENITAL HERPES: Status: ACTIVE | Noted: 2025-04-07

## 2025-04-07 PROBLEM — F60.9 PERSONALITY DISORDER (H): Status: ACTIVE | Noted: 2025-04-07

## 2025-04-07 PROBLEM — M94.9 DISORDER OF BONE AND CARTILAGE: Status: ACTIVE | Noted: 2025-04-07

## 2025-04-07 PROBLEM — S72.8X2A OTHER FRACTURE OF LEFT FEMUR, INITIAL ENCOUNTER FOR CLOSED FRACTURE (H): Status: RESOLVED | Noted: 2017-12-02 | Resolved: 2025-04-07

## 2025-04-07 PROBLEM — J45.20 MILD INTERMITTENT ASTHMA, UNSPECIFIED WHETHER COMPLICATED: Status: ACTIVE | Noted: 2018-06-08

## 2025-04-07 PROBLEM — J31.0 CHRONIC RHINITIS: Status: ACTIVE | Noted: 2025-04-07

## 2025-04-07 PROBLEM — F41.9 ANXIETY: Status: ACTIVE | Noted: 2025-04-07

## 2025-04-07 PROBLEM — E16.2 HYPOGLYCEMIA: Status: RESOLVED | Noted: 2023-05-05 | Resolved: 2025-04-07

## 2025-04-07 PROBLEM — M89.9 DISORDER OF BONE AND CARTILAGE: Status: ACTIVE | Noted: 2025-04-07

## 2025-04-07 NOTE — PROGRESS NOTES
Subjective: Bhavana Marr is a 84 year old who is seen at in the nursing home for follow up visit today.  Seen at [facility] AT [place]   [address]  [facility] [address] .    Acute concerns today include: Bhavana mentioned that she woke up yesterday with pain in her legs. This pain resolved with a cream she used on her legs. She does not have any pain in her legs today. The nursing supervisor mentioned that Bhavana is interested in a [employer] pass, but with her dementia, that would be unsafe. Bhavana did not mention anything about [employer] to me during my encounter.    Chronic and Past Medical Problems include:  Patient Active Problem List   Diagnosis    Osteoarthrosis    Borderline personality disorder (H)    GERD (gastroesophageal reflux disease)    Holosystolic murmur    History of gastric bypass    Hyperlipidemia LDL goal <130    Other insomnia    Mild mitral regurgitation    Mild aortic stenosis    Weight loss    Bilateral low back pain without sciatica    Adhesive capsulitis of shoulder, right    Mild intermittent asthma, unspecified whether complicated    Bipolar affective disorder, remission status unspecified (H)    Constipation, unspecified constipation type    Age-related osteoporosis without current pathological fracture    Plantar warts    Failure to thrive in adult    Hypotension, unspecified hypotension type    Suicidal ideation    Chronic pain of left knee    B12 deficiency anemia    Chronic diarrhea    Chronic rhinitis    DDD (degenerative disc disease), lumbosacral    Dementia with behavioral disturbance (H)    Disorder of bone and cartilage    Essential hypertension    Genital herpes    History of total right hip replacement    Personal history of poliomyelitis    Prediabetes    Recurrent major depressive disorder    Vitamin D deficiency    Nursing home resident    Anxiety     Family History       Problem (# of Occurrences) Relation (Name,Age of Onset)    Depression (4) Maternal Aunt  (60), Maternal Uncle (85), Paternal Aunt, Paternal Uncle          Social History:   Current activities:  Exercise classes, plays Sparkle on the weekends with a group of about 5 residents. Plays Bingo when offered.  Support services:  No  Currently living family/friends:  No  PMHX/PSHX/MEDS/ALLERGIES/SHX/FHX reviewed and updated in Epic.   MEDICATION LIST:  Current Outpatient Medications   Medication Sig Dispense Refill    acetaminophen (TYLENOL) 325 MG tablet Take 2 tablets (650 mg) by mouth every 6 hours as needed for mild pain or other (and adjunct with moderate or severe pain or per patient request)      alum & mag hydroxide-simethicone (MAALOX) 200-200-20 MG/5ML SUSP suspension Take 15 mLs by mouth every 3 hours as needed for indigestion      bisacodyl (DULCOLAX) 10 MG suppository Place 1 suppository (10 mg) rectally three times a week 100 suppository 0    budesonide-formoterol (SYMBICORT) 160-4.5 MCG/ACT Inhaler Inhale 2 puffs once daily plus 1-2 puffs as needed. May use up to 12 puffs per day. 20.4 g 11    calcium carbonate (TUMS) 500 MG chewable tablet Take 2 chew tab by mouth every 3 hours as needed Chew 2 tabs every 3 hours as needed      calcium carbonate 500 mg, elemental, (OSCAL) 500 MG tablet Take 1 tablet (500 mg) by mouth At Bedtime 30 tablet 0    cholecalciferol (VITAMIN D3) 1000 UNIT tablet Take 1 tablet (1,000 Units) by mouth daily 30 tablet 11    diclofenac (VOLTAREN) 1 % topical gel Apply 4 g topically 4 times daily as needed for moderate pain. 350 g 0    erythromycin (E-MYCIN) 250 MG tablet Take 1 tablet (250 mg) by mouth 3 times daily (before meals)      famotidine (PEPCID) 20 MG tablet Take 1 tablet (20 mg) by mouth daily 30 tablet 0    FLUoxetine (PROZAC) 20 MG/5ML solution Take 15 mLs (60 mg) by mouth daily 120 mL 1    gabapentin (NEURONTIN) 250 MG/5ML solution Take 4 mLs (200 mg) by mouth 3 times daily 470 mL 0    guaiFENesin-dextromethorphan (ROBITUSSIN DM) 100-10 MG/5ML syrup Take 10 mLs  by mouth every 4 hours as needed for cough      hypromellose (ARTIFICIAL TEARS) 0.5 % SOLN ophthalmic solution Place 2 drops into both eyes 3 times daily      lamoTRIgine (LAMICTAL) 25 MG tablet Take 1 tablet (25 mg) by mouth daily 30 tablet 0    magnesium hydroxide (MILK OF MAGNESIA) 400 MG/5ML suspension Take 30 mLs by mouth daily as needed for constipation or heartburn      melatonin 3 MG tablet Take 2 tablets (6 mg) by mouth every evening 60 tablet 0    memantine (NAMENDA) 10 MG tablet Take 1 tablet (10 mg) by mouth 2 times daily 60 tablet 0    mirtazapine (REMERON) 15 MG tablet Take 1 tablet (15 mg) by mouth At Bedtime      Multiple Vitamins-Minerals (CEROVITE SENIOR) TABS Take 1 tablet by mouth daily 30 tablet 3    OLANZapine zydis (ZYPREXA) 10 MG ODT Take 1 tablet (10 mg) by mouth At Bedtime 30 tablet 0    OLANZapine zydis (ZYPREXA) 5 MG ODT Take a half tablet (2.5mg) once daily (also taking 10mg at bedtime)      pantoprazole (PROTONIX) 40 MG EC tablet Take 1 tablet (40 mg) by mouth every morning (before breakfast) 30 tablet 0    polyethylene glycol (MIRALAX) 17 GM/Dose powder Take 17 g (1 Capful) by mouth daily. May also take 17 g (1 Capful) daily as needed for constipation.      pravastatin (PRAVACHOL) 40 MG tablet Take 1 tablet (40 mg) by mouth daily. 90 tablet 3    SENNA-docusate sodium (SENNA S) 8.6-50 MG tablet Take 1 tablet by mouth 2 times daily      valACYclovir (VALTREX) 500 MG tablet Give 500 mg by mouth one time a day related to other herpesviral infection for outbreak prevention  3    vitamin B-12 (CYANOCOBALAMIN) 50 MCG tablet Take 1 tablet (50 mcg) by mouth daily 30 tablet 11     No current facility-administered medications for this visit.     Medications are managed by:  NURSE  Patient uses a pill box to manage their medications:  N/A  Patient has questions, concerns, or potential side effects/interactions from their medications:  No  GERIATRIC ROS:    Do you have difficulty getting around,  watching TV or reading because of poor eyesight? No - s/p cataract surgery. Wears readers  Can you hear normal conversational voice? Yes   Do you use hearing aides? Yes  Do you have problems with your memory? Yes - known dementia  Do you often feel sad or depressed? Yes - once in a while   Have you unintentionally lost weight in the last 6 months? Yes - was 130s, now 121   Do you have trouble with control of your bladder? No   Do you have trouble with control of your bowels? Yes - occasional diarrhea and constipation   Have you had any falls in the past year? No - uses a walker    GENERAL ROS:   General: Unexplained weight loss; adequate sleep pattern.  Resp: No worsening shortness of breath, cough or hemoptysis.   GI: No worsening constipation, occasional diarrhea, no nausea or vomiting  : Voiding independently with no significant incontinence   Skin: No areas of new skin breakdown or worrisome rashes  Extremities:  No extremity weakness or balance troubles. Occasional leg pain    Objective: Most recent weight:  121  Gen: Well nourished and in NAD   HEENT: Head is atraumatic, decent dentition  CV: RRR - systolic murmur, no rubs, or gallups,   Pulm: CTAB, no wheezes/rales/rhonchi, good air entry   ABD: soft, nontender, BS intact  Extrem: no cyanosis, edema or clubbing   Psych: Euthymic  Neuro: Pt is able to ambulate independently with walker    Preventative Screening:   Vaccinations reviewed and up to date. Could get optional RSV vaccine  Healthcare Directive, Living Will, POLST has been completed:  Yes     Assessment/ Plan:  Nursing home resident  60-day visit completed today. Acute problems are discussed below.  Dementia with behavioral disturbance  Bhavana has a history of dementia. Otherwise, appears well and was conversing appropriately during my encounter  Weight loss  Bhavana has lost about 10 lbs from her baseline. Will add Ensure shakes to her diet. Bhavana understands the plan.   Chronic diarrhea  Constipation,  unspecified constipation type  Bhavana has a history of alternating diarrhea and constipation. She does not seem to be bothered by these conditions, and there were no interventions or new medications prescribed today.   Holosystolic murmur  Murmur noted on exam today. Continue to monitor. Lung sounds clear. No extremity swelling. Low concern for CHF based on exam today.    RECOMMENDED FOLLOW UP:  2 months.    Total of 20 minutes was spent in face to face contact with patient with > 50% in counseling and coordination of care.  Options for treatment and/or follow-up care were reviewed with the patient. Bhavana Marr was engaged and actively involved in the decision making process. She verbalized understanding of the options discussed and was satisfied with the final plan.      Clemente Mckeon, DO

## 2025-05-06 NOTE — PLAN OF CARE
Left message for return call and update    Urine, Bacterial Culture >100,000 CFU/mL Escherichia coli Abnormal       Testing on this culture has been completed.  If additional testing is required, please contact [facility] within 48 hours.        Resulting Agency: [facility]     Susceptibility     Escherichia coli     IRINEO     Amoxicillin/Clavulanic Acid <=2 ug/mL Susceptible     Ampicillin 4 ug/mL Susceptible     Ampicillin/Sulbactam <=2 ug/mL Susceptible     Aztreonam <=1 ug/mL Susceptible     Cefazolin <=4 ug/mL 1      Cefazolin (Urine) <=4 ug/mL Susceptible 2     Cefoxitin <=4 ug/mL Susceptible     Ceftriaxone <=1 ug/mL Susceptible     Cefuroxime Axetil 4 ug/mL Susceptible     Ciprofloxacin <=0.25 ug/mL Susceptible     Gentamicin <=1 ug/mL Susceptible     Nitrofurantoin <=16 ug/mL Susceptible     Piperacillin/Tazobactam <=4 ug/mL Susceptible     Trimethoprim/Sulfamethoxazole <=20 ug/mL Susceptible              1 A cefazolin IRINEO result of <=4 cannot be used to differentiate between susceptible and intermediate isolates.  Consider alternative therapy based on clinical response and severity of infection. This result should be used when considering treatment for any infection other than an uncomplicated UTI.   2 If susceptible, cefazolin predicts activity for other oral cephalosporins (cefaclor, cefdinir, cefpodoxime, cefprozil, cefuroxime, cephalexin, and loracarbef) when used for uncomplicated UTIs due to E. coli, K. pneumo, and P. mirabilis.         Received a 82 y.o. female with admitting dx of Aspiration Pneumonia. No CPR/DNI code  Alert and oriented 4x. Forgetful. No changes of LOC.  Complaint of N&V, denies pain, SOB  Breathing pattern is normal. In Room air.  Tolerate Regular diet/Thin liquids - no straw, on sitting position  Move independently on the bed  Voiding no difficulty, on diaper   PIV line at left lower arm, with ongoing NS at 75ml/hr   Instructed to use call bel for assistance.  Expected to discharge on Monday to [facility]TC

## 2025-05-19 ENCOUNTER — MEDICAL CORRESPONDENCE (OUTPATIENT)
Dept: HEALTH INFORMATION MANAGEMENT | Facility: CLINIC | Age: 85
End: 2025-05-19
Payer: COMMERCIAL

## 2025-06-11 ENCOUNTER — TELEPHONE (OUTPATIENT)
Dept: FAMILY MEDICINE | Facility: CLINIC | Age: 85
End: 2025-06-11
Payer: COMMERCIAL

## 2025-06-12 ENCOUNTER — NURSING HOME VISIT (OUTPATIENT)
Dept: FAMILY MEDICINE | Facility: CLINIC | Age: 85
End: 2025-06-12
Payer: COMMERCIAL

## 2025-06-12 VITALS
TEMPERATURE: 97.5 F | WEIGHT: 128 LBS | DIASTOLIC BLOOD PRESSURE: 76 MMHG | BODY MASS INDEX: 21.97 KG/M2 | SYSTOLIC BLOOD PRESSURE: 144 MMHG | HEART RATE: 84 BPM | RESPIRATION RATE: 18 BRPM

## 2025-06-12 DIAGNOSIS — Z78.9 NURSING HOME RESIDENT: Primary | ICD-10-CM

## 2025-06-12 DIAGNOSIS — F03.918 DEMENTIA WITH BEHAVIORAL DISTURBANCE (H): ICD-10-CM

## 2025-06-12 NOTE — PROGRESS NOTES
Subjective: Bhavana Marr is a 84 year old who is seen at in the nursing home for follow up visit today.  Seen at [facility] AT [place]   [address]  [facility] [address] .      Acute concerns today include: No acute concerns. Excited about going to [employer], eager to get going.    Chronic and Past Medical Problems include:  Patient Active Problem List   Diagnosis    Osteoarthrosis    Borderline personality disorder (H)    GERD (gastroesophageal reflux disease)    Holosystolic murmur    History of gastric bypass    Hyperlipidemia LDL goal <130    Other insomnia    Mild mitral regurgitation    Mild aortic stenosis    Weight loss    Bilateral low back pain without sciatica    Adhesive capsulitis of shoulder, right    Mild intermittent asthma, unspecified whether complicated    Bipolar affective disorder, remission status unspecified (H)    Constipation, unspecified constipation type    Age-related osteoporosis without current pathological fracture    Plantar warts    Failure to thrive in adult    Hypotension, unspecified hypotension type    Suicidal ideation    Chronic pain of left knee    B12 deficiency anemia    Chronic diarrhea    Chronic rhinitis    DDD (degenerative disc disease), lumbosacral    Dementia with behavioral disturbance (H)    Disorder of bone and cartilage    Essential hypertension    Genital herpes    History of total right hip replacement    Personal history of poliomyelitis    Prediabetes    Recurrent major depressive disorder    Vitamin D deficiency    Nursing home resident    Anxiety     Family History       Problem (# of Occurrences) Relation (Name,Age of Onset)    Depression (4) Maternal Aunt (60), Maternal Uncle (85), Paternal Aunt, Paternal Uncle          Social History:   Current activities:  Going on field trips with other residents    PMHX/PSHX/MEDS/ALLERGIES/SHX/FHX reviewed and updated in Epic.   MEDICATION LIST:  Current Outpatient Medications   Medication Sig Dispense Refill     acetaminophen (TYLENOL) 325 MG tablet Take 2 tablets (650 mg) by mouth every 6 hours as needed for mild pain or other (and adjunct with moderate or severe pain or per patient request)      alum & mag hydroxide-simethicone (MAALOX) 200-200-20 MG/5ML SUSP suspension Take 15 mLs by mouth every 3 hours as needed for indigestion      bisacodyl (DULCOLAX) 10 MG suppository Place 1 suppository (10 mg) rectally three times a week 100 suppository 0    budesonide-formoterol (SYMBICORT) 160-4.5 MCG/ACT Inhaler Inhale 2 puffs once daily plus 1-2 puffs as needed. May use up to 12 puffs per day. 20.4 g 11    calcium carbonate (TUMS) 500 MG chewable tablet Take 2 chew tab by mouth every 3 hours as needed Chew 2 tabs every 3 hours as needed      calcium carbonate 500 mg, elemental, (OSCAL) 500 MG tablet Take 1 tablet (500 mg) by mouth At Bedtime 30 tablet 0    cholecalciferol (VITAMIN D3) 1000 UNIT tablet Take 1 tablet (1,000 Units) by mouth daily 30 tablet 11    diclofenac (VOLTAREN) 1 % topical gel Apply 4 g topically 4 times daily as needed for moderate pain. 350 g 0    erythromycin (E-MYCIN) 250 MG tablet Take 1 tablet (250 mg) by mouth 3 times daily (before meals)      famotidine (PEPCID) 20 MG tablet Take 1 tablet (20 mg) by mouth daily 30 tablet 0    FLUoxetine (PROZAC) 20 MG/5ML solution Take 15 mLs (60 mg) by mouth daily 120 mL 1    gabapentin (NEURONTIN) 250 MG/5ML solution Take 4 mLs (200 mg) by mouth 3 times daily 470 mL 0    guaiFENesin-dextromethorphan (ROBITUSSIN DM) 100-10 MG/5ML syrup Take 10 mLs by mouth every 4 hours as needed for cough      hypromellose (ARTIFICIAL TEARS) 0.5 % SOLN ophthalmic solution Place 2 drops into both eyes 3 times daily      lamoTRIgine (LAMICTAL) 25 MG tablet Take 1 tablet (25 mg) by mouth daily 30 tablet 0    magnesium hydroxide (MILK OF MAGNESIA) 400 MG/5ML suspension Take 30 mLs by mouth daily as needed for constipation or heartburn      melatonin 3 MG tablet Take 2 tablets (6 mg) by  mouth every evening 60 tablet 0    memantine (NAMENDA) 10 MG tablet Take 1 tablet (10 mg) by mouth 2 times daily 60 tablet 0    mirtazapine (REMERON) 15 MG tablet Take 1 tablet (15 mg) by mouth At Bedtime      Multiple Vitamins-Minerals (CEROVITE SENIOR) TABS Take 1 tablet by mouth daily 30 tablet 3    OLANZapine zydis (ZYPREXA) 10 MG ODT Take 1 tablet (10 mg) by mouth At Bedtime 30 tablet 0    OLANZapine zydis (ZYPREXA) 5 MG ODT Take a half tablet (2.5mg) once daily (also taking 10mg at bedtime)      pantoprazole (PROTONIX) 40 MG EC tablet Take 1 tablet (40 mg) by mouth every morning (before breakfast) 30 tablet 0    polyethylene glycol (MIRALAX) 17 GM/Dose powder Take 17 g (1 Capful) by mouth daily. May also take 17 g (1 Capful) daily as needed for constipation.      pravastatin (PRAVACHOL) 40 MG tablet Take 1 tablet (40 mg) by mouth daily. 90 tablet 3    SENNA-docusate sodium (SENNA S) 8.6-50 MG tablet Take 1 tablet by mouth 2 times daily      valACYclovir (VALTREX) 500 MG tablet Give 500 mg by mouth one time a day related to other herpesviral infection for outbreak prevention  3    vitamin B-12 (CYANOCOBALAMIN) 50 MCG tablet Take 1 tablet (50 mcg) by mouth daily 30 tablet 11     No current facility-administered medications for this visit.     Medications are managed by:  NURSE  Patient uses a pill box to manage their medications:  N/A  Patient has questions, concerns, or potential side effects/interactions from their medications:  No  GERIATRIC ROS:    Do you have difficulty getting around, watching TV or reading because of poor eyesight? No - s/p cataract surgery, wears readers  Can you hear normal conversational voice? Yes   Do you use hearing aides? Yes     GENERAL ROS:   General: No unexplained weight gain or loss; adequate sleep pattern.  Resp: No worsening shortness of breath, cough or hemoptysis.   GI: No worsening constipation, no diarrhea, no nausea or vomiting  : Voiding independently with no  significant incontinence   Skin: No areas of new skin breakdown or worrisome rashes  Extremities:  No pain, extremity weakness or balance troubles    Objective: BP (!) 144/76   Pulse 84   Temp 97.5  F (36.4  C)   Resp 18   Wt 58.1 kg (128 lb)   LMP  (LMP Unknown)   BMI 21.97 kg/m     Most recent weight:  128 lb  Gen: Well nourished and in NAD   HEENT: Head is atraumatic, decent dentition  CV: RRR - holosystolic murmur previously documented   Pulm: CTAB, no wheezes/rales/rhonchi, good air entry   Extrem: no cyanosis, edema or clubbing   Psych: Euthymic  Neuro: Pt is able to ambulate independently with walker    Preventative Screening:   Vaccinations reviewed and up to date - could receive RSV vaccine    Healthcare Directive, Living Will, POLST has been completed:  Yes     Assessment/ Plan:  Nursing home resident  60-day visit completed today.  Dementia with behavioral disturbance  Bhavana has a history of dementia. Otherwise, appears well and was conversing appropriately during our encounter  Holosystolic murmur  Murmur noted on exam today. Continue to monitor. Lung sounds clear.     RECOMMENDED FOLLOW UP:  2 months.    Total of 20 minutes was spent in face to face contact with patient with > 50% in counseling and coordination of care.  Options for treatment and/or follow-up care were reviewed with the patient. Bhavanajohn Marr was engaged and actively involved in the decision making process. She verbalized understanding of the options discussed and was satisfied with the final plan.      Clemente Hopkins, DO

## 2025-06-12 NOTE — PROGRESS NOTES
Preceptor Attestation:    I discussed the patient with the resident and evaluated the patient in person. I have verified the content of the note, which accurately reflects my assessment of the patient and the plan of care.  The longitudinal plan of care for the diagnosis(es)/condition(s) as documented were addressed during this visit. Due to the added complexity in care, I will continue to support Bhavana Munguia in the subsequent management and with ongoing continuity of care.   Supervising Physician:  David Lewis MD.

## 2025-06-18 DIAGNOSIS — H04.123 DRY EYES: Primary | ICD-10-CM

## 2025-06-18 NOTE — TELEPHONE ENCOUNTER
hypromellose (ARTIFICIAL TEARS) 0.5 % SOLN ophthalmic solution         Sig: Place 2 drops into both eyes 3 times daily.    Disp: Not specified    Refills:    Start: 6/18/2025    Class: E-Prescribe    Non-formulary    Last ordered: 4 years ago (4/16/2021) by Patient Reported     RAUL Brambila, BSN

## 2025-08-13 ENCOUNTER — TELEPHONE (OUTPATIENT)
Dept: FAMILY MEDICINE | Facility: CLINIC | Age: 85
End: 2025-08-13
Payer: COMMERCIAL

## 2025-08-14 ENCOUNTER — NURSING HOME VISIT (OUTPATIENT)
Dept: FAMILY MEDICINE | Facility: CLINIC | Age: 85
End: 2025-08-14
Payer: COMMERCIAL